# Patient Record
Sex: FEMALE | Race: BLACK OR AFRICAN AMERICAN | NOT HISPANIC OR LATINO | Employment: FULL TIME | URBAN - METROPOLITAN AREA
[De-identification: names, ages, dates, MRNs, and addresses within clinical notes are randomized per-mention and may not be internally consistent; named-entity substitution may affect disease eponyms.]

---

## 2017-07-11 ENCOUNTER — HOSPITAL ENCOUNTER (EMERGENCY)
Facility: HOSPITAL | Age: 32
Discharge: HOME | End: 2017-07-11
Attending: EMERGENCY MEDICINE | Admitting: EMERGENCY MEDICINE

## 2017-07-11 ENCOUNTER — APPOINTMENT (OUTPATIENT)
Dept: CT IMAGING | Facility: HOSPITAL | Age: 32
End: 2017-07-11

## 2017-07-11 VITALS
BODY MASS INDEX: 55.95 KG/M2 | HEART RATE: 66 BPM | RESPIRATION RATE: 20 BRPM | OXYGEN SATURATION: 100 % | DIASTOLIC BLOOD PRESSURE: 106 MMHG | SYSTOLIC BLOOD PRESSURE: 174 MMHG | WEIGHT: 285 LBS | HEIGHT: 60 IN | TEMPERATURE: 98 F

## 2017-07-11 DIAGNOSIS — R20.2 PARESTHESIA OF LEFT ARM AND LEG: ICD-10-CM

## 2017-07-11 DIAGNOSIS — I10 UNCONTROLLED HYPERTENSION: Primary | ICD-10-CM

## 2017-07-11 LAB
BUN BLDA-MCNC: 5 MG/DL (ref 8–26)
CA-I BLDA-SCNC: 1.27 MMOL/L (ref 1.2–1.32)
CHLORIDE BLDA-SCNC: 99 MMOL/L (ref 98–109)
CO2 BLDA-SCNC: 28 MMOL/L (ref 24–29)
CREAT BLDA-MCNC: 0.9 MG/DL (ref 0.6–1.3)
GLUCOSE BLDC GLUCOMTR-MCNC: 96 MG/DL (ref 70–130)
HCT VFR BLDA CALC: 43 % (ref 38–51)
HGB BLDA-MCNC: 14.6 G/DL (ref 12–17)
INR PPP: 1.1 (ref 0.8–1.2)
POTASSIUM BLDA-SCNC: 3.7 MMOL/L (ref 3.5–4.9)
PROTHROMBIN TIME: 12.9 SECONDS (ref 12.8–15.2)
SODIUM BLDA-SCNC: 141 MMOL/L (ref 138–146)

## 2017-07-11 PROCEDURE — 85610 PROTHROMBIN TIME: CPT

## 2017-07-11 PROCEDURE — 80047 BASIC METABLC PNL IONIZED CA: CPT

## 2017-07-11 PROCEDURE — 99284 EMERGENCY DEPT VISIT MOD MDM: CPT

## 2017-07-11 PROCEDURE — 85014 HEMATOCRIT: CPT

## 2017-07-11 PROCEDURE — 70450 CT HEAD/BRAIN W/O DYE: CPT

## 2017-07-11 PROCEDURE — 96374 THER/PROPH/DIAG INJ IV PUSH: CPT

## 2017-07-11 RX ORDER — FLUCONAZOLE 200 MG/1
200 TABLET ORAL 2 TIMES DAILY
COMMUNITY
End: 2020-03-16

## 2017-07-11 RX ORDER — LABETALOL HYDROCHLORIDE 5 MG/ML
20 INJECTION, SOLUTION INTRAVENOUS ONCE
Status: COMPLETED | OUTPATIENT
Start: 2017-07-11 | End: 2017-07-11

## 2017-07-11 RX ORDER — MULTIVIT WITH MINERALS/LUTEIN
1000 TABLET ORAL DAILY
COMMUNITY
End: 2017-08-25

## 2017-07-11 RX ORDER — CLONIDINE HYDROCHLORIDE 0.2 MG/1
TABLET ORAL
Qty: 15 TABLET | Refills: 0 | Status: SHIPPED | OUTPATIENT
Start: 2017-07-11 | End: 2017-08-25

## 2017-07-11 RX ADMIN — LABETALOL HYDROCHLORIDE 20 MG: 5 INJECTION, SOLUTION INTRAVENOUS at 14:11

## 2017-07-11 NOTE — ED PROVIDER NOTES
Subjective   HPI Comments: Frances Hartman is a 31 y.o.female with history of Hypertension, Diabetes Mellitus, and PCLS who presents to the ED with c/o neurologic symptoms. She reports waking up this morning with a frontal headache that has remained constant since. At 1205 her symptoms progressed as she developed left upper extremity numbness and tingling. She feels as though her left arm may be weaker in comparison to her right but she states she is unsure of this. She has history of migraines that are treated with over the counter medications. She denies experiencing the same symptoms during previous migraine episodes. She denies any other complaints at this time.     Patient is a 31 y.o. female presenting with neurologic complaint.   History provided by:  Patient  Neurologic Problem   The patient's primary symptoms include weakness (LUE). Primary symptoms comment: Numbness/tingling. Headache.. This is a new problem. The current episode started today. The neurological problem developed suddenly. Last Known Well Instant: She woke up with the headache this morning and developed numbness/tingling/weakness at 1205.  The problem is unchanged. There was left-sided focality noted. Associated symptoms include headaches (frontal). Past treatments include nothing. There is no history of a CVA.       Review of Systems   Neurological: Positive for weakness (LUE), numbness (and tingling of the LUE) and headaches (frontal).   All other systems reviewed and are negative.      Past Medical History:   Diagnosis Date   • Asthma    • Back pain    • Diabetes mellitus    • Hypertension    • Migraines    • Seasonal allergies        Allergies   Allergen Reactions   • Codeine    • Doxycycline    • Naproxen        Past Surgical History:   Procedure Laterality Date   • CHOLECYSTECTOMY     • HERNIA REPAIR     • KELOID EXCISION     • WISDOM TOOTH EXTRACTION         History reviewed. No pertinent family history.    Social History      Social History   • Marital status: Single     Spouse name: N/A   • Number of children: N/A   • Years of education: N/A     Social History Main Topics   • Smoking status: Never Smoker   • Smokeless tobacco: None   • Alcohol use Yes      Comment: RARE   • Drug use: No   • Sexual activity: Defer     Other Topics Concern   • None     Social History Narrative         Objective   Physical Exam   Constitutional: She is oriented to person, place, and time. She appears well-developed and well-nourished. No distress.   HENT:   Head: Normocephalic and atraumatic.   Eyes: Conjunctivae are normal. No scleral icterus.   Neck: Normal range of motion. Neck supple.   Cardiovascular: Normal rate, regular rhythm, normal heart sounds and intact distal pulses.    No murmur heard.  Pulmonary/Chest: Effort normal and breath sounds normal. No respiratory distress.   Abdominal: Soft. Bowel sounds are normal. There is no tenderness.   Musculoskeletal: Normal range of motion.   Neurological: She is alert and oriented to person, place, and time. No cranial nerve deficit.   No pronator drift. Equal  strength. Sensation and motor function intact. Face appears symmetric. No speech, hearing, or vision deficits noted.    Skin: Skin is warm and dry.   Psychiatric: She has a normal mood and affect. Her behavior is normal.   Nursing note and vitals reviewed.      Procedures         ED Course  ED Course   CT scan negative.  BP very high, pt says typical is 120s/80s with her meds.  Labetalol given and BP came down about 50 points systolic.  Pt reports significant improvement in headache and numbness.  I think her problem is hypertensive related.  We discussed this in detail along with treatment plan, need to monitor BP and keep a log, talk to PCP about potential need for changes in her daily meds.  Discussed expected course, reasons to return and followup.                    MDM  Number of Diagnoses or Management Options  Paresthesia of left arm  and leg:   Uncontrolled hypertension:      Amount and/or Complexity of Data Reviewed  Clinical lab tests: reviewed and ordered  Tests in the radiology section of CPT®: ordered and reviewed  Independent visualization of images, tracings, or specimens: yes        Final diagnoses:   Uncontrolled hypertension   Paresthesia of left arm and leg       Documentation assistance provided by kari Terrell.  Information recorded by the scribe was done at my direction and has been verified and validated by me.     Sarah Terrell  07/11/17 [id number]       Mikey Escalera MD  07/11/17 [id number]

## 2017-08-23 PROBLEM — E03.8 SUBCLINICAL HYPOTHYROIDISM: Status: ACTIVE | Noted: 2017-08-23

## 2017-08-23 PROBLEM — K21.9 GERD (GASTROESOPHAGEAL REFLUX DISEASE): Status: ACTIVE | Noted: 2017-08-23

## 2017-08-23 PROBLEM — L91.0 KELOID SKIN DISORDER: Status: ACTIVE | Noted: 2017-08-23

## 2017-08-23 PROBLEM — B02.9 HERPES ZOSTER: Status: ACTIVE | Noted: 2017-08-23

## 2017-08-23 PROBLEM — E78.5 HYPERLIPIDEMIA: Status: ACTIVE | Noted: 2017-08-23

## 2017-08-23 PROBLEM — I87.2 VENOUS INSUFFICIENCY (CHRONIC) (PERIPHERAL): Status: ACTIVE | Noted: 2017-08-23

## 2017-08-23 PROBLEM — E28.2 POLYCYSTIC OVARIES: Status: ACTIVE | Noted: 2017-08-23

## 2017-08-23 PROBLEM — J30.2 SEASONAL ALLERGIES: Status: ACTIVE | Noted: 2017-08-23

## 2017-08-23 PROBLEM — E66.01 MORBID OBESITY (HCC): Status: ACTIVE | Noted: 2017-08-23

## 2017-08-23 PROBLEM — D64.9 ANEMIA: Status: ACTIVE | Noted: 2017-08-23

## 2017-08-23 PROBLEM — N93.9 ABNORMAL UTERINE BLEEDING: Status: ACTIVE | Noted: 2017-08-23

## 2017-08-23 PROBLEM — R53.82 CHRONIC FATIGUE: Status: ACTIVE | Noted: 2017-08-23

## 2017-08-23 PROBLEM — E11.9 TYPE 2 DIABETES MELLITUS: Status: ACTIVE | Noted: 2017-08-23

## 2017-08-23 PROBLEM — J45.909 ASTHMA: Status: ACTIVE | Noted: 2017-08-23

## 2017-08-23 PROBLEM — F32.A DEPRESSION: Status: ACTIVE | Noted: 2017-08-23

## 2017-08-23 PROBLEM — G43.909 MIGRAINE: Status: ACTIVE | Noted: 2017-08-23

## 2017-08-23 PROBLEM — I10 HYPERTENSION: Status: ACTIVE | Noted: 2017-08-23

## 2017-08-23 PROBLEM — J35.1 CHRONIC TONSILLAR HYPERTROPHY: Status: ACTIVE | Noted: 2017-08-23

## 2017-08-25 ENCOUNTER — CONSULT (OUTPATIENT)
Dept: CARDIOLOGY | Facility: CLINIC | Age: 32
End: 2017-08-25

## 2017-08-25 VITALS
HEART RATE: 59 BPM | BODY MASS INDEX: 54.81 KG/M2 | DIASTOLIC BLOOD PRESSURE: 94 MMHG | HEIGHT: 60 IN | SYSTOLIC BLOOD PRESSURE: 140 MMHG | WEIGHT: 279.2 LBS

## 2017-08-25 DIAGNOSIS — E66.01 MORBID OBESITY DUE TO EXCESS CALORIES (HCC): ICD-10-CM

## 2017-08-25 DIAGNOSIS — G43.109 MIGRAINE WITH AURA AND WITHOUT STATUS MIGRAINOSUS, NOT INTRACTABLE: ICD-10-CM

## 2017-08-25 DIAGNOSIS — E78.2 MIXED HYPERLIPIDEMIA: ICD-10-CM

## 2017-08-25 DIAGNOSIS — I10 ESSENTIAL HYPERTENSION: Primary | ICD-10-CM

## 2017-08-25 PROCEDURE — 93000 ELECTROCARDIOGRAM COMPLETE: CPT | Performed by: INTERNAL MEDICINE

## 2017-08-25 PROCEDURE — 99203 OFFICE O/P NEW LOW 30 MIN: CPT | Performed by: INTERNAL MEDICINE

## 2017-08-25 RX ORDER — OMEGA-3S/DHA/EPA/FISH OIL/D3 300MG-1000
400 CAPSULE ORAL DAILY
COMMUNITY
End: 2019-02-01

## 2017-08-25 RX ORDER — PRENATAL VIT NO.126/IRON/FOLIC 28MG-0.8MG
1 TABLET ORAL DAILY
COMMUNITY
End: 2017-10-05

## 2017-08-25 RX ORDER — HYDROCODONE BITARTRATE AND ACETAMINOPHEN 5; 325 MG/1; MG/1
1 TABLET ORAL AS NEEDED
COMMUNITY
End: 2019-02-01 | Stop reason: SDUPTHER

## 2017-08-25 RX ORDER — LISINOPRIL AND HYDROCHLOROTHIAZIDE 12.5; 1 MG/1; MG/1
1 TABLET ORAL DAILY
Qty: 30 TABLET | Refills: 6 | Status: SHIPPED | OUTPATIENT
Start: 2017-08-25 | End: 2017-09-15

## 2017-08-25 RX ORDER — CEFADROXIL 500 MG/1
500 CAPSULE ORAL 2 TIMES DAILY
COMMUNITY
End: 2019-02-01

## 2017-08-25 NOTE — PROGRESS NOTES
Subjective   Frances Hartman is a 31 y.o. female.  LASHANDA Michel PA  [address] [place] [address]      Chief Complaint   Patient presents with   • Irregular Heart Beat   • Shortness of Breath   • Dizziness   • Edema       Patient Active Problem List    Diagnosis   • Asthma [J45.909]   • Chronic fatigue [R53.82]   • Chronic tonsillar hypertrophy [J35.1]   • Type 2 diabetes mellitus [E11.9]   • Hyperlipidemia [E78.5]   • Hypertension [I10]     Overview Note:     1. Echo 7-11-14:   1. The estimated ejection fraction is 55-60%.    2. All valves are structurally and functionally normal with no hemodynamically significant valve  disease.      • Keloid skin disorder [L91.0]   • Morbid obesity [E66.01]   • Polycystic ovaries [E28.2]   • Seasonal allergies [J30.2]   • Subclinical hypothyroidism [E03.9]   • Venous insufficiency (chronic) (peripheral) [I87.2]   • Abnormal uterine bleeding [N93.9]     Overview Note:     h/o     • Anemia [D64.9]     Overview Note:     h/o     • Depression [F32.9]     Overview Note:     h/o     • GERD (gastroesophageal reflux disease) [K21.9]     Overview Note:     H/o     • Herpes zoster [B02.9]     Overview Note:     h/o     • Migraine with aura [G43.109]     Overview Note:     1.  Onset age 8          History of Present Illness   This is a 31 year old hypertensive obese -American female with no significant cardiac history with exception of onset of hypertension at age 21.  She reports having had a stress test at that time which was apparently reported to be unremarkable.  She had an echocardiogram 3 years ago which was also unremarkable.  She recently presented to the [facility] emergency department with a complaint of left arm numbness progressing to left leg numbness she had some mild confusion but no dysarthria.  She reports that her blood pressure on arrival in the emergency department was 255/122.  She had a CT of the  "head which was unremarkable.  She was treated in the emergency department with clonidine which brought her blood pressure down approximately 50 points prior to discharge.  She was discharged to home with a prescription for Ziac in place of her previous atenolol however she reports seeing no difference in blood pressures at home and went back to her previous medical regimen.  She has been on atenolol 50 mg for many years.  She takes Lasix when necessary leg swelling.  She checks her blood pressure periodically at home the last being 133/77.  She has a history of migraine with aura with onset at age 8.  She reports having 2-3 migraines per week which she treats with OTC medications.  She states that she has a \"high pain tolerance\" but has missed work on several occasions due to headache.  She states that she had a migraine prior to her left-sided numbness and tingling.  She had a TSH and T4 in June of this year both of which were normal.  She has a history of diabetes which is reported to be well controlled by most recent laboratory evaluation.  She is not currently on an ACE inhibitor or ARB.  She has never been on a statin.  She has no complaint of exertional chest pain or dyspnea and orthopnea no PND she does have periodic lower extremity edema she has rare awareness of palpitations which are self-limiting.    The following portions of the patient's history were reviewed and updated as appropriate: allergies, current medications, past family history, past medical history, past social history, past surgical history and problem list.    Allergies   Allergen Reactions   • Augmentin [Amoxicillin-Pot Clavulanate]    • Codeine    • Doxycycline    • Naproxen          Current Outpatient Prescriptions:   •  Acetaminophen (TYLENOL ARTHRITIS PAIN PO), Take  by mouth., Disp: , Rfl:   •  albuterol (PROVENTIL HFA;VENTOLIN HFA) 108 (90 BASE) MCG/ACT inhaler, Inhale 2 puffs Every 4 (Four) Hours As Needed for wheezing., Disp: , " Rfl:   •  atenolol (TENORMIN) 50 MG tablet, Take 25 mg by mouth 2 (Two) Times a Day., Disp: , Rfl:   •  cefadroxil (DURICEF) 500 MG capsule, Take 500 mg by mouth 2 (Two) Times a Day., Disp: , Rfl:   •  cholecalciferol (VITAMIN D3) 400 units tablet, Take 400 Units by mouth Daily., Disp: , Rfl:   •  cyclobenzaprine (FLEXERIL) 10 MG tablet, Take 10 mg by mouth 3 (Three) Times a Day As Needed for muscle spasms., Disp: , Rfl:   •  diphenhydrAMINE (BENADRYL) 25 mg capsule, Take 25 mg by mouth Every 6 (Six) Hours As Needed for itching., Disp: , Rfl:   •  fluconazole (DIFLUCAN) 200 MG tablet, Take 600 mg by mouth Daily., Disp: , Rfl:   •  furosemide (LASIX) 40 MG tablet, Take 40 mg by mouth As Needed., Disp: , Rfl:   •  gabapentin (NEURONTIN) 300 MG capsule, Take 300 mg by mouth 3 (Three) Times a Day As Needed., Disp: , Rfl:   •  glipiZIDE (GLUCOTROL) 5 MG tablet, Take 5 mg by mouth 2 (Two) Times a Day Before Meals., Disp: , Rfl:   •  HYDROcodone-acetaminophen (NORCO) 5-325 MG per tablet, Take 1 tablet by mouth As Needed., Disp: , Rfl:   •  loratadine (CLARITIN) 10 MG tablet, Take 10 mg by mouth Daily., Disp: , Rfl:   •  metFORMIN (GLUCOPHAGE) 500 MG tablet, Take 500 mg by mouth Daily., Disp: , Rfl:   •  Multiple Vitamins-Minerals (MULTIVITAMIN ADULT PO), Take 1 tablet by mouth Daily., Disp: , Rfl:   •  norethindrone (AYGESTIN) 5 MG tablet, Take 5 mg by mouth Daily. 10 days per month, Disp: , Rfl:   •  potassium chloride (MICRO-K) 10 MEQ CR capsule, Take 10 mEq by mouth Daily As Needed., Disp: , Rfl:   •  Prenatal Vit-Fe Fumarate-FA (PRENATAL, CLASSIC, VITAMIN) 28-0.8 MG tablet tablet, Take 1 tablet by mouth Daily., Disp: , Rfl:   •  traMADol (ULTRAM) 50 MG tablet, Take 50 mg by mouth Every 6 (Six) Hours As Needed for moderate pain (4-6)., Disp: , Rfl:       Review of Systems   Constitution: Positive for malaise/fatigue and weight gain.   HENT: Negative.    Eyes: Negative.    Cardiovascular: Positive for leg swelling and  "palpitations. Negative for chest pain, claudication and paroxysmal nocturnal dyspnea.   Respiratory: Positive for shortness of breath.    Endocrine: Negative.    Hematologic/Lymphatic: Negative.    Skin: Negative.    Musculoskeletal: Positive for arthritis and myalgias.   Gastrointestinal: Positive for heartburn, nausea and vomiting.   Genitourinary: Negative.    Neurological: Positive for difficulty with concentration, dizziness and vertigo.   Psychiatric/Behavioral: Negative.    Allergic/Immunologic: Negative.    All other systems reviewed and are negative.      Social History     Social History   • Marital status: Single     Spouse name: N/A   • Number of children: N/A   • Years of education: N/A     Occupational History   • Not on file.     Social History Main Topics   • Smoking status: Never Smoker   • Smokeless tobacco: Not on file   • Alcohol use Yes      Comment: RARE   • Drug use: No   • Sexual activity: Defer     Other Topics Concern   • Not on file     Social History Narrative       Family History   Problem Relation Age of Onset   • Arthritis Mother    • Diabetes Mother    • Obesity Mother    • Dementia Father    • Heart attack Father    • Stroke Other      CVA       Objective      Blood pressure 140/94, pulse 59, height 60\" (152.4 cm), weight 279 lb 3.2 oz (127 kg).    Physical Exam   Constitutional: She is oriented to person, place, and time. She appears well-developed and well-nourished. No distress.   HENT:   Head: Normocephalic and atraumatic.   Mouth/Throat: Oropharynx is clear and moist.   Eyes: Pupils are equal, round, and reactive to light. No scleral icterus.   Neck: Neck supple. No JVD present. No tracheal deviation present. No thyromegaly present.   Cardiovascular: Normal rate, regular rhythm and normal heart sounds.  Exam reveals no gallop and no friction rub.    No murmur heard.  Pulmonary/Chest: Effort normal and breath sounds normal. No respiratory distress. She has no wheezes. She has no " rales.   Abdominal: Soft. Bowel sounds are normal. She exhibits no distension and no mass. There is no tenderness. There is no rebound and no guarding.   Musculoskeletal: Normal range of motion. She exhibits no edema or deformity.   Lymphadenopathy:     She has no cervical adenopathy.   Neurological: She is alert and oriented to person, place, and time. No cranial nerve deficit.   Skin: Skin is warm and dry. No rash noted. She is not diaphoretic.   Psychiatric: She has a normal mood and affect. Her behavior is normal.   Nursing note and vitals reviewed.        ECG 12 Lead  Date/Time: 8/25/2017 12:06 PM  Performed by: SLIME TOVAR  Authorized by: SLIME TOVAR   Rhythm: sinus rhythm  Rate: normal  Conduction: conduction normal  ST Segments: ST segments normal  T Waves: T waves normal  QRS axis: normal  Other: no other findings  Clinical impression: normal ECG                POC CHEM 8   Order: [id number]   Status:  Final result   Visible to patient:  No (Not Released)      Ref Range & Units 1mo ago     Glucose 70 - 130 mg/dL 96   BUN, Arterial 8 - 26 mg/dL 5 (L)   Creatinine 0.60 - 1.30 mg/dL 0.90   Sodium 138 - 146 mmol/L 141   Potassium 3.5 - 4.9 mmol/L 3.7   Chloride 98 - 109 mmol/L 99   Total CO2 24 - 29 mmol/L 28   Hemoglobin 12.0 - 17.0 g/dL 14.6   Comments: Serial Number: [id number]:  [id number]   Hematocrit 38 - 51 % 43   Ionized Calcium 1.20 - 1.32 mmol/L 1.27   Resulting Agency  [facility]      Specimen Collected: 07/11/17  1:28 PM Last Resulted: 07/11/17  1:36 PM                 Study Result   EXAMINATION: CT HEAD WO CONTRAST STROKE PROTOCOL- 0711/2017      INDICATION: left sided weakness onset 1 hour ago          TECHNIQUE: Axial CT head without intravenous contrast administration  utilizing stroke protocol      COMPARISON: NONE      FINDINGS: No large area of low attenuation to suggest large infarction.  No intracranial hemorrhage, mass or mass effect. No midline shift. Basal  cisterns are patent.  Ventricles and sulci are symmetric and within  normal limits for patient age. Globes and orbits are normal. Visualized  portions of the paranasal sinuses and mastoid air cells are grossly  clear.      IMPRESSION:  No acute intracranial abnormality.      Findings were discussed in person at scanner 1319 hours on 07/11/2017 by  Dr. Hannon with Dr. Escalera.      D:  07/11/2017  E:  07/11/2017      This report was finalized on 7/11/2017 3:29 PM by Dr. Mann Hannon.     TSH 6-2-17  2.35  T4  8    Assessment:   Diagnosis Plan   1. Essential hypertension  Adult Transthoracic Echo Complete   2. Mixed hyperlipidemia , Diet and exercise recommended with follow up with PCP.      3. Morbid obesity due to excess calories Diet and exercise recommended.      4. Migraine with aura and without status migrainosus, not intractable  Ambulatory Referral to Neurology     Plan:  Her episodes of uncontrolled hypertension appeared to be in the setting of frequent migraine headache, we feel that once this problem is addressed blood pressure control should improve.  Diet and exercise with focus on weight loss recommended, DASH diet prescribed.  She will be started on lisinopril HCTZ 10/12.5 mg daily and will continue rest of the current medications.   FU in 3 MO, sooner as needed.  Thank you for allowing us to participate in the care of your patient.     Scribed for Julianna Astudillo MD by Gumaro Tello PA-C. 8/25/2017  11:49 AM    IJulianna MD, personally performed the services described in this documentation as scribed by the above named individual in my presence, and it is both accurate and complete.  8/25/2017  12:48 PM

## 2017-09-15 ENCOUNTER — TELEPHONE (OUTPATIENT)
Dept: CARDIOLOGY | Facility: CLINIC | Age: 32
End: 2017-09-15

## 2017-09-15 RX ORDER — LISINOPRIL 20 MG/1
20 TABLET ORAL DAILY
Qty: 30 TABLET | Refills: 5 | Status: SHIPPED | OUTPATIENT
Start: 2017-09-15 | End: 2017-10-05

## 2017-09-15 NOTE — TELEPHONE ENCOUNTER
She can discontinue HCTZ, start lisinopril 20 mg daily, keep taking all other medications, monitor blood pressure and report if pressure is higher than 140.

## 2017-09-15 NOTE — TELEPHONE ENCOUNTER
Patient reports that she forgot to inform us that she was intolerant to hydrochlorothiazide as it makes her retain fluid.  She has stopped the medication and I will consult with Dr. Astudillo for further recommendations.    She is taking Prinizide which contains lisinopril as well.  Patient reports no known problems with lisinopril.     She was seen for a consult visit on 8/25/17.      Please advise.

## 2017-09-19 ENCOUNTER — HOSPITAL ENCOUNTER (OUTPATIENT)
Dept: CARDIOLOGY | Facility: HOSPITAL | Age: 32
Discharge: HOME | End: 2017-09-19
Admitting: PHYSICIAN ASSISTANT

## 2017-09-19 PROCEDURE — 93306 TTE W/DOPPLER COMPLETE: CPT | Performed by: INTERNAL MEDICINE

## 2017-09-19 PROCEDURE — 93306 TTE W/DOPPLER COMPLETE: CPT

## 2017-09-20 LAB
BH CV ECHO MEAS - AO ROOT AREA (BSA CORRECTED): 1.3
BH CV ECHO MEAS - AO ROOT AREA: 6.1 CM^2
BH CV ECHO MEAS - AO ROOT DIAM: 2.8 CM
BH CV ECHO MEAS - BSA(HAYCOCK): 2.4 M^2
BH CV ECHO MEAS - BSA: 2.2 M^2
BH CV ECHO MEAS - BZI_BMI: 54.5 KILOGRAMS/M^2
BH CV ECHO MEAS - BZI_METRIC_HEIGHT: 152.4 CM
BH CV ECHO MEAS - BZI_METRIC_WEIGHT: 126.6 KG
BH CV ECHO MEAS - CONTRAST EF (2CH): 56.1 ML/M^2
BH CV ECHO MEAS - CONTRAST EF 4CH: 63.1 ML/M^2
BH CV ECHO MEAS - EDV(CUBED): 105.1 ML
BH CV ECHO MEAS - EDV(MOD-SP2): 66 ML
BH CV ECHO MEAS - EDV(MOD-SP4): 65 ML
BH CV ECHO MEAS - EDV(TEICH): 103.4 ML
BH CV ECHO MEAS - EF(CUBED): 65.7 %
BH CV ECHO MEAS - EF(MOD-SP2): 56.1 %
BH CV ECHO MEAS - EF(MOD-SP4): 63.1 %
BH CV ECHO MEAS - EF(TEICH): 57.1 %
BH CV ECHO MEAS - ESV(CUBED): 36.1 ML
BH CV ECHO MEAS - ESV(MOD-SP2): 29 ML
BH CV ECHO MEAS - ESV(MOD-SP4): 24 ML
BH CV ECHO MEAS - ESV(TEICH): 44.3 ML
BH CV ECHO MEAS - FS: 30 %
BH CV ECHO MEAS - IVS/LVPW: 0.82
BH CV ECHO MEAS - IVSD: 1 CM
BH CV ECHO MEAS - LA DIMENSION: 3.8 CM
BH CV ECHO MEAS - LA/AO: 1.4
BH CV ECHO MEAS - LAT PEAK E' VEL: 13.7 CM/SEC
BH CV ECHO MEAS - LV DIASTOLIC VOL/BSA (35-75): 30.2 ML/M^2
BH CV ECHO MEAS - LV MASS(C)D: 192.5 GRAMS
BH CV ECHO MEAS - LV MASS(C)DI: 89.5 GRAMS/M^2
BH CV ECHO MEAS - LV MAX PG: 3.7 MMHG
BH CV ECHO MEAS - LV MEAN PG: 2.1 MMHG
BH CV ECHO MEAS - LV SYSTOLIC VOL/BSA (12-30): 11.2 ML/M^2
BH CV ECHO MEAS - LV V1 MAX: 96.7 CM/SEC
BH CV ECHO MEAS - LV V1 MEAN: 69.4 CM/SEC
BH CV ECHO MEAS - LV V1 VTI: 19.6 CM
BH CV ECHO MEAS - LVIDD: 4.7 CM
BH CV ECHO MEAS - LVIDS: 3.3 CM
BH CV ECHO MEAS - LVLD AP2: 7.8 CM
BH CV ECHO MEAS - LVLD AP4: 8.2 CM
BH CV ECHO MEAS - LVLS AP2: 6.3 CM
BH CV ECHO MEAS - LVLS AP4: 6.7 CM
BH CV ECHO MEAS - LVOT AREA (M): 2.3 CM^2
BH CV ECHO MEAS - LVOT AREA: 2.3 CM^2
BH CV ECHO MEAS - LVOT DIAM: 1.7 CM
BH CV ECHO MEAS - LVPWD: 1.2 CM
BH CV ECHO MEAS - MED PEAK E' VEL: 10.3 CM/SEC
BH CV ECHO MEAS - MV A MAX VEL: 72.1 CM/SEC
BH CV ECHO MEAS - MV E MAX VEL: 129.8 CM/SEC
BH CV ECHO MEAS - MV E/A: 1.8
BH CV ECHO MEAS - PA ACC SLOPE: 650.7 CM/SEC^2
BH CV ECHO MEAS - PA ACC TIME: 0.14 SEC
BH CV ECHO MEAS - PA PR(ACCEL): 14.8 MMHG
BH CV ECHO MEAS - RAP SYSTOLE: 8 MMHG
BH CV ECHO MEAS - RVDD: 2.9 CM
BH CV ECHO MEAS - RVSP: 32.3 MMHG
BH CV ECHO MEAS - SI(CUBED): 32.1 ML/M^2
BH CV ECHO MEAS - SI(LVOT): 21 ML/M^2
BH CV ECHO MEAS - SI(MOD-SP2): 17.2 ML/M^2
BH CV ECHO MEAS - SI(MOD-SP4): 19.1 ML/M^2
BH CV ECHO MEAS - SI(TEICH): 27.5 ML/M^2
BH CV ECHO MEAS - SV(CUBED): 69 ML
BH CV ECHO MEAS - SV(LVOT): 45.2 ML
BH CV ECHO MEAS - SV(MOD-SP2): 37 ML
BH CV ECHO MEAS - SV(MOD-SP4): 41 ML
BH CV ECHO MEAS - SV(TEICH): 59.1 ML
BH CV ECHO MEAS - TR MAX VEL: 246.5 CM/SEC
BH CV VAS BP RIGHT ARM: NORMAL MMHG
BH CV XLRA - RV BASE: 2.9 CM
BH CV XLRA - RV LENGTH: 6.2 CM
BH CV XLRA - RV MID: 2.3 CM
BH CV XLRA - TDI S': 13.1 CM/SEC
E/E' RATIO: 10
LEFT ATRIUM VOLUME INDEX: 14.4 ML/M2
LV EF 2D ECHO EST: 65 %

## 2017-10-05 ENCOUNTER — OFFICE VISIT (OUTPATIENT)
Dept: NEUROLOGY | Facility: CLINIC | Age: 32
End: 2017-10-05

## 2017-10-05 VITALS
HEART RATE: 56 BPM | BODY MASS INDEX: 55.17 KG/M2 | WEIGHT: 281 LBS | SYSTOLIC BLOOD PRESSURE: 140 MMHG | DIASTOLIC BLOOD PRESSURE: 90 MMHG | HEIGHT: 60 IN | OXYGEN SATURATION: 97 %

## 2017-10-05 DIAGNOSIS — G43.109 MIGRAINE WITH AURA AND WITHOUT STATUS MIGRAINOSUS, NOT INTRACTABLE: ICD-10-CM

## 2017-10-05 DIAGNOSIS — G43.C0 PERIODIC HEADACHE SYNDROME, NOT INTRACTABLE: Primary | ICD-10-CM

## 2017-10-05 PROCEDURE — 99245 OFF/OP CONSLTJ NEW/EST HI 55: CPT | Performed by: PSYCHIATRY & NEUROLOGY

## 2017-10-05 RX ORDER — CITALOPRAM 10 MG/1
TABLET ORAL
COMMUNITY
Start: 2017-09-21 | End: 2019-02-01

## 2017-10-05 RX ORDER — TOPIRAMATE 25 MG/1
TABLET ORAL
Qty: 120 TABLET | Refills: 3 | Status: SHIPPED | OUTPATIENT
Start: 2017-10-05 | End: 2017-12-13

## 2017-10-05 NOTE — PROGRESS NOTES
Subjective:   Chief Complaint   Patient presents with   • Migraine       Patient ID: Frances Hartman is a 31 y.o. female.    History of Present Illness     31 y.o. woman referred by Dr Astudillo for migraines.  HA frequency is daily.  Located in front of head with squeezing and throbbing sensation moderate to severe intensity.  Awakens with HA and will decrease slightly by bedtime.  Treating with Tylenol, tramadol, Lortab on a daily.  Previous trial of triptans with minimal response.       Reviewed Dr Astudillo's notes:    Presented to [facility] ED on with /122 and left A/L numbness and confusion.  Treated with clonidine and BP decreased.  H/O migraines since 8 yoa.  2 - 3 HA a week treated with OTC.  Echo  - EF 55-60,     HCT, my review of films, 7/11/17 normal  CBC, CMP, TSH, Hep B, HIV, RPR, INR - NCS    The following portions of the patient's history were reviewed and updated as appropriate:   She  has a past medical history of Asthma; Back pain; Diabetes mellitus; History of fracture; History of gallstones; Hypertension; Left lower quadrant pain; Migraines; Seasonal allergies; and UTI (urinary tract infection).  She  does not have any pertinent problems on file.  She  has a past surgical history that includes Keloid excision; Cholecystectomy; [place] tooth extraction; Hernia repair; and Other surgical history.  Her family history includes Arthritis in her mother; Dementia in her father; Diabetes in her mother; Heart attack in her father; Obesity in her mother; Stroke in her other.  She  reports that she has never smoked. She has never used smokeless tobacco. She reports that she drinks alcohol. She reports that she does not use illicit drugs.  Current Outpatient Prescriptions   Medication Sig Dispense Refill   • Acetaminophen (TYLENOL ARTHRITIS PAIN PO) Take  by mouth.     • albuterol (PROVENTIL HFA;VENTOLIN HFA) 108 (90 BASE) MCG/ACT inhaler Inhale 2 puffs Every 4 (Four) Hours As Needed for wheezing.     •  atenolol (TENORMIN) 50 MG tablet Take 25 mg by mouth 2 (Two) Times a Day.     • cefadroxil (DURICEF) 500 MG capsule Take 500 mg by mouth 2 (Two) Times a Day.     • cholecalciferol (VITAMIN D3) 400 units tablet Take 400 Units by mouth Daily.     • citalopram (CeleXA) 10 MG tablet      • cyclobenzaprine (FLEXERIL) 10 MG tablet Take 10 mg by mouth 3 (Three) Times a Day As Needed for muscle spasms.     • diphenhydrAMINE (BENADRYL) 25 mg capsule Take 25 mg by mouth Every 6 (Six) Hours As Needed for itching.     • fluconazole (DIFLUCAN) 200 MG tablet Take 600 mg by mouth Daily.     • furosemide (LASIX) 40 MG tablet Take 40 mg by mouth As Needed.     • gabapentin (NEURONTIN) 300 MG capsule Take 300 mg by mouth 3 (Three) Times a Day As Needed.     • glipiZIDE (GLUCOTROL) 5 MG tablet Take 5 mg by mouth 2 (Two) Times a Day Before Meals.     • HYDROcodone-acetaminophen (NORCO) 5-325 MG per tablet Take 1 tablet by mouth As Needed.     • loratadine (CLARITIN) 10 MG tablet Take 10 mg by mouth Daily.     • metFORMIN (GLUCOPHAGE) 500 MG tablet Take 500 mg by mouth Daily.     • norethindrone (AYGESTIN) 5 MG tablet Take 5 mg by mouth Daily. 10 days per month     • potassium chloride (MICRO-K) 10 MEQ CR capsule Take 10 mEq by mouth Daily As Needed.     • traMADol (ULTRAM) 50 MG tablet Take 50 mg by mouth Every 6 (Six) Hours As Needed for moderate pain (4-6).     • topiramate (TOPAMAX) 25 MG tablet Take one tablet twice a day for one week, then two tablets twice a day 120 tablet 3     No current facility-administered medications for this visit.      Current Outpatient Prescriptions on File Prior to Visit   Medication Sig   • Acetaminophen (TYLENOL ARTHRITIS PAIN PO) Take  by mouth.   • albuterol (PROVENTIL HFA;VENTOLIN HFA) 108 (90 BASE) MCG/ACT inhaler Inhale 2 puffs Every 4 (Four) Hours As Needed for wheezing.   • atenolol (TENORMIN) 50 MG tablet Take 25 mg by mouth 2 (Two) Times a Day.   • cefadroxil (DURICEF) 500 MG capsule Take  500 mg by mouth 2 (Two) Times a Day.   • cholecalciferol (VITAMIN D3) 400 units tablet Take 400 Units by mouth Daily.   • cyclobenzaprine (FLEXERIL) 10 MG tablet Take 10 mg by mouth 3 (Three) Times a Day As Needed for muscle spasms.   • diphenhydrAMINE (BENADRYL) 25 mg capsule Take 25 mg by mouth Every 6 (Six) Hours As Needed for itching.   • fluconazole (DIFLUCAN) 200 MG tablet Take 600 mg by mouth Daily.   • furosemide (LASIX) 40 MG tablet Take 40 mg by mouth As Needed.   • gabapentin (NEURONTIN) 300 MG capsule Take 300 mg by mouth 3 (Three) Times a Day As Needed.   • glipiZIDE (GLUCOTROL) 5 MG tablet Take 5 mg by mouth 2 (Two) Times a Day Before Meals.   • HYDROcodone-acetaminophen (NORCO) 5-325 MG per tablet Take 1 tablet by mouth As Needed.   • loratadine (CLARITIN) 10 MG tablet Take 10 mg by mouth Daily.   • metFORMIN (GLUCOPHAGE) 500 MG tablet Take 500 mg by mouth Daily.   • norethindrone (AYGESTIN) 5 MG tablet Take 5 mg by mouth Daily. 10 days per month   • potassium chloride (MICRO-K) 10 MEQ CR capsule Take 10 mEq by mouth Daily As Needed.   • traMADol (ULTRAM) 50 MG tablet Take 50 mg by mouth Every 6 (Six) Hours As Needed for moderate pain (4-6).   • [DISCONTINUED] lisinopril (PRINIVIL,ZESTRIL) 20 MG tablet Take 1 tablet by mouth Daily.   • [DISCONTINUED] Multiple Vitamins-Minerals (MULTIVITAMIN ADULT PO) Take 1 tablet by mouth Daily.   • [DISCONTINUED] Prenatal Vit-Fe Fumarate-FA (PRENATAL, CLASSIC, VITAMIN) 28-0.8 MG tablet tablet Take 1 tablet by mouth Daily.     No current facility-administered medications on file prior to visit.      She is allergic to hydrochlorothiazide; augmentin [amoxicillin-pot clavulanate]; codeine; doxycycline; and naproxen..    Review of Systems   Constitutional: Negative for activity change, fatigue and unexpected weight change.   HENT: Negative for tinnitus and trouble swallowing.    Eyes: Negative for photophobia and visual disturbance.   Respiratory: Negative for apnea,  cough and choking.    Cardiovascular: Negative for leg swelling.   Gastrointestinal: Negative for nausea and vomiting.   Endocrine: Negative for cold intolerance and heat intolerance.   Genitourinary: Negative for difficulty urinating, frequency, menstrual problem and urgency.   Musculoskeletal: Negative for back pain, gait problem, myalgias and neck pain.   Skin: Negative for color change and rash.   Allergic/Immunologic: Negative for immunocompromised state.   Neurological: Positive for weakness, numbness and headaches. Negative for dizziness, tremors, seizures, syncope, facial asymmetry, speech difficulty and light-headedness.   Hematological: Negative for adenopathy. Does not bruise/bleed easily.   Psychiatric/Behavioral: Positive for confusion. Negative for behavioral problems, decreased concentration, hallucinations and sleep disturbance.        Objective:    Neurologic Exam     Mental Status   Oriented to person, place, and time.   Registration: recalls 3 of 3 objects. Recall at 5 minutes: recalls 3 of 3 objects. Follows 3 step commands.   Attention: normal. Concentration: normal.   Speech: speech is normal   Level of consciousness: alert  Knowledge: good and consistent with education.   Able to name object. Able to read. Able to repeat. Able to write. Normal comprehension.     Cranial Nerves     CN II   Visual fields full to confrontation.   Visual acuity: normal  Right visual field deficit: none  Left visual field deficit: none     CN III, IV, VI   Pupils are equal, round, and reactive to light.  Extraocular motions are normal.   Right pupil: Shape: regular. Reactivity: brisk. Consensual response: intact.   Left pupil: Shape: regular. Reactivity: brisk. Consensual response: intact.   Nystagmus: none   Diplopia: none  Ophthalmoparesis: none  Upgaze: normal  Downgaze: normal  Conjugate gaze: present  Vestibulo-ocular reflex: present    CN V   Facial sensation intact.   Right corneal reflex: normal  Left  corneal reflex: normal    CN VII   Right facial weakness: none  Left facial weakness: none    CN VIII   Hearing: intact    CN IX, X   Palate: symmetric  Right gag reflex: normal  Left gag reflex: normal    CN XI   Right sternocleidomastoid strength: normal  Left sternocleidomastoid strength: normal    CN XII   Tongue: not atrophic  Fasciculations: absent  Tongue deviation: none    Motor Exam   Muscle bulk: normal  Overall muscle tone: normal  Right arm tone: normal  Left arm tone: normal  Right leg tone: normal  Left leg tone: normal    Strength   Strength 5/5 throughout.     Sensory Exam   Light touch normal.   Vibration normal.   Proprioception normal.   Pinprick normal.     Gait, Coordination, and Reflexes     Gait  Gait: normal    Coordination   Romberg: negative  Finger to nose coordination: normal  Heel to shin coordination: normal  Tandem walking coordination: normal    Tremor   Resting tremor: absent  Intention tremor: absent  Action tremor: absent    Reflexes   Reflexes 2+ except as noted.       Physical Exam   Constitutional: She is oriented to person, place, and time. Vital signs are normal. She appears well-developed and well-nourished.   HENT:   Head: Normocephalic and atraumatic.   Eyes: EOM and lids are normal. Pupils are equal, round, and reactive to light.   Fundoscopic exam:       The right eye shows no exudate, no hemorrhage and no papilledema. The right eye shows venous pulsations.        The left eye shows no exudate, no hemorrhage and no papilledema. The left eye shows venous pulsations.   Neck: Normal range of motion and phonation normal. Normal carotid pulses present. Carotid bruit is not present. No thyroid mass and no thyromegaly present.   Cardiovascular: Normal rate, regular rhythm and normal heart sounds.    Pulmonary/Chest: Effort normal.   Neurological: She is oriented to person, place, and time. She has normal strength. She has a normal Finger-Nose-Finger Test, a normal Heel to Shin  Test, a normal Romberg Test and a normal Tandem Gait Test. Gait normal.   Skin: Skin is warm and dry.   Psychiatric: She has a normal mood and affect. Her speech is normal.   Nursing note and vitals reviewed.      Assessment/Plan:       Problems Addressed this Visit        Cardiovascular and Mediastinum    Migraine with aura     Headaches are newly identified.  Medication changes per orders.     Start TPM titration              Relevant Medications    citalopram (CeleXA) 10 MG tablet    topiramate (TOPAMAX) 25 MG tablet      Other Visit Diagnoses     Periodic headache syndrome, not intractable    -  Primary    Relevant Medications    citalopram (CeleXA) 10 MG tablet    topiramate (TOPAMAX) 25 MG tablet

## 2017-12-01 ENCOUNTER — TELEPHONE (OUTPATIENT)
Dept: NEUROLOGY | Facility: CLINIC | Age: 32
End: 2017-12-01

## 2017-12-01 RX ORDER — POTASSIUM CHLORIDE 750 MG/1
20 CAPSULE, EXTENDED RELEASE ORAL 2 TIMES DAILY
Qty: 60 CAPSULE | Refills: 5 | Status: SHIPPED | OUTPATIENT
Start: 2017-12-01 | End: 2017-12-13

## 2017-12-01 NOTE — TELEPHONE ENCOUNTER
----- Message from Coni Cruz sent at 12/1/2017 12:44 PM EST -----  Regarding: SIDE EFFECT  Contact: [phone]  Patient states her feet are tingling from topamax. She states this started a month ago. She was advised by PCP to call and report side effect.

## 2017-12-01 NOTE — TELEPHONE ENCOUNTER
Calling patient to let her know that  wants her to take potassium chloride 10meq cr 2 tablets by mouth 2 times a day

## 2017-12-13 ENCOUNTER — OFFICE VISIT (OUTPATIENT)
Dept: NEUROLOGY | Facility: CLINIC | Age: 32
End: 2017-12-13

## 2017-12-13 VITALS
BODY MASS INDEX: 54.97 KG/M2 | SYSTOLIC BLOOD PRESSURE: 132 MMHG | OXYGEN SATURATION: 97 % | HEART RATE: 58 BPM | RESPIRATION RATE: 18 BRPM | HEIGHT: 60 IN | WEIGHT: 280 LBS | DIASTOLIC BLOOD PRESSURE: 88 MMHG

## 2017-12-13 DIAGNOSIS — G43.109 MIGRAINE WITH AURA AND WITHOUT STATUS MIGRAINOSUS, NOT INTRACTABLE: Primary | ICD-10-CM

## 2017-12-13 PROCEDURE — 99213 OFFICE O/P EST LOW 20 MIN: CPT | Performed by: PSYCHIATRY & NEUROLOGY

## 2017-12-13 RX ORDER — DIVALPROEX SODIUM 500 MG/1
500 TABLET, EXTENDED RELEASE ORAL DAILY
Qty: 30 TABLET | Refills: 5 | Status: SHIPPED | OUTPATIENT
Start: 2017-12-13 | End: 2019-02-01

## 2017-12-13 NOTE — ASSESSMENT & PLAN NOTE
Headaches are worsening.  Medication changes per orders.     Stop TPM    Start Depakote  mg daily

## 2017-12-13 NOTE — PROGRESS NOTES
Subjective:   Chief Complaint   Patient presents with   • Headache       Patient ID: Frances Hartman is a 32 y.o. female.    History of Present Illness     32 y.o. woman returns in follow up for migraines.  Last visit on 10/5/17 started on TPM.     Patient called on 12/1/17 reporting tingling on topamax and rx [facility].    Side effects of tingling and word finding problems on TPM.  HA worsened recently.    HA frequency is daily.  Located in front of head.  Taking Tylenol daily.  Tramadol/hydrocodone rare for back pain.      Problem history:    HA frequency is daily.  Located in front of head with squeezing and throbbing sensation moderate to severe intensity.  Awakens with HA and will decrease slightly by bedtime.  Treating with Tylenol, tramadol, Lortab on a daily.  Previous trial of triptans with minimal response.       Reviewed Dr Astudillo's notes:    Presented to [facility] ED on with /122 and left A/L numbness and confusion.  Treated with clonidine and BP decreased.  H/O migraines since 8 yoa.  2 - 3 HA a week treated with OTC.  Echo  - EF 55-60,     HCT, my review of films, 7/11/17 normal  CBC, CMP, TSH, Hep B, HIV, RPR, INR - NCS    The following portions of the patient's history were reviewed and updated as appropriate:   She  has a past medical history of Asthma; Back pain; Diabetes mellitus; History of fracture; History of gallstones; Hypertension; Left lower quadrant pain; Migraines; Seasonal allergies; and UTI (urinary tract infection).  She  does not have any pertinent problems on file.  She  has a past surgical history that includes Keloid excision; Cholecystectomy; [place] tooth extraction; Hernia repair; and Other surgical history.  Her family history includes Arthritis in her mother; Dementia in her father; Diabetes in her mother; Heart attack in her father; Obesity in her mother; Stroke in her other.  She  reports that she has never smoked. She has never used smokeless tobacco. She reports that she  drinks alcohol. She reports that she does not use illicit drugs.  Current Outpatient Prescriptions   Medication Sig Dispense Refill   • Acetaminophen (TYLENOL ARTHRITIS PAIN PO) Take  by mouth.     • albuterol (PROVENTIL HFA;VENTOLIN HFA) 108 (90 BASE) MCG/ACT inhaler Inhale 2 puffs Every 4 (Four) Hours As Needed for wheezing.     • atenolol (TENORMIN) 50 MG tablet Take 25 mg by mouth 2 (Two) Times a Day.     • cholecalciferol (VITAMIN D3) 400 units tablet Take 400 Units by mouth Daily.     • cyclobenzaprine (FLEXERIL) 10 MG tablet Take 10 mg by mouth 3 (Three) Times a Day As Needed for muscle spasms.     • diphenhydrAMINE (BENADRYL) 25 mg capsule Take 25 mg by mouth Every 6 (Six) Hours As Needed for itching.     • furosemide (LASIX) 40 MG tablet Take 40 mg by mouth As Needed.     • gabapentin (NEURONTIN) 300 MG capsule Take 300 mg by mouth 3 (Three) Times a Day As Needed.     • glipiZIDE (GLUCOTROL) 5 MG tablet Take 5 mg by mouth 2 (Two) Times a Day Before Meals.     • HYDROcodone-acetaminophen (NORCO) 5-325 MG per tablet Take 1 tablet by mouth As Needed.     • loratadine (CLARITIN) 10 MG tablet Take 10 mg by mouth Daily.     • metFORMIN (GLUCOPHAGE) 500 MG tablet Take 500 mg by mouth Daily.     • norethindrone (AYGESTIN) 5 MG tablet Take 5 mg by mouth Daily. 10 days per month     • traMADol (ULTRAM) 50 MG tablet Take 50 mg by mouth Every 6 (Six) Hours As Needed for moderate pain (4-6).     • cefadroxil (DURICEF) 500 MG capsule Take 500 mg by mouth 2 (Two) Times a Day.     • citalopram (CeleXA) 10 MG tablet      • divalproex (DEPAKOTE) 500 MG 24 hr tablet Take 1 tablet by mouth Daily. 30 tablet 5   • fluconazole (DIFLUCAN) 200 MG tablet Take 600 mg by mouth Daily.       No current facility-administered medications for this visit.      Current Outpatient Prescriptions on File Prior to Visit   Medication Sig   • Acetaminophen (TYLENOL ARTHRITIS PAIN PO) Take  by mouth.   • albuterol (PROVENTIL HFA;VENTOLIN HFA) 108  (90 BASE) MCG/ACT inhaler Inhale 2 puffs Every 4 (Four) Hours As Needed for wheezing.   • atenolol (TENORMIN) 50 MG tablet Take 25 mg by mouth 2 (Two) Times a Day.   • cholecalciferol (VITAMIN D3) 400 units tablet Take 400 Units by mouth Daily.   • cyclobenzaprine (FLEXERIL) 10 MG tablet Take 10 mg by mouth 3 (Three) Times a Day As Needed for muscle spasms.   • diphenhydrAMINE (BENADRYL) 25 mg capsule Take 25 mg by mouth Every 6 (Six) Hours As Needed for itching.   • furosemide (LASIX) 40 MG tablet Take 40 mg by mouth As Needed.   • gabapentin (NEURONTIN) 300 MG capsule Take 300 mg by mouth 3 (Three) Times a Day As Needed.   • glipiZIDE (GLUCOTROL) 5 MG tablet Take 5 mg by mouth 2 (Two) Times a Day Before Meals.   • HYDROcodone-acetaminophen (NORCO) 5-325 MG per tablet Take 1 tablet by mouth As Needed.   • loratadine (CLARITIN) 10 MG tablet Take 10 mg by mouth Daily.   • metFORMIN (GLUCOPHAGE) 500 MG tablet Take 500 mg by mouth Daily.   • norethindrone (AYGESTIN) 5 MG tablet Take 5 mg by mouth Daily. 10 days per month   • traMADol (ULTRAM) 50 MG tablet Take 50 mg by mouth Every 6 (Six) Hours As Needed for moderate pain (4-6).   • [DISCONTINUED] potassium chloride (MICRO-K) 10 MEQ CR capsule Take 2 capsules by mouth 2 (Two) Times a Day.   • [DISCONTINUED] topiramate (TOPAMAX) 25 MG tablet Take one tablet twice a day for one week, then two tablets twice a day   • cefadroxil (DURICEF) 500 MG capsule Take 500 mg by mouth 2 (Two) Times a Day.   • citalopram (CeleXA) 10 MG tablet    • fluconazole (DIFLUCAN) 200 MG tablet Take 600 mg by mouth Daily.     No current facility-administered medications on file prior to visit.      She is allergic to hydrochlorothiazide; augmentin [amoxicillin-pot clavulanate]; codeine; doxycycline; and naproxen..    Review of Systems   Constitutional: Positive for fatigue. Negative for activity change and unexpected weight change.   HENT: Negative for tinnitus and trouble swallowing.    Eyes:  "Negative for photophobia and visual disturbance.   Respiratory: Negative for apnea, cough and choking.    Cardiovascular: Negative for leg swelling.   Gastrointestinal: Negative for nausea and vomiting.   Endocrine: Negative for cold intolerance and heat intolerance.   Genitourinary: Negative for difficulty urinating, frequency, menstrual problem and urgency.   Musculoskeletal: Negative for back pain, gait problem, myalgias and neck pain.   Skin: Negative for color change and rash.   Allergic/Immunologic: Negative for immunocompromised state.   Neurological: Positive for weakness, numbness and headaches. Negative for dizziness, tremors, seizures, syncope, facial asymmetry, speech difficulty and light-headedness.   Hematological: Negative for adenopathy. Does not bruise/bleed easily.   Psychiatric/Behavioral: Positive for confusion. Negative for behavioral problems, decreased concentration, hallucinations and sleep disturbance.        Objective:  Vitals:    12/13/17 1014   BP: 132/88   BP Location: Left arm   Patient Position: Sitting   Cuff Size: Adult   Pulse: 58   Resp: 18   SpO2: 97%   Weight: 127 kg (280 lb)   Height: 152.4 cm (60\")       Neurologic Exam     Mental Status   Registration: recalls 3 of 3 objects. Recall at 5 minutes: recalls 3 of 3 objects. Follows 3 step commands.   Attention: normal. Concentration: normal.   Level of consciousness: alert  Knowledge: good and consistent with education.   Able to name object. Able to read. Able to repeat. Able to write. Normal comprehension.     Cranial Nerves     CN II   Visual fields full to confrontation.   Visual acuity: normal  Right visual field deficit: none  Left visual field deficit: none     CN III, IV, VI   Right pupil: Shape: regular. Reactivity: brisk. Consensual response: intact.   Left pupil: Shape: regular. Reactivity: brisk. Consensual response: intact.   Nystagmus: none   Diplopia: none  Ophthalmoparesis: none  Upgaze: normal  Downgaze: " normal  Conjugate gaze: present  Vestibulo-ocular reflex: present    CN V   Facial sensation intact.   Right corneal reflex: normal  Left corneal reflex: normal    CN VII   Right facial weakness: none  Left facial weakness: none    CN VIII   Hearing: intact    CN IX, X   Palate: symmetric  Right gag reflex: normal  Left gag reflex: normal    CN XI   Right sternocleidomastoid strength: normal  Left sternocleidomastoid strength: normal    CN XII   Tongue: not atrophic  Fasciculations: absent  Tongue deviation: none    Motor Exam   Muscle bulk: normal  Overall muscle tone: normal  Right arm tone: normal  Left arm tone: normal  Right leg tone: normal  Left leg tone: normal    Sensory Exam   Light touch normal.   Vibration normal.   Proprioception normal.   Pinprick normal.     Gait, Coordination, and Reflexes     Tremor   Resting tremor: absent  Intention tremor: absent  Action tremor: absent    Reflexes   Reflexes 2+ except as noted.       Physical Exam   Constitutional: She appears well-developed and well-nourished.   Nursing note and vitals reviewed.      Assessment/Plan:       Problems Addressed this Visit        Cardiovascular and Mediastinum    Migraine with aura - Primary     Headaches are worsening.  Medication changes per orders.     Stop TPM    Start Depakote  mg daily              Relevant Medications    divalproex (DEPAKOTE) 500 MG 24 hr tablet

## 2017-12-31 ENCOUNTER — HOSPITAL ENCOUNTER (EMERGENCY)
Facility: HOSPITAL | Age: 32
Discharge: HOME | End: 2017-12-31
Attending: EMERGENCY MEDICINE | Admitting: EMERGENCY MEDICINE

## 2017-12-31 ENCOUNTER — APPOINTMENT (OUTPATIENT)
Dept: GENERAL RADIOLOGY | Facility: HOSPITAL | Age: 32
End: 2017-12-31

## 2017-12-31 VITALS
BODY MASS INDEX: 54.97 KG/M2 | DIASTOLIC BLOOD PRESSURE: 93 MMHG | TEMPERATURE: 98.1 F | WEIGHT: 280 LBS | OXYGEN SATURATION: 100 % | HEIGHT: 60 IN | SYSTOLIC BLOOD PRESSURE: 160 MMHG | RESPIRATION RATE: 18 BRPM | HEART RATE: 74 BPM

## 2017-12-31 DIAGNOSIS — S80.02XA CONTUSION OF LEFT KNEE, INITIAL ENCOUNTER: ICD-10-CM

## 2017-12-31 DIAGNOSIS — S62.646A CLOSED NONDISPLACED FRACTURE OF PROXIMAL PHALANX OF RIGHT LITTLE FINGER, INITIAL ENCOUNTER: Primary | ICD-10-CM

## 2017-12-31 DIAGNOSIS — M54.5 ACUTE LOW BACK PAIN, UNSPECIFIED BACK PAIN LATERALITY, WITH SCIATICA PRESENCE UNSPECIFIED: ICD-10-CM

## 2017-12-31 DIAGNOSIS — S83.92XA SPRAIN OF LEFT KNEE, UNSPECIFIED LIGAMENT, INITIAL ENCOUNTER: ICD-10-CM

## 2017-12-31 PROCEDURE — 99284 EMERGENCY DEPT VISIT MOD MDM: CPT

## 2017-12-31 PROCEDURE — 73560 X-RAY EXAM OF KNEE 1 OR 2: CPT

## 2017-12-31 PROCEDURE — 73130 X-RAY EXAM OF HAND: CPT

## 2017-12-31 PROCEDURE — 72100 X-RAY EXAM L-S SPINE 2/3 VWS: CPT

## 2017-12-31 RX ORDER — HYDROCODONE BITARTRATE AND ACETAMINOPHEN 7.5; 325 MG/1; MG/1
1 TABLET ORAL ONCE
Status: COMPLETED | OUTPATIENT
Start: 2017-12-31 | End: 2017-12-31

## 2017-12-31 RX ORDER — HYDROCODONE BITARTRATE AND ACETAMINOPHEN 5; 325 MG/1; MG/1
1-2 TABLET ORAL EVERY 6 HOURS PRN
Qty: 15 TABLET | Refills: 0 | Status: ON HOLD | OUTPATIENT
Start: 2017-12-31 | End: 2021-11-30 | Stop reason: SDUPTHER

## 2017-12-31 RX ORDER — ACETAMINOPHEN 325 MG/1
650 TABLET ORAL ONCE
Status: COMPLETED | OUTPATIENT
Start: 2017-12-31 | End: 2017-12-31

## 2017-12-31 RX ADMIN — HYDROCODONE BITARTRATE AND ACETAMINOPHEN 1 TABLET: 7.5; 325 TABLET ORAL at 20:09

## 2017-12-31 RX ADMIN — ACETAMINOPHEN 650 MG: 325 TABLET, FILM COATED ORAL at 19:08
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
## 2018-02-01 ENCOUNTER — LAB (OUTPATIENT)
Dept: LAB | Facility: HOSPITAL | Age: 33
End: 2018-02-01

## 2018-02-01 ENCOUNTER — TRANSCRIBE ORDERS (OUTPATIENT)
Dept: LAB | Facility: HOSPITAL | Age: 33
End: 2018-02-01

## 2018-02-01 DIAGNOSIS — R53.83 FATIGUE, UNSPECIFIED TYPE: ICD-10-CM

## 2018-02-01 DIAGNOSIS — E11.8 TYPE 2 DIABETES MELLITUS WITH COMPLICATION, UNSPECIFIED LONG TERM INSULIN USE STATUS: ICD-10-CM

## 2018-02-01 DIAGNOSIS — R21 RASH AND OTHER NONSPECIFIC SKIN ERUPTION: Primary | ICD-10-CM

## 2018-02-01 PROCEDURE — 87186 SC STD MICRODIL/AGAR DIL: CPT

## 2018-02-01 PROCEDURE — 87147 CULTURE TYPE IMMUNOLOGIC: CPT

## 2018-02-01 PROCEDURE — 87205 SMEAR GRAM STAIN: CPT

## 2018-02-01 PROCEDURE — 88305 TISSUE EXAM BY PATHOLOGIST: CPT

## 2018-02-01 PROCEDURE — 87176 TISSUE HOMOGENIZATION CULTR: CPT

## 2018-02-01 PROCEDURE — 87070 CULTURE OTHR SPECIMN AEROBIC: CPT

## 2018-02-01 PROCEDURE — 87077 CULTURE AEROBIC IDENTIFY: CPT

## 2018-02-06 LAB
CYTO UR: NORMAL
DX PRELIMINARY: NORMAL
LAB AP CASE REPORT: NORMAL
LAB AP CLINICAL INFORMATION: NORMAL
Lab: NORMAL
PATH REPORT.FINAL DX SPEC: NORMAL
PATH REPORT.GROSS SPEC: NORMAL

## 2018-02-08 ENCOUNTER — LAB (OUTPATIENT)
Dept: LAB | Facility: HOSPITAL | Age: 33
End: 2018-02-08

## 2018-02-08 DIAGNOSIS — E10.8 TYPE 1 DIABETES MELLITUS WITH COMPLICATION (HCC): Primary | ICD-10-CM

## 2018-02-08 LAB
ALBUMIN SERPL-MCNC: 4.5 G/DL (ref 3.2–4.8)
ALBUMIN/GLOB SERPL: 1.5 G/DL (ref 1.5–2.5)
ALP SERPL-CCNC: 115 U/L (ref 25–100)
ALT SERPL W P-5'-P-CCNC: 28 U/L (ref 7–40)
ANION GAP SERPL CALCULATED.3IONS-SCNC: 8 MMOL/L (ref 3–11)
AST SERPL-CCNC: 24 U/L (ref 0–33)
BACTERIA SPEC AEROBE CULT: ABNORMAL
BILIRUB SERPL-MCNC: 0.4 MG/DL (ref 0.3–1.2)
BUN BLD-MCNC: 9 MG/DL (ref 9–23)
BUN/CREAT SERPL: 10 (ref 7–25)
CALCIUM SPEC-SCNC: 9.8 MG/DL (ref 8.7–10.4)
CHLORIDE SERPL-SCNC: 104 MMOL/L (ref 99–109)
CO2 SERPL-SCNC: 27 MMOL/L (ref 20–31)
CREAT BLD-MCNC: 0.9 MG/DL (ref 0.6–1.3)
GFR SERPL CREATININE-BSD FRML MDRD: 87 ML/MIN/1.73
GLOBULIN UR ELPH-MCNC: 3 GM/DL
GLUCOSE BLD-MCNC: 111 MG/DL (ref 70–100)
GRAM STN SPEC: ABNORMAL
POTASSIUM BLD-SCNC: 4.2 MMOL/L (ref 3.5–5.5)
PROT SERPL-MCNC: 7.5 G/DL (ref 5.7–8.2)
SODIUM BLD-SCNC: 139 MMOL/L (ref 132–146)

## 2018-02-08 PROCEDURE — 80053 COMPREHEN METABOLIC PANEL: CPT

## 2018-02-08 PROCEDURE — 36415 COLL VENOUS BLD VENIPUNCTURE: CPT

## 2019-02-01 ENCOUNTER — OFFICE VISIT (OUTPATIENT)
Dept: CARDIOLOGY | Facility: CLINIC | Age: 34
End: 2019-02-01

## 2019-02-01 VITALS
BODY MASS INDEX: 52.54 KG/M2 | HEIGHT: 60 IN | WEIGHT: 267.6 LBS | HEART RATE: 59 BPM | SYSTOLIC BLOOD PRESSURE: 149 MMHG | DIASTOLIC BLOOD PRESSURE: 92 MMHG

## 2019-02-01 DIAGNOSIS — I10 ESSENTIAL HYPERTENSION: Primary | ICD-10-CM

## 2019-02-01 DIAGNOSIS — E66.01 MORBID OBESITY (HCC): ICD-10-CM

## 2019-02-01 DIAGNOSIS — E78.2 MIXED HYPERLIPIDEMIA: ICD-10-CM

## 2019-02-01 PROCEDURE — 99214 OFFICE O/P EST MOD 30 MIN: CPT | Performed by: INTERNAL MEDICINE

## 2019-02-01 RX ORDER — RAMIPRIL 10 MG/1
10 CAPSULE ORAL NIGHTLY
Qty: 90 CAPSULE | Refills: 3 | Status: SHIPPED | OUTPATIENT
Start: 2019-02-01 | End: 2019-08-08 | Stop reason: ALTCHOICE

## 2019-02-01 RX ORDER — CARVEDILOL 25 MG/1
25 TABLET ORAL 2 TIMES DAILY
Qty: 180 TABLET | Refills: 3 | OUTPATIENT
Start: 2019-02-01 | End: 2019-02-26

## 2019-02-01 RX ORDER — CLONIDINE HYDROCHLORIDE 0.2 MG/1
0.2 TABLET ORAL DAILY PRN
COMMUNITY
End: 2019-07-17 | Stop reason: SDUPTHER

## 2019-02-01 RX ORDER — SPIRONOLACTONE 100 MG/1
100 TABLET, FILM COATED ORAL
COMMUNITY
End: 2021-04-09 | Stop reason: SDUPTHER

## 2019-02-01 NOTE — PROGRESS NOTES
"        Encounter Date:02/01/2019      Patient ID: Frances Hartman is a 33 y.o. female.    Chief Complaint: Hypertension      PROBLEM LIST:  1. Hypertension:  a. Echo, 07/11/2014: EF 55-60%. All valves are structurally and functionally normal with no hemodynamically significant valve disease.  b. Echo, 09/19/2017: EF 65%. Mild TR with normal RVSP.  2. Hyperlipidemia.  3. DM2.  4. Morbid obesity (BMI 54.7)  5. Peripheral chronic venous insufficiency.  6. Polycystic ovaries.  7. GERD.  8. Subclinical hypothyroidism.  9. Keloid skin disorder.  10. Chronic fatigue.  11. Chronic tonsillar hypertrophy.  12. Depression.  13. Migraine with aura, onset age 8.    History of Present Illness  Patient presents today for follow-up with a history of hypertension and hyperlipidemia. Since last visit, she has had \"random\" episodes of fluctuating blood pressure, with fast heart rate associated cold sweats and light-headedness. She does not tolerate hydrochlorothiazide, and says it \"makes her retain water\".. Denies chest pain, shortness of breath, leg swelling, palpitations, and syncope. She recently saw a neurologist, who put her on medication for her headaches which caused multiple side effects, including hair loss, and memory loss, she discontinued the medication.  She is not exercising regularly and has not been too careful with her diet..     Allergies   Allergen Reactions   • Hydrochlorothiazide Swelling   • Augmentin [Amoxicillin-Pot Clavulanate]    • Codeine    • Doxycycline    • Naproxen          Current Outpatient Medications:   •  Acetaminophen (TYLENOL ARTHRITIS PAIN PO), Take  by mouth As Needed., Disp: , Rfl:   •  albuterol (PROVENTIL HFA;VENTOLIN HFA) 108 (90 BASE) MCG/ACT inhaler, Inhale 2 puffs Every 4 (Four) Hours As Needed for wheezing., Disp: , Rfl:   •  atenolol (TENORMIN) 50 MG tablet, Take 50 mg by mouth 2 (Two) Times a Day., Disp: , Rfl:   •  CloNIDine (CATAPRES) 0.2 MG tablet, Take 0.2 mg by mouth Daily " As Needed for High Blood Pressure., Disp: , Rfl:   •  cyclobenzaprine (FLEXERIL) 10 MG tablet, Take 10 mg by mouth 3 (Three) Times a Day As Needed for muscle spasms., Disp: , Rfl:   •  diphenhydrAMINE (BENADRYL) 25 mg capsule, Take 25 mg by mouth Every 6 (Six) Hours As Needed for itching., Disp: , Rfl:   •  fluconazole (DIFLUCAN) 200 MG tablet, Take 200 mg by mouth 2 (Two) Times a Day., Disp: , Rfl:   •  furosemide (LASIX) 40 MG tablet, Take 40 mg by mouth Every Other Day., Disp: , Rfl:   •  gabapentin (NEURONTIN) 300 MG capsule, Take 300 mg by mouth 3 (Three) Times a Day As Needed., Disp: , Rfl:   •  glipiZIDE (GLUCOTROL) 5 MG tablet, Take 5 mg by mouth 2 (Two) Times a Day Before Meals., Disp: , Rfl:   •  HYDROcodone-acetaminophen (NORCO) 5-325 MG per tablet, Take 1-2 tablets by mouth Every 6 (Six) Hours As Needed for Severe Pain ., Disp: 15 tablet, Rfl: 0  •  loratadine (CLARITIN) 10 MG tablet, Take 10 mg by mouth Daily., Disp: , Rfl:   •  metFORMIN (GLUCOPHAGE) 500 MG tablet, Take 500 mg by mouth Daily., Disp: , Rfl:   •  norethindrone (AYGESTIN) 5 MG tablet, Take 5 mg by mouth Daily. 10 days per month, Disp: , Rfl:   •  spironolactone (ALDACTONE) 100 MG tablet, Take 100 mg by mouth Daily., Disp: , Rfl:   •  traMADol (ULTRAM) 50 MG tablet, Take 50 mg by mouth Every 6 (Six) Hours As Needed for moderate pain (4-6)., Disp: , Rfl:     The following portions of the patient's history were reviewed and updated as appropriate: allergies, current medications, past family history, past medical history, past social history, past surgical history and problem list.    ROS  Review of Systems   Constitution: Negative for chills, fever, weight gain and weight loss. Positive for cold sweats.  Cardiovascular: Negative for chest pain, claudication, dyspnea on exertion, leg swelling, orthopnea, palpitations, paroxysmal nocturnal dyspnea and syncope.  Positive for dizziness   Gastrointestinal: Negative for abdominal pain,  "constipation, diarrhea, nausea and vomiting.   Genitourinary:        No urinary symptoms   Neurological: positive for headaches.       No symptoms of stroke.   All other systems reviewed and are negative.    Objective:     Blood pressure 149/92, pulse 59, height 152.4 cm (60\"), weight 121 kg (267 lb 9.6 oz).      Physical Exam  Constitutional: She appears well-developed and well-nourished.   HENT:   HEENT exam unremarkable.   Neck: Neck supple. No JVD present.   No carotid bruits.   Cardiovascular: Normal rate, regular rhythm and normal heart sounds.    No murmur heard.  2 plus symmetric pulses.   Pulmonary/Chest: Breath sounds normal. Does not exhibit tenderness.   Abdominal:   Abdomen benign.   Musculoskeletal: Does not exhibit edema.   Neurological:   Neurological exam unremarkable.   Vitals reviewed.    Lab Review:   Lab Results   Component Value Date    GLUCOSE 111 (H) 02/08/2018    BUN 9 02/08/2018    CREATININE 0.90 02/08/2018    EGFRIFNONA 94 03/25/2015    EGFRIFAFRI 87 02/08/2018    BCR 10.0 02/08/2018    K 4.2 02/08/2018    CO2 27.0 02/08/2018    CALCIUM 9.8 02/08/2018    PROTENTOTREF 7.5 03/25/2015    ALBUMIN 4.50 02/08/2018    LABIL2 1.5 03/25/2015    AST 24 02/08/2018    ALT 28 02/08/2018     Procedures       Assessment:      Diagnosis Plan   1. Essential hypertension  DC atenolol and start carvedilol 25 mg BID.  Start ramipril 10 mg daily. Continue all other current medications   2. Mixed hyperlipidemia     3. Morbid obesity (CMS/[facility])  Weight loss strongly recommended, with diet and exercise. If this not effective, bariatric surgery recommended.     Plan:   DC atenolol and start carvedilol 25 mg BID.  Start ramipril 10 mg daily at bedtime, maintaining good hydration.  Monitor blood pressure at home.    Diet and exercise for weight loss, consider bariatric surgery.    Stable cardiac status.  Continue all other current medications.   FU in 6 MO, sooner as needed.  Thank you for allowing us to participate " in the care of your patient.     Scribed for Julianna Astudillo MD by Lois Cintron. 2/1/2019  10:59 AM      I, Julianna Astudillo MD, personally performed the services described in this documentation as scribed by the above named individual in my presence, and it is both accurate and complete.  2/1/2019  11:03 AM        Please note that portions of this note may have been completed with a voice recognition program. Efforts were made to edit the dictations, but occasionally words are mistranscribed.

## 2019-02-26 ENCOUNTER — TELEPHONE (OUTPATIENT)
Dept: CARDIOLOGY | Facility: CLINIC | Age: 34
End: 2019-02-26

## 2019-02-26 ENCOUNTER — HOSPITAL ENCOUNTER (EMERGENCY)
Facility: HOSPITAL | Age: 34
Discharge: HOME | End: 2019-02-26
Attending: EMERGENCY MEDICINE | Admitting: EMERGENCY MEDICINE

## 2019-02-26 VITALS
RESPIRATION RATE: 16 BRPM | BODY MASS INDEX: 51.63 KG/M2 | TEMPERATURE: 98.6 F | OXYGEN SATURATION: 99 % | WEIGHT: 263 LBS | HEIGHT: 60 IN | SYSTOLIC BLOOD PRESSURE: 126 MMHG | HEART RATE: 58 BPM | DIASTOLIC BLOOD PRESSURE: 108 MMHG

## 2019-02-26 DIAGNOSIS — R00.1 SINUS BRADYCARDIA: Primary | ICD-10-CM

## 2019-02-26 LAB
ALBUMIN SERPL-MCNC: 5.07 G/DL (ref 3.2–4.8)
ALBUMIN/GLOB SERPL: 1.6 G/DL (ref 1.5–2.5)
ALP SERPL-CCNC: 120 U/L (ref 25–100)
ALT SERPL W P-5'-P-CCNC: 44 U/L (ref 7–40)
ANION GAP SERPL CALCULATED.3IONS-SCNC: 9 MMOL/L (ref 3–11)
AST SERPL-CCNC: 40 U/L (ref 0–33)
BASOPHILS # BLD AUTO: 0.03 10*3/MM3 (ref 0–0.2)
BASOPHILS NFR BLD AUTO: 0.3 % (ref 0–1)
BILIRUB SERPL-MCNC: 0.4 MG/DL (ref 0.3–1.2)
BUN BLD-MCNC: 7 MG/DL (ref 9–23)
BUN/CREAT SERPL: 7.1 (ref 7–25)
CALCIUM SPEC-SCNC: 10.3 MG/DL (ref 8.7–10.4)
CHLORIDE SERPL-SCNC: 100 MMOL/L (ref 99–109)
CO2 SERPL-SCNC: 27 MMOL/L (ref 20–31)
CREAT BLD-MCNC: 0.99 MG/DL (ref 0.6–1.3)
DEPRECATED RDW RBC AUTO: 46.4 FL (ref 37–54)
EOSINOPHIL # BLD AUTO: 0.31 10*3/MM3 (ref 0–0.3)
EOSINOPHIL NFR BLD AUTO: 3.1 % (ref 0–3)
ERYTHROCYTE [DISTWIDTH] IN BLOOD BY AUTOMATED COUNT: 13 % (ref 11.3–14.5)
GFR SERPL CREATININE-BSD FRML MDRD: 77 ML/MIN/1.73
GLOBULIN UR ELPH-MCNC: 3.1 GM/DL
GLUCOSE BLD-MCNC: 106 MG/DL (ref 70–100)
HCT VFR BLD AUTO: 42.9 % (ref 34.5–44)
HGB BLD-MCNC: 13.7 G/DL (ref 11.5–15.5)
IMM GRANULOCYTES # BLD AUTO: 0.01 10*3/MM3 (ref 0–0.05)
IMM GRANULOCYTES NFR BLD AUTO: 0.1 % (ref 0–0.6)
LYMPHOCYTES # BLD AUTO: 4.36 10*3/MM3 (ref 0.6–4.8)
LYMPHOCYTES NFR BLD AUTO: 43.3 % (ref 24–44)
MCH RBC QN AUTO: 31.4 PG (ref 27–31)
MCHC RBC AUTO-ENTMCNC: 31.9 G/DL (ref 32–36)
MCV RBC AUTO: 98.2 FL (ref 80–99)
MONOCYTES # BLD AUTO: 0.53 10*3/MM3 (ref 0–1)
MONOCYTES NFR BLD AUTO: 5.3 % (ref 0–12)
NEUTROPHILS # BLD AUTO: 4.84 10*3/MM3 (ref 1.5–8.3)
NEUTROPHILS NFR BLD AUTO: 48 % (ref 41–71)
PLATELET # BLD AUTO: 227 10*3/MM3 (ref 150–450)
PMV BLD AUTO: 12.7 FL (ref 6–12)
POTASSIUM BLD-SCNC: 4.1 MMOL/L (ref 3.5–5.5)
PROT SERPL-MCNC: 8.2 G/DL (ref 5.7–8.2)
RBC # BLD AUTO: 4.37 10*6/MM3 (ref 3.89–5.14)
SODIUM BLD-SCNC: 136 MMOL/L (ref 132–146)
WBC NRBC COR # BLD: 10.07 10*3/MM3 (ref 3.5–10.8)

## 2019-02-26 PROCEDURE — 99284 EMERGENCY DEPT VISIT MOD MDM: CPT

## 2019-02-26 PROCEDURE — 80053 COMPREHEN METABOLIC PANEL: CPT | Performed by: EMERGENCY MEDICINE

## 2019-02-26 PROCEDURE — 85025 COMPLETE CBC W/AUTO DIFF WBC: CPT | Performed by: EMERGENCY MEDICINE

## 2019-02-26 PROCEDURE — 93005 ELECTROCARDIOGRAM TRACING: CPT | Performed by: EMERGENCY MEDICINE

## 2019-02-26 NOTE — TELEPHONE ENCOUNTER
Patient was seen at ER for asymptomatic bradycardia with HR in the 40's.  She was advised to hold carvedilol until talking with our office.    Advised patient to hold dose tonight, and restart tomorrow at half dose (12.5mg BID.)  Keep a BP/HR log and call back in 5 days.  Sooner if necessary.    Patient verbalized understanding.

## 2019-03-08 ENCOUNTER — OFFICE VISIT (OUTPATIENT)
Dept: NEUROLOGY | Facility: CLINIC | Age: 34
End: 2019-03-08

## 2019-03-08 VITALS
WEIGHT: 270 LBS | HEART RATE: 60 BPM | RESPIRATION RATE: 16 BRPM | DIASTOLIC BLOOD PRESSURE: 84 MMHG | SYSTOLIC BLOOD PRESSURE: 126 MMHG | HEIGHT: 60 IN | BODY MASS INDEX: 53.01 KG/M2

## 2019-03-08 DIAGNOSIS — G43.109 MIGRAINE WITH AURA AND WITHOUT STATUS MIGRAINOSUS, NOT INTRACTABLE: Primary | ICD-10-CM

## 2019-03-08 PROCEDURE — 99213 OFFICE O/P EST LOW 20 MIN: CPT | Performed by: PSYCHIATRY & NEUROLOGY

## 2019-03-08 RX ORDER — AMITRIPTYLINE HYDROCHLORIDE 25 MG/1
25 TABLET, FILM COATED ORAL NIGHTLY
Qty: 30 TABLET | Refills: 4 | Status: SHIPPED | OUTPATIENT
Start: 2019-03-08 | End: 2019-08-08

## 2019-03-08 RX ORDER — CARVEDILOL 25 MG/1
37.5 TABLET ORAL DAILY
COMMUNITY
Start: 2019-02-28 | End: 2019-08-16 | Stop reason: SDUPTHER

## 2019-03-08 NOTE — PROGRESS NOTES
Subjective:   Chief Complaint   Patient presents with   • Migraine       Patient ID: Frances Hartman is a 33 y.o. female.    History of Present Illness     33 y.o. woman returns in follow up for migraines.  Last visit on 12/13/17 stopped TPM and started Depakote.     Constant HA for the last 2 weeks.  Previous to recent HA would have one once a week, mild.  One severe HA a month.  Located in varying locations of head.  Stopped Depakote over year due to losing hair.      Taking GBP, Flexeril, tramadol for back pain once every few months.        Problem history:    HA frequency is daily.  Located in front of head with squeezing and throbbing sensation moderate to severe intensity.  Awakens with HA and will decrease slightly by bedtime.  Treating with Tylenol, tramadol, Lortab on a daily.  Previous trial of triptans with minimal response.       Reviewed Dr Astudillo's notes:    Presented to [facility] ED on with /122 and left A/L numbness and confusion.  Treated with clonidine and BP decreased.  H/O migraines since 8 yoa.  2 - 3 HA a week treated with OTC.  Echo  - EF 55-60,     HCT, my review of films, 7/11/17 normal  CBC, CMP, TSH, Hep B, HIV, RPR, INR - NCS    The following portions of the patient's history were reviewed and updated as appropriate:   She  has a past medical history of Asthma, Back pain, Diabetes mellitus (CMS/HCC), History of fracture, History of gallstones, Hypertension, Left lower quadrant pain, Migraines, Seasonal allergies, and UTI (urinary tract infection).  She does not have any pertinent problems on file.  She  has a past surgical history that includes Keloid excision; Cholecystectomy; [place] tooth extraction; Hernia repair; and Other surgical history.  Her family history includes Arrhythmia in her mother; Arthritis in her mother; Dementia in her father; Diabetes in her mother; Heart attack in her father; Obesity in her mother; Stroke in her other.  She  reports that she has never smoked. She  has never used smokeless tobacco. She reports that she drinks alcohol. She reports that she does not use drugs.  Current Outpatient Medications   Medication Sig Dispense Refill   • Acetaminophen (TYLENOL ARTHRITIS PAIN PO) Take  by mouth As Needed.     • albuterol (PROVENTIL HFA;VENTOLIN HFA) 108 (90 BASE) MCG/ACT inhaler Inhale 2 puffs Every 4 (Four) Hours As Needed for wheezing.     • carvedilol (COREG) 12.5 MG tablet      • CloNIDine (CATAPRES) 0.2 MG tablet Take 0.2 mg by mouth Daily As Needed for High Blood Pressure.     • cyclobenzaprine (FLEXERIL) 10 MG tablet Take 10 mg by mouth 3 (Three) Times a Day As Needed for muscle spasms.     • diphenhydrAMINE (BENADRYL) 25 mg capsule Take 25 mg by mouth Every 6 (Six) Hours As Needed for itching.     • fluconazole (DIFLUCAN) 200 MG tablet Take 200 mg by mouth 2 (Two) Times a Day.     • furosemide (LASIX) 40 MG tablet Take 40 mg by mouth 1 (One) Time As Needed (as needed every other day).     • gabapentin (NEURONTIN) 300 MG capsule Take 300 mg by mouth 3 (Three) Times a Day As Needed.     • glipiZIDE (GLUCOTROL) 5 MG tablet Take 5 mg by mouth 2 (Two) Times a Day Before Meals.     • HYDROcodone-acetaminophen (NORCO) 5-325 MG per tablet Take 1-2 tablets by mouth Every 6 (Six) Hours As Needed for Severe Pain . 15 tablet 0   • loratadine (CLARITIN) 10 MG tablet Take 10 mg by mouth Daily.     • metFORMIN (GLUCOPHAGE) 500 MG tablet Take 500 mg by mouth Daily.     • ramipril (ALTACE) 10 MG capsule Take 1 capsule by mouth Every Night. 90 capsule 3   • spironolactone (ALDACTONE) 100 MG tablet Take 100 mg by mouth Daily.     • traMADol (ULTRAM) 50 MG tablet Take 50 mg by mouth Every 6 (Six) Hours As Needed for moderate pain (4-6).     • amitriptyline (ELAVIL) 25 MG tablet Take 1 tablet by mouth Every Night. 30 tablet 4     No current facility-administered medications for this visit.      Current Outpatient Medications on File Prior to Visit   Medication Sig   • Acetaminophen  (TYLENOL ARTHRITIS PAIN PO) Take  by mouth As Needed.   • albuterol (PROVENTIL HFA;VENTOLIN HFA) 108 (90 BASE) MCG/ACT inhaler Inhale 2 puffs Every 4 (Four) Hours As Needed for wheezing.   • carvedilol (COREG) 12.5 MG tablet    • CloNIDine (CATAPRES) 0.2 MG tablet Take 0.2 mg by mouth Daily As Needed for High Blood Pressure.   • cyclobenzaprine (FLEXERIL) 10 MG tablet Take 10 mg by mouth 3 (Three) Times a Day As Needed for muscle spasms.   • diphenhydrAMINE (BENADRYL) 25 mg capsule Take 25 mg by mouth Every 6 (Six) Hours As Needed for itching.   • fluconazole (DIFLUCAN) 200 MG tablet Take 200 mg by mouth 2 (Two) Times a Day.   • furosemide (LASIX) 40 MG tablet Take 40 mg by mouth 1 (One) Time As Needed (as needed every other day).   • gabapentin (NEURONTIN) 300 MG capsule Take 300 mg by mouth 3 (Three) Times a Day As Needed.   • glipiZIDE (GLUCOTROL) 5 MG tablet Take 5 mg by mouth 2 (Two) Times a Day Before Meals.   • HYDROcodone-acetaminophen (NORCO) 5-325 MG per tablet Take 1-2 tablets by mouth Every 6 (Six) Hours As Needed for Severe Pain .   • loratadine (CLARITIN) 10 MG tablet Take 10 mg by mouth Daily.   • metFORMIN (GLUCOPHAGE) 500 MG tablet Take 500 mg by mouth Daily.   • ramipril (ALTACE) 10 MG capsule Take 1 capsule by mouth Every Night.   • spironolactone (ALDACTONE) 100 MG tablet Take 100 mg by mouth Daily.   • traMADol (ULTRAM) 50 MG tablet Take 50 mg by mouth Every 6 (Six) Hours As Needed for moderate pain (4-6).     No current facility-administered medications on file prior to visit.      She is allergic to hydrochlorothiazide; augmentin [amoxicillin-pot clavulanate]; codeine; doxycycline; and naproxen..    Review of Systems   Constitutional: Positive for fatigue. Negative for activity change and unexpected weight change.   HENT: Negative for tinnitus and trouble swallowing.    Eyes: Negative for photophobia and visual disturbance.   Respiratory: Negative for apnea, cough and choking.   "  Cardiovascular: Negative for leg swelling.   Gastrointestinal: Negative for nausea and vomiting.   Endocrine: Negative for cold intolerance and heat intolerance.   Genitourinary: Negative for difficulty urinating, frequency, menstrual problem and urgency.   Musculoskeletal: Negative for back pain, gait problem, myalgias and neck pain.   Skin: Negative for color change and rash.   Allergic/Immunologic: Negative for immunocompromised state.   Neurological: Positive for weakness, numbness and headaches. Negative for dizziness, tremors, seizures, syncope, facial asymmetry, speech difficulty and light-headedness.   Hematological: Negative for adenopathy. Does not bruise/bleed easily.   Psychiatric/Behavioral: Positive for confusion. Negative for behavioral problems, decreased concentration, hallucinations and sleep disturbance.        Objective:  Vitals:    03/08/19 1413   BP: 126/84   BP Location: Right arm   Patient Position: Sitting   Cuff Size: Adult   Pulse: 60   Resp: 16   Weight: 122 kg (270 lb)   Height: 152.4 cm (60\")       Neurologic Exam     Mental Status   Registration: recalls 3 of 3 objects. Recall at 5 minutes: recalls 3 of 3 objects. Follows 3 step commands.   Attention: normal. Concentration: normal.   Level of consciousness: alert  Knowledge: good and consistent with education.   Able to name object. Able to read. Able to repeat. Able to write. Normal comprehension.     Cranial Nerves     CN II   Visual fields full to confrontation.   Visual acuity: normal  Right visual field deficit: none  Left visual field deficit: none     CN III, IV, VI   Right pupil: Shape: regular. Reactivity: brisk. Consensual response: intact.   Left pupil: Shape: regular. Reactivity: brisk. Consensual response: intact.   Nystagmus: none   Diplopia: none  Ophthalmoparesis: none  Upgaze: normal  Downgaze: normal  Conjugate gaze: present  Vestibulo-ocular reflex: present    CN V   Facial sensation intact.   Right corneal reflex: " normal  Left corneal reflex: normal    CN VII   Right facial weakness: none  Left facial weakness: none    CN VIII   Hearing: intact    CN IX, X   Palate: symmetric  Right gag reflex: normal  Left gag reflex: normal    CN XI   Right sternocleidomastoid strength: normal  Left sternocleidomastoid strength: normal    CN XII   Tongue: not atrophic  Fasciculations: absent  Tongue deviation: none    Motor Exam   Muscle bulk: normal  Overall muscle tone: normal  Right arm tone: normal  Left arm tone: normal  Right leg tone: normal  Left leg tone: normal    Sensory Exam   Light touch normal.   Vibration normal.   Proprioception normal.   Pinprick normal.     Gait, Coordination, and Reflexes     Tremor   Resting tremor: absent  Intention tremor: absent  Action tremor: absent    Reflexes   Reflexes 2+ except as noted.       Physical Exam   Constitutional: She appears well-developed and well-nourished.   Nursing note and vitals reviewed.    Admission on 02/26/2019, Discharged on 02/26/2019   Component Date Value Ref Range Status   • Glucose 02/26/2019 106* 70 - 100 mg/dL Final   • BUN 02/26/2019 7* 9 - 23 mg/dL Final   • Creatinine 02/26/2019 0.99  0.60 - 1.30 mg/dL Final   • Sodium 02/26/2019 136  132 - 146 mmol/L Final   • Potassium 02/26/2019 4.1  3.5 - 5.5 mmol/L Final   • Chloride 02/26/2019 100  99 - 109 mmol/L Final   • CO2 02/26/2019 27.0  20.0 - 31.0 mmol/L Final   • Calcium 02/26/2019 10.3  8.7 - 10.4 mg/dL Final   • Total Protein 02/26/2019 8.2  5.7 - 8.2 g/dL Final   • Albumin 02/26/2019 5.07* 3.20 - 4.80 g/dL Final   • ALT (SGPT) 02/26/2019 44* 7 - 40 U/L Final   • AST (SGOT) 02/26/2019 40* 0 - 33 U/L Final   • Alkaline Phosphatase 02/26/2019 120* 25 - 100 U/L Final   • Total Bilirubin 02/26/2019 0.4  0.3 - 1.2 mg/dL Final   • eGFR   Amer 02/26/2019 77  >60 mL/min/1.73 Final   • Globulin 02/26/2019 3.1  gm/dL Final   • A/G Ratio 02/26/2019 1.6  1.5 - 2.5 g/dL Final   • BUN/Creatinine Ratio 02/26/2019 7.1   7.0 - 25.0 Final   • Anion Gap 02/26/2019 9.0  3.0 - 11.0 mmol/L Final   • WBC 02/26/2019 10.07  3.50 - 10.80 10*3/mm3 Final   • RBC 02/26/2019 4.37  3.89 - 5.14 10*6/mm3 Final   • Hemoglobin 02/26/2019 13.7  11.5 - 15.5 g/dL Final   • Hematocrit 02/26/2019 42.9  34.5 - 44.0 % Final   • MCV 02/26/2019 98.2  80.0 - 99.0 fL Final   • MCH 02/26/2019 31.4* 27.0 - 31.0 pg Final   • MCHC 02/26/2019 31.9* 32.0 - 36.0 g/dL Final   • RDW 02/26/2019 13.0  11.3 - 14.5 % Final   • RDW-SD 02/26/2019 46.4  37.0 - 54.0 fl Final   • MPV 02/26/2019 12.7* 6.0 - 12.0 fL Final   • Platelets 02/26/2019 227  150 - 450 10*3/mm3 Final   • Neutrophil % 02/26/2019 48.0  41.0 - 71.0 % Final   • Lymphocyte % 02/26/2019 43.3  24.0 - 44.0 % Final   • Monocyte % 02/26/2019 5.3  0.0 - 12.0 % Final   • Eosinophil % 02/26/2019 3.1* 0.0 - 3.0 % Final   • Basophil % 02/26/2019 0.3  0.0 - 1.0 % Final   • Immature Grans % 02/26/2019 0.1  0.0 - 0.6 % Final   • Neutrophils, Absolute 02/26/2019 4.84  1.50 - 8.30 10*3/mm3 Final   • Lymphocytes, Absolute 02/26/2019 4.36  0.60 - 4.80 10*3/mm3 Final   • Monocytes, Absolute 02/26/2019 0.53  0.00 - 1.00 10*3/mm3 Final   • Eosinophils, Absolute 02/26/2019 0.31* 0.00 - 0.30 10*3/mm3 Final   • Basophils, Absolute 02/26/2019 0.03  0.00 - 0.20 10*3/mm3 Final   • Immature Grans, Absolute 02/26/2019 0.01  0.00 - 0.05 10*3/mm3 Final         Assessment/Plan:       Problems Addressed this Visit        Cardiovascular and Mediastinum    Migraine with aura - Primary     Headaches are worsening.  Medication changes per orders.     Start Elavil 25 mg po qhs              Relevant Medications    carvedilol (COREG) 12.5 MG tablet    amitriptyline (ELAVIL) 25 MG tablet

## 2019-04-15 ENCOUNTER — TELEPHONE (OUTPATIENT)
Dept: URGENT CARE | Facility: CLINIC | Age: 34
End: 2019-04-15

## 2019-04-15 NOTE — TELEPHONE ENCOUNTER
[facility]  [address] [facility]   [phone]  Appt for 4/16/19 @ 8:45am   Pt to arrive early and bring Ins, ID, and Meds list.   Patient notfied of appt.

## 2019-05-03 ENCOUNTER — OFFICE VISIT (OUTPATIENT)
Dept: NEUROLOGY | Facility: CLINIC | Age: 34
End: 2019-05-03

## 2019-05-03 VITALS
BODY MASS INDEX: 53.2 KG/M2 | OXYGEN SATURATION: 98 % | WEIGHT: 271 LBS | SYSTOLIC BLOOD PRESSURE: 122 MMHG | DIASTOLIC BLOOD PRESSURE: 78 MMHG | HEIGHT: 60 IN | HEART RATE: 75 BPM

## 2019-05-03 DIAGNOSIS — G43.109 MIGRAINE WITH AURA AND WITHOUT STATUS MIGRAINOSUS, NOT INTRACTABLE: Primary | ICD-10-CM

## 2019-05-03 PROCEDURE — 99212 OFFICE O/P EST SF 10 MIN: CPT | Performed by: PSYCHIATRY & NEUROLOGY

## 2019-05-03 NOTE — PROGRESS NOTES
Subjective:   Chief Complaint   Patient presents with   • Migraine       Patient ID: Frances Hartman is a 33 y.o. female.    History of Present Illness     33 y.o. woman returns in follow up for migraines.  Last visit on 3/8/19 started Elavil 25 mg qhs.     Frequency is twice a week.  Moderate intensity.  Last for a few hours.   Located in varying locations of head.   OTC meds helpful.  Denies SE of Elavil.  Mild sedation on Elavil.       Taking GBP, Flexeril, tramadol for back pain once every few months.        Problem history:    HA frequency is daily.  Located in front of head with squeezing and throbbing sensation moderate to severe intensity.  Awakens with HA and will decrease slightly by bedtime.  Treating with Tylenol, tramadol, Lortab on a daily.  Previous trial of triptans with minimal response.       Reviewed Dr Astudillo's notes:    Presented to [facility] ED on with /122 and left A/L numbness and confusion.  Treated with clonidine and BP decreased.  H/O migraines since 8 yoa.  2 - 3 HA a week treated with OTC.  Echo  - EF 55-60,     HCT, my review of films, 7/11/17 normal  CBC, CMP, TSH, Hep B, HIV, RPR, INR - NCS    The following portions of the patient's history were reviewed and updated as appropriate:   She  has a past medical history of Asthma, Back pain, Diabetes mellitus (CMS/HCC), Foot pain, History of fracture, History of gallstones, Hypertension, Left lower quadrant pain, Migraines, Seasonal allergies, and UTI (urinary tract infection).  She does not have any pertinent problems on file.  She  has a past surgical history that includes Keloid excision; Cholecystectomy; [place] tooth extraction; Hernia repair; and Other surgical history.  Her family history includes Arrhythmia in her mother; Arthritis in her mother; Dementia in her father; Diabetes in her mother; Heart attack in her father; Obesity in her mother; Stroke in her other.  She  reports that she has never smoked. She has never used  smokeless tobacco. She reports that she drinks alcohol. She reports that she does not use drugs.  Current Outpatient Medications   Medication Sig Dispense Refill   • Acetaminophen (TYLENOL ARTHRITIS PAIN PO) Take  by mouth As Needed.     • albuterol (PROVENTIL HFA;VENTOLIN HFA) 108 (90 BASE) MCG/ACT inhaler Inhale 2 puffs Every 4 (Four) Hours As Needed for wheezing.     • amitriptyline (ELAVIL) 25 MG tablet Take 1 tablet by mouth Every Night. 30 tablet 4   • carvedilol (COREG) 12.5 MG tablet      • CloNIDine (CATAPRES) 0.2 MG tablet Take 0.2 mg by mouth Daily As Needed for High Blood Pressure.     • cyclobenzaprine (FLEXERIL) 10 MG tablet Take 10 mg by mouth 3 (Three) Times a Day As Needed for muscle spasms.     • diphenhydrAMINE (BENADRYL) 25 mg capsule Take 25 mg by mouth Every 6 (Six) Hours As Needed for itching.     • fluconazole (DIFLUCAN) 200 MG tablet Take 200 mg by mouth 2 (Two) Times a Day.     • furosemide (LASIX) 40 MG tablet Take 40 mg by mouth 1 (One) Time As Needed (as needed every other day).     • gabapentin (NEURONTIN) 300 MG capsule Take 300 mg by mouth 3 (Three) Times a Day As Needed.     • glipiZIDE (GLUCOTROL) 5 MG tablet Take 5 mg by mouth 2 (Two) Times a Day Before Meals.     • HYDROcodone-acetaminophen (NORCO) 5-325 MG per tablet Take 1-2 tablets by mouth Every 6 (Six) Hours As Needed for Severe Pain . 15 tablet 0   • loratadine (CLARITIN) 10 MG tablet Take 10 mg by mouth Daily.     • metFORMIN (GLUCOPHAGE) 500 MG tablet Take 500 mg by mouth Daily.     • ramipril (ALTACE) 10 MG capsule Take 1 capsule by mouth Every Night. 90 capsule 3   • spironolactone (ALDACTONE) 100 MG tablet Take 100 mg by mouth Daily.     • traMADol (ULTRAM) 50 MG tablet Take 50 mg by mouth Every 6 (Six) Hours As Needed for moderate pain (4-6).       No current facility-administered medications for this visit.      Current Outpatient Medications on File Prior to Visit   Medication Sig   • Acetaminophen (TYLENOL ARTHRITIS  PAIN PO) Take  by mouth As Needed.   • albuterol (PROVENTIL HFA;VENTOLIN HFA) 108 (90 BASE) MCG/ACT inhaler Inhale 2 puffs Every 4 (Four) Hours As Needed for wheezing.   • amitriptyline (ELAVIL) 25 MG tablet Take 1 tablet by mouth Every Night.   • carvedilol (COREG) 12.5 MG tablet    • CloNIDine (CATAPRES) 0.2 MG tablet Take 0.2 mg by mouth Daily As Needed for High Blood Pressure.   • cyclobenzaprine (FLEXERIL) 10 MG tablet Take 10 mg by mouth 3 (Three) Times a Day As Needed for muscle spasms.   • diphenhydrAMINE (BENADRYL) 25 mg capsule Take 25 mg by mouth Every 6 (Six) Hours As Needed for itching.   • fluconazole (DIFLUCAN) 200 MG tablet Take 200 mg by mouth 2 (Two) Times a Day.   • furosemide (LASIX) 40 MG tablet Take 40 mg by mouth 1 (One) Time As Needed (as needed every other day).   • gabapentin (NEURONTIN) 300 MG capsule Take 300 mg by mouth 3 (Three) Times a Day As Needed.   • glipiZIDE (GLUCOTROL) 5 MG tablet Take 5 mg by mouth 2 (Two) Times a Day Before Meals.   • HYDROcodone-acetaminophen (NORCO) 5-325 MG per tablet Take 1-2 tablets by mouth Every 6 (Six) Hours As Needed for Severe Pain .   • loratadine (CLARITIN) 10 MG tablet Take 10 mg by mouth Daily.   • metFORMIN (GLUCOPHAGE) 500 MG tablet Take 500 mg by mouth Daily.   • ramipril (ALTACE) 10 MG capsule Take 1 capsule by mouth Every Night.   • spironolactone (ALDACTONE) 100 MG tablet Take 100 mg by mouth Daily.   • traMADol (ULTRAM) 50 MG tablet Take 50 mg by mouth Every 6 (Six) Hours As Needed for moderate pain (4-6).     No current facility-administered medications on file prior to visit.      She is allergic to hydrochlorothiazide; augmentin [amoxicillin-pot clavulanate]; codeine; doxycycline; and naproxen..    Review of Systems   Constitutional: Positive for fatigue. Negative for activity change and unexpected weight change.   HENT: Negative for tinnitus and trouble swallowing.    Eyes: Negative for photophobia and visual disturbance.  "  Respiratory: Negative for apnea, cough and choking.    Cardiovascular: Negative for leg swelling.   Gastrointestinal: Negative for nausea and vomiting.   Endocrine: Negative for cold intolerance and heat intolerance.   Genitourinary: Negative for difficulty urinating, frequency, menstrual problem and urgency.   Musculoskeletal: Negative for back pain, gait problem, myalgias and neck pain.   Skin: Negative for color change and rash.   Allergic/Immunologic: Negative for immunocompromised state.   Neurological: Positive for weakness, numbness and headaches. Negative for dizziness, tremors, seizures, syncope, facial asymmetry, speech difficulty and light-headedness.   Hematological: Negative for adenopathy. Does not bruise/bleed easily.   Psychiatric/Behavioral: Positive for confusion. Negative for behavioral problems, decreased concentration, hallucinations and sleep disturbance.        Objective:  Vitals:    05/03/19 1449   BP: 122/78   BP Location: Right arm   Patient Position: Sitting   Cuff Size: Adult   Pulse: 75   SpO2: 98%   Weight: 123 kg (271 lb)   Height: 152.4 cm (60\")       Neurologic Exam     Mental Status   Registration: recalls 3 of 3 objects. Recall at 5 minutes: recalls 3 of 3 objects. Follows 3 step commands.   Attention: normal. Concentration: normal.   Level of consciousness: alert  Knowledge: good and consistent with education.   Able to name object. Able to read. Able to repeat. Able to write. Normal comprehension.     Cranial Nerves     CN II   Visual fields full to confrontation.   Visual acuity: normal  Right visual field deficit: none  Left visual field deficit: none     CN III, IV, VI   Right pupil: Shape: regular. Reactivity: brisk. Consensual response: intact.   Left pupil: Shape: regular. Reactivity: brisk. Consensual response: intact.   Nystagmus: none   Diplopia: none  Ophthalmoparesis: none  Upgaze: normal  Downgaze: normal  Conjugate gaze: present  Vestibulo-ocular reflex: " present    CN V   Facial sensation intact.   Right corneal reflex: normal  Left corneal reflex: normal    CN VII   Right facial weakness: none  Left facial weakness: none    CN VIII   Hearing: intact    CN IX, X   Palate: symmetric  Right gag reflex: normal  Left gag reflex: normal    CN XI   Right sternocleidomastoid strength: normal  Left sternocleidomastoid strength: normal    CN XII   Tongue: not atrophic  Fasciculations: absent  Tongue deviation: none    Motor Exam   Muscle bulk: normal  Overall muscle tone: normal  Right arm tone: normal  Left arm tone: normal  Right leg tone: normal  Left leg tone: normal    Sensory Exam   Light touch normal.   Vibration normal.   Proprioception normal.   Pinprick normal.     Gait, Coordination, and Reflexes     Tremor   Resting tremor: absent  Intention tremor: absent  Action tremor: absent    Reflexes   Reflexes 2+ except as noted.       Physical Exam   Constitutional: She appears well-developed and well-nourished.   Nursing note and vitals reviewed.    Admission on 02/26/2019, Discharged on 02/26/2019   Component Date Value Ref Range Status   • Glucose 02/26/2019 106* 70 - 100 mg/dL Final   • BUN 02/26/2019 7* 9 - 23 mg/dL Final   • Creatinine 02/26/2019 0.99  0.60 - 1.30 mg/dL Final   • Sodium 02/26/2019 136  132 - 146 mmol/L Final   • Potassium 02/26/2019 4.1  3.5 - 5.5 mmol/L Final   • Chloride 02/26/2019 100  99 - 109 mmol/L Final   • CO2 02/26/2019 27.0  20.0 - 31.0 mmol/L Final   • Calcium 02/26/2019 10.3  8.7 - 10.4 mg/dL Final   • Total Protein 02/26/2019 8.2  5.7 - 8.2 g/dL Final   • Albumin 02/26/2019 5.07* 3.20 - 4.80 g/dL Final   • ALT (SGPT) 02/26/2019 44* 7 - 40 U/L Final   • AST (SGOT) 02/26/2019 40* 0 - 33 U/L Final   • Alkaline Phosphatase 02/26/2019 120* 25 - 100 U/L Final   • Total Bilirubin 02/26/2019 0.4  0.3 - 1.2 mg/dL Final   • eGFR   Amer 02/26/2019 77  >60 mL/min/1.73 Final   • Globulin 02/26/2019 3.1  gm/dL Final   • A/G Ratio 02/26/2019  1.6  1.5 - 2.5 g/dL Final   • BUN/Creatinine Ratio 02/26/2019 7.1  7.0 - 25.0 Final   • Anion Gap 02/26/2019 9.0  3.0 - 11.0 mmol/L Final   • WBC 02/26/2019 10.07  3.50 - 10.80 10*3/mm3 Final   • RBC 02/26/2019 4.37  3.89 - 5.14 10*6/mm3 Final   • Hemoglobin 02/26/2019 13.7  11.5 - 15.5 g/dL Final   • Hematocrit 02/26/2019 42.9  34.5 - 44.0 % Final   • MCV 02/26/2019 98.2  80.0 - 99.0 fL Final   • MCH 02/26/2019 31.4* 27.0 - 31.0 pg Final   • MCHC 02/26/2019 31.9* 32.0 - 36.0 g/dL Final   • RDW 02/26/2019 13.0  11.3 - 14.5 % Final   • RDW-SD 02/26/2019 46.4  37.0 - 54.0 fl Final   • MPV 02/26/2019 12.7* 6.0 - 12.0 fL Final   • Platelets 02/26/2019 227  150 - 450 10*3/mm3 Final   • Neutrophil % 02/26/2019 48.0  41.0 - 71.0 % Final   • Lymphocyte % 02/26/2019 43.3  24.0 - 44.0 % Final   • Monocyte % 02/26/2019 5.3  0.0 - 12.0 % Final   • Eosinophil % 02/26/2019 3.1* 0.0 - 3.0 % Final   • Basophil % 02/26/2019 0.3  0.0 - 1.0 % Final   • Immature Grans % 02/26/2019 0.1  0.0 - 0.6 % Final   • Neutrophils, Absolute 02/26/2019 4.84  1.50 - 8.30 10*3/mm3 Final   • Lymphocytes, Absolute 02/26/2019 4.36  0.60 - 4.80 10*3/mm3 Final   • Monocytes, Absolute 02/26/2019 0.53  0.00 - 1.00 10*3/mm3 Final   • Eosinophils, Absolute 02/26/2019 0.31* 0.00 - 0.30 10*3/mm3 Final   • Basophils, Absolute 02/26/2019 0.03  0.00 - 0.20 10*3/mm3 Final   • Immature Grans, Absolute 02/26/2019 0.01  0.00 - 0.05 10*3/mm3 Final         Assessment/Plan:       Problems Addressed this Visit        Cardiovascular and Mediastinum    Migraine with aura - Primary     Headaches are improving with treatment.  Continue current treatment regimen.

## 2019-06-13 ENCOUNTER — TRANSCRIBE ORDERS (OUTPATIENT)
Dept: PHYSICAL THERAPY | Facility: HOSPITAL | Age: 34
End: 2019-06-13

## 2019-06-13 DIAGNOSIS — M47.816 SPONDYLOSIS OF LUMBAR REGION WITHOUT MYELOPATHY OR RADICULOPATHY: Primary | ICD-10-CM

## 2019-06-24 ENCOUNTER — HOSPITAL ENCOUNTER (OUTPATIENT)
Dept: PHYSICAL THERAPY | Facility: HOSPITAL | Age: 34
Setting detail: THERAPIES SERIES
Discharge: HOME | End: 2019-06-24

## 2019-06-24 DIAGNOSIS — G89.29 CHRONIC LOW BACK PAIN WITHOUT SCIATICA, UNSPECIFIED BACK PAIN LATERALITY: Primary | ICD-10-CM

## 2019-06-24 DIAGNOSIS — M54.50 CHRONIC LOW BACK PAIN WITHOUT SCIATICA, UNSPECIFIED BACK PAIN LATERALITY: Primary | ICD-10-CM

## 2019-06-24 PROCEDURE — 97161 PT EVAL LOW COMPLEX 20 MIN: CPT | Performed by: PHYSICAL THERAPIST

## 2019-06-24 NOTE — THERAPY EVALUATION
Outpatient Physical Therapy Ortho Initial Evaluation  [facility]     Patient Name: Frances Hartman  : 1985  MRN: [id number]  Today's Date: 2019      Visit Date: 2019    Patient Active Problem List   Diagnosis   • Asthma   • Chronic fatigue   • Chronic tonsillar hypertrophy   • Type 2 diabetes mellitus (CMS/HCC)   • Hyperlipidemia   • Hypertension   • Keloid skin disorder   • Morbid obesity (CMS/HCC)   • Polycystic ovaries   • Seasonal allergies   • Subclinical hypothyroidism   • Venous insufficiency (chronic) (peripheral)   • Abnormal uterine bleeding   • Anemia   • Depression   • GERD (gastroesophageal reflux disease)   • Herpes zoster   • Migraine with aura        Past Medical History:   Diagnosis Date   • Asthma    • Back pain    • Diabetes mellitus (CMS/HCC)    • Foot pain    • History of fracture    • History of gallstones    • Hypertension    • Left lower quadrant pain     h/o   • Migraines    • Seasonal allergies    • UTI (urinary tract infection)     H/o        Past Surgical History:   Procedure Laterality Date   • CHOLECYSTECTOMY     • HERNIA REPAIR     • KELOID EXCISION     • OTHER SURGICAL HISTORY      Excision of chest wall,shoulder, and trunk   • WISDOM TOOTH EXTRACTION         Visit Dx:     ICD-10-CM ICD-9-CM   1. Chronic low back pain without sciatica, unspecified back pain laterality M54.5 724.2    G89.29 338.29         Patient History     Row Name 19 1000             History    Chief Complaint  Pain  -DAVON      Type of Pain  Back pain;Hip pain  -DAVON      Date Current Problem(s) Began  00 18+ years low back couple years hips  -DAVON      Brief Description of Current Complaint  Patient notes chronic history of lower back pain with progressive hip pain and weakness noted during transfers into the car and with climbing stairs.  Pain in the hip is anterolateral bilaterally with the left starting before the right.  She reports lower back pain is sacral in nature with  "occasional lumbar pain and lower thoracic pain intermittently.  She reports her back will \"go out\" at times where she has severely limited mobility due to pain that lasts about 10 days releived with a \"coctail of medication\" that her insurance will not cover over the last 6 months or so.    -DAVON      Current Tobacco Use  nonuser  -DAVON      Hand Dominance  right-handed  -DAVON      Occupation/sports/leisure activities  not currently employed, been out of work since last April.  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  Hobbies: sing [employer], goes to [employer] [place] [employer], limited pariticpation in [employer], [employer], walking, travelling.    -DAVON      Results of Clinical Tests  XR nonsignificant in the past.  MRI: unavailable for review.  -DAVON         Pain     Pain Location  Back;Hip  -DAVON      Pain at Present  7  -DAVON      Pain at Best  3  -DAVON      Pain at Worst  9  -DAVON      What Performance Factors Make the Current Problem(s) WORSE?  prolonged sitting/standing (more than 20 minutes), laying supine  -DAVON      What Performance Factors Make the Current Problem(s) BETTER?  prone position, padding a chair for sitting    -DAVON      Difficulties at work?  na  -DAVON      Difficulties with ADL's?  sleeping  -DAVON      Difficulties with recreational activities?  sitting at [employer] [place], higher level participation  -DAVON         Fall Risk Assessment    Any falls in the past year:  No  -DAVON         Daily Activities    Primary Language  English  -DAVON      Barriers to learning  Visual  -DAVON      Action taken for identified issues  wears glasses  -DAVON      Pt Participated in POC and Goals  Yes  -DAVON         Safety    Are you being hurt, hit, or frightened by anyone at home or in your life?  No  -DAVON        User Key  (r) = Recorded By, (t) = Taken By, (c) = Cosigned By    Initials Name Provider Type    DAVON Urbano Chambers, PT Physical Therapist          PT Ortho     Row Name 06/24/19 1000       Posture/Observations    Posture/Observations Comments  OW female " with increased lumbar lordosis and generally flat thoracic spine  -DAVON       Quarter Clearing    Quarter Clearing  Lower Quarter Clearing  -DAVON       DTR- Lower Quarter Clearing    Patellar tendon (L2-4)  Bilateral:;1- Minimal response  -DAVON    Achilles tendon (S1-2)  Bilateral:;1- Minimal response  -DAVON       Neural Tension Signs- Lower Quarter Clearing    Slump  Negative  -DAVON    SLR  Negative  -DAVON       Pathological Reflexes- Lower Quarter Clearing    Clonus  Negative  -DAVON    Benítez  Negative  -DAVON       Sensory Screen for Light Touch- Lower Quarter Clearing    L2 (anterior mid thigh)  Bilateral:;Intact  -DAVON    L3 (distal anterior thigh)  Bilateral:;Intact  -DAVON    L4 (medial lower leg/foot)  Bilateral:;Intact  -DAVON    L5 (lateral lower leg/great toe)  Bilateral:;Intact  -DAVON    S1 (bottom of foot)  Bilateral:;Intact  -DAVON       Myotomal Screen- Lower Quarter Clearing    Hip flexion (L2)  Right:;4+ (Good +);Left:;4 (Good)  -DAVON    Knee extension (L3)  Bilateral:;5 (Normal)  -DAVON    Ankle DF (L4)  Bilateral:;5 (Normal)  -DAVON    Great toe extension (L5)  Bilateral:;5 (Normal)  -DAVON    Knee flexion (S2)  Bilateral:;5 (Normal)  -DAVON       Lumbar ROM Screen- Lower Quarter Clearing    Lumbar Flexion  Normal  -DAVON    Lumbar Extension  Normal hinges low lumbar, pain increases  -DAVON       SI/Hip Screen- Lower Quarter Clearing    Megan's/Armaan's test  Bilateral:;Negative  -DAVON    Posterior thigh sheer  Bilateral:;Positive  -DAVON       Special Tests/Palpation    Special Tests/Palpation  Lumbar/SI;Hip  -DAVON       Lumbar/SI Special Tests    Sacral [place] Test (SI Dysfunction)  Bilateral:;Positive  -DAVON    Lumbar/SI Special Tests Comments  repeated motion testing deferred d/t time constraints  -DAVON       Lumbosacral Palpation    Lumbosacral Palpation?  Yes  -DAVON    Lumbosacral Palpation Comments  pain with PIVM along the low lumbar segments, hypomobility noted along the low thoracic area.  No significant muscular tenderness, significant tenderness  noted along the sacral border and along the transitional lumbosacral segments  -DAVON       Hip/Thigh Palpation    Hip/Thigh Palpation?  Yes  -DAVON       Hip Special Tests    Hip scour test (labral vs hip pathology)  Left:;Positive  -DAVON    Piriformis test (piriformis syndrome)  Left:;Positive  -DAVON       MMT (Manual Muscle Testing)    General MMT Comments  pain with sidebending to the right on the left, pain bilaterally with trunk extension  -DAVON       Flexibility    Flexibility Tested?  Lower Extremity  -DAVON       Lower Extremity Flexibility    Hamstrings  Bilateral:;Mildly limited  -DAVON    Hip External Rotators  Left:;Mildly limited;Right:;WNL  -DAVON    Hip Internal Rotators  Bilateral:;Mildly limited  -DAVON       Gait/Stairs Assessment/Training    Comment (Gait/Stairs)  hip drop noted bilaterally during swing phase.   -DAVON      User Key  (r) = Recorded By, (t) = Taken By, (c) = Cosigned By    Initials Name Provider Type    Urbano Enriquez, PT Physical Therapist                      Therapy Education  Education Details: initiated HEP per exercise flowsheet  Given: HEP  Program: New  How Provided: Verbal, Written  Provided to: Patient  Level of Understanding: Verbalized     PT OP Goals     Row Name 06/24/19 1000          PT Short Term Goals    STG Date to Achieve  07/08/19  -DAVON     STG 1  Patient to be compliant with initial HEP  -DAVON     STG 1 Progress  New  -DAVON     STG 2  Patient to demonstrate reduced pain in supine lying  -DAVON     STG 2 Progress  New  -DAVON        Long Term Goals    LTG Date to Achieve  07/22/19  -DAVON     LTG 1  Patient to be independent with final HEP  -DAVON     LTG 1 Progress  New  -DAVON     LTG 2  Patient to demonstrate reduced sacral tenderness to improve sitting tolerance  -DAVON     LTG 2 Progress  New  -DAVON     LTG 3  Patient to improve Mod Oswestry score to at least 28%  -DAVON     LTG 3 Progress  New  -DAVON        Time Calculation    PT Goal Re-Cert Due Date  09/22/19  -DAVON       User Key  (r) = Recorded By, (t) =  Taken By, (c) = Cosigned By    Initials Name Provider Type    Urbano Enriquez, PT Physical Therapist          PT Assessment/Plan     Row Name 06/24/19 1000          PT Assessment    Functional Limitations  Impaired gait;Limitation in home management;Performance in leisure activities  -DAVON     Impairments  Endurance;Gait;Pain;Poor body mechanics;Posture  -DAVON     Assessment Comments  Patient presents with chronic lower back pain with bilateral hip pain.  Back pain is primarily axial in nature without referral.  She has lordotic standing posture that likely contributes, but she reports improved pain in prone lying.  Unfortunately was unable to perform repeated motion testing on evaluation d/t time constraints.    -DAVON     Please refer to paper survey for additional self-reported information  Yes  -DAVON     Rehab Potential  Fair  -DAVON     Patient/caregiver participated in establishment of treatment plan and goals  Yes  -DAVON     Patient would benefit from skilled therapy intervention  Yes  -DAVON        PT Plan    PT Frequency  2x/week;1x/week  -DAVON     Predicted Duration of Therapy Intervention (Therapy Eval)  up to 10 visits  -DAVON     Planned CPT's?  PT EVAL LOW COMPLEXITY: 58570;PT THER PROC EA 15 MIN: 78802;PT MANUAL THERAPY EA 15 MIN: 25951;PT NEUROMUSC RE-EDUCATION EA 15 MIN: 59882;PT ELECTRICAL STIM UNATTEND: ;PT ULTRASOUND EA 15 MIN: 82232;PT HOT/COLD PACK WC NONMCARE: 31535;PT TRACTION LUMBAR: 34001  -DAVON     PT Plan Comments  possible extension based program, trunk mobility, hip stretching, core stabilization, manual and modalities based on pain.  -DAVON       User Key  (r) = Recorded By, (t) = Taken By, (c) = Cosigned By    Initials Name Provider Type    Urbano Enriquez, PT Physical Therapist            Exercises     Row Name 06/24/19 1000             Subjective Pain    Able to rate subjective pain?  yes  -DAVON      Pre-Treatment Pain Level  7  -DAVON      Post-Treatment Pain Level  7  -DAVON         Exercise 1     Exercise Name 1  HEP instruction to include piriformis stretch, bridges, and LTRs  -DAVON        User Key  (r) = Recorded By, (t) = Taken By, (c) = Cosigned By    Initials Name Provider Type    Urbano Enriquez, PT Physical Therapist                                  Time Calculation:     Start Time: 0955     Therapy Charges for Today     Code Description Service Date Service Provider Modifiers Qty    [id number] HC PT EVAL LOW COMPLEXITY 4 6/24/2019 Urbano Chambers, PT GP 1                    Urbano Chambers, PT  6/24/2019

## 2019-06-27 ENCOUNTER — HOSPITAL ENCOUNTER (OUTPATIENT)
Dept: PHYSICAL THERAPY | Facility: HOSPITAL | Age: 34
Setting detail: THERAPIES SERIES
Discharge: HOME | End: 2019-06-27

## 2019-06-27 DIAGNOSIS — M54.50 CHRONIC LOW BACK PAIN WITHOUT SCIATICA, UNSPECIFIED BACK PAIN LATERALITY: Primary | ICD-10-CM

## 2019-06-27 DIAGNOSIS — G89.29 CHRONIC LOW BACK PAIN WITHOUT SCIATICA, UNSPECIFIED BACK PAIN LATERALITY: Primary | ICD-10-CM

## 2019-06-27 PROCEDURE — 97110 THERAPEUTIC EXERCISES: CPT | Performed by: PHYSICAL THERAPIST

## 2019-06-27 NOTE — THERAPY TREATMENT NOTE
Outpatient Physical Therapy Ortho Treatment Note   [facility]     Patient Name: Frances Hartman  : 1985  MRN: [id number]  Today's Date: 2019      Visit Date: 2019    Visit Dx:    ICD-10-CM ICD-9-CM   1. Chronic low back pain without sciatica, unspecified back pain laterality M54.5 724.2    G89.29 338.29       Patient Active Problem List   Diagnosis   • Asthma   • Chronic fatigue   • Chronic tonsillar hypertrophy   • Type 2 diabetes mellitus (CMS/HCC)   • Hyperlipidemia   • Hypertension   • Keloid skin disorder   • Morbid obesity (CMS/HCC)   • Polycystic ovaries   • Seasonal allergies   • Subclinical hypothyroidism   • Venous insufficiency (chronic) (peripheral)   • Abnormal uterine bleeding   • Anemia   • Depression   • GERD (gastroesophageal reflux disease)   • Herpes zoster   • Migraine with aura        Past Medical History:   Diagnosis Date   • Asthma    • Back pain    • Diabetes mellitus (CMS/HCC)    • Foot pain    • History of fracture    • History of gallstones    • Hypertension    • Left lower quadrant pain     h/o   • Migraines    • Seasonal allergies    • UTI (urinary tract infection)     H/o        Past Surgical History:   Procedure Laterality Date   • CHOLECYSTECTOMY     • HERNIA REPAIR     • KELOID EXCISION     • OTHER SURGICAL HISTORY      Excision of chest wall,shoulder, and trunk   • WISDOM TOOTH EXTRACTION                         PT Assessment/Plan     Row Name 19 1000          PT Assessment    Assessment Comments  Patient tolerates treatment well today.  Her hip pain responds well to john stretch.  -DAVON        PT Plan    PT Plan Comments  add hip strengthening  -DAVON       User Key  (r) = Recorded By, (t) = Taken By, (c) = Cosigned By    Initials Name Provider Type    Urbano Enriquez, PT Physical Therapist            Exercises     Row Name 19 1000             Subjective Comments    Subjective Comments  Patient notes that she is sore and tender along  bilateral hip joints today.  She points to the lateral hip when asked where.  -DAVON         Subjective Pain    Able to rate subjective pain?  yes  -DAVON      Pre-Treatment Pain Level  7  -DAVON      Post-Treatment Pain Level  5  -DAVON         Total Minutes    74451 - PT Therapeutic Exercise Minutes  31  -DAVON         Exercise 1    Exercise Name 1  NuStep L6  -DAVON      Time 1  5 m  -DAVON         Exercise 2    Exercise Name 2  ITB (S)  -DAVON      Sets 2  3  -DAVON      Time 2  30 s  -DAVON         Exercise 3    Exercise Name 3  Melly (S)  -DAVON      Sets 3  3  -DAVON      Time 3  30 s  -DAVON         Exercise 4    Exercise Name 4  attempted PPU  -DAVON      Sets 4  3  -DAVON      Reps 4  10  -DAVON      Additional Comments  minimal symptom change  -DAVON         Exercise 5    Exercise Name 5  bridges  -DAVON      Sets 5  2  -DAVON      Reps 5  10  -DAVON         Exercise 6    Exercise Name 6  LTR  -DAVON      Reps 6  10  -DAVON         Exercise 7    Exercise Name 7  piriformis (S)  -DAVON      Sets 7  3  -DAVON      Time 7  30 s  -DAVON        User Key  (r) = Recorded By, (t) = Taken By, (c) = Cosigned By    Initials Name Provider Type    Urbano Enriquez, PT Physical Therapist                           Therapy Education  Education Details: added ITB and melly (S)  Given: HEP  Program: New  How Provided: Verbal, Written, Demonstration  Provided to: Patient  Level of Understanding: Verbalized, Demonstrated              Time Calculation:   Start Time: 1041  Therapy Charges for Today     Code Description Service Date Service Provider Modifiers Qty    02665453039  PT THER PROC EA 15 MIN 6/27/2019 Urbano Chambers, PT GP 2                    Urbano RAMIREZ. Reyes, PT  6/27/2019

## 2019-07-11 ENCOUNTER — HOSPITAL ENCOUNTER (OUTPATIENT)
Dept: PHYSICAL THERAPY | Facility: HOSPITAL | Age: 34
Setting detail: THERAPIES SERIES
Discharge: HOME | End: 2019-07-11

## 2019-07-11 DIAGNOSIS — M54.50 CHRONIC LOW BACK PAIN WITHOUT SCIATICA, UNSPECIFIED BACK PAIN LATERALITY: Primary | ICD-10-CM

## 2019-07-11 DIAGNOSIS — G89.29 CHRONIC LOW BACK PAIN WITHOUT SCIATICA, UNSPECIFIED BACK PAIN LATERALITY: Primary | ICD-10-CM

## 2019-07-11 PROCEDURE — 97110 THERAPEUTIC EXERCISES: CPT | Performed by: PHYSICAL THERAPIST

## 2019-07-11 NOTE — THERAPY TREATMENT NOTE
Outpatient Physical Therapy Ortho Treatment Note   [facility]     Patient Name: Frances Hartman  : 1985  MRN: [id number]  Today's Date: 2019      Visit Date: 2019    Visit Dx:    ICD-10-CM ICD-9-CM   1. Chronic low back pain without sciatica, unspecified back pain laterality M54.5 724.2    G89.29 338.29       Patient Active Problem List   Diagnosis   • Asthma   • Chronic fatigue   • Chronic tonsillar hypertrophy   • Type 2 diabetes mellitus (CMS/HCC)   • Hyperlipidemia   • Hypertension   • Keloid skin disorder   • Morbid obesity (CMS/HCC)   • Polycystic ovaries   • Seasonal allergies   • Subclinical hypothyroidism   • Venous insufficiency (chronic) (peripheral)   • Abnormal uterine bleeding   • Anemia   • Depression   • GERD (gastroesophageal reflux disease)   • Herpes zoster   • Migraine with aura        Past Medical History:   Diagnosis Date   • Asthma    • Back pain    • Diabetes mellitus (CMS/HCC)    • Foot pain    • History of fracture    • History of gallstones    • Hypertension    • Left lower quadrant pain     h/o   • Migraines    • Seasonal allergies    • UTI (urinary tract infection)     H/o        Past Surgical History:   Procedure Laterality Date   • CHOLECYSTECTOMY     • HERNIA REPAIR     • KELOID EXCISION     • OTHER SURGICAL HISTORY      Excision of chest wall,shoulder, and trunk   • WISDOM TOOTH EXTRACTION                         PT Assessment/Plan     Row Name 19 1400          PT Assessment    Assessment Comments  Patient tolerated treatment well overall and has reducing pain.  -DAVON        PT Plan    PT Plan Comments  add standing hip strengthening  -DAVON       User Key  (r) = Recorded By, (t) = Taken By, (c) = Cosigned By    Initials Name Provider Type    Urbano Enriquez, PT Physical Therapist            Exercises     Row Name 19 1400             Subjective Comments    Subjective Comments  Patient notes that she has been sick with sinus issues.  She notes  "that she is sick  -DAVON         Subjective Pain    Able to rate subjective pain?  yes  -DAVON      Pre-Treatment Pain Level  2  -DAVON      Subjective Pain Comment  \"just stiff\"  -DAVON         Total Minutes    97960 - PT Therapeutic Exercise Minutes  32  -DAVON         Exercise 1    Exercise Name 1  NuStep L6  -DAVON      Time 1  5 m  -DAVON         Exercise 2    Exercise Name 2  ITB (S)  -DAVON      Sets 2  3  -DAVON      Time 2  30 s  -DAVON         Exercise 3    Exercise Name 3  Melly (S)  -DAVON      Sets 3  3  -DAVON      Time 3  30 s  -DAVON         Exercise 4    Exercise Name 4  S/L hip abd  -DAVON      Sets 4  2  -DAVON      Reps 4  10 ea  -DAVON         Exercise 5    Exercise Name 5  bridges  -DAVON      Sets 5  2  -DAVON      Reps 5  10  -DAVON         Exercise 6    Exercise Name 6  prone heel squeezes  -DAVON      Reps 6  15  -DAVON      Time 6  3 s  -DAVON         Exercise 7    Exercise Name 7  piriformis (S)  -DAVON      Sets 7  3  -DAVON      Time 7  30 s  -DAVON        User Key  (r) = Recorded By, (t) = Taken By, (c) = Cosigned By    Initials Name Provider Type    DAVON Urbano Chambers, PT Physical Therapist                                 Time Calculation:   Start Time: 1431  Therapy Charges for Today     Code Description Service Date Service Provider Modifiers Qty    84145744672  PT THER PROC EA 15 MIN 7/11/2019 Urbano Chambers, PT GP 2                    Urbano Chambers, PT  7/11/2019     "

## 2019-07-15 ENCOUNTER — TELEPHONE (OUTPATIENT)
Dept: CARDIOLOGY | Facility: CLINIC | Age: 34
End: 2019-07-15

## 2019-07-15 NOTE — TELEPHONE ENCOUNTER
"Patient states her DBP has been elevated lately.  BP readings in the 120-140's/ range, HR in the 75-90 range.  This has been happening over the last six weeks.  Patient has migraine headaches, and states they are worse when BP elevated.  She is taking all medications as prescribed, and has used PRN clonidine approx three times last week.  Denies SOA/CP.    Optometrist advised patient to hold ramipril for \"dry eyes\" 3-4 days to see if her symptoms improved, but patient wanted to contact the office first.  She drinks approx 60 ounces of water daily, no caffeine.    Please advise.  "

## 2019-07-17 RX ORDER — CLONIDINE HYDROCHLORIDE 0.2 MG/1
0.2 TABLET ORAL 2 TIMES DAILY
Qty: 60 TABLET | Refills: 5 | Status: SHIPPED | OUTPATIENT
Start: 2019-07-17 | End: 2021-03-02 | Stop reason: SDUPTHER

## 2019-07-19 ENCOUNTER — HOSPITAL ENCOUNTER (OUTPATIENT)
Dept: PHYSICAL THERAPY | Facility: HOSPITAL | Age: 34
Setting detail: THERAPIES SERIES
Discharge: HOME | End: 2019-07-19

## 2019-07-19 DIAGNOSIS — M54.50 CHRONIC LOW BACK PAIN WITHOUT SCIATICA, UNSPECIFIED BACK PAIN LATERALITY: Primary | ICD-10-CM

## 2019-07-19 DIAGNOSIS — G89.29 CHRONIC LOW BACK PAIN WITHOUT SCIATICA, UNSPECIFIED BACK PAIN LATERALITY: Primary | ICD-10-CM

## 2019-07-19 PROCEDURE — 97110 THERAPEUTIC EXERCISES: CPT

## 2019-07-19 NOTE — THERAPY TREATMENT NOTE
Outpatient Physical Therapy Ortho Treatment Note   [facility]     Patient Name: Frances Hartman  : 1985  MRN: [id number]  Today's Date: 2019      Visit Date: 2019    Visit Dx:    ICD-10-CM ICD-9-CM   1. Chronic low back pain without sciatica, unspecified back pain laterality M54.5 724.2    G89.29 338.29       Patient Active Problem List   Diagnosis   • Asthma   • Chronic fatigue   • Chronic tonsillar hypertrophy   • Type 2 diabetes mellitus (CMS/HCC)   • Hyperlipidemia   • Hypertension   • Keloid skin disorder   • Morbid obesity (CMS/HCC)   • Polycystic ovaries   • Seasonal allergies   • Subclinical hypothyroidism   • Venous insufficiency (chronic) (peripheral)   • Abnormal uterine bleeding   • Anemia   • Depression   • GERD (gastroesophageal reflux disease)   • Herpes zoster   • Migraine with aura        Past Medical History:   Diagnosis Date   • Asthma    • Back pain    • Diabetes mellitus (CMS/HCC)    • Foot pain    • History of fracture    • History of gallstones    • Hypertension    • Left lower quadrant pain     h/o   • Migraines    • Seasonal allergies    • UTI (urinary tract infection)     H/o        Past Surgical History:   Procedure Laterality Date   • CHOLECYSTECTOMY     • HERNIA REPAIR     • KELOID EXCISION     • OTHER SURGICAL HISTORY      Excision of chest wall,shoulder, and trunk   • WISDOM TOOTH EXTRACTION         PT Ortho     Row Name 19 2958       Subjective Comments    Subjective Comments  Patient states that she is feeling pretty good today. She reports being a little stiff but has been feeling fine.   -DAVON (r) NS (t) DAVON (c)       Subjective Pain    Able to rate subjective pain?  yes  -DAVON (r) NS (t) DAVON (c)    Pre-Treatment Pain Level  0  -DAVON (r) NS (t) DAVON (c)    Post-Treatment Pain Level  0  -DAVON (r) NS (t) DAVON (c)    Subjective Pain Comment  just stiff  -DAVON (r) NS (t) DAVON (c)      User Key  (r) = Recorded By, (t) = Taken By, (c) = Cosigned By    Initials Name  Provider Type    Urbano Enriquez, PT Physical Therapist    Funmilayo Martins, PT Student PT Student                      PT Assessment/Plan     Row Name 07/19/19 1345          PT Assessment    Assessment Comments  Patient tolerated therapy well. She demonstrated good mechanics with standing dynamic hip strengthening activities. She reported muscle fatigue following the session but not an increase in pain. She will be re-assessed next visit and HEP will be reviewed to continue to improve strength, mobility, and pain.  -DAVON (r) NS (t) DAVON (c)        PT Plan    PT Plan Comments  progress side stepping/diagonals, re-assess next visit  -DAVON (r) NS (t) DAVON (c)       User Key  (r) = Recorded By, (t) = Taken By, (c) = Cosigned By    Initials Name Provider Type    Urbano Enriquez, PT Physical Therapist    Funmilayo Martins, PT Student PT Student            Exercises     Row Name 07/19/19 1348             Subjective Comments    Subjective Comments  Patient states that she is feeling pretty good today. She reports being a little stiff but has been feeling fine.   -DAVON (r) NS (t) DAVON (c)         Subjective Pain    Able to rate subjective pain?  yes  -DAVON (r) NS (t) DAVON (c)      Pre-Treatment Pain Level  0  -DAVON (r) NS (t) DAVON (c)      Post-Treatment Pain Level  0  -DAVON (r) NS (t) DAVON (c)      Subjective Pain Comment  just stiff  -DAVON (r) NS (t) DAVON (c)         Total Minutes    52026 - PT Therapeutic Exercise Minutes  40  -DAVON (r) NS (t) DAVON (c)         Exercise 1    Exercise Name 1  NuStep L6  -DAVON (r) NS (t) DAVON (c)      Time 1  5 m  -DAVON (r) NS (t) DAVON (c)         Exercise 2    Exercise Name 2  Bridges  -DAVON (r) NS (t) DAVON (c)      Reps 2  10  -DAVON (r) NS (t) DAVON (c)         Exercise 3    Exercise Name 3  Standing hip ABD  -DAVON (r) NS (t) DAVON (c)      Sets 3  2  -DAVON (r) NS (t) DAVON (c)      Reps 3  10  -DAVON (r) NS (t) DAVON (c)         Exercise 4    Exercise Name 4  Standing hip ext  -DAVON (r) NS (t) DAVON (c)      Sets 4  2  -DAVON (r) NS (t) DAVON (c)       Reps 4  10  -DAVON (r) NS (t) DAVON (c)         Exercise 5    Exercise Name 5  TG squats   -DAVON (r) NS (t) DAVON (c)      Reps 5  10  -DAVON (r) NS (t) DAVON (c)         Exercise 6    Exercise Name 6  LTR on ball  -DAVON (r) NS (t) DAVON (c)      Reps 6  10  -DAVON (r) NS (t) DAVON (c)         Exercise 7    Exercise Name 7  Side stepping  -DAVON (r) NS (t) DAVON (c)      Reps 7  1 lap  -DAVON (r) NS (t) DAVON (c)      Additional Comments  fatigue following  -DAVON (r) NS (t) DAVON (c)         Exercise 8    Exercise Name 8  DKTC with Salv[place] ball  -DAVON (r) NS (t) DAVON (c)      Sets 8  2  -DAVON (r) NS (t) DAVON (c)      Reps 8  10  -DAVON (r) NS (t) DAVON (c)        User Key  (r) = Recorded By, (t) = Taken By, (c) = Cosigned By    Initials Name Provider Type    Urbano Enriquez, PT Physical Therapist    NS Funmilayo Zayas, PT Student PT Student                           Therapy Education  Education Details: Patient was educated about continuation of HEP  Program: Reinforced  How Provided: Verbal, Demonstration  Provided to: Patient  Level of Understanding: Verbalized, Demonstrated              Time Calculation:   Start Time: 1348  Therapy Charges for Today     Code Description Service Date Service Provider Modifiers Qty    61141372615  PT THER PROC EA 15 MIN 7/19/2019 Funmilayo Zayas, PT Student GP 3                    Funmilayo Zayas, PT Student  7/19/2019

## 2019-07-25 ENCOUNTER — HOSPITAL ENCOUNTER (OUTPATIENT)
Dept: PHYSICAL THERAPY | Facility: HOSPITAL | Age: 34
Setting detail: THERAPIES SERIES
Discharge: HOME | End: 2019-07-25

## 2019-07-25 DIAGNOSIS — G89.29 CHRONIC LOW BACK PAIN WITHOUT SCIATICA, UNSPECIFIED BACK PAIN LATERALITY: Primary | ICD-10-CM

## 2019-07-25 DIAGNOSIS — M54.50 CHRONIC LOW BACK PAIN WITHOUT SCIATICA, UNSPECIFIED BACK PAIN LATERALITY: Primary | ICD-10-CM

## 2019-07-25 PROCEDURE — 97110 THERAPEUTIC EXERCISES: CPT

## 2019-07-25 NOTE — THERAPY PROGRESS REPORT/RE-CERT
Outpatient Physical Therapy Ortho Progress Note  [facility]     Patient Name: Frances Harmtan  : 1985  MRN: [id number]  Today's Date: 2019      Visit Date: 2019    Visit Dx:    ICD-10-CM ICD-9-CM   1. Chronic low back pain without sciatica, unspecified back pain laterality M54.5 724.2    G89.29 338.29       Patient Active Problem List   Diagnosis   • Asthma   • Chronic fatigue   • Chronic tonsillar hypertrophy   • Type 2 diabetes mellitus (CMS/HCC)   • Hyperlipidemia   • Hypertension   • Keloid skin disorder   • Morbid obesity (CMS/HCC)   • Polycystic ovaries   • Seasonal allergies   • Subclinical hypothyroidism   • Venous insufficiency (chronic) (peripheral)   • Abnormal uterine bleeding   • Anemia   • Depression   • GERD (gastroesophageal reflux disease)   • Herpes zoster   • Migraine with aura        Past Medical History:   Diagnosis Date   • Asthma    • Back pain    • Diabetes mellitus (CMS/HCC)    • Foot pain    • History of fracture    • History of gallstones    • Hypertension    • Left lower quadrant pain     h/o   • Migraines    • Seasonal allergies    • UTI (urinary tract infection)     H/o        Past Surgical History:   Procedure Laterality Date   • CHOLECYSTECTOMY     • HERNIA REPAIR     • KELOID EXCISION     • OTHER SURGICAL HISTORY      Excision of chest wall,shoulder, and trunk   • WISDOM TOOTH EXTRACTION         PT Ortho     Row Name 19 1600       Subjective Comments    Subjective Comments  Patient states that she is feeling a lot stronger but she is still having some pain in her back. She reports improvement in sitting in the car without pain, only reporting pain getting out of and into a car. She states that she is hoping to get a pain injection on .  -DAVON (r) NS (t) DAVON (c)       Subjective Pain    Able to rate subjective pain?  yes  -DAVON (r) NS (t) DAVON (c)    Pre-Treatment Pain Level  2  -DAVON (r) NS (t) DAVON (c)    Post-Treatment Pain Level  2  -DAVON (r) NS  (t) DAVON (c)    Subjective Pain Comment  stiff  -DAVON (r) NS (t) DAVON (c)       Myotomal Screen- Lower Quarter Clearing    Hip flexion (L2)  Bilateral:;4+ (Good +) pain  -DAVON (r) NS (t) DAVON (c)    Knee extension (L3)  Bilateral:;5 (Normal)  -DAVON (r) NS (t) DAVON (c)    Knee flexion (S2)  Bilateral:;5 (Normal) relief of pain on L LE  -DAVON (r) NS (t) DAVON (c)       Lumbosacral Palpation    Lumbosacral Palpation Comments  pain over sacrum and sacral borders, L in particular and B greater trochanters  -DAVON (r) NS (t) DAVON (c)       MMT (Manual Muscle Testing)    General MMT Comments  B hip ABD: 4/5, R hip ext: 4/5, L 4-/5  -DAVON (r) NS (t) DAVON (c)       Gait/Stairs Assessment/Training    Comment (Gait/Stairs)  Patient ambulates with increased LEVON and decreased arm swing.  -DAVON (r) NS (t) DAVON (c)      User Key  (r) = Recorded By, (t) = Taken By, (c) = Cosigned By    Initials Name Provider Type    Urbano Enriquez, PT Physical Therapist    NS Funmilayo Zayas, PT Student PT Student                      PT Assessment/Plan     Row Name 07/25/19 1600          PT Assessment    Functional Limitations  Impaired gait;Limitation in home management;Performance in leisure activities  -DAVON (r) NS (t) DAVON (c)     Impairments  Endurance;Gait;Pain;Poor body mechanics;Posture  -DAVON (r) NS (t) DAVON (c)     Assessment Comments  Patient is progressing well with STGs and LTGs. She is continuing to have some pain with transitional movement but is improving in strength and function, reporting improvement in sitting tolerance in the car which was very limiting. She continues to have some strength deficits and pain and will continue to benefit from skilled PT to address pain and function.  -DAVON (r) NS (t) DAVON (c)     Please refer to paper survey for additional self-reported information  Yes  -DAVON (r) NS (t) DAVON (c)     Rehab Potential  Fair  -DAVON (r) NS (t) DAVON (c)     Patient/caregiver participated in establishment of treatment plan and goals  Yes  -DAVON (r) NS (t) DAVON (c)      Patient would benefit from skilled therapy intervention  Yes  -DAVON (r) NS (t) DAVON (c)        PT Plan    PT Frequency  1x/week  -DAVON (r) NS (t) DAVON (c)     Predicted Duration of Therapy Intervention (Therapy Eval)  up to 4 visits  -DAVON (r) NS (t) DAVON (c)     Planned CPT's?  PT THER PROC EA 15 MIN: 93845;PT THER ACT EA 15 MIN: 26900;PT MANUAL THERAPY EA 15 MIN: 59514;PT NEUROMUSC RE-EDUCATION EA 15 MIN: 75295;PT GAIT TRAINING EA 15 MIN: 90534;PT ELECTRICAL STIM UNATTEND: ;PT TRACTION LUMBAR: 68471;PT ULTRASOUND EA 15 MIN: 21868;PT IONTOPHORESIS EA 15 MIN: 03529;PT HOT/COLD PACK WC NONMCARE: 10336  -DAVON (r) NS (t) DAVON (c)     PT Plan Comments  add qped hip extensions, hip flexor strengthening, and PPTs.   -DAVON (r) NS (t) DAVON (c)       User Key  (r) = Recorded By, (t) = Taken By, (c) = Cosigned By    Initials Name Provider Type    Urbano Enriquez, PT Physical Therapist    Funmilayo Mratins, PT Student PT Student            Exercises     Row Name 07/25/19 1600             Subjective Comments    Subjective Comments  Patient states that she is feeling a lot stronger but she is still having some pain in her back. She reports improvement in sitting in the car without pain, only reporting pain getting out of and into a car. She states that she is hoping to get a pain injection on September 6th.  -DAVON (r) NS (t) DAVON (c)         Subjective Pain    Able to rate subjective pain?  yes  -DAVON (r) NS (t) DAVON (c)      Pre-Treatment Pain Level  2  -DAVON (r) NS (t) DAVON (c)      Post-Treatment Pain Level  2  -DAVON (r) NS (t) DAVON (c)      Subjective Pain Comment  stiff  -DAVON (r) NS (t) DAVON (c)         Total Minutes    57211 - PT Therapeutic Exercise Minutes  30  -DAVON (r) NS (t) DAVON (c)         Exercise 1    Exercise Name 1  Re-assessmen performed- see for details  -DAVON (r) NS (t) DAVON (c)        User Key  (r) = Recorded By, (t) = Taken By, (c) = Cosigned By    Initials Name Provider Type    Urbano Enriquez, PT Physical Therapist    Funmilayo Martins, PT  Student PT Student                       PT OP Goals     Row Name 07/25/19 1600          PT Short Term Goals    STG Date to Achieve  07/08/19  -DAVON (r) NS (t) DAVON (c)     STG 1  Patient to be compliant with initial HEP  -DAVON (r) NS (t) DAVON (c)     STG 1 Progress  Met  -DAVON (r) NS (t) DAVON (c)     STG 2  Patient to demonstrate reduced pain in supine lying  -DAVON (r) NS (t) DAVON (c)     STG 2 Progress  Ongoing  -DAVON (r) NS (t) DAVON (c)        Long Term Goals    LTG Date to Achieve  07/22/19  -DAVON (r) NS (t) DAVON (c)     LTG 1  Patient to be independent with final HEP  -DAVON (r) NS (t) DAVON (c)     LTG 1 Progress  Ongoing  -DAVON (r) NS (t) DAVON (c)     LTG 2  Patient to demonstrate reduced sacral tenderness to improve sitting tolerance  -DAVON (r) NS (t) DAVON (c)     LTG 2 Progress  Ongoing decreased pain with sitting, increased with transitions  -DAVON (r) NS (t) DAVON (c)     LTG 3  Patient to improve Mod Oswestry score to at least 28%  -DAVON (r) NS (t) DAVON (c)     LTG 3 Progress  Ongoing  -DAVON (r) NS (t) DAVON (c)     LTG 3 Progress Comments  38%  -DAVON (r) NS (t) DAVON (c)       User Key  (r) = Recorded By, (t) = Taken By, (c) = Cosigned By    Initials Name Provider Type    Urbano Enriquez, PT Physical Therapist    Funmilayo Martins, PT Student PT Student          Therapy Education  Education Details: Patient was educated about continuation of HEP and PT POC  Program: Reinforced  How Provided: Verbal  Provided to: Patient  Level of Understanding: Verbalized, Demonstrated    Outcome Measure Options: Modifed Owestry  Modified Oswestry  Modified Oswestry Score/Comments: 38%      Time Calculation:   Start Time: 1530  Therapy Charges for Today     Code Description Service Date Service Provider Modifiers Qty    33192568958 HC PT THER PROC EA 15 MIN 7/25/2019 Funmilayo Zayas, PT Student GP 2          PT G-Codes  Outcome Measure Options: Modifed Owestry  Modified Oswestry Score/Comments: 38%         Funmilayo Zayas PT Student  7/25/2019

## 2019-08-02 ENCOUNTER — APPOINTMENT (OUTPATIENT)
Dept: PHYSICAL THERAPY | Facility: HOSPITAL | Age: 34
End: 2019-08-02

## 2019-08-06 ENCOUNTER — HOSPITAL ENCOUNTER (OUTPATIENT)
Dept: PHYSICAL THERAPY | Facility: HOSPITAL | Age: 34
Setting detail: THERAPIES SERIES
Discharge: HOME | End: 2019-08-06

## 2019-08-06 DIAGNOSIS — M54.50 CHRONIC LOW BACK PAIN WITHOUT SCIATICA, UNSPECIFIED BACK PAIN LATERALITY: Primary | ICD-10-CM

## 2019-08-06 DIAGNOSIS — G89.29 CHRONIC LOW BACK PAIN WITHOUT SCIATICA, UNSPECIFIED BACK PAIN LATERALITY: Primary | ICD-10-CM

## 2019-08-06 PROCEDURE — 97110 THERAPEUTIC EXERCISES: CPT

## 2019-08-06 NOTE — THERAPY TREATMENT NOTE
Outpatient Physical Therapy Ortho Treatment Note  [facility]     Patient Name: Frances Hartman  : 1985  MRN: [id number]  Today's Date: 2019      Visit Date: 2019    Visit Dx:    ICD-10-CM ICD-9-CM   1. Chronic low back pain without sciatica, unspecified back pain laterality M54.5 724.2    G89.29 338.29       Patient Active Problem List   Diagnosis   • Asthma   • Chronic fatigue   • Chronic tonsillar hypertrophy   • Type 2 diabetes mellitus (CMS/HCC)   • Hyperlipidemia   • Hypertension   • Keloid skin disorder   • Morbid obesity (CMS/HCC)   • Polycystic ovaries   • Seasonal allergies   • Subclinical hypothyroidism   • Venous insufficiency (chronic) (peripheral)   • Abnormal uterine bleeding   • Anemia   • Depression   • GERD (gastroesophageal reflux disease)   • Herpes zoster   • Migraine with aura        Past Medical History:   Diagnosis Date   • Asthma    • Back pain    • Diabetes mellitus (CMS/HCC)    • Foot pain    • History of fracture    • History of gallstones    • Hypertension    • Left lower quadrant pain     h/o   • Migraines    • Seasonal allergies    • UTI (urinary tract infection)     H/o        Past Surgical History:   Procedure Laterality Date   • CHOLECYSTECTOMY     • HERNIA REPAIR     • KELOID EXCISION     • OTHER SURGICAL HISTORY      Excision of chest wall,shoulder, and trunk   • WISDOM TOOTH EXTRACTION         PT Ortho     Row Name 19 1400       Subjective Comments    Subjective Comments  Patient states that she still has some stiffness but she feels like it is getting better. She states that she is having an easier time with rolling over in bed and picking objects up off the floor, and feeling like she has more motion all together. She also states increased sitting tolerance in the car.  -DAVON (r) NS (t) DAVON (c)       Subjective Pain    Able to rate subjective pain?  yes  -DAVON (r) NS (t) DAVON (c)    Pre-Treatment Pain Level  0  -DAVON (r) NS (t) DAVON (c)    Post-Treatment  Pain Level  0  -DAVON (r) NS (t) DAVON (c)    Subjective Pain Comment  stiff  -DAVON (r) NS (t) DAVON (c)      User Key  (r) = Recorded By, (t) = Taken By, (c) = Cosigned By    Initials Name Provider Type    Urbano Enriquez, PT Physical Therapist    Funmilayo Martins, PT Student PT Student                      PT Assessment/Plan     Row Name 08/06/19 1400          PT Assessment    Assessment Comments  Patient tolerated therapy well. She has mild increase in B hip discomfort in her groin area with stepping, likely due to hip weakness. She is noting improvement in function at home and in the community with daily activities. She will continue to benefit from hip/glute strengthening and stretching.  -DAVON (r) NS (t) DAVON (c)        PT Plan    PT Plan Comments  review HEP, add side stepping/diagonals with band and PPT  -DAVON (r) NS (t) DAVON (c)       User Key  (r) = Recorded By, (t) = Taken By, (c) = Cosigned By    Initials Name Provider Type    Urbano Enriquez, PT Physical Therapist    Funmilayo Martins, PT Student PT Student            Exercises     Row Name 08/06/19 1400             Subjective Comments    Subjective Comments  Patient states that she still has some stiffness but she feels like it is getting better. She states that she is having an easier time with rolling over in bed and picking objects up off the floor, and feeling like she has more motion all together. She also states increased sitting tolerance in the car.  -DAVON (r) NS (t) DAVON (c)         Subjective Pain    Able to rate subjective pain?  yes  -DAVON (r) NS (t) DAVON (c)      Pre-Treatment Pain Level  0  -DAVON (r) NS (t) DAVON (c)      Post-Treatment Pain Level  0  -DAVON (r) NS (t) DAVON (c)      Subjective Pain Comment  stiff  -DAVON (r) NS (t) DAVON (c)         Total Minutes    22702 - PT Therapeutic Exercise Minutes  31  -DAVON (r) NS (t) DAVON (c)         Exercise 1    Exercise Name 1  NuStep L6  -DAVON (r) NS (t) DAVON (c)      Time 1  5 mins  -DAVON (r) NS (t) DAVON (c)         Exercise 2     Exercise Name 2  Bridges- toes up with green band  -DAVON (r) NS (t) DAVON (c)      Sets 2  2  -DAVON (r) NS (t) DAVON (c)      Reps 2  10  -DAVON (r) NS (t) DAVON (c)      Time 2  3 secs  -DAVON (r) NS (t) DAVON (c)         Exercise 3    Exercise Name 3  Step ups- 1 riser  -DAVON (r) NS (t) DAVON (c)      Reps 3  8 ea  -DAVON (r) NS (t) DAVON (c)         Exercise 4    Exercise Name 4  TG squats  -DAVON (r) NS (t) DAVON (c)      Sets 4  2  -DAVON (r) NS (t) DAVON (c)      Reps 4  10  -DAVON (r) NS (t) DAVON (c)         Exercise 5    Exercise Name 5  Prone knee flexion stretch  -DAVON (r) NS (t) DAVON (c)      Reps 5  2  -DAVON (r) NS (t) DAVON (c)      Time 5  20 sec ea  -DAVON (r) NS (t) DAVON (c)         Exercise 6    Exercise Name 6  Qped LE extensions  -DAVON (r) NS (t) DAVON (c)      Time 6  stopped due to increase in wrist pain  -DAVON (r) NS (t) DAVON (c)         Exercise 7    Exercise Name 7  Prone heel squeeze  -DAVON (r) NS (t) DAVON (c)      Reps 7  10  -DAVON (r) NS (t) DAVON (c)      Time 7  3 sec  -DAVON (r) NS (t) DAVON (c)        User Key  (r) = Recorded By, (t) = Taken By, (c) = Cosigned By    Initials Name Provider Type    Urbano Enriquez, PT Physical Therapist    Funmilayo Martins, PT Student PT Student                           Therapy Education  Education Details: Patient was educated about continuation of HEP  Program: Reinforced  How Provided: Verbal  Provided to: Patient  Level of Understanding: Verbalized, Demonstrated              Time Calculation:   Start Time: 1411  Therapy Charges for Today     Code Description Service Date Service Provider Modifiers Qty    50289751522 HC PT THER PROC EA 15 MIN 8/6/2019 Funmilayo Zayas, PT Student GP 2                    Funmilayo Zayas, PT Student  8/6/2019

## 2019-08-08 ENCOUNTER — OFFICE VISIT (OUTPATIENT)
Dept: NEUROLOGY | Facility: CLINIC | Age: 34
End: 2019-08-08

## 2019-08-08 VITALS
OXYGEN SATURATION: 98 % | WEIGHT: 268 LBS | DIASTOLIC BLOOD PRESSURE: 76 MMHG | HEART RATE: 78 BPM | RESPIRATION RATE: 18 BRPM | HEIGHT: 60 IN | BODY MASS INDEX: 52.61 KG/M2 | SYSTOLIC BLOOD PRESSURE: 112 MMHG

## 2019-08-08 DIAGNOSIS — G43.109 MIGRAINE WITH AURA AND WITHOUT STATUS MIGRAINOSUS, NOT INTRACTABLE: Primary | ICD-10-CM

## 2019-08-08 PROCEDURE — 99213 OFFICE O/P EST LOW 20 MIN: CPT | Performed by: PSYCHIATRY & NEUROLOGY

## 2019-08-08 RX ORDER — TOPIRAMATE 25 MG/1
TABLET ORAL
Qty: 120 TABLET | Refills: 3 | Status: SHIPPED | OUTPATIENT
Start: 2019-08-08 | End: 2019-08-08

## 2019-08-08 NOTE — ASSESSMENT & PLAN NOTE
Headaches are worsening.  Medication changes per orders.     Stop Elavil     Emgality given in office

## 2019-08-08 NOTE — PROGRESS NOTES
Subjective:   Chief Complaint   Patient presents with   • Migraine       Patient ID: Frances Hartman is a 33 y.o. female.    History of Present Illness     33 y.o. woman returns in follow up for migraines.  Last visit on 5/3/19 started Elavil 25 mg qhs.     Pt stopped Elavil due to dry mouth.    Frequency is daily.  Moderate intensity.  Last for entire day. .   Located in varying locations of head.   OTC meds helpful.       Taking GBP, Flexeril, tramadol for back pain once every few months.      Preventatives:  Elavil, TPM, VPA     Problem history:    HA frequency is daily.  Located in front of head with squeezing and throbbing sensation moderate to severe intensity.  Awakens with HA and will decrease slightly by bedtime.  Treating with Tylenol, tramadol, Lortab on a daily.  Previous trial of triptans with minimal response.       Reviewed Dr Astudillo's notes:    Presented to [facility] ED on with /122 and left A/L numbness and confusion.  Treated with clonidine and BP decreased.  H/O migraines since 8 yoa.  2 - 3 HA a week treated with OTC.  Echo  - EF 55-60,     HCT, my review of films, 7/11/17 normal  CBC, CMP, TSH, Hep B, HIV, RPR, INR - NCS    The following portions of the patient's history were reviewed and updated as appropriate:   She  has a past medical history of Asthma, Back pain, Diabetes mellitus (CMS/HCC), Foot pain, History of fracture, History of gallstones, Hypertension, Left lower quadrant pain, Migraines, Seasonal allergies, and UTI (urinary tract infection).  She does not have any pertinent problems on file.  She  has a past surgical history that includes Keloid excision; Cholecystectomy; [place] tooth extraction; Hernia repair; and Other surgical history.  Her family history includes Arrhythmia in her mother; Arthritis in her mother; Dementia in her father; Diabetes in her mother; Heart attack in her father; Obesity in her mother; Stroke in her other.  She  reports that she has never smoked. She  has never used smokeless tobacco. She reports that she drinks alcohol. She reports that she does not use drugs.  Current Outpatient Medications   Medication Sig Dispense Refill   • Acetaminophen (TYLENOL ARTHRITIS PAIN PO) Take  by mouth As Needed.     • albuterol (PROVENTIL HFA;VENTOLIN HFA) 108 (90 BASE) MCG/ACT inhaler Inhale 2 puffs Every 4 (Four) Hours As Needed for wheezing.     • carvedilol (COREG) 25 MG tablet Take 37.5 mg by mouth.     • CloNIDine (CATAPRES) 0.2 MG tablet Take 1 tablet by mouth 2 (Two) Times a Day. 60 tablet 5   • cyclobenzaprine (FLEXERIL) 10 MG tablet Take 10 mg by mouth 3 (Three) Times a Day As Needed for muscle spasms.     • DICLOFENAC SODIUM PO Take 75 mg by mouth.     • diphenhydrAMINE (BENADRYL) 25 mg capsule Take 25 mg by mouth Every 6 (Six) Hours As Needed for itching.     • fluconazole (DIFLUCAN) 200 MG tablet Take 200 mg by mouth 2 (Two) Times a Day.     • furosemide (LASIX) 40 MG tablet Take 40 mg by mouth 1 (One) Time As Needed (as needed every other day).     • gabapentin (NEURONTIN) 300 MG capsule Take 300 mg by mouth 3 (Three) Times a Day As Needed.     • glipiZIDE (GLUCOTROL) 5 MG tablet Take 5 mg by mouth 2 (Two) Times a Day Before Meals.     • HYDROcodone-acetaminophen (NORCO) 5-325 MG per tablet Take 1-2 tablets by mouth Every 6 (Six) Hours As Needed for Severe Pain . 15 tablet 0   • loratadine (CLARITIN) 10 MG tablet Take 10 mg by mouth Daily.     • metFORMIN (GLUCOPHAGE) 500 MG tablet Take 500 mg by mouth Daily.     • spironolactone (ALDACTONE) 100 MG tablet Take 100 mg by mouth Daily.     • traMADol (ULTRAM) 50 MG tablet Take 50 mg by mouth Every 6 (Six) Hours As Needed for moderate pain (4-6).       No current facility-administered medications for this visit.      Current Outpatient Medications on File Prior to Visit   Medication Sig   • Acetaminophen (TYLENOL ARTHRITIS PAIN PO) Take  by mouth As Needed.   • albuterol (PROVENTIL HFA;VENTOLIN HFA) 108 (90 BASE)  MCG/ACT inhaler Inhale 2 puffs Every 4 (Four) Hours As Needed for wheezing.   • carvedilol (COREG) 25 MG tablet Take 37.5 mg by mouth.   • CloNIDine (CATAPRES) 0.2 MG tablet Take 1 tablet by mouth 2 (Two) Times a Day.   • cyclobenzaprine (FLEXERIL) 10 MG tablet Take 10 mg by mouth 3 (Three) Times a Day As Needed for muscle spasms.   • DICLOFENAC SODIUM PO Take 75 mg by mouth.   • diphenhydrAMINE (BENADRYL) 25 mg capsule Take 25 mg by mouth Every 6 (Six) Hours As Needed for itching.   • fluconazole (DIFLUCAN) 200 MG tablet Take 200 mg by mouth 2 (Two) Times a Day.   • furosemide (LASIX) 40 MG tablet Take 40 mg by mouth 1 (One) Time As Needed (as needed every other day).   • gabapentin (NEURONTIN) 300 MG capsule Take 300 mg by mouth 3 (Three) Times a Day As Needed.   • glipiZIDE (GLUCOTROL) 5 MG tablet Take 5 mg by mouth 2 (Two) Times a Day Before Meals.   • HYDROcodone-acetaminophen (NORCO) 5-325 MG per tablet Take 1-2 tablets by mouth Every 6 (Six) Hours As Needed for Severe Pain .   • loratadine (CLARITIN) 10 MG tablet Take 10 mg by mouth Daily.   • metFORMIN (GLUCOPHAGE) 500 MG tablet Take 500 mg by mouth Daily.   • spironolactone (ALDACTONE) 100 MG tablet Take 100 mg by mouth Daily.   • traMADol (ULTRAM) 50 MG tablet Take 50 mg by mouth Every 6 (Six) Hours As Needed for moderate pain (4-6).   • [DISCONTINUED] amitriptyline (ELAVIL) 25 MG tablet Take 1 tablet by mouth Every Night.   • [DISCONTINUED] ramipril (ALTACE) 10 MG capsule Take 1 capsule by mouth Every Night.     No current facility-administered medications on file prior to visit.      She is allergic to hydrochlorothiazide; augmentin [amoxicillin-pot clavulanate]; codeine; doxycycline; and naproxen..    Review of Systems   Constitutional: Positive for fatigue. Negative for activity change and unexpected weight change.   HENT: Negative for trouble swallowing.    Eyes: Negative for visual disturbance.   Respiratory: Negative for apnea and choking.   "  Cardiovascular: Negative for leg swelling.   Endocrine: Negative for cold intolerance and heat intolerance.   Genitourinary: Negative for difficulty urinating, frequency, menstrual problem and urgency.   Musculoskeletal: Negative for gait problem and myalgias.   Skin: Negative for color change and rash.   Allergic/Immunologic: Negative for immunocompromised state.   Neurological: Positive for headaches. Negative for tremors, syncope, facial asymmetry, speech difficulty and light-headedness.   Hematological: Negative for adenopathy. Does not bruise/bleed easily.   Psychiatric/Behavioral: Positive for confusion. Negative for behavioral problems, decreased concentration, hallucinations and sleep disturbance.        Objective:  Vitals:    08/08/19 1404   BP: 112/76   BP Location: Left arm   Patient Position: Sitting   Cuff Size: Adult   Pulse: 78   Resp: 18   SpO2: 98%   Weight: 122 kg (268 lb)   Height: 152.4 cm (60\")       Neurologic Exam     Mental Status   Registration: recalls 3 of 3 objects. Recall at 5 minutes: recalls 3 of 3 objects. Follows 3 step commands.   Attention: normal. Concentration: normal.   Level of consciousness: alert  Knowledge: good and consistent with education.   Able to name object. Able to read. Able to repeat. Able to write. Normal comprehension.     Cranial Nerves     CN II   Visual fields full to confrontation.   Visual acuity: normal  Right visual field deficit: none  Left visual field deficit: none     CN III, IV, VI   Right pupil: Shape: regular. Reactivity: brisk. Consensual response: intact.   Left pupil: Shape: regular. Reactivity: brisk. Consensual response: intact.   Nystagmus: none   Diplopia: none  Ophthalmoparesis: none  Upgaze: normal  Downgaze: normal  Conjugate gaze: present  Vestibulo-ocular reflex: present    CN V   Facial sensation intact.   Right corneal reflex: normal  Left corneal reflex: normal    CN VII   Right facial weakness: none  Left facial weakness: " none    CN VIII   Hearing: intact    CN IX, X   Palate: symmetric  Right gag reflex: normal  Left gag reflex: normal    CN XI   Right sternocleidomastoid strength: normal  Left sternocleidomastoid strength: normal    CN XII   Tongue: not atrophic  Fasciculations: absent  Tongue deviation: none    Motor Exam   Muscle bulk: normal  Overall muscle tone: normal  Right arm tone: normal  Left arm tone: normal  Right leg tone: normal  Left leg tone: normal    Sensory Exam   Light touch normal.   Vibration normal.   Proprioception normal.   Pinprick normal.     Gait, Coordination, and Reflexes     Tremor   Resting tremor: absent  Intention tremor: absent  Action tremor: absent    Reflexes   Reflexes 2+ except as noted.       Physical Exam   Constitutional: She appears well-developed and well-nourished.   Nursing note and vitals reviewed.    Admission on 02/26/2019, Discharged on 02/26/2019   Component Date Value Ref Range Status   • Glucose 02/26/2019 106* 70 - 100 mg/dL Final   • BUN 02/26/2019 7* 9 - 23 mg/dL Final   • Creatinine 02/26/2019 0.99  0.60 - 1.30 mg/dL Final   • Sodium 02/26/2019 136  132 - 146 mmol/L Final   • Potassium 02/26/2019 4.1  3.5 - 5.5 mmol/L Final   • Chloride 02/26/2019 100  99 - 109 mmol/L Final   • CO2 02/26/2019 27.0  20.0 - 31.0 mmol/L Final   • Calcium 02/26/2019 10.3  8.7 - 10.4 mg/dL Final   • Total Protein 02/26/2019 8.2  5.7 - 8.2 g/dL Final   • Albumin 02/26/2019 5.07* 3.20 - 4.80 g/dL Final   • ALT (SGPT) 02/26/2019 44* 7 - 40 U/L Final   • AST (SGOT) 02/26/2019 40* 0 - 33 U/L Final   • Alkaline Phosphatase 02/26/2019 120* 25 - 100 U/L Final   • Total Bilirubin 02/26/2019 0.4  0.3 - 1.2 mg/dL Final   • eGFR   Amer 02/26/2019 77  >60 mL/min/1.73 Final   • Globulin 02/26/2019 3.1  gm/dL Final   • A/G Ratio 02/26/2019 1.6  1.5 - 2.5 g/dL Final   • BUN/Creatinine Ratio 02/26/2019 7.1  7.0 - 25.0 Final   • Anion Gap 02/26/2019 9.0  3.0 - 11.0 mmol/L Final   • WBC 02/26/2019 10.07   3.50 - 10.80 10*3/mm3 Final   • RBC 02/26/2019 4.37  3.89 - 5.14 10*6/mm3 Final   • Hemoglobin 02/26/2019 13.7  11.5 - 15.5 g/dL Final   • Hematocrit 02/26/2019 42.9  34.5 - 44.0 % Final   • MCV 02/26/2019 98.2  80.0 - 99.0 fL Final   • MCH 02/26/2019 31.4* 27.0 - 31.0 pg Final   • MCHC 02/26/2019 31.9* 32.0 - 36.0 g/dL Final   • RDW 02/26/2019 13.0  11.3 - 14.5 % Final   • RDW-SD 02/26/2019 46.4  37.0 - 54.0 fl Final   • MPV 02/26/2019 12.7* 6.0 - 12.0 fL Final   • Platelets 02/26/2019 227  150 - 450 10*3/mm3 Final   • Neutrophil % 02/26/2019 48.0  41.0 - 71.0 % Final   • Lymphocyte % 02/26/2019 43.3  24.0 - 44.0 % Final   • Monocyte % 02/26/2019 5.3  0.0 - 12.0 % Final   • Eosinophil % 02/26/2019 3.1* 0.0 - 3.0 % Final   • Basophil % 02/26/2019 0.3  0.0 - 1.0 % Final   • Immature Grans % 02/26/2019 0.1  0.0 - 0.6 % Final   • Neutrophils, Absolute 02/26/2019 4.84  1.50 - 8.30 10*3/mm3 Final   • Lymphocytes, Absolute 02/26/2019 4.36  0.60 - 4.80 10*3/mm3 Final   • Monocytes, Absolute 02/26/2019 0.53  0.00 - 1.00 10*3/mm3 Final   • Eosinophils, Absolute 02/26/2019 0.31* 0.00 - 0.30 10*3/mm3 Final   • Basophils, Absolute 02/26/2019 0.03  0.00 - 0.20 10*3/mm3 Final   • Immature Grans, Absolute 02/26/2019 0.01  0.00 - 0.05 10*3/mm3 Final         Assessment/Plan:       Problems Addressed this Visit        Cardiovascular and Mediastinum    Migraine with aura - Primary     Headaches are worsening.  Medication changes per orders.     Stop Elavil     Emgality given in office              Relevant Medications    DICLOFENAC SODIUM PO

## 2019-08-09 ENCOUNTER — TRANSCRIBE ORDERS (OUTPATIENT)
Dept: ADMINISTRATIVE | Facility: HOSPITAL | Age: 34
End: 2019-08-09

## 2019-08-09 DIAGNOSIS — Z80.3 FAMILY HISTORY OF BREAST CANCER IN MOTHER: ICD-10-CM

## 2019-08-09 DIAGNOSIS — Z12.31 VISIT FOR SCREENING MAMMOGRAM: Primary | ICD-10-CM

## 2019-08-13 ENCOUNTER — HOSPITAL ENCOUNTER (OUTPATIENT)
Dept: PHYSICAL THERAPY | Facility: HOSPITAL | Age: 34
Setting detail: THERAPIES SERIES
Discharge: HOME | End: 2019-08-13

## 2019-08-13 DIAGNOSIS — G89.29 CHRONIC LOW BACK PAIN WITHOUT SCIATICA, UNSPECIFIED BACK PAIN LATERALITY: Primary | ICD-10-CM

## 2019-08-13 DIAGNOSIS — M54.50 CHRONIC LOW BACK PAIN WITHOUT SCIATICA, UNSPECIFIED BACK PAIN LATERALITY: Primary | ICD-10-CM

## 2019-08-13 PROCEDURE — 97110 THERAPEUTIC EXERCISES: CPT

## 2019-08-13 NOTE — THERAPY TREATMENT NOTE
Outpatient Physical Therapy Ortho Treatment Note  [facility]     Patient Name: Frances Hartman  : 1985  MRN: [id number]  Today's Date: 2019      Visit Date: 2019    Visit Dx:    ICD-10-CM ICD-9-CM   1. Chronic low back pain without sciatica, unspecified back pain laterality M54.5 724.2    G89.29 338.29       Patient Active Problem List   Diagnosis   • Asthma   • Chronic fatigue   • Chronic tonsillar hypertrophy   • Type 2 diabetes mellitus (CMS/HCC)   • Hyperlipidemia   • Hypertension   • Keloid skin disorder   • Morbid obesity (CMS/HCC)   • Polycystic ovaries   • Seasonal allergies   • Subclinical hypothyroidism   • Venous insufficiency (chronic) (peripheral)   • Abnormal uterine bleeding   • Anemia   • Depression   • GERD (gastroesophageal reflux disease)   • Herpes zoster   • Migraine with aura        Past Medical History:   Diagnosis Date   • Asthma    • Back pain    • Diabetes mellitus (CMS/HCC)    • Foot pain    • History of fracture    • History of gallstones    • Hypertension    • Left lower quadrant pain     h/o   • Migraines    • Seasonal allergies    • UTI (urinary tract infection)     H/o        Past Surgical History:   Procedure Laterality Date   • CHOLECYSTECTOMY     • HERNIA REPAIR     • KELOID EXCISION     • OTHER SURGICAL HISTORY      Excision of chest wall,shoulder, and trunk   • WISDOM TOOTH EXTRACTION         PT Ortho     Row Name 19 1500       Subjective Comments    Subjective Comments  Patient states that she has some burning pain in the LS region that is fairly localized. She states that it came on this morning and this is something that has come on randomly from time to time over the past few years. She states that her exercises at home are going fine and that overall she has made some improvements.   -DAVON (r) NS (t) DAVON (c)       Subjective Pain    Able to rate subjective pain?  yes  -DAVON (r) NS (t) DAVON (c)    Pre-Treatment Pain Level  3  -DAVON (r) NS (t) DAVON (c)     Post-Treatment Pain Level  3  -DAVON (r) NS (t) DAVON (c)    Subjective Pain Comment  burning  -DAVON (r) NS (t) DAVON (c)      User Key  (r) = Recorded By, (t) = Taken By, (c) = Cosigned By    Initials Name Provider Type    Urbano Enriquez, PT Physical Therapist    Funmilayo Martins, PT Student PT Student                      PT Assessment/Plan     Row Name 08/13/19 1500          PT Assessment    Assessment Comments  Patient tolerated treatment well. She had a shortened session due to arriving late. She noted decreased burning pain in the sacral area with PPUs and standing extensions, with PPUs improving pain slightly more. She will continue to benefit from streching, strengthening, and extension based exercises if burning continues.  -DAVON (r) NS (t) DAVON (c)        PT Plan    PT Plan Comments  continue with PPUs if continues burning sx, add side stepping/diagonals with band and PPT.  -DAVON (r) NS (t) DAVON (c)       User Key  (r) = Recorded By, (t) = Taken By, (c) = Cosigned By    Initials Name Provider Type    Urbano Enriquez, PT Physical Therapist    Funmilayo Martins, PT Student PT Student            Exercises     Row Name 08/13/19 1500             Subjective Comments    Subjective Comments  Patient states that she has some burning pain in the LS region that is fairly localized. She states that it came on this morning and this is something that has come on randomly from time to time over the past few years. She states that her exercises at home are going fine and that overall she has made some improvements.   -DAVON (r) NS (t) DAVON (c)         Subjective Pain    Able to rate subjective pain?  yes  -DAVON (r) NS (t) DAVON (c)      Pre-Treatment Pain Level  3  -DAVON (r) NS (t) DAVON (c)      Post-Treatment Pain Level  3  -DAVON (r) NS (t) DAVON (c)      Subjective Pain Comment  burning  -DAVON (r) NS (t) DAVON (c)         Total Minutes    12176 - PT Therapeutic Exercise Minutes  25  -DAVON (r) NS (t) DAVON (c)         Exercise 1    Exercise Name 1  Bridges-  toes up  -DAVON (r) NS (t) DAVON (c)      Sets 1  2  -DAVON (r) NS (t) DAVON (c)      Reps 1  10  -DAVON (r) NS (t) DAVON (c)      Additional Comments  3 secs  -DAVON (r) NS (t) DAVON (c)         Exercise 2    Exercise Name 2  PPUs  -DAVON (r) NS (t) DAVON (c)      Reps 2  10  -DAVON (r) NS (t) DAVON (c)      Additional Comments  from elbows  -DAVON (r) NS (t) DAVON (c)         Exercise 3    Exercise Name 3  Standing extensions  -DAVON (r) NS (t) DAVON (c)      Reps 3  10  -DAVON (r) NS (t) DAVON (c)         Exercise 4    Exercise Name 4  Prone heel squeeze  -DAVON (r) NS (t) DAVON (c)      Sets 4  2  -DAVON (r) NS (t) DAVON (c)      Reps 4  10  -DAVON (r) NS (t) DAVON (c)        User Key  (r) = Recorded By, (t) = Taken By, (c) = Cosigned By    Initials Name Provider Type    Urbano Enriquez, PT Physical Therapist    Funmilayo Martins, PT Student PT Student                           Therapy Education  Education Details: Patient was educated about continuation of HEP with addition of PPUs.  Given: HEP  Program: Reinforced, New  How Provided: Verbal, Demonstration  Provided to: Patient  Level of Understanding: Verbalized, Demonstrated              Time Calculation:   Start Time: 1531  Therapy Charges for Today     Code Description Service Date Service Provider Modifiers Qty    17184393690 HC PT THER PROC EA 15 MIN 8/13/2019 Funmilayo Zayas, PT Student GP 2                    Funmilayo Zayas, PT Student  8/13/2019

## 2019-08-16 ENCOUNTER — OFFICE VISIT (OUTPATIENT)
Dept: CARDIOLOGY | Facility: CLINIC | Age: 34
End: 2019-08-16

## 2019-08-16 VITALS
WEIGHT: 270 LBS | SYSTOLIC BLOOD PRESSURE: 110 MMHG | BODY MASS INDEX: 53.01 KG/M2 | HEART RATE: 70 BPM | HEIGHT: 60 IN | OXYGEN SATURATION: 97 % | DIASTOLIC BLOOD PRESSURE: 72 MMHG

## 2019-08-16 DIAGNOSIS — I10 ESSENTIAL HYPERTENSION: Primary | ICD-10-CM

## 2019-08-16 DIAGNOSIS — E78.2 MIXED HYPERLIPIDEMIA: ICD-10-CM

## 2019-08-16 DIAGNOSIS — E66.01 MORBID OBESITY (HCC): ICD-10-CM

## 2019-08-16 PROCEDURE — 99214 OFFICE O/P EST MOD 30 MIN: CPT | Performed by: INTERNAL MEDICINE

## 2019-08-16 RX ORDER — CARVEDILOL 25 MG/1
25 TABLET ORAL EVERY MORNING
Qty: 90 TABLET | Refills: 3 | Status: SHIPPED | OUTPATIENT
Start: 2019-08-16 | End: 2020-06-19 | Stop reason: SDUPTHER

## 2019-08-16 RX ORDER — CARVEDILOL 12.5 MG/1
12.5 TABLET ORAL EVERY EVENING
Qty: 90 TABLET | Refills: 3 | Status: SHIPPED | OUTPATIENT
Start: 2019-08-16 | End: 2020-06-19 | Stop reason: DRUGHIGH

## 2019-08-16 RX ORDER — IBUPROFEN 600 MG/1
TABLET ORAL AS NEEDED
COMMUNITY
Start: 2019-08-14 | End: 2021-01-08

## 2019-08-16 NOTE — PROGRESS NOTES
Encounter Date:08/16/2019        Patient ID: Frances Hartman is a 33 y.o. female.    PCP: Agustin Magdaleno APRN     Chief Complaint: Essential hypertension      PROBLEM LIST:  1. Hypertension:  a. Echo, 07/11/2014: EF 55-60%. All valves are structurally and functionally normal with no hemodynamically significant valve disease.  b. Echo, 09/19/2017: EF 65%. Mild TR with normal RVSP.  2. Hyperlipidemia.  3. DM2.  4. Morbid obesity (BMI 54.7)  5. Peripheral chronic venous insufficiency.  6. Polycystic ovaries.  7. GERD.  8. Subclinical hypothyroidism.  9. Keloid skin disorder.  10. Chronic fatigue.  11. Chronic tonsillar hypertrophy.  12. Depression.  13. Migraine with aura, onset age 8.      History of Present Illness  Patient presents today for 6 month follow-up with a history of hypertension and hyperlipidemia. At last visit, we switched her atenolol to carvedilol and started her on ramipril for better control of her blood pressure. However, she felt that ramipril was increasing her her heart rate too much. She saw her PCP who DC'd her ramipril and changed her carvedilol from 25 mg BID to 25 mg in the AM and 12.5 mg in the PM. Since these changes, she feels that her heart rate and blood pressure have been much better controlled. Denies chest pain, shortness of breath, leg swelling, palpitations, and syncope.       Allergies   Allergen Reactions   • Hydrochlorothiazide Swelling   • Augmentin [Amoxicillin-Pot Clavulanate]    • Codeine    • Doxycycline    • Naproxen          Current Outpatient Medications:   •  Acetaminophen (TYLENOL ARTHRITIS PAIN PO), Take  by mouth As Needed., Disp: , Rfl:   •  albuterol (PROVENTIL HFA;VENTOLIN HFA) 108 (90 BASE) MCG/ACT inhaler, Inhale 2 puffs Every 4 (Four) Hours As Needed for wheezing., Disp: , Rfl:   •  carvedilol (COREG) 25 MG tablet, Take 37.5 mg by mouth Daily., Disp: , Rfl:   •  CloNIDine (CATAPRES) 0.2 MG tablet, Take 1 tablet by mouth 2 (Two) Times a Day.  (Patient taking differently: Take 0.2 mg by mouth As Needed.), Disp: 60 tablet, Rfl: 5  •  cyclobenzaprine (FLEXERIL) 10 MG tablet, Take 10 mg by mouth 3 (Three) Times a Day As Needed for muscle spasms., Disp: , Rfl:   •  DICLOFENAC SODIUM PO, Take 75 mg by mouth 2 (Two) Times a Day., Disp: , Rfl:   •  diphenhydrAMINE (BENADRYL) 25 mg capsule, Take 25 mg by mouth Every 6 (Six) Hours As Needed for itching., Disp: , Rfl:   •  fluconazole (DIFLUCAN) 200 MG tablet, Take 200 mg by mouth 2 (Two) Times a Day., Disp: , Rfl:   •  furosemide (LASIX) 40 MG tablet, Take 40 mg by mouth 1 (One) Time As Needed (as needed every other day)., Disp: , Rfl:   •  gabapentin (NEURONTIN) 300 MG capsule, Take 300 mg by mouth 3 (Three) Times a Day As Needed., Disp: , Rfl:   •  glipiZIDE (GLUCOTROL) 5 MG tablet, Take 5 mg by mouth 2 (Two) Times a Day Before Meals., Disp: , Rfl:   •  HYDROcodone-acetaminophen (NORCO) 5-325 MG per tablet, Take 1-2 tablets by mouth Every 6 (Six) Hours As Needed for Severe Pain ., Disp: 15 tablet, Rfl: 0  •  ibuprofen (ADVIL,MOTRIN) 600 MG tablet, As Needed., Disp: , Rfl:   •  loratadine (CLARITIN) 10 MG tablet, Take 10 mg by mouth Daily., Disp: , Rfl:   •  metFORMIN (GLUCOPHAGE) 500 MG tablet, Take 500 mg by mouth Daily., Disp: , Rfl:   •  norethindrone (AYGESTIN) 5 MG tablet, Daily., Disp: , Rfl:   •  spironolactone (ALDACTONE) 100 MG tablet, Take 100 mg by mouth. Takes 30 days on,  off 30 days., Disp: , Rfl:   •  traMADol (ULTRAM) 50 MG tablet, Take 50 mg by mouth Every 6 (Six) Hours As Needed for moderate pain (4-6)., Disp: , Rfl:     The following portions of the patient's history were reviewed and updated as appropriate: allergies, current medications, past family history, past medical history, past social history, past surgical history and problem list.    ROS  Review of Systems   Constitution: Negative for chills, fatigue, fever, generalized weakness, weight gain and weight loss.   Cardiovascular:  "Negative for chest pain, claudication, dyspnea on exertion, leg swelling, orthopnea, palpitations, paroxysmal nocturnal dyspnea and syncope.   Respiratory: Negative for cough, shortness of breath, and wheezing.  HENT: Negative for ear pain, nosebleeds, and tinnitus.  Gastrointestinal: Negative for abdominal pain, constipation, diarrhea, nausea and vomiting.   Genitourinary: No urinary symptoms   Musculoskeletal: Negative for muscle cramps.  Neurological: Negative for dizziness, headaches, loss of balance, numbness, and symptoms of stroke.  Psychiatric: Normal mental status.     All other systems reviewed and are negative.    Objective:     /72 (BP Location: Right arm, Patient Position: Sitting)   Pulse 70   Ht 152.4 cm (60\")   Wt 122 kg (270 lb)   SpO2 97%   BMI 52.73 kg/m²        Physical Exam  Constitutional: Patient appears well-developed and well-nourished.   HENT: HEENT exam unremarkable.   Neck: Neck supple. No JVD present. No carotid bruits.   Cardiovascular: Normal rate, regular rhythm and normal heart sounds. No murmur heard.   2+ symmetric pulses.   Pulmonary/Chest: Breath sounds normal. Does not exhibit tenderness.   Abdominal: Abdomen benign.   Musculoskeletal: Does not exhibit edema.   Neurological: Neurological exam unremarkable.   Vitals reviewed.    Lab Review:   Lab Results   Component Value Date    GLUCOSE 106 (H) 02/26/2019    BUN 7 (L) 02/26/2019    CREATININE 0.99 02/26/2019    EGFRIFNONA 94 03/25/2015    EGFRIFAFRI 77 02/26/2019    BCR 7.1 02/26/2019    K 4.1 02/26/2019    CO2 27.0 02/26/2019    CALCIUM 10.3 02/26/2019    PROTENTOTREF 7.5 03/25/2015    ALBUMIN 5.07 (H) 02/26/2019    LABIL2 1.5 03/25/2015    AST 40 (H) 02/26/2019    ALT 44 (H) 02/26/2019     Lab Results   Component Value Date    WBC 10.07 02/26/2019    HGB 13.7 02/26/2019    HCT 42.9 02/26/2019    MCV 98.2 02/26/2019     02/26/2019      Procedures       Assessment:      Diagnosis Plan   1. Essential " hypertension  Well-controlled, continue current medications.   2. Mixed hyperlipidemia  Monitored by PCP.   3. Morbid obesity (CMS/HCC)  Caloric restriction and exercise recommended. Patient was advised to consider bariatric surgery for weight loss.     Plan:   Stable cardiac status.  Continue current medications.   FU in 6 MO, sooner as needed.  Thank you for allowing us to participate in the care of your patient.     Scribed for Julianna Astudillo MD by Lois Cintron. 8/16/2019  4:04 PM     I, Julianna Astudillo MD, personally performed the services described in this documentation as scribed by the above named individual in my presence, and it is both accurate and complete.  8/16/2019  4:05 PM        Please note that portions of this note may have been completed with a voice recognition program. Efforts were made to edit the dictations, but occasionally words are mistranscribed.

## 2019-08-22 ENCOUNTER — HOSPITAL ENCOUNTER (OUTPATIENT)
Dept: PHYSICAL THERAPY | Facility: HOSPITAL | Age: 34
Setting detail: THERAPIES SERIES
Discharge: HOME | End: 2019-08-22

## 2019-08-22 DIAGNOSIS — G89.29 CHRONIC LOW BACK PAIN WITHOUT SCIATICA, UNSPECIFIED BACK PAIN LATERALITY: Primary | ICD-10-CM

## 2019-08-22 DIAGNOSIS — M54.50 CHRONIC LOW BACK PAIN WITHOUT SCIATICA, UNSPECIFIED BACK PAIN LATERALITY: Primary | ICD-10-CM

## 2019-08-22 PROCEDURE — 97110 THERAPEUTIC EXERCISES: CPT | Performed by: PHYSICAL THERAPIST

## 2019-08-22 NOTE — THERAPY PROGRESS REPORT/RE-CERT
Outpatient Physical Therapy Ortho Progress Note   [facility]     Patient Name: Frances Hartman  : 1985  MRN: [id number]  Today's Date: 2019      Visit Date: 2019    Visit Dx:    ICD-10-CM ICD-9-CM   1. Chronic low back pain without sciatica, unspecified back pain laterality M54.5 724.2    G89.29 338.29       Patient Active Problem List   Diagnosis   • Asthma   • Chronic fatigue   • Chronic tonsillar hypertrophy   • Type 2 diabetes mellitus (CMS/HCC)   • Hyperlipidemia   • Hypertension   • Keloid skin disorder   • Morbid obesity (CMS/HCC)   • Polycystic ovaries   • Seasonal allergies   • Subclinical hypothyroidism   • Venous insufficiency (chronic) (peripheral)   • Abnormal uterine bleeding   • Anemia   • Depression   • GERD (gastroesophageal reflux disease)   • Herpes zoster   • Migraine with aura        Past Medical History:   Diagnosis Date   • Asthma    • Back pain    • Diabetes mellitus (CMS/HCC)    • Foot pain    • History of fracture    • History of gallstones    • Hypertension    • Left lower quadrant pain     h/o   • Migraines    • Seasonal allergies    • UTI (urinary tract infection)     H/o        Past Surgical History:   Procedure Laterality Date   • CHOLECYSTECTOMY     • HERNIA REPAIR     • KELOID EXCISION     • OTHER SURGICAL HISTORY      Excision of chest wall,shoulder, and trunk   • WISDOM TOOTH EXTRACTION         PT Ortho     Row Name 19 1600       Posture/Observations    Posture/Observations Comments  OW female with increased lumbar lordosis and generally flat thoracic spine  -DAVON       Neural Tension Signs- Lower Quarter Clearing    Slump  Negative  -DAVON    SLR  Negative  -DAVON       Myotomal Screen- Lower Quarter Clearing    Hip flexion (L2)  Bilateral:;4+ (Good +)  -DAVON    Knee extension (L3)  Bilateral:;5 (Normal)  -DAVON    Ankle DF (L4)  Bilateral:;5 (Normal)  -DAVON    Great toe extension (L5)  Bilateral:;5 (Normal)  -DAVON    Knee flexion (S2)  Bilateral:;5 (Normal)  -DAVON        Lumbar ROM Screen- Lower Quarter Clearing    Lumbar Flexion  Normal  -DAVON    Lumbar Extension  Normal  -DAVON       SI/Hip Screen- Lower Quarter Clearing    Megan's/Armaan's test  Bilateral:;Negative  -DAVON    Posterior thigh sheer  Right:;Positive  -DAVON       Lumbar/SI Special Tests    Sacral Spring Test (SI Dysfunction)  Right:;Positive  -DAVON    Lumbar/SI Special Tests Comments  no repeated motion testing  -DAVON       Lumbosacral Palpation    Lumbosacral Palpation Comments  painful mildly along the right SIJ.  MInimal lumbar tenderness  -DAVON       MMT (Manual Muscle Testing)    General MMT Comments  bilateral hip abd 4/5, eduin hip ext 4/5  -DAVON       Lower Extremity Flexibility    Hamstrings  Bilateral:;WNL  -DAVON    Hip External Rotators  Bilateral:;WNL  -DAVON    Hip Internal Rotators  Bilateral:;Mildly limited  -DAVON      User Key  (r) = Recorded By, (t) = Taken By, (c) = Cosigned By    Initials Name Provider Type    DAVON Urbano Chambers, PT Physical Therapist                      PT Assessment/Plan     Row Name 08/22/19 1600          PT Assessment    Functional Limitations  Impaired gait;Limitation in home management;Performance in leisure activities  -DAVON     Impairments  Endurance;Gait;Pain;Poor body mechanics;Posture  -DAVON     Assessment Comments  At this time, the patient is independent with an appropriate HEP and it is challenging and progressing toward all goals remaining.  She is likely appropriate to transition to I HEP as long as she maintains current LOF.  -DAVON     Please refer to paper survey for additional self-reported information  Yes  -DAVON     Rehab Potential  Good  -DAVON     Patient/caregiver participated in establishment of treatment plan and goals  Yes  -DAVON     Patient would benefit from skilled therapy intervention  Yes  -DAVON        PT Plan    PT Frequency  1x/week  -DAVON     Predicted Duration of Therapy Intervention (Therapy Eval)  up to 2 more visits  -DAVON     Planned CPT's?  PT THER PROC EA 15 MIN: 78538;PT  MANUAL THERAPY EA 15 MIN: 49170;PT NEUROMUSC RE-EDUCATION EA 15 MIN: 57639;PT ELECTRICAL STIM UNATTEND: ;PT ULTRASOUND EA 15 MIN: 43010;PT HOT/COLD PACK WC NONMCARE: 79395;PT TRACTION LUMBAR: 32890  -DAVON     PT Plan Comments  hold PT x 2 weeks, optional follow up at that time if pain increases or exercises become easy, otherwise will plan to transition to I HEP.  -DAVON       User Key  (r) = Recorded By, (t) = Taken By, (c) = Cosigned By    Initials Name Provider Type    Urbano Enriquez, PT Physical Therapist            Exercises     Row Name 08/22/19 1600             Subjective Comments    Subjective Comments  Patient reports a significant improvement in her ability to roll over and get in/out of bed.  She reports that she isn't taking as much tylenol and that the exercises remain challenging but she is able to do them alll effectively.  -DAVON         Subjective Pain    Able to rate subjective pain?  yes  -DAVON      Pre-Treatment Pain Level  0  -DAVON      Post-Treatment Pain Level  0  -DAVON         Total Minutes    05834 - PT Therapeutic Exercise Minutes  32  -DAVON         Exercise 1    Exercise Name 1  NuStep L5  -DAVON      Time 1  5 m  -DAVON         Exercise 2    Exercise Name 2  reassessment performed  -DAVON        User Key  (r) = Recorded By, (t) = Taken By, (c) = Cosigned By    Initials Name Provider Type    Urbano Enriquez, PT Physical Therapist                       PT OP Goals     Row Name 08/22/19 1600          PT Short Term Goals    STG Date to Achieve  07/08/19  -DAVON     STG 1  Patient to be compliant with initial HEP  -DAVON     STG 1 Progress  Met  -DAVON     STG 2  Patient to demonstrate reduced pain in supine lying  -DAVON     STG 2 Progress  Met  -DAVON        Long Term Goals    LTG Date to Achieve  07/22/19  -DAVON     LTG 1  Patient to be independent with final HEP  -DAVON     LTG 1 Progress  Met  -DAVON     LTG 2  Patient to demonstrate reduced sacral tenderness to improve sitting tolerance  -DAVON     LTG 2 Progress  Ongoing  decreased pain with sitting, increased with transitions  -DAVON     LTG 3  Patient to improve Mod Oswestry score to at least 28%  -DAVON     LTG 3 Progress  Partially Met;Ongoing  -DAVON     LTG 3 Progress Comments  30%  -DAVON       User Key  (r) = Recorded By, (t) = Taken By, (c) = Cosigned By    Initials Name Provider Type    Urbano Enriquez, PT Physical Therapist                         Time Calculation:   Start Time: 1557  Therapy Charges for Today     Code Description Service Date Service Provider Modifiers Qty    [id number]  PT THER PROC EA 15 MIN 8/22/2019 Urbano Chambers, PT  2                    Urbano Chambers, PT  8/22/2019

## 2019-08-22 NOTE — THERAPY TREATMENT NOTE
Outpatient Physical Therapy Ortho Treatment Note   [facility]     Patient Name: Frances Hartman  : 1985  MRN: [id number]  Today's Date: 2019      Visit Date: 2019    Visit Dx:    ICD-10-CM ICD-9-CM   1. Chronic low back pain without sciatica, unspecified back pain laterality M54.5 724.2    G89.29 338.29       Patient Active Problem List   Diagnosis   • Asthma   • Chronic fatigue   • Chronic tonsillar hypertrophy   • Type 2 diabetes mellitus (CMS/HCC)   • Hyperlipidemia   • Hypertension   • Keloid skin disorder   • Morbid obesity (CMS/HCC)   • Polycystic ovaries   • Seasonal allergies   • Subclinical hypothyroidism   • Venous insufficiency (chronic) (peripheral)   • Abnormal uterine bleeding   • Anemia   • Depression   • GERD (gastroesophageal reflux disease)   • Herpes zoster   • Migraine with aura        Past Medical History:   Diagnosis Date   • Asthma    • Back pain    • Diabetes mellitus (CMS/HCC)    • Foot pain    • History of fracture    • History of gallstones    • Hypertension    • Left lower quadrant pain     h/o   • Migraines    • Seasonal allergies    • UTI (urinary tract infection)     H/o        Past Surgical History:   Procedure Laterality Date   • CHOLECYSTECTOMY     • HERNIA REPAIR     • KELOID EXCISION     • OTHER SURGICAL HISTORY      Excision of chest wall,shoulder, and trunk   • WISDOM TOOTH EXTRACTION         PT Ortho     Row Name 19 1600       Posture/Observations    Posture/Observations Comments  OW female with increased lumbar lordosis and generally flat thoracic spine  -DAVON       Neural Tension Signs- Lower Quarter Clearing    Slump  Negative  -DAVON    SLR  Negative  -DAVON       Myotomal Screen- Lower Quarter Clearing    Hip flexion (L2)  Bilateral:;4+ (Good +)  -DAVON    Knee extension (L3)  Bilateral:;5 (Normal)  -DAVON    Ankle DF (L4)  Bilateral:;5 (Normal)  -DAVON    Great toe extension (L5)  Bilateral:;5 (Normal)  -DAVON    Knee flexion (S2)  Bilateral:;5 (Normal)   -DAVON       Lumbar ROM Screen- Lower Quarter Clearing    Lumbar Flexion  Normal  -DAVON    Lumbar Extension  Normal  -DAVON       SI/Hip Screen- Lower Quarter Clearing    Megan's/Armaan's test  Bilateral:;Negative  -DAVON    Posterior thigh sheer  Right:;Positive  -DAVON       Lumbar/SI Special Tests    Sacral Spring Test (SI Dysfunction)  Right:;Positive  -DAVON    Lumbar/SI Special Tests Comments  no repeated motion testing  -DAVON       Lumbosacral Palpation    Lumbosacral Palpation Comments  painful mildly along the right SIJ.  MInimal lumbar tenderness  -DAVON       MMT (Manual Muscle Testing)    General MMT Comments  bilateral hip abd 4/5, eduin hip ext 4/5  -DAVON       Lower Extremity Flexibility    Hamstrings  Bilateral:;WNL  -DAVON    Hip External Rotators  Bilateral:;WNL  -DAVON    Hip Internal Rotators  Bilateral:;Mildly limited  -DAVON      User Key  (r) = Recorded By, (t) = Taken By, (c) = Cosigned By    Initials Name Provider Type    DAVON Urbano Chambers, PT Physical Therapist                      PT Assessment/Plan     Row Name 08/22/19 1600          PT Assessment    Functional Limitations  Impaired gait;Limitation in home management;Performance in leisure activities  -DAVON     Impairments  Endurance;Gait;Pain;Poor body mechanics;Posture  -DAVON     Assessment Comments  At this time, the patient is independent with an appropriate HEP and it is challenging and progressing toward all goals remaining.  She is likely appropriate to transition to I HEP as long as she maintains current LOF.  -DAVON     Please refer to paper survey for additional self-reported information  Yes  -DAVON     Rehab Potential  Good  -DAVON     Patient/caregiver participated in establishment of treatment plan and goals  Yes  -DAVON     Patient would benefit from skilled therapy intervention  Yes  -DAVON        PT Plan    PT Frequency  1x/week  -DAVON     Predicted Duration of Therapy Intervention (Therapy Eval)  up to 2 more visits  -DAVON     Planned CPT's?  PT THER PROC EA 15 MIN: 14588;PT  MANUAL THERAPY EA 15 MIN: 89063;PT NEUROMUSC RE-EDUCATION EA 15 MIN: 27853;PT ELECTRICAL STIM UNATTEND: ;PT ULTRASOUND EA 15 MIN: 91618;PT HOT/COLD PACK WC NONMCARE: 71046;PT TRACTION LUMBAR: 48443  -ADVON     PT Plan Comments  hold PT x 2 weeks, optional follow up at that time if pain increases or exercises become easy, otherwise will plan to transition to I HEP.  -DAVON       User Key  (r) = Recorded By, (t) = Taken By, (c) = Cosigned By    Initials Name Provider Type    Urbano Enriquez, PT Physical Therapist            Exercises     Row Name 08/22/19 1600             Subjective Comments    Subjective Comments  Patient reports a significant improvement in her ability to roll over and get in/out of bed.  She reports that she isn't taking as much tylenol and that the exercises remain challenging but she is able to do them alll effectively.  -DAVON         Subjective Pain    Able to rate subjective pain?  yes  -DAVON      Pre-Treatment Pain Level  0  -DAVON      Post-Treatment Pain Level  0  -DAVON         Total Minutes    51422 - PT Therapeutic Exercise Minutes  32  -DAVON         Exercise 1    Exercise Name 1  NuStep L5  -DAVON      Time 1  5 m  -DAVON         Exercise 2    Exercise Name 2  reassessment performed  -DAVON        User Key  (r) = Recorded By, (t) = Taken By, (c) = Cosigned By    Initials Name Provider Type    Urbano Enriquez, PT Physical Therapist                       PT OP Goals     Row Name 08/22/19 1600          PT Short Term Goals    STG Date to Achieve  07/08/19  -DAVON     STG 1  Patient to be compliant with initial HEP  -DAVON     STG 1 Progress  Met  -DAVON     STG 2  Patient to demonstrate reduced pain in supine lying  -DAVON     STG 2 Progress  Met  -DAVON        Long Term Goals    LTG Date to Achieve  07/22/19  -DAVON     LTG 1  Patient to be independent with final HEP  -DAVON     LTG 1 Progress  Met  -DAVON     LTG 2  Patient to demonstrate reduced sacral tenderness to improve sitting tolerance  -DAVON     LTG 2 Progress  Ongoing  decreased pain with sitting, increased with transitions  -DAVON     LTG 3  Patient to improve Mod Oswestry score to at least 28%  -DAVON     LTG 3 Progress  Partially Met;Ongoing  -DAVON     LTG 3 Progress Comments  30%  -DAVON       User Key  (r) = Recorded By, (t) = Taken By, (c) = Cosigned By    Initials Name Provider Type    Urbano Enriquez, PT Physical Therapist                         Time Calculation:   Start Time: 1557  Therapy Charges for Today     Code Description Service Date Service Provider Modifiers Qty    [id number]  PT THER PROC EA 15 MIN 8/22/2019 Urbano Chambers, PT  2                    Urbano Chambers, PT  8/22/2019

## 2019-08-29 ENCOUNTER — SPECIALTY PHARMACY (OUTPATIENT)
Dept: ONCOLOGY | Facility: HOSPITAL | Age: 34
End: 2019-08-29

## 2019-08-29 NOTE — PROGRESS NOTES
Injectable Neurology Medication Teaching        Patient Name/:     Frances Hartman   1985  Injectable Neurology Medication Regimen:  Emgality 240mg SQ x 1, then 120mg SQ monthly  Date Started Medication: 2019               Initial Teaching Follow Up Comments      Safety      Storage instructions (away from children; away from heat/cold, sunlight, or moisture)       “How are you storing your medications?”, reminders on storage, proper handling (away from children, managing waste, etc.), disposal of medication with D/C or dosage change     Patient counseled on appropriate storage of medication. Store in refrigerator, away from pets and children. Advised to inspect each syringe prior to use and discard each syringe after use in an appropriate container. Pt verbalized understanding.       Adherence       patient and/or caregiver on how to take medication, take with/without food, assess their adherence potential, stress importance of adherence, ways to manage adherence (pill boxes, phone reminders, calendars), what to do if miss a dose    “How are you taking your medication?” “How are you remembering to take your medication?”, “How many doses have you missed?”, determine reasons for non-adherence (not remembering, side effects, etc), ways to improve, overadherence? Remind patient of ways to improve/maintain adherence Reviewed plan for Emgality 120mg SQ monthly. Discussed importance of compliance. LD given in office on 2019. First self-injection of Emgality due 2019. Script processed at [facility] and mailed to patient.     Side Effects/Adverse Reactions       patient on potential side effects, s/s, ways to manage, when to call MD/seek help       Determine if patient experiencing side effects, ways to manage  Discussed potential side effects including but not limited to: injection site reactions and hypersensitivity reactions.  Counseled pt on importance of rotating injection sites. Pt  verbalized understanding.      Miscellaneous      Food interactions, DDIs, financial issues Determine if patient started any new medications (analyze for DDI) No DDIs identified with planned medication list and Emgality.       Additional Notes: Discussed aforementioned material with patient by phone. All questions and concerns addressed. Patient provided with my contact information and instructed to call if any additional questions arise.

## 2019-09-09 ENCOUNTER — TRANSCRIBE ORDERS (OUTPATIENT)
Dept: NUTRITION | Facility: HOSPITAL | Age: 34
End: 2019-09-09

## 2019-09-09 DIAGNOSIS — E11.9 DIABETES MELLITUS WITHOUT COMPLICATION (HCC): ICD-10-CM

## 2019-09-09 DIAGNOSIS — E66.01 MORBID OBESITY (HCC): Primary | ICD-10-CM

## 2019-09-09 DIAGNOSIS — I10 HYPERTENSION, UNSPECIFIED TYPE: ICD-10-CM

## 2019-09-25 ENCOUNTER — SPECIALTY PHARMACY (OUTPATIENT)
Dept: PHARMACY | Facility: HOSPITAL | Age: 34
End: 2019-09-25

## 2019-09-25 NOTE — PROGRESS NOTES
Injectable Neurology Medication Teaching        Patient Name/:     Frances Hartman   1985  Injectable Neurology Medication Regimen:  Emgality 240mg SQ x 1, then 120mg SQ monthly  Date Started Medication: 2019               Initial Teaching Follow Up Comments      Safety      Storage instructions (away from children; away from heat/cold, sunlight, or moisture)       “How are you storing your medications?”, reminders on storage, proper handling (away from children, managing waste, etc.), disposal of medication with D/C or dosage change     Patient reports appropriate storage and handling.      Adherence       patient and/or caregiver on how to take medication, take with/without food, assess their adherence potential, stress importance of adherence, ways to manage adherence (pill boxes, phone reminders, calendars), what to do if miss a dose    “How are you taking your medication?” “How are you remembering to take your medication?”, “How many doses have you missed?”, determine reasons for non-adherence (not remembering, side effects, etc), ways to improve, overadherence? Remind patient of ways to improve/maintain adherence Confirmed dose of Emgality 120mg SQ monthly. LD given in office on 2019. Next self-injection of Emgality due 10/3/2019. Script processed at [facility] and mailed to patient.     Side Effects/Adverse Reactions       patient on potential side effects, s/s, ways to manage, when to call MD/seek help       Determine if patient experiencing side effects, ways to manage  Pt denies adverse effects or issues with medication administration.      Miscellaneous      Food interactions, DDIs, financial issues Determine if patient started any new medications (analyze for DDI)       Additional Notes: Discussed aforementioned material with patient by phone. All questions and concerns addressed. Patient provided with my contact information and instructed to call if any additional  questions arise. Notified [facility] of refill request.

## 2019-10-01 ENCOUNTER — HOSPITAL ENCOUNTER (OUTPATIENT)
Dept: MAMMOGRAPHY | Facility: HOSPITAL | Age: 34
Discharge: HOME | End: 2019-10-01
Admitting: NURSE PRACTITIONER

## 2019-10-01 DIAGNOSIS — Z12.31 VISIT FOR SCREENING MAMMOGRAM: ICD-10-CM

## 2019-10-01 DIAGNOSIS — Z80.3 FAMILY HISTORY OF BREAST CANCER IN MOTHER: ICD-10-CM

## 2019-10-01 PROCEDURE — 77063 BREAST TOMOSYNTHESIS BI: CPT

## 2019-10-01 PROCEDURE — 77063 BREAST TOMOSYNTHESIS BI: CPT | Performed by: RADIOLOGY

## 2019-10-01 PROCEDURE — 77067 SCR MAMMO BI INCL CAD: CPT | Performed by: RADIOLOGY

## 2019-10-01 PROCEDURE — 77067 SCR MAMMO BI INCL CAD: CPT

## 2019-10-04 ENCOUNTER — OFFICE VISIT (OUTPATIENT)
Dept: NEUROLOGY | Facility: CLINIC | Age: 34
End: 2019-10-04

## 2019-10-04 VITALS
HEART RATE: 72 BPM | BODY MASS INDEX: 50.26 KG/M2 | WEIGHT: 256 LBS | HEIGHT: 60 IN | DIASTOLIC BLOOD PRESSURE: 68 MMHG | SYSTOLIC BLOOD PRESSURE: 126 MMHG | RESPIRATION RATE: 18 BRPM

## 2019-10-04 DIAGNOSIS — G43.109 MIGRAINE WITH AURA AND WITHOUT STATUS MIGRAINOSUS, NOT INTRACTABLE: Primary | ICD-10-CM

## 2019-10-04 PROCEDURE — 99213 OFFICE O/P EST LOW 20 MIN: CPT | Performed by: PSYCHIATRY & NEUROLOGY

## 2019-10-04 NOTE — PROGRESS NOTES
Subjective:   Chief Complaint   Patient presents with   • Migraine       Patient ID: Frances Hartman is a 33 y.o. female.    History of Present Illness     33 y.o. woman returns in follow up for migraines.  Last visit on 8/8/19 stopped Elavil and added Emgality.     Frequency decreased to two a week after Emgality.  Increased to daily at end of injection.      Moderate intensity.  Last for entire day. .   Located in varying locations of head.   OTC meds helpful.       Taking GBP, Flexeril, tramadol for back pain once every few months.      Preventatives:  Elavil, TPM, VPA     Problem history:    HA frequency is daily.  Located in front of head with squeezing and throbbing sensation moderate to severe intensity.  Awakens with HA and will decrease slightly by bedtime.  Treating with Tylenol, tramadol, Lortab on a daily.  Previous trial of triptans with minimal response.       Reviewed Dr Astudillo's notes:    Presented to [facility] ED on with /122 and left A/L numbness and confusion.  Treated with clonidine and BP decreased.  H/O migraines since 8 yoa.  2 - 3 HA a week treated with OTC.  Echo  - EF 55-60,     HCT, my review of films, 7/11/17 normal  CBC, CMP, TSH, Hep B, HIV, RPR, INR - N[facility]    The following portions of the patient's history were reviewed and updated as appropriate:   She  has a past medical history of Asthma, Back pain, Diabetes mellitus (CMS/HCC), Foot pain, History of fracture, History of gallstones, radiation therapy, Hypertension, Left lower quadrant pain, Migraines, Seasonal allergies, and UTI (urinary tract infection).  She does not have any pertinent problems on file.  She  has a past surgical history that includes Keloid excision; Cholecystectomy; [place] tooth extraction; Hernia repair; and Other surgical history.  Her family history includes Arrhythmia in her mother; Arthritis in her mother; Breast cancer in her paternal aunt; Dementia in her father; Diabetes in her mother; Heart attack in  her father; Obesity in her mother; Ovarian cancer in her maternal aunt; Stroke in an other family member.  She  reports that she has never smoked. She has never used smokeless tobacco. She reports that she drinks alcohol. She reports that she does not use drugs.  Current Outpatient Medications   Medication Sig Dispense Refill   • Acetaminophen (TYLENOL ARTHRITIS PAIN PO) Take  by mouth As Needed.     • albuterol (PROVENTIL HFA;VENTOLIN HFA) 108 (90 BASE) MCG/ACT inhaler Inhale 2 puffs Every 4 (Four) Hours As Needed for wheezing.     • albuterol sulfate  (90 Base) MCG/ACT inhaler Inhale 2 puffs Every 4 (Four) Hours As Needed for Wheezing or Shortness of Air for up to 30 days. 1 inhaler 0   • carvedilol (COREG) 12.5 MG tablet Take 1 tablet by mouth Every Evening. 90 tablet 3   • carvedilol (COREG) 25 MG tablet Take 1 tablet by mouth Every Morning. 90 tablet 3   • CloNIDine (CATAPRES) 0.2 MG tablet Take 1 tablet by mouth 2 (Two) Times a Day. (Patient taking differently: Take 0.2 mg by mouth As Needed.) 60 tablet 5   • cyclobenzaprine (FLEXERIL) 10 MG tablet Take 10 mg by mouth 3 (Three) Times a Day As Needed for muscle spasms.     • diphenhydrAMINE (BENADRYL) 25 mg capsule Take 25 mg by mouth Every 6 (Six) Hours As Needed for itching.     • fluconazole (DIFLUCAN) 200 MG tablet Take 200 mg by mouth 2 (Two) Times a Day.     • furosemide (LASIX) 40 MG tablet Take 40 mg by mouth 1 (One) Time As Needed (as needed every other day).     • gabapentin (NEURONTIN) 300 MG capsule Take 300 mg by mouth 3 (Three) Times a Day As Needed.     • galcanezumab-gnlm (EMGALITY) 120 MG/ML prefilled syringe Inject 1 mL under the skin into the appropriate area as directed Every 30 (Thirty) Days. 1 mL 11   • glipiZIDE (GLUCOTROL) 5 MG tablet Take 5 mg by mouth 2 (Two) Times a Day Before Meals.     • HYDROcodone-acetaminophen (NORCO) 5-325 MG per tablet Take 1-2 tablets by mouth Every 6 (Six) Hours As Needed for Severe Pain . 15 tablet 0    • ibuprofen (ADVIL,MOTRIN) 600 MG tablet As Needed.     • loratadine (CLARITIN) 10 MG tablet Take 10 mg by mouth Daily.     • metFORMIN (GLUCOPHAGE) 500 MG tablet Take 500 mg by mouth Daily.     • norethindrone (AYGESTIN) 5 MG tablet Daily.     • spironolactone (ALDACTONE) 100 MG tablet Take 100 mg by mouth. Takes 30 days on,  off 30 days.     • traMADol (ULTRAM) 50 MG tablet Take 50 mg by mouth Every 6 (Six) Hours As Needed for moderate pain (4-6).       No current facility-administered medications for this visit.      Current Outpatient Medications on File Prior to Visit   Medication Sig   • Acetaminophen (TYLENOL ARTHRITIS PAIN PO) Take  by mouth As Needed.   • albuterol (PROVENTIL HFA;VENTOLIN HFA) 108 (90 BASE) MCG/ACT inhaler Inhale 2 puffs Every 4 (Four) Hours As Needed for wheezing.   • albuterol sulfate  (90 Base) MCG/ACT inhaler Inhale 2 puffs Every 4 (Four) Hours As Needed for Wheezing or Shortness of Air for up to 30 days.   • carvedilol (COREG) 12.5 MG tablet Take 1 tablet by mouth Every Evening.   • carvedilol (COREG) 25 MG tablet Take 1 tablet by mouth Every Morning.   • CloNIDine (CATAPRES) 0.2 MG tablet Take 1 tablet by mouth 2 (Two) Times a Day. (Patient taking differently: Take 0.2 mg by mouth As Needed.)   • cyclobenzaprine (FLEXERIL) 10 MG tablet Take 10 mg by mouth 3 (Three) Times a Day As Needed for muscle spasms.   • diphenhydrAMINE (BENADRYL) 25 mg capsule Take 25 mg by mouth Every 6 (Six) Hours As Needed for itching.   • fluconazole (DIFLUCAN) 200 MG tablet Take 200 mg by mouth 2 (Two) Times a Day.   • furosemide (LASIX) 40 MG tablet Take 40 mg by mouth 1 (One) Time As Needed (as needed every other day).   • gabapentin (NEURONTIN) 300 MG capsule Take 300 mg by mouth 3 (Three) Times a Day As Needed.   • galcanezumab-gnlm (EMGALITY) 120 MG/ML prefilled syringe Inject 1 mL under the skin into the appropriate area as directed Every 30 (Thirty) Days.   • glipiZIDE (GLUCOTROL) 5 MG  tablet Take 5 mg by mouth 2 (Two) Times a Day Before Meals.   • HYDROcodone-acetaminophen (NORCO) 5-325 MG per tablet Take 1-2 tablets by mouth Every 6 (Six) Hours As Needed for Severe Pain .   • ibuprofen (ADVIL,MOTRIN) 600 MG tablet As Needed.   • loratadine (CLARITIN) 10 MG tablet Take 10 mg by mouth Daily.   • metFORMIN (GLUCOPHAGE) 500 MG tablet Take 500 mg by mouth Daily.   • norethindrone (AYGESTIN) 5 MG tablet Daily.   • spironolactone (ALDACTONE) 100 MG tablet Take 100 mg by mouth. Takes 30 days on,  off 30 days.   • traMADol (ULTRAM) 50 MG tablet Take 50 mg by mouth Every 6 (Six) Hours As Needed for moderate pain (4-6).   • [DISCONTINUED] azithromycin (ZITHROMAX Z-LEONID) 250 MG tablet Take 2 tablets the first day, then 1 tablet daily for 4 days.   • [DISCONTINUED] DICLOFENAC SODIUM PO Take 75 mg by mouth 2 (Two) Times a Day.     No current facility-administered medications on file prior to visit.      She is allergic to hydrochlorothiazide; augmentin [amoxicillin-pot clavulanate]; codeine; doxycycline; and naproxen..    Review of Systems   Constitutional: Positive for fatigue. Negative for activity change and unexpected weight change.   HENT: Negative for trouble swallowing.    Eyes: Negative for visual disturbance.   Respiratory: Negative for apnea and choking.    Cardiovascular: Negative for leg swelling.   Endocrine: Negative for cold intolerance and heat intolerance.   Genitourinary: Negative for difficulty urinating, frequency, menstrual problem and urgency.   Musculoskeletal: Negative for gait problem and myalgias.   Skin: Negative for color change and rash.   Allergic/Immunologic: Negative for immunocompromised state.   Neurological: Positive for headaches. Negative for tremors, syncope, facial asymmetry, speech difficulty and light-headedness.   Hematological: Negative for adenopathy. Does not bruise/bleed easily.   Psychiatric/Behavioral: Positive for confusion. Negative for behavioral problems,  "decreased concentration, hallucinations and sleep disturbance.        Objective:  Vitals:    10/04/19 1449   BP: 126/68   BP Location: Left arm   Patient Position: Sitting   Cuff Size: Adult   Pulse: 72   Resp: 18   Weight: 116 kg (256 lb)   Height: 152.4 cm (60\")       Neurologic Exam     Mental Status   Registration: recalls 3 of 3 objects. Recall at 5 minutes: recalls 3 of 3 objects. Follows 3 step commands.   Attention: normal. Concentration: normal.   Level of consciousness: alert  Knowledge: good and consistent with education.   Able to name object. Able to read. Able to repeat. Able to write. Normal comprehension.     Cranial Nerves     CN II   Visual fields full to confrontation.   Visual acuity: normal  Right visual field deficit: none  Left visual field deficit: none     CN III, IV, VI   Right pupil: Shape: regular. Reactivity: brisk. Consensual response: intact.   Left pupil: Shape: regular. Reactivity: brisk. Consensual response: intact.   Nystagmus: none   Diplopia: none  Ophthalmoparesis: none  Upgaze: normal  Downgaze: normal  Conjugate gaze: present  Vestibulo-ocular reflex: present    CN V   Facial sensation intact.   Right corneal reflex: normal  Left corneal reflex: normal    CN VII   Right facial weakness: none  Left facial weakness: none    CN VIII   Hearing: intact    CN IX, X   Palate: symmetric  Right gag reflex: normal  Left gag reflex: normal    CN XI   Right sternocleidomastoid strength: normal  Left sternocleidomastoid strength: normal    CN XII   Tongue: not atrophic  Fasciculations: absent  Tongue deviation: none    Motor Exam   Muscle bulk: normal  Overall muscle tone: normal  Right arm tone: normal  Left arm tone: normal  Right leg tone: normal  Left leg tone: normal    Sensory Exam   Light touch normal.   Vibration normal.   Proprioception normal.   Pinprick normal.     Gait, Coordination, and Reflexes     Tremor   Resting tremor: absent  Intention tremor: absent  Action tremor: " absent    Reflexes   Reflexes 2+ except as noted.       Physical Exam   Constitutional: She appears well-developed and well-nourished.   Nursing note and vitals reviewed.    Admission on 02/26/2019, Discharged on 02/26/2019   Component Date Value Ref Range Status   • Glucose 02/26/2019 106* 70 - 100 mg/dL Final   • BUN 02/26/2019 7* 9 - 23 mg/dL Final   • Creatinine 02/26/2019 0.99  0.60 - 1.30 mg/dL Final   • Sodium 02/26/2019 136  132 - 146 mmol/L Final   • Potassium 02/26/2019 4.1  3.5 - 5.5 mmol/L Final   • Chloride 02/26/2019 100  99 - 109 mmol/L Final   • CO2 02/26/2019 27.0  20.0 - 31.0 mmol/L Final   • Calcium 02/26/2019 10.3  8.7 - 10.4 mg/dL Final   • Total Protein 02/26/2019 8.2  5.7 - 8.2 g/dL Final   • Albumin 02/26/2019 5.07* 3.20 - 4.80 g/dL Final   • ALT (SGPT) 02/26/2019 44* 7 - 40 U/L Final   • AST (SGOT) 02/26/2019 40* 0 - 33 U/L Final   • Alkaline Phosphatase 02/26/2019 120* 25 - 100 U/L Final   • Total Bilirubin 02/26/2019 0.4  0.3 - 1.2 mg/dL Final   • eGFR   Amer 02/26/2019 77  >60 mL/min/1.73 Final   • Globulin 02/26/2019 3.1  gm/dL Final   • A/G Ratio 02/26/2019 1.6  1.5 - 2.5 g/dL Final   • BUN/Creatinine Ratio 02/26/2019 7.1  7.0 - 25.0 Final   • Anion Gap 02/26/2019 9.0  3.0 - 11.0 mmol/L Final   • WBC 02/26/2019 10.07  3.50 - 10.80 10*3/mm3 Final   • RBC 02/26/2019 4.37  3.89 - 5.14 10*6/mm3 Final   • Hemoglobin 02/26/2019 13.7  11.5 - 15.5 g/dL Final   • Hematocrit 02/26/2019 42.9  34.5 - 44.0 % Final   • MCV 02/26/2019 98.2  80.0 - 99.0 fL Final   • MCH 02/26/2019 31.4* 27.0 - 31.0 pg Final   • MCHC 02/26/2019 31.9* 32.0 - 36.0 g/dL Final   • RDW 02/26/2019 13.0  11.3 - 14.5 % Final   • RDW-SD 02/26/2019 46.4  37.0 - 54.0 fl Final   • MPV 02/26/2019 12.7* 6.0 - 12.0 fL Final   • Platelets 02/26/2019 227  150 - 450 10*3/mm3 Final   • Neutrophil % 02/26/2019 48.0  41.0 - 71.0 % Final   • Lymphocyte % 02/26/2019 43.3  24.0 - 44.0 % Final   • Monocyte % 02/26/2019 5.3  0.0 - 12.0 %  Final   • Eosinophil % 02/26/2019 3.1* 0.0 - 3.0 % Final   • Basophil % 02/26/2019 0.3  0.0 - 1.0 % Final   • Immature Grans % 02/26/2019 0.1  0.0 - 0.6 % Final   • Neutrophils, Absolute 02/26/2019 4.84  1.50 - 8.30 10*3/mm3 Final   • Lymphocytes, Absolute 02/26/2019 4.36  0.60 - 4.80 10*3/mm3 Final   • Monocytes, Absolute 02/26/2019 0.53  0.00 - 1.00 10*3/mm3 Final   • Eosinophils, Absolute 02/26/2019 0.31* 0.00 - 0.30 10*3/mm3 Final   • Basophils, Absolute 02/26/2019 0.03  0.00 - 0.20 10*3/mm3 Final   • Immature Grans, Absolute 02/26/2019 0.01  0.00 - 0.05 10*3/mm3 Final         Assessment/Plan:       Problems Addressed this Visit        Cardiovascular and Mediastinum    Migraine with aura - Primary     Headaches are improving with treatment.  Continue current treatment regimen.     Continue Emgality injections

## 2019-10-04 NOTE — ASSESSMENT & PLAN NOTE
Headaches are improving with treatment.  Continue current treatment regimen.     Continue Emgality injections

## 2019-10-22 ENCOUNTER — SPECIALTY PHARMACY (OUTPATIENT)
Dept: PHARMACY | Facility: HOSPITAL | Age: 34
End: 2019-10-22

## 2019-10-22 NOTE — PROGRESS NOTES
Injectable Neurology Medication Teaching        Patient Name/:     Frances Hartman   1985  Injectable Neurology Medication Regimen:  Emgality 240mg SQ x 1, then 120mg SQ monthly  Date Started Medication: 2019               Initial Teaching Follow Up Comments      Safety      Storage instructions (away from children; away from heat/cold, sunlight, or moisture)       “How are you storing your medications?”, reminders on storage, proper handling (away from children, managing waste, etc.), disposal of medication with D/C or dosage change     Patient reports appropriate storage and handling.      Adherence       patient and/or caregiver on how to take medication, take with/without food, assess their adherence potential, stress importance of adherence, ways to manage adherence (pill boxes, phone reminders, calendars), what to do if miss a dose    “How are you taking your medication?” “How are you remembering to take your medication?”, “How many doses have you missed?”, determine reasons for non-adherence (not remembering, side effects, etc), ways to improve, overadherence? Remind patient of ways to improve/maintain adherence Confirmed dose of Emgality 120mg SQ monthly. LD given in office on 2019.  Script processed at [facility] and mailed to patient.      Side Effects/Adverse Reactions       patient on potential side effects, s/s, ways to manage, when to call MD/seek help       Determine if patient experiencing side effects, ways to manage  Pt denies adverse effects or issues with medication administration.      Miscellaneous      Food interactions, DDIs, financial issues Determine if patient started any new medications (analyze for DDI)        Additional Notes: Discussed aforementioned material with patient by phone. All questions and concerns addressed. Patient provided with my contact information and instructed to call if any additional questions arise. Notified [facility] [employer] of refill  request.

## 2019-11-10 ENCOUNTER — APPOINTMENT (OUTPATIENT)
Dept: GENERAL RADIOLOGY | Facility: HOSPITAL | Age: 34
End: 2019-11-10

## 2019-11-10 ENCOUNTER — HOSPITAL ENCOUNTER (EMERGENCY)
Facility: HOSPITAL | Age: 34
Discharge: HOME | End: 2019-11-10
Attending: EMERGENCY MEDICINE | Admitting: EMERGENCY MEDICINE

## 2019-11-10 VITALS
RESPIRATION RATE: 16 BRPM | SYSTOLIC BLOOD PRESSURE: 139 MMHG | DIASTOLIC BLOOD PRESSURE: 99 MMHG | HEIGHT: 60 IN | BODY MASS INDEX: 50.85 KG/M2 | TEMPERATURE: 98.3 F | OXYGEN SATURATION: 96 % | HEART RATE: 50 BPM | WEIGHT: 259 LBS

## 2019-11-10 DIAGNOSIS — R05.9 COUGH: ICD-10-CM

## 2019-11-10 DIAGNOSIS — R06.00 DYSPNEA, UNSPECIFIED TYPE: Primary | ICD-10-CM

## 2019-11-10 LAB
ALBUMIN SERPL-MCNC: 4.5 G/DL (ref 3.5–5.2)
ALBUMIN/GLOB SERPL: 1.1 G/DL
ALP SERPL-CCNC: 104 U/L (ref 39–117)
ALT SERPL W P-5'-P-CCNC: 31 U/L (ref 1–33)
ANION GAP SERPL CALCULATED.3IONS-SCNC: 14 MMOL/L (ref 5–15)
AST SERPL-CCNC: 29 U/L (ref 1–32)
B-HCG UR QL: NEGATIVE
BASOPHILS # BLD AUTO: 0.07 10*3/MM3 (ref 0–0.2)
BASOPHILS NFR BLD AUTO: 0.6 % (ref 0–1.5)
BILIRUB SERPL-MCNC: 0.3 MG/DL (ref 0.2–1.2)
BUN BLD-MCNC: 10 MG/DL (ref 6–20)
BUN/CREAT SERPL: 13 (ref 7–25)
CALCIUM SPEC-SCNC: 10.2 MG/DL (ref 8.6–10.5)
CHLORIDE SERPL-SCNC: 102 MMOL/L (ref 98–107)
CO2 SERPL-SCNC: 24 MMOL/L (ref 22–29)
CREAT BLD-MCNC: 0.77 MG/DL (ref 0.57–1)
D DIMER PPP FEU-MCNC: <0.27 MCGFEU/ML (ref 0–0.56)
DEPRECATED RDW RBC AUTO: 47.8 FL (ref 37–54)
EOSINOPHIL # BLD AUTO: 0.23 10*3/MM3 (ref 0–0.4)
EOSINOPHIL NFR BLD AUTO: 1.9 % (ref 0.3–6.2)
ERYTHROCYTE [DISTWIDTH] IN BLOOD BY AUTOMATED COUNT: 13.2 % (ref 12.3–15.4)
GFR SERPL CREATININE-BSD FRML MDRD: 103 ML/MIN/1.73
GLOBULIN UR ELPH-MCNC: 4.1 GM/DL
GLUCOSE BLD-MCNC: 116 MG/DL (ref 65–99)
HCT VFR BLD AUTO: 40 % (ref 34–46.6)
HGB BLD-MCNC: 12.8 G/DL (ref 12–15.9)
HOLD SPECIMEN: NORMAL
HOLD SPECIMEN: NORMAL
IMM GRANULOCYTES # BLD AUTO: 0.03 10*3/MM3 (ref 0–0.05)
IMM GRANULOCYTES NFR BLD AUTO: 0.2 % (ref 0–0.5)
INTERNAL NEGATIVE CONTROL: NEGATIVE
INTERNAL POSITIVE CONTROL: POSITIVE
LYMPHOCYTES # BLD AUTO: 3.92 10*3/MM3 (ref 0.7–3.1)
LYMPHOCYTES NFR BLD AUTO: 32.1 % (ref 19.6–45.3)
Lab: NORMAL
MCH RBC QN AUTO: 31.3 PG (ref 26.6–33)
MCHC RBC AUTO-ENTMCNC: 32 G/DL (ref 31.5–35.7)
MCV RBC AUTO: 97.8 FL (ref 79–97)
MONOCYTES # BLD AUTO: 0.61 10*3/MM3 (ref 0.1–0.9)
MONOCYTES NFR BLD AUTO: 5 % (ref 5–12)
NEUTROPHILS # BLD AUTO: 7.36 10*3/MM3 (ref 1.7–7)
NEUTROPHILS NFR BLD AUTO: 60.2 % (ref 42.7–76)
NRBC BLD AUTO-RTO: 0 /100 WBC (ref 0–0.2)
NT-PROBNP SERPL-MCNC: 48.9 PG/ML (ref 5–450)
PLATELET # BLD AUTO: 311 10*3/MM3 (ref 140–450)
PMV BLD AUTO: 11.5 FL (ref 6–12)
POTASSIUM BLD-SCNC: 3.7 MMOL/L (ref 3.5–5.2)
PROT SERPL-MCNC: 8.6 G/DL (ref 6–8.5)
RBC # BLD AUTO: 4.09 10*6/MM3 (ref 3.77–5.28)
SODIUM BLD-SCNC: 140 MMOL/L (ref 136–145)
TROPONIN T SERPL-MCNC: <0.01 NG/ML (ref 0–0.03)
TROPONIN T SERPL-MCNC: <0.01 NG/ML (ref 0–0.03)
WBC NRBC COR # BLD: 12.22 10*3/MM3 (ref 3.4–10.8)
WHOLE BLOOD HOLD SPECIMEN: NORMAL
WHOLE BLOOD HOLD SPECIMEN: NORMAL

## 2019-11-10 PROCEDURE — 85379 FIBRIN DEGRADATION QUANT: CPT | Performed by: PHYSICIAN ASSISTANT

## 2019-11-10 PROCEDURE — 99284 EMERGENCY DEPT VISIT MOD MDM: CPT

## 2019-11-10 PROCEDURE — 83880 ASSAY OF NATRIURETIC PEPTIDE: CPT

## 2019-11-10 PROCEDURE — 81025 URINE PREGNANCY TEST: CPT | Performed by: EMERGENCY MEDICINE

## 2019-11-10 PROCEDURE — 25010000002 METHYLPREDNISOLONE PER 40 MG: Performed by: PHYSICIAN ASSISTANT

## 2019-11-10 PROCEDURE — 80053 COMPREHEN METABOLIC PANEL: CPT

## 2019-11-10 PROCEDURE — 84484 ASSAY OF TROPONIN QUANT: CPT | Performed by: EMERGENCY MEDICINE

## 2019-11-10 PROCEDURE — 96374 THER/PROPH/DIAG INJ IV PUSH: CPT

## 2019-11-10 PROCEDURE — 93005 ELECTROCARDIOGRAM TRACING: CPT | Performed by: EMERGENCY MEDICINE

## 2019-11-10 PROCEDURE — 94640 AIRWAY INHALATION TREATMENT: CPT

## 2019-11-10 PROCEDURE — 93005 ELECTROCARDIOGRAM TRACING: CPT

## 2019-11-10 PROCEDURE — 71045 X-RAY EXAM CHEST 1 VIEW: CPT

## 2019-11-10 PROCEDURE — 84484 ASSAY OF TROPONIN QUANT: CPT

## 2019-11-10 PROCEDURE — 85025 COMPLETE CBC W/AUTO DIFF WBC: CPT

## 2019-11-10 RX ORDER — ALBUTEROL SULFATE 1.25 MG/3ML
2.5 SOLUTION RESPIRATORY (INHALATION) EVERY 6 HOURS PRN
Qty: 60 VIAL | Refills: 0 | Status: SHIPPED | OUTPATIENT
Start: 2019-11-10 | End: 2020-03-16

## 2019-11-10 RX ORDER — SODIUM CHLORIDE 0.9 % (FLUSH) 0.9 %
10 SYRINGE (ML) INJECTION AS NEEDED
Status: DISCONTINUED | OUTPATIENT
Start: 2019-11-10 | End: 2019-11-10 | Stop reason: HOSPADM

## 2019-11-10 RX ORDER — METHYLPREDNISOLONE SODIUM SUCCINATE 40 MG/ML
80 INJECTION, POWDER, LYOPHILIZED, FOR SOLUTION INTRAMUSCULAR; INTRAVENOUS ONCE
Status: COMPLETED | OUTPATIENT
Start: 2019-11-10 | End: 2019-11-10

## 2019-11-10 RX ORDER — PREDNISONE 20 MG/1
40 TABLET ORAL DAILY
Qty: 8 TABLET | Refills: 0 | Status: SHIPPED | OUTPATIENT
Start: 2019-11-10 | End: 2019-11-14

## 2019-11-10 RX ORDER — ALBUTEROL SULFATE 2.5 MG/3ML
2.5 SOLUTION RESPIRATORY (INHALATION) ONCE
Status: COMPLETED | OUTPATIENT
Start: 2019-11-10 | End: 2019-11-10

## 2019-11-10 RX ADMIN — METHYLPREDNISOLONE SODIUM SUCCINATE 80 MG: 40 INJECTION, POWDER, FOR SOLUTION INTRAMUSCULAR; INTRAVENOUS at 17:22

## 2019-11-10 RX ADMIN — ALBUTEROL SULFATE 2.5 MG: 2.5 SOLUTION RESPIRATORY (INHALATION) at 17:52

## 2019-11-10 NOTE — ED PROVIDER NOTES
Subjective   Ms. Frances Hartman is a 33 y.o. female who presents to the ED with c/o shortness of breath. She reports for the past 4-6 weeks she has been experiencing wheezing and productive cough. Denies hemoptysis but reports mucous in her throat recently had streaks of blood in the mucous. Patient experiences pressure making her want to cough. She took extra Lasix but this did not alleviate her shortness of breath. She was prescribed Lasix because she has had leg swelling in the past but no diagnosis of CHF. She also notes she has recently had increased frequency of bowel movements and they have been loose. She has a history of hypertension, diabetes, and PCOS. She has a family history of CAD. She denies smoking, drugs, or alcohol abuse. There are no other acute symptoms at this time.        History provided by:  Patient  Shortness of Breath   Severity:  Moderate  Onset quality:  Gradual  Duration:  6 weeks  Timing:  Constant  Progression:  Worsening  Chronicity:  New  Relieved by:  Nothing  Worsened by:  Nothing  Associated symptoms: cough and sputum production    Associated symptoms: no chest pain (chest pressure), no fever and no vomiting        Review of Systems   Constitutional: Negative for chills and fever.   Respiratory: Positive for cough, sputum production and shortness of breath.    Cardiovascular: Negative for chest pain (chest pressure).   Gastrointestinal: Positive for diarrhea. Negative for blood in stool, nausea and vomiting.   All other systems reviewed and are negative.      Past Medical History:   Diagnosis Date   • Asthma    • Back pain    • Diabetes mellitus (CMS/HCC)    • Foot pain    • History of fracture    • History of gallstones    • Hx of radiation therapy     for treatments of Keloids   • Hypertension    • Left lower quadrant pain     h/o   • Migraines    • Seasonal allergies    • UTI (urinary tract infection)     H/o       Allergies   Allergen Reactions   • Hydrochlorothiazide  Swelling   • Augmentin [Amoxicillin-Pot Clavulanate]    • Codeine    • Doxycycline    • Naproxen        Past Surgical History:   Procedure Laterality Date   • CHOLECYSTECTOMY     • HERNIA REPAIR     • KELOID EXCISION     • OTHER SURGICAL HISTORY      Excision of chest wall,shoulder, and trunk   • WISDOM TOOTH EXTRACTION         Family History   Problem Relation Age of Onset   • Arthritis Mother    • Diabetes Mother    • Obesity Mother    • Arrhythmia Mother    • Dementia Father    • Heart attack Father    • Stroke Other         CVA   • Ovarian cancer Maternal Aunt         40's   • Breast cancer Paternal Aunt         30's       Social History     Socioeconomic History   • Marital status: Single     Spouse name: Not on file   • Number of children: Not on file   • Years of education: Not on file   • Highest education level: Not on file   Tobacco Use   • Smoking status: Never Smoker   • Smokeless tobacco: Never Used   Substance and Sexual Activity   • Alcohol use: Yes     Comment: RARE   • Drug use: No   • Sexual activity: Defer         Objective   Physical Exam   Constitutional: She is oriented to person, place, and time. She appears well-developed and well-nourished. No distress.   HENT:   Head: Normocephalic and atraumatic.   Nose: Nose normal.   Eyes: Conjunctivae are normal. No scleral icterus.   Neck: Normal range of motion. Neck supple.   Cardiovascular: Normal rate, regular rhythm and normal heart sounds. Exam reveals no gallop and no friction rub.   No murmur heard.  Pulmonary/Chest: Effort normal and breath sounds normal. No respiratory distress. She has no decreased breath sounds. She has no wheezes. She has no rales.   Abdominal: Soft. She exhibits no distension. There is no tenderness. There is no guarding.   Musculoskeletal: Normal range of motion. She exhibits no edema.   Neurological: She is alert and oriented to person, place, and time.   Skin: Skin is warm and dry.   Psychiatric: She has a normal mood  and affect. Her behavior is normal.   Nursing note and vitals reviewed.      Procedures         ED Course  ED Course as of Nov 10 2111   Sun Nov 10, 2019   1705 D-Dimer, Quant: <0.27 [WT]   1708 WBC: (!) 12.22 [WT]   1708 Cxr    Impression    No acute cardiopulmonary process      [WT]   1740 Troponin T: <0.010 [WT]   1740 EKG sinus bradycardia rate 57, time 1706  [WT]   1741 EKG 1450 NSR 67  [WT]      ED Course User Index  [WT] Tiffanie Robles, SILVA     Recent Results (from the past 24 hour(s))   Comprehensive Metabolic Panel    Collection Time: 11/10/19  2:58 PM   Result Value Ref Range    Glucose 116 (H) 65 - 99 mg/dL    BUN 10 6 - 20 mg/dL    Creatinine 0.77 0.57 - 1.00 mg/dL    Sodium 140 136 - 145 mmol/L    Potassium 3.7 3.5 - 5.2 mmol/L    Chloride 102 98 - 107 mmol/L    CO2 24.0 22.0 - 29.0 mmol/L    Calcium 10.2 8.6 - 10.5 mg/dL    Total Protein 8.6 (H) 6.0 - 8.5 g/dL    Albumin 4.50 3.50 - 5.20 g/dL    ALT (SGPT) 31 1 - 33 U/L    AST (SGOT) 29 1 - 32 U/L    Alkaline Phosphatase 104 39 - 117 U/L    Total Bilirubin 0.3 0.2 - 1.2 mg/dL    eGFR  African Amer 103 >60 mL/min/1.73    Globulin 4.1 gm/dL    A/G Ratio 1.1 g/dL    BUN/Creatinine Ratio 13.0 7.0 - 25.0    Anion Gap 14.0 5.0 - 15.0 mmol/L   BNP    Collection Time: 11/10/19  2:58 PM   Result Value Ref Range    proBNP 48.9 5.0 - 450.0 pg/mL   Troponin    Collection Time: 11/10/19  2:58 PM   Result Value Ref Range    Troponin T <0.010 0.000 - 0.030 ng/mL   Light Blue Top    Collection Time: 11/10/19  2:58 PM   Result Value Ref Range    Extra Tube hold for add-on    Green Top (Gel)    Collection Time: 11/10/19  2:58 PM   Result Value Ref Range    Extra Tube Hold for add-ons.    Lavender Top    Collection Time: 11/10/19  2:58 PM   Result Value Ref Range    Extra Tube hold for add-on    Gold Top - SST    Collection Time: 11/10/19  2:58 PM   Result Value Ref Range    Extra Tube Hold for add-ons.    CBC Auto Differential    Collection Time: 11/10/19  2:58 PM    Result Value Ref Range    WBC 12.22 (H) 3.40 - 10.80 10*3/mm3    RBC 4.09 3.77 - 5.28 10*6/mm3    Hemoglobin 12.8 12.0 - 15.9 g/dL    Hematocrit 40.0 34.0 - 46.6 %    MCV 97.8 (H) 79.0 - 97.0 fL    MCH 31.3 26.6 - 33.0 pg    MCHC 32.0 31.5 - 35.7 g/dL    RDW 13.2 12.3 - 15.4 %    RDW-SD 47.8 37.0 - 54.0 fl    MPV 11.5 6.0 - 12.0 fL    Platelets 311 140 - 450 10*3/mm3    Neutrophil % 60.2 42.7 - 76.0 %    Lymphocyte % 32.1 19.6 - 45.3 %    Monocyte % 5.0 5.0 - 12.0 %    Eosinophil % 1.9 0.3 - 6.2 %    Basophil % 0.6 0.0 - 1.5 %    Immature Grans % 0.2 0.0 - 0.5 %    Neutrophils, Absolute 7.36 (H) 1.70 - 7.00 10*3/mm3    Lymphocytes, Absolute 3.92 (H) 0.70 - 3.10 10*3/mm3    Monocytes, Absolute 0.61 0.10 - 0.90 10*3/mm3    Eosinophils, Absolute 0.23 0.00 - 0.40 10*3/mm3    Basophils, Absolute 0.07 0.00 - 0.20 10*3/mm3    Immature Grans, Absolute 0.03 0.00 - 0.05 10*3/mm3    nRBC 0.0 0.0 - 0.2 /100 WBC   D-dimer, Quantitative    Collection Time: 11/10/19  2:58 PM   Result Value Ref Range    D-Dimer, Quantitative <0.27 0.00 - 0.56 MCGFEU/mL   POCT pregnancy, urine    Collection Time: 11/10/19  3:55 PM   Result Value Ref Range    HCG, Urine, QL Negative Negative    Lot Number [id number]     Internal Positive Control Positive     Internal Negative Control Negative    Troponin    Collection Time: 11/10/19  5:02 PM   Result Value Ref Range    Troponin T <0.010 0.000 - 0.030 ng/mL     Note: In addition to lab results from this visit, the labs listed above may include labs taken at another facility or during a different encounter within the last 24 hours. Please correlate lab times with ED admission and discharge times for further clarification of the services performed during this visit.    XR Chest 1 View   Preliminary Result   No acute cardiopulmonary process                Vitals:    11/10/19 1804 11/10/19 1840 11/10/19 1841 11/10/19 1900   BP: 140/97 136/78  139/99   BP Location:       Patient Position:       Pulse: 64   56 50   Resp:       Temp:       TempSrc:       SpO2: 98%  97% 96%   Weight:       Height:         Medications   methylPREDNISolone sodium succinate (SOLU-Medrol) injection 80 mg (80 mg Intravenous Given 11/10/19 1722)   albuterol (PROVENTIL) nebulizer solution 0.083% 2.5 mg/3mL (2.5 mg Nebulization Given 11/10/19 1752)     ECG/EMG Results (last 24 hours)     Procedure Component Value Units Date/Time    ECG 12 Lead [[id number]] Collected:  11/10/19 1450     Updated:  11/10/19 1450        ECG 12 Lead         ECG 12 Lead                             MDM  Number of Diagnoses or Management Options  Cough:   Dyspnea, unspecified type:   Diagnosis management comments: Patient presents complaining of several weeks of cough and shortness of breath.  She has been treated with antibiotics and steroids.  She does have history of diabetes.  Differential diagnosis includes pneumonia, bronchitis, ACS, pulmonary embolism, CHF.  Plan to obtain CBC, CMP, EKG, troponin, d-dimer, BNP, chest x-ray.  Patient's chest x-ray is negative for any pneumonia.  EKG and troponin x2 are negative.  D-dimer is negative.  She is low risk Wells, no further work-up indicated.  BNP is negative.  She likely has bronchitis given her history of cough with the symptoms.  Patient be treated with steroids and albuterol.  Though she is diabetic her blood sugar is well managed and we will do low-dose.  She was given return precautions and follow-up with the chest pain clinic as she has had some chest pressure.      Final diagnoses:   Dyspnea, unspecified type   Cough       Documentation assistance provided by kari Espinoza.  Information recorded by the kari was done at my direction and has been verified and validated by me.     Robbie Espinoza  11/10/19 1636       Tiffanie Robles PA-C  11/10/19 2118

## 2019-11-10 NOTE — DISCHARGE INSTRUCTIONS
You have been seen for shortness of breath.  Please follow-up with your PCP.  I will also have you follow-up with the chest pain clinic.  Return here for any worsening of your symptoms.

## 2019-11-12 ENCOUNTER — HOSPITAL ENCOUNTER (OUTPATIENT)
Dept: MAMMOGRAPHY | Facility: HOSPITAL | Age: 34
Discharge: HOME | End: 2019-11-12
Admitting: RADIOLOGY

## 2019-11-12 ENCOUNTER — HOSPITAL ENCOUNTER (OUTPATIENT)
Dept: ULTRASOUND IMAGING | Facility: HOSPITAL | Age: 34
Discharge: HOME | End: 2019-11-12

## 2019-11-12 ENCOUNTER — OFFICE VISIT (OUTPATIENT)
Dept: CARDIOLOGY | Facility: HOSPITAL | Age: 34
End: 2019-11-12

## 2019-11-12 VITALS
OXYGEN SATURATION: 97 % | WEIGHT: 260.13 LBS | TEMPERATURE: 97.1 F | BODY MASS INDEX: 51.07 KG/M2 | HEIGHT: 60 IN | SYSTOLIC BLOOD PRESSURE: 129 MMHG | DIASTOLIC BLOOD PRESSURE: 82 MMHG | HEART RATE: 71 BPM | RESPIRATION RATE: 15 BRPM

## 2019-11-12 DIAGNOSIS — R07.9 CHEST PAIN, UNSPECIFIED TYPE: Primary | ICD-10-CM

## 2019-11-12 DIAGNOSIS — E78.2 MIXED HYPERLIPIDEMIA: ICD-10-CM

## 2019-11-12 DIAGNOSIS — E11.9 TYPE 2 DIABETES MELLITUS WITHOUT COMPLICATION, WITHOUT LONG-TERM CURRENT USE OF INSULIN (HCC): ICD-10-CM

## 2019-11-12 DIAGNOSIS — R06.09 DOE (DYSPNEA ON EXERTION): ICD-10-CM

## 2019-11-12 DIAGNOSIS — R92.8 ABNORMAL MAMMOGRAM: ICD-10-CM

## 2019-11-12 DIAGNOSIS — I10 ESSENTIAL HYPERTENSION: ICD-10-CM

## 2019-11-12 PROCEDURE — 77066 DX MAMMO INCL CAD BI: CPT | Performed by: RADIOLOGY

## 2019-11-12 PROCEDURE — 99214 OFFICE O/P EST MOD 30 MIN: CPT | Performed by: NURSE PRACTITIONER

## 2019-11-12 PROCEDURE — 77062 BREAST TOMOSYNTHESIS BI: CPT | Performed by: RADIOLOGY

## 2019-11-12 PROCEDURE — 76642 ULTRASOUND BREAST LIMITED: CPT

## 2019-11-12 PROCEDURE — 77066 DX MAMMO INCL CAD BI: CPT

## 2019-11-12 PROCEDURE — 76642 ULTRASOUND BREAST LIMITED: CPT | Performed by: RADIOLOGY

## 2019-11-12 PROCEDURE — G0279 TOMOSYNTHESIS, MAMMO: HCPCS

## 2019-11-12 NOTE — PROGRESS NOTES
" [facility]      Encounter Date:11/12/2019     Frances Hartman  [address] [place] [id number]  [unfilled]    1985    Agustin Magdaleno APRN    Frances Hartman is a 33 y.o. female.      Subjective:     Chief Complaint:  Establish Care and Shortness of Breath       HPI   Patient is a 33-year-old female with asthma, type 2 diabetes, hyperlipidemia, hypertension, subclinical hypothyroidism, chronic venous insufficiency, depression and GERD who presents to the chest pain clinic at the request of Tiffanie Robles PA-C/Dr. Escalera.  Patient presented to the ED 11/10/2019 with a 4 to 6-week history of shortness of breath, wheezing and productive cough.  Patient took extra Lasix but this did not alleviate her shortness of breath.  She reports that she was given Lasix in the past due to chronic leg swelling.  No history of CHF.  Patient has been treated with antibiotics and steroids.  Serial troponins, d-dimer, BNP were negative.  She was treated with additional steroids and albuterol.  Due to complaints of intermittent chest pressure she was sent here today for further evaluation.She reports feeling as if an elephant was sitting on her chest. On the morning of the ED visit she woke up \"gasping\" for breath. She notes multiple symptoms recently including increasing anxiety, nausea, an \"air bubble\" feeling in her chest-usually worse with eating, labile BPs and sugars. She has been on steroids recently. She does note improved chest pressure and dyspnea but still some mild discomfort as if she needs to belch. Chest discomfort intermittent, slightly worse when laying flat and when climbing stairs    Followed by Dr. Astudillo for hypertension.  She had an echo in 2017 which showed normal left ventricular systolic function no significant valve disease. S    Cardiac risks: Pretension, hyperlipidemia, diabetes, obesity  Patient Active Problem List   Diagnosis   • Asthma   • Chronic " fatigue   • Chronic tonsillar hypertrophy   • Type 2 diabetes mellitus (CMS/HCC)   • Hyperlipidemia   • Hypertension   • Keloid skin disorder   • Morbid obesity (CMS/HCC)   • Polycystic ovaries   • Seasonal allergies   • Subclinical hypothyroidism   • Venous insufficiency (chronic) (peripheral)   • Abnormal uterine bleeding   • Anemia   • Depression   • GERD (gastroesophageal reflux disease)   • Herpes zoster   • Migraine with aura       Past Medical History:   Diagnosis Date   • Asthma    • Back pain    • Diabetes mellitus (CMS/HCC)    • Foot pain    • History of fracture    • History of gallstones    • Hx of radiation therapy     for treatments of Keloids   • Hypertension    • Left lower quadrant pain     h/o   • Migraines    • Seasonal allergies    • UTI (urinary tract infection)     H/o       Past Surgical History:   Procedure Laterality Date   • CHOLECYSTECTOMY     • HERNIA REPAIR     • KELOID EXCISION     • OTHER SURGICAL HISTORY      Excision of chest wall,shoulder, and trunk   • OTHER SURGICAL HISTORY      in grown toe nail   • RADIOFREQUENCY ABLATION     • WISDOM TOOTH EXTRACTION         Family History   Problem Relation Age of Onset   • Arthritis Mother    • Diabetes Mother    • Obesity Mother    • Arrhythmia Mother    • Dementia Father    • Heart attack Father    • Stroke Other         CVA   • Ovarian cancer Maternal Aunt         40's   • Breast cancer Paternal Aunt         30's       Social History     Socioeconomic History   • Marital status: Single     Spouse name: Not on file   • Number of children: Not on file   • Years of education: Not on file   • Highest education level: Not on file   Tobacco Use   • Smoking status: Never Smoker   • Smokeless tobacco: Never Used   Substance and Sexual Activity   • Alcohol use: Yes     Comment: RARE   • Drug use: No   • Sexual activity: Defer   Social History Narrative    Caffeine coffee,soda ocassionally       Allergies   Allergen Reactions   • Hydrochlorothiazide  Swelling   • Aspartame Other (See Comments)   • Augmentin [Amoxicillin-Pot Clavulanate]    • Codeine    • Diclofenac Other (See Comments)   • Doxycycline    • Monosodium Glutamate Other (See Comments)   • Naproxen          Current Outpatient Medications:   •  Acetaminophen (TYLENOL ARTHRITIS PAIN PO), Take  by mouth As Needed., Disp: , Rfl:   •  albuterol (ACCUNEB) 1.25 MG/3ML nebulizer solution, Take 6 mL by nebulization Every 6 (Six) Hours As Needed for Wheezing., Disp: 60 vial, Rfl: 0  •  albuterol (PROVENTIL HFA;VENTOLIN HFA) 108 (90 BASE) MCG/ACT inhaler, Inhale 2 puffs Every 4 (Four) Hours As Needed for wheezing., Disp: , Rfl:   •  carvedilol (COREG) 12.5 MG tablet, Take 1 tablet by mouth Every Evening., Disp: 90 tablet, Rfl: 3  •  carvedilol (COREG) 25 MG tablet, Take 1 tablet by mouth Every Morning., Disp: 90 tablet, Rfl: 3  •  CloNIDine (CATAPRES) 0.2 MG tablet, Take 1 tablet by mouth 2 (Two) Times a Day. (Patient taking differently: Take 0.2 mg by mouth As Needed.), Disp: 60 tablet, Rfl: 5  •  cyclobenzaprine (FLEXERIL) 10 MG tablet, Take 10 mg by mouth 3 (Three) Times a Day As Needed for muscle spasms., Disp: , Rfl:   •  diphenhydrAMINE (BENADRYL) 25 mg capsule, Take 25 mg by mouth Every 6 (Six) Hours As Needed for itching., Disp: , Rfl:   •  fluconazole (DIFLUCAN) 200 MG tablet, Take 200 mg by mouth 2 (Two) Times a Day., Disp: , Rfl:   •  furosemide (LASIX) 40 MG tablet, Take 40 mg by mouth 1 (One) Time As Needed (as needed every other day)., Disp: , Rfl:   •  gabapentin (NEURONTIN) 300 MG capsule, Take 300 mg by mouth 3 (Three) Times a Day As Needed., Disp: , Rfl:   •  galcanezumab-gnlm (EMGALITY) 120 MG/ML prefilled syringe, Inject 1 mL under the skin into the appropriate area as directed Every 30 (Thirty) Days., Disp: 1 mL, Rfl: 11  •  glipiZIDE (GLUCOTROL) 5 MG tablet, Take 5 mg by mouth 2 (Two) Times a Day Before Meals., Disp: , Rfl:   •  HYDROcodone-acetaminophen (NORCO) 5-325 MG per tablet, Take  1-2 tablets by mouth Every 6 (Six) Hours As Needed for Severe Pain ., Disp: 15 tablet, Rfl: 0  •  ibuprofen (ADVIL,MOTRIN) 600 MG tablet, As Needed., Disp: , Rfl:   •  loratadine (CLARITIN) 10 MG tablet, Take 10 mg by mouth Daily., Disp: , Rfl:   •  metFORMIN (GLUCOPHAGE) 500 MG tablet, Take 500 mg by mouth Daily., Disp: , Rfl:   •  norethindrone (AYGESTIN) 5 MG tablet, Daily., Disp: , Rfl:   •  predniSONE (DELTASONE) 20 MG tablet, Take 2 tablets by mouth Daily for 4 days., Disp: 8 tablet, Rfl: 0  •  spironolactone (ALDACTONE) 100 MG tablet, Take 100 mg by mouth. Takes 30 days on,  off 30 days., Disp: , Rfl:   •  traMADol (ULTRAM) 50 MG tablet, Take 50 mg by mouth Every 6 (Six) Hours As Needed for moderate pain (4-6)., Disp: , Rfl:     The following portions of the patient's history were reviewed today and updated as appropriate: allergies, current medications, past family history, past medical history, past social history, past surgical history and problem list     Review of Systems   Constitution: Positive for malaise/fatigue. Negative for chills and fever.   Eyes: Negative.    Cardiovascular: Positive for chest pain and orthopnea. Negative for claudication, cyanosis, dyspnea on exertion, irregular heartbeat, leg swelling, near-syncope, palpitations, paroxysmal nocturnal dyspnea and syncope.   Respiratory: Positive for sputum production. Negative for cough, shortness of breath, snoring and wheezing.    Endocrine: Negative.    Hematologic/Lymphatic: Does not bruise/bleed easily.   Skin: Negative for poor wound healing.   Musculoskeletal: Negative.    Gastrointestinal: Positive for bloating, diarrhea, heartburn and nausea. Negative for abdominal pain, hematemesis, melena and vomiting.   Genitourinary: Negative.  Negative for hematuria.   Neurological: Positive for headaches.   Psychiatric/Behavioral: Negative.    Allergic/Immunologic: Positive for environmental allergies.       Objective:     Vitals:    11/12/19  "0841 11/12/19 0843 11/12/19 0844   BP: 139/69 140/88 129/82   BP Location: Right arm Left arm Left arm   Patient Position: Sitting Sitting Standing   Cuff Size: Large Adult Large Adult Large Adult   Pulse: 59  71   Resp: 15     Temp: 97.1 °F (36.2 °C)     TempSrc: Temporal     SpO2: 97%  97%   Weight: 118 kg (260 lb 2 oz)     Height: 152.4 cm (60\")         Body mass index is 50.8 kg/m².    Physical Exam   Constitutional: She is oriented to person, place, and time. She appears well-developed and well-nourished. No distress.   HENT:   Head: Normocephalic.   Eyes: Conjunctivae are normal. Pupils are equal, round, and reactive to light.   Neck: Neck supple. No JVD present. No thyromegaly present.   Cardiovascular: Regular rhythm, normal heart sounds and intact distal pulses. Bradycardia present. Exam reveals no gallop and no friction rub.   No murmur heard.  Pulmonary/Chest: Effort normal and breath sounds normal. No respiratory distress. She has no wheezes. She has no rales. She exhibits no tenderness.   Abdominal: Soft. Bowel sounds are normal.   Musculoskeletal: Normal range of motion. She exhibits no edema.   Neurological: She is alert and oriented to person, place, and time.   Skin: Skin is warm and dry.   Psychiatric: She has a normal mood and affect. Her behavior is normal. Thought content normal.   Vitals reviewed.      Lab and Diagnostic Review:  Echo 9/20/2017  · Left ventricular systolic function is normal. Estimated EF = 65%.  · Mild tricuspid valve regurgitation with normal RVSP.  Results for orders placed or performed during the hospital encounter of 11/10/19   Comprehensive Metabolic Panel   Result Value Ref Range    Glucose 116 (H) 65 - 99 mg/dL    BUN 10 6 - 20 mg/dL    Creatinine 0.77 0.57 - 1.00 mg/dL    Sodium 140 136 - 145 mmol/L    Potassium 3.7 3.5 - 5.2 mmol/L    Chloride 102 98 - 107 mmol/L    CO2 24.0 22.0 - 29.0 mmol/L    Calcium 10.2 8.6 - 10.5 mg/dL    Total Protein 8.6 (H) 6.0 - 8.5 g/dL    " Albumin 4.50 3.50 - 5.20 g/dL    ALT (SGPT) 31 1 - 33 U/L    AST (SGOT) 29 1 - 32 U/L    Alkaline Phosphatase 104 39 - 117 U/L    Total Bilirubin 0.3 0.2 - 1.2 mg/dL    eGFR  African Amer 103 >60 mL/min/1.73    Globulin 4.1 gm/dL    A/G Ratio 1.1 g/dL    BUN/Creatinine Ratio 13.0 7.0 - 25.0    Anion Gap 14.0 5.0 - 15.0 mmol/L   BNP   Result Value Ref Range    proBNP 48.9 5.0 - 450.0 pg/mL   Troponin   Result Value Ref Range    Troponin T <0.010 0.000 - 0.030 ng/mL   CBC Auto Differential   Result Value Ref Range    WBC 12.22 (H) 3.40 - 10.80 10*3/mm3    RBC 4.09 3.77 - 5.28 10*6/mm3    Hemoglobin 12.8 12.0 - 15.9 g/dL    Hematocrit 40.0 34.0 - 46.6 %    MCV 97.8 (H) 79.0 - 97.0 fL    MCH 31.3 26.6 - 33.0 pg    MCHC 32.0 31.5 - 35.7 g/dL    RDW 13.2 12.3 - 15.4 %    RDW-SD 47.8 37.0 - 54.0 fl    MPV 11.5 6.0 - 12.0 fL    Platelets 311 140 - 450 10*3/mm3    Neutrophil % 60.2 42.7 - 76.0 %    Lymphocyte % 32.1 19.6 - 45.3 %    Monocyte % 5.0 5.0 - 12.0 %    Eosinophil % 1.9 0.3 - 6.2 %    Basophil % 0.6 0.0 - 1.5 %    Immature Grans % 0.2 0.0 - 0.5 %    Neutrophils, Absolute 7.36 (H) 1.70 - 7.00 10*3/mm3    Lymphocytes, Absolute 3.92 (H) 0.70 - 3.10 10*3/mm3    Monocytes, Absolute 0.61 0.10 - 0.90 10*3/mm3    Eosinophils, Absolute 0.23 0.00 - 0.40 10*3/mm3    Basophils, Absolute 0.07 0.00 - 0.20 10*3/mm3    Immature Grans, Absolute 0.03 0.00 - 0.05 10*3/mm3    nRBC 0.0 0.0 - 0.2 /100 WBC   Troponin   Result Value Ref Range    Troponin T <0.010 0.000 - 0.030 ng/mL   D-dimer, Quantitative   Result Value Ref Range    D-Dimer, Quantitative <0.27 0.00 - 0.56 MCGFEU/mL   POCT pregnancy, urine   Result Value Ref Range    HCG, Urine, QL Negative Negative    Lot Number [id number]     Internal Positive Control Positive     Internal Negative Control Negative    CXR   FINDINGS: Cardiac size within normal limits. Pulmonary vascularity  within normal limits. No focal opacification or consolidation. No  pneumothorax or pleural  effusion. No acute osseous findings.         EKG 11/10/19 Sinus bradycardia    Assessment and Plan:   1. Chest pain, unspecified type  BRE risk score 1  Patient unable to walk on treadmill due to severe arthritis in lower back currently being treated with ablation  - Stress Test With Pet Myocardial Perfusion (MULTI STUDY, REST AND STRESS); Future.    2. Essential hypertension  Controlled  - Stress Test With Pet Myocardial Perfusion (MULTI STUDY, REST AND STRESS); Future    3. Type 2 diabetes mellitus without complication, without long-term current use of insulin (CMS/[facility])  Reports good control without complication.  Reported A1c close to 6  - Stress Test With Pet Myocardial Perfusion (MULTI STUDY, REST AND STRESS); Future    4. Mixed hyperlipidemia  Not currently on statin therapy, patient managing with diet bid lipids followed by PCP  - Stress Test With Pet Myocardial Perfusion (MULTI STUDY, REST AND STRESS); Future    5. MACKAY (dyspnea on exertion)  - Stress Test With Pet Myocardial Perfusion (MULTI STUDY, REST AND STRESS); Future    Further plans pending stress test results      It has been a pleasure to participate in the care of this patient.  Patient was instructed to call the [facility] with any questions, concerns, or worsening symptoms.    *Please note that portions of this note were completed with a voice recognition program. Efforts were made to edit the dictations, but occasionally words are mistranscribed.

## 2019-11-19 ENCOUNTER — HOSPITAL ENCOUNTER (OUTPATIENT)
Dept: CARDIOLOGY | Facility: HOSPITAL | Age: 34
Discharge: HOME | End: 2019-11-19
Admitting: NURSE PRACTITIONER

## 2019-11-19 DIAGNOSIS — R07.9 CHEST PAIN, UNSPECIFIED TYPE: ICD-10-CM

## 2019-11-19 DIAGNOSIS — E78.2 MIXED HYPERLIPIDEMIA: ICD-10-CM

## 2019-11-19 DIAGNOSIS — R06.09 DOE (DYSPNEA ON EXERTION): ICD-10-CM

## 2019-11-19 DIAGNOSIS — E11.9 TYPE 2 DIABETES MELLITUS WITHOUT COMPLICATION, WITHOUT LONG-TERM CURRENT USE OF INSULIN (HCC): ICD-10-CM

## 2019-11-19 DIAGNOSIS — I10 ESSENTIAL HYPERTENSION: ICD-10-CM

## 2019-11-19 LAB
BH CV STRESS BP STAGE 1: NORMAL
BH CV STRESS BP STAGE 3: NORMAL
BH CV STRESS COMMENTS STAGE 1: NORMAL
BH CV STRESS DOSE REGADENOSON STAGE 1: 0.4
BH CV STRESS DURATION MIN STAGE 1: 0
BH CV STRESS DURATION MIN STAGE 2: 1
BH CV STRESS DURATION MIN STAGE 3: 1
BH CV STRESS DURATION MIN STAGE 4: 1
BH CV STRESS DURATION SEC STAGE 1: 10
BH CV STRESS DURATION SEC STAGE 2: 0
BH CV STRESS DURATION SEC STAGE 3: 0
BH CV STRESS DURATION SEC STAGE 4: 0
BH CV STRESS HR STAGE 1: 91
BH CV STRESS HR STAGE 2: 76
BH CV STRESS HR STAGE 3: 75
BH CV STRESS HR STAGE 4: 72
BH CV STRESS PROTOCOL 1: NORMAL
BH CV STRESS RECOVERY BP: NORMAL MMHG
BH CV STRESS RECOVERY HR: 68 BPM
BH CV STRESS STAGE 1: 1
BH CV STRESS STAGE 2: 2
BH CV STRESS STAGE 3: 3
BH CV STRESS STAGE 4: 4
LV EF NUC BP: 83 %
MAXIMAL PREDICTED HEART RATE: 184 BPM
PERCENT MAX PREDICTED HR: 48.91 %
STRESS BASELINE BP: NORMAL MMHG
STRESS BASELINE HR: 57 BPM
STRESS PERCENT HR: 58 %
STRESS POST PEAK BP: NORMAL MMHG
STRESS POST PEAK HR: 90 BPM
STRESS TARGET HR: 156 BPM

## 2019-11-19 PROCEDURE — 78492 MYOCRD IMG PET MLT RST&STRS: CPT | Performed by: INTERNAL MEDICINE

## 2019-11-19 PROCEDURE — 93018 CV STRESS TEST I&R ONLY: CPT | Performed by: INTERNAL MEDICINE

## 2019-11-19 PROCEDURE — A9555 RB82 RUBIDIUM: HCPCS | Performed by: NURSE PRACTITIONER

## 2019-11-19 PROCEDURE — 78492 MYOCRD IMG PET MLT RST&STRS: CPT

## 2019-11-19 PROCEDURE — 25010000002 REGADENOSON 0.4 MG/5ML SOLUTION: Performed by: NURSE PRACTITIONER

## 2019-11-19 PROCEDURE — 93017 CV STRESS TEST TRACING ONLY: CPT

## 2019-11-19 PROCEDURE — 0 RUBIDIUM CHLORIDE: Performed by: NURSE PRACTITIONER

## 2019-11-19 RX ADMIN — REGADENOSON 0.4 MG: 0.08 INJECTION, SOLUTION INTRAVENOUS at 14:06

## 2019-11-19 RX ADMIN — RUBIDIUM CHLORIDE RB-82 1 DOSE: 150 INJECTION, SOLUTION INTRAVENOUS at 14:10

## 2019-11-19 RX ADMIN — RUBIDIUM CHLORIDE RB-82 1 DOSE: 150 INJECTION, SOLUTION INTRAVENOUS at 13:55

## 2019-11-20 ENCOUNTER — SPECIALTY PHARMACY (OUTPATIENT)
Dept: ONCOLOGY | Facility: HOSPITAL | Age: 34
End: 2019-11-20

## 2019-11-20 NOTE — PROGRESS NOTES
Injectable Neurology Medication Teaching        Patient Name/:     Frances Hartman   1985  Injectable Neurology Medication Regimen:  Emgality 240mg SQ x 1, then 120mg SQ monthly  Date Started Medication: 2019               Initial Teaching Follow Up Comments      Safety      Storage instructions (away from children; away from heat/cold, sunlight, or moisture)       “How are you storing your medications?”, reminders on storage, proper handling (away from children, managing waste, etc.), disposal of medication with D/C or dosage change     Patient reports appropriate storage and handling.      Adherence       patient and/or caregiver on how to take medication, take with/without food, assess their adherence potential, stress importance of adherence, ways to manage adherence (pill boxes, phone reminders, calendars), what to do if miss a dose    “How are you taking your medication?” “How are you remembering to take your medication?”, “How many doses have you missed?”, determine reasons for non-adherence (not remembering, side effects, etc), ways to improve, overadherence? Remind patient of ways to improve/maintain adherence Confirmed dose of Emgality 120mg SQ monthly. LD given in office on 2019. Refill processed at [email or web address] [facility] and mailed to patient.     Side Effects/Adverse Reactions       patient on potential side effects, s/s, ways to manage, when to call MD/seek help       Determine if patient experiencing side effects, ways to manage  Pt reports slight itching following injections but not problematic at this time. Pt will continue to monitor sx.      Miscellaneous      Food interactions, DDIs, financial issues Determine if patient started any new medications (analyze for DDI)       Additional Notes: Discussed aforementioned material with patient by phone. All questions and concerns addressed. Patient provided with my contact information and instructed to call if any additional  questions arise. Notified [facility] retail of refill request.

## 2019-11-26 ENCOUNTER — TRANSCRIBE ORDERS (OUTPATIENT)
Dept: ADMINISTRATIVE | Facility: HOSPITAL | Age: 34
End: 2019-11-26

## 2019-11-26 DIAGNOSIS — R92.8 ABNORMAL MAMMOGRAM: Primary | ICD-10-CM

## 2019-12-19 ENCOUNTER — SPECIALTY PHARMACY (OUTPATIENT)
Dept: ONCOLOGY | Facility: HOSPITAL | Age: 34
End: 2019-12-19

## 2019-12-19 NOTE — PROGRESS NOTES
Injectable Neurology Medication Follow-Up        Patient Name/:     Frances Hartman   1985  Injectable Neurology Medication Regimen:  Emgality 240mg SQ x 1, then 120mg SQ monthly  Date Started Medication: 2019               Initial Teaching Follow Up Comments      Safety      Storage instructions (away from children; away from heat/cold, sunlight, or moisture)       “How are you storing your medications?”, reminders on storage, proper handling (away from children, managing waste, etc.), disposal of medication with D/C or dosage change     Patient reports appropriate storage and handling.      Adherence       patient and/or caregiver on how to take medication, take with/without food, assess their adherence potential, stress importance of adherence, ways to manage adherence (pill boxes, phone reminders, calendars), what to do if miss a dose    “How are you taking your medication?” “How are you remembering to take your medication?”, “How many doses have you missed?”, determine reasons for non-adherence (not remembering, side effects, etc), ways to improve, overadherence? Remind patient of ways to improve/maintain adherence Confirmed dose of Emgality 120mg SQ monthly. LD given in office on 2019. Refill processed at [facility] retail and mailed to patient.     Side Effects/Adverse Reactions       patient on potential side effects, s/s, ways to manage, when to call MD/seek help       Determine if patient experiencing side effects, ways to manage  Pt reports worsening of migraine sx within last month. Admits may be stress or weather-related and wants to continue to monitor sx until 2020 appt with MD to discuss continuation.      Miscellaneous      Food interactions, DDIs, financial issues Determine if patient started any new medications (analyze for DDI)       Additional Notes: Discussed aforementioned material with patient by phone. All questions and concerns addressed. Patient provided with my  contact information and instructed to call if any additional questions arise. Notified [facility] retail of refill request.

## 2020-01-02 ENCOUNTER — TELEPHONE (OUTPATIENT)
Dept: CARDIOLOGY | Facility: CLINIC | Age: 35
End: 2020-01-02

## 2020-01-02 NOTE — TELEPHONE ENCOUNTER
Pt called and stated that she is interested in bariatric surgery. Referred pt to Dr Geiger or Dr Andre to get process started and keep 3/13/2020 appt with Dr Astudillo.     Advised pt to call us back if she needed our office to do anything.    Pt verbalizes understanding and agreeable to plan.

## 2020-01-21 ENCOUNTER — SPECIALTY PHARMACY (OUTPATIENT)
Dept: ONCOLOGY | Facility: HOSPITAL | Age: 35
End: 2020-01-21

## 2020-01-21 NOTE — PROGRESS NOTES
Injectable Neurology Medication Follow-Up        Patient Name/:     Frances Hartman   1985  Injectable Neurology Medication Regimen:  Emgality 240mg SQ x 1, then 120mg SQ monthly  Date Started Medication: 2019               Initial Teaching Follow Up Comments      Safety      Storage instructions (away from children; away from heat/cold, sunlight, or moisture)       “How are you storing your medications?”, reminders on storage, proper handling (away from children, managing waste, etc.), disposal of medication with D/C or dosage change     Patient reports appropriate storage and handling.      Adherence       patient and/or caregiver on how to take medication, take with/without food, assess their adherence potential, stress importance of adherence, ways to manage adherence (pill boxes, phone reminders, calendars), what to do if miss a dose    “How are you taking your medication?” “How are you remembering to take your medication?”, “How many doses have you missed?”, determine reasons for non-adherence (not remembering, side effects, etc), ways to improve, overadherence? Remind patient of ways to improve/maintain adherence Confirmed dose of Emgality 120mg SQ monthly. LD given in office on 2019. Refill processed at [facility] retail and mailed to patient.     Side Effects/Adverse Reactions       patient on potential side effects, s/s, ways to manage, when to call MD/seek help       Determine if patient experiencing side effects, ways to manage  Pt reports worsening of migraine sx within last month. Will take next injection as scheduled on 2020 and continue to monitor sx until 2020 appt with MD to discuss continuation.      Miscellaneous      Food interactions, DDIs, financial issues Determine if patient started any new medications (analyze for DDI)       Additional Notes: Discussed aforementioned material with patient by phone. All questions and concerns addressed. Patient provided with  my contact information and instructed to call if any additional questions arise. Notified [facility] retail of refill request.

## 2020-02-04 ENCOUNTER — OFFICE VISIT (OUTPATIENT)
Dept: NEUROLOGY | Facility: CLINIC | Age: 35
End: 2020-02-04

## 2020-02-04 VITALS — HEIGHT: 60 IN | OXYGEN SATURATION: 98 % | BODY MASS INDEX: 51.08 KG/M2 | HEART RATE: 82 BPM | WEIGHT: 260.2 LBS

## 2020-02-04 DIAGNOSIS — G43.109 MIGRAINE WITH AURA AND WITHOUT STATUS MIGRAINOSUS, NOT INTRACTABLE: Primary | ICD-10-CM

## 2020-02-04 DIAGNOSIS — G43.709 CHRONIC MIGRAINE WITHOUT AURA WITHOUT STATUS MIGRAINOSUS, NOT INTRACTABLE: ICD-10-CM

## 2020-02-04 PROBLEM — I87.2 VENOUS INSUFFICIENCY (CHRONIC) (PERIPHERAL): Status: RESOLVED | Noted: 2017-08-23 | Resolved: 2020-02-04

## 2020-02-04 PROCEDURE — 99213 OFFICE O/P EST LOW 20 MIN: CPT | Performed by: PSYCHIATRY & NEUROLOGY

## 2020-02-04 NOTE — ASSESSMENT & PLAN NOTE
Headaches are worsening.  Medication changes per orders.     Stop Emgality as not as effective     Start  Units.

## 2020-02-04 NOTE — PROGRESS NOTES
Subjective:   Chief Complaint   Patient presents with   • Migraine     4 month follow up        Patient ID: Frances Hartman is a 34 y.o. female.    History of Present Illness     34 y.o. woman returns in follow up for migraines.  Last visit on 10/4/19 continued Emgality.     Greater than 15 HA days a month, moderate to severe intensity lasting for over 4 - 6 hours.      Frequency increased to four a week in December.  Worsens prior to no next injection.  Increased to daily at end of injection.      Moderate to severe intensity.  Last for entire day.  Located in varying locations of head.   OTC meds helpful.       Taking GBP, Flexeril, tramadol for back pain once every few months.      Preventatives:  Elavil, TPM, VPA     Problem history:    HA frequency is daily.  Located in front of head with squeezing and throbbing sensation moderate to severe intensity.  Awakens with HA and will decrease slightly by bedtime.  Treating with Tylenol, tramadol, Lortab on a daily.  Previous trial of triptans with minimal response.       Reviewed Dr Astudillo's notes:    Presented to [facility] ED on with /122 and left A/L numbness and confusion.  Treated with clonidine and BP decreased.  H/O migraines since 8 yoa.  2 - 3 HA a week treated with OTC.  Echo  - EF 55-60,     HCT, my review of films, 7/11/17 normal  CBC, CMP, TSH, Hep B, HIV, RPR, INR - N[facility]    The following portions of the patient's history were reviewed and updated as appropriate:   She  has a past medical history of Asthma, Back pain, Diabetes mellitus (CMS/HCC), Foot pain, History of fracture, History of gallstones, radiation therapy, Hypertension, Left lower quadrant pain, Migraines, Seasonal allergies, and UTI (urinary tract infection).  She does not have any pertinent problems on file.  She  has a past surgical history that includes Keloid excision; Cholecystectomy; [place] tooth extraction; Hernia repair; Other surgical history; Other surgical history; and  Radiofrequency ablation.  Her family history includes Arrhythmia in her mother; Arthritis in her mother; Breast cancer in her paternal aunt; Dementia in her father; Diabetes in her mother; Heart attack in her father; Obesity in her mother; Ovarian cancer in her maternal aunt; Stroke in an other family member.  She  reports that she has never smoked. She has never used smokeless tobacco. She reports that she drinks alcohol. She reports that she does not use drugs.  Current Outpatient Medications   Medication Sig Dispense Refill   • Acetaminophen (TYLENOL ARTHRITIS PAIN PO) Take  by mouth As Needed.     • albuterol (ACCUNEB) 1.25 MG/3ML nebulizer solution Take 6 mL by nebulization Every 6 (Six) Hours As Needed for Wheezing. 60 vial 0   • albuterol (PROVENTIL HFA;VENTOLIN HFA) 108 (90 BASE) MCG/ACT inhaler Inhale 2 puffs Every 4 (Four) Hours As Needed for wheezing.     • carvedilol (COREG) 12.5 MG tablet Take 1 tablet by mouth Every Evening. 90 tablet 3   • carvedilol (COREG) 25 MG tablet Take 1 tablet by mouth Every Morning. 90 tablet 3   • cephalexin (KEFLEX) 500 MG capsule Take 1 capsule by mouth 3 (Three) Times a Day. 30 capsule 0   • CloNIDine (CATAPRES) 0.2 MG tablet Take 1 tablet by mouth 2 (Two) Times a Day. (Patient taking differently: Take 0.2 mg by mouth As Needed.) 60 tablet 5   • cyclobenzaprine (FLEXERIL) 10 MG tablet Take 10 mg by mouth 3 (Three) Times a Day As Needed for muscle spasms.     • diphenhydrAMINE (BENADRYL) 25 mg capsule Take 25 mg by mouth Every 6 (Six) Hours As Needed for itching.     • fluconazole (DIFLUCAN) 200 MG tablet Take 200 mg by mouth 2 (Two) Times a Day.     • furosemide (LASIX) 40 MG tablet Take 40 mg by mouth 1 (One) Time As Needed (as needed every other day).     • gabapentin (NEURONTIN) 300 MG capsule Take 300 mg by mouth 3 (Three) Times a Day As Needed.     • glipiZIDE (GLUCOTROL) 5 MG tablet Take 5 mg by mouth 2 (Two) Times a Day Before Meals.     • HYDROcodone-acetaminophen  (NORCO) 5-325 MG per tablet Take 1-2 tablets by mouth Every 6 (Six) Hours As Needed for Severe Pain . 15 tablet 0   • ibuprofen (ADVIL,MOTRIN) 600 MG tablet As Needed.     • loratadine (CLARITIN) 10 MG tablet Take 10 mg by mouth Daily.     • metFORMIN (GLUCOPHAGE) 500 MG tablet Take 500 mg by mouth Daily.     • norethindrone (AYGESTIN) 5 MG tablet Daily.     • oxyCODONE (ROXICODONE) 5 MG immediate release tablet      • promethazine-dextromethorphan (PROMETHAZINE-DM) 6.25-15 MG/5ML syrup Take 5 ml QID PRN cough/congestion 180 mL 0   • spironolactone (ALDACTONE) 100 MG tablet Take 100 mg by mouth. Takes 30 days on,  off 30 days.     • traMADol (ULTRAM) 50 MG tablet Take 50 mg by mouth Every 6 (Six) Hours As Needed for moderate pain (4-6).       No current facility-administered medications for this visit.      Current Outpatient Medications on File Prior to Visit   Medication Sig   • Acetaminophen (TYLENOL ARTHRITIS PAIN PO) Take  by mouth As Needed.   • albuterol (ACCUNEB) 1.25 MG/3ML nebulizer solution Take 6 mL by nebulization Every 6 (Six) Hours As Needed for Wheezing.   • albuterol (PROVENTIL HFA;VENTOLIN HFA) 108 (90 BASE) MCG/ACT inhaler Inhale 2 puffs Every 4 (Four) Hours As Needed for wheezing.   • carvedilol (COREG) 12.5 MG tablet Take 1 tablet by mouth Every Evening.   • carvedilol (COREG) 25 MG tablet Take 1 tablet by mouth Every Morning.   • cephalexin (KEFLEX) 500 MG capsule Take 1 capsule by mouth 3 (Three) Times a Day.   • CloNIDine (CATAPRES) 0.2 MG tablet Take 1 tablet by mouth 2 (Two) Times a Day. (Patient taking differently: Take 0.2 mg by mouth As Needed.)   • cyclobenzaprine (FLEXERIL) 10 MG tablet Take 10 mg by mouth 3 (Three) Times a Day As Needed for muscle spasms.   • diphenhydrAMINE (BENADRYL) 25 mg capsule Take 25 mg by mouth Every 6 (Six) Hours As Needed for itching.   • fluconazole (DIFLUCAN) 200 MG tablet Take 200 mg by mouth 2 (Two) Times a Day.   • furosemide (LASIX) 40 MG tablet Take  40 mg by mouth 1 (One) Time As Needed (as needed every other day).   • gabapentin (NEURONTIN) 300 MG capsule Take 300 mg by mouth 3 (Three) Times a Day As Needed.   • glipiZIDE (GLUCOTROL) 5 MG tablet Take 5 mg by mouth 2 (Two) Times a Day Before Meals.   • HYDROcodone-acetaminophen (NORCO) 5-325 MG per tablet Take 1-2 tablets by mouth Every 6 (Six) Hours As Needed for Severe Pain .   • ibuprofen (ADVIL,MOTRIN) 600 MG tablet As Needed.   • loratadine (CLARITIN) 10 MG tablet Take 10 mg by mouth Daily.   • metFORMIN (GLUCOPHAGE) 500 MG tablet Take 500 mg by mouth Daily.   • norethindrone (AYGESTIN) 5 MG tablet Daily.   • oxyCODONE (ROXICODONE) 5 MG immediate release tablet    • promethazine-dextromethorphan (PROMETHAZINE-DM) 6.25-15 MG/5ML syrup Take 5 ml QID PRN cough/congestion   • spironolactone (ALDACTONE) 100 MG tablet Take 100 mg by mouth. Takes 30 days on,  off 30 days.   • traMADol (ULTRAM) 50 MG tablet Take 50 mg by mouth Every 6 (Six) Hours As Needed for moderate pain (4-6).   • [DISCONTINUED] galcanezumab-gnlm (EMGALITY) 120 MG/ML prefilled syringe Inject 1 mL under the skin into the appropriate area as directed Every 30 (Thirty) Days.     No current facility-administered medications on file prior to visit.      She is allergic to hydrochlorothiazide; aspartame; augmentin [amoxicillin-pot clavulanate]; codeine; diclofenac; doxycycline; monosodium glutamate; and naproxen..    Review of Systems   Constitutional: Positive for fatigue. Negative for activity change and unexpected weight change.   HENT: Negative for trouble swallowing.    Eyes: Negative for visual disturbance.   Respiratory: Negative for apnea and choking.    Cardiovascular: Negative for leg swelling.   Endocrine: Negative for cold intolerance and heat intolerance.   Genitourinary: Negative for difficulty urinating, frequency, menstrual problem and urgency.   Musculoskeletal: Negative for gait problem and myalgias.   Skin: Negative for color  "change and rash.   Allergic/Immunologic: Negative for immunocompromised state.   Neurological: Positive for headaches. Negative for tremors, syncope, facial asymmetry, speech difficulty and light-headedness.   Hematological: Negative for adenopathy. Does not bruise/bleed easily.   Psychiatric/Behavioral: Positive for confusion. Negative for behavioral problems, decreased concentration, hallucinations and sleep disturbance.        Objective:  Vitals:    02/04/20 1506   Pulse: 82   SpO2: 98%   Weight: 118 kg (260 lb 3.2 oz)   Height: 152.4 cm (60\")       Neurologic Exam     Mental Status   Registration: recalls 3 of 3 objects. Recall at 5 minutes: recalls 3 of 3 objects. Follows 3 step commands.   Attention: normal. Concentration: normal.   Level of consciousness: alert  Knowledge: good and consistent with education.   Able to name object. Able to read. Able to repeat. Able to write. Normal comprehension.     Cranial Nerves     CN II   Visual fields full to confrontation.   Visual acuity: normal  Right visual field deficit: none  Left visual field deficit: none     CN III, IV, VI   Right pupil: Shape: regular. Reactivity: brisk. Consensual response: intact.   Left pupil: Shape: regular. Reactivity: brisk. Consensual response: intact.   Nystagmus: none   Diplopia: none  Ophthalmoparesis: none  Upgaze: normal  Downgaze: normal  Conjugate gaze: present  Vestibulo-ocular reflex: present    CN V   Facial sensation intact.   Right corneal reflex: normal  Left corneal reflex: normal    CN VII   Right facial weakness: none  Left facial weakness: none    CN VIII   Hearing: intact    CN IX, X   Palate: symmetric  Right gag reflex: normal  Left gag reflex: normal    CN XI   Right sternocleidomastoid strength: normal  Left sternocleidomastoid strength: normal    CN XII   Tongue: not atrophic  Fasciculations: absent  Tongue deviation: none    Motor Exam   Muscle bulk: normal  Overall muscle tone: normal  Right arm tone: " normal  Left arm tone: normal  Right leg tone: normal  Left leg tone: normal    Sensory Exam   Light touch normal.   Vibration normal.   Proprioception normal.   Pinprick normal.     Gait, Coordination, and Reflexes     Tremor   Resting tremor: absent  Intention tremor: absent  Action tremor: absent    Reflexes   Reflexes 2+ except as noted.       Physical Exam   Constitutional: She appears well-developed and well-nourished.   Nursing note and vitals reviewed.    Admission on 12/19/2019, Discharged on 12/19/2019   Component Date Value Ref Range Status   • Rapid Strep A Screen 12/19/2019 Negative  Negative, VALID, INVALID, Not Performed Final   • Internal Control 12/19/2019 Passed  Passed Final   • Lot Number 12/19/2019 [id number]   Final   • Expiration Date 12/19/2019 4/29/2022   Final   • Rapid Influenza A Ag 12/19/2019 Negative  Negative Final   • Rapid Influenza B Ag 12/19/2019 Negative  Negative Final   • Internal Control 12/19/2019 Passed  Passed Final   • Lot Number 12/19/2019 [id number]   Final   • Expiration Date 12/19/2019 12/11/2021   Final         Assessment/Plan:       Problems Addressed this Visit        Cardiovascular and Mediastinum    Chronic migraine without aura, not intractable     Headaches are worsening.  Medication changes per orders.     Stop Emgality as not as effective     Start  Units.              RESOLVED: Migraine with aura - Primary

## 2020-03-03 ENCOUNTER — PROCEDURE VISIT (OUTPATIENT)
Dept: NEUROLOGY | Facility: CLINIC | Age: 35
End: 2020-03-03

## 2020-03-03 VITALS
WEIGHT: 260.14 LBS | DIASTOLIC BLOOD PRESSURE: 86 MMHG | HEIGHT: 60 IN | SYSTOLIC BLOOD PRESSURE: 142 MMHG | OXYGEN SATURATION: 98 % | BODY MASS INDEX: 51.07 KG/M2 | HEART RATE: 87 BPM

## 2020-03-03 DIAGNOSIS — G43.709 CHRONIC MIGRAINE WITHOUT AURA WITHOUT STATUS MIGRAINOSUS, NOT INTRACTABLE: ICD-10-CM

## 2020-03-03 DIAGNOSIS — G43.711 CHRONIC MIGRAINE WITHOUT AURA, WITH INTRACTABLE MIGRAINE, SO STATED, WITH STATUS MIGRAINOSUS: Primary | ICD-10-CM

## 2020-03-03 PROCEDURE — 64615 CHEMODENERV MUSC MIGRAINE: CPT | Performed by: PSYCHIATRY & NEUROLOGY

## 2020-03-03 NOTE — PROGRESS NOTES
Botulinum Toxin Injection Procedure    Chief Complaint   Patient presents with   • Botox Injection     NDC: 8308-4073-32 LOT: [id number] EXP: 08/2022 155units Patient Supplied          History:  Response to treatment has reduced HA's at least 7 fewer days a month and/or 100 hours fewer each month.     Pre-operative diagnosis: headache    Post-operative diagnosis: Same    Procedure: Chemical neurolysis    After risks and benefits were explained including bleeding, infection, worsening of pain, damage to the areas being injected, weakness of muscles, loss of muscle control, dysphagia if injecting the head or neck, facial droop if injecting the facial area, painful injection, allergic or other reaction to the medications being injected, and the failure of the procedure to help the problem, a signed consent was obtained.      The patient was placed in a comfortable area and the sites to be treated were identified. A time out was called and performed. The area to be treated was prepped three times with alcohol and the alcohol allowed to dry. Next, a 27 gauge needle was used to inject the medication in the area to be treated.    Area(s) injected: frontalis muscle, semispinalis capitis muscle and temporallis muscle    Medications used: botulinum toxin 200 mu, 2 mL of saline used to dilute the botulinum toxin.      The patient did tolerate the procedure well. There were no complications.       Physical Examination    Current Treatment (Units)   Splenius Capitis 25 units on the right and 25 units on the left.   Temporalis 25 units on the right and 25 units on the left.   Frontalis 27 units on the right and 28 units on the left.   EMG Guidance none .   Complications: none .   The total amount injected in units is 155 .   The total amount wasted in units is 45 .   The total amount submitted in units is 200 .   Botox was supplied by patient

## 2020-03-13 ENCOUNTER — OFFICE VISIT (OUTPATIENT)
Dept: CARDIOLOGY | Facility: CLINIC | Age: 35
End: 2020-03-13

## 2020-03-13 VITALS
WEIGHT: 258 LBS | OXYGEN SATURATION: 98 % | DIASTOLIC BLOOD PRESSURE: 86 MMHG | SYSTOLIC BLOOD PRESSURE: 134 MMHG | BODY MASS INDEX: 50.65 KG/M2 | HEART RATE: 70 BPM | HEIGHT: 60 IN

## 2020-03-13 DIAGNOSIS — E78.2 MIXED HYPERLIPIDEMIA: ICD-10-CM

## 2020-03-13 DIAGNOSIS — R00.2 PALPITATIONS: Primary | ICD-10-CM

## 2020-03-13 DIAGNOSIS — I10 ESSENTIAL HYPERTENSION: ICD-10-CM

## 2020-03-13 DIAGNOSIS — E66.01 OBESITY, MORBID (MORE THAN 100 LBS OVER IDEAL WEIGHT OR BMI > 40) (HCC): ICD-10-CM

## 2020-03-13 PROCEDURE — 99213 OFFICE O/P EST LOW 20 MIN: CPT | Performed by: INTERNAL MEDICINE

## 2020-03-13 NOTE — PROGRESS NOTES
"[facility] Cardiology    Encounter Date:2020    Patient ID: Frances Hartman is a 34 y.o. female.  : 1985     PCP: Agustin Magdaleno APRN       Chief Complaint: Palpitations      PROBLEM LIST:  1. Hypertension:  a. Echo, 2014: EF 55-60%. All valves are structurally and functionally normal with no hemodynamically significant valve disease.  b. Echo, 2017: EF 65%. Mild TR with normal RVSP.  2. Hyperlipidemia.  3. Chest pain:  a. Pet MPS, 2019: EF > 70%. No evidence of reversible ischemia.  4. DM2.  5. Morbid obesity (BMI 54.7)  6. Peripheral chronic venous insufficiency.  7. Polycystic ovaries.  8. GERD.  9. Subclinical hypothyroidism.  10. Keloid skin disorder.  11. Chronic fatigue.  12. Chronic tonsillar hypertrophy.  13. Depression.  14. Migraine with aura, onset age 8.       History of Present Illness  Patient presents today for 6 month follow-up with a history of hypertension and hyperlipidemia. Pt reports her heart rate has been \"jumping really fast out of nowhere\" at rest. She is not aware of any specific triggers for these tachy palpitations. Patient has been trying to eat healthy, with low sodium. She admits she does not have an exercise routine. She attended a seminar on weight loss surgery \"a while ago\", and is interested in this. Patient denies chest pain, shortness of breath, edema, dizziness, and syncope.      Allergies   Allergen Reactions   • Hydrochlorothiazide Swelling   • Aspartame Other (See Comments)   • Augmentin [Amoxicillin-Pot Clavulanate]    • Codeine    • Diclofenac Other (See Comments)   • Doxycycline    • Monosodium Glutamate Other (See Comments)   • Naproxen          Current Outpatient Medications:   •  Acetaminophen (TYLENOL ARTHRITIS PAIN PO), Take  by mouth As Needed., Disp: , Rfl:   •  albuterol (ACCUNEB) 1.25 MG/3ML nebulizer solution, Take 6 mL by nebulization Every 6 (Six) Hours As Needed for Wheezing., Disp: 60 vial, " Rfl: 0  •  albuterol (PROVENTIL HFA;VENTOLIN HFA) 108 (90 BASE) MCG/ACT inhaler, Inhale 2 puffs Every 4 (Four) Hours As Needed for wheezing., Disp: , Rfl:   •  carvedilol (COREG) 12.5 MG tablet, Take 1 tablet by mouth Every Evening., Disp: 90 tablet, Rfl: 3  •  carvedilol (COREG) 25 MG tablet, Take 1 tablet by mouth Every Morning., Disp: 90 tablet, Rfl: 3  •  cephalexin (KEFLEX) 500 MG capsule, Take 1 capsule by mouth 3 (Three) Times a Day., Disp: 30 capsule, Rfl: 0  •  CloNIDine (CATAPRES) 0.2 MG tablet, Take 1 tablet by mouth 2 (Two) Times a Day. (Patient taking differently: Take 0.2 mg by mouth As Needed.), Disp: 60 tablet, Rfl: 5  •  cyclobenzaprine (FLEXERIL) 10 MG tablet, Take 10 mg by mouth 3 (Three) Times a Day As Needed for muscle spasms., Disp: , Rfl:   •  diphenhydrAMINE (BENADRYL) 25 mg capsule, Take 25 mg by mouth Every 6 (Six) Hours As Needed for itching., Disp: , Rfl:   •  fluconazole (DIFLUCAN) 200 MG tablet, Take 200 mg by mouth 2 (Two) Times a Day., Disp: , Rfl:   •  furosemide (LASIX) 40 MG tablet, Take 40 mg by mouth 1 (One) Time As Needed (as needed every other day)., Disp: , Rfl:   •  gabapentin (NEURONTIN) 300 MG capsule, Take 300 mg by mouth 3 (Three) Times a Day As Needed., Disp: , Rfl:   •  glipiZIDE (GLUCOTROL) 5 MG tablet, Take 5 mg by mouth 2 (Two) Times a Day Before Meals., Disp: , Rfl:   •  HYDROcodone-acetaminophen (NORCO) 5-325 MG per tablet, Take 1-2 tablets by mouth Every 6 (Six) Hours As Needed for Severe Pain ., Disp: 15 tablet, Rfl: 0  •  ibuprofen (ADVIL,MOTRIN) 600 MG tablet, As Needed., Disp: , Rfl:   •  loratadine (CLARITIN) 10 MG tablet, Take 10 mg by mouth Daily., Disp: , Rfl:   •  metFORMIN (GLUCOPHAGE) 500 MG tablet, Take 500 mg by mouth Daily., Disp: , Rfl:   •  norethindrone (AYGESTIN) 5 MG tablet, Daily., Disp: , Rfl:   •  OnabotulinumtoxinA 200 units reconstituted solution, Inject 200 units IM into head neck shoulders every 12 weeks per FDA protocol. Dx G43.709,  "Disp: 1 each, Rfl: 3  •  oxyCODONE (ROXICODONE) 5 MG immediate release tablet, , Disp: , Rfl:   •  promethazine-dextromethorphan (PROMETHAZINE-DM) 6.25-15 MG/5ML syrup, Take 5 ml QID PRN cough/congestion, Disp: 180 mL, Rfl: 0  •  spironolactone (ALDACTONE) 100 MG tablet, Take 100 mg by mouth. Takes 30 days on,  off 30 days., Disp: , Rfl:   •  traMADol (ULTRAM) 50 MG tablet, Take 50 mg by mouth Every 6 (Six) Hours As Needed for moderate pain (4-6)., Disp: , Rfl:     The following portions of the patient's history were reviewed and updated as appropriate: allergies, current medications, past family history, past medical history, past social history, past surgical history and problem list.    ROS  Review of Systems   Constitution: Negative for chills, fever, fatigue, generalized weakness.   Cardiovascular: Negative for chest pain, claudication, dyspnea on exertion, leg swelling, orthopnea, and syncope. Positive for palpitations.  Respiratory: Negative for cough, shortness of breath, and wheezing.  HENT: Negative for ear pain, nosebleeds, and tinnitus.  Gastrointestinal: Negative for abdominal pain, constipation, diarrhea, nausea and vomiting.   Genitourinary: No urinary symptoms.  Musculoskeletal: Negative for muscle cramps.  Neurological: Negative for dizziness, headaches, loss of balance, numbness, and symptoms of stroke.  Psychiatric: Normal mental status.     All other systems reviewed and are negative.    Objective:     /86 (BP Location: Left arm, Patient Position: Sitting)   Pulse 70   Ht 152.4 cm (60\")   Wt 117 kg (258 lb)   SpO2 98%   BMI 50.39 kg/m²        Physical Exam  Constitutional: Patient appears well-developed and well-nourished.   HENT: HEENT exam unremarkable.   Neck: Neck supple. No JVD present. No carotid bruits.   Cardiovascular: Normal rate, regular rhythm and normal heart sounds. No murmur heard.   2+ symmetric pulses.   Pulmonary/Chest: Breath sounds normal. Does not exhibit " tenderness.   Abdominal: Abdomen benign.   Musculoskeletal: Does not exhibit edema.   Neurological: Neurological exam unremarkable.   Vitals reviewed.    Lab Review:   Lab Results   Component Value Date    GLUCOSE 116 (H) 11/10/2019    BUN 10 11/10/2019    CREATININE 0.77 11/10/2019    EGFRIFAFRI 103 11/10/2019    BCR 13.0 11/10/2019    K 3.7 11/10/2019    CO2 24.0 11/10/2019    CALCIUM 10.2 11/10/2019    ALBUMIN 4.50 11/10/2019    AST 29 11/10/2019    ALT 31 11/10/2019      Lab Results   Component Value Date    WBC 12.22 (H) 11/10/2019    RBC 4.09 11/10/2019    HGB 12.8 11/10/2019    HCT 40.0 11/10/2019    MCV 97.8 (H) 11/10/2019     11/10/2019     Procedures       Assessment:      Diagnosis Plan   1. Palpitations  2-week Zio monitor to assess for arrhythmias given patient's palpitations.    2. Essential hypertension  Well-controlled, continue carvedilol, clonidine, Lasix, and spironolactone.   3. Mixed hyperlipidemia  Monitored by PCP   4. Obesity, morbid (more than 100 lbs over ideal weight or BMI > 40) (CMS/[facility])  Patient considering weight loss surgery. Begin routine aerobic exercise, at least 30 minutes 4 days per week.      Plan:   2-week Zio monitor to assess for arrhythmias given patient's palpitations.   At her request we will refer her to bariatric surgery.  Continue current medications.   FU in 3 MO, sooner as needed.  Thank you for allowing us to participate in the care of your patient.     Scribed for Julianna Astudillo MD by Lois Cintron. 3/13/2020  13:48      I, Julianna Astudillo MD, personally performed the services described in this documentation as scribed by the above named individual in my presence, and it is both accurate and complete.  3/13/2020  16:17        Please note that portions of this note may have been completed with a voice recognition program. Efforts were made to edit the dictations, but occasionally words are mistranscribed.

## 2020-03-16 ENCOUNTER — OFFICE VISIT (OUTPATIENT)
Dept: FAMILY MEDICINE CLINIC | Facility: CLINIC | Age: 35
End: 2020-03-16

## 2020-03-16 VITALS
OXYGEN SATURATION: 98 % | HEIGHT: 60 IN | DIASTOLIC BLOOD PRESSURE: 84 MMHG | BODY MASS INDEX: 50.89 KG/M2 | HEART RATE: 74 BPM | SYSTOLIC BLOOD PRESSURE: 126 MMHG | WEIGHT: 259.2 LBS

## 2020-03-16 DIAGNOSIS — J30.2 SEASONAL ALLERGIES: ICD-10-CM

## 2020-03-16 DIAGNOSIS — E78.2 MIXED HYPERLIPIDEMIA: ICD-10-CM

## 2020-03-16 DIAGNOSIS — Z76.89 ENCOUNTER TO ESTABLISH CARE: Primary | ICD-10-CM

## 2020-03-16 DIAGNOSIS — J45.30 MILD PERSISTENT ASTHMA WITHOUT COMPLICATION: ICD-10-CM

## 2020-03-16 DIAGNOSIS — E66.01 MORBID OBESITY (HCC): ICD-10-CM

## 2020-03-16 DIAGNOSIS — I10 ESSENTIAL HYPERTENSION: ICD-10-CM

## 2020-03-16 DIAGNOSIS — K21.9 GASTROESOPHAGEAL REFLUX DISEASE WITHOUT ESOPHAGITIS: ICD-10-CM

## 2020-03-16 DIAGNOSIS — G89.4 CHRONIC PAIN DISORDER: ICD-10-CM

## 2020-03-16 DIAGNOSIS — E55.9 VITAMIN D DEFICIENCY: ICD-10-CM

## 2020-03-16 DIAGNOSIS — E11.65 TYPE 2 DIABETES MELLITUS WITH HYPERGLYCEMIA, WITHOUT LONG-TERM CURRENT USE OF INSULIN (HCC): ICD-10-CM

## 2020-03-16 DIAGNOSIS — E28.2 POLYCYSTIC OVARIES: ICD-10-CM

## 2020-03-16 DIAGNOSIS — Z20.2 STD EXPOSURE: ICD-10-CM

## 2020-03-16 DIAGNOSIS — Z00.00 PHYSICAL EXAM, ANNUAL: ICD-10-CM

## 2020-03-16 PROBLEM — M16.9 OSTEOARTHRITIS OF HIP: Status: ACTIVE | Noted: 2019-06-12

## 2020-03-16 PROBLEM — M54.50 LOW BACK PAIN: Status: ACTIVE | Noted: 2019-04-19

## 2020-03-16 PROBLEM — M47.816 LUMBAR SPONDYLOSIS: Status: ACTIVE | Noted: 2019-06-12

## 2020-03-16 PROBLEM — M51.369 DEGENERATION OF LUMBAR INTERVERTEBRAL DISC: Status: ACTIVE | Noted: 2019-06-12

## 2020-03-16 PROBLEM — M51.36 DEGENERATION OF LUMBAR INTERVERTEBRAL DISC: Status: ACTIVE | Noted: 2019-06-12

## 2020-03-16 PROBLEM — M53.3 PAIN IN THE COCCYX: Status: ACTIVE | Noted: 2020-03-16

## 2020-03-16 PROCEDURE — 99214 OFFICE O/P EST MOD 30 MIN: CPT | Performed by: PHYSICIAN ASSISTANT

## 2020-03-16 RX ORDER — FLUCONAZOLE 200 MG/1
200 TABLET ORAL 2 TIMES DAILY
COMMUNITY
End: 2020-06-12 | Stop reason: SDUPTHER

## 2020-03-16 RX ORDER — FUROSEMIDE 20 MG/1
20 TABLET ORAL DAILY
COMMUNITY
End: 2020-06-12 | Stop reason: SDUPTHER

## 2020-03-16 RX ORDER — GLIPIZIDE 10 MG/1
10 TABLET ORAL 2 TIMES DAILY
COMMUNITY
Start: 2020-03-03 | End: 2020-06-12 | Stop reason: SDUPTHER

## 2020-03-16 RX ORDER — METFORMIN HYDROCHLORIDE 500 MG/1
500 TABLET, EXTENDED RELEASE ORAL DAILY
COMMUNITY
Start: 2020-03-05 | End: 2020-06-12

## 2020-03-16 NOTE — PROGRESS NOTES
Chief Complaint   Patient presents with   • Establish Care       HPI     Frances Hartman is a 34 y.o. female who presents to establish care.  Patient previously seen at [facility], not happy with provider and is switching to [facility].  Has multiple issues and complaints today.  Followed by pain management, gynecology, and cardiology.  Reports chest pressure and shortness of breath.  Seen by cardiology 4 days ago, has holter monitor on and is pending further tests.  Compliant on carvedilol 25 mg QAM and 12.5 mg QPM.  Taking lasix 20 mg QD and spironolactone 100 mg QD.  Blood pressure is stable and well-controlled.  Spironolactone and metformin initially started for PCOS.  On multiple pain medications for chronic pain especially in low back.  Reports worsening sugar values with recent back injection.  Compliant on glipizide 10 mg BID and metformin 500 mg QD.      On fluconazole 200 mg BID which PCP was managing, originally started by nfectious disease for rash on face, well-controlled with medication currently.      Chief Complaint   Patient presents with   • Establish Care       Past Medical History:   Diagnosis Date   • Asthma    • Back pain    • Diabetes mellitus (CMS/HCC)    • Foot pain    • History of fracture    • History of gallstones    • Hx of radiation therapy     for treatments of Keloids   • Hypertension    • Left lower quadrant pain     h/o   • Migraines    • Seasonal allergies    • UTI (urinary tract infection)     H/o       Past Surgical History:   Procedure Laterality Date   • CHOLECYSTECTOMY     • HERNIA REPAIR     • KELOID EXCISION     • OTHER SURGICAL HISTORY      Excision of chest wall,shoulder, and trunk   • OTHER SURGICAL HISTORY      in grown toe nail   • OTHER SURGICAL HISTORY  2019    had a spot removed of her face    • RADIOFREQUENCY ABLATION     • RADIOFREQUENCY ABLATION  2019    total of 2 ablations   • WISDOM TOOTH EXTRACTION         Family History   Problem Relation Age of  Onset   • Arthritis Mother    • Diabetes Mother    • Obesity Mother    • Arrhythmia Mother    • Dementia Father    • Heart attack Father    • Stroke Other         CVA   • Ovarian cancer Maternal Aunt         40's   • Breast cancer Paternal Aunt         30's       Social History     Socioeconomic History   • Marital status: Single     Spouse name: Not on file   • Number of children: Not on file   • Years of education: Not on file   • Highest education level: Not on file   Tobacco Use   • Smoking status: Never Smoker   • Smokeless tobacco: Never Used   Substance and Sexual Activity   • Alcohol use: Yes     Frequency: Monthly or less     Comment: RARE   • Drug use: No   • Sexual activity: Yes     Partners: Male     Birth control/protection: Condom   Social History Narrative    Caffeine coffee,soda ocassionally       Allergies   Allergen Reactions   • Hydrochlorothiazide Swelling   • Aspartame Other (See Comments)   • Augmentin [Amoxicillin-Pot Clavulanate]    • Codeine Other (See Comments)     Syncope, can take amoxicillin   • Diclofenac Other (See Comments)   • Doxycycline Other (See Comments)     Hallucinations, rash   • Eggs Or Egg-Derived Products Other (See Comments)     unsure   • Monosodium Glutamate Other (See Comments)   • Naproxen        ROS    Review of Systems   Constitutional: Positive for fatigue. Negative for chills, diaphoresis and fever.   HENT: Negative for congestion, postnasal drip and rhinorrhea.    Respiratory: Positive for chest tightness and shortness of breath. Negative for cough and wheezing.    Cardiovascular: Positive for palpitations. Negative for chest pain and leg swelling.   Neurological: Negative for dizziness and headache.   Psychiatric/Behavioral: Positive for stress. Negative for self-injury, sleep disturbance, suicidal ideas and depressed mood. The patient is not nervous/anxious.        Vitals:    03/16/20 1035   BP: 126/84   Pulse: 74   SpO2: 98%   Weight: 118 kg (259 lb 3.2 oz)  "  Height: 152.4 cm (60\")     Body mass index is 50.62 kg/m².    Current Outpatient Medications on File Prior to Visit   Medication Sig Dispense Refill   • fluconazole (DIFLUCAN) 200 MG tablet Take 200 mg by mouth 2 (Two) Times a Day.     • furosemide (LASIX) 20 MG tablet Take 20 mg by mouth Daily.     • Acetaminophen (TYLENOL ARTHRITIS PAIN PO) Take  by mouth As Needed.     • albuterol (PROVENTIL HFA;VENTOLIN HFA) 108 (90 BASE) MCG/ACT inhaler Inhale 2 puffs Every 4 (Four) Hours As Needed for wheezing.     • carvedilol (COREG) 12.5 MG tablet Take 1 tablet by mouth Every Evening. 90 tablet 3   • carvedilol (COREG) 25 MG tablet Take 1 tablet by mouth Every Morning. 90 tablet 3   • CloNIDine (CATAPRES) 0.2 MG tablet Take 1 tablet by mouth 2 (Two) Times a Day. (Patient taking differently: Take 0.2 mg by mouth As Needed.) 60 tablet 5   • cyclobenzaprine (FLEXERIL) 10 MG tablet Take 10 mg by mouth 3 (Three) Times a Day As Needed for muscle spasms.     • diphenhydrAMINE (BENADRYL) 25 mg capsule Take 25 mg by mouth Every 6 (Six) Hours As Needed for itching.     • gabapentin (NEURONTIN) 300 MG capsule Take 300 mg by mouth 3 (Three) Times a Day As Needed.     • glipizide (GLUCOTROL) 10 MG tablet Take 10 mg by mouth 2 (Two) Times a Day.     • HYDROcodone-acetaminophen (NORCO) 5-325 MG per tablet Take 1-2 tablets by mouth Every 6 (Six) Hours As Needed for Severe Pain . 15 tablet 0   • ibuprofen (ADVIL,MOTRIN) 600 MG tablet As Needed.     • loratadine (CLARITIN) 10 MG tablet Take 10 mg by mouth Daily.     • metFORMIN ER (GLUCOPHAGE-XR) 500 MG 24 hr tablet Take 500 mg by mouth Daily.     • norethindrone (AYGESTIN) 5 MG tablet Daily.     • OnabotulinumtoxinA 200 units reconstituted solution Inject 200 units IM into head neck shoulders every 12 weeks per FDA protocol. Dx G43.709 1 each 3   • oxyCODONE (ROXICODONE) 5 MG immediate release tablet      • spironolactone (ALDACTONE) 100 MG tablet Take 100 mg by mouth. Takes 30 days on,  " off 30 days.     • traMADol (ULTRAM) 50 MG tablet Take 50 mg by mouth Every 6 (Six) Hours As Needed for moderate pain (4-6).     • [DISCONTINUED] albuterol (ACCUNEB) 1.25 MG/3ML nebulizer solution Take 6 mL by nebulization Every 6 (Six) Hours As Needed for Wheezing. 60 vial 0   • [DISCONTINUED] cephalexin (KEFLEX) 500 MG capsule Take 1 capsule by mouth 3 (Three) Times a Day. 30 capsule 0   • [DISCONTINUED] fluconazole (DIFLUCAN) 200 MG tablet Take 200 mg by mouth 2 (Two) Times a Day.     • [DISCONTINUED] furosemide (LASIX) 40 MG tablet Take 40 mg by mouth 1 (One) Time As Needed (as needed every other day).     • [DISCONTINUED] glipiZIDE (GLUCOTROL) 5 MG tablet Take 5 mg by mouth 2 (Two) Times a Day Before Meals.     • [DISCONTINUED] metFORMIN (GLUCOPHAGE) 500 MG tablet Take 500 mg by mouth Daily.     • [DISCONTINUED] promethazine-dextromethorphan (PROMETHAZINE-DM) 6.25-15 MG/5ML syrup Take 5 ml QID PRN cough/congestion 180 mL 0     No current facility-administered medications on file prior to visit.        Results for orders placed or performed during the hospital encounter of 12/19/19   POCT Rapid Strep A   Result Value Ref Range    Rapid Strep A Screen Negative Negative, VALID, INVALID, Not Performed    Internal Control Passed Passed    Lot Number [id number]     Expiration Date 4/29/2022    POCT Influenza A/B   Result Value Ref Range    Rapid Influenza A Ag Negative Negative    Rapid Influenza B Ag Negative Negative    Internal Control Passed Passed    Lot Number [id number]     Expiration Date 12/11/2021        PE  Physical Exam   Constitutional: She appears well-developed and well-nourished. She is active and cooperative. She does not appear ill. No distress. She is morbidly obese.  HENT:   Head: Normocephalic and atraumatic.   Eyes: EOM are normal.   Neck: Normal range of motion.   Cardiovascular: Normal rate, regular rhythm and normal heart sounds.   Pulmonary/Chest: Effort normal and breath sounds normal.    Musculoskeletal: Normal range of motion. She exhibits no edema.   Neurological: She is alert.   Skin: Skin is warm. She is not diaphoretic. No erythema.   Psychiatric: She has a normal mood and affect. Her speech is normal and behavior is normal. Judgment and thought content normal. She is not actively hallucinating. She is attentive.   Vitals reviewed.      LUCINDA/P    Frances was seen today for establish care.    Diagnoses and all orders for this visit:    Encounter to establish care    Morbid obesity (CMS/[facility])  Referred to bariatric surgery, pending appointment.    Mild persistent asthma without complication  Using albuterol prn.  Not on any daily inhalers, did not tolerate dry powder daily well.  Has not tried spiriva previously, declines medication today.    Type 2 diabetes mellitus with hyperglycemia, without long-term current use of insulin (CMS/[facility])  -     Vitamin B12; Future  -     Microalbumin / Creatinine Urine Ratio - Urine, Clean Catch; Future  Repeat hemoglobin AIC in 1 month.  Had AIC completed recently with mild elevation.  Compliant on glipizide 10 mg BID and metformin 500 mg QD.    Mixed hyperlipidemia  -     Comprehensive Metabolic Panel; Future  -     Lipid Panel; Future  Not on any medications currently, will order lipid panel.    Essential hypertension  Compliant on medications.  Followed by cardiology, seen 4 days ago.  Pending tests for chest pressure and shortness of breath, possible palpitations.  Wearing holter monitor.  Stable, well-controlled.    Gastroesophageal reflux disease without esophagitis  On omeprazole as needed.  No significant issues currently.    Seasonal allergies  Compliant on anti-histamine.    Chronic pain disorder  Followed by pain management.  On multiple controlled substances.    Polycystic ovaries  Followed by gynecology.  On metformin and spironolactone.    Physical exam, annual  -     CBC Auto Differential; Future  -     Comprehensive Metabolic Panel; Future  -      TSH Rfx On Abnormal To Free T4; Future  -     Lipid Panel; Future  -     Vitamin B12; Future  -     Microalbumin / Creatinine Urine Ratio - Urine, Clean Catch; Future  Return in 4 weeks for APE.  Obtain labs prior.    Vitamin D deficiency  -     Vitamin D 25 Hydroxy; Future    STD exposure  Reports recent exposure.  Requests testing.  No vaginal symptoms.  Will order vaginal swab at next visit or go to gynecology.  -     Hepatitis C Antibody; Future  -     HIV-1 / O / 2 Ag / Antibody 4th Generation; Future        -     RPR; Future     Plan of care reviewed with patient at the conclusion of today's visit. Education was provided regarding diagnosis, management and any prescribed or recommended OTC medications.  Patient verbalizes understanding of and agreement with management plan.    Return in about 4 weeks (around 4/13/2020) for Annual physical.     Lesli Ramirez PA-C

## 2020-03-30 ENCOUNTER — TELEPHONE (OUTPATIENT)
Dept: FAMILY MEDICINE CLINIC | Facility: CLINIC | Age: 35
End: 2020-03-30

## 2020-04-03 ENCOUNTER — E-VISIT (OUTPATIENT)
Dept: FAMILY MEDICINE CLINIC | Facility: CLINIC | Age: 35
End: 2020-04-03

## 2020-04-03 ENCOUNTER — NURSE TRIAGE (OUTPATIENT)
Dept: CALL CENTER | Facility: HOSPITAL | Age: 35
End: 2020-04-03

## 2020-04-03 DIAGNOSIS — J45.901 MILD ASTHMA WITH EXACERBATION, UNSPECIFIED WHETHER PERSISTENT: ICD-10-CM

## 2020-04-03 DIAGNOSIS — J01.00 ACUTE NON-RECURRENT MAXILLARY SINUSITIS: Primary | ICD-10-CM

## 2020-04-03 PROCEDURE — 99422 OL DIG E/M SVC 11-20 MIN: CPT | Performed by: INTERNAL MEDICINE

## 2020-04-03 NOTE — PROGRESS NOTES
Cough, shortness of breath, low-grade fever      HPI:  Frances Hartman is a 34 y.o. female who presents today for cough, shortness of breath and low-grade fever for the last 5 days.  She typically has allergies during this time of the year although with current coronavirus situation she is concerned.  She is currently not working at this time and quarantine at home.  Visit was completed via telephone to get more information.  No obvious coronavirus contacts.    ROS:  Constitutional: no fevers, night sweats or unexplained weight loss  Eyes: no vision changes  ENT: no runny nose, ear pain, sore throat  Cardio: no chest pain, palpitations  Pulm: no shortness of breath, wheezing, or cough  GI: no abdominal pain or changes in bowel movements  : no difficulty urinating  MSK: no difficulty ambulating, no joint pain  Neuro: no weakness, dizziness or headache  Psych: no trouble sleeping  Endo: no change in appetite      Past Medical History:   Diagnosis Date   • Asthma    • Back pain    • Diabetes mellitus (CMS/HCC)    • Foot pain    • History of fracture    • History of gallstones    • Hx of radiation therapy     for treatments of Keloids   • Hypertension    • Left lower quadrant pain     h/o   • Migraines    • Seasonal allergies    • UTI (urinary tract infection)     H/o      Family History   Problem Relation Age of Onset   • Arthritis Mother    • Diabetes Mother    • Obesity Mother    • Arrhythmia Mother    • Dementia Father    • Heart attack Father    • Stroke Other         CVA   • Ovarian cancer Maternal Aunt         40's   • Breast cancer Paternal Aunt         30's      Social History     Socioeconomic History   • Marital status: Single     Spouse name: Not on file   • Number of children: Not on file   • Years of education: Not on file   • Highest education level: Not on file   Tobacco Use   • Smoking status: Never Smoker   • Smokeless tobacco: Never Used   Substance and Sexual Activity   • Alcohol use: Yes      Frequency: Monthly or less     Comment: RARE   • Drug use: No   • Sexual activity: Yes     Partners: Male     Birth control/protection: Condom   Social History Narrative    Caffeine coffee,soda ocassionally      Allergies   Allergen Reactions   • Hydrochlorothiazide Swelling   • Aspartame Other (See Comments)   • Augmentin [Amoxicillin-Pot Clavulanate]    • Codeine Other (See Comments)     Syncope, can take amoxicillin   • Diclofenac Other (See Comments)   • Doxycycline Other (See Comments)     Hallucinations, rash   • Eggs Or Egg-Derived Products Other (See Comments)     unsure   • Monosodium Glutamate Other (See Comments)   • Naproxen       Immunization History   Administered Date(s) Administered   • Hepatitis A 01/15/2019   • Tdap 01/15/2019        PE:  There were no vitals filed for this visit.   There is no height or weight on file to calculate BMI.        Current Outpatient Medications   Medication Sig Dispense Refill   • Acetaminophen (TYLENOL ARTHRITIS PAIN PO) Take  by mouth As Needed.     • albuterol (PROVENTIL HFA;VENTOLIN HFA) 108 (90 BASE) MCG/ACT inhaler Inhale 2 puffs Every 4 (Four) Hours As Needed for wheezing.     • carvedilol (COREG) 12.5 MG tablet Take 1 tablet by mouth Every Evening. 90 tablet 3   • carvedilol (COREG) 25 MG tablet Take 1 tablet by mouth Every Morning. 90 tablet 3   • CloNIDine (CATAPRES) 0.2 MG tablet Take 1 tablet by mouth 2 (Two) Times a Day. (Patient taking differently: Take 0.2 mg by mouth As Needed.) 60 tablet 5   • cyclobenzaprine (FLEXERIL) 10 MG tablet Take 10 mg by mouth 3 (Three) Times a Day As Needed for muscle spasms.     • diphenhydrAMINE (BENADRYL) 25 mg capsule Take 25 mg by mouth Every 6 (Six) Hours As Needed for itching.     • fluconazole (DIFLUCAN) 200 MG tablet Take 200 mg by mouth 2 (Two) Times a Day.     • furosemide (LASIX) 20 MG tablet Take 20 mg by mouth Daily.     • gabapentin (NEURONTIN) 300 MG capsule Take 300 mg by mouth 3 (Three) Times a Day As  Needed.     • glipizide (GLUCOTROL) 10 MG tablet Take 10 mg by mouth 2 (Two) Times a Day.     • HYDROcodone-acetaminophen (NORCO) 5-325 MG per tablet Take 1-2 tablets by mouth Every 6 (Six) Hours As Needed for Severe Pain . 15 tablet 0   • ibuprofen (ADVIL,MOTRIN) 600 MG tablet As Needed.     • loratadine (CLARITIN) 10 MG tablet Take 10 mg by mouth Daily.     • metFORMIN ER (GLUCOPHAGE-XR) 500 MG 24 hr tablet Take 500 mg by mouth Daily.     • norethindrone (AYGESTIN) 5 MG tablet Daily.     • OnabotulinumtoxinA 200 units reconstituted solution Inject 200 units IM into head neck shoulders every 12 weeks per FDA protocol. Dx G43.709 1 each 3   • oxyCODONE (ROXICODONE) 5 MG immediate release tablet      • spironolactone (ALDACTONE) 100 MG tablet Take 100 mg by mouth. Takes 30 days on,  off 30 days.     • traMADol (ULTRAM) 50 MG tablet Take 50 mg by mouth Every 6 (Six) Hours As Needed for moderate pain (4-6).       No current facility-administered medications for this visit.       Several antibiotic allergies.  Recommend azithromycin for atypical coverage.  Continue to quarantine for 14 days total.  Call return to clinic if no improvement over the next 7 days.  Diagnoses and all orders for this visit:  11 minutes was spent discussing patient on phone  Acute non-recurrent maxillary sinusitis  -     QUESTIONNAIRE SERIES    Mild asthma with exacerbation, unspecified whether persistent  -     QUESTIONNAIRE SERIES         No follow-ups on file.     Please note that portions of this document were completed with a voice recognition program. Efforts were made to edit the dictations, but occasionally words are mis-transcribed.

## 2020-04-03 NOTE — TELEPHONE ENCOUNTER
"    Reason for Disposition  • Cough present > 3 weeks    Additional Information  • Negative: SEVERE difficulty breathing (e.g., struggling for each breath, speaks in single words)  • Negative: Difficult to awaken or acting confused (e.g., disoriented, slurred speech)  • Negative: Bluish (or gray) lips or face now  • Negative: Shock suspected (e.g., cold/pale/clammy skin, too weak to stand, low BP, rapid pulse)  • Negative: Sounds like a life-threatening emergency to the triager  • Negative: [1] COVID-19 suspected (e.g., cough, fever, shortness of breath) AND [2] public health department recommends testing  • Negative: [1] COVID-19 exposure AND [2] no symptoms  • Negative: COVID-19 and Breastfeeding, questions about  • Negative: SEVERE or constant chest pain (Exception: mild central chest pain, present only when coughing)  • Negative: MODERATE difficulty breathing (e.g., speaks in phrases, SOB even at rest, pulse 100-120)  • Negative: Patient sounds very sick or weak to the triager  • Negative: MILD difficulty breathing (e.g., minimal/no SOB at rest, SOB with walking, pulse <100)  • Negative: Chest pain  • Negative: Fever > 103 F (39.4 C)  • Negative: [1] Fever > 101 F (38.3 C) AND [2] age > 60  • Negative: [1] Fever > 100.0 F (37.8 C) AND [2] bedridden (e.g., nursing home patient, CVA, chronic illness, recovering from surgery)  • Negative: HIGH RISK patient (e.g., age > 64 years, diabetes, heart or lung disease, weak immune system)  • Negative: Fever present > 3 days (72 hours)  • Negative: [1] Fever returns after gone for over 24 hours AND [2] symptoms worse or not improved  • Negative: [1] Continuous (nonstop) coughing interferes with work or school AND [2] no improvement using cough treatment per protocol    Answer Assessment - Initial Assessment Questions  1. COVID-19 DIAGNOSIS: \"Who made your Coronavirus (COVID-19) diagnosis?\" \"Was it confirmed by a positive lab test?\" If not diagnosed by a HCP, ask \"Are there " "lots of cases (community spread) where you live?\" (See public health department website, if unsure)    * MAJOR community spread: high number of cases; numbers of cases are increasing; many people hospitalized.    * MINOR community spread: low number of cases; not increasing; few or no people hospitalized      n/a  2. ONSET: \"When did the COVID-19 symptoms start?\"       1 week ago  3. WORST SYMPTOM: \"What is your worst symptom?\" (e.g., cough, fever, shortness of breath, muscle aches)      Productive cough  4. COUGH: \"How bad is the cough?\"        Not too bad  5. FEVER: \"Do you have a fever?\" If so, ask: \"What is your temperature, how was it measured, and when did it start?\"      No fever, 98.5 today  6. RESPIRATORY STATUS: \"Describe your breathing?\" (e.g., shortness of breath, wheezing, unable to speak)       A little soa with coughing  7. BETTER-SAME-WORSE: \"Are you getting better, staying the same or getting worse compared to yesterday?\"  If getting worse, ask, \"In what way?\"      same  8. HIGH RISK DISEASE: \"Do you have any chronic medical problems?\" (e.g., asthma, heart or lung disease, weak immune system, etc.)      Asthma, allergies  9. PREGNANCY: \"Is there any chance you are pregnant?\" \"When was your last menstrual period?\"      n/a  10. OTHER SYMPTOMS: \"Do you have any other symptoms?\"  (e.g., runny nose, headache, sore throat, loss of smell)        no    Protocols used: CORONAVIRUS (COVID-19) DIAGNOSED OR SUSPECTED-ADULT-AH      "

## 2020-04-06 ENCOUNTER — TELEPHONE (OUTPATIENT)
Dept: FAMILY MEDICINE CLINIC | Facility: CLINIC | Age: 35
End: 2020-04-06

## 2020-04-06 DIAGNOSIS — J45.901 MILD ASTHMA WITH EXACERBATION, UNSPECIFIED WHETHER PERSISTENT: Primary | ICD-10-CM

## 2020-04-06 RX ORDER — AZITHROMYCIN 250 MG/1
TABLET, FILM COATED ORAL
Qty: 6 TABLET | Refills: 0 | OUTPATIENT
Start: 2020-04-06 | End: 2020-05-15

## 2020-05-15 ENCOUNTER — HOSPITAL ENCOUNTER (OUTPATIENT)
Dept: MAMMOGRAPHY | Facility: HOSPITAL | Age: 35
Discharge: HOME | End: 2020-05-15
Admitting: NURSE PRACTITIONER

## 2020-05-15 DIAGNOSIS — R92.8 ABNORMAL MAMMOGRAM: ICD-10-CM

## 2020-05-15 PROCEDURE — 77066 DX MAMMO INCL CAD BI: CPT | Performed by: RADIOLOGY

## 2020-05-15 PROCEDURE — U0003 INFECTIOUS AGENT DETECTION BY NUCLEIC ACID (DNA OR RNA); SEVERE ACUTE RESPIRATORY SYNDROME CORONAVIRUS 2 (SARS-COV-2) (CORONAVIRUS DISEASE [COVID-19]), AMPLIFIED PROBE TECHNIQUE, MAKING USE OF HIGH THROUGHPUT TECHNOLOGIES AS DESCRIBED BY CMS-2020-01-R: HCPCS | Performed by: FAMILY MEDICINE

## 2020-05-15 PROCEDURE — 77066 DX MAMMO INCL CAD BI: CPT

## 2020-05-18 ENCOUNTER — TELEPHONE (OUTPATIENT)
Dept: URGENT CARE | Facility: CLINIC | Age: 35
End: 2020-05-18

## 2020-05-19 ENCOUNTER — TELEPHONE (OUTPATIENT)
Dept: FAMILY MEDICINE CLINIC | Facility: CLINIC | Age: 35
End: 2020-05-19

## 2020-05-19 NOTE — TELEPHONE ENCOUNTER
PT IS REQUESTING A REFILL FOR:  [facility] TESTING STRIPS    PT STATED SHE IS ALMOST OUT    [address] - [phone]  - [phone] FX     PT CONTACT NUMBER: [phone]

## 2020-06-01 ENCOUNTER — TELEPHONE (OUTPATIENT)
Dept: FAMILY MEDICINE CLINIC | Facility: CLINIC | Age: 35
End: 2020-06-01

## 2020-06-01 NOTE — TELEPHONE ENCOUNTER
Patient states that she would like to have labs done tomorrow for her appt on 6/12.  She can be reached at [phone]

## 2020-06-02 NOTE — TELEPHONE ENCOUNTER
There are lab orders already in the chart.  She needs to fast for at least 8 hours but okay to drink water or black coffee and take medications.

## 2020-06-04 ENCOUNTER — LAB (OUTPATIENT)
Dept: LAB | Facility: HOSPITAL | Age: 35
End: 2020-06-04

## 2020-06-04 ENCOUNTER — PROCEDURE VISIT (OUTPATIENT)
Dept: NEUROLOGY | Facility: CLINIC | Age: 35
End: 2020-06-04

## 2020-06-04 DIAGNOSIS — Z00.00 PHYSICAL EXAM, ANNUAL: ICD-10-CM

## 2020-06-04 DIAGNOSIS — E11.65 TYPE 2 DIABETES MELLITUS WITH HYPERGLYCEMIA, WITHOUT LONG-TERM CURRENT USE OF INSULIN (HCC): ICD-10-CM

## 2020-06-04 DIAGNOSIS — Z20.2 STD EXPOSURE: ICD-10-CM

## 2020-06-04 DIAGNOSIS — E78.2 MIXED HYPERLIPIDEMIA: ICD-10-CM

## 2020-06-04 DIAGNOSIS — E55.9 VITAMIN D DEFICIENCY: ICD-10-CM

## 2020-06-04 DIAGNOSIS — G43.711 CHRONIC MIGRAINE WITHOUT AURA, WITH INTRACTABLE MIGRAINE, SO STATED, WITH STATUS MIGRAINOSUS: Primary | ICD-10-CM

## 2020-06-04 PROCEDURE — 82607 VITAMIN B-12: CPT

## 2020-06-04 PROCEDURE — 84443 ASSAY THYROID STIM HORMONE: CPT

## 2020-06-04 PROCEDURE — 86803 HEPATITIS C AB TEST: CPT

## 2020-06-04 PROCEDURE — 86592 SYPHILIS TEST NON-TREP QUAL: CPT

## 2020-06-04 PROCEDURE — 82570 ASSAY OF URINE CREATININE: CPT

## 2020-06-04 PROCEDURE — 82306 VITAMIN D 25 HYDROXY: CPT

## 2020-06-04 PROCEDURE — 82043 UR ALBUMIN QUANTITATIVE: CPT

## 2020-06-04 PROCEDURE — 64615 CHEMODENERV MUSC MIGRAINE: CPT | Performed by: NURSE PRACTITIONER

## 2020-06-04 PROCEDURE — 85025 COMPLETE CBC W/AUTO DIFF WBC: CPT

## 2020-06-04 PROCEDURE — 80061 LIPID PANEL: CPT

## 2020-06-04 PROCEDURE — G0432 EIA HIV-1/HIV-2 SCREEN: HCPCS

## 2020-06-04 PROCEDURE — 80053 COMPREHEN METABOLIC PANEL: CPT

## 2020-06-04 NOTE — PROGRESS NOTES
Botulinum Toxin Injection Procedure    History:  Response to treatment has reduced HA's at least 7 fewer days a month and/or 100 hours fewer each month.     Pre-operative diagnosis: headache    Post-operative diagnosis: Same    Procedure: Chemical neurolysis    After risks and benefits were explained including bleeding, infection, worsening of pain, damage to the areas being injected, weakness of muscles, loss of muscle control, dysphagia if injecting the head or neck, facial droop if injecting the facial area, painful injection, allergic or other reaction to the medications being injected, and the failure of the procedure to help the problem, a signed consent was obtained.      The patient was placed in a comfortable area and the sites to be treated were identified. A time out was called and performed. The area to be treated was prepped three times with alcohol and the alcohol allowed to dry. Next, a 30 gauge needle was used to inject the medication in the area to be treated.    Area(s) injected: frontalis muscle, semispinalis capitis muscle and temporallis muscle    Medications used: botulinum toxin 200 mu, 2 mL of saline used to dilute the botulinum toxin.      The patient did tolerate the procedure well. There were no complications.       Physical Examination    Current Treatment (Units)   Splenius Capitis 25 units on the right and 25 units on the left.   Temporalis 25 units on the right and 25 units on the left.   Frontalis 27 units on the right and 28 units on the left.   EMG Guidance none .   Complications: none .   The total amount injected in units is 155 .   The total amount wasted in units is 45 .   The total amount submitted in units is 200 .   Botox was supplied by the patient.

## 2020-06-05 LAB
25(OH)D3 SERPL-MCNC: 26.3 NG/ML (ref 30–100)
ALBUMIN SERPL-MCNC: 4.2 G/DL (ref 3.5–5.2)
ALBUMIN UR-MCNC: 3.9 MG/DL
ALBUMIN/GLOB SERPL: 1.4 G/DL
ALP SERPL-CCNC: 85 U/L (ref 39–117)
ALT SERPL W P-5'-P-CCNC: 21 U/L (ref 1–33)
ANION GAP SERPL CALCULATED.3IONS-SCNC: 15.5 MMOL/L (ref 5–15)
AST SERPL-CCNC: 27 U/L (ref 1–32)
BASOPHILS # BLD AUTO: 0.04 10*3/MM3 (ref 0–0.2)
BASOPHILS NFR BLD AUTO: 0.4 % (ref 0–1.5)
BILIRUB SERPL-MCNC: 0.3 MG/DL (ref 0.2–1.2)
BUN BLD-MCNC: 5 MG/DL (ref 6–20)
BUN/CREAT SERPL: 6.6 (ref 7–25)
CALCIUM SPEC-SCNC: 9.4 MG/DL (ref 8.6–10.5)
CHLORIDE SERPL-SCNC: 101 MMOL/L (ref 98–107)
CHOLEST SERPL-MCNC: 194 MG/DL (ref 0–200)
CO2 SERPL-SCNC: 19.5 MMOL/L (ref 22–29)
CREAT BLD-MCNC: 0.76 MG/DL (ref 0.57–1)
CREAT UR-MCNC: 381 MG/DL
DEPRECATED RDW RBC AUTO: 45.4 FL (ref 37–54)
EOSINOPHIL # BLD AUTO: 0.22 10*3/MM3 (ref 0–0.4)
EOSINOPHIL NFR BLD AUTO: 2.2 % (ref 0.3–6.2)
ERYTHROCYTE [DISTWIDTH] IN BLOOD BY AUTOMATED COUNT: 12.8 % (ref 12.3–15.4)
GFR SERPL CREATININE-BSD FRML MDRD: 104 ML/MIN/1.73
GLOBULIN UR ELPH-MCNC: 3 GM/DL
GLUCOSE BLD-MCNC: 160 MG/DL (ref 65–99)
HCT VFR BLD AUTO: 35.4 % (ref 34–46.6)
HCV AB SER DONR QL: NORMAL
HDLC SERPL-MCNC: 30 MG/DL (ref 40–60)
HGB BLD-MCNC: 11.7 G/DL (ref 12–15.9)
HIV1+2 AB SER QL: NORMAL
IMM GRANULOCYTES # BLD AUTO: 0.03 10*3/MM3 (ref 0–0.05)
IMM GRANULOCYTES NFR BLD AUTO: 0.3 % (ref 0–0.5)
LDLC SERPL CALC-MCNC: 121 MG/DL (ref 0–100)
LDLC/HDLC SERPL: 4.04 {RATIO}
LYMPHOCYTES # BLD AUTO: 2.63 10*3/MM3 (ref 0.7–3.1)
LYMPHOCYTES NFR BLD AUTO: 26.4 % (ref 19.6–45.3)
MCH RBC QN AUTO: 32 PG (ref 26.6–33)
MCHC RBC AUTO-ENTMCNC: 33.1 G/DL (ref 31.5–35.7)
MCV RBC AUTO: 96.7 FL (ref 79–97)
MICROALBUMIN/CREAT UR: 10.2 MG/G
MONOCYTES # BLD AUTO: 0.49 10*3/MM3 (ref 0.1–0.9)
MONOCYTES NFR BLD AUTO: 4.9 % (ref 5–12)
NEUTROPHILS # BLD AUTO: 6.55 10*3/MM3 (ref 1.7–7)
NEUTROPHILS NFR BLD AUTO: 65.8 % (ref 42.7–76)
NRBC BLD AUTO-RTO: 0 /100 WBC (ref 0–0.2)
PLATELET # BLD AUTO: 224 10*3/MM3 (ref 140–450)
PMV BLD AUTO: 12.6 FL (ref 6–12)
POTASSIUM BLD-SCNC: 3.9 MMOL/L (ref 3.5–5.2)
PROT SERPL-MCNC: 7.2 G/DL (ref 6–8.5)
RBC # BLD AUTO: 3.66 10*6/MM3 (ref 3.77–5.28)
RPR SER QL: NORMAL
SODIUM BLD-SCNC: 136 MMOL/L (ref 136–145)
TRIGL SERPL-MCNC: 214 MG/DL (ref 0–150)
TSH SERPL DL<=0.05 MIU/L-ACNC: 4.05 UIU/ML (ref 0.27–4.2)
VIT B12 BLD-MCNC: 456 PG/ML (ref 211–946)
VLDLC SERPL-MCNC: 42.8 MG/DL (ref 5–40)
WBC NRBC COR # BLD: 9.96 10*3/MM3 (ref 3.4–10.8)

## 2020-06-12 ENCOUNTER — OFFICE VISIT (OUTPATIENT)
Dept: FAMILY MEDICINE CLINIC | Facility: CLINIC | Age: 35
End: 2020-06-12

## 2020-06-12 VITALS
BODY MASS INDEX: 50.65 KG/M2 | DIASTOLIC BLOOD PRESSURE: 82 MMHG | WEIGHT: 258 LBS | HEIGHT: 60 IN | SYSTOLIC BLOOD PRESSURE: 130 MMHG

## 2020-06-12 DIAGNOSIS — Z00.00 PHYSICAL EXAM, ANNUAL: Primary | ICD-10-CM

## 2020-06-12 DIAGNOSIS — E28.2 POLYCYSTIC OVARIES: ICD-10-CM

## 2020-06-12 DIAGNOSIS — Z23 NEED FOR VACCINATION AGAINST STREPTOCOCCUS PNEUMONIAE: ICD-10-CM

## 2020-06-12 DIAGNOSIS — J30.2 SEASONAL ALLERGIES: ICD-10-CM

## 2020-06-12 DIAGNOSIS — R21 RASH AND NONSPECIFIC SKIN ERUPTION: ICD-10-CM

## 2020-06-12 DIAGNOSIS — E11.65 TYPE 2 DIABETES MELLITUS WITH HYPERGLYCEMIA, WITHOUT LONG-TERM CURRENT USE OF INSULIN (HCC): ICD-10-CM

## 2020-06-12 DIAGNOSIS — E66.01 OBESITY, MORBID (MORE THAN 100 LBS OVER IDEAL WEIGHT OR BMI > 40) (HCC): ICD-10-CM

## 2020-06-12 DIAGNOSIS — I10 ESSENTIAL HYPERTENSION: ICD-10-CM

## 2020-06-12 DIAGNOSIS — G89.29 CHRONIC MIDLINE LOW BACK PAIN WITHOUT SCIATICA: ICD-10-CM

## 2020-06-12 DIAGNOSIS — M54.50 CHRONIC MIDLINE LOW BACK PAIN WITHOUT SCIATICA: ICD-10-CM

## 2020-06-12 DIAGNOSIS — E78.2 MIXED HYPERLIPIDEMIA: ICD-10-CM

## 2020-06-12 LAB — HBA1C MFR BLD: 7.3 %

## 2020-06-12 PROCEDURE — 83036 HEMOGLOBIN GLYCOSYLATED A1C: CPT | Performed by: PHYSICIAN ASSISTANT

## 2020-06-12 PROCEDURE — 90732 PPSV23 VACC 2 YRS+ SUBQ/IM: CPT | Performed by: PHYSICIAN ASSISTANT

## 2020-06-12 PROCEDURE — 99395 PREV VISIT EST AGE 18-39: CPT | Performed by: PHYSICIAN ASSISTANT

## 2020-06-12 PROCEDURE — 90471 IMMUNIZATION ADMIN: CPT | Performed by: PHYSICIAN ASSISTANT

## 2020-06-12 RX ORDER — PRAVASTATIN SODIUM 40 MG
40 TABLET ORAL DAILY
Qty: 30 TABLET | Refills: 5 | Status: SHIPPED | OUTPATIENT
Start: 2020-06-12 | End: 2020-09-21

## 2020-06-12 RX ORDER — FUROSEMIDE 20 MG/1
TABLET ORAL
Qty: 135 TABLET | Refills: 1 | Status: SHIPPED | OUTPATIENT
Start: 2020-06-12 | End: 2021-06-20 | Stop reason: HOSPADM

## 2020-06-12 RX ORDER — GLIPIZIDE 10 MG/1
10 TABLET ORAL 2 TIMES DAILY
Qty: 180 TABLET | Refills: 3 | Status: SHIPPED | OUTPATIENT
Start: 2020-06-12 | End: 2020-09-22 | Stop reason: SDUPTHER

## 2020-06-12 RX ORDER — POTASSIUM CHLORIDE 750 MG/1
10 CAPSULE, EXTENDED RELEASE ORAL AS NEEDED
COMMUNITY
End: 2021-03-24

## 2020-06-12 RX ORDER — GUAIFENESIN 400 MG/1
200 TABLET ORAL AS NEEDED
COMMUNITY
End: 2023-03-09

## 2020-06-12 RX ORDER — FLUCONAZOLE 200 MG/1
200 TABLET ORAL 2 TIMES DAILY
Qty: 180 TABLET | Refills: 3 | Status: SHIPPED | OUTPATIENT
Start: 2020-06-12 | End: 2021-09-02

## 2020-06-12 RX ORDER — LORATADINE 10 MG/1
10 TABLET ORAL DAILY
Qty: 90 TABLET | Refills: 3 | Status: SHIPPED | OUTPATIENT
Start: 2020-06-12 | End: 2021-09-27

## 2020-06-12 NOTE — PATIENT INSTRUCTIONS
"Recommend vitamin D3 5000 units daily.  Nuun tablets    General Health Maintenance:  -Yearly dermatology exam  -Yearly eye exam if you are diabetic, otherwise every 2 years for patients without diabetes  -Dental exams/cleanings at least twice a year  -Staying current on your required colonoscopy, starts at age 50 unless there are other indications prior  -Regular pap smears (at least every 1-3 years until age 65)  -Yearly pelvic and breast exams  -Yearly mammograms starting at age 40    Vaccines:  -Talk to pharmacist about shingrix shot  -Obtain flu shot when available  -Health department is recommending Hepatitis A vaccine    The largest impact you can have on your own health is getting the proper nutrition, exercise and maintaining an overall healthy body weight.  Proper nutrition involves eating lots of vegetables, fruit, minimal lean meat and avoiding processed foods and limiting refined sugars, carbohydrates and starches (\"the white stuff\").  Drinking at least 8 cups of water daily.  Walking at least 30 minutes a day and if possible, increasing this to a more cardiovascular aerobic exercise.  Maintaining a healthy body weight.      If you smoke or use tobacco products, quitting is extremely important and I am happy to discuss options with you regarding how to do this and what's available to assist you.    If you consume alcohol, limit your alcohol intake to 2 drinks a day for men and 1 drink a day for woman.    It is also important to make sure to keep regular appointments and have all general health maintenance items completed in a timely manner.      Thank you for entrusting me with your care.  It is a pleasure and honor to participate in your health.    "

## 2020-06-12 NOTE — PROGRESS NOTES
Patient Care Team:  Lesli Ramirez PA-C as PCP - General (Physician Assistant)  Albert Malone MD as Consulting Physician (Obstetrics and Gynecology)  Julianna Astudillo MD as Consulting Physician (Cardiology)  Paras Rawls MD as Consulting Physician (Anesthesiology)     Chief complaint: Patient is in today for a physical     Barnetta LUCINDA Hartman is a 34 y.o. female who presents for her yearly physical exam.     Patient presents for management of multiple concerns: hypertension, hyperlipidemia, diabetes type 2, obesity, PCOS, chronic low back pain, and rash.  Patient's blood pressure is stable and well-controlled.  She is compliant on all medications.  Patient's recent labs show hyperlipidemia.  She is not taking any medication and is hesitant to take anymore medication.  She is aware she needs to work on weight loss.  She has appointment with bariatric surgery.  She is on metformin and glipizide.  She is followed by gynecology for PCOS management.  She is followed by pain management for her chronic low back pain.  She is taking multiple pain medications which they prescribe.  She is currently on diflucan 200 mg BID for skin rash.  She reports abnormal skin rash that was investigated by ID.  Unknown cause but only diflucan was found to improve it.  If she stops taking the diflucan medication then the rash returns.  She is aware of risk of taking this medication on liver function.  Patient goes to ophthalmologist regularly.       Review of Systems   Constitutional: Positive for fatigue. Negative for chills, diaphoresis and fever.   HENT: Negative for congestion, ear pain, hearing loss, postnasal drip, rhinorrhea and sore throat.    Eyes: Negative for blurred vision and pain.   Respiratory: Negative for cough, shortness of breath and wheezing.    Cardiovascular: Negative for chest pain and leg swelling.   Gastrointestinal: Negative for abdominal pain, blood in stool, constipation, diarrhea, nausea,  vomiting and indigestion.   Endocrine: Negative for polyuria.   Genitourinary: Positive for menstrual problem. Negative for dysuria, flank pain and hematuria.   Musculoskeletal: Negative for arthralgias, gait problem and myalgias.   Skin: Positive for rash. Negative for skin lesions.   Neurological: Negative for dizziness and headache.   Psychiatric/Behavioral: Positive for stress. Negative for self-injury, sleep disturbance, suicidal ideas and depressed mood. The patient is not nervous/anxious.         History  Past Medical History:   Diagnosis Date   • Asthma    • Back pain    • Diabetes mellitus (CMS/HCC)    • Foot pain    • History of fracture    • History of gallstones    • Hx of radiation therapy     for treatments of Keloids   • Hypertension    • Left lower quadrant pain     h/o   • Migraines    • Seasonal allergies    • UTI (urinary tract infection)     H/o      Past Surgical History:   Procedure Laterality Date   • CHOLECYSTECTOMY     • HERNIA REPAIR     • KELOID EXCISION     • OTHER SURGICAL HISTORY      Excision of chest wall,shoulder, and trunk   • OTHER SURGICAL HISTORY      in grown toe nail   • OTHER SURGICAL HISTORY  2019    had a spot removed of her face    • RADIOFREQUENCY ABLATION     • RADIOFREQUENCY ABLATION  2019    total of 2 ablations   • WISDOM TOOTH EXTRACTION        Allergies   Allergen Reactions   • Hydrochlorothiazide Swelling   • Amitriptyline Mental Status Change   • Aspartame Other (See Comments)   • Codeine Other (See Comments)     Syncope, can take amoxicillin   • Diclofenac Other (See Comments)   • Doxycycline Other (See Comments)     Hallucinations, rash   • Eggs Or Egg-Derived Products Other (See Comments)     unsure   • Monosodium Glutamate Other (See Comments)   • Naproxen       Family History   Problem Relation Age of Onset   • Arthritis Mother    • Diabetes Mother    • Obesity Mother    • Arrhythmia Mother    • Dementia Father    • Heart attack Father    • Stroke Other          CVA   • Ovarian cancer Maternal Aunt         40's   • Breast cancer Paternal Aunt         30's      Social History     Socioeconomic History   • Marital status: Single     Spouse name: Not on file   • Number of children: Not on file   • Years of education: Not on file   • Highest education level: Not on file   Tobacco Use   • Smoking status: Never Smoker   • Smokeless tobacco: Never Used   Substance and Sexual Activity   • Alcohol use: Yes     Frequency: Monthly or less     Comment: RARE   • Drug use: No   • Sexual activity: Yes     Partners: Male     Birth control/protection: Condom   Social History Narrative    Caffeine coffee,soda ocassionally      Current Outpatient Medications on File Prior to Visit   Medication Sig Dispense Refill   • Acetaminophen (TYLENOL ARTHRITIS PAIN PO) Take  by mouth As Needed.     • albuterol (PROVENTIL HFA;VENTOLIN HFA) 108 (90 BASE) MCG/ACT inhaler Inhale 2 puffs Every 4 (Four) Hours As Needed for wheezing.     • carvedilol (COREG) 12.5 MG tablet Take 1 tablet by mouth Every Evening. 90 tablet 3   • carvedilol (COREG) 25 MG tablet Take 1 tablet by mouth Every Morning. 90 tablet 3   • Cholecalciferol (VITAMIN D3 GUMMIES ADULT PO) Daily.     • CloNIDine (CATAPRES) 0.2 MG tablet Take 1 tablet by mouth 2 (Two) Times a Day. (Patient taking differently: Take 0.2 mg by mouth As Needed.) 60 tablet 5   • cyclobenzaprine (FLEXERIL) 10 MG tablet Take 10 mg by mouth 3 (Three) Times a Day As Needed for muscle spasms.     • diphenhydrAMINE (BENADRYL) 25 mg capsule Take 25 mg by mouth Every 6 (Six) Hours As Needed for itching.     • gabapentin (NEURONTIN) 300 MG capsule Take 300 mg by mouth 3 (Three) Times a Day As Needed.     • glucose blood test strip Use as instructed 100 each 11   • guaiFENesin 200 MG tablet 200 mg As Needed.     • HYDROcodone-acetaminophen (NORCO) 5-325 MG per tablet Take 1-2 tablets by mouth Every 6 (Six) Hours As Needed for Severe Pain . 15 tablet 0   • ibuprofen  (ADVIL,MOTRIN) 600 MG tablet As Needed.     • levonorgestrel (Mirena, 52 MG,) 20 MCG/24HR IUD      • OnabotulinumtoxinA 200 units reconstituted solution Inject 200 units IM into head neck shoulders every 12 weeks per FDA protocol. Dx G43.709 1 each 3   • oxyCODONE (ROXICODONE) 5 MG immediate release tablet      • potassium chloride (MICRO-K) 10 MEQ CR capsule 10 mEq As Needed.     • spironolactone (ALDACTONE) 100 MG tablet Take 100 mg by mouth. Takes 30 days on,  off 30 days.     • traMADol (ULTRAM) 50 MG tablet Take 50 mg by mouth Every 6 (Six) Hours As Needed for moderate pain (4-6).       No current facility-administered medications on file prior to visit.        Results for orders placed or performed in visit on 06/12/20   POC Glycosylated Hemoglobin (Hb A1C)   Result Value Ref Range    Hemoglobin A1C 7.3 %       Health Maintenance   Topic Date Due   • DIABETIC FOOT EXAM  07/11/2017   • DIABETIC EYE EXAM  07/11/2017   • INFLUENZA VACCINE  08/01/2020   • HEMOGLOBIN A1C  12/12/2020   • LIPID PANEL  06/04/2021   • URINE MICROALBUMIN  06/04/2021   • ANNUAL PHYSICAL  06/13/2021   • PAP SMEAR  08/14/2022   • TDAP/TD VACCINES (2 - Td) 01/15/2029   • PNEUMOCOCCAL VACCINE (19-64 MEDIUM RISK)  Completed   • HEPATITIS C SCREENING  Completed       Immunizations  Td/Tdap(Booster Q 10 yrs):  Completed  Flu (Yearly):  Completed  Pneumovax (1 yr after Prevnar):  Completed  Kiiurab25 (1 yr after Pneumo):  N/A  Hep B:  Completed  Shringrix:  N/A}   Immunization History   Administered Date(s) Administered   • Hepatitis A 01/15/2019   • Pneumococcal Polysaccharide (PPSV23) 06/12/2020   • Tdap 01/15/2019         Diabetes:  Yes   Eye Exam: N/A   Foot Exam:  N/A  Obesity Counseling:  Complete  Hemoglobin A1C   Date Value Ref Range Status   06/12/2020 7.3 % Final     Microalbumin, Urine   Date Value Ref Range Status   06/04/2020 3.9 mg/dL Final       Patient's Body mass index is 50.39 kg/m². BMI is above normal parameters.  "Recommendations include: exercise counseling, nutrition counseling and pending appointment with weight loss clinic for bariatric surgery.      Colorectal Screening:  N/A  Pap:  Complete  Mammogram:  N/A  PSA(Over age 50):  N/A  US Aorta (For male smokers, age 65):  N/A  CT for Smoker (Age 55-75, 30pk yr):  N/A  Bone Density/DEXA:  N/A  Hep C ( 0680-9298):  Complete      Results for orders placed or performed in visit on 20   POC Glycosylated Hemoglobin (Hb A1C)   Result Value Ref Range    Hemoglobin A1C 7.3 %            Vitals:    20 1058   BP: 130/82   BP Location: Left arm   Patient Position: Sitting   Cuff Size: Large Adult   Weight: 117 kg (258 lb)   Height: 152.4 cm (60\")       Body mass index is 50.39 kg/m².    Physical Exam   Constitutional: She is oriented to person, place, and time. Vital signs are normal. She appears well-developed and well-nourished. She is active and cooperative. She does not appear ill. No distress. She is morbidly obese.  HENT:   Head: Normocephalic and atraumatic.   Right Ear: Hearing, tympanic membrane, external ear and ear canal normal.   Left Ear: Hearing, tympanic membrane, external ear and ear canal normal.   Nose: Nose normal. Right sinus exhibits no maxillary sinus tenderness and no frontal sinus tenderness. Left sinus exhibits no maxillary sinus tenderness and no frontal sinus tenderness.   Mouth/Throat: Uvula is midline, oropharynx is clear and moist and mucous membranes are normal.   Eyes: Conjunctivae, EOM and lids are normal.   Neck: Trachea normal, normal range of motion and phonation normal. No thyroid mass and no thyromegaly present.   Cardiovascular: Normal rate, regular rhythm and normal heart sounds.   Pulmonary/Chest: Effort normal and breath sounds normal.   Abdominal: Soft. Normal appearance and bowel sounds are normal. She exhibits no distension. There is no tenderness. There is no rigidity, no guarding and no CVA tenderness.   Musculoskeletal: " Normal range of motion. She exhibits no edema.   Lymphadenopathy:     She has no cervical adenopathy.        Right cervical: No superficial cervical adenopathy present.       Left cervical: No superficial cervical adenopathy present.   Neurological: She is alert and oriented to person, place, and time. She has normal strength and normal reflexes. Coordination and gait normal.   CN grossly intact   Skin: Skin is warm and intact. No rash noted. She is not diaphoretic. No cyanosis or erythema. Nails show no clubbing.   Psychiatric: She has a normal mood and affect. Her speech is normal and behavior is normal. Judgment and thought content normal. She is not actively hallucinating. Cognition and memory are normal. She is attentive.   Vitals reviewed.          Counseling provided on diet and nutrition, exercise, weight management, supplements, hypertension, diabetes, hyperlipidemia and flu prevention.    Frances was seen today for annual exam.    Diagnoses and all orders for this visit:    Physical exam, annual    Obesity, morbid (more than 100 lbs over ideal weight or BMI > 40) (CMS/[facility])  Patient has appointment with bariatric surgery.  Encouragement given as weight is a significant factor in her health.    Type 2 diabetes mellitus with hyperglycemia, without long-term current use of insulin (CMS/HCC)  -     POC Glycosylated Hemoglobin (Hb A1C)  -     glipizide (GLUCOTROL) 10 MG tablet; Take 1 tablet by mouth 2 (Two) Times a Day.  -     metFORMIN (Glucophage) 500 MG tablet; Take 1 tablet by mouth Every Night.  Hemoglobin AIC is 7.3%.  Continue glipizide and metformin.  Repeat hemoglobin AIC in 3 months.  Discussed diet and weight loss.    Essential hypertension  -     furosemide (LASIX) 20 MG tablet; Take 1-2 tablets nightly.  BP is stable and well-controlled.    Mixed hyperlipidemia  -     pravastatin (PRAVACHOL) 40 MG tablet; Take 1 tablet by mouth Daily.  Lipid panel indicates need for statin treatment.  Discussed  with patient and started pravastatin 40 mg daily.  Repeat cholesterol panel in 3-6 months with ALT.    Polycystic ovaries  -     metFORMIN (Glucophage) 500 MG tablet; Take 1 tablet by mouth Every Night.  Followed by gynecology.    Seasonal allergies  -     loratadine (CLARITIN) 10 MG tablet; Take 1 tablet by mouth Daily.    Chronic midline low back pain without sciatica  Followed by pain management.    Rash and nonspecific skin eruption  -     fluconazole (DIFLUCAN) 200 MG tablet; Take 1 tablet by mouth 2 (Two) Times a Day.  States that ID started her on diflucan 200 mg BID for rash.  If she stops taking medicine has severe rash on trunk.  Discussed risk of medication on liver and patient is aware of this.      Need for vaccination against Streptococcus pneumoniae  -     Pneumococcal Polysaccharide Vaccine 23-Valent Greater Than or Equal To 3yo Subcutaneous / IM      Lesli Ramirez PA-C   6/16/2020   16:19

## 2020-06-19 ENCOUNTER — OFFICE VISIT (OUTPATIENT)
Dept: CARDIOLOGY | Facility: CLINIC | Age: 35
End: 2020-06-19

## 2020-06-19 VITALS
DIASTOLIC BLOOD PRESSURE: 74 MMHG | WEIGHT: 259.2 LBS | TEMPERATURE: 97.1 F | OXYGEN SATURATION: 97 % | BODY MASS INDEX: 50.89 KG/M2 | HEART RATE: 72 BPM | SYSTOLIC BLOOD PRESSURE: 122 MMHG | HEIGHT: 60 IN

## 2020-06-19 DIAGNOSIS — I10 ESSENTIAL HYPERTENSION: ICD-10-CM

## 2020-06-19 DIAGNOSIS — E78.2 MIXED HYPERLIPIDEMIA: ICD-10-CM

## 2020-06-19 DIAGNOSIS — R00.2 PALPITATIONS: Primary | ICD-10-CM

## 2020-06-19 PROCEDURE — 99214 OFFICE O/P EST MOD 30 MIN: CPT | Performed by: INTERNAL MEDICINE

## 2020-06-19 RX ORDER — CARVEDILOL 25 MG/1
25 TABLET ORAL 2 TIMES DAILY WITH MEALS
Qty: 180 TABLET | Refills: 3 | Status: SHIPPED | OUTPATIENT
Start: 2020-06-19 | End: 2020-06-23 | Stop reason: SDUPTHER

## 2020-06-19 NOTE — PROGRESS NOTES
[facility] Cardiology    Encounter Date: 2020    Patient ID: Frances Hartman is a 34 y.o. female.  : 1985     PCP: Lesli Ramirez PA-C       Chief Complaint: Palpitations and Essential hypertension      PROBLEM LIST:  1. Hypertension:  a. Echo, 2014: EF 55-60%. All valves are structurally and functionally normal with no hemodynamically significant valve disease.  b. Echo, 2017: EF 65%. Mild TR with normal RVSP.  2. Hyperlipidemia.  3. Chest pain:  a. Pet MPS, 2019: EF > 70%. No evidence of reversible ischemia.  4. Palpitations:  a. 2-week Zio, 2020: SR. <1% PACs/PVCs. One short SVT (4 beats). Average HR 74 bpm.  5. DM2.  6. Morbid obesity (BMI 54.7)  7. Peripheral chronic venous insufficiency.  8. Polycystic ovaries.  9. GERD.  10. Subclinical hypothyroidism.  11. Keloid skin disorder.  12. Chronic fatigue.  13. Chronic tonsillar hypertrophy.  14. Depression.  15. Migraine with aura, onset age 8.    History of Present Illness  Patient presents today for a 3month follow-up with a history of palpitations and cardiac risk factors. Since last visit, she has been feeling well overall from a cardiovascular standpoint. Her main complaint is of palpitations. Patient admits she does not have an exercise routine, in part due to the COVID-19 lock down. She spends most of her time at home as she does not have a job. She has sent in a packet to request a bariatric surgery. Patient denies chest pain, shortness of breath, edema, dizziness, and syncope.      Allergies   Allergen Reactions   • Hydrochlorothiazide Swelling   • Amitriptyline Mental Status Change   • Aspartame Other (See Comments)   • Codeine Other (See Comments)     Syncope, can take amoxicillin   • Diclofenac Other (See Comments)   • Doxycycline Other (See Comments)     Hallucinations, rash   • Eggs Or Egg-Derived Products Other (See Comments)     unsure   • Monosodium Glutamate Other  (See Comments)   • Naproxen          Current Outpatient Medications:   •  Acetaminophen (TYLENOL ARTHRITIS PAIN PO), Take  by mouth As Needed., Disp: , Rfl:   •  albuterol (PROVENTIL HFA;VENTOLIN HFA) 108 (90 BASE) MCG/ACT inhaler, Inhale 2 puffs Every 4 (Four) Hours As Needed for wheezing., Disp: , Rfl:   •  carvedilol (COREG) 25 MG tablet, Take 1 tablet by mouth Every Morning., Disp: 90 tablet, Rfl: 3  •  Cholecalciferol (VITAMIN D3 GUMMIES ADULT PO), Daily., Disp: , Rfl:   •  CloNIDine (CATAPRES) 0.2 MG tablet, Take 1 tablet by mouth 2 (Two) Times a Day. (Patient taking differently: Take 0.2 mg by mouth As Needed.), Disp: 60 tablet, Rfl: 5  •  cyclobenzaprine (FLEXERIL) 10 MG tablet, Take 10 mg by mouth 3 (Three) Times a Day As Needed for muscle spasms., Disp: , Rfl:   •  diphenhydrAMINE (BENADRYL) 25 mg capsule, Take 25 mg by mouth Every 6 (Six) Hours As Needed for itching., Disp: , Rfl:   •  fluconazole (DIFLUCAN) 200 MG tablet, Take 1 tablet by mouth 2 (Two) Times a Day., Disp: 180 tablet, Rfl: 3  •  furosemide (LASIX) 20 MG tablet, Take 1-2 tablets nightly., Disp: 135 tablet, Rfl: 1  •  gabapentin (NEURONTIN) 300 MG capsule, Take 300 mg by mouth 3 (Three) Times a Day As Needed., Disp: , Rfl:   •  glipizide (GLUCOTROL) 10 MG tablet, Take 1 tablet by mouth 2 (Two) Times a Day., Disp: 180 tablet, Rfl: 3  •  glucose blood test strip, Use as instructed, Disp: 100 each, Rfl: 11  •  guaiFENesin 200 MG tablet, 200 mg As Needed., Disp: , Rfl:   •  HYDROcodone-acetaminophen (NORCO) 5-325 MG per tablet, Take 1-2 tablets by mouth Every 6 (Six) Hours As Needed for Severe Pain ., Disp: 15 tablet, Rfl: 0  •  ibuprofen (ADVIL,MOTRIN) 600 MG tablet, As Needed., Disp: , Rfl:   •  levonorgestrel (Mirena, 52 MG,) 20 MCG/24HR IUD, , Disp: , Rfl:   •  loratadine (CLARITIN) 10 MG tablet, Take 1 tablet by mouth Daily., Disp: 90 tablet, Rfl: 3  •  metFORMIN (Glucophage) 500 MG tablet, Take 1 tablet by mouth Every Night., Disp: 90  "tablet, Rfl: 1  •  OnabotulinumtoxinA 200 units reconstituted solution, Inject 200 units IM into head neck shoulders every 12 weeks per FDA protocol. Dx G43.709, Disp: 1 each, Rfl: 3  •  oxyCODONE (ROXICODONE) 5 MG immediate release tablet, 5 mg As Needed., Disp: , Rfl:   •  potassium chloride (MICRO-K) 10 MEQ CR capsule, 10 mEq As Needed., Disp: , Rfl:   •  pravastatin (PRAVACHOL) 40 MG tablet, Take 1 tablet by mouth Daily., Disp: 30 tablet, Rfl: 5  •  spironolactone (ALDACTONE) 100 MG tablet, Take 100 mg by mouth. Takes 30 days on,  off 30 days., Disp: , Rfl:   •  traMADol (ULTRAM) 50 MG tablet, Take 50 mg by mouth Every 6 (Six) Hours As Needed for moderate pain (4-6)., Disp: , Rfl:     The following portions of the patient's history were reviewed and updated as appropriate: allergies, current medications, past family history, past medical history, past social history, past surgical history and problem list.    ROS  Review of Systems   Constitution: Negative for chills, fever, fatigue, generalized weakness.   Cardiovascular: Negative for chest pain, claudication, dyspnea on exertion, leg swelling, palpitations, orthopnea, and syncope.   Respiratory: Negative for cough, shortness of breath, and wheezing.  HENT: Negative for ear pain, nosebleeds, and tinnitus.  Gastrointestinal: Negative for abdominal pain, constipation, diarrhea, nausea and vomiting.   Genitourinary: No urinary symptoms.  Musculoskeletal: Negative for muscle cramps.  Neurological: Negative for dizziness, headaches, loss of balance, numbness, and symptoms of stroke.  Psychiatric: Normal mental status.     All other systems reviewed and are negative.        Objective:     /74 (BP Location: Left arm, Patient Position: Sitting)   Pulse 72   Temp 97.1 °F (36.2 °C)   Ht 152.4 cm (60\")   Wt 118 kg (259 lb 3.2 oz)   SpO2 97%   BMI 50.62 kg/m²      Physical Exam  Constitutional: Patient appears well-developed and well-nourished.   HENT: HEENT " exam unremarkable.   Neck: Neck supple. No JVD present. No carotid bruits.   Cardiovascular: Normal rate, regular rhythm and normal heart sounds. No murmur heard.   2+ symmetric pulses.   Pulmonary/Chest: Breath sounds normal. Does not exhibit tenderness.   Abdominal: Abdomen benign.   Musculoskeletal: Does not exhibit edema.   Neurological: Neurological exam unremarkable.   Vitals reviewed.    Lab Review:   Lab Results   Component Value Date    GLUCOSE 160 (H) 06/04/2020    BUN 5 (L) 06/04/2020    CREATININE 0.76 06/04/2020    EGFRIFNONA 94 03/25/2015    EGFRIFAFRI 104 06/04/2020    BCR 6.6 (L) 06/04/2020    K 3.9 06/04/2020    CO2 19.5 (L) 06/04/2020    CALCIUM 9.4 06/04/2020    ALBUMIN 4.20 06/04/2020    AST 27 06/04/2020    ALT 21 06/04/2020     Lab Results   Component Value Date    CHOL 194 06/04/2020    TRIG 214 (H) 06/04/2020    HDL 30 (L) 06/04/2020     (H) 06/04/2020      Lab Results   Component Value Date    WBC 9.96 06/04/2020    RBC 3.66 (L) 06/04/2020    HGB 11.7 (L) 06/04/2020    HCT 35.4 06/04/2020    MCV 96.7 06/04/2020     06/04/2020     Lab Results   Component Value Date    TSH 4.050 06/04/2020     Lab Results   Component Value Date    HGBA1C 7.3 06/12/2020        Procedures       Assessment:      Diagnosis Plan   1. Palpitations  Reviewed recent heart monitor. Increase carvedilol to 25 mg BID for better control symptomatic rare/benign PACs presenting as palpitations.   2. Essential hypertension  Increase carvedilol to 25 mg BID. Continue all other current medications   3. Mixed hyperlipidemia  Acceptable control, continue pravastatin 40 mg.      Plan:   Begin routine aerobic exercise, at least 30 minutes 5 days per week.   She is undergoing evaluation for bariatric surgery, if planned she would be cleared from cardiology standpoint with low risk.  Increase carvedilol to 25 mg BID for better control of palpitations.  Continue all other current medications.   FU in 6 MO, sooner as  needed.  Thank you for allowing us to participate in the care of your patient.     Scribed for Julianna Astudillo MD by Lois Cintron. 6/19/2020  14:16      I, Julianna Astudillo MD, personally performed the services described in this documentation as scribed by the above named individual in my presence, and it is both accurate and complete.  6/19/2020  14:51        Please note that portions of this note may have been completed with a voice recognition program. Efforts were made to edit the dictations, but occasionally words are mistranscribed.

## 2020-06-23 RX ORDER — CARVEDILOL 25 MG/1
25 TABLET ORAL 2 TIMES DAILY WITH MEALS
Qty: 180 TABLET | Refills: 3 | Status: SHIPPED | OUTPATIENT
Start: 2020-06-23 | End: 2020-08-31

## 2020-08-28 ENCOUNTER — PROCEDURE VISIT (OUTPATIENT)
Dept: NEUROLOGY | Facility: CLINIC | Age: 35
End: 2020-08-28

## 2020-08-28 DIAGNOSIS — G43.709 CHRONIC MIGRAINE WITHOUT AURA WITHOUT STATUS MIGRAINOSUS, NOT INTRACTABLE: ICD-10-CM

## 2020-08-28 DIAGNOSIS — G43.711 CHRONIC MIGRAINE WITHOUT AURA, WITH INTRACTABLE MIGRAINE, SO STATED, WITH STATUS MIGRAINOSUS: Primary | ICD-10-CM

## 2020-08-28 PROCEDURE — 64615 CHEMODENERV MUSC MIGRAINE: CPT | Performed by: NURSE PRACTITIONER

## 2020-08-31 RX ORDER — CARVEDILOL 25 MG/1
25 TABLET ORAL 2 TIMES DAILY WITH MEALS
Qty: 180 TABLET | Refills: 1 | Status: SHIPPED | OUTPATIENT
Start: 2020-08-31 | End: 2020-11-10

## 2020-09-21 ENCOUNTER — OFFICE VISIT (OUTPATIENT)
Dept: OBSTETRICS AND GYNECOLOGY | Facility: CLINIC | Age: 35
End: 2020-09-21

## 2020-09-21 VITALS
HEIGHT: 60 IN | SYSTOLIC BLOOD PRESSURE: 124 MMHG | DIASTOLIC BLOOD PRESSURE: 72 MMHG | WEIGHT: 251.9 LBS | BODY MASS INDEX: 49.45 KG/M2

## 2020-09-21 DIAGNOSIS — E28.2 POLYCYSTIC OVARIES: ICD-10-CM

## 2020-09-21 DIAGNOSIS — N93.9 ABNORMAL UTERINE BLEEDING: ICD-10-CM

## 2020-09-21 DIAGNOSIS — E11.9 TYPE 2 DIABETES MELLITUS WITHOUT COMPLICATION, WITHOUT LONG-TERM CURRENT USE OF INSULIN (HCC): ICD-10-CM

## 2020-09-21 DIAGNOSIS — Z30.431 IUD CHECK UP: ICD-10-CM

## 2020-09-21 DIAGNOSIS — E66.01 OBESITY, MORBID (MORE THAN 100 LBS OVER IDEAL WEIGHT OR BMI > 40) (HCC): Primary | ICD-10-CM

## 2020-09-21 DIAGNOSIS — D25.1 FIBROIDS, INTRAMURAL: ICD-10-CM

## 2020-09-21 PROBLEM — N93.8 DUB (DYSFUNCTIONAL UTERINE BLEEDING): Status: ACTIVE | Noted: 2020-09-21

## 2020-09-21 PROCEDURE — 99213 OFFICE O/P EST LOW 20 MIN: CPT | Performed by: OBSTETRICS & GYNECOLOGY

## 2020-09-21 NOTE — PROGRESS NOTES
"Chief Complaint   Patient presents with   • Contraception     IUD check         Subjective   HPI  Frances Hartman is a 34 y.o. female, No obstetric history on file., who presents for IUD check follow up.  She had an IUD placed 4 months ago.  Her LMP was 09/20/2020.  Since the IUD placement, the patient reports pelvic discomfort. Additional complaints include only light spotting for 2 days each month with onset of a light menses yesterday.  Approximately 2 months ago she had some left lower quadrant abdominal pain That resolved spontaneously.    Also has noticed some dry nipples since IUD insertion which is improved with Vaseline      The additional following portions of the patient's history were reviewed and updated as appropriate: allergies, current medications, past family history, past medical history, past social history, past surgical history and problem list.    Did the patient have U/S today? No    Review of Systems  All other systems reviewed and are negative.     I have reviewed and agree with the HPI, ROS, and historical information as entered above. Albert Malone MD    Objective   /72   Ht 152.4 cm (60\")   Wt 114 kg (251 lb 14.4 oz)   BMI 49.20 kg/m²     Physical Exam  Exam conducted with a chaperone present.   Constitutional:       Appearance: She is obese.   Chest:      Breasts:         Right: Normal.         Left: Normal.      Comments: Macromastia present.  No masses.  No skin changes.  Neurological:      Mental Status: She is alert.   Psychiatric:         Mood and Affect: Mood normal.         Behavior: Behavior normal.         Cognition and Memory: Cognition normal.         Assessment/Plan     Assessment     Problem List Items Addressed This Visit        Digestive    Obesity, morbid (more than 100 lbs over ideal weight or BMI > 40) (CMS/[facility]) - Primary       Endocrine    Type 2 diabetes mellitus (CMS/[facility])    Relevant Medications    glipizide (GLUCOTROL) 10 MG tablet    metFORMIN " (Glucophage) 500 MG tablet    Polycystic ovaries    Relevant Medications    metFORMIN (Glucophage) 500 MG tablet       Genitourinary    Abnormal uterine bleeding    Overview     H/o; Improved with Mirena IUD.          Fibroids, intramural    IUD check up    Overview      Mirena IUD insertion May 20, 2020.               1. Dysfunctional uterine bleeding.  Improved after insertion of Mirena IUD.  Had 2 days of spotting each month first last 2 months.  Onset of late menses on 9/20/2020.  We will see if she has any abnormal bleeding.  Will need ultrasound to confirm IUD placement if has heavy or persistent bleeding.    Plan     1. Call or return if symptoms worsen or persist  No follow-ups on file.  2. Return in 3 months.      Albert Malone MD  09/21/2020

## 2020-09-22 ENCOUNTER — OFFICE VISIT (OUTPATIENT)
Dept: FAMILY MEDICINE CLINIC | Facility: CLINIC | Age: 35
End: 2020-09-22

## 2020-09-22 ENCOUNTER — TELEPHONE (OUTPATIENT)
Dept: FAMILY MEDICINE CLINIC | Facility: CLINIC | Age: 35
End: 2020-09-22

## 2020-09-22 VITALS
BODY MASS INDEX: 49.48 KG/M2 | OXYGEN SATURATION: 98 % | DIASTOLIC BLOOD PRESSURE: 82 MMHG | WEIGHT: 252 LBS | HEART RATE: 71 BPM | HEIGHT: 60 IN | SYSTOLIC BLOOD PRESSURE: 122 MMHG

## 2020-09-22 DIAGNOSIS — E11.65 TYPE 2 DIABETES MELLITUS WITH HYPERGLYCEMIA, WITHOUT LONG-TERM CURRENT USE OF INSULIN (HCC): Primary | ICD-10-CM

## 2020-09-22 DIAGNOSIS — I10 ESSENTIAL HYPERTENSION: ICD-10-CM

## 2020-09-22 DIAGNOSIS — E11.65 TYPE 2 DIABETES MELLITUS WITH HYPERGLYCEMIA, WITHOUT LONG-TERM CURRENT USE OF INSULIN (HCC): ICD-10-CM

## 2020-09-22 DIAGNOSIS — E66.01 OBESITY, MORBID (MORE THAN 100 LBS OVER IDEAL WEIGHT OR BMI > 40) (HCC): ICD-10-CM

## 2020-09-22 DIAGNOSIS — F33.0 MILD EPISODE OF RECURRENT MAJOR DEPRESSIVE DISORDER (HCC): ICD-10-CM

## 2020-09-22 LAB — HBA1C MFR BLD: 7.6 %

## 2020-09-22 PROCEDURE — 99214 OFFICE O/P EST MOD 30 MIN: CPT | Performed by: PHYSICIAN ASSISTANT

## 2020-09-22 PROCEDURE — 83036 HEMOGLOBIN GLYCOSYLATED A1C: CPT | Performed by: PHYSICIAN ASSISTANT

## 2020-09-22 RX ORDER — GLIPIZIDE 5 MG/1
TABLET ORAL
Qty: 270 TABLET | Refills: 0 | Status: SHIPPED | OUTPATIENT
Start: 2020-09-22 | End: 2021-05-18

## 2020-09-22 RX ORDER — CALCIUM CARBONATE 750 MG/1
TABLET, CHEWABLE ORAL AS NEEDED
COMMUNITY
End: 2021-09-22

## 2020-09-22 RX ORDER — GLIPIZIDE 5 MG/1
5 TABLET ORAL
Qty: 270 TABLET | Refills: 0 | Status: SHIPPED | OUTPATIENT
Start: 2020-09-22 | End: 2020-09-22 | Stop reason: SDUPTHER

## 2020-09-22 NOTE — PATIENT INSTRUCTIONS
Stop soda.  Try decaf coffee for caffeine.    Call if you have another episode of blacking out.  Check glucose and blood pressure with heart rate during these episodes.      Reduce glipizide to 5 mg in the morning and 10 mg at night.  Monitor fasting blood glucose and call if less than 90 or higher than 140.

## 2020-09-22 NOTE — PROGRESS NOTES
Chief Complaint   Patient presents with   • Diabetes       HPI     Frances Hartman is a pleasant 34 y.o. female who is here for routine follow-up of hypertension, diabetes type 2, depression and obesity.  Patient is currently motivated and working on becoming healthier.  She is working on weight loss and has appointment with bariatric surgery.  She is hopeful this will help with her diabetes and chronic joint pain.  She is followed by pain management.  Her blood pressure is stable and well-controlled today.  She is followed by cardiologist.  She is currently taking metformin 500 mg at night with dinner, unable to tolerate higher dose due to diarrhea/GI intolerance.  On glipizide 10 mg BID and has been on this for years.  Reports several episodes of syncope in the last few weeks, last episode was 2 weeks ago.  Reports feeling dizzy, diaphoretic and fainting.  She has not checked blood pressure or blood sugar around time of incident and seem to occur when she has not eaten.  She will speak with cardiology regarding this and monitor glucose levels closely, return to office or ER at next episode.  She is on diflucan daily for previous ID issue, has issues with yeast overgrowth and urinary infections.  Reports stable mood currently, denies any suicidal ideation.    Past Medical History:   Diagnosis Date   • Arthritis     unspecified   • Asthma    • Back pain    • Bradycardia    • Diabetes mellitus (CMS/HCC)     Type II   • Foot pain    • Hirsutism    • History of fracture    • History of gallstones    • Hx of radiation therapy     for treatments of Keloids   • Hypertension    • Irregular menses     infrequent spotting   • Left lower quadrant pain     h/o   • Leiomyoma, subserous     fundal peduculated 3-4 cm fibroid on u/s   • Leiomyoma, subserous     possible peduculated f3-4 cm fibroid on u/s at [facility]   • Migraines     headaches; sees Neuology   • Morbid obesity (CMS/HCC)    • PCOS (polycystic ovarian syndrome)    •  "Polycystic ovaries     severe insulin resistance/hirsuitism   • Screening breast examination     self admits   • Seasonal allergies    • UTI (urinary tract infection)     H/o       Past Surgical History:   Procedure Laterality Date   • CHOLECYSTECTOMY      laparoscopic   • HERNIA REPAIR     • KELOID EXCISION     • OTHER SURGICAL HISTORY      Excision of chest wall,shoulder, and trunk   • OTHER SURGICAL HISTORY      in grown toe nail   • OTHER SURGICAL HISTORY  2019    had a spot removed of her face    • OTHER SURGICAL HISTORY      Per [facility], \"skin spots removed\"   • RADIOFREQUENCY ABLATION     • RADIOFREQUENCY ABLATION  2019    total of 2 ablations   • WISDOM TOOTH EXTRACTION         Family History   Problem Relation Age of Onset   • Arthritis Mother    • Diabetes Mother    • Obesity Mother    • Arrhythmia Mother    • Dementia Father    • Heart attack Father    • Stroke Other         CVA   • Ovarian cancer Maternal Aunt         40's   • Breast cancer Paternal Aunt         30's   • Heart disease Other    • Hypertension Other        Social History     Socioeconomic History   • Marital status: Single     Spouse name: Not on file   • Number of children: Not on file   • Years of education: Not on file   • Highest education level: Not on file   Occupational History   • Occupation: Customer Service   Tobacco Use   • Smoking status: Never Smoker   • Smokeless tobacco: Never Used   Substance and Sexual Activity   • Alcohol use: Yes     Frequency: Monthly or less     Comment: RARE...Per Rupali, current some day   • Drug use: No   • Sexual activity: Yes     Partners: Male     Birth control/protection: Condom   Social History Narrative    Caffeine coffee,soda ocassionally       Allergies   Allergen Reactions   • Hydrochlorothiazide Swelling   • Amitriptyline Mental Status Change   • Aspartame Other (See Comments)   • Codeine Other (See Comments)     Syncope, can take amoxicillin   • Diclofenac Other (See Comments)   • " "Doxycycline Other (See Comments)     Hallucinations, rash   • Eggs Or Egg-Derived Products Other (See Comments)     unsure   • Monosodium Glutamate Other (See Comments)   • Naproxen        ROS  Review of Systems   Constitutional: Positive for fatigue. Negative for chills, fever and unexpected weight loss.   Eyes: Positive for blurred vision.   Respiratory: Negative for cough, shortness of breath and wheezing.    Cardiovascular: Negative for chest pain, palpitations and leg swelling.   Gastrointestinal: Positive for diarrhea. Negative for abdominal pain.   Endocrine: Positive for polydipsia. Negative for polyphagia and polyuria.   Skin: Negative for pallor and rash.   Neurological: Positive for dizziness, syncope, weakness and light-headedness. Negative for tremors, speech difficulty and confusion.   Psychiatric/Behavioral: Positive for stress. Negative for sleep disturbance, suicidal ideas and depressed mood. The patient is not nervous/anxious.        Vitals:    09/22/20 1239   BP: 122/82   Pulse: 71   SpO2: 98%   Weight: 114 kg (252 lb)   Height: 152.4 cm (60\")     Body mass index is 49.22 kg/m².    Current Outpatient Medications on File Prior to Visit   Medication Sig Dispense Refill   • Acetaminophen (TYLENOL ARTHRITIS PAIN PO) Take  by mouth As Needed.     • albuterol (PROVENTIL HFA;VENTOLIN HFA) 108 (90 BASE) MCG/ACT inhaler Inhale 2 puffs Every 4 (Four) Hours As Needed for wheezing.     • carvedilol (COREG) 25 MG tablet Take 1 tablet by mouth 2 (Two) Times a Day With Meals. 180 tablet 1   • Cholecalciferol (VITAMIN D3 GUMMIES ADULT PO) Daily.     • CloNIDine (CATAPRES) 0.2 MG tablet Take 1 tablet by mouth 2 (Two) Times a Day. (Patient taking differently: Take 0.2 mg by mouth As Needed.) 60 tablet 5   • cyclobenzaprine (FLEXERIL) 10 MG tablet Take 10 mg by mouth 3 (Three) Times a Day As Needed for muscle spasms.     • diphenhydrAMINE (BENADRYL) 25 mg capsule Take 25 mg by mouth Every 6 (Six) Hours As Needed for " itching.     • fluconazole (DIFLUCAN) 200 MG tablet Take 1 tablet by mouth 2 (Two) Times a Day. 180 tablet 3   • furosemide (LASIX) 20 MG tablet Take 1-2 tablets nightly. 135 tablet 1   • gabapentin (NEURONTIN) 300 MG capsule Take 300 mg by mouth 3 (Three) Times a Day As Needed.     • glucose blood test strip Use as instructed 100 each 11   • guaiFENesin 200 MG tablet 200 mg As Needed.     • HYDROcodone-acetaminophen (NORCO) 5-325 MG per tablet Take 1-2 tablets by mouth Every 6 (Six) Hours As Needed for Severe Pain . 15 tablet 0   • ibuprofen (ADVIL,MOTRIN) 600 MG tablet As Needed.     • levonorgestrel (Mirena, 52 MG,) 20 MCG/24HR IUD      • loratadine (CLARITIN) 10 MG tablet Take 1 tablet by mouth Daily. 90 tablet 3   • metFORMIN (Glucophage) 500 MG tablet Take 1 tablet by mouth Every Night. 90 tablet 1   • OnabotulinumtoxinA 200 units reconstituted solution Inject 200 units IM into head neck shoulders every 12 weeks per FDA protocol. Dx G43.709 1 each 3   • potassium chloride (MICRO-K) 10 MEQ CR capsule 10 mEq As Needed.     • spironolactone (ALDACTONE) 100 MG tablet Take 100 mg by mouth. Takes 30 days on,  off 30 days.     • traMADol (ULTRAM) 50 MG tablet Take 50 mg by mouth Every 6 (Six) Hours As Needed for moderate pain (4-6).     • calcium carbonate EX (Antacid Flavor Chews) 750 MG chewable tablet        No current facility-administered medications on file prior to visit.        Results for orders placed or performed in visit on 09/22/20   POC Glycosylated Hemoglobin (Hb A1C)    Specimen: Blood   Result Value Ref Range    Hemoglobin A1C 7.6 %       PE    Physical Exam  Constitutional:       General: She is not in acute distress.     Appearance: Normal appearance. She is well-developed. She is morbidly obese. She is not ill-appearing or diaphoretic.   HENT:      Head: Normocephalic and atraumatic.      Right Ear: Tympanic membrane normal.      Left Ear: Tympanic membrane normal.      Nose: Nose normal.   Eyes:       Extraocular Movements: Extraocular movements intact.      Conjunctiva/sclera: Conjunctivae normal.   Neck:      Musculoskeletal: Normal range of motion.   Cardiovascular:      Rate and Rhythm: Normal rate and regular rhythm.      Heart sounds: Normal heart sounds.   Pulmonary:      Effort: Pulmonary effort is normal.      Breath sounds: Normal breath sounds.   Musculoskeletal: Normal range of motion.      Right lower leg: No edema.      Left lower leg: No edema.   Skin:     General: Skin is warm.      Findings: No erythema.   Neurological:      Mental Status: She is alert.   Psychiatric:         Attention and Perception: She is attentive.         Mood and Affect: Mood normal.         Speech: Speech normal.         Behavior: Behavior normal. Behavior is cooperative.         Thought Content: Thought content normal.         Judgment: Judgment normal.         A/P    Frances was seen today for diabetes.    Diagnoses and all orders for this visit:    Type 2 diabetes mellitus with hyperglycemia, without long-term current use of insulin (CMS/[facility])  -     POC Glycosylated Hemoglobin (Hb A1C)  -     Discontinue: glipizide (GLUCOTROL) 5 MG tablet; Take 1 tablet by mouth 2 (Two) Times a Day Before Meals. Take 1 tablet in the morning, 2 tablets at night.  Reports of episodes that appear to be consistent with hypoglycemic events.  Will reduce glipizide to 5 mg QAM and 10 mg QPM.  Continue metformin 500 mg at night.  Monitor fasting glucose levels and call if she has any further issues with hypoglycemia.  Consider GLP-1.  SGLT-2 contraindicated due to yeast infection issues.  Hemoglobin AIC was 7.3%, today it is 7.6%.    Essential hypertension  Followed by cardiology.  Stable, well-controlled.    Mild episode of recurrent major depressive disorder (CMS/[facility])  Reports stable mood.  Denies any issues with depression or suicidal ideation today.    Obesity, morbid (more than 100 lbs over ideal weight or BMI > 40)  (CMS/[facility])  Working on weight loss with lifestyle changes.  Has appointment with bariatric surgery.       Plan of care reviewed with patient at the conclusion of today's visit. Education was provided regarding diagnosis, management and any prescribed or recommended OTC medications.  Patient verbalizes understanding of and agreement with management plan.    Return in about 3 months (around 12/22/2020) for Recheck, diabetes.     Lesli Ramirez PA-C  Answers for HPI/ROS submitted by the patient on 9/21/2020   Diabetes problem  What is the primary reason for your visit?: Diabetes

## 2020-09-22 NOTE — TELEPHONE ENCOUNTER
Take 1 tablet by mouth 2 (Two) Times a Day Before Meals. Take 1 tablet in the morning, 2 tablets at night    Which do you want 1 BID or 1 in am and 2 at night?

## 2020-09-22 NOTE — TELEPHONE ENCOUNTER
PHARMACY CALLED REQUESTING CLARIFICATION ON    glipizide (GLUCOTROL) 5 MG tablet    [place]    [facility] [place] [address] [place] [facility] [address] [place] [address] [phone] [facility] [phone] FX

## 2020-09-28 ENCOUNTER — TRANSCRIBE ORDERS (OUTPATIENT)
Dept: ADMINISTRATIVE | Facility: HOSPITAL | Age: 35
End: 2020-09-28

## 2020-09-28 DIAGNOSIS — R92.8 ABNORMAL MAMMOGRAM: Primary | ICD-10-CM

## 2020-10-01 ENCOUNTER — OFFICE VISIT (OUTPATIENT)
Dept: BARIATRICS/WEIGHT MGMT | Facility: CLINIC | Age: 35
End: 2020-10-01

## 2020-10-01 ENCOUNTER — DOCUMENTATION (OUTPATIENT)
Dept: BARIATRICS/WEIGHT MGMT | Facility: CLINIC | Age: 35
End: 2020-10-01

## 2020-10-01 VITALS
WEIGHT: 245.5 LBS | DIASTOLIC BLOOD PRESSURE: 78 MMHG | SYSTOLIC BLOOD PRESSURE: 134 MMHG | BODY MASS INDEX: 46.35 KG/M2 | TEMPERATURE: 97.1 F | HEART RATE: 71 BPM | OXYGEN SATURATION: 98 % | HEIGHT: 61 IN | RESPIRATION RATE: 18 BRPM

## 2020-10-01 DIAGNOSIS — J45.30 MILD PERSISTENT ASTHMA WITHOUT COMPLICATION: ICD-10-CM

## 2020-10-01 DIAGNOSIS — R12 HEARTBURN: ICD-10-CM

## 2020-10-01 DIAGNOSIS — E66.9 DIABETES MELLITUS TYPE 2 IN OBESE (HCC): ICD-10-CM

## 2020-10-01 DIAGNOSIS — I10 ESSENTIAL HYPERTENSION: ICD-10-CM

## 2020-10-01 DIAGNOSIS — E28.2 PCOS (POLYCYSTIC OVARIAN SYNDROME): ICD-10-CM

## 2020-10-01 DIAGNOSIS — E66.01 MORBID OBESITY (HCC): Primary | ICD-10-CM

## 2020-10-01 DIAGNOSIS — E11.69 DIABETES MELLITUS TYPE 2 IN OBESE (HCC): ICD-10-CM

## 2020-10-01 PROCEDURE — 99214 OFFICE O/P EST MOD 30 MIN: CPT | Performed by: PHYSICIAN ASSISTANT

## 2020-10-01 RX ORDER — SIMETHICONE 125 MG
125 TABLET,CHEWABLE ORAL EVERY 6 HOURS PRN
Status: ON HOLD | COMMUNITY
End: 2021-11-30 | Stop reason: SDUPTHER

## 2020-10-01 NOTE — PROGRESS NOTES
[facility] BARIATRIC SURGERY  [address]    [facility] [id number] [phone]      Patient  Name:  Frances Hartman  :  1985      Date of Visit: 10/01/2020      Chief Complaint:  weight gain; unable to maintain weight loss      History of Present Illness:  Frances Hartman is a 34 y.o. female who presents today for evaluation, education and consultation regarding metabolic and bariatric surgery with Dr. Andre.     Frances has been overweight for at least 20 years, has been 35 pounds or more overweight for at least 20 years, has been 100 pounds or more overweight for 10 or more years and started dieting at age 14.  Previous diet attempts include: Yoseph Jerome, High Protein, Low Carbohydrate, Low Fat, Calorie Counting, Karina's Diet, Fasting and Slim Fast.  The most weight Frances lost was 25 pounds diet and exercise but was unable to maintain that weight loss.  Her maximum lifetime weight is 290 pounds.       Complete history has been obtained and discussed today, as pertinent to metabolic/ bariatric surgery.     Note: hx significant for keloid formation - says does well w/ surgical glue + compression bandages.    Past Medical History:   Diagnosis Date   • Ankle swelling    • Anxiety    • Asthma     prn inhalers, does not follow w/ pulmonary   • Chronic back pain     previously followed w/ pain management, now just prn Tylenol + Gabapentin   • Diabetes mellitus (CMS/HCC)     Type II, dx , never on insulin, A1C 7.6   • Dyspepsia    • Dyspnea on exertion    • Fatigue    • Heartburn     chronic, prn TUMS, denies prior eval   • Hirsutism    • Hx of radiation therapy     for treatments of Keloids   • Hypertension    • Irregular menses     infrequent spotting   • Leiomyoma, subserous     possible peduculated f3-4 cm fibroid on u/s at [facility]   • Migraines     botox q3 months, following Neurology @ [facility]   • Morbid obesity (CMS/HCC)    • Polycystic ovaries     severe  "insulin resistance/hirsuitism   • Seasonal allergies      Past Surgical History:   Procedure Laterality Date   • KELOID EXCISION      1990,1993,1999,2015,2016,2019   • LAPAROSCOPIC CHOLECYSTECTOMY  2000    for stones   • RADIOFREQUENCY ABLATION  2019    x 2   • UMBILICAL HERNIA REPAIR  1990   • WISDOM TOOTH EXTRACTION  1994       Allergies   Allergen Reactions   • Hydrochlorothiazide Swelling   • Monosodium Glutamate Swelling and Headache   • Amitriptyline Mental Status Change     memory gaps + neuropathy + hair loss   • Aspartame Nausea Only   • Codeine Headache         • Diclofenac Headache   • Doxycycline Rash and Hallucinations   • Eggs Or Egg-Derived Products Other (See Comments)     dx on allergy testing, but does not completely avoid   • Naproxen Other (See Comments)     \"blackout\"       Current Outpatient Medications:   •  Acetaminophen (TYLENOL ARTHRITIS PAIN PO), Take  by mouth As Needed., Disp: , Rfl:   •  albuterol (PROVENTIL HFA;VENTOLIN HFA) 108 (90 BASE) MCG/ACT inhaler, Inhale 2 puffs Every 4 (Four) Hours As Needed for wheezing., Disp: , Rfl:   •  calcium carbonate EX (Antacid Flavor Chews) 750 MG chewable tablet, , Disp: , Rfl:   •  carvedilol (COREG) 25 MG tablet, Take 1 tablet by mouth 2 (Two) Times a Day With Meals., Disp: 180 tablet, Rfl: 1  •  Cholecalciferol (VITAMIN D3 GUMMIES ADULT PO), Daily., Disp: , Rfl:   •  CloNIDine (CATAPRES) 0.2 MG tablet, Take 1 tablet by mouth 2 (Two) Times a Day. (Patient taking differently: Take 0.2 mg by mouth As Needed.), Disp: 60 tablet, Rfl: 5  •  cyclobenzaprine (FLEXERIL) 10 MG tablet, Take 10 mg by mouth 3 (Three) Times a Day As Needed for muscle spasms., Disp: , Rfl:   •  diphenhydrAMINE (BENADRYL) 25 mg capsule, Take 25 mg by mouth Every 6 (Six) Hours As Needed for itching., Disp: , Rfl:   •  fluconazole (DIFLUCAN) 200 MG tablet, Take 1 tablet by mouth 2 (Two) Times a Day., Disp: 180 tablet, Rfl: 3  •  furosemide (LASIX) 20 MG tablet, Take 1-2 tablets " nightly., Disp: 135 tablet, Rfl: 1  •  gabapentin (NEURONTIN) 300 MG capsule, Take 300 mg by mouth 3 (Three) Times a Day As Needed., Disp: , Rfl:   •  glipizide (GLUCOTROL) 5 MG tablet, Take 1 tablet in the morning, 2 tablets at night., Disp: 270 tablet, Rfl: 0  •  glucose blood test strip, Use as instructed, Disp: 100 each, Rfl: 11  •  guaiFENesin 200 MG tablet, 200 mg As Needed., Disp: , Rfl:   •  HYDROcodone-acetaminophen (NORCO) 5-325 MG per tablet, Take 1-2 tablets by mouth Every 6 (Six) Hours As Needed for Severe Pain ., Disp: 15 tablet, Rfl: 0  •  ibuprofen (ADVIL,MOTRIN) 600 MG tablet, As Needed., Disp: , Rfl:   •  levonorgestrel (Mirena, 52 MG,) 20 MCG/24HR IUD, , Disp: , Rfl:   •  loratadine (CLARITIN) 10 MG tablet, Take 1 tablet by mouth Daily., Disp: 90 tablet, Rfl: 3  •  metFORMIN (Glucophage) 500 MG tablet, Take 1 tablet by mouth Every Night., Disp: 90 tablet, Rfl: 1  •  OnabotulinumtoxinA 200 units reconstituted solution, Inject 200 units IM into head neck shoulders every 12 weeks per FDA protocol. Dx G43.709, Disp: 1 each, Rfl: 3  •  potassium chloride (MICRO-K) 10 MEQ CR capsule, 10 mEq As Needed., Disp: , Rfl:   •  simethicone (MYLICON) 125 MG chewable tablet, Chew 125 mg Every 6 (Six) Hours As Needed for Flatulence., Disp: , Rfl:   •  spironolactone (ALDACTONE) 100 MG tablet, Take 100 mg by mouth. Takes 30 days on,  off 30 days., Disp: , Rfl:   •  traMADol (ULTRAM) 50 MG tablet, Take 50 mg by mouth Every 6 (Six) Hours As Needed for moderate pain (4-6)., Disp: , Rfl:     Social History     Socioeconomic History   • Marital status: Single     Spouse name: Not on file   • Number of children: Not on file   • Years of education: Not on file   • Highest education level: Not on file   Occupational History   • Occupation: Customer Service   Tobacco Use   • Smoking status: Never Smoker   • Smokeless tobacco: Never Used   Substance and Sexual Activity   • Alcohol use: Yes     Frequency: Monthly or less   •  Drug use: No   • Sexual activity: Yes     Partners: Male     Birth control/protection: Condom   Social History Narrative    Patient lives in [place], [place], single. Unemployed.     Family History   Problem Relation Age of Onset   • Arthritis Mother    • Diabetes Mother    • Obesity Mother    • Arrhythmia Mother    • Hypertension Mother    • Dementia Father    • Heart attack Father    • Stroke Other         CVA   • Obesity Other    • Ovarian cancer Maternal Aunt         40's   • Breast cancer Paternal Aunt         30's   • Heart disease Other    • Hypertension Other        Review of Systems:  Constitutional:  reports fatigue, weight gain and denies fevers, chills.  HEENT:  denies headache, ear pain or loss of hearing, blurred or double vision, nasal discharge or sore throat.  Cardiovascular:  reports HTN and denies hx MI, chest pain, palpitations, hx DVT.  Respiratory:   denies cough , wheezing, sleep apnea, hx PE.  Gastrointestinal:  reports heartburn and denies dysphagia, nausea, vomiting, abdominal pain, IBS, liver disease.  Genitourinary:   denies history of  frequent UTI, incontinence, hematuria, dysuria, polyuria, polydipsia, renal insufficiency.    Musculoskeletal:  reports joint pain, back pain, arthritis and denies autoimmune disease.  Neurological:  reports migraines and denies numbness /tingling, dizziness, confusion, seizure.  Psychiatric:  reports hx depression, hx anxiety and denies depressed mood, feeling anxious, bipolar disorder.  Endocrine:  reports diabetes and denies thyroid disease.  Hematologic:   denies bruising, bleeding disorder, hx anemia, hx blood transfusion.  Skin:  denies rashes, hx MRSA.    Physical Exam:  Vital Signs:  Weight: 111 kg (245 lb 8 oz)   Body mass index is 47.16 kg/m².  Temp: 97.1 °F (36.2 °C)   Heart Rate: 71   BP: 134/78     Physical Exam  Vitals signs reviewed.   Constitutional:       Appearance: She is well-developed.      Comments: wearing a mask   HENT:      Head:  Normocephalic and atraumatic.   Eyes:      General: No scleral icterus.     Conjunctiva/sclera: Conjunctivae normal.   Neck:      Musculoskeletal: Neck supple.      Thyroid: No thyromegaly.   Cardiovascular:      Rate and Rhythm: Normal rate and regular rhythm.      Heart sounds: No murmur.   Pulmonary:      Effort: Pulmonary effort is normal. No respiratory distress.      Breath sounds: Normal breath sounds. No wheezing or rales.   Abdominal:      General: Bowel sounds are normal. There is no distension.      Palpations: Abdomen is soft. There is no mass.      Tenderness: There is no abdominal tenderness.      Hernia: No hernia is present.      Comments: scars: lap zeynep, umbilical - keloid noted (L) of umbilicus   Musculoskeletal: Normal range of motion.   Skin:     General: Skin is warm and dry.      Findings: No rash.   Neurological:      Mental Status: She is alert and oriented to person, place, and time.      Gait: Gait normal.   Psychiatric:         Judgment: Judgment normal.         Patient Active Problem List   Diagnosis   • Asthma   • Chronic fatigue   • Chronic tonsillar hypertrophy   • Type 2 diabetes mellitus (CMS/HCC)   • Hyperlipidemia   • Hypertension   • Keloid skin disorder   • Seasonal allergies   • Subclinical hypothyroidism   • Abnormal uterine bleeding   • Anemia   • Depression   • Herpes zoster   • Chronic migraine without aura, not intractable   • Chronic pain disorder   • Degeneration of lumbar intervertebral disc   • Low back pain   • Lumbar spondylosis   • Osteoarthritis of hip   • Pain in the coccyx   • Fibroids, intramural   • IUD check up   • DUB (dysfunctional uterine bleeding)   • PCOS (polycystic ovarian syndrome)   • Morbid obesity (CMS/HCC)   • Dyspepsia   • Dyspnea on exertion   • Anxiety   • Heartburn   • Chronic back pain       Assessment:  34 y.o. female with medically complicated obesity pursuing sleeve gastrectomy.    Patient's Body mass index is 47.16 kg/m². BMI is above  normal parameters. Recommendations include: MBS.  Metabolic & Bariatric Surgery is deemed medically necessary given the following obesity related comorbidities: diabetes, hypertension, chronic back pain, heartburn, asthma and polycystic ovarian disease .    Plan:  Patient understands that bariatric surgery is not cosmetic surgery but rather a tool to help make a lifelong commitment to lifestyle changes including diet, exercise, behavior modifications, and healthy habits.  The patient has been educated today on those expected postoperative lifestyle changes.  Psychological and Nutritional consultations will be arranged prior to surgery.  Instructions on how to access [employer] (an internet based site w/ educational surgical videos) were given to the patient.  Recommended vitamin supplementation was reviewed.  The importance of avoiding ASA/ NSAIDS/ steroids/ tobacco/ hormones/ immunomodulators perioperatively was discussed in detail.  All questions/concerns have been addressed.      Further evaluation will include: CBC, CMP, Lipids, TSH, HgA1C, H.Pylori UBT, EKG, CXR, Pulmonary Function Testing, Gastric Emptying Study and EGD.    Additional clearances needed prior to surgery will include: Cardiology.       Further input to follow pending the above.           LASHANDA Morley

## 2020-10-01 NOTE — PROGRESS NOTES
"Metabolic and Bariatric Surgery  Presurgical Nutrition Assessment     Frances Hartman  10/01/2020  [id number]  [id number]  1985  female    Surgery desired: Sleeve Gastrectomy   Ht 153.7 cm (60.5\")   Wt 111 kg (245 lb 8 oz)  Past Medical History:   Diagnosis Date   • Ankle swelling    • Arthritis     unspecified   • Asthma    • Back pain    • Bradycardia    • Diabetes mellitus (CMS/HCC)     Type II   • Foot pain    • GERD (gastroesophageal reflux disease)    • Heartburn    • Hirsutism    • History of fracture    • History of gallstones    • Hx of radiation therapy     for treatments of Keloids   • Hypertension    • Irregular menses     infrequent spotting   • Left lower quadrant pain     h/o   • Leiomyoma, subserous     fundal peduculated 3-4 cm fibroid on u/s   • Leiomyoma, subserous     possible peduculated f3-4 cm fibroid on u/s at [facility]   • Migraines     headaches; sees Neuology   • Morbid obesity (CMS/HCC)    • PCOS (polycystic ovarian syndrome)    • Polycystic ovaries     severe insulin resistance/hirsuitism   • Screening breast examination     self admits   • Seasonal allergies    • UTI (urinary tract infection)     H/o     Past Surgical History:   Procedure Laterality Date   • CHOLECYSTECTOMY      laparoscopic   • HERNIA REPAIR     • KELOID EXCISION     • OTHER SURGICAL HISTORY      Excision of chest wall,shoulder, and trunk   • OTHER SURGICAL HISTORY      in grown toe nail   • OTHER SURGICAL HISTORY  2019    had a spot removed of her face    • OTHER SURGICAL HISTORY      Per Rupali, \"skin spots removed\"   • RADIOFREQUENCY ABLATION     • RADIOFREQUENCY ABLATION  2019    total of 2 ablations   • WISDOM TOOTH EXTRACTION       Allergies   Allergen Reactions   • Hydrochlorothiazide Swelling   • Amitriptyline Mental Status Change   • Aspartame Other (See Comments)   • Codeine Other (See Comments)     Syncope, can take amoxicillin   • Diclofenac Other (See Comments)   • Doxycycline Other (See Comments) "     Hallucinations, rash   • Eggs Or Egg-Derived Products Other (See Comments)     unsure   • Monosodium Glutamate Other (See Comments)   • Naproxen        Current Outpatient Medications:   •  Acetaminophen (TYLENOL ARTHRITIS PAIN PO), Take  by mouth As Needed., Disp: , Rfl:   •  albuterol (PROVENTIL HFA;VENTOLIN HFA) 108 (90 BASE) MCG/ACT inhaler, Inhale 2 puffs Every 4 (Four) Hours As Needed for wheezing., Disp: , Rfl:   •  calcium carbonate EX (Antacid Flavor Chews) 750 MG chewable tablet, , Disp: , Rfl:   •  carvedilol (COREG) 25 MG tablet, Take 1 tablet by mouth 2 (Two) Times a Day With Meals., Disp: 180 tablet, Rfl: 1  •  Cholecalciferol (VITAMIN D3 GUMMIES ADULT PO), Daily., Disp: , Rfl:   •  CloNIDine (CATAPRES) 0.2 MG tablet, Take 1 tablet by mouth 2 (Two) Times a Day. (Patient taking differently: Take 0.2 mg by mouth As Needed.), Disp: 60 tablet, Rfl: 5  •  cyclobenzaprine (FLEXERIL) 10 MG tablet, Take 10 mg by mouth 3 (Three) Times a Day As Needed for muscle spasms., Disp: , Rfl:   •  diphenhydrAMINE (BENADRYL) 25 mg capsule, Take 25 mg by mouth Every 6 (Six) Hours As Needed for itching., Disp: , Rfl:   •  fluconazole (DIFLUCAN) 200 MG tablet, Take 1 tablet by mouth 2 (Two) Times a Day., Disp: 180 tablet, Rfl: 3  •  furosemide (LASIX) 20 MG tablet, Take 1-2 tablets nightly., Disp: 135 tablet, Rfl: 1  •  gabapentin (NEURONTIN) 300 MG capsule, Take 300 mg by mouth 3 (Three) Times a Day As Needed., Disp: , Rfl:   •  glipizide (GLUCOTROL) 5 MG tablet, Take 1 tablet in the morning, 2 tablets at night., Disp: 270 tablet, Rfl: 0  •  glucose blood test strip, Use as instructed, Disp: 100 each, Rfl: 11  •  guaiFENesin 200 MG tablet, 200 mg As Needed., Disp: , Rfl:   •  HYDROcodone-acetaminophen (NORCO) 5-325 MG per tablet, Take 1-2 tablets by mouth Every 6 (Six) Hours As Needed for Severe Pain ., Disp: 15 tablet, Rfl: 0  •  ibuprofen (ADVIL,MOTRIN) 600 MG tablet, As Needed., Disp: , Rfl:   •  levonorgestrel  (Mirena, 52 MG,) 20 MCG/24HR IUD, , Disp: , Rfl:   •  loratadine (CLARITIN) 10 MG tablet, Take 1 tablet by mouth Daily., Disp: 90 tablet, Rfl: 3  •  metFORMIN (Glucophage) 500 MG tablet, Take 1 tablet by mouth Every Night., Disp: 90 tablet, Rfl: 1  •  OnabotulinumtoxinA 200 units reconstituted solution, Inject 200 units IM into head neck shoulders every 12 weeks per FDA protocol. Dx G43.709, Disp: 1 each, Rfl: 3  •  potassium chloride (MICRO-K) 10 MEQ CR capsule, 10 mEq As Needed., Disp: , Rfl:   •  simethicone (MYLICON) 125 MG chewable tablet, Chew 125 mg Every 6 (Six) Hours As Needed for Flatulence., Disp: , Rfl:   •  spironolactone (ALDACTONE) 100 MG tablet, Take 100 mg by mouth. Takes 30 days on,  off 30 days., Disp: , Rfl:   •  traMADol (ULTRAM) 50 MG tablet, Take 50 mg by mouth Every 6 (Six) Hours As Needed for moderate pain (4-6)., Disp: , Rfl:       Nutrition Assessment    Estimated energy needs: 1900    Estimated calories for weight loss: 1300    IBW (Pounds):  145      Excess body weight (Pounds): 100       Nutrition Recall  24 Hour recall: (B) (L) (D) -  Reviewed and discussed with patient  11am:  Bowl of cereal, sugar, milk, grape juice  5pm:  Steak, greens, mushrooms  10pm:  2 steaks, greens, mushrooms  2am:  Chips, cheddar cheese  Water       Exercise  never      Education    Provided manual for Sleeve Gastrectomy and reviewed necessary vitamin and protein supplementation for surgery.     Provided follow-up options for support, including contact information for dietitians here, if desired.  Web-based support information and apps for smart phones and computers given.  Noted that support group is offered at this clinic, and that support is associated with weight loss.    Recommend that team follow per protocol.      Nutrition Goals   Dietary Guidelines per manual  Protein goal:  grams per day     Exercise Goals  Add 15-30 minutes of activity per day as tolerated        Michela Corona,  DAHLIA  10/01/2020  13:56 EDT

## 2020-10-02 LAB — UREA BREATH TEST QL: NEGATIVE

## 2020-10-19 ENCOUNTER — APPOINTMENT (OUTPATIENT)
Dept: PREADMISSION TESTING | Facility: HOSPITAL | Age: 35
End: 2020-10-19

## 2020-10-19 PROCEDURE — U0004 COV-19 TEST NON-CDC HGH THRU: HCPCS

## 2020-10-19 PROCEDURE — C9803 HOPD COVID-19 SPEC COLLECT: HCPCS

## 2020-10-20 LAB — SARS-COV-2 RNA RESP QL NAA+PROBE: NOT DETECTED

## 2020-10-22 ENCOUNTER — LAB REQUISITION (OUTPATIENT)
Dept: LAB | Facility: HOSPITAL | Age: 35
End: 2020-10-22

## 2020-10-22 ENCOUNTER — OUTSIDE FACILITY SERVICE (OUTPATIENT)
Dept: BARIATRICS/WEIGHT MGMT | Facility: CLINIC | Age: 35
End: 2020-10-22

## 2020-10-22 DIAGNOSIS — R12 HEARTBURN: ICD-10-CM

## 2020-10-22 PROCEDURE — 43239 EGD BIOPSY SINGLE/MULTIPLE: CPT | Performed by: SURGERY

## 2020-10-22 PROCEDURE — 88312 SPECIAL STAINS GROUP 1: CPT | Performed by: SURGERY

## 2020-10-22 PROCEDURE — 88305 TISSUE EXAM BY PATHOLOGIST: CPT | Performed by: SURGERY

## 2020-10-24 LAB
CYTO UR: NORMAL
LAB AP CASE REPORT: NORMAL
LAB AP CLINICAL INFORMATION: NORMAL
PATH REPORT.FINAL DX SPEC: NORMAL
PATH REPORT.GROSS SPEC: NORMAL

## 2020-11-05 ENCOUNTER — HOSPITAL ENCOUNTER (OUTPATIENT)
Dept: MAMMOGRAPHY | Facility: HOSPITAL | Age: 35
Discharge: HOME | End: 2020-11-05
Admitting: PHYSICIAN ASSISTANT

## 2020-11-05 DIAGNOSIS — R92.8 ABNORMAL MAMMOGRAM: ICD-10-CM

## 2020-11-05 PROCEDURE — G0279 TOMOSYNTHESIS, MAMMO: HCPCS

## 2020-11-05 PROCEDURE — 77062 BREAST TOMOSYNTHESIS BI: CPT | Performed by: RADIOLOGY

## 2020-11-05 PROCEDURE — 77066 DX MAMMO INCL CAD BI: CPT | Performed by: RADIOLOGY

## 2020-11-05 PROCEDURE — 77066 DX MAMMO INCL CAD BI: CPT

## 2020-11-06 ENCOUNTER — APPOINTMENT (OUTPATIENT)
Dept: PULMONOLOGY | Facility: HOSPITAL | Age: 35
End: 2020-11-06

## 2020-11-06 ENCOUNTER — HOSPITAL ENCOUNTER (OUTPATIENT)
Dept: NUCLEAR MEDICINE | Facility: HOSPITAL | Age: 35
Discharge: HOME | End: 2020-11-06

## 2020-11-06 DIAGNOSIS — E11.69 DIABETES MELLITUS TYPE 2 IN OBESE (HCC): ICD-10-CM

## 2020-11-06 DIAGNOSIS — E66.9 DIABETES MELLITUS TYPE 2 IN OBESE (HCC): ICD-10-CM

## 2020-11-06 PROCEDURE — 78264 GASTRIC EMPTYING IMG STUDY: CPT

## 2020-11-06 PROCEDURE — 0 TECHNETIUM SULFUR COLLOID: Performed by: PHYSICIAN ASSISTANT

## 2020-11-06 PROCEDURE — A9541 TC99M SULFUR COLLOID: HCPCS | Performed by: PHYSICIAN ASSISTANT

## 2020-11-06 RX ADMIN — TECHNETIUM TC 99M SULFUR COLLOID 1 DOSE: KIT at 11:36

## 2020-11-08 ENCOUNTER — APPOINTMENT (OUTPATIENT)
Dept: PREADMISSION TESTING | Facility: HOSPITAL | Age: 35
End: 2020-11-08

## 2020-11-08 LAB — SARS-COV-2 RNA RESP QL NAA+PROBE: NOT DETECTED

## 2020-11-08 PROCEDURE — U0004 COV-19 TEST NON-CDC HGH THRU: HCPCS

## 2020-11-08 PROCEDURE — C9803 HOPD COVID-19 SPEC COLLECT: HCPCS

## 2020-11-10 ENCOUNTER — OFFICE VISIT (OUTPATIENT)
Dept: FAMILY MEDICINE CLINIC | Facility: CLINIC | Age: 35
End: 2020-11-10

## 2020-11-10 ENCOUNTER — TELEPHONE (OUTPATIENT)
Dept: FAMILY MEDICINE CLINIC | Facility: CLINIC | Age: 35
End: 2020-11-10

## 2020-11-10 VITALS
BODY MASS INDEX: 47.64 KG/M2 | DIASTOLIC BLOOD PRESSURE: 88 MMHG | WEIGHT: 248 LBS | SYSTOLIC BLOOD PRESSURE: 124 MMHG | OXYGEN SATURATION: 99 % | HEART RATE: 74 BPM

## 2020-11-10 DIAGNOSIS — G89.29 CHRONIC MIDLINE LOW BACK PAIN WITHOUT SCIATICA: ICD-10-CM

## 2020-11-10 DIAGNOSIS — E66.01 MORBID OBESITY (HCC): ICD-10-CM

## 2020-11-10 DIAGNOSIS — I10 ESSENTIAL HYPERTENSION: ICD-10-CM

## 2020-11-10 DIAGNOSIS — E66.01 MORBID OBESITY (HCC): Primary | ICD-10-CM

## 2020-11-10 DIAGNOSIS — E11.65 TYPE 2 DIABETES MELLITUS WITH HYPERGLYCEMIA, WITHOUT LONG-TERM CURRENT USE OF INSULIN (HCC): ICD-10-CM

## 2020-11-10 DIAGNOSIS — M54.50 CHRONIC MIDLINE LOW BACK PAIN WITHOUT SCIATICA: ICD-10-CM

## 2020-11-10 DIAGNOSIS — G89.4 CHRONIC PAIN DISORDER: Primary | ICD-10-CM

## 2020-11-10 PROCEDURE — 99214 OFFICE O/P EST MOD 30 MIN: CPT | Performed by: PHYSICIAN ASSISTANT

## 2020-11-10 RX ORDER — CARVEDILOL 25 MG/1
25 TABLET ORAL EVERY EVENING
COMMUNITY
End: 2021-06-24

## 2020-11-10 RX ORDER — CARVEDILOL 25 MG/1
25 TABLET ORAL 2 TIMES DAILY WITH MEALS
COMMUNITY
End: 2020-11-10

## 2020-11-10 RX ORDER — CARVEDILOL 12.5 MG/1
12.5 TABLET ORAL
COMMUNITY
End: 2020-11-23

## 2020-11-10 NOTE — PROGRESS NOTES
Chief Complaint   Patient presents with   • Nutrition Counseling       HPI     Frances Hartman is a pleasant 34 y.o. female who is here for routine follow-up of obesity, hypertension, diabetes type 2.  Patient has established with bariatric surgery and would like to proceed with surgical intervention.  She has been working on weight loss for a long time without success.  We discussed diet and exercise today.  She admits to drinking a lot of fruit juice, has cut out sodas.  Doesn't eat a lot of sweets.  Not really able to exercise due to chronic back pain which is managed by pain management.  Encouraged walking a few times a day for short periods of time to increase activity level.    Blood pressure is stable and well-controlled today.  Followed by cardiology.    She reports that her fasting glucose levels have been elevated in the 170s since switching from metformin XL to regular metformin.  She is also taking glipizide 5 mg in AM and 10 mg in PM.    Past Medical History:   Diagnosis Date   • Ankle swelling    • Anxiety    • Asthma     prn inhalers, does not follow w/ pulmonary   • Chronic back pain     previously followed w/ pain management, now just prn Tylenol + Gabapentin   • Diabetes mellitus (CMS/HCC)     Type II, dx 2014, never on insulin, A1C 7.6   • Dyspepsia    • Dyspnea on exertion    • Fatigue    • Heartburn     chronic, prn TUMS, denies prior eval   • Hirsutism    • Hx of radiation therapy     for treatments of Keloids   • Hypertension    • Irregular menses     infrequent spotting   • Leiomyoma, subserous     possible peduculated f3-4 cm fibroid on u/s at [facility]   • Migraines     botox q3 months, following Neurology @ [facility]   • Morbid obesity (CMS/HCC)    • Polycystic ovaries     severe insulin resistance/hirsuitism   • Seasonal allergies        Past Surgical History:   Procedure Laterality Date   • KELOID EXCISION      1990,1993,1999,2015,2016,2019   • LAPAROSCOPIC CHOLECYSTECTOMY  2000    for stones  "  • RADIOFREQUENCY ABLATION  2019    x 2   • UMBILICAL HERNIA REPAIR  1990   • WISDOM TOOTH EXTRACTION  1994       Family History   Problem Relation Age of Onset   • Arthritis Mother    • Diabetes Mother    • Obesity Mother    • Arrhythmia Mother    • Hypertension Mother    • Dementia Father    • Heart attack Father    • Stroke Other         CVA   • Obesity Other    • Ovarian cancer Maternal Aunt         40's   • Breast cancer Paternal Aunt         30's   • Heart disease Other    • Hypertension Other    • Endometrial cancer Neg Hx        Social History     Socioeconomic History   • Marital status: Single     Spouse name: Not on file   • Number of children: Not on file   • Years of education: Not on file   • Highest education level: Not on file   Occupational History   • Occupation: Customer Service   Tobacco Use   • Smoking status: Never Smoker   • Smokeless tobacco: Never Used   Substance and Sexual Activity   • Alcohol use: Yes     Frequency: Monthly or less   • Drug use: No   • Sexual activity: Yes     Partners: Male     Birth control/protection: Condom   Social History Narrative    Patient lives in [place], [place], single. Unemployed.       Allergies   Allergen Reactions   • Hydrochlorothiazide Swelling   • Monosodium Glutamate Swelling and Headache   • Oatmeal Other (See Comments)     Dx on allergy testing   • Amitriptyline Mental Status Change     memory gaps + neuropathy + hair loss   • Aspartame Nausea Only   • Codeine Headache         • Diclofenac Headache   • Doxycycline Rash and Hallucinations   • Eggs Or Egg-Derived Products Other (See Comments)     dx on allergy testing, but does not completely avoid   • Naproxen Other (See Comments)     \"blackout\"       ROS  Review of Systems   Constitutional: Positive for fatigue. Negative for chills and fever.   Musculoskeletal: Positive for arthralgias and back pain. Negative for gait problem.   Neurological: Negative for dizziness and headache.       Vitals:    " 11/10/20 1146   BP: 124/88   Pulse: 74   SpO2: 99%   Weight: 112 kg (248 lb)     Body mass index is 47.64 kg/m².    Current Outpatient Medications on File Prior to Visit   Medication Sig Dispense Refill   • Acetaminophen (TYLENOL ARTHRITIS PAIN PO) Take  by mouth As Needed.     • albuterol (PROVENTIL HFA;VENTOLIN HFA) 108 (90 BASE) MCG/ACT inhaler Inhale 2 puffs Every 4 (Four) Hours As Needed for wheezing.     • calcium carbonate EX (Antacid Flavor Chews) 750 MG chewable tablet      • carvedilol (COREG) 12.5 MG tablet Take 12.5 mg by mouth Daily With Breakfast.     • carvedilol (COREG) 25 MG tablet Take 25 mg by mouth Daily With Dinner.     • CloNIDine (CATAPRES) 0.2 MG tablet Take 1 tablet by mouth 2 (Two) Times a Day. (Patient taking differently: Take 0.2 mg by mouth As Needed.) 60 tablet 5   • cyclobenzaprine (FLEXERIL) 10 MG tablet Take 10 mg by mouth 3 (Three) Times a Day As Needed for muscle spasms.     • diphenhydrAMINE (BENADRYL) 25 mg capsule Take 25 mg by mouth Every 6 (Six) Hours As Needed for itching.     • fluconazole (DIFLUCAN) 200 MG tablet Take 1 tablet by mouth 2 (Two) Times a Day. 180 tablet 3   • furosemide (LASIX) 20 MG tablet Take 1-2 tablets nightly. 135 tablet 1   • gabapentin (NEURONTIN) 300 MG capsule Take 300 mg by mouth 3 (Three) Times a Day As Needed.     • glipizide (GLUCOTROL) 5 MG tablet Take 1 tablet in the morning, 2 tablets at night. 270 tablet 0   • glucose blood test strip Use as instructed 100 each 11   • guaiFENesin 200 MG tablet 200 mg As Needed.     • HYDROcodone-acetaminophen (NORCO) 5-325 MG per tablet Take 1-2 tablets by mouth Every 6 (Six) Hours As Needed for Severe Pain . 15 tablet 0   • levonorgestrel (Mirena, 52 MG,) 20 MCG/24HR IUD      • loratadine (CLARITIN) 10 MG tablet Take 1 tablet by mouth Daily. 90 tablet 3   • metFORMIN (Glucophage) 500 MG tablet Take 1 tablet by mouth Every Night. 90 tablet 1   • OnabotulinumtoxinA 200 units reconstituted solution Inject 200  units IM into head neck shoulders every 12 weeks per FDA protocol. Dx G43.709 1 each 3   • potassium chloride (MICRO-K) 10 MEQ CR capsule 10 mEq As Needed.     • simethicone (MYLICON) 125 MG chewable tablet Chew 125 mg Every 6 (Six) Hours As Needed for Flatulence.     • spironolactone (ALDACTONE) 100 MG tablet Take 100 mg by mouth. Takes 30 days on,  off 30 days.     • traMADol (ULTRAM) 50 MG tablet Take 50 mg by mouth Every 6 (Six) Hours As Needed for moderate pain (4-6).     • [DISCONTINUED] carvedilol (COREG) 25 MG tablet Take 1 tablet by mouth 2 (Two) Times a Day With Meals. 180 tablet 1   • [DISCONTINUED] carvedilol (COREG) 25 MG tablet Take 25 mg by mouth 2 (Two) Times a Day With Meals. Take 0.5 tablet in morning, full tablet in evening     • ibuprofen (ADVIL,MOTRIN) 600 MG tablet As Needed.     • [DISCONTINUED] Cholecalciferol (VITAMIN D3 GUMMIES ADULT PO) Daily.       No current facility-administered medications on file prior to visit.        Results for orders placed or performed in visit on 11/08/20   COVID-19,[facility], NP SWAB IN [place] VIRAL TRANSPORT MEDIA 24-30 HR TAT - Swab, Nasopharynx    Specimen: Nasopharynx; Swab   Result Value Ref Range    SARS-CoV-2 [place] Not Detected Not Detected       PE    Physical Exam  Vitals signs reviewed.   Constitutional:       General: She is not in acute distress.     Appearance: Normal appearance. She is well-developed. She is morbidly obese. She is not ill-appearing or diaphoretic.   HENT:      Head: Normocephalic and atraumatic.   Eyes:      Extraocular Movements: Extraocular movements intact.      Conjunctiva/sclera: Conjunctivae normal.   Neck:      Musculoskeletal: Normal range of motion.   Pulmonary:      Effort: No respiratory distress.   Musculoskeletal: Normal range of motion.      Right lower leg: No edema.      Left lower leg: No edema.   Skin:     General: Skin is warm.      Findings: No erythema or rash.   Neurological:      General: No focal deficit  present.      Mental Status: She is alert.   Psychiatric:         Attention and Perception: She is attentive.         Mood and Affect: Mood normal.         Speech: Speech normal.         Behavior: Behavior normal. Behavior is cooperative.         Thought Content: Thought content normal.         Judgment: Judgment normal.         A/P    Diagnoses and all orders for this visit:    1. Morbid obesity (CMS/HCC) (Primary)  Established with bariatric surgery.  Discussed nutritional and exercise recommendations.  Stop drinking high calorie drinks like fruit juice and soda.  More water.  Try to walk three times a day for 10 minute increments.  Portion control, monitor calories at home.  Return in 1 month for evaluation.    2. Essential hypertension  Stable, well-controlled.  Followed by cardiology.    3. Type 2 diabetes mellitus with hyperglycemia, without long-term current use of insulin (CMS/[facility])  Recent hemoglobin AIC was 7.6%, repeat in 1 month.  Increase glipizide to 10 mg BID.  Discuss switching back to metformin XL with pharmacist.  Will prescribe if they have it available and covered by insurance.       Plan of care reviewed with patient at the conclusion of today's visit. Education was provided regarding diagnosis, management and any prescribed or recommended OTC medications.  Patient verbalizes understanding of and agreement with management plan.    Return in about 4 weeks (around 12/8/2020) for Recheck, weight loss appointment.     Lesli Ramirez PA-C  Answers for HPI/ROS submitted by the patient on 11/4/2020   What is the primary reason for your visit?: Physical

## 2020-11-10 NOTE — TELEPHONE ENCOUNTER
PATIENT CALLED AND STATED SHE HAS TO CHANGE PAIN MGMT PROVIDERS.  SHE WOULD LIKE A REFERRAL FOR A NEW PAIN MGMT DOCTOR PREFERABLY IN THE [facility].    PLEASE CONTACT PATIENT TO ADVISE.    CALLBACK:  [phone]

## 2020-11-11 ENCOUNTER — HOSPITAL ENCOUNTER (OUTPATIENT)
Dept: PULMONOLOGY | Facility: HOSPITAL | Age: 35
Discharge: HOME | End: 2020-11-11
Admitting: PHYSICIAN ASSISTANT

## 2020-11-11 DIAGNOSIS — J45.30 MILD PERSISTENT ASTHMA WITHOUT COMPLICATION: ICD-10-CM

## 2020-11-11 PROCEDURE — 94727 GAS DIL/WSHOT DETER LNG VOL: CPT

## 2020-11-11 PROCEDURE — 94010 BREATHING CAPACITY TEST: CPT | Performed by: INTERNAL MEDICINE

## 2020-11-11 PROCEDURE — 94727 GAS DIL/WSHOT DETER LNG VOL: CPT | Performed by: INTERNAL MEDICINE

## 2020-11-11 PROCEDURE — 94729 DIFFUSING CAPACITY: CPT

## 2020-11-11 PROCEDURE — 94729 DIFFUSING CAPACITY: CPT | Performed by: INTERNAL MEDICINE

## 2020-11-11 PROCEDURE — 94010 BREATHING CAPACITY TEST: CPT

## 2020-11-11 NOTE — TELEPHONE ENCOUNTER
Please let patient know that pain management at [facility] does not generally manage narcotics.  Most pain management providers at this time do not prescribe controlled substances and prefer injections/procedures for managing pain.  I will place a referral for Dr. Zayas, a pain doctor, and defer to him for appropriate management.

## 2020-11-11 NOTE — TELEPHONE ENCOUNTER
Spoke with patient she has been with [facility] pain management clinic for over 1 yr but says when she was referred the doctor had already looked over her chart notes and determined she would only get injections no controlled substances, 2 weeks ago her back went out and she used the rest of the pain medication she had at her appointment yesterday they told her they would not refill her Norco, Tramadol, Gabapentin or Flexeril because they do not prescribe her controlled substance. Patient is upset that a doctor could just look over notes and ask you questions and determine no controlled medications will be given. She has 3-4 Gabapentin and a few Flexeril but no Tramadol or Norco. She says [facility] is going to write her a letter so she can see a different pain clinic she is asking to be referred within the [facility] system.

## 2020-11-11 NOTE — TELEPHONE ENCOUNTER
Contacted patient and relayed message. She is requesting narcotic because her pain is very severe and states she is unable to move.     I advised her that [facility] is unable to prescribed the narcotics she has been using from previous PCP's. She is very unhappy with [facility] and refuses to return to their office. I advised her that a new referral has been initiated and she will receive detail info from our referral coordinator. She verbalized understanding and agreed

## 2020-11-23 RX ORDER — CARVEDILOL 12.5 MG/1
TABLET ORAL
Qty: 90 TABLET | Refills: 3 | Status: SHIPPED | OUTPATIENT
Start: 2020-11-23 | End: 2021-06-24

## 2020-11-25 ENCOUNTER — TELEPHONE (OUTPATIENT)
Dept: NEUROLOGY | Facility: CLINIC | Age: 35
End: 2020-11-25

## 2020-12-01 NOTE — TELEPHONE ENCOUNTER
I spoke with patient. Informed her that botox delivery is scheduled for today. She is scheduled for 12/3/20. If for some reason our office does not receive botox, we will contact her to reschedule. Patient verbalized understanding.   -TMT

## 2020-12-08 ENCOUNTER — OFFICE VISIT (OUTPATIENT)
Dept: FAMILY MEDICINE CLINIC | Facility: CLINIC | Age: 35
End: 2020-12-08

## 2020-12-08 VITALS
HEART RATE: 75 BPM | WEIGHT: 248.6 LBS | SYSTOLIC BLOOD PRESSURE: 142 MMHG | HEIGHT: 61 IN | RESPIRATION RATE: 18 BRPM | BODY MASS INDEX: 46.93 KG/M2 | OXYGEN SATURATION: 98 % | DIASTOLIC BLOOD PRESSURE: 92 MMHG

## 2020-12-08 DIAGNOSIS — G89.29 CHRONIC MIDLINE THORACIC BACK PAIN: ICD-10-CM

## 2020-12-08 DIAGNOSIS — M54.6 CHRONIC MIDLINE THORACIC BACK PAIN: ICD-10-CM

## 2020-12-08 DIAGNOSIS — E66.01 MORBID OBESITY (HCC): ICD-10-CM

## 2020-12-08 DIAGNOSIS — E11.65 TYPE 2 DIABETES MELLITUS WITH HYPERGLYCEMIA, WITHOUT LONG-TERM CURRENT USE OF INSULIN (HCC): Primary | ICD-10-CM

## 2020-12-08 DIAGNOSIS — G89.4 CHRONIC PAIN SYNDROME: ICD-10-CM

## 2020-12-08 LAB
EXPIRATION DATE: ABNORMAL
HBA1C MFR BLD: 7.7 %
Lab: ABNORMAL

## 2020-12-08 PROCEDURE — 99214 OFFICE O/P EST MOD 30 MIN: CPT | Performed by: PHYSICIAN ASSISTANT

## 2020-12-08 PROCEDURE — 83036 HEMOGLOBIN GLYCOSYLATED A1C: CPT | Performed by: PHYSICIAN ASSISTANT

## 2020-12-08 RX ORDER — CYCLOBENZAPRINE HCL 10 MG
10 TABLET ORAL 3 TIMES DAILY PRN
Qty: 30 TABLET | Refills: 0 | Status: SHIPPED | OUTPATIENT
Start: 2020-12-08 | End: 2021-11-30 | Stop reason: HOSPADM

## 2020-12-08 RX ORDER — METFORMIN HYDROCHLORIDE 500 MG/1
500 TABLET, EXTENDED RELEASE ORAL
Qty: 90 TABLET | Refills: 1 | Status: SHIPPED | OUTPATIENT
Start: 2020-12-08 | End: 2021-04-09 | Stop reason: SDUPTHER

## 2020-12-08 NOTE — PATIENT INSTRUCTIONS
Try to increase metformin 500 mg twice a day with meals.    Contact Dr. Astudillo about blood pressure.  Okay to try carvedilol 12.5 mg twice a day.    Obtain records of back imaging from current pain doctor.    Try calling [facility].  [phone]    Consider cymbalta (dulextine) 30 mg to 60 mg

## 2020-12-08 NOTE — PROGRESS NOTES
"Chief Complaint   Patient presents with   • Obesity     Pt here for 4 wk f/u; says she's not doing well and her 'life is falling apart\" since last time she was here; she says she's had health issues and a little bit of everything       HPI     Frances Hartman is a pleasant 35 y.o. female who is here for obesity, diabetes type 2 and chronic pain syndrome.  Patient admits she has not been eating as well as she was previously.  She is taking glipizide 10 mg BID.  Taking metformin 500 mg at night.  Thinks that the extended-release metformin was better.  No diarrhea or issues with metformin.  Declines trying ozempic or injectable medicine.     Patient's main concern today is the fact that her pain management doctor has refused to prescribe norco, gabapentin or flexeril going forward.  She states she has been compliant and only uses medication prn and is only getting on refill a year.  She reports low back pain with radiculopathy that episodically flares-up and she takes norco and gabpentin for this.  She occasionally has upper thoracic/shoulder pain that the flexeril helps with.    She is morbidly obese and plans on having bariatric surgery.  She forgot her paperwork today.  Not exercising due to chronic pain.    Reports worsening depression recently due to pain issues.  She declines trying any new medications including cymbalta.    Past Medical History:   Diagnosis Date   • Ankle swelling    • Anxiety    • Asthma     prn inhalers, does not follow w/ pulmonary   • Chronic back pain     previously followed w/ pain management, now just prn Tylenol + Gabapentin   • Diabetes mellitus (CMS/HCC)     Type II, dx 2014, never on insulin, A1C 7.6   • Dyspepsia    • Dyspnea on exertion    • Fatigue    • Heartburn     chronic, prn TUMS, denies prior eval   • Hirsutism    • Hx of radiation therapy     for treatments of Keloids   • Hypertension    • Irregular menses     infrequent spotting   • Leiomyoma, subserous     possible " peduculated f3-4 cm fibroid on u/s at [facility]   • Migraines     botox q3 months, following Neurology @ [facility]   • Morbid obesity (CMS/HCC)    • Polycystic ovaries     severe insulin resistance/hirsuitism   • Seasonal allergies        Past Surgical History:   Procedure Laterality Date   • KELOID EXCISION      1990,1993,1999,2015,2016,2019   • LAPAROSCOPIC CHOLECYSTECTOMY  2000    for stones   • RADIOFREQUENCY ABLATION  2019    x 2   • UMBILICAL HERNIA REPAIR  1990   • WISDOM TOOTH EXTRACTION  1994       Family History   Problem Relation Age of Onset   • Arthritis Mother    • Diabetes Mother    • Obesity Mother    • Arrhythmia Mother    • Hypertension Mother    • Dementia Father    • Heart attack Father    • Stroke Other         CVA   • Obesity Other    • Ovarian cancer Maternal Aunt         40's   • Breast cancer Paternal Aunt         30's   • Heart disease Other    • Hypertension Other    • Endometrial cancer Neg Hx        Social History     Socioeconomic History   • Marital status: Single     Spouse name: Not on file   • Number of children: Not on file   • Years of education: Not on file   • Highest education level: Not on file   Occupational History   • Occupation: Customer Service   Tobacco Use   • Smoking status: Never Smoker   • Smokeless tobacco: Never Used   Substance and Sexual Activity   • Alcohol use: Yes     Frequency: Monthly or less   • Drug use: No   • Sexual activity: Yes     Partners: Male     Birth control/protection: Condom   Social History Narrative    Patient lives in [place], [place], single. Unemployed.       Allergies   Allergen Reactions   • Hydrochlorothiazide Swelling   • Monosodium Glutamate Swelling and Headache   • Oatmeal Other (See Comments)     Dx on allergy testing   • Amitriptyline Mental Status Change     memory gaps + neuropathy + hair loss   • Aspartame Nausea Only   • Codeine Headache         • Diclofenac Headache   • Doxycycline Rash and Hallucinations   • Eggs Or Egg-Derived Products  "Other (See Comments)     dx on allergy testing, but does not completely avoid   • Naproxen Other (See Comments)     \"blackout\"       ROS  Review of Systems   Constitutional: Positive for fatigue. Negative for chills, diaphoresis and fever.   HENT: Negative for congestion, postnasal drip and rhinorrhea.    Respiratory: Negative for cough, shortness of breath and wheezing.    Cardiovascular: Negative for chest pain and leg swelling.   Musculoskeletal: Positive for arthralgias, back pain and myalgias. Negative for gait problem.   Neurological: Negative for dizziness and headache.   Psychiatric/Behavioral: Positive for depressed mood and stress. Negative for self-injury, sleep disturbance and suicidal ideas. The patient is not nervous/anxious.        Vitals:    12/08/20 1434   BP: 142/92   Pulse: 75   Resp: 18   SpO2: 98%   Weight: 113 kg (248 lb 9.6 oz)   Height: 153.7 cm (60.5\")     Body mass index is 47.75 kg/m².    Current Outpatient Medications on File Prior to Visit   Medication Sig Dispense Refill   • Acetaminophen (TYLENOL ARTHRITIS PAIN PO) Take  by mouth As Needed.     • albuterol (PROVENTIL HFA;VENTOLIN HFA) 108 (90 BASE) MCG/ACT inhaler Inhale 2 puffs Every 4 (Four) Hours As Needed for wheezing.     • calcium carbonate EX (Antacid Flavor Chews) 750 MG chewable tablet      • carvedilol (COREG) 12.5 MG tablet TAKE ONE TABLET BY MOUTH EVERY EVENING 90 tablet 3   • carvedilol (COREG) 25 MG tablet Take 25 mg by mouth Daily With Dinner.     • CloNIDine (CATAPRES) 0.2 MG tablet Take 1 tablet by mouth 2 (Two) Times a Day. (Patient taking differently: Take 0.2 mg by mouth As Needed.) 60 tablet 5   • diphenhydrAMINE (BENADRYL) 25 mg capsule Take 25 mg by mouth Every 6 (Six) Hours As Needed for itching.     • fluconazole (DIFLUCAN) 200 MG tablet Take 1 tablet by mouth 2 (Two) Times a Day. 180 tablet 3   • furosemide (LASIX) 20 MG tablet Take 1-2 tablets nightly. 135 tablet 1   • gabapentin (NEURONTIN) 300 MG capsule " Take 300 mg by mouth 3 (Three) Times a Day As Needed.     • glipizide (GLUCOTROL) 5 MG tablet Take 1 tablet in the morning, 2 tablets at night. 270 tablet 0   • glucose blood test strip Use as instructed 100 each 11   • guaiFENesin 200 MG tablet 200 mg As Needed.     • HYDROcodone-acetaminophen (NORCO) 5-325 MG per tablet Take 1-2 tablets by mouth Every 6 (Six) Hours As Needed for Severe Pain . 15 tablet 0   • ibuprofen (ADVIL,MOTRIN) 600 MG tablet As Needed.     • levonorgestrel (Mirena, 52 MG,) 20 MCG/24HR IUD      • loratadine (CLARITIN) 10 MG tablet Take 1 tablet by mouth Daily. 90 tablet 3   • OnabotulinumtoxinA 200 units reconstituted solution Inject 200 units IM into head neck shoulders every 12 weeks per FDA protocol. Dx G43.709 1 each 3   • potassium chloride (MICRO-K) 10 MEQ CR capsule 10 mEq As Needed.     • simethicone (MYLICON) 125 MG chewable tablet Chew 125 mg Every 6 (Six) Hours As Needed for Flatulence.     • spironolactone (ALDACTONE) 100 MG tablet Take 100 mg by mouth. Takes 30 days on,  off 30 days.     • traMADol (ULTRAM) 50 MG tablet Take 50 mg by mouth Every 6 (Six) Hours As Needed for moderate pain (4-6).     • [DISCONTINUED] cyclobenzaprine (FLEXERIL) 10 MG tablet Take 10 mg by mouth 3 (Three) Times a Day As Needed for muscle spasms.     • [DISCONTINUED] metFORMIN (Glucophage) 500 MG tablet Take 1 tablet by mouth Every Night. 90 tablet 1     No current facility-administered medications on file prior to visit.        Results for orders placed or performed in visit on 12/08/20   POC Glycosylated Hemoglobin (Hb A1C)    Specimen: Blood   Result Value Ref Range    Hemoglobin A1C 7.7 %    Lot Number [id number]     Expiration Date 11,161,983        PE    Physical Exam  Vitals signs reviewed.   Constitutional:       General: She is not in acute distress.     Appearance: Normal appearance. She is well-developed. She is morbidly obese. She is not ill-appearing or diaphoretic.   HENT:      Head:  Normocephalic and atraumatic.   Eyes:      Extraocular Movements: Extraocular movements intact.      Conjunctiva/sclera: Conjunctivae normal.   Neck:      Musculoskeletal: Normal range of motion.   Cardiovascular:      Rate and Rhythm: Normal rate and regular rhythm.      Heart sounds: Normal heart sounds.   Pulmonary:      Effort: Pulmonary effort is normal.      Breath sounds: Normal breath sounds.   Musculoskeletal: Normal range of motion.      Right lower leg: No edema.      Left lower leg: No edema.   Skin:     General: Skin is warm.      Findings: No erythema or rash.   Neurological:      General: No focal deficit present.      Mental Status: She is alert.   Psychiatric:         Attention and Perception: Attention and perception normal. She is attentive.         Mood and Affect: Mood is depressed. Affect is tearful.         Speech: Speech normal.         Behavior: Behavior normal. Behavior is cooperative.         Thought Content: Thought content normal.         Cognition and Memory: Cognition and memory normal.         Judgment: Judgment normal.         A/P    Diagnoses and all orders for this visit:    1. Type 2 diabetes mellitus with hyperglycemia, without long-term current use of insulin (CMS/[facility]) (Primary)  -     POC Glycosylated Hemoglobin (Hb A1C)  -     metFORMIN ER (GLUCOPHAGE-XR) 500 MG 24 hr tablet; Take 1 tablet by mouth Daily With Dinner.  Dispense: 90 tablet; Refill: 1  Continue glipizide 10 mg BID.  Switch to metformin ER and take BID.  Previous hemoglobin AIC was 7.6%, today it is 7.7%.    2. Morbid obesity (CMS/HCC)  Admits she has been eating worse lately.  Not exercising due to chronic pain.  Pending bariatric surgery.  Forgot paperwork today.    3. Chronic pain syndrome  4. Chronic midline thoracic back pain  -     cyclobenzaprine (FLEXERIL) 10 MG tablet; Take 1 tablet by mouth 3 (Three) Times a Day As Needed for Muscle Spasms.  Dispense: 30 tablet; Refill: 0  Asked patient to get imaging  from pain management.  May consider taking over gabapentin prescription.  Will need UDS, CSC and rhett.  Advised patient multiple times that we can not manage tramadol or norco for her.       Plan of care reviewed with patient at the conclusion of today's visit. Education was provided regarding diagnosis, management and any prescribed or recommended OTC medications.  Patient verbalizes understanding of and agreement with management plan.    No follow-ups on file.     Lesli Ramirez PA-C  Answers for HPI/ROS submitted by the patient on 12/1/2020   What is the primary reason for your visit?: Physical

## 2020-12-09 ENCOUNTER — PROCEDURE VISIT (OUTPATIENT)
Dept: NEUROLOGY | Facility: CLINIC | Age: 35
End: 2020-12-09

## 2020-12-09 DIAGNOSIS — G43.711 CHRONIC MIGRAINE WITHOUT AURA, WITH INTRACTABLE MIGRAINE, SO STATED, WITH STATUS MIGRAINOSUS: Primary | ICD-10-CM

## 2020-12-09 PROCEDURE — 64615 CHEMODENERV MUSC MIGRAINE: CPT | Performed by: NURSE PRACTITIONER

## 2020-12-09 NOTE — PROGRESS NOTES
Botulinum Toxin Injection Procedure    History:  Response to treatment has reduced HA's at least 7 fewer days a month and/or 100 hours fewer each month.     Pre-operative diagnosis: headache    Post-operative diagnosis: Same    Procedure: Chemical neurolysis    After risks and benefits were explained including bleeding, infection, worsening of pain, damage to the areas being injected, weakness of muscles, loss of muscle control, dysphagia if injecting the head or neck, facial droop if injecting the facial area, painful injection, allergic or other reaction to the medications being injected, and the failure of the procedure to help the problem, a signed consent was obtained.      The patient was placed in a comfortable area and the sites to be treated were identified. A time out was called and performed. The area to be treated was prepped three times with alcohol and the alcohol allowed to dry. Next, a 30 gauge needle was used to inject the medication in the area to be treated.    Area(s) injected: frontalis muscle, semispinalis capitis muscle and temporallis muscle    Medications used: botulinum toxin 200 mu, 2 mL of saline used to dilute the botulinum toxin.      The patient did tolerate the procedure well. There were no complications.       Physical Examination    Current Treatment (Units)   Splenius Capitis 25 units on the right and 25 units on the left.   Temporalis 25 units on the right and 25 units on the left.   Frontalis 27 units on the right and 28 units on the left.   EMG Guidance none .   Complications: none .   The total amount injected in units is 155 .   The total amount wasted in units is 45 .   The total amount submitted in units is 200 .   Botox was supplied by the patient.     Given samples of Nurtec for abortive treatment.

## 2020-12-10 ENCOUNTER — OFFICE VISIT (OUTPATIENT)
Dept: PSYCHIATRY | Facility: CLINIC | Age: 35
End: 2020-12-10

## 2020-12-10 DIAGNOSIS — G89.29 OTHER CHRONIC PAIN: ICD-10-CM

## 2020-12-10 DIAGNOSIS — F43.9 SITUATIONAL STRESS: ICD-10-CM

## 2020-12-10 DIAGNOSIS — F41.9 ANXIETY: Primary | ICD-10-CM

## 2020-12-10 DIAGNOSIS — E66.01 MORBID OBESITY (HCC): ICD-10-CM

## 2020-12-10 DIAGNOSIS — Z71.89 ENCOUNTER FOR PSYCHOLOGICAL ASSESSMENT PRIOR TO BARIATRIC SURGERY: ICD-10-CM

## 2020-12-10 PROCEDURE — 90791 PSYCH DIAGNOSTIC EVALUATION: CPT | Performed by: PSYCHOLOGIST
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
## 2021-01-04 ENCOUNTER — TELEPHONE (OUTPATIENT)
Dept: FAMILY MEDICINE CLINIC | Facility: CLINIC | Age: 36
End: 2021-01-04

## 2021-01-04 DIAGNOSIS — G89.29 CHRONIC MIDLINE LOW BACK PAIN WITHOUT SCIATICA: ICD-10-CM

## 2021-01-04 DIAGNOSIS — M54.6 CHRONIC MIDLINE THORACIC BACK PAIN: Primary | ICD-10-CM

## 2021-01-04 DIAGNOSIS — G89.29 CHRONIC MIDLINE THORACIC BACK PAIN: Primary | ICD-10-CM

## 2021-01-04 DIAGNOSIS — M54.50 CHRONIC MIDLINE LOW BACK PAIN WITHOUT SCIATICA: ICD-10-CM

## 2021-01-04 NOTE — TELEPHONE ENCOUNTER
Spoke with patient.  Last MRI lumbar spine was in 2019.  Will order repeat given ongoing chronic back pain and desire to be referred to pain management.

## 2021-01-08 ENCOUNTER — OFFICE VISIT (OUTPATIENT)
Dept: CARDIOLOGY | Facility: CLINIC | Age: 36
End: 2021-01-08

## 2021-01-08 ENCOUNTER — OFFICE VISIT (OUTPATIENT)
Dept: FAMILY MEDICINE CLINIC | Facility: CLINIC | Age: 36
End: 2021-01-08

## 2021-01-08 VITALS
DIASTOLIC BLOOD PRESSURE: 72 MMHG | HEART RATE: 62 BPM | HEIGHT: 60 IN | TEMPERATURE: 97.8 F | OXYGEN SATURATION: 97 % | WEIGHT: 246.6 LBS | BODY MASS INDEX: 48.42 KG/M2 | SYSTOLIC BLOOD PRESSURE: 118 MMHG

## 2021-01-08 VITALS
HEART RATE: 70 BPM | DIASTOLIC BLOOD PRESSURE: 88 MMHG | WEIGHT: 246.8 LBS | BODY MASS INDEX: 46.6 KG/M2 | OXYGEN SATURATION: 98 % | HEIGHT: 61 IN | SYSTOLIC BLOOD PRESSURE: 136 MMHG

## 2021-01-08 DIAGNOSIS — I10 ESSENTIAL HYPERTENSION: ICD-10-CM

## 2021-01-08 DIAGNOSIS — E78.2 MIXED HYPERLIPIDEMIA: ICD-10-CM

## 2021-01-08 DIAGNOSIS — R00.2 PALPITATIONS: Primary | ICD-10-CM

## 2021-01-08 DIAGNOSIS — E66.01 MORBID OBESITY (HCC): Primary | ICD-10-CM

## 2021-01-08 PROCEDURE — 99214 OFFICE O/P EST MOD 30 MIN: CPT | Performed by: INTERNAL MEDICINE

## 2021-01-08 PROCEDURE — 99213 OFFICE O/P EST LOW 20 MIN: CPT | Performed by: PHYSICIAN ASSISTANT

## 2021-01-08 NOTE — PROGRESS NOTES
[facility] Cardiology    Encounter Date: 2021    Patient ID: Frances Hartman is a 35 y.o. female.  : 1985     PCP: Lesli Ramirez PA-C       Chief Complaint: bp issues      PROBLEM LIST:  1. Hypertension:  a. Echo, 2014: EF 55-60%. All valves are structurally and functionally normal with no hemodynamically significant valve disease.  b. Echo, 2017: EF 65%. Mild TR with normal RVSP.  2. Hyperlipidemia.  3. Chest pain:  a. Pet MPS, 2019: EF > 70%. No evidence of reversible ischemia.  4. Palpitations:  a. 2-week Zio, 2020: SR. <1% PACs/PVCs. One short SVT (4 beats). Average HR 74 bpm.  5. DM2.  6. Morbid obesity (BMI 54.7)  7. Peripheral chronic venous insufficiency.  8. Polycystic ovaries.  9. GERD.  10. Subclinical hypothyroidism.  11. Keloid skin disorder.  12. Chronic fatigue.  13. Chronic tonsillar hypertrophy.  14. Depression.  15. Migraine with aura, onset age 8.    History of Present Illness  Patient presents today for a 6 month follow-up with a history of palpitations and cardiac risk factors. Since last visit, she has been feeling well overall from a cardiovascular standpoint. Her palpitations have improved. She notes that her blood pressure has been running low at times. It reportedly always runs below 120/80 and tends to stay in the 90's/60's. She has only been taking her clonidine as needed. She is working with the weight loss center and is preparing for bariatric surgery. Patient otherwise denies chest pain, shortness of breath, PND, edema, syncope, or presyncope at this time.      Allergies   Allergen Reactions   • Hydrochlorothiazide Swelling   • Monosodium Glutamate Swelling and Headache   • Oatmeal Other (See Comments)     Dx on allergy testing   • Amitriptyline Mental Status Change     memory gaps + neuropathy + hair loss   • Aspartame Nausea Only   • Codeine Headache         • Diclofenac Headache   • Doxycycline Rash  "and Hallucinations   • Eggs Or Egg-Derived Products Other (See Comments)     dx on allergy testing, but does not completely avoid   • Naproxen Other (See Comments)     \"blackout\"         Current Outpatient Medications:   •  Acetaminophen (TYLENOL ARTHRITIS PAIN PO), Take  by mouth As Needed., Disp: , Rfl:   •  albuterol (PROVENTIL HFA;VENTOLIN HFA) 108 (90 BASE) MCG/ACT inhaler, Inhale 2 puffs Every 4 (Four) Hours As Needed for wheezing., Disp: , Rfl:   •  calcium carbonate EX (Antacid Flavor Chews) 750 MG chewable tablet, As Needed., Disp: , Rfl:   •  carvedilol (COREG) 12.5 MG tablet, TAKE ONE TABLET BY MOUTH EVERY EVENING (Patient taking differently: 12.5 mg Every Morning.), Disp: 90 tablet, Rfl: 3  •  carvedilol (COREG) 25 MG tablet, Take 25 mg by mouth Every Evening., Disp: , Rfl:   •  CloNIDine (CATAPRES) 0.2 MG tablet, Take 1 tablet by mouth 2 (Two) Times a Day. (Patient taking differently: Take 0.2 mg by mouth As Needed.), Disp: 60 tablet, Rfl: 5  •  cyclobenzaprine (FLEXERIL) 10 MG tablet, Take 1 tablet by mouth 3 (Three) Times a Day As Needed for Muscle Spasms., Disp: 30 tablet, Rfl: 0  •  diphenhydrAMINE (BENADRYL) 25 mg capsule, Take 25 mg by mouth Every 6 (Six) Hours As Needed for itching., Disp: , Rfl:   •  fluconazole (DIFLUCAN) 200 MG tablet, Take 1 tablet by mouth 2 (Two) Times a Day., Disp: 180 tablet, Rfl: 3  •  furosemide (LASIX) 20 MG tablet, Take 1-2 tablets nightly., Disp: 135 tablet, Rfl: 1  •  glipizide (GLUCOTROL) 5 MG tablet, Take 1 tablet in the morning, 2 tablets at night. (Patient taking differently: Take 2  tablet in the morning, 2 tablets at night.), Disp: 270 tablet, Rfl: 0  •  glucose blood test strip, Use as instructed, Disp: 100 each, Rfl: 11  •  guaiFENesin 200 MG tablet, 200 mg As Needed., Disp: , Rfl:   •  HYDROcodone-acetaminophen (NORCO) 5-325 MG per tablet, Take 1-2 tablets by mouth Every 6 (Six) Hours As Needed for Severe Pain ., Disp: 15 tablet, Rfl: 0  •  levonorgestrel " (Mirena, 52 MG,) 20 MCG/24HR IUD, , Disp: , Rfl:   •  loratadine (CLARITIN) 10 MG tablet, Take 1 tablet by mouth Daily., Disp: 90 tablet, Rfl: 3  •  metFORMIN ER (GLUCOPHAGE-XR) 500 MG 24 hr tablet, Take 1 tablet by mouth Daily With Dinner., Disp: 90 tablet, Rfl: 1  •  OnabotulinumtoxinA 200 units reconstituted solution, Inject 200 units IM into head neck shoulders every 12 weeks per FDA protocol. Dx G43.709, Disp: 1 each, Rfl: 3  •  potassium chloride (MICRO-K) 10 MEQ CR capsule, 10 mEq As Needed., Disp: , Rfl:   •  simethicone (MYLICON) 125 MG chewable tablet, Chew 125 mg Every 6 (Six) Hours As Needed for Flatulence., Disp: , Rfl:   •  spironolactone (ALDACTONE) 100 MG tablet, Take 100 mg by mouth. Takes 30 days on,  off 30 days., Disp: , Rfl:   •  traMADol (ULTRAM) 50 MG tablet, Take 50 mg by mouth Every 6 (Six) Hours As Needed for moderate pain (4-6)., Disp: , Rfl:   •  gabapentin (NEURONTIN) 300 MG capsule, Take 300 mg by mouth 3 (Three) Times a Day As Needed., Disp: , Rfl:     The following portions of the patient's history were reviewed and updated as appropriate: allergies, current medications, past family history, past medical history, past social history, past surgical history and problem list.    ROS  Review of Systems   Constitution: Negative for chills, fever, fatigue, generalized weakness.   Cardiovascular: Negative for chest pain, dyspnea on exertion, leg swelling, orthopnea, and syncope. Positive for palpitations.  Respiratory: Negative for cough, shortness of breath, and wheezing.  HENT: Negative for ear pain, nosebleeds, and tinnitus.  Gastrointestinal: Negative for abdominal pain, constipation, diarrhea, nausea and vomiting.   Genitourinary: No urinary symptoms.  Musculoskeletal: Negative for muscle cramps.  Neurological: Negative for dizziness, headaches, loss of balance, numbness, and symptoms of stroke.  Psychiatric: Normal mental status.     All other systems reviewed and are negative.       "  Objective:     /72 (BP Location: Left arm, Patient Position: Sitting)   Pulse 62   Temp 97.8 °F (36.6 °C)   Ht 152.4 cm (60\")   Wt 112 kg (246 lb 9.6 oz)   SpO2 97%   BMI 48.16 kg/m²      Physical Exam  Constitutional: Patient appears well-developed and well-nourished.   HENT: HEENT exam unremarkable.   Neck: Neck supple. No JVD present. No carotid bruits.   Cardiovascular: Normal rate, regular rhythm and normal heart sounds. No murmur heard.   2+ symmetric pulses.   Pulmonary/Chest: Breath sounds normal. Does not exhibit tenderness.   Abdominal: Abdomen benign.   Musculoskeletal: Does not exhibit edema.   Neurological: Neurological exam unremarkable.   Vitals reviewed.    Data Review:   Lab Results   Component Value Date    GLUCOSE 160 (H) 06/04/2020    BUN 5 (L) 06/04/2020    CREATININE 0.76 06/04/2020    EGFRIFAFRI 104 06/04/2020    BCR 6.6 (L) 06/04/2020    K 3.9 06/04/2020    CO2 19.5 (L) 06/04/2020    CALCIUM 9.4 06/04/2020    ALBUMIN 4.20 06/04/2020    AST 27 06/04/2020    ALT 21 06/04/2020     Lab Results   Component Value Date    CHOL 194 06/04/2020    TRIG 214 (H) 06/04/2020    HDL 30 (L) 06/04/2020     (H) 06/04/2020      Lab Results   Component Value Date    WBC 9.96 06/04/2020    RBC 3.66 (L) 06/04/2020    HGB 11.7 (L) 06/04/2020    HCT 35.4 06/04/2020    MCV 96.7 06/04/2020     06/04/2020     Lab Results   Component Value Date    TSH 4.050 06/04/2020     Lab Results   Component Value Date    HGBA1C 7.7 12/08/2020        Procedures       Assessment:      Diagnosis Plan   1. Palpitations  Stable; continue carvedilol for rate control.    2. Essential hypertension  Well-controlled; take lasix only as needed and continue all other medications.    3. Mixed hyperlipidemia  Acceptable control; continue current medications.     Plan:   Stable cardiac status.  Her palpitations are well controlled.  Patient has been in a weight loss program and awaiting bariatric surgery.  States that " her blood pressure has been fluctuating and runs low at times.  She uses clonidine rarely for severe hypertension only.  At this time I have recommended that she should take Lasix and potassium only as needed for volume retention or weight gain of more than 2 pounds over 24 hours.  Continue all other current medications.   FU in 6 MO, sooner as needed.  Thank you for allowing us to participate in the care of your patient.     Scribed for Julianna Astudillo MD by Sarah Evans. 1/8/2021  15:46 EST      I, Julianna Astudillo MD, personally performed the services described in this documentation as scribed by the above named individual in my presence, and it is both accurate and complete.  1/8/2021  16:50 EST        Please note that portions of this note may have been completed with a voice recognition program. Efforts were made to edit the dictations, but occasionally words are mistranscribed.

## 2021-01-08 NOTE — PROGRESS NOTES
Chief Complaint   Patient presents with   • Weight Loss       HPI     Frances Hartman is a pleasant 35 y.o. female who is here for routine follow-up of morbid obesity.  Patient is currently being followed by bariatric surgery and is in the process of scheduling surgery for weight loss.  She has been working on diet and exercise for the last several months.  She reports that she has given up all of her soda and sugary beverages.  Watching her intake of baked goods and processed sugar during the holidays.  Has been monitoring her fasting glucose and the values have been improved recently.  Patient has chronic pain especially in her back.  Unable to do a lot of physical activity, tries to park far away from entrance doors and walk more.  Pending MRI next Thursday and new pain management referral.    Past Medical History:   Diagnosis Date   • Ankle swelling    • Anxiety    • Asthma     prn inhalers, does not follow w/ pulmonary   • Chronic back pain     previously followed w/ pain management, now just prn Tylenol + Gabapentin   • Diabetes mellitus (CMS/HCC)     Type II, dx 2014, never on insulin, A1C 7.6   • Dyspepsia    • Dyspnea on exertion    • Fatigue    • Heartburn     chronic, prn TUMS, denies prior eval   • Hirsutism    • Hx of radiation therapy     for treatments of Keloids   • Hypertension    • Irregular menses     infrequent spotting   • Leiomyoma, subserous     possible peduculated f3-4 cm fibroid on u/s at [facility]   • Migraines     botox q3 months, following Neurology @ [facility]   • Morbid obesity (CMS/HCC)    • Polycystic ovaries     severe insulin resistance/hirsuitism   • Seasonal allergies        Past Surgical History:   Procedure Laterality Date   • KELOID EXCISION      1990,1993,1999,2015,2016,2019   • LAPAROSCOPIC CHOLECYSTECTOMY  2000    for stones   • RADIOFREQUENCY ABLATION  2019    x 2   • UMBILICAL HERNIA REPAIR  1990   • WISDOM TOOTH EXTRACTION  1994       Family History   Problem Relation Age of  "Onset   • Arthritis Mother    • Diabetes Mother    • Obesity Mother    • Arrhythmia Mother    • Hypertension Mother    • Dementia Father    • Heart attack Father    • Stroke Other         CVA   • Obesity Other    • Ovarian cancer Maternal Aunt         40's   • Breast cancer Paternal Aunt         30's   • Heart disease Other    • Hypertension Other    • Endometrial cancer Neg Hx        Social History     Socioeconomic History   • Marital status: Single     Spouse name: Not on file   • Number of children: Not on file   • Years of education: Not on file   • Highest education level: Not on file   Occupational History   • Occupation: Customer Service   Tobacco Use   • Smoking status: Never Smoker   • Smokeless tobacco: Never Used   Substance and Sexual Activity   • Alcohol use: Yes     Frequency: Monthly or less   • Drug use: No   • Sexual activity: Yes     Partners: Male     Birth control/protection: Condom   Social History Narrative    Patient lives in [place], [place], single. Unemployed.       Allergies   Allergen Reactions   • Hydrochlorothiazide Swelling   • Monosodium Glutamate Swelling and Headache   • Oatmeal Other (See Comments)     Dx on allergy testing   • Amitriptyline Mental Status Change     memory gaps + neuropathy + hair loss   • Aspartame Nausea Only   • Codeine Headache         • Diclofenac Headache   • Doxycycline Rash and Hallucinations   • Eggs Or Egg-Derived Products Other (See Comments)     dx on allergy testing, but does not completely avoid   • Naproxen Other (See Comments)     \"blackout\"       ROS  Review of Systems   Constitutional: Positive for fatigue. Negative for chills, diaphoresis and fever.   Respiratory: Negative for cough, shortness of breath and wheezing.    Cardiovascular: Negative for chest pain and leg swelling.   Neurological: Negative for dizziness and headache.   Psychiatric/Behavioral: Positive for stress. Negative for sleep disturbance, suicidal ideas and depressed mood. The " "patient is not nervous/anxious.        Vitals:    01/08/21 1314   BP: 136/88   BP Location: Left arm   Patient Position: Sitting   Cuff Size: Adult   Pulse: 70   SpO2: 98%   Weight: 112 kg (246 lb 12.8 oz)   Height: 153.7 cm (60.51\")     Body mass index is 47.39 kg/m².    Current Outpatient Medications on File Prior to Visit   Medication Sig Dispense Refill   • Acetaminophen (TYLENOL ARTHRITIS PAIN PO) Take  by mouth As Needed.     • albuterol (PROVENTIL HFA;VENTOLIN HFA) 108 (90 BASE) MCG/ACT inhaler Inhale 2 puffs Every 4 (Four) Hours As Needed for wheezing.     • calcium carbonate EX (Antacid Flavor Chews) 750 MG chewable tablet      • carvedilol (COREG) 12.5 MG tablet TAKE ONE TABLET BY MOUTH EVERY EVENING 90 tablet 3   • carvedilol (COREG) 25 MG tablet Take 25 mg by mouth Daily With Dinner.     • CloNIDine (CATAPRES) 0.2 MG tablet Take 1 tablet by mouth 2 (Two) Times a Day. (Patient taking differently: Take 0.2 mg by mouth As Needed.) 60 tablet 5   • cyclobenzaprine (FLEXERIL) 10 MG tablet Take 1 tablet by mouth 3 (Three) Times a Day As Needed for Muscle Spasms. 30 tablet 0   • diphenhydrAMINE (BENADRYL) 25 mg capsule Take 25 mg by mouth Every 6 (Six) Hours As Needed for itching.     • fluconazole (DIFLUCAN) 200 MG tablet Take 1 tablet by mouth 2 (Two) Times a Day. 180 tablet 3   • furosemide (LASIX) 20 MG tablet Take 1-2 tablets nightly. 135 tablet 1   • gabapentin (NEURONTIN) 300 MG capsule Take 300 mg by mouth 3 (Three) Times a Day As Needed.     • glipizide (GLUCOTROL) 5 MG tablet Take 1 tablet in the morning, 2 tablets at night. 270 tablet 0   • glucose blood test strip Use as instructed 100 each 11   • guaiFENesin 200 MG tablet 200 mg As Needed.     • HYDROcodone-acetaminophen (NORCO) 5-325 MG per tablet Take 1-2 tablets by mouth Every 6 (Six) Hours As Needed for Severe Pain . 15 tablet 0   • ibuprofen (ADVIL,MOTRIN) 600 MG tablet As Needed.     • levonorgestrel (Mirena, 52 MG,) 20 MCG/24HR IUD      • " loratadine (CLARITIN) 10 MG tablet Take 1 tablet by mouth Daily. 90 tablet 3   • metFORMIN ER (GLUCOPHAGE-XR) 500 MG 24 hr tablet Take 1 tablet by mouth Daily With Dinner. 90 tablet 1   • OnabotulinumtoxinA 200 units reconstituted solution Inject 200 units IM into head neck shoulders every 12 weeks per FDA protocol. Dx G43.709 1 each 3   • potassium chloride (MICRO-K) 10 MEQ CR capsule 10 mEq As Needed.     • simethicone (MYLICON) 125 MG chewable tablet Chew 125 mg Every 6 (Six) Hours As Needed for Flatulence.     • spironolactone (ALDACTONE) 100 MG tablet Take 100 mg by mouth. Takes 30 days on,  off 30 days.     • traMADol (ULTRAM) 50 MG tablet Take 50 mg by mouth Every 6 (Six) Hours As Needed for moderate pain (4-6).       No current facility-administered medications on file prior to visit.        Results for orders placed or performed in visit on 12/08/20   POC Glycosylated Hemoglobin (Hb A1C)    Specimen: Blood   Result Value Ref Range    Hemoglobin A1C 7.7 %    Lot Number [id number]     Expiration Date 11,161,983        PE    Physical Exam  Vitals signs reviewed.   Constitutional:       General: She is not in acute distress.     Appearance: Normal appearance. She is well-developed. She is morbidly obese. She is not ill-appearing or diaphoretic.   HENT:      Head: Normocephalic and atraumatic.   Eyes:      Extraocular Movements: Extraocular movements intact.      Conjunctiva/sclera: Conjunctivae normal.   Neck:      Musculoskeletal: Normal range of motion.   Cardiovascular:      Rate and Rhythm: Normal rate and regular rhythm.      Heart sounds: Normal heart sounds.   Pulmonary:      Effort: Pulmonary effort is normal.      Breath sounds: Normal breath sounds.   Musculoskeletal: Normal range of motion.      Right lower leg: No edema.      Left lower leg: No edema.   Skin:     General: Skin is warm.      Findings: No erythema or rash.   Neurological:      General: No focal deficit present.      Mental Status: She  is alert.   Psychiatric:         Attention and Perception: Attention and perception normal. She is attentive.         Mood and Affect: Mood and affect normal.         Speech: Speech normal.         Behavior: Behavior normal. Behavior is cooperative.         Thought Content: Thought content normal.         Cognition and Memory: Cognition and memory normal.         Judgment: Judgment normal.         A/P    Diagnoses and all orders for this visit:    1. Morbid obesity (CMS/HCC) (Primary)  Established with bariatric surgery.  Working on dietary changes and increasing daily activity.  Has lost 2 lbs since her previous visit a month ago.  Will return in 1 month.       Plan of care reviewed with patient at the conclusion of today's visit. Education was provided regarding diagnosis, management and any prescribed or recommended OTC medications.  Patient verbalizes understanding of and agreement with management plan.    No follow-ups on file.     Lesli Ramirez PA-C  Answers for HPI/ROS submitted by the patient on 1/6/2021   What is the primary reason for your visit?: Physical

## 2021-01-14 ENCOUNTER — HOSPITAL ENCOUNTER (OUTPATIENT)
Dept: MRI IMAGING | Facility: HOSPITAL | Age: 36
Discharge: HOME | End: 2021-01-14
Admitting: PHYSICIAN ASSISTANT

## 2021-01-14 DIAGNOSIS — G89.29 CHRONIC MIDLINE LOW BACK PAIN WITHOUT SCIATICA: ICD-10-CM

## 2021-01-14 DIAGNOSIS — M54.50 CHRONIC MIDLINE LOW BACK PAIN WITHOUT SCIATICA: ICD-10-CM

## 2021-01-14 PROCEDURE — 72148 MRI LUMBAR SPINE W/O DYE: CPT

## 2021-02-12 ENCOUNTER — OFFICE VISIT (OUTPATIENT)
Dept: FAMILY MEDICINE CLINIC | Facility: CLINIC | Age: 36
End: 2021-02-12

## 2021-02-12 VITALS
SYSTOLIC BLOOD PRESSURE: 125 MMHG | BODY MASS INDEX: 46.92 KG/M2 | TEMPERATURE: 98.7 F | HEART RATE: 82 BPM | DIASTOLIC BLOOD PRESSURE: 75 MMHG | HEIGHT: 60 IN | OXYGEN SATURATION: 99 % | WEIGHT: 239 LBS

## 2021-02-12 DIAGNOSIS — E66.01 MORBIDLY OBESE (HCC): Primary | ICD-10-CM

## 2021-02-12 DIAGNOSIS — R21 RASH AND NONSPECIFIC SKIN ERUPTION: ICD-10-CM

## 2021-02-12 DIAGNOSIS — J01.00 ACUTE NON-RECURRENT MAXILLARY SINUSITIS: ICD-10-CM

## 2021-02-12 DIAGNOSIS — G89.4 CHRONIC PAIN SYNDROME: ICD-10-CM

## 2021-02-12 DIAGNOSIS — M51.36 DEGENERATION OF LUMBAR INTERVERTEBRAL DISC: ICD-10-CM

## 2021-02-12 PROCEDURE — 99214 OFFICE O/P EST MOD 30 MIN: CPT | Performed by: PHYSICIAN ASSISTANT

## 2021-02-12 RX ORDER — AMOXICILLIN 875 MG/1
875 TABLET, COATED ORAL EVERY 12 HOURS SCHEDULED
Qty: 20 TABLET | Refills: 0 | Status: SHIPPED | OUTPATIENT
Start: 2021-02-12 | End: 2021-02-22

## 2021-02-12 NOTE — PROGRESS NOTES
Chief Complaint   Patient presents with   • Back Pain     Pt states she is having lower and upper back pain for about 2 weeks.    • Weight Loss     Pt states she has been trying to work on her portion control and she tries to exercise but she is in so much pain in her back .       HPI     Frances Hartman is a pleasant 35 y.o. female who is here for obesity.  Patient has been working on weight loss with diet and exercise.  She has lost 7 lbs on our scale.  She reports eating healthier.  Smaller portions, cooking at home, drinking more water.  Has had a lot of back pain in the last 2 weeks, hasn't been able to exercise as much.  Pending referral to pain management.    Patient also reports new onset rash on her hands.  She reports itching and redness.  She has not used any new products.  Never had this issue before.  Not currently using any OTC medication.    Patient reports facial pain and headache.  She reports visual changes and drainage from right eye.  Was already seen by ophthalmologist.  Not allergic to PCN.      Past Medical History:   Diagnosis Date   • Ankle swelling    • Anxiety    • Asthma     prn inhalers, does not follow w/ pulmonary   • Chronic back pain     previously followed w/ pain management, now just prn Tylenol + Gabapentin   • Diabetes mellitus (CMS/HCC)     Type II, dx 2014, never on insulin, A1C 7.6   • Dyspepsia    • Dyspnea on exertion    • Fatigue    • Heartburn     chronic, prn TUMS, denies prior eval   • Hirsutism    • Hx of radiation therapy     for treatments of Keloids   • Hypertension    • Irregular menses     infrequent spotting   • Leiomyoma, subserous     possible peduculated f3-4 cm fibroid on u/s at [facility]   • Migraines     botox q3 months, following Neurology @ [facility]   • Morbid obesity (CMS/HCC)    • Polycystic ovaries     severe insulin resistance/hirsuitism   • Seasonal allergies        Past Surgical History:   Procedure Laterality Date   • KELOID EXCISION       "1990,1993,1999,2015,2016,2019   • LAPAROSCOPIC CHOLECYSTECTOMY  2000    for stones   • RADIOFREQUENCY ABLATION  2019    x 2   • UMBILICAL HERNIA REPAIR  1990   • WISDOM TOOTH EXTRACTION  1994       Family History   Problem Relation Age of Onset   • Arthritis Mother    • Diabetes Mother    • Obesity Mother    • Arrhythmia Mother    • Hypertension Mother    • Dementia Father    • Heart attack Father    • Stroke Other         CVA   • Obesity Other    • Ovarian cancer Maternal Aunt         40's   • Breast cancer Paternal Aunt         30's   • Heart disease Other    • Hypertension Other    • Endometrial cancer Neg Hx        Social History     Socioeconomic History   • Marital status: Single     Spouse name: Not on file   • Number of children: Not on file   • Years of education: Not on file   • Highest education level: Not on file   Occupational History   • Occupation: Customer Service   Tobacco Use   • Smoking status: Never Smoker   • Smokeless tobacco: Never Used   Substance and Sexual Activity   • Alcohol use: Yes     Frequency: Monthly or less   • Drug use: No   • Sexual activity: Yes     Partners: Male     Birth control/protection: Condom   Social History Narrative    Patient lives in [place], [place], single. Unemployed.       Allergies   Allergen Reactions   • Hydrochlorothiazide Swelling   • Monosodium Glutamate Swelling and Headache   • Oatmeal Other (See Comments)     Dx on allergy testing   • Amitriptyline Mental Status Change     memory gaps + neuropathy + hair loss   • Aspartame Nausea Only   • Codeine Headache         • Diclofenac Headache   • Doxycycline Rash and Hallucinations   • Eggs Or Egg-Derived Products Other (See Comments)     dx on allergy testing, but does not completely avoid   • Naproxen Other (See Comments)     \"blackout\"       ROS  Review of Systems   Constitutional: Negative for chills and fever.   HENT: Positive for sinus pressure. Negative for congestion.    Musculoskeletal: Positive for back " "pain.   Skin: Positive for rash.       Vitals:    02/12/21 1442   BP: 125/75   Pulse: 82   Temp: 98.7 °F (37.1 °C)   SpO2: 99%   Weight: 108 kg (239 lb)   Height: 152.4 cm (60\")   PainSc:   8     Body mass index is 46.68 kg/m².    Current Outpatient Medications on File Prior to Visit   Medication Sig Dispense Refill   • glipizide (GLUCOTROL) 5 MG tablet Take 1 tablet in the morning, 2 tablets at night. (Patient taking differently: 10 mg. Take 1 tablet in the morning, 1 tablet at night.) 270 tablet 0   • melatonin 1 MG tablet Take 5 mg by mouth As Needed.     • Acetaminophen (TYLENOL ARTHRITIS PAIN PO) Take  by mouth As Needed.     • albuterol (PROVENTIL HFA;VENTOLIN HFA) 108 (90 BASE) MCG/ACT inhaler Inhale 2 puffs Every 4 (Four) Hours As Needed for wheezing.     • calcium carbonate EX (Antacid Flavor Chews) 750 MG chewable tablet As Needed.     • carvedilol (COREG) 12.5 MG tablet TAKE ONE TABLET BY MOUTH EVERY EVENING (Patient taking differently: 12.5 mg Every Morning.) 90 tablet 3   • carvedilol (COREG) 25 MG tablet Take 25 mg by mouth Every Evening.     • CloNIDine (CATAPRES) 0.2 MG tablet Take 1 tablet by mouth 2 (Two) Times a Day. (Patient taking differently: Take 0.2 mg by mouth As Needed.) 60 tablet 5   • cyclobenzaprine (FLEXERIL) 10 MG tablet Take 1 tablet by mouth 3 (Three) Times a Day As Needed for Muscle Spasms. 30 tablet 0   • diphenhydrAMINE (BENADRYL) 25 mg capsule Take 25 mg by mouth Every 6 (Six) Hours As Needed for itching.     • fluconazole (DIFLUCAN) 200 MG tablet Take 1 tablet by mouth 2 (Two) Times a Day. 180 tablet 3   • furosemide (LASIX) 20 MG tablet Take 1-2 tablets nightly. 135 tablet 1   • gabapentin (NEURONTIN) 300 MG capsule Take 300 mg by mouth 3 (Three) Times a Day As Needed.     • glucose blood test strip Use as instructed 100 each 11   • guaiFENesin 200 MG tablet 200 mg As Needed.     • HYDROcodone-acetaminophen (NORCO) 5-325 MG per tablet Take 1-2 tablets by mouth Every 6 (Six) " Hours As Needed for Severe Pain . 15 tablet 0   • levonorgestrel (Mirena, 52 MG,) 20 MCG/24HR IUD      • loratadine (CLARITIN) 10 MG tablet Take 1 tablet by mouth Daily. 90 tablet 3   • metFORMIN ER (GLUCOPHAGE-XR) 500 MG 24 hr tablet Take 1 tablet by mouth Daily With Dinner. 90 tablet 1   • OnabotulinumtoxinA 200 units reconstituted solution Inject 200 units IM into head neck shoulders every 12 weeks per FDA protocol. Dx G43.709 1 each 3   • potassium chloride (MICRO-K) 10 MEQ CR capsule 10 mEq As Needed.     • simethicone (MYLICON) 125 MG chewable tablet Chew 125 mg Every 6 (Six) Hours As Needed for Flatulence.     • spironolactone (ALDACTONE) 100 MG tablet Take 100 mg by mouth. Takes 30 days on,  off 30 days.     • traMADol (ULTRAM) 50 MG tablet Take 50 mg by mouth Every 6 (Six) Hours As Needed for moderate pain (4-6).       No current facility-administered medications on file prior to visit.        Results for orders placed or performed in visit on 12/08/20   POC Glycosylated Hemoglobin (Hb A1C)    Specimen: Blood   Result Value Ref Range    Hemoglobin A1C 7.7 %    Lot Number [id number]     Expiration Date 11,161,983        PE    Physical Exam  Vitals signs reviewed.   Constitutional:       General: She is not in acute distress.     Appearance: Normal appearance. She is well-developed. She is morbidly obese. She is not ill-appearing or diaphoretic.   HENT:      Head: Normocephalic and atraumatic.   Eyes:      Extraocular Movements: Extraocular movements intact.      Conjunctiva/sclera: Conjunctivae normal.   Neck:      Musculoskeletal: Normal range of motion.   Pulmonary:      Effort: No respiratory distress.   Musculoskeletal: Normal range of motion.   Skin:     General: Skin is warm.      Findings: Erythema and rash present.          Neurological:      General: No focal deficit present.      Mental Status: She is alert.   Psychiatric:         Attention and Perception: She is attentive.         Mood and Affect:  Mood normal.         Speech: Speech normal.         Behavior: Behavior normal. Behavior is cooperative.         Thought Content: Thought content normal.         Judgment: Judgment normal.         A/P    Diagnoses and all orders for this visit:    1. Morbidly obese (CMS/HCC) (Primary)  Has lost 7 lbs.  Working on weight loss with portion control, making food at home and watching ingredients.  Not exercising due to back pain.  Still planning on bariatric surgery.    2. Chronic pain syndrome  3. Degeneration of lumbar intervertebral disc  Chronic back pain.  Pending pain management referral.    4. Rash and nonspecific skin eruption  -     triamcinolone (KENALOG) 0.1 % ointment; Apply  topically to the appropriate area as directed 2 (Two) Times a Day.  Dispense: 80 g; Refill: 0  Stop using hand .  Trial of steroid cream.    5. Acute non-recurrent maxillary sinusitis  -     amoxicillin (AMOXIL) 875 MG tablet; Take 1 tablet by mouth Every 12 (Twelve) Hours for 10 days.  Dispense: 20 tablet; Refill: 0  Reports TTP along maxillary sinuses.  Will treat with amoxicillin.       Plan of care reviewed with patient at the conclusion of today's visit. Education was provided regarding diagnosis, management and any prescribed or recommended OTC medications.  Patient verbalizes understanding of and agreement with management plan.    No follow-ups on file.     Lesli Ramirez PA-C  Answers for HPI/ROS submitted by the patient on 2/10/2021   What is the primary reason for your visit?: Physical

## 2021-02-25 RX ORDER — ONABOTULINUMTOXINA 200 [USP'U]/1
INJECTION, POWDER, LYOPHILIZED, FOR SOLUTION INTRADERMAL; INTRAMUSCULAR
Qty: 1 EACH | Refills: 1 | Status: SHIPPED | OUTPATIENT
Start: 2021-02-25 | End: 2021-09-08 | Stop reason: SDUPTHER

## 2021-03-01 DIAGNOSIS — J01.00 ACUTE NON-RECURRENT MAXILLARY SINUSITIS: ICD-10-CM

## 2021-03-01 RX ORDER — AMOXICILLIN 875 MG/1
875 TABLET, COATED ORAL EVERY 12 HOURS SCHEDULED
Qty: 20 TABLET | Refills: 0 | OUTPATIENT
Start: 2021-03-01 | End: 2021-03-11

## 2021-03-01 NOTE — TELEPHONE ENCOUNTER
Amoxicillin was filled 2 weeks ago.  If patient is still having symptoms, would want to have appointment to discuss.

## 2021-03-02 ENCOUNTER — OFFICE VISIT (OUTPATIENT)
Dept: FAMILY MEDICINE CLINIC | Facility: CLINIC | Age: 36
End: 2021-03-02

## 2021-03-02 VITALS
BODY MASS INDEX: 47.39 KG/M2 | SYSTOLIC BLOOD PRESSURE: 112 MMHG | WEIGHT: 241.4 LBS | DIASTOLIC BLOOD PRESSURE: 70 MMHG | HEIGHT: 60 IN

## 2021-03-02 DIAGNOSIS — J01.01 ACUTE RECURRENT MAXILLARY SINUSITIS: Primary | ICD-10-CM

## 2021-03-02 PROCEDURE — 99213 OFFICE O/P EST LOW 20 MIN: CPT | Performed by: PHYSICIAN ASSISTANT

## 2021-03-02 RX ORDER — SULFAMETHOXAZOLE AND TRIMETHOPRIM 800; 160 MG/1; MG/1
1 TABLET ORAL 2 TIMES DAILY
Qty: 20 TABLET | Refills: 0 | OUTPATIENT
Start: 2021-03-02 | End: 2021-03-14

## 2021-03-02 RX ORDER — GLIPIZIDE 10 MG/1
10 TABLET ORAL
COMMUNITY
End: 2021-06-20 | Stop reason: HOSPADM

## 2021-03-02 NOTE — PATIENT INSTRUCTIONS
"DASH Eating Plan  DASH stands for \"Dietary Approaches to Stop Hypertension.\" The DASH eating plan is a healthy eating plan that has been shown to reduce high blood pressure (hypertension). Additional health benefits may include reducing the risk of type 2 diabetes mellitus, heart disease, and stroke. The DASH eating plan may also help with weight loss.  WHAT DO I NEED TO KNOW ABOUT THE DASH EATING PLAN?  For the DASH eating plan, you will follow these general guidelines:  · Choose foods with less than 150 milligrams of sodium per serving (as listed on the food label).  · Use salt-free seasonings or herbs instead of table salt or sea salt.  · Check with your health care provider or pharmacist before using salt substitutes.  · Eat lower-sodium products. These are often labeled as \"low-sodium\" or \"no salt added.\"  · Eat fresh foods. Avoid eating a lot of canned foods.  · Eat more vegetables, fruits, and low-fat dairy products.  · Choose whole grains. Look for the word \"whole\" as the first word in the ingredient list.  · Choose fish and skinless chicken or turkey more often than red meat. Limit fish, poultry, and meat to 6 oz (170 g) each day.  · Limit sweets, desserts, sugars, and sugary drinks.  · Choose heart-healthy fats.  · Eat more home-cooked food and less restaurant, buffet, and fast food.  · Limit fried foods.  · Do not oliveira foods. Cook foods using methods such as baking, boiling, grilling, and broiling instead.  · When eating at a restaurant, ask that your food be prepared with less salt, or no salt if possible.  WHAT FOODS CAN I EAT?  Seek help from a dietitian for individual calorie needs.  Grains  Whole grain or whole wheat bread. Brown rice. Whole grain or whole wheat pasta. Quinoa, bulgur, and whole grain cereals. Low-sodium cereals. Corn or whole wheat flour tortillas. Whole grain cornbread. Whole grain crackers. Low-sodium crackers.  Vegetables  Fresh or frozen vegetables (raw, steamed, roasted, or " grilled). Low-sodium or reduced-sodium tomato and vegetable juices. Low-sodium or reduced-sodium tomato sauce and paste. Low-sodium or reduced-sodium canned vegetables.   Fruits  All fresh, canned (in natural juice), or frozen fruits.  Meat and Other Protein Products  Ground beef (85% or leaner), grass-fed beef, or beef trimmed of fat. Skinless chicken or turkey. Ground chicken or turkey. Pork trimmed of fat. All fish and seafood. Eggs. Dried beans, peas, or lentils. Unsalted nuts and seeds. Unsalted canned beans.  Dairy  Low-fat dairy products, such as skim or 1% milk, 2% or reduced-fat cheeses, low-fat ricotta or cottage cheese, or plain low-fat yogurt. Low-sodium or reduced-sodium cheeses.  Fats and Oils  Tub margarines without trans fats. Light or reduced-fat mayonnaise and salad dressings (reduced sodium). Avocado. Safflower, olive, or canola oils. Natural peanut or almond butter.  Other  Unsalted popcorn and pretzels.  The items listed above may not be a complete list of recommended foods or beverages. Contact your dietitian for more options.  WHAT FOODS ARE NOT RECOMMENDED?  Grains  White bread. White pasta. White rice. Refined cornbread. Bagels and croissants. Crackers that contain trans fat.  Vegetables  Creamed or fried vegetables. Vegetables in a cheese sauce. Regular canned vegetables. Regular canned tomato sauce and paste. Regular tomato and vegetable juices.  Fruits  Canned fruit in light or heavy syrup. Fruit juice.  Meat and Other Protein Products  Fatty cuts of meat. Ribs, chicken wings, torres, sausage, bologna, salami, chitterlings, fatback, hot dogs, bratwurst, and packaged luncheon meats. Salted nuts and seeds. Canned beans with salt.  Dairy  Whole or 2% milk, cream, half-and-half, and cream cheese. Whole-fat or sweetened yogurt. Full-fat cheeses or blue cheese. Nondairy creamers and whipped toppings. Processed cheese, cheese spreads, or cheese curds.  Condiments  Onion and garlic salt, seasoned  salt, table salt, and sea salt. Canned and packaged gravies. Worcestershire sauce. Tartar sauce. Barbecue sauce. Teriyaki sauce. Soy sauce, including reduced sodium. Steak sauce. Fish sauce. Oyster sauce. Cocktail sauce. Horseradish. Ketchup and mustard. Meat flavorings and tenderizers. Bouillon cubes. Hot sauce. Tabasco sauce. Marinades. Taco seasonings. Relishes.  Fats and Oils  Butter, stick margarine, lard, shortening, ghee, and torres fat. Coconut, palm kernel, or palm oils. Regular salad dressings.  Other  Pickles and olives. Salted popcorn and pretzels.  The items listed above may not be a complete list of foods and beverages to avoid. Contact your dietitian for more information.  WHERE CAN I FIND MORE INFORMATION?  National Heart, Lung, and Blood Post: www.nhlbi.nih.gov/health/health-topics/topics/dash/     This information is not intended to replace advice given to you by your health care provider. Make sure you discuss any questions you have with your health care provider.     Document Released: 12/06/2012 Document Revised: 04/10/2017 Document Reviewed: 10/22/2014  Elsevier Interactive Patient Education ©2017 [employer]     yes

## 2021-03-02 NOTE — TELEPHONE ENCOUNTER
Contacted patient and patient was very frustrated that her prescription was not enough pills to clear up her infection. Pt states she cannot wait until 3/5. Pt has been rescheduled for 3/2. Pt did not have any additional questions at this time.

## 2021-03-02 NOTE — PROGRESS NOTES
Chief Complaint   Patient presents with   • Eye Drainage     pt states that after stoping the antibiotic she was on her eye is draining again and itching       HPI      Frances Hartman is a 35 y.o. female who presents for Eye Drainage (pt states that after stoping the antibiotic she was on her eye is draining again and itching)    Patient was recently seen for sinusitis.  She was taking amoxicillin twice a day for 10 days.  She reports that her symptoms are improving but they returned after discontinuing the antibiotic.  She reports maxillary facial tenderness.  She also has a lot of eye complaints including drainage, gritty sensation and a film.  She is not currently on any eyedrops and has not been seen by the ophthalmologist.    Past Medical History:   Diagnosis Date   • Ankle swelling    • Anxiety    • Asthma     prn inhalers, does not follow w/ pulmonary   • Chronic back pain     previously followed w/ pain management, now just prn Tylenol + Gabapentin   • Diabetes mellitus (CMS/HCC)     Type II, dx 2014, never on insulin, A1C 7.6   • Dyspepsia    • Dyspnea on exertion    • Fatigue    • Heartburn     chronic, prn TUMS, denies prior eval   • Hirsutism    • Hx of radiation therapy     for treatments of Keloids   • Hypertension    • Irregular menses     infrequent spotting   • Leiomyoma, subserous     possible peduculated f3-4 cm fibroid on [facility] at [facility]   • Migraines     botox q3 months, following Neurology @ [facility]   • Morbid obesity (CMS/HCC)    • Polycystic ovaries     severe insulin resistance/hirsuitism   • Seasonal allergies        Past Surgical History:   Procedure Laterality Date   • KELOID EXCISION      1990,1993,1999,2015,2016,2019   • LAPAROSCOPIC CHOLECYSTECTOMY  2000    for stones   • RADIOFREQUENCY ABLATION  2019    x 2   • UMBILICAL HERNIA REPAIR  1990   • WISDOM TOOTH EXTRACTION  1994       Family History   Problem Relation Age of Onset   • Arthritis Mother    • Diabetes Mother    • Obesity Mother   "  • Arrhythmia Mother    • Hypertension Mother    • Dementia Father    • Heart attack Father    • Stroke Other         CVA   • Obesity Other    • Ovarian cancer Maternal Aunt         40's   • Breast cancer Paternal Aunt         30's   • Heart disease Other    • Hypertension Other    • Endometrial cancer Neg Hx        Social History     Socioeconomic History   • Marital status: Single     Spouse name: Not on file   • Number of children: Not on file   • Years of education: Not on file   • Highest education level: Not on file   Occupational History   • Occupation: Customer Service   Tobacco Use   • Smoking status: Never Smoker   • Smokeless tobacco: Never Used   Substance and Sexual Activity   • Alcohol use: Yes     Frequency: Monthly or less   • Drug use: No   • Sexual activity: Yes     Partners: Male     Birth control/protection: Condom   Social History Narrative    Patient lives in [place], [place], single. Unemployed.       Allergies   Allergen Reactions   • Hydrochlorothiazide Swelling   • Monosodium Glutamate Swelling and Headache   • Oatmeal Other (See Comments)     Dx on allergy testing   • Amitriptyline Mental Status Change     memory gaps + neuropathy + hair loss   • Aspartame Nausea Only   • Codeine Headache         • Diclofenac Headache   • Doxycycline Rash and Hallucinations   • Eggs Or Egg-Derived Products Other (See Comments)     dx on allergy testing, but does not completely avoid   • Naproxen Other (See Comments)     \"blackout\"       ROS    Review of Systems   Constitutional: Negative for chills and fatigue.   HENT: Positive for rhinorrhea, sinus pressure and sore throat. Negative for congestion, ear pain, postnasal drip, swollen glands and trouble swallowing.    Eyes: Positive for pain, discharge, redness, itching and visual disturbance.   Respiratory: Negative for cough, shortness of breath and wheezing.    Cardiovascular: Negative for chest pain.       Vitals:    03/02/21 1347   BP: 112/70   Weight: " "109 kg (241 lb 6.4 oz)   Height: 152.4 cm (60\")     Body mass index is 47.15 kg/m².    Current Outpatient Medications on File Prior to Visit   Medication Sig Dispense Refill   • Acetaminophen (TYLENOL ARTHRITIS PAIN PO) Take  by mouth As Needed.     • albuterol (PROVENTIL HFA;VENTOLIN HFA) 108 (90 BASE) MCG/ACT inhaler Inhale 2 puffs Every 4 (Four) Hours As Needed for wheezing.     • calcium carbonate EX (Antacid Flavor Chews) 750 MG chewable tablet As Needed.     • carvedilol (COREG) 12.5 MG tablet TAKE ONE TABLET BY MOUTH EVERY EVENING (Patient taking differently: 12.5 mg Every Morning.) 90 tablet 3   • carvedilol (COREG) 25 MG tablet Take 25 mg by mouth Every Evening.     • cyclobenzaprine (FLEXERIL) 10 MG tablet Take 1 tablet by mouth 3 (Three) Times a Day As Needed for Muscle Spasms. 30 tablet 0   • diphenhydrAMINE (BENADRYL) 25 mg capsule Take 25 mg by mouth Every 6 (Six) Hours As Needed for itching.     • fluconazole (DIFLUCAN) 200 MG tablet Take 1 tablet by mouth 2 (Two) Times a Day. 180 tablet 3   • furosemide (LASIX) 20 MG tablet Take 1-2 tablets nightly. 135 tablet 1   • gabapentin (NEURONTIN) 300 MG capsule Take 300 mg by mouth 3 (Three) Times a Day As Needed.     • glipizide (GLUCOTROL) 10 MG tablet Take 10 mg by mouth 2 (Two) Times a Day Before Meals. Take one tablet in the morning and one tablet at night     • glucose blood test strip Use as instructed 100 each 11   • guaiFENesin 200 MG tablet 200 mg As Needed.     • HYDROcodone-acetaminophen (NORCO) 5-325 MG per tablet Take 1-2 tablets by mouth Every 6 (Six) Hours As Needed for Severe Pain . 15 tablet 0   • levonorgestrel (Mirena, 52 MG,) 20 MCG/24HR IUD      • loratadine (CLARITIN) 10 MG tablet Take 1 tablet by mouth Daily. 90 tablet 3   • melatonin 1 MG tablet Take 5 mg by mouth As Needed.     • metFORMIN ER (GLUCOPHAGE-XR) 500 MG 24 hr tablet Take 1 tablet by mouth Daily With Dinner. 90 tablet 1   • OnabotulinumtoxinA (Botox) 200 units " reconstituted solution FOR . PHYSICIAN TO INJECT UP  UNITS INTRAMUSCULARLY INTO HEAD, NECK AND SHOULDERS EVERY 90 DAYS. 1 each 1   • potassium chloride (MICRO-K) 10 MEQ CR capsule 10 mEq As Needed.     • simethicone (MYLICON) 125 MG chewable tablet Chew 125 mg Every 6 (Six) Hours As Needed for Flatulence.     • spironolactone (ALDACTONE) 100 MG tablet Take 100 mg by mouth. Takes 30 days on,  off 30 days.     • traMADol (ULTRAM) 50 MG tablet Take 50 mg by mouth Every 6 (Six) Hours As Needed for moderate pain (4-6).     • triamcinolone (KENALOG) 0.1 % ointment Apply  topically to the appropriate area as directed 2 (Two) Times a Day. 80 g 0   • glipizide (GLUCOTROL) 5 MG tablet Take 1 tablet in the morning, 2 tablets at night. (Patient taking differently: 10 mg. Take 1 tablet in the morning, 1 tablet at night.) 270 tablet 0   • [DISCONTINUED] CloNIDine (CATAPRES) 0.2 MG tablet Take 1 tablet by mouth 2 (Two) Times a Day. (Patient taking differently: Take 0.2 mg by mouth As Needed.) 60 tablet 5     No current facility-administered medications on file prior to visit.        Results for orders placed or performed in visit on 12/08/20   POC Glycosylated Hemoglobin (Hb A1C)    Specimen: Blood   Result Value Ref Range    Hemoglobin A1C 7.7 %    Lot Number [id number]     Expiration Date 11,161,983        PE    Physical Exam  Constitutional:       Appearance: Normal appearance. She is obese.   HENT:      Head: Normocephalic and atraumatic.      Right Ear: Tympanic membrane, ear canal and external ear normal.      Left Ear: Tympanic membrane, ear canal and external ear normal.      Nose:      Right Sinus: Maxillary sinus tenderness present. No frontal sinus tenderness.      Left Sinus: Maxillary sinus tenderness present. No frontal sinus tenderness.      Mouth/Throat:      Pharynx: Uvula midline. No oropharyngeal exudate.      Comments: Anatomically large tonsils.  Tongue has white film.  Eyes:       Extraocular Movements:      Right eye: Normal extraocular motion.      Left eye: Normal extraocular motion.      Conjunctiva/sclera: Conjunctivae normal.      Right eye: Right conjunctiva is not injected. No exudate.     Left eye: Left conjunctiva is not injected. No exudate.  Pulmonary:      Effort: No respiratory distress.   Neurological:      Mental Status: She is alert.          A/P    Diagnoses and all orders for this visit:    1. Acute recurrent maxillary sinusitis (Primary)  -     sulfamethoxazole-trimethoprim (Bactrim DS) 800-160 MG per tablet; Take 1 tablet by mouth 2 (Two) Times a Day.  Dispense: 20 tablet; Refill: 0  Patient has tenderness to palpation bilaterally along maxillary sinuses.  Prescribed Bactrim.  If symptoms persist will recommend that she be seen by ENT for ongoing recurrent sinusitis.  Discussed with patient that the eye symptoms she is describing would not be related to sinusitis.  She should follow-up with an ophthalmologist regarding this.       Plan of care reviewed with patient at the conclusion of today's visit. Education was provided regarding diagnosis, management and any prescribed or recommended OTC medications.  Patient verbalizes understanding of and agreement with management plan.    No follow-ups on file.     Lesli Ramirez PA-C    Answers for HPI/ROS submitted by the patient on 3/2/2021   What is the primary reason for your visit?: Other  Please describe your symptoms.: Phlegm in my eye.  Have you had these symptoms before?: Yes  How long have you been having these symptoms?: 5-7 days  Please list any medications you are currently taking for this condition.: Zaditor and/or Clear Eyes.  Please describe any probable cause for these symptoms. : Sinus infection. Last round of antibiotics was not long enough to completely eradicate the infection.

## 2021-03-03 ENCOUNTER — TELEPHONE (OUTPATIENT)
Dept: NEUROLOGY | Facility: CLINIC | Age: 36
End: 2021-03-03

## 2021-03-03 NOTE — TELEPHONE ENCOUNTER
KAMALA GUTIERREZ  [phone]    THE PT IS CALLING IN FRUSTRATED BECAUSE SHE WAS INFORMED BY HER SPECIALTY PHARMACY [facility] THAT HER PA THAT WAS NOT SUPPOSE TO  IN 2021  WAS CANCELED BECAUSE THEY CHANGED THE PA FROM A PHARMACY BENEFIT TO A MEDICAL BENEFIT. THEY ARE WAITING FOR THE OFFICE TO NOW SUBMIT A NEW PA FOR THE PT'S BOTOX. THE PT IS WANTING TO KNOW WHY HER PREVIOUS PA WAS CANCELED AND WHY WAS THE BENEFIT CHANGED. PLEASE GIVE HER SPECIALTY PHARMACY AND INSURANCE COMPANY A CALL TO DISCUSS THIS WITH THEM BECAUSE THE PT IS WANTING TO GET TO THE BOTTOM OF THIS BECAUSE SHE STATED THIS HAPPENED TO HER BACK IN November AS WELL. ALSO PLEASE GIVE THE PT A CALL TO KEEP HER UP TO DATE ON WHAT IS GOING ON WITH IT. THE PT IS CURRENTLY SCHEDULE FOR A BOTOX APPT ON 3/11.    [facility] - [phone]    [facility]ANCE   MEMBER SERVICES - [phone]  PROVIDER SERVICES- [phone]

## 2021-03-04 NOTE — TELEPHONE ENCOUNTER
Called [place] and the Ava conferenced me and the patient together and we got the issue took care of. Patients botox was switched to [facility] and the patient is aware.

## 2021-03-04 NOTE — TELEPHONE ENCOUNTER
I spoke with the patient yesterday and explained to her that I wasn't for sure what happened to the insurance that would make the auth go from pharmacy to medical benefits. I told her that I had started the case through her medical benefit, I was just waiting on the response. She was a little upset because the last time she had to cancel due to [facility] switching to [facility].   I called [facility] today and she stated that the botox will be delivered on 03/09/2021. I gave her the information on for the authorization. The lady stated that [facility] is switching to medical benefit as of the first of the year

## 2021-03-05 ENCOUNTER — OFFICE VISIT (OUTPATIENT)
Dept: PSYCHIATRY | Facility: CLINIC | Age: 36
End: 2021-03-05

## 2021-03-05 DIAGNOSIS — G89.29 OTHER CHRONIC PAIN: Primary | ICD-10-CM

## 2021-03-05 DIAGNOSIS — F43.23 ADJUSTMENT REACTION WITH ANXIETY AND DEPRESSION: ICD-10-CM

## 2021-03-05 PROCEDURE — 90791 PSYCH DIAGNOSTIC EVALUATION: CPT | Performed by: PSYCHOLOGIST

## 2021-03-06 NOTE — PROGRESS NOTES
PROGRESS NOTE    Data:  Frances Hartman is a 35 y.o. female who met with the undersigned for a scheduled individual outpatient therapy session from 1:46 - 2:30pm.       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 She needed quite a bit of time to tell her story today and this was conducted. She discussed feeling mistreated, punished for no reason, not feeling appreciated even though she thought she did well in the profession, and then feeling blind-sighted when she was fired. Stressors were processed individually and in detail. Venting of frustrations was conducted in order to help the pt feel less tense emotionally and gain insight into issues. Feelings were processed and validated several times in session. The numerous levels of stress at this job were discussed in detail. The numerous events over the time she was there including both hurtful and positive times were processed. Grieving the loss of the job was initiated. Healing from the pain of being fired was initiated. Perspective taking was conducted multiple times in order to help her feel less stuck, less overwhelmed, and see challenges as much more manageable. The pt's strengths were identified in order to help identify abilities to use to better face/overcome challenges. She was assisted with seeing how she did well at times, remaining responsible/respectful, even if she felt very angry or hurt. Laying the groundwork for counseling was conducted. Education about what good was done today and what to expect in subsequent sessions were provided. Moving forward in counseling, including things to work on, were discussed some towards the end of session. Self-care after an emotional session like this was highly recommended for her to do today and the pt agreed to do it. The pt expressed gratitude for today's session.        Mental Status Exam  Hygiene:   good  Dress: normal  Attitude:  cooperative and proactive  Motor Activity: normal  Speech: normal  Mood:  tense  Affect:  congruent  Thought Processes: normal  Thought Content:  normal  Suicidal Thoughts:  not endorsed  Homicidal Thoughts:  not endorsed  Crisis Safety Plan: not needed   Hallucinations:  none      Patient's Support Network Includes:  family, friends      Progress toward goal: there is evidence to suggest that she is taking measures to improve the quality of her life including seeking weight loss surgery.      Functional Status: moderate to high      Prognosis: good    Assessment      The pt presented to be struggling with chronic pain (back) and anxiety. Situational stress is evident due to financial difficulties. Anxiety tied most closely to enduring different traumas over the years, but also related to engaging in maladaptive thought patterns.       Plan      In order to diminish symptoms of anxiety, she will continue with counseling focused on healing from trauma (ongoing). Self-care after an emotional session like this was highly recommended for her to do today and the pt agreed to do it.    Misty Nava, PhD, LP

## 2021-03-09 ENCOUNTER — OFFICE VISIT (OUTPATIENT)
Dept: FAMILY MEDICINE CLINIC | Facility: CLINIC | Age: 36
End: 2021-03-09

## 2021-03-09 VITALS
TEMPERATURE: 96.8 F | HEIGHT: 60 IN | WEIGHT: 238 LBS | BODY MASS INDEX: 46.72 KG/M2 | DIASTOLIC BLOOD PRESSURE: 80 MMHG | OXYGEN SATURATION: 96 % | SYSTOLIC BLOOD PRESSURE: 126 MMHG | HEART RATE: 78 BPM

## 2021-03-09 DIAGNOSIS — E11.65 TYPE 2 DIABETES MELLITUS WITH HYPERGLYCEMIA, WITHOUT LONG-TERM CURRENT USE OF INSULIN (HCC): ICD-10-CM

## 2021-03-09 DIAGNOSIS — J02.9 SORE THROAT: ICD-10-CM

## 2021-03-09 DIAGNOSIS — J01.01 ACUTE RECURRENT MAXILLARY SINUSITIS: ICD-10-CM

## 2021-03-09 DIAGNOSIS — J35.8 CRYPTIC TONSIL: ICD-10-CM

## 2021-03-09 DIAGNOSIS — E66.01 MORBIDLY OBESE (HCC): Primary | ICD-10-CM

## 2021-03-09 LAB — HBA1C MFR BLD: 6.9 %

## 2021-03-09 PROCEDURE — 83036 HEMOGLOBIN GLYCOSYLATED A1C: CPT | Performed by: PHYSICIAN ASSISTANT

## 2021-03-09 PROCEDURE — 99214 OFFICE O/P EST MOD 30 MIN: CPT | Performed by: PHYSICIAN ASSISTANT

## 2021-03-09 NOTE — PROGRESS NOTES
Chief Complaint   Patient presents with   • Weight Loss     Pt is here for a follow up.    • Sore Throat     Pt states her left tonsil has been really sore for about 4 days. Pt states she is not sure if it the drainage from her sinusitis but she had trouble swallowing.        HPI     Frances Hartman is a pleasant 35 y.o. female who is here for routine follow-up of obesity, diabetes type 2 and sinusitis/throat pain.  Patient presents for 1 month follow-up of weight loss.  She has lost a few additional pounds.  She reports that she has been doing better about discontinuing her soda intake.  She is drinking more sparkling water.  She has been unable to exercise secondary to chronic back pain.  She is still planning on proceeding with bariatric surgery for overall health.    Patient is compliant on Metformin and glipizide.  She increased her glipizide to 5 mg twice daily.  She has been watching her diet closely and trying to eliminate sugars and starches.  She has been monitoring her glucose at home and it seems to be well controlled.    Has ongoing issues with sinusitis.  She reports sore throat this weekend and tonsil pain.  She has cryptic tonsils in general and has never had issues with these.  She was recently treated with amoxicillin but has ongoing symptoms so she was started on Bactrim.  She has not established with ENT.  She is doing salt water gargles and tea with honey at home.      Past Medical History:   Diagnosis Date   • Ankle swelling    • Anxiety    • Asthma     prn inhalers, does not follow w/ pulmonary   • Chronic back pain     previously followed w/ pain management, now just prn Tylenol + Gabapentin   • Diabetes mellitus (CMS/[facility])     Type II, dx 2014, never on insulin, A1C 7.6   • Dyspepsia    • Dyspnea on exertion    • Fatigue    • Heartburn     chronic, prn TUMS, denies prior eval   • Hirsutism    • Hx of radiation therapy     for treatments of Keloids   • Hypertension    • Irregular menses      infrequent spotting   • Leiomyoma, subserous     possible peduculated f3-4 cm fibroid on u/s at [facility]   • Migraines     botox q3 months, following Neurology @ [facility]   • Morbid obesity (CMS/HCC)    • Polycystic ovaries     severe insulin resistance/hirsuitism   • Seasonal allergies        Past Surgical History:   Procedure Laterality Date   • KELOID EXCISION      1990,1993,1999,2015,2016,2019   • LAPAROSCOPIC CHOLECYSTECTOMY  2000    for stones   • RADIOFREQUENCY ABLATION  2019    x 2   • UMBILICAL HERNIA REPAIR  1990   • WISDOM TOOTH EXTRACTION  1994       Family History   Problem Relation Age of Onset   • Arthritis Mother    • Diabetes Mother    • Obesity Mother    • Arrhythmia Mother    • Hypertension Mother    • Dementia Father    • Heart attack Father    • Stroke Other         CVA   • Obesity Other    • Ovarian cancer Maternal Aunt         40's   • Breast cancer Paternal Aunt         30's   • Heart disease Other    • Hypertension Other    • Endometrial cancer Neg Hx        Social History     Socioeconomic History   • Marital status: Single     Spouse name: Not on file   • Number of children: Not on file   • Years of education: Not on file   • Highest education level: Not on file   Tobacco Use   • Smoking status: Never Smoker   • Smokeless tobacco: Never Used   Substance and Sexual Activity   • Alcohol use: Yes   • Drug use: No   • Sexual activity: Yes     Partners: Male     Birth control/protection: Condom       Allergies   Allergen Reactions   • Hydrochlorothiazide Swelling   • Monosodium Glutamate Swelling and Headache   • Oatmeal Other (See Comments)     Dx on allergy testing   • Amitriptyline Mental Status Change     memory gaps + neuropathy + hair loss   • Aspartame Nausea Only   • Codeine Headache         • Diclofenac Headache   • Doxycycline Rash and Hallucinations   • Eggs Or Egg-Derived Products Other (See Comments)     dx on allergy testing, but does not completely avoid   • Naproxen Other (See  "Comments)     \"blackout\"       ROS  Review of Systems   Constitutional: Positive for fatigue. Negative for chills and fever.   HENT: Positive for rhinorrhea, sinus pressure and sore throat. Negative for congestion, ear pain, postnasal drip and trouble swallowing.    Eyes: Negative for visual disturbance.   Endocrine: Negative for polydipsia, polyphagia and polyuria.   Neurological: Negative for dizziness and headache.       Vitals:    03/09/21 1443   BP: 126/80   Pulse: 78   Temp: 96.8 °F (36 °C)   SpO2: 96%   Weight: 108 kg (238 lb)   Height: 152.4 cm (60\")   PainSc:   6     Body mass index is 46.48 kg/m².    Current Outpatient Medications on File Prior to Visit   Medication Sig Dispense Refill   • Acetaminophen (TYLENOL ARTHRITIS PAIN PO) Take  by mouth As Needed.     • albuterol (PROVENTIL HFA;VENTOLIN HFA) 108 (90 BASE) MCG/ACT inhaler Inhale 2 puffs Every 4 (Four) Hours As Needed for wheezing.     • calcium carbonate EX (Antacid Flavor Chews) 750 MG chewable tablet As Needed.     • carvedilol (COREG) 12.5 MG tablet TAKE ONE TABLET BY MOUTH EVERY EVENING (Patient taking differently: 12.5 mg Every Morning.) 90 tablet 3   • carvedilol (COREG) 25 MG tablet Take 25 mg by mouth Every Evening.     • cyclobenzaprine (FLEXERIL) 10 MG tablet Take 1 tablet by mouth 3 (Three) Times a Day As Needed for Muscle Spasms. 30 tablet 0   • diphenhydrAMINE (BENADRYL) 25 mg capsule Take 25 mg by mouth Every 6 (Six) Hours As Needed for itching.     • fluconazole (DIFLUCAN) 200 MG tablet Take 1 tablet by mouth 2 (Two) Times a Day. 180 tablet 3   • furosemide (LASIX) 20 MG tablet Take 1-2 tablets nightly. 135 tablet 1   • gabapentin (NEURONTIN) 300 MG capsule Take 300 mg by mouth 3 (Three) Times a Day As Needed.     • glipizide (GLUCOTROL) 10 MG tablet Take 10 mg by mouth 2 (Two) Times a Day Before Meals. Take one tablet in the morning and one tablet at night     • glipizide (GLUCOTROL) 5 MG tablet Take 1 tablet in the morning, 2 " tablets at night. (Patient taking differently: 10 mg. Take 1 tablet in the morning, 1 tablet at night.) 270 tablet 0   • glucose blood test strip Use as instructed 100 each 11   • guaiFENesin 200 MG tablet 200 mg As Needed.     • HYDROcodone-acetaminophen (NORCO) 5-325 MG per tablet Take 1-2 tablets by mouth Every 6 (Six) Hours As Needed for Severe Pain . 15 tablet 0   • levonorgestrel (Mirena, 52 MG,) 20 MCG/24HR IUD      • loratadine (CLARITIN) 10 MG tablet Take 1 tablet by mouth Daily. 90 tablet 3   • melatonin 1 MG tablet Take 5 mg by mouth As Needed.     • metFORMIN ER (GLUCOPHAGE-XR) 500 MG 24 hr tablet Take 1 tablet by mouth Daily With Dinner. 90 tablet 1   • OnabotulinumtoxinA (Botox) 200 units reconstituted solution FOR . PHYSICIAN TO INJECT UP  UNITS INTRAMUSCULARLY INTO HEAD, NECK AND SHOULDERS EVERY 90 DAYS. 1 each 1   • potassium chloride (MICRO-K) 10 MEQ CR capsule 10 mEq As Needed.     • simethicone (MYLICON) 125 MG chewable tablet Chew 125 mg Every 6 (Six) Hours As Needed for Flatulence.     • spironolactone (ALDACTONE) 100 MG tablet Take 100 mg by mouth. Takes 30 days on,  off 30 days.     • sulfamethoxazole-trimethoprim (Bactrim DS) 800-160 MG per tablet Take 1 tablet by mouth 2 (Two) Times a Day. 20 tablet 0   • traMADol (ULTRAM) 50 MG tablet Take 50 mg by mouth Every 6 (Six) Hours As Needed for moderate pain (4-6).     • triamcinolone (KENALOG) 0.1 % ointment Apply  topically to the appropriate area as directed 2 (Two) Times a Day. 80 g 0     No current facility-administered medications on file prior to visit.       Results for orders placed or performed in visit on 03/09/21   POC Glycosylated Hemoglobin (Hb A1C)    Specimen: Blood   Result Value Ref Range    Hemoglobin A1C 6.9 %       PE    Physical Exam  Vitals reviewed.   Constitutional:       General: She is not in acute distress.     Appearance: Normal appearance. She is well-developed. She is morbidly obese. She  is not ill-appearing or diaphoretic.   HENT:      Head: Normocephalic and atraumatic.      Nose: Nose normal.      Right Sinus: No maxillary sinus tenderness or frontal sinus tenderness.      Left Sinus: No maxillary sinus tenderness or frontal sinus tenderness.      Mouth/Throat:      Lips: Pink.      Mouth: Mucous membranes are moist.      Pharynx: Uvula midline. No oropharyngeal exudate or posterior oropharyngeal erythema.      Comments: Anatomically large cryptic tonsils.  Eyes:      Extraocular Movements: Extraocular movements intact.      Conjunctiva/sclera: Conjunctivae normal.   Pulmonary:      Effort: No respiratory distress.   Musculoskeletal:         General: Normal range of motion.      Cervical back: Normal range of motion.      Right lower leg: No edema.      Left lower leg: No edema.   Skin:     General: Skin is warm.      Findings: No erythema or rash.   Neurological:      General: No focal deficit present.      Mental Status: She is alert.   Psychiatric:         Attention and Perception: Attention and perception normal. She is attentive.         Mood and Affect: Mood and affect normal.         Speech: Speech normal.         Behavior: Behavior normal. Behavior is cooperative.         Thought Content: Thought content normal.         Cognition and Memory: Cognition and memory normal.         Judgment: Judgment normal.         A/P    Diagnoses and all orders for this visit:    1. Morbidly obese (CMS/HCC) (Primary)  Patient is pending bariatric surgery.  She has been working on weight loss and improving her diet.  She is been successful in discontinuing most of her soda consumption.  She is also watching her sugar and starch intake.  Unable to exercise due to chronic back pain.  She did buy an exercise ball and is trying to stretch on a regular basis.    2. Cryptic tonsil  3. Sore throat  4. Acute recurrent maxillary sinusitis  -     Ambulatory Referral to ENT (Otolaryngology)  Patient was treated with  amoxicillin 875 mg twice daily for 10 days.  She presented after this reporting that her symptoms returned.  She is unable to tolerate Augmentin.  She was started on Bactrim DS twice daily.  She reports that she still has 5 days worth.  She continues to complain of symptoms especially tonsillar pain.  She denies any issues with swallowing or breathing.  Uvula is midline.  Will refer to ENT for further evaluation.    5. Type 2 diabetes mellitus with hyperglycemia, without long-term current use of insulin (CMS/[facility])  -     POC Glycosylated Hemoglobin (Hb A1C)  If his hemoglobin A1c was 7.7%, today her A1c is 6.9%.  She has been watching her diet closely and has increased her glipizide to 5 mg twice daily.  She is also planning on Metformin.       Plan of care reviewed with patient at the conclusion of today's visit. Education was provided regarding diagnosis, management and any prescribed or recommended OTC medications.  Patient verbalizes understanding of and agreement with management plan.    No follow-ups on file.     Lesli Ramirez PA-C  Answers for HPI/ROS submitted by the patient on 3/2/2021  What is the primary reason for your visit?: Physical

## 2021-03-11 ENCOUNTER — PROCEDURE VISIT (OUTPATIENT)
Dept: NEUROLOGY | Facility: CLINIC | Age: 36
End: 2021-03-11

## 2021-03-11 ENCOUNTER — OFFICE VISIT (OUTPATIENT)
Dept: PSYCHIATRY | Facility: CLINIC | Age: 36
End: 2021-03-11

## 2021-03-11 DIAGNOSIS — G89.29 OTHER CHRONIC PAIN: Primary | ICD-10-CM

## 2021-03-11 DIAGNOSIS — F43.21 GRIEF: ICD-10-CM

## 2021-03-11 DIAGNOSIS — F41.9 ANXIETY: ICD-10-CM

## 2021-03-11 DIAGNOSIS — G43.719 INTRACTABLE CHRONIC MIGRAINE WITHOUT AURA AND WITHOUT STATUS MIGRAINOSUS: ICD-10-CM

## 2021-03-11 PROCEDURE — 64615 CHEMODENERV MUSC MIGRAINE: CPT | Performed by: NURSE PRACTITIONER

## 2021-03-11 PROCEDURE — 90834 PSYTX W PT 45 MINUTES: CPT | Performed by: PSYCHOLOGIST
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
## 2021-03-11 NOTE — PROGRESS NOTES
Botulinum Toxin Injection Procedure    History:  Response to treatment has reduced HA's at least 7 fewer days a month and/or 100 hours fewer each month.     Pre-operative diagnosis: headache    Post-operative diagnosis: Same    Procedure: Chemical neurolysis    After risks and benefits were explained including bleeding, infection, worsening of pain, damage to the areas being injected, weakness of muscles, loss of muscle control, dysphagia if injecting the head or neck, facial droop if injecting the facial area, painful injection, allergic or other reaction to the medications being injected, and the failure of the procedure to help the problem, a signed consent was obtained.      The patient was placed in a comfortable area and the sites to be treated were identified. A time out was called and performed. The area to be treated was prepped three times with alcohol and the alcohol allowed to dry. Next, a 30 gauge needle was used to inject the medication in the area to be treated.    Area(s) injected: frontalis muscle, semispinalis capitis muscle and temporallis muscle    Medications used: botulinum toxin 200 mu, 2 mL of saline used to dilute the botulinum toxin.      The patient did tolerate the procedure well. There were no complications.       Physical Examination    Current Treatment (Units)   Splenius Capitis 25 units on the right and 25 units on the left.   Temporalis 25 units on the right and 25 units on the left.   Frontalis 27 units on the right and 28 units on the left.   EMG Guidance none .   Complications: none .   The total amount injected in units is 155 .   The total amount wasted in units is 45 .   The total amount submitted in units is 200 .   Botox was supplied by the patient.     Not currently taking abortive medication, has Nurte samples to try if needed

## 2021-03-14 ENCOUNTER — APPOINTMENT (OUTPATIENT)
Dept: GENERAL RADIOLOGY | Facility: HOSPITAL | Age: 36
End: 2021-03-14

## 2021-03-14 ENCOUNTER — HOSPITAL ENCOUNTER (EMERGENCY)
Facility: HOSPITAL | Age: 36
Discharge: HOME | End: 2021-03-14
Attending: EMERGENCY MEDICINE | Admitting: EMERGENCY MEDICINE

## 2021-03-14 VITALS
OXYGEN SATURATION: 97 % | SYSTOLIC BLOOD PRESSURE: 123 MMHG | HEIGHT: 60 IN | BODY MASS INDEX: 46.33 KG/M2 | DIASTOLIC BLOOD PRESSURE: 81 MMHG | HEART RATE: 68 BPM | TEMPERATURE: 97.2 F | RESPIRATION RATE: 16 BRPM | WEIGHT: 236 LBS

## 2021-03-14 DIAGNOSIS — N28.9 RENAL INSUFFICIENCY: ICD-10-CM

## 2021-03-14 DIAGNOSIS — J20.8 VIRAL BRONCHITIS: Primary | ICD-10-CM

## 2021-03-14 LAB
ALBUMIN SERPL-MCNC: 4.4 G/DL (ref 3.5–5.2)
ALBUMIN/GLOB SERPL: 1 G/DL
ALP SERPL-CCNC: 119 U/L (ref 39–117)
ALT SERPL W P-5'-P-CCNC: 50 U/L (ref 1–33)
ANION GAP SERPL CALCULATED.3IONS-SCNC: 11 MMOL/L (ref 5–15)
AST SERPL-CCNC: 54 U/L (ref 1–32)
BASOPHILS # BLD AUTO: 0.09 10*3/MM3 (ref 0–0.2)
BASOPHILS NFR BLD AUTO: 1 % (ref 0–1.5)
BILIRUB SERPL-MCNC: 0.3 MG/DL (ref 0–1.2)
BUN SERPL-MCNC: 15 MG/DL (ref 6–20)
BUN/CREAT SERPL: 11.9 (ref 7–25)
CALCIUM SPEC-SCNC: 10.9 MG/DL (ref 8.6–10.5)
CHLORIDE SERPL-SCNC: 100 MMOL/L (ref 98–107)
CO2 SERPL-SCNC: 21 MMOL/L (ref 22–29)
CREAT SERPL-MCNC: 1.26 MG/DL (ref 0.57–1)
DEPRECATED RDW RBC AUTO: 45.3 FL (ref 37–54)
EOSINOPHIL # BLD AUTO: 0.35 10*3/MM3 (ref 0–0.4)
EOSINOPHIL NFR BLD AUTO: 3.8 % (ref 0.3–6.2)
ERYTHROCYTE [DISTWIDTH] IN BLOOD BY AUTOMATED COUNT: 12.9 % (ref 12.3–15.4)
FLUAV RNA RESP QL NAA+PROBE: NOT DETECTED
FLUBV RNA RESP QL NAA+PROBE: NOT DETECTED
GFR SERPL CREATININE-BSD FRML MDRD: 58 ML/MIN/1.73
GLOBULIN UR ELPH-MCNC: 4.2 GM/DL
GLUCOSE SERPL-MCNC: 149 MG/DL (ref 65–99)
HCT VFR BLD AUTO: 39.8 % (ref 34–46.6)
HGB BLD-MCNC: 13.1 G/DL (ref 12–15.9)
HOLD SPECIMEN: NORMAL
IMM GRANULOCYTES # BLD AUTO: 0.02 10*3/MM3 (ref 0–0.05)
IMM GRANULOCYTES NFR BLD AUTO: 0.2 % (ref 0–0.5)
LYMPHOCYTES # BLD AUTO: 3.11 10*3/MM3 (ref 0.7–3.1)
LYMPHOCYTES NFR BLD AUTO: 33.8 % (ref 19.6–45.3)
MCH RBC QN AUTO: 31.5 PG (ref 26.6–33)
MCHC RBC AUTO-ENTMCNC: 32.9 G/DL (ref 31.5–35.7)
MCV RBC AUTO: 95.7 FL (ref 79–97)
MONOCYTES # BLD AUTO: 0.54 10*3/MM3 (ref 0.1–0.9)
MONOCYTES NFR BLD AUTO: 5.9 % (ref 5–12)
NEUTROPHILS NFR BLD AUTO: 5.09 10*3/MM3 (ref 1.7–7)
NEUTROPHILS NFR BLD AUTO: 55.3 % (ref 42.7–76)
NRBC BLD AUTO-RTO: 0 /100 WBC (ref 0–0.2)
NT-PROBNP SERPL-MCNC: 35.8 PG/ML (ref 0–450)
PLATELET # BLD AUTO: 243 10*3/MM3 (ref 140–450)
PMV BLD AUTO: 12.3 FL (ref 6–12)
POTASSIUM SERPL-SCNC: 5.9 MMOL/L (ref 3.5–5.2)
PROT SERPL-MCNC: 8.6 G/DL (ref 6–8.5)
RBC # BLD AUTO: 4.16 10*6/MM3 (ref 3.77–5.28)
S PYO AG THROAT QL: NEGATIVE
SARS-COV-2 RNA RESP QL NAA+PROBE: NOT DETECTED
SODIUM SERPL-SCNC: 132 MMOL/L (ref 136–145)
TROPONIN T SERPL-MCNC: <0.01 NG/ML (ref 0–0.03)
WBC # BLD AUTO: 9.2 10*3/MM3 (ref 3.4–10.8)
WHOLE BLOOD HOLD SPECIMEN: NORMAL
WHOLE BLOOD HOLD SPECIMEN: NORMAL

## 2021-03-14 PROCEDURE — 87081 CULTURE SCREEN ONLY: CPT | Performed by: PHYSICIAN ASSISTANT

## 2021-03-14 PROCEDURE — 84484 ASSAY OF TROPONIN QUANT: CPT | Performed by: EMERGENCY MEDICINE

## 2021-03-14 PROCEDURE — 83880 ASSAY OF NATRIURETIC PEPTIDE: CPT | Performed by: EMERGENCY MEDICINE

## 2021-03-14 PROCEDURE — 93005 ELECTROCARDIOGRAM TRACING: CPT | Performed by: EMERGENCY MEDICINE

## 2021-03-14 PROCEDURE — 71045 X-RAY EXAM CHEST 1 VIEW: CPT

## 2021-03-14 PROCEDURE — 87880 STREP A ASSAY W/OPTIC: CPT | Performed by: PHYSICIAN ASSISTANT

## 2021-03-14 PROCEDURE — 85025 COMPLETE CBC W/AUTO DIFF WBC: CPT | Performed by: EMERGENCY MEDICINE

## 2021-03-14 PROCEDURE — 99285 EMERGENCY DEPT VISIT HI MDM: CPT

## 2021-03-14 PROCEDURE — 87636 SARSCOV2 & INF A&B AMP PRB: CPT | Performed by: PHYSICIAN ASSISTANT

## 2021-03-14 PROCEDURE — 80053 COMPREHEN METABOLIC PANEL: CPT | Performed by: EMERGENCY MEDICINE

## 2021-03-14 RX ORDER — BENZONATATE 100 MG/1
100 CAPSULE ORAL 3 TIMES DAILY PRN
Qty: 12 CAPSULE | Refills: 0 | Status: SHIPPED | OUTPATIENT
Start: 2021-03-14 | End: 2021-05-18

## 2021-03-14 RX ORDER — SODIUM CHLORIDE 0.9 % (FLUSH) 0.9 %
10 SYRINGE (ML) INJECTION AS NEEDED
Status: DISCONTINUED | OUTPATIENT
Start: 2021-03-14 | End: 2021-03-14 | Stop reason: HOSPADM

## 2021-03-15 ENCOUNTER — EPISODE CHANGES (OUTPATIENT)
Dept: CASE MANAGEMENT | Facility: OTHER | Age: 36
End: 2021-03-15

## 2021-03-15 LAB
QT INTERVAL: 368 MS
QTC INTERVAL: 394 MS

## 2021-03-15 NOTE — ED PROVIDER NOTES
Subjective   Ms. Hartman is a 35-year-old female comes to the emergency room today complaining of having a cough congestion. She states that that she has had some wheezing associated with this but would not necessarily call it short of breath. She states she had no chest pain. She had no fevers no chills. She has had no productive cough including no hemoptysis. She is been on 2 different rounds of antibiotics initially was on amoxicillin for 10 days and then switched over to Bactrim DS for 10 days. She is not a smoker. She has a past medical history for asthma and uses an inhaler occasionally. She is not any exposure to Covid that she is aware of. She did get the influenza vaccine has not received the Covid vaccine. She feels like she is got posterior nasal drainage and congestion in her chest. She denies any dyspnea with exertion. She has no orthopnea no PND no pedal edema. No prior history of CHF or coronary artery disease. She reports she also had a sore throat most on the left side. She is actually been to see ENT over this and they told her that they did not see anything regarding peritonsillar abscess or otherwise. Felt this is more due to posterior nasal drainage.      History provided by:  Patient   used: No    Cough  Cough characteristics:  Non-productive  Sputum characteristics:  Nondescript  Severity:  Moderate  Onset quality:  Gradual  Duration:  2 weeks  Timing:  Constant  Progression:  Unchanged  Chronicity:  New  Smoker: no    Context: upper respiratory infection    Context: not animal exposure, not exposure to allergens, not sick contacts, not smoke exposure and not with activity    Relieved by:  Nothing  Worsened by:  Activity  Ineffective treatments:  None tried  Associated symptoms: sinus congestion and sore throat    Associated symptoms: no chest pain, no diaphoresis, no ear fullness, no eye discharge, no headaches, no weight loss and no wheezing        Review of Systems  "  Constitutional: Negative for diaphoresis and weight loss.   HENT: Positive for sore throat.    Eyes: Negative for discharge.   Respiratory: Positive for cough. Negative for chest tightness and wheezing.    Cardiovascular: Negative for chest pain.   Gastrointestinal: Negative for abdominal pain.   Genitourinary: Negative for dysuria.   Neurological: Negative for headaches.   Hematological: Negative for adenopathy.   Psychiatric/Behavioral: Negative.    All other systems reviewed and are negative.      Past Medical History:   Diagnosis Date   • Ankle swelling    • Anxiety    • Asthma     prn inhalers, does not follow w/ pulmonary   • Chronic back pain     previously followed w/ pain management, now just prn Tylenol + Gabapentin   • Diabetes mellitus (CMS/[facility])     Type II, dx 2014, never on insulin, A1C 7.6   • Dyspepsia    • Dyspnea on exertion    • Fatigue    • Heartburn     chronic, prn TUMS, denies prior eval   • Hirsutism    • Hx of radiation therapy     for treatments of Keloids   • Hypertension    • Irregular menses     infrequent spotting   • Leiomyoma, subserous     possible peduculated f3-4 cm fibroid on u/s at [facility]   • Migraines     botox q3 months, following Neurology @ [facility]   • Morbid obesity (CMS/[facility])    • Polycystic ovaries     severe insulin resistance/hirsuitism   • Seasonal allergies        Allergies   Allergen Reactions   • Hydrochlorothiazide Swelling   • Monosodium Glutamate Swelling and Headache   • Oatmeal Other (See Comments)     Dx on allergy testing   • Amitriptyline Mental Status Change     memory gaps + neuropathy + hair loss   • Aspartame Nausea Only   • Codeine Headache         • Diclofenac Headache   • Doxycycline Rash and Hallucinations   • Eggs Or Egg-Derived Products Other (See Comments)     dx on allergy testing, but does not completely avoid   • Naproxen Other (See Comments)     \"blackout\"       Past Surgical History:   Procedure Laterality Date   • KELOID EXCISION      " 1990,1993,1999,2015,2016,2019   • LAPAROSCOPIC CHOLECYSTECTOMY  2000    for stones   • RADIOFREQUENCY ABLATION  2019    x 2   • UMBILICAL HERNIA REPAIR  1990   • WISDOM TOOTH EXTRACTION  1994       Family History   Problem Relation Age of Onset   • Arthritis Mother    • Diabetes Mother    • Obesity Mother    • Arrhythmia Mother    • Hypertension Mother    • Dementia Father    • Heart attack Father    • Stroke Other         CVA   • Obesity Other    • Ovarian cancer Maternal Aunt         40's   • Breast cancer Paternal Aunt         30's   • Heart disease Other    • Hypertension Other    • Endometrial cancer Neg Hx        Social History     Socioeconomic History   • Marital status: Single     Spouse name: Not on file   • Number of children: Not on file   • Years of education: Not on file   • Highest education level: Not on file   Tobacco Use   • Smoking status: Never Smoker   • Smokeless tobacco: Never Used   Substance and Sexual Activity   • Alcohol use: Yes   • Drug use: No   • Sexual activity: Yes     Partners: Male     Birth control/protection: Condom           Objective   Physical Exam  Vitals and nursing note reviewed.   Constitutional:       General: She is not in acute distress.     Appearance: She is well-developed. She is not diaphoretic.   HENT:      Head: Normocephalic and atraumatic.      Nose: Nose normal.      Mouth/Throat:      Pharynx: No pharyngeal swelling or oropharyngeal exudate.   Eyes:      General: No scleral icterus.     Conjunctiva/sclera: Conjunctivae normal.   Cardiovascular:      Rate and Rhythm: Normal rate and regular rhythm.      Heart sounds: Normal heart sounds. No murmur.   Pulmonary:      Effort: Pulmonary effort is normal. No respiratory distress.      Breath sounds: Normal breath sounds.   Abdominal:      General: Bowel sounds are normal.      Palpations: Abdomen is soft.      Tenderness: There is no abdominal tenderness.   Musculoskeletal:         General: Normal range of motion.       Cervical back: Normal range of motion and neck supple.   Lymphadenopathy:      Cervical: No cervical adenopathy.   Skin:     General: Skin is warm and dry.   Neurological:      Mental Status: She is alert and oriented to person, place, and time.   Psychiatric:         Behavior: Behavior normal.         Procedures           ED Course  ED Course as of Mar 20 1736   Sun Mar 14, 2021   2105 Dr. Brizuela also seen and examined Ms. Hartman. Wearing to discharge her home with Tessalon Perles. She can follow-up with her PMD for follow-up on her elevated creatinine. Advised her to stay away from the Bactrim she already completed this course at this time.    [DAVON]      ED Course User Index  [DAVON] Albert Nathan PA                                   No results found for this or any previous visit (from the past 24 hour(s)).  Note: In addition to lab results from this visit, the labs listed above may include labs taken at another facility or during a different encounter within the last 24 hours. Please correlate lab times with ED admission and discharge times for further clarification of the services performed during this visit.    XR Chest 1 View   Final Result   No acute cardiopulmonary process.       This report was finalized on 3/14/2021 5:14 PM by Jase Perrin.            Vitals:    03/14/21 2045 03/14/21 2100 03/14/21 2115 03/14/21 2122   BP:  123/81  123/81   BP Location:    Right arm   Patient Position:    Lying   Pulse: 71  71 68   Resp:    16   Temp:       TempSrc:       SpO2: 97%  99% 97%   Weight:       Height:         Medications - No data to display  ECG/EMG Results (last 24 hours)     Procedure Component Value Units Date/Time    ECG 12 Lead [[id number]] Collected: 03/14/21 1554     Updated: 03/14/21 1654        ECG 12 Lead   Final Result   Test Reason : SOA Protocol   Blood Pressure : **/** mmHG   Vent. Rate : 069 BPM     Atrial Rate : 069 BPM      P-R Int : 158 ms          QRS Dur : 084 ms       QT Int :  368 ms       P-R-T Axes : 137 -24 013 degrees      QTc Int : 394 ms      Unusual P axis, possible ectopic atrial rhythm   Nonspecific T wave abnormality   Abnormal ECG   When compared with ECG of 10-NOV-2019 17:06,   Ectopic atrial rhythm has replaced Sinus rhythm   Nonspecific T wave abnormality, worse in Inferior leads   Confirmed by MD FARRAR CORY ([id number]) on 3/15/2021 6:42:57 PM      Referred By:  EDMD           Confirmed By:JAI FARRAR MD                  MDM  Number of Diagnoses or Management Options  Renal insufficiency: new and requires workup  Viral bronchitis: new and requires workup     Amount and/or Complexity of Data Reviewed  Clinical lab tests: reviewed and ordered  Tests in the radiology section of CPT®: reviewed and ordered  Tests in the medicine section of CPT®: reviewed and ordered  Decide to obtain previous medical records or to obtain history from someone other than the patient: yes  Discuss the patient with other providers: yes    Patient Progress  Patient progress: stable      Final diagnoses:   Viral bronchitis   Renal insufficiency            Albert Nathan PA  03/20/21 8845

## 2021-03-16 LAB — BACTERIA SPEC AEROBE CULT: NORMAL

## 2021-03-18 ENCOUNTER — OFFICE VISIT (OUTPATIENT)
Dept: PSYCHIATRY | Facility: CLINIC | Age: 36
End: 2021-03-18

## 2021-03-18 DIAGNOSIS — F33.1 MODERATE EPISODE OF RECURRENT MAJOR DEPRESSIVE DISORDER (HCC): Primary | ICD-10-CM

## 2021-03-18 DIAGNOSIS — R45.89: ICD-10-CM

## 2021-03-18 PROCEDURE — 90834 PSYTX W PT 45 MINUTES: CPT | Performed by: PSYCHOLOGIST

## 2021-03-19 ENCOUNTER — IMMUNIZATION (OUTPATIENT)
Dept: VACCINE CLINIC | Facility: HOSPITAL | Age: 36
End: 2021-03-19

## 2021-03-19 ENCOUNTER — PATIENT OUTREACH (OUTPATIENT)
Dept: CASE MANAGEMENT | Facility: OTHER | Age: 36
End: 2021-03-19

## 2021-03-19 PROCEDURE — 0001A: CPT | Performed by: INTERNAL MEDICINE

## 2021-03-19 PROCEDURE — 91300 HC SARSCOV02 VAC 30MCG/0.3ML IM: CPT | Performed by: INTERNAL MEDICINE

## 2021-03-19 NOTE — PROGRESS NOTES
PROGRESS NOTE    Data:  Frances Hartman is a 35 y.o. female who met with the undersigned for a scheduled individual outpatient therapy session from 1:00 - 1:45pm.      Clinical Maneuvering/Intervention:      The pt talked about experiencing different major and difficult experiences recently. She talked about stressors faced just in the past week with great detail. She expressed how she had shortness of breath, felt very worried about this, going to urgent care, and not feeling satisfied with the care she received. This, she said happened, after going to her primary doctor and another doctor, both experiences described by the pt as difficult for her. Stressors were processed individually and in detail. Venting of frustrations was conducted in order to help the pt feel less tense emotionally and gain insight into issues. Feelings were processed and validated several times in session. She was assisted with processing recent stressful events with having a sore through and shortness of breath. She was assisted with 'teasing out,' how even though she did not feel understood by some, that she did feel understood and cared for by others. Also, even though much, if not everything seemed terrible last week, she was assisted with seeing the good and lessons in it. This intervention will be conducted in subsequent sessions in order to model if for her and help her practice it more often (in order to help alleviate depression). Maladaptive thought patterns were identified, challenged, and evaluated for validity in order to allow for the pt to chose different and more adaptive ways of thinking a few times. At the same time, having survived many difficulties in life and how this can still drag her down were normalized. The pt expressed gratitude for today's session.     Mental Status Exam  Hygiene:  good  Dress: normal  Attitude:  cooperative and proactive  Motor Activity: normal  Speech: normal  Mood:  tense, down  Affect:   congruent  Thought Processes: normal  Thought Content:  normal  Suicidal Thoughts:  not endorsed  Homicidal Thoughts:  not endorsed  Crisis Safety Plan: not needed   Hallucinations:  none      Patient's Support Network Includes:  family, friends      Progress toward goal: there is evidence to suggest that she is making efforts to heal from loss, trauma, and cope better with challenges in life      Functional Status: moderate to high      Prognosis: good    Assessment      The pt presented to be struggling with several things in life, today being more of depression and feeling upset with specific health problems/health treatment. She seems to benefit from CBT, and even though she faces and has experienced many difficulties, she can cope better with stressors over time.       Plan      In order to diminish symptoms of depression and calm emotional upset; she will give thought to what was discussed today in terms of noting good things that came out of the week, practice more adaptive thinking in general, and continue with counseling (ongoing).    Misty Nava, PhD, LP

## 2021-03-19 NOTE — OUTREACH NOTE
Patient Outreach Note    Called patient in follow up to ED visit 3-14-21.  Patient stated she still has some nagging discomfort with swallowing with left tonsil.  Stated her breathing is easier now; said she never thought she was short of breath, but felt like she had difficulty breathing.  Provided attentive listening as patient talked about her issues over last several months with extra mucus and drainage.  Patient stated she has kept regular and frequent appts with her PCP; she has seen the ENT as recommended. Reported she is drinking increased amount of water and cranberry juice/ fluids to stay well hydrated; is eating okay as normal; is cutting back on Tylenol as she said she was instructed.  Discussed ED recommendations/ AVS.  Patient voiced awareness of need to get labs re-checked regarding her kidney function and f/u with her PCP; stated she is calling PCP office today.  Stated she is also getting her first Covid vaccine today.  Patient verbalized being attentive and in-tune with her body and any changes from normal.  She demonstrated good communication skills regarding her body's symptoms.  Encouraged good communication with her PCP.  Patient voiced appreciation for the call.    Yakelin Andrade RN  Ambulatory     3/19/2021, 11:50 EDT

## 2021-03-23 ENCOUNTER — LAB (OUTPATIENT)
Dept: LAB | Facility: HOSPITAL | Age: 36
End: 2021-03-23

## 2021-03-23 DIAGNOSIS — N28.9 ABNORMAL KIDNEY FUNCTION: Primary | ICD-10-CM

## 2021-03-23 DIAGNOSIS — N28.9 ABNORMAL KIDNEY FUNCTION: ICD-10-CM

## 2021-03-23 LAB
ALBUMIN SERPL-MCNC: 4.6 G/DL (ref 3.5–5.2)
ALBUMIN/GLOB SERPL: 1.4 G/DL
ALP SERPL-CCNC: 106 U/L (ref 39–117)
ALT SERPL W P-5'-P-CCNC: 38 U/L (ref 1–33)
ANION GAP SERPL CALCULATED.3IONS-SCNC: 15 MMOL/L (ref 5–15)
AST SERPL-CCNC: 29 U/L (ref 1–32)
BILIRUB SERPL-MCNC: 0.2 MG/DL (ref 0–1.2)
BUN SERPL-MCNC: 15 MG/DL (ref 6–20)
BUN/CREAT SERPL: 15.5 (ref 7–25)
CALCIUM SPEC-SCNC: 10.1 MG/DL (ref 8.6–10.5)
CHLORIDE SERPL-SCNC: 101 MMOL/L (ref 98–107)
CO2 SERPL-SCNC: 22 MMOL/L (ref 22–29)
CREAT SERPL-MCNC: 0.97 MG/DL (ref 0.57–1)
GFR SERPL CREATININE-BSD FRML MDRD: 78 ML/MIN/1.73
GLOBULIN UR ELPH-MCNC: 3.4 GM/DL
GLUCOSE SERPL-MCNC: 248 MG/DL (ref 65–99)
POTASSIUM SERPL-SCNC: 4.1 MMOL/L (ref 3.5–5.2)
PROT SERPL-MCNC: 8 G/DL (ref 6–8.5)
SODIUM SERPL-SCNC: 138 MMOL/L (ref 136–145)

## 2021-03-23 PROCEDURE — 80053 COMPREHEN METABOLIC PANEL: CPT

## 2021-03-23 PROCEDURE — 36415 COLL VENOUS BLD VENIPUNCTURE: CPT

## 2021-03-24 ENCOUNTER — OFFICE VISIT (OUTPATIENT)
Dept: FAMILY MEDICINE CLINIC | Facility: CLINIC | Age: 36
End: 2021-03-24

## 2021-03-24 VITALS
OXYGEN SATURATION: 98 % | SYSTOLIC BLOOD PRESSURE: 130 MMHG | DIASTOLIC BLOOD PRESSURE: 85 MMHG | HEIGHT: 60 IN | TEMPERATURE: 97.7 F | WEIGHT: 239 LBS | HEART RATE: 83 BPM | BODY MASS INDEX: 46.92 KG/M2

## 2021-03-24 DIAGNOSIS — N17.9 AKI (ACUTE KIDNEY INJURY) (HCC): Primary | ICD-10-CM

## 2021-03-24 DIAGNOSIS — J20.8 VIRAL BRONCHITIS: ICD-10-CM

## 2021-03-24 DIAGNOSIS — R74.8 ELEVATED LIVER ENZYMES: ICD-10-CM

## 2021-03-24 DIAGNOSIS — F43.21 GRIEF REACTION: ICD-10-CM

## 2021-03-24 PROCEDURE — 99214 OFFICE O/P EST MOD 30 MIN: CPT | Performed by: PHYSICIAN ASSISTANT

## 2021-03-24 NOTE — PATIENT INSTRUCTIONS
May want to consider Dr. Wang and Dr. Zamarripa for ENT/season allergies.  Call and see if they accept her insurance.  Happy to make referral if they accept her insurance.  Otherwise,  would be a good option.

## 2021-03-24 NOTE — PROGRESS NOTES
Chief Complaint   Patient presents with   • Hospital Follow Up Visit      Pt states she was having trouble breathing & she thought it was due to something she ate or related to her drainage which she saw the ENT on the day before. Pt states she went to the ER on 3/14. Pt states she was told she had a kidney infection.    • Flank Pain     Pt states she has been having some lower back pain. Pt also states she had some cramping and spotting last week. Pt states she missed her recent gyno appt due to family emergency.        HPI      Frances Hartman is a 35 y.o. female who presents for Hospital Follow Up Visit ( Pt states she was having trouble breathing & she thought it was due to something she ate or related to her drainage which she saw the ENT on the day before. Pt states she went to the ER on 3/14. Pt states she was told she had a kidney infection. ) and Flank Pain (Pt states she has been having some lower back pain. Pt also states she had some cramping and spotting last week. Pt states she missed her recent gyno appt due to family emergency. )    Patient was recently seen in the ED on 3/14 for ongoing facial congestion, postnasal drip and cough.  She was diagnosed with viral bronchitis and prescribed Tessalon Perles.  She had recently completed a course of amoxicillin and Bactrim.  Her lab work in the hospital showed abnormal kidney function and electrolytes.  She was told told her potassium.  Hydrate well and return to PCP for lab work.  Her lab work from yesterday showed that her kidney function and electrolytes had returned to normal.  She still has a slight elevation in her ALT but it is improving.  She is feeling better overall from the head congestion, postnasal drip and cough.  She does report some mild flank pain, abdominal cramping and light spotting.  She has a Mirena and occasionally has a period that is irregular.  She is established with gynecology.  Patient was also seen by ENT, Dr. White, and  was unhappy with provider.  She was told she would need tonsillectomy and referred to .  Her allergies and sinusitis were not evaluated or discussed.    She did however find that her father passed 2 days ago and she is struggling emotionally with this.  She denies any worsening depression and plans on going to [place] soon for his .    Past Medical History:   Diagnosis Date   • Ankle swelling    • Anxiety    • Asthma     prn inhalers, does not follow w/ pulmonary   • Chronic back pain     previously followed w/ pain management, now just prn Tylenol + Gabapentin   • Diabetes mellitus (CMS/HCC)     Type II, dx , never on insulin, A1C 7.6   • Dyspepsia    • Dyspnea on exertion    • Fatigue    • Heartburn     chronic, prn TUMS, denies prior eval   • Hirsutism    • Hx of radiation therapy     for treatments of Keloids   • Hypertension    • Irregular menses     infrequent spotting   • Leiomyoma, subserous     possible peduculated f3-4 cm fibroid on u/s at [facility]   • Migraines     botox q3 months, following Neurology @ [facility]   • Morbid obesity (CMS/HCC)    • Polycystic ovaries     severe insulin resistance/hirsuitism   • Seasonal allergies        Past Surgical History:   Procedure Laterality Date   • KELOID EXCISION      ,,,2015,2016,2019   • LAPAROSCOPIC CHOLECYSTECTOMY      for stones   • RADIOFREQUENCY ABLATION  2019    x 2   • UMBILICAL HERNIA REPAIR     • WISDOM TOOTH EXTRACTION         Family History   Problem Relation Age of Onset   • Arthritis Mother    • Diabetes Mother    • Obesity Mother    • Arrhythmia Mother    • Hypertension Mother    • Dementia Father    • Heart attack Father    • Stroke Other         CVA   • Obesity Other    • Ovarian cancer Maternal Aunt         40's   • Breast cancer Paternal Aunt         30's   • Heart disease Other    • Hypertension Other    • Endometrial cancer Neg Hx        Social History     Socioeconomic History   • Marital status: Single     Spouse  "name: Not on file   • Number of children: Not on file   • Years of education: Not on file   • Highest education level: Not on file   Tobacco Use   • Smoking status: Never Smoker   • Smokeless tobacco: Never Used   Substance and Sexual Activity   • Alcohol use: Yes   • Drug use: No   • Sexual activity: Yes     Partners: Male     Birth control/protection: Condom       Allergies   Allergen Reactions   • Hydrochlorothiazide Swelling   • Monosodium Glutamate Swelling and Headache   • Oatmeal Other (See Comments)     Dx on allergy testing   • Amitriptyline Mental Status Change     memory gaps + neuropathy + hair loss   • Aspartame Nausea Only   • Codeine Headache         • Diclofenac Headache   • Doxycycline Rash and Hallucinations   • Eggs Or Egg-Derived Products Other (See Comments)     dx on allergy testing, but does not completely avoid   • Naproxen Other (See Comments)     \"blackout\"       ROS    Review of Systems   Constitutional: Positive for fatigue. Negative for chills, diaphoresis and fever.   HENT: Negative for congestion, postnasal drip and rhinorrhea.    Respiratory: Negative for cough, shortness of breath and wheezing.    Cardiovascular: Negative for chest pain and leg swelling.   Genitourinary: Positive for flank pain and vaginal bleeding. Negative for dysuria and hematuria.   Musculoskeletal: Positive for back pain.   Neurological: Negative for dizziness and headache.   Psychiatric/Behavioral: Positive for sleep disturbance, depressed mood and stress. Negative for self-injury and suicidal ideas. The patient is not nervous/anxious.        Vitals:    03/24/21 1020   BP: 130/85   Pulse: 83   Temp: 97.7 °F (36.5 °C)   SpO2: 98%   Weight: 108 kg (239 lb)   Height: 152.4 cm (60\")   PainSc:   8     Body mass index is 46.68 kg/m².    Current Outpatient Medications on File Prior to Visit   Medication Sig Dispense Refill   • Acetaminophen (TYLENOL ARTHRITIS PAIN PO) Take  by mouth As Needed.     • albuterol " (PROVENTIL HFA;VENTOLIN HFA) 108 (90 BASE) MCG/ACT inhaler Inhale 2 puffs Every 4 (Four) Hours As Needed for wheezing.     • benzonatate (TESSALON) 100 MG capsule Take 1 capsule by mouth 3 (Three) Times a Day As Needed for Cough. 12 capsule 0   • calcium carbonate EX (Antacid Flavor Chews) 750 MG chewable tablet As Needed.     • carvedilol (COREG) 12.5 MG tablet TAKE ONE TABLET BY MOUTH EVERY EVENING (Patient taking differently: 12.5 mg Every Morning.) 90 tablet 3   • carvedilol (COREG) 25 MG tablet Take 25 mg by mouth Every Evening.     • cyclobenzaprine (FLEXERIL) 10 MG tablet Take 1 tablet by mouth 3 (Three) Times a Day As Needed for Muscle Spasms. 30 tablet 0   • diphenhydrAMINE (BENADRYL) 25 mg capsule Take 25 mg by mouth Every 6 (Six) Hours As Needed for itching.     • fluconazole (DIFLUCAN) 200 MG tablet Take 1 tablet by mouth 2 (Two) Times a Day. 180 tablet 3   • furosemide (LASIX) 20 MG tablet Take 1-2 tablets nightly. 135 tablet 1   • gabapentin (NEURONTIN) 300 MG capsule Take 300 mg by mouth 3 (Three) Times a Day As Needed.     • glipizide (GLUCOTROL) 10 MG tablet Take 10 mg by mouth 2 (Two) Times a Day Before Meals. Take one tablet in the morning and one tablet at night     • glipizide (GLUCOTROL) 5 MG tablet Take 1 tablet in the morning, 2 tablets at night. (Patient taking differently: 10 mg. Take 1 tablet in the morning, 1 tablet at night.) 270 tablet 0   • glucose blood test strip Use as instructed 100 each 11   • guaiFENesin 200 MG tablet 200 mg As Needed.     • HYDROcodone-acetaminophen (NORCO) 5-325 MG per tablet Take 1-2 tablets by mouth Every 6 (Six) Hours As Needed for Severe Pain . 15 tablet 0   • levonorgestrel (Mirena, 52 MG,) 20 MCG/24HR IUD      • loratadine (CLARITIN) 10 MG tablet Take 1 tablet by mouth Daily. 90 tablet 3   • melatonin 1 MG tablet Take 5 mg by mouth As Needed.     • metFORMIN ER (GLUCOPHAGE-XR) 500 MG 24 hr tablet Take 1 tablet by mouth Daily With Dinner. 90 tablet 1   •  OnabotulinumtoxinA (Botox) 200 units reconstituted solution FOR . PHYSICIAN TO INJECT UP  UNITS INTRAMUSCULARLY INTO HEAD, NECK AND SHOULDERS EVERY 90 DAYS. 1 each 1   • simethicone (MYLICON) 125 MG chewable tablet Chew 125 mg Every 6 (Six) Hours As Needed for Flatulence.     • spironolactone (ALDACTONE) 100 MG tablet Take 100 mg by mouth. Takes 30 days on,  off 30 days.     • traMADol (ULTRAM) 50 MG tablet Take 50 mg by mouth Every 6 (Six) Hours As Needed for moderate pain (4-6).     • [DISCONTINUED] potassium chloride (MICRO-K) 10 MEQ CR capsule 10 mEq As Needed.       No current facility-administered medications on file prior to visit.       Results for orders placed or performed in visit on 03/23/21   Comprehensive metabolic panel    Specimen: Blood   Result Value Ref Range    Glucose 248 (H) 65 - 99 mg/dL    BUN 15 6 - 20 mg/dL    Creatinine 0.97 0.57 - 1.00 mg/dL    Sodium 138 136 - 145 mmol/L    Potassium 4.1 3.5 - 5.2 mmol/L    Chloride 101 98 - 107 mmol/L    CO2 22.0 22.0 - 29.0 mmol/L    Calcium 10.1 8.6 - 10.5 mg/dL    Total Protein 8.0 6.0 - 8.5 g/dL    Albumin 4.60 3.50 - 5.20 g/dL    ALT (SGPT) 38 (H) 1 - 33 U/L    AST (SGOT) 29 1 - 32 U/L    Alkaline Phosphatase 106 39 - 117 U/L    Total Bilirubin 0.2 0.0 - 1.2 mg/dL    eGFR  African Amer 78 >60 mL/min/1.73    Globulin 3.4 gm/dL    A/G Ratio 1.4 g/dL    BUN/Creatinine Ratio 15.5 7.0 - 25.0    Anion Gap 15.0 5.0 - 15.0 mmol/L       PE    Physical Exam  Vitals reviewed.   Constitutional:       General: She is not in acute distress.     Appearance: Normal appearance. She is well-developed. She is morbidly obese. She is not ill-appearing or diaphoretic.   HENT:      Head: Normocephalic and atraumatic.   Eyes:      Extraocular Movements: Extraocular movements intact.      Conjunctiva/sclera: Conjunctivae normal.   Cardiovascular:      Rate and Rhythm: Normal rate and regular rhythm.      Heart sounds: Normal heart sounds.    Pulmonary:      Effort: Pulmonary effort is normal.      Breath sounds: Normal breath sounds.   Musculoskeletal:         General: Normal range of motion.      Cervical back: Normal range of motion.      Right lower leg: No edema.      Left lower leg: No edema.   Skin:     General: Skin is warm.      Findings: No erythema or rash.   Neurological:      General: No focal deficit present.      Mental Status: She is alert.   Psychiatric:         Attention and Perception: Attention and perception normal. She is attentive.         Mood and Affect: Mood and affect normal.         Speech: Speech normal.         Behavior: Behavior normal. Behavior is cooperative.         Thought Content: Thought content normal.         Cognition and Memory: Cognition and memory normal.         Judgment: Judgment normal.          A/P    Diagnoses and all orders for this visit:    1. LURDES (acute kidney injury) (CMS/HCC) (Primary)  Resolved according to lab work yesterday.  Hydrate well.  Will continue to monitor.    2. Elevated liver enzymes  Improving according to recent lab work.  Will repeat in 1 month.    3. Viral bronchitis  Resolved.  Denies any symptoms today.    4. Grief reaction  Father passed 2 days ago.  Going to [place] for .  Has good family support.  Seems to be managing feelings well.  Established with psychiatry, has appointment tomorrow.       Plan of care reviewed with patient at the conclusion of today's visit. Education was provided regarding diagnosis, management and any prescribed or recommended OTC medications.  Patient verbalizes understanding of and agreement with management plan.    No follow-ups on file.     Lesli Ramirez PA-C

## 2021-03-25 ENCOUNTER — OFFICE VISIT (OUTPATIENT)
Dept: PSYCHIATRY | Facility: CLINIC | Age: 36
End: 2021-03-25

## 2021-03-25 DIAGNOSIS — F33.1 MODERATE EPISODE OF RECURRENT MAJOR DEPRESSIVE DISORDER (HCC): ICD-10-CM

## 2021-03-25 DIAGNOSIS — F43.21 GRIEF: Primary | ICD-10-CM

## 2021-03-25 DIAGNOSIS — R45.4 ANGER: ICD-10-CM

## 2021-03-25 PROCEDURE — 90832 PSYTX W PT 30 MINUTES: CPT | Performed by: PSYCHOLOGIST

## 2021-03-27 NOTE — PROGRESS NOTES
PROGRESS NOTE    Data:  Frances Hartman is a 35 y.o. female who met with the undersigned for a scheduled individual outpatient therapy session from 1:15 - 1:45pm.      Clinical Maneuvering/Intervention:      The pt talked about experiencing different major and difficult experiences recently. She talked about stressors faced just in the past week with great detail. She expressed how she had just lost her father and is struggling to deal with her stepmother about getting ashes. Stressors were processed individually and in detail. Venting of frustrations was conducted in order to help the pt feel less tense emotionally and gain insight into issues. Feelings were processed and validated several times in session. Grief counseling was provided. It was a shorter session today, however, as the pt came late. The pt was told upfront in session that it would be shorter than the usual 45 minutes. Grief counseling continued. Anger, venting of anger, and processing loss were the focus of the session. She was assisted with working out anger, in order to make sense of things, and think more clearly. After most of the sessison was spent on processing loss and anger, she was assisted with noting coping skills. Ideas such as letting her mother care for her were discussed in detail. The pt was able to use some humor toward the end of session and this was encouraged/supported in order to help the pt see things as less overwhelming and to feel calmer. The pt expressed gratitude for today's session.     Mental Status Exam  Hygiene:  good  Dress: normal  Attitude:  cooperative and proactive  Motor Activity: normal  Speech: normal  Mood:  tense, down  Affect:  congruent  Thought Processes: normal  Thought Content:  normal  Suicidal Thoughts:  not endorsed  Homicidal Thoughts:  not endorsed  Crisis Safety Plan: not needed   Hallucinations:  none      Patient's Support Network Includes:  family, friends      Progress toward goal: there is  evidence to suggest that she is upset and grieving due to the recent loss of her father.      Functional Status: moderate to high      Prognosis: good    Assessment      The pt presented to be struggling with several things in life, today grief over the loss of her father and stress dealing with her stepmother. She seems to benefit from CBT and supportive therapy typically, but grief therapy is more appropriate as of late. Anger is likely related to existing depression and now, loss.       Plan      In order to diminish symptoms of grief and calm emotional upset; she will spend time with her mother who is very supportive and continue processing her loss with family and other supportive individuals (ongoing). Depression will be addressed in subsequent sessions, but after first coping with grief.     Misty Nava, PhD, LP

## 2021-04-09 ENCOUNTER — OFFICE VISIT (OUTPATIENT)
Dept: FAMILY MEDICINE CLINIC | Facility: CLINIC | Age: 36
End: 2021-04-09

## 2021-04-09 ENCOUNTER — IMMUNIZATION (OUTPATIENT)
Dept: VACCINE CLINIC | Facility: HOSPITAL | Age: 36
End: 2021-04-09

## 2021-04-09 VITALS
DIASTOLIC BLOOD PRESSURE: 82 MMHG | HEIGHT: 60 IN | SYSTOLIC BLOOD PRESSURE: 118 MMHG | TEMPERATURE: 96.9 F | OXYGEN SATURATION: 96 % | BODY MASS INDEX: 47.12 KG/M2 | HEART RATE: 77 BPM | WEIGHT: 240 LBS

## 2021-04-09 DIAGNOSIS — M51.36 DEGENERATION OF LUMBAR INTERVERTEBRAL DISC: ICD-10-CM

## 2021-04-09 DIAGNOSIS — E11.65 TYPE 2 DIABETES MELLITUS WITH HYPERGLYCEMIA, WITHOUT LONG-TERM CURRENT USE OF INSULIN (HCC): ICD-10-CM

## 2021-04-09 DIAGNOSIS — E28.2 PCOS (POLYCYSTIC OVARIAN SYNDROME): ICD-10-CM

## 2021-04-09 DIAGNOSIS — F43.21 GRIEF REACTION: ICD-10-CM

## 2021-04-09 DIAGNOSIS — E66.01 MORBIDLY OBESE (HCC): Primary | ICD-10-CM

## 2021-04-09 DIAGNOSIS — G89.4 CHRONIC PAIN SYNDROME: ICD-10-CM

## 2021-04-09 PROCEDURE — 0002A: CPT | Performed by: INTERNAL MEDICINE

## 2021-04-09 PROCEDURE — 91300 HC SARSCOV02 VAC 30MCG/0.3ML IM: CPT | Performed by: INTERNAL MEDICINE

## 2021-04-09 PROCEDURE — 99214 OFFICE O/P EST MOD 30 MIN: CPT | Performed by: PHYSICIAN ASSISTANT

## 2021-04-09 RX ORDER — METFORMIN HYDROCHLORIDE 500 MG/1
500 TABLET, EXTENDED RELEASE ORAL
Qty: 90 TABLET | Refills: 1 | Status: SHIPPED | OUTPATIENT
Start: 2021-04-09 | End: 2021-10-14

## 2021-04-09 RX ORDER — SPIRONOLACTONE 100 MG/1
100 TABLET, FILM COATED ORAL EVERY OTHER DAY
Qty: 45 TABLET | Refills: 1 | Status: SHIPPED | OUTPATIENT
Start: 2021-04-09 | End: 2021-11-30 | Stop reason: HOSPADM

## 2021-04-09 NOTE — PROGRESS NOTES
Chief Complaint   Patient presents with   • Weight Check     Pt is here for her final weight check.    • grief     Pt states she has been processing things. Pt states she is doing a little better than her last visit.    • Back Pain     Pt states she did follow up with the pain clinic.        HPI     Frances Hartman is a pleasant 35 y.o. female who is here for morbid obesity, back pain and grief reaction.  Patient is doing well overall.  She went to [place] for her father's .  Seems to be managing his passing in a healthy manner.  She was seen by Dr. Zayas for back pain and they are working on a plan to manage/treat her issues.  She has been working on weight loss with diet.  Back pain limits her exercise.  She was not able to maintain her diet when she was traveling for .  She has not lost any weight since her last appointment, gained 1 lb.    Past Medical History:   Diagnosis Date   • Ankle swelling    • Anxiety    • Asthma     prn inhalers, does not follow w/ pulmonary   • Chronic back pain     previously followed w/ pain management, now just prn Tylenol + Gabapentin   • Diabetes mellitus (CMS/HCC)     Type II, dx , never on insulin, A1C 7.6   • Dyspepsia    • Dyspnea on exertion    • Fatigue    • Heartburn     chronic, prn TUMS, denies prior eval   • Hirsutism    • Hx of radiation therapy     for treatments of Keloids   • Hypertension    • Irregular menses     infrequent spotting   • Leiomyoma, subserous     possible peduculated f3-4 cm fibroid on u/s at [facility]   • Migraines     botox q3 months, following Neurology @ [facility]   • Morbid obesity (CMS/HCC)    • Polycystic ovaries     severe insulin resistance/hirsuitism   • Seasonal allergies        Past Surgical History:   Procedure Laterality Date   • KELOID EXCISION      ,,,,2016,2019   • LAPAROSCOPIC CHOLECYSTECTOMY      for stones   • RADIOFREQUENCY ABLATION  2019    x 2   • UMBILICAL HERNIA REPAIR     • WISDOM TOOTH  "EXTRACTION  1994       Family History   Problem Relation Age of Onset   • Arthritis Mother    • Diabetes Mother    • Obesity Mother    • Arrhythmia Mother    • Hypertension Mother    • Dementia Father    • Heart attack Father    • Stroke Other         CVA   • Obesity Other    • Ovarian cancer Maternal Aunt         40's   • Breast cancer Paternal Aunt         30's   • Heart disease Other    • Hypertension Other    • Endometrial cancer Neg Hx        Social History     Socioeconomic History   • Marital status: Single     Spouse name: Not on file   • Number of children: Not on file   • Years of education: Not on file   • Highest education level: Not on file   Tobacco Use   • Smoking status: Never Smoker   • Smokeless tobacco: Never Used   Substance and Sexual Activity   • Alcohol use: Yes   • Drug use: No   • Sexual activity: Yes     Partners: Male     Birth control/protection: Condom       Allergies   Allergen Reactions   • Hydrochlorothiazide Swelling   • Monosodium Glutamate Swelling and Headache   • Oatmeal Other (See Comments)     Dx on allergy testing   • Amitriptyline Mental Status Change     memory gaps + neuropathy + hair loss   • Aspartame Nausea Only   • Codeine Headache         • Diclofenac Headache   • Doxycycline Rash and Hallucinations   • Eggs Or Egg-Derived Products Other (See Comments)     dx on allergy testing, but does not completely avoid   • Naproxen Other (See Comments)     \"blackout\"       ROS  Review of Systems   Constitutional: Positive for fatigue. Negative for chills and fever.   Musculoskeletal: Positive for back pain.   Psychiatric/Behavioral: Positive for depressed mood and stress. Negative for agitation, sleep disturbance and suicidal ideas. The patient is not nervous/anxious.        Vitals:    04/09/21 1449   BP: 118/82   Pulse: 77   Temp: 96.9 °F (36.1 °C)   SpO2: 96%   Weight: 109 kg (240 lb)   Height: 152.4 cm (60\")   PainSc:   5     Body mass index is 46.87 kg/m².    Current " Outpatient Medications on File Prior to Visit   Medication Sig Dispense Refill   • Acetaminophen (TYLENOL ARTHRITIS PAIN PO) Take  by mouth As Needed.     • albuterol (PROVENTIL HFA;VENTOLIN HFA) 108 (90 BASE) MCG/ACT inhaler Inhale 2 puffs Every 4 (Four) Hours As Needed for wheezing.     • benzonatate (TESSALON) 100 MG capsule Take 1 capsule by mouth 3 (Three) Times a Day As Needed for Cough. 12 capsule 0   • calcium carbonate EX (Antacid Flavor Chews) 750 MG chewable tablet As Needed.     • carvedilol (COREG) 12.5 MG tablet TAKE ONE TABLET BY MOUTH EVERY EVENING (Patient taking differently: 12.5 mg Every Morning.) 90 tablet 3   • carvedilol (COREG) 25 MG tablet Take 25 mg by mouth Every Evening.     • cyclobenzaprine (FLEXERIL) 10 MG tablet Take 1 tablet by mouth 3 (Three) Times a Day As Needed for Muscle Spasms. 30 tablet 0   • diphenhydrAMINE (BENADRYL) 25 mg capsule Take 25 mg by mouth Every 6 (Six) Hours As Needed for itching.     • fluconazole (DIFLUCAN) 200 MG tablet Take 1 tablet by mouth 2 (Two) Times a Day. 180 tablet 3   • furosemide (LASIX) 20 MG tablet Take 1-2 tablets nightly. 135 tablet 1   • gabapentin (NEURONTIN) 300 MG capsule Take 300 mg by mouth 3 (Three) Times a Day As Needed.     • glipizide (GLUCOTROL) 10 MG tablet Take 10 mg by mouth 2 (Two) Times a Day Before Meals. Take one tablet in the morning and one tablet at night     • glipizide (GLUCOTROL) 5 MG tablet Take 1 tablet in the morning, 2 tablets at night. (Patient taking differently: 10 mg. Take 1 tablet in the morning, 1 tablet at night.) 270 tablet 0   • glucose blood test strip Use as instructed 100 each 11   • guaiFENesin 200 MG tablet 200 mg As Needed.     • HYDROcodone-acetaminophen (NORCO) 5-325 MG per tablet Take 1-2 tablets by mouth Every 6 (Six) Hours As Needed for Severe Pain . 15 tablet 0   • levonorgestrel (Mirena, 52 MG,) 20 MCG/24HR IUD      • loratadine (CLARITIN) 10 MG tablet Take 1 tablet by mouth Daily. 90 tablet 3   •  melatonin 1 MG tablet Take 5 mg by mouth As Needed.     • OnabotulinumtoxinA (Botox) 200 units reconstituted solution FOR . PHYSICIAN TO INJECT UP  UNITS INTRAMUSCULARLY INTO HEAD, NECK AND SHOULDERS EVERY 90 DAYS. 1 each 1   • simethicone (MYLICON) 125 MG chewable tablet Chew 125 mg Every 6 (Six) Hours As Needed for Flatulence.     • traMADol (ULTRAM) 50 MG tablet Take 50 mg by mouth Every 6 (Six) Hours As Needed for moderate pain (4-6).     • [DISCONTINUED] metFORMIN ER (GLUCOPHAGE-XR) 500 MG 24 hr tablet Take 1 tablet by mouth Daily With Dinner. 90 tablet 1   • [DISCONTINUED] spironolactone (ALDACTONE) 100 MG tablet Take 100 mg by mouth. Takes 30 days on,  off 30 days.       No current facility-administered medications on file prior to visit.       Results for orders placed or performed in visit on 03/23/21   Comprehensive metabolic panel    Specimen: Blood   Result Value Ref Range    Glucose 248 (H) 65 - 99 mg/dL    BUN 15 6 - 20 mg/dL    Creatinine 0.97 0.57 - 1.00 mg/dL    Sodium 138 136 - 145 mmol/L    Potassium 4.1 3.5 - 5.2 mmol/L    Chloride 101 98 - 107 mmol/L    CO2 22.0 22.0 - 29.0 mmol/L    Calcium 10.1 8.6 - 10.5 mg/dL    Total Protein 8.0 6.0 - 8.5 g/dL    Albumin 4.60 3.50 - 5.20 g/dL    ALT (SGPT) 38 (H) 1 - 33 U/L    AST (SGOT) 29 1 - 32 U/L    Alkaline Phosphatase 106 39 - 117 U/L    Total Bilirubin 0.2 0.0 - 1.2 mg/dL    eGFR  African Amer 78 >60 mL/min/1.73    Globulin 3.4 gm/dL    A/G Ratio 1.4 g/dL    BUN/Creatinine Ratio 15.5 7.0 - 25.0    Anion Gap 15.0 5.0 - 15.0 mmol/L       PE    Physical Exam  Vitals reviewed.   Constitutional:       General: She is not in acute distress.     Appearance: Normal appearance. She is well-developed. She is morbidly obese. She is not ill-appearing or diaphoretic.   HENT:      Head: Normocephalic and atraumatic.   Eyes:      Extraocular Movements: Extraocular movements intact.      Conjunctiva/sclera: Conjunctivae normal.    Pulmonary:      Effort: No respiratory distress.   Musculoskeletal:         General: Normal range of motion.      Cervical back: Normal range of motion.   Neurological:      General: No focal deficit present.      Mental Status: She is alert.   Psychiatric:         Attention and Perception: She is attentive.         Mood and Affect: Mood normal.         Speech: Speech normal.         Behavior: Behavior normal. Behavior is cooperative.         Thought Content: Thought content normal.         Judgment: Judgment normal.         A/P    Diagnoses and all orders for this visit:    1. Morbidly obese (CMS/[facility]) (Primary)  Actively working on weight loss.  Watching diet.  Trying to exercise when back is not hurting.  Has not lost any weight this last month.  Will follow-up with bariatric surgery about proceeding with surgical intervention given failure of weight loss with diet/exercise.    2. Type 2 diabetes mellitus with hyperglycemia, without long-term current use of insulin (CMS/[facility])  -     metFORMIN ER (GLUCOPHAGE-XR) 500 MG 24 hr tablet; Take 1 tablet by mouth Daily With Dinner.  Dispense: 90 tablet; Refill: 1  Hemoglobin AIC was 6.9% a month ago.  Return in 2 months.    3. Degeneration of lumbar intervertebral disc  4. Chronic pain syndrome  Recently established with Dr. Zayas.    5. PCOS (polycystic ovarian syndrome)  -     spironolactone (ALDACTONE) 100 MG tablet; Take 1 tablet by mouth Every Other Day. Takes 30 days on,  off 30 days.  Dispense: 45 tablet; Refill: 1  Established with gynecology.  Goes regularly.  Reviewed recent labs, normal potassium.    6. Grief reaction  Father recently passed.  Seems to be managing her grief in a normal, healthy manner.       Plan of care reviewed with patient at the conclusion of today's visit. Education was provided regarding diagnosis, management and any prescribed or recommended OTC medications.  Patient verbalizes understanding of and agreement with management plan.    No  follow-ups on file.     Lesli Ramirez PA-C

## 2021-05-07 DIAGNOSIS — R53.83 FATIGUE, UNSPECIFIED TYPE: Primary | ICD-10-CM

## 2021-05-07 DIAGNOSIS — R06.00 DYSPNEA, UNSPECIFIED TYPE: ICD-10-CM

## 2021-05-13 ENCOUNTER — PATIENT MESSAGE (OUTPATIENT)
Dept: FAMILY MEDICINE CLINIC | Facility: CLINIC | Age: 36
End: 2021-05-13

## 2021-05-13 RX ORDER — RIBOFLAVIN (VITAMIN B2) 100 MG
500 TABLET ORAL DAILY
Qty: 150 TABLET | Refills: 2 | Status: CANCELLED | OUTPATIENT
Start: 2021-05-13 | End: 2021-06-12

## 2021-05-13 RX ORDER — MAGNESIUM 200 MG
1 TABLET ORAL DAILY
Qty: 30 EACH | Refills: 5 | Status: SHIPPED | OUTPATIENT
Start: 2021-05-13 | End: 2021-07-09

## 2021-05-13 RX ORDER — PNV NO.95/FERROUS FUM/FOLIC AC 28MG-0.8MG
325 TABLET ORAL DAILY
Qty: 30 TABLET | Refills: 5 | Status: SHIPPED | OUTPATIENT
Start: 2021-05-13 | End: 2021-09-22

## 2021-05-13 RX ORDER — THIAMINE MONONITRATE (VIT B1) 100 MG
100 TABLET ORAL DAILY
Qty: 30 TABLET | Refills: 2 | Status: SHIPPED | OUTPATIENT
Start: 2021-05-13 | End: 2021-06-12

## 2021-05-13 RX ORDER — CALCIUM CARB/VIT D2/MINERALS
1200 TABLET ORAL DAILY
Qty: 30 EACH | Refills: 5 | Status: SHIPPED | OUTPATIENT
Start: 2021-05-13 | End: 2021-07-09

## 2021-05-13 RX ORDER — MULTIVIT WITH MINERALS/LUTEIN
500 TABLET ORAL DAILY
Qty: 60 TABLET | Refills: 5 | Status: ON HOLD | OUTPATIENT
Start: 2021-05-13 | End: 2021-06-15

## 2021-05-13 RX ORDER — UBIDECARENONE 75 MG
500 CAPSULE ORAL 2 TIMES DAILY
Qty: 60 TABLET | Refills: 2 | Status: CANCELLED | OUTPATIENT
Start: 2021-05-13 | End: 2021-06-12

## 2021-05-13 NOTE — TELEPHONE ENCOUNTER
From: Frances Hartman  To: Lesli Ramirez PA-C  Sent: 5/13/2021 11:31 AM EDT  Subject: Prescription Question    Vern Ballesteros,     I am another step closer to having the Bariatric Sleeve Surgery completed. In preparation for the surgery and postoperative treatment, I have to take several vitamins.     I was curious to know if you would be able to prescribe any or all of the vitamins, so that I can get them through the pharmacy?    I would be happy to come in, if you need to discuss this in person. I consulted with the Bariatric Office first and they said I should ask your office for those prescriptions.     Look forward to hearing from you.     Thank you,     Frances

## 2021-05-14 ENCOUNTER — LAB (OUTPATIENT)
Dept: LAB | Facility: HOSPITAL | Age: 36
End: 2021-05-14

## 2021-05-14 DIAGNOSIS — R53.83 FATIGUE, UNSPECIFIED TYPE: ICD-10-CM

## 2021-05-14 DIAGNOSIS — R06.00 DYSPNEA, UNSPECIFIED TYPE: ICD-10-CM

## 2021-05-14 LAB
ALBUMIN SERPL-MCNC: 4.4 G/DL (ref 3.5–5.2)
ALBUMIN/GLOB SERPL: 1.1 G/DL
ALP SERPL-CCNC: 106 U/L (ref 39–117)
ALT SERPL W P-5'-P-CCNC: 63 U/L (ref 1–33)
ANION GAP SERPL CALCULATED.3IONS-SCNC: 11.5 MMOL/L (ref 5–15)
AST SERPL-CCNC: 40 U/L (ref 1–32)
BILIRUB SERPL-MCNC: 0.2 MG/DL (ref 0–1.2)
BUN SERPL-MCNC: 13 MG/DL (ref 6–20)
BUN/CREAT SERPL: 13 (ref 7–25)
CALCIUM SPEC-SCNC: 10.3 MG/DL (ref 8.6–10.5)
CHLORIDE SERPL-SCNC: 102 MMOL/L (ref 98–107)
CO2 SERPL-SCNC: 23.5 MMOL/L (ref 22–29)
CREAT SERPL-MCNC: 1 MG/DL (ref 0.57–1)
DEPRECATED RDW RBC AUTO: 45.3 FL (ref 37–54)
ERYTHROCYTE [DISTWIDTH] IN BLOOD BY AUTOMATED COUNT: 12.8 % (ref 12.3–15.4)
GFR SERPL CREATININE-BSD FRML MDRD: 76 ML/MIN/1.73
GLOBULIN UR ELPH-MCNC: 4 GM/DL
GLUCOSE SERPL-MCNC: 216 MG/DL (ref 65–99)
HCT VFR BLD AUTO: 40.3 % (ref 34–46.6)
HGB BLD-MCNC: 13.1 G/DL (ref 12–15.9)
MCH RBC QN AUTO: 31.6 PG (ref 26.6–33)
MCHC RBC AUTO-ENTMCNC: 32.5 G/DL (ref 31.5–35.7)
MCV RBC AUTO: 97.3 FL (ref 79–97)
PLATELET # BLD AUTO: 259 10*3/MM3 (ref 140–450)
PMV BLD AUTO: 12.3 FL (ref 6–12)
POTASSIUM SERPL-SCNC: 4.7 MMOL/L (ref 3.5–5.2)
PROT SERPL-MCNC: 8.4 G/DL (ref 6–8.5)
RBC # BLD AUTO: 4.14 10*6/MM3 (ref 3.77–5.28)
SODIUM SERPL-SCNC: 137 MMOL/L (ref 136–145)
WBC # BLD AUTO: 13.7 10*3/MM3 (ref 3.4–10.8)

## 2021-05-14 PROCEDURE — 85027 COMPLETE CBC AUTOMATED: CPT

## 2021-05-14 PROCEDURE — 80053 COMPREHEN METABOLIC PANEL: CPT

## 2021-05-14 PROCEDURE — 36415 COLL VENOUS BLD VENIPUNCTURE: CPT

## 2021-05-18 ENCOUNTER — CONSULT (OUTPATIENT)
Dept: BARIATRICS/WEIGHT MGMT | Facility: CLINIC | Age: 36
End: 2021-05-18

## 2021-05-18 VITALS
WEIGHT: 231 LBS | OXYGEN SATURATION: 98 % | HEIGHT: 60 IN | BODY MASS INDEX: 45.35 KG/M2 | RESPIRATION RATE: 18 BRPM | SYSTOLIC BLOOD PRESSURE: 122 MMHG | TEMPERATURE: 97.8 F | DIASTOLIC BLOOD PRESSURE: 80 MMHG | HEART RATE: 67 BPM

## 2021-05-18 DIAGNOSIS — E66.9 DIABETES MELLITUS TYPE 2 IN OBESE (HCC): ICD-10-CM

## 2021-05-18 DIAGNOSIS — J45.30 MILD PERSISTENT ASTHMA WITHOUT COMPLICATION: ICD-10-CM

## 2021-05-18 DIAGNOSIS — E11.69 DIABETES MELLITUS TYPE 2 IN OBESE (HCC): ICD-10-CM

## 2021-05-18 DIAGNOSIS — R53.83 FATIGUE, UNSPECIFIED TYPE: ICD-10-CM

## 2021-05-18 DIAGNOSIS — I10 ESSENTIAL HYPERTENSION: ICD-10-CM

## 2021-05-18 DIAGNOSIS — E66.01 OBESITY, CLASS III, BMI 40-49.9 (MORBID OBESITY) (HCC): Primary | ICD-10-CM

## 2021-05-18 DIAGNOSIS — E28.2 PCOS (POLYCYSTIC OVARIAN SYNDROME): ICD-10-CM

## 2021-05-18 PROCEDURE — 99214 OFFICE O/P EST MOD 30 MIN: CPT | Performed by: SURGERY

## 2021-05-18 RX ORDER — DIPHENOXYLATE HYDROCHLORIDE AND ATROPINE SULFATE 2.5; .025 MG/1; MG/1
1 TABLET ORAL DAILY
COMMUNITY
Start: 2021-04-24 | End: 2021-09-22

## 2021-05-18 RX ORDER — CHLORHEXIDINE GLUCONATE 0.12 MG/ML
15 RINSE ORAL
Status: CANCELLED | OUTPATIENT
Start: 2021-05-18 | End: 2021-05-18

## 2021-05-18 RX ORDER — SCOLOPAMINE TRANSDERMAL SYSTEM 1 MG/1
1 PATCH, EXTENDED RELEASE TRANSDERMAL ONCE
Status: CANCELLED | OUTPATIENT
Start: 2021-05-18 | End: 2021-05-18

## 2021-05-18 RX ORDER — PANTOPRAZOLE SODIUM 40 MG/10ML
40 INJECTION, POWDER, LYOPHILIZED, FOR SOLUTION INTRAVENOUS ONCE
Status: CANCELLED | OUTPATIENT
Start: 2021-05-18 | End: 2021-05-18

## 2021-05-18 RX ORDER — GABAPENTIN 100 MG/1
600 CAPSULE ORAL ONCE
Status: CANCELLED | OUTPATIENT
Start: 2021-05-18 | End: 2021-05-18

## 2021-05-18 RX ORDER — ACETAMINOPHEN 500 MG
1000 TABLET ORAL ONCE
Status: CANCELLED | OUTPATIENT
Start: 2021-05-18 | End: 2021-05-18

## 2021-05-18 RX ORDER — ASCORBIC ACID 500 MG
500 TABLET ORAL DAILY
COMMUNITY
Start: 2021-05-13 | End: 2021-09-22

## 2021-05-18 RX ORDER — SODIUM CHLORIDE 9 MG/ML
150 INJECTION, SOLUTION INTRAVENOUS CONTINUOUS
Status: CANCELLED | OUTPATIENT
Start: 2021-05-18

## 2021-05-18 NOTE — PROGRESS NOTES
"[facility] BARIATRIC SURGERY  [address]    [facility] [id number] [phone]      Patient  Name:  Frances Hartman  :  1985      Date of Visit: 2021      Chief Complaint:  weight gain; unable to maintain weight loss    History of Present Illness:  Frances Hartman is a 35 y.o. female who presents today for evaluation, education and consultation regarding weight loss surgery.     Patient has been overweight for many years, with numerous failed dietary/weight loss attempts.  She now has obesity related comorbidities of HTN, DM2, asthma, chronic back pain, GERD, gastroparesis, PCOS, migraines and as such has decided to pursue weight loss surgery.    From intake:  \"  Note: hx significant for keloid formation - says does well w/ surgical glue + compression bandages.\"    2021 Update:    Currently not working, but has a  background.    No personal or family hx of VTE or clotting d/o.  No liver, lung, heart, or renal disease      Re: hypertrophic scarring, prefers subcuticular stitches/dermabond.  She will get some silicone for incisions.  She reports massage makes the keloids worse.  Would like to try pressure dressing with 4x4 and stretch tape to provide constant compression.  Most of her hypertrophic scars are from chicken pox scars.    Had umbilical hernia repair at age 5, presumably without mesh.    She appreciated my class and wanted copies of the pregnancy and mental preparation slide.    Please hold Tramadol 1 week before and 6 weeks after surgery.  Has chronic back pain.  Sees pain management, has had cortisone epidural and radiofrequency ablation.    Has some mild nausea.  Not really bothersome.  New dx of gastroparesis on screening GES prior to Bariatric Surgery.        Review of data:    Further evaluation will include: CBC, CMP, Lipids, TSH, HgA1C, H.Pylori UBT, EKG, CXR, Pulmonary Function Testing, Gastric Emptying Study and " EGD.    Additional clearances needed prior to surgery will include: Cardiology.     JOSE: gabapentin and tramadol x 1  CBC: WBcs 13.7  CMP: glc 216, ALt/AST 63/40  HP negative    EKG: ectopic atrial rhythm, T wave abnormalities.  CXR: nl    EGD: 10/22/20 GEJ 40 cm, moderate size hiatal hernia.  Path benign  GES: delayed gastric emptying    Cardiac clearance: low risk    PFTs: mild reduction in DLCO    Last tobacco: n/a  Last NSAIDs: tramadol 4 days ago.  Last ASA: n/a  Last steroids: epidural with steroid April 28.  Last hormones: IUD      Past Medical History:   Diagnosis Date   • Anxiety    • Asthma     prn inhalers, does not follow w/ pulmonary   • Chronic back pain     previously followed w/ pain management, now just prn Tylenol + Gabapentin   • Diabetes mellitus (CMS/HCC)     Type II, dx 2014, never on insulin, A1C 7.6   • Dyspepsia    • Dyspnea on exertion    • Fatigue    • Gastroparesis    • Heartburn     chronic, prn TUMS, denies prior eval   • Hirsutism    • Hx of radiation therapy     for treatments of Keloids   • Hypertension    • Irregular menses     infrequent spotting   • Leiomyoma, subserous     possible peduculated f3-4 cm fibroid on u/s at [facility]   • Migraines     botox q3 months, following Neurology @ [facility]   • Morbid obesity (CMS/HCC)    • Peripheral edema    • Polycystic ovaries     severe insulin resistance/hirsuitism   • Seasonal allergies      Past Surgical History:   Procedure Laterality Date   • KELOID EXCISION      1990,1993,1999,2015,2016,2019   • LAPAROSCOPIC CHOLECYSTECTOMY  2000    for stones   • RADIOFREQUENCY ABLATION  2019    x 2   • UMBILICAL HERNIA REPAIR  1990   • WISDOM TOOTH EXTRACTION  1994       Allergies   Allergen Reactions   • Hydrochlorothiazide Swelling   • Monosodium Glutamate Swelling and Headache   • Oatmeal Other (See Comments)     Dx on allergy testing   • Augmentin [Amoxicillin-Pot Clavulanate] Other (See Comments)     Syncope, not sure if allergic to cephalosporins.  "  • Amitriptyline Mental Status Change     memory gaps + neuropathy + hair loss   • Aspartame Nausea Only   • Codeine Headache      Can tolerate hydrocodone, no other adverse reactions to other narcotics.   • Diclofenac Headache   • Doxycycline Rash and Hallucinations   • Eggs Or Egg-Derived Products Other (See Comments)     dx on allergy testing, but does not completely avoid   • Naproxen Other (See Comments)     \"blackout\"       Current Outpatient Medications:   •  Acetaminophen (TYLENOL ARTHRITIS PAIN PO), Take  by mouth As Needed., Disp: , Rfl:   •  albuterol (PROVENTIL HFA;VENTOLIN HFA) 108 (90 BASE) MCG/ACT inhaler, Inhale 2 puffs Every 4 (Four) Hours As Needed for wheezing., Disp: , Rfl:   •  carvedilol (COREG) 12.5 MG tablet, TAKE ONE TABLET BY MOUTH EVERY EVENING (Patient taking differently: 12.5 mg Every Morning.), Disp: 90 tablet, Rfl: 3  •  carvedilol (COREG) 25 MG tablet, Take 25 mg by mouth Every Evening., Disp: , Rfl:   •  cyclobenzaprine (FLEXERIL) 10 MG tablet, Take 1 tablet by mouth 3 (Three) Times a Day As Needed for Muscle Spasms., Disp: 30 tablet, Rfl: 0  •  diphenhydrAMINE (BENADRYL) 25 mg capsule, Take 25 mg by mouth Every 6 (Six) Hours As Needed for itching., Disp: , Rfl:   •  fluconazole (DIFLUCAN) 200 MG tablet, Take 1 tablet by mouth 2 (Two) Times a Day., Disp: 180 tablet, Rfl: 3  •  furosemide (LASIX) 20 MG tablet, Take 1-2 tablets nightly., Disp: 135 tablet, Rfl: 1  •  gabapentin (NEURONTIN) 300 MG capsule, Take 300 mg by mouth 3 (Three) Times a Day As Needed., Disp: , Rfl:   •  glipizide (GLUCOTROL) 10 MG tablet, Take 10 mg by mouth 2 (Two) Times a Day Before Meals. Take one tablet in the morning and one tablet at night, Disp: , Rfl:   •  glucose blood test strip, Use as instructed, Disp: 100 each, Rfl: 11  •  guaiFENesin 200 MG tablet, 200 mg As Needed., Disp: , Rfl:   •  levonorgestrel (Mirena, 52 MG,) 20 MCG/24HR IUD, , Disp: , Rfl:   •  loratadine (CLARITIN) 10 MG tablet, Take 1 " tablet by mouth Daily., Disp: 90 tablet, Rfl: 3  •  melatonin 1 MG tablet, Take 5 mg by mouth As Needed., Disp: , Rfl:   •  metFORMIN ER (GLUCOPHAGE-XR) 500 MG 24 hr tablet, Take 1 tablet by mouth Daily With Dinner., Disp: 90 tablet, Rfl: 1  •  Multiple Minerals-Vitamins (Calcium Citrate +) tablet, Take 1,200 mg by mouth Daily., Disp: 30 each, Rfl: 5  •  OnabotulinumtoxinA (Botox) 200 units reconstituted solution, FOR . PHYSICIAN TO INJECT UP  UNITS INTRAMUSCULARLY INTO HEAD, NECK AND SHOULDERS EVERY 90 DAYS., Disp: 1 each, Rfl: 1  •  simethicone (MYLICON) 125 MG chewable tablet, Chew 125 mg Every 6 (Six) Hours As Needed for Flatulence., Disp: , Rfl:   •  spironolactone (ALDACTONE) 100 MG tablet, Take 1 tablet by mouth Every Other Day. Takes 30 days on,  off 30 days., Disp: 45 tablet, Rfl: 1  •  traMADol (ULTRAM) 50 MG tablet, Take 50 mg by mouth Every 6 (Six) Hours As Needed for moderate pain (4-6)., Disp: , Rfl:   •  ascorbic acid (VITAMIN C) 500 MG tablet, Take 500 mg by mouth Daily., Disp: , Rfl:   •  calcium carbonate EX (Antacid Flavor Chews) 750 MG chewable tablet, As Needed., Disp: , Rfl:   •  Cyanocobalamin (Vitamin B-12) 1000 MCG sublingual tablet, Place 1 tablet under the tongue Daily., Disp: 30 each, Rfl: 5  •  ferrous sulfate 325 (65 Fe) MG tablet, Take 1 tablet by mouth Daily., Disp: 30 tablet, Rfl: 5  •  HYDROcodone-acetaminophen (NORCO) 5-325 MG per tablet, Take 1-2 tablets by mouth Every 6 (Six) Hours As Needed for Severe Pain ., Disp: 15 tablet, Rfl: 0  •  multivitamin (Multi Vitamin Daily) tablet tablet, , Disp: , Rfl:   •  thiamine (VITAMIN B-1) 100 MG tablet, Take 1 tablet by mouth Daily for 30 days., Disp: 30 tablet, Rfl: 2  •  vitamin C (ASCORBIC ACID) 250 MG tablet, Take 2 tablets by mouth Daily., Disp: 60 tablet, Rfl: 5    Social History     Socioeconomic History   • Marital status: Single     Spouse name: Not on file   • Number of children: Not on file   • Years  of education: Not on file   • Highest education level: Not on file   Tobacco Use   • Smoking status: Never Smoker   • Smokeless tobacco: Never Used   Substance and Sexual Activity   • Alcohol use: Yes   • Drug use: No   • Sexual activity: Yes     Partners: Male     Birth control/protection: Condom     Family History   Problem Relation Age of Onset   • Arthritis Mother    • Diabetes Mother    • Obesity Mother    • Arrhythmia Mother    • Hypertension Mother    • Dementia Father    • Heart attack Father    • Stroke Other         CVA   • Obesity Other    • Ovarian cancer Maternal Aunt         40's   • Breast cancer Paternal Aunt         30's   • Heart disease Other    • Hypertension Other    • Endometrial cancer Neg Hx        Review of Systems   Constitutional: Positive for fatigue and unexpected weight gain. Negative for chills, diaphoresis, fever and unexpected weight loss.   HENT: Negative for congestion and facial swelling.    Eyes: Negative for blurred vision, double vision and discharge.   Respiratory: Negative for chest tightness, shortness of breath and stridor.    Cardiovascular: Negative for chest pain, palpitations and leg swelling.   Gastrointestinal: Negative for blood in stool.   Endocrine: Negative for polydipsia.   Genitourinary: Negative for hematuria.   Musculoskeletal: Positive for arthralgias.   Skin: Negative for color change.   Allergic/Immunologic: Negative for immunocompromised state.   Neurological: Negative for confusion.   Psychiatric/Behavioral: Negative for self-injury.       Physical Exam:  Vital Signs:  Weight: 105 kg (231 lb)   Body mass index is 45.11 kg/m².  Temp: 97.8 °F (36.6 °C)   Heart Rate: 67   BP: 122/80     Physical Exam  Vitals reviewed.   Constitutional:       Appearance: She is well-developed.   HENT:      Head: Normocephalic and atraumatic.      Nose: Nose normal.   Eyes:      Conjunctiva/sclera: Conjunctivae normal.      Pupils: Pupils are equal, round, and reactive to  light.   Neck:      Thyroid: No thyromegaly.      Vascular: No carotid bruit.      Trachea: No tracheal deviation.   Cardiovascular:      Rate and Rhythm: Normal rate and regular rhythm.      Heart sounds: Normal heart sounds.   Pulmonary:      Effort: Pulmonary effort is normal. No respiratory distress.      Breath sounds: Normal breath sounds.   Abdominal:      General: There is no distension.      Palpations: Abdomen is soft.      Tenderness: There is no abdominal tenderness.      Comments: Lap scars, one with hypertrophic scarring   Musculoskeletal:         General: No deformity. Normal range of motion.      Cervical back: Normal range of motion and neck supple.   Skin:     General: Skin is warm and dry.      Findings: No rash.      Comments: Hypertrophic scars on abdomen, left jaw, left shoulder, right upper arm. Acanthosis nigricans on her neck.   Neurological:      Mental Status: She is alert and oriented to person, place, and time.      Cranial Nerves: No cranial nerve deficit.      Coordination: Coordination normal.   Psychiatric:         Behavior: Behavior normal.         Thought Content: Thought content normal.         Judgment: Judgment normal.         Patient Active Problem List   Diagnosis   • Asthma   • Chronic fatigue   • Chronic tonsillar hypertrophy   • Type 2 diabetes mellitus (CMS/HCC)   • Hyperlipidemia   • Hypertension   • Keloid skin disorder   • Seasonal allergies   • Subclinical hypothyroidism   • Abnormal uterine bleeding   • Anemia   • Depression   • Herpes zoster   • Chronic migraine without aura, not intractable   • Chronic pain syndrome   • Degeneration of lumbar intervertebral disc   • Low back pain   • Lumbar spondylosis   • Osteoarthritis of hip   • Pain in the coccyx   • Fibroids, intramural   • IUD check up   • DUB (dysfunctional uterine bleeding)   • PCOS (polycystic ovarian syndrome)   • Morbidly obese (CMS/HCC)   • Dyspepsia   • Dyspnea on exertion   • Anxiety   • Heartburn   •  "Chronic back pain   • Morbid obesity (CMS/[facility])       Assessment:    Frances Hartman is a 35 y.o. year old female with medically complicated obesity.    Weight loss surgery is deemed medically necessary given the following obesity related comorbidities including HTN, DM2, asthma, chronic back pain, GERD, gastroparesis, PCOS, migraines with current Weight: 105 kg (231 lb) and Body mass index is 45.11 kg/m²..    Patient is aware that surgery is a tool, and that weight loss is not guaranteed but only seen in the context of appropriate use, follow up and exercise.    The patient was present for an approximately a 2.5 hour discussion of the purpose of weight loss surgery, how WLS is a tool to assist in achieving weight loss goals, the most common complications and how best to avoid them, and the strategies for short and long term weight loss.  Ample opportunity to discuss questions was available both in group and during the time of individual examination.    I reviewed all available preop labs, Xrays, tests, clearances, etc and signed off on these in the record.  All of this in addition to the patient's unique history and exam has been taken into consideration in determining their appropriate candidacy for weight loss surgery.    Complications  of laparoscopic/possible robotic gastric sleeve were discussed. The patient is well aware of the potential complications of surgery that include but not limited to bleeding, infections, deep venous thrombosis, pulmonary embolism, pulmonary complications such as pneumonia, cardiac events, hernias, small bowel obstruction, damage to the spleen or other organs, bowel injury, disfiguring scars, failure to lose weight, need for additional surgery, conversion to an open procedure, and death. Patient is also aware of complications which apply in this particular procedure that can include but are not limited to a \"leak\" at the staple line which in some instances may require conversion " to gastric bypass.    The patient is aware if a hiatal hernia is encountered, it likely will be repaired.  R/B/A Rx to hiatal hernia repair were discussed as outlined in our long consent form.  Briefly risks in addition to those for LSG include recurrent hernia, YOLANDA, dysphagia, esophageal injury, pneumothorax, injury to the vagus nerves, injury to the thoracic duct, aorta or vena cava.    Greater than 3 minutes was spent with the patient discussing avoiding all tobacco products and second hand smoke at least 2 weeks pre-operatively and 6 weeks post-operatively to minimize the risk of sleeve leak.  This included discussing the importance of avoiding even secondhand smoke as the risk of leak is increased.  Examples discussed:  I made it very clear that the patient understands they should avoid even riding in a car where someone has previously smoked in the last 2 weeks, living in a house where someone smokes (even if it's in a separate room/patio/attached garage, etc.) we discussed that they should not have a conversation with a group of people who are smoking even if it's outside.  They can be around wood burning fires and barbecue.  I told them I do not know if marijuana has a same effects but my overall recommendation is to avoid it for 2 weeks prior in 6 weeks after surgery.  They also are aware that nicotine may also increase the risk of leak and I strongly encouraged him to avoid that as well for 2 weeks prior in 6 weeks after surgery.    Discussed the risks, benefits and alternative therapies at great length as outlined in our extensive consent forms, consent videos, and educational teaching process under the direction of the center's .    A copy of the patient's signed informed consent is on file.    Plan:  Laparoscopic sleeve gastrectomy + HHR at [facility].  Possible robotic.      R/B/A Rx discussed to postop anticoagulation incl but not limited to bleeding, drug reaction, venothromboembolic  events, etc. and she declined.    MDM high:  Elective procedure with the following risk factors: morbid obesity, DM2, HTN.    We discussed at length her hypertrophic scarring.    4+ chronic medical problems reviewed.      Ayaka Andre MD                                           Answers for HPI/ROS submitted by the patient on 5/11/2021  What is the primary reason for your visit?: Physical

## 2021-05-18 NOTE — H&P
"[facility] BARIATRIC SURGERY  [address]    [facility] [id number] [phone]      Patient  Name:  Frances Hartman  :  1985      Date of Visit: 2021      Chief Complaint:  weight gain; unable to maintain weight loss    History of Present Illness:  Frances Hartman is a 35 y.o. female who presents today for evaluation, education and consultation regarding weight loss surgery.     Patient has been overweight for many years, with numerous failed dietary/weight loss attempts.  She now has obesity related comorbidities of HTN, DM2, asthma, chronic back pain, GERD, gastroparesis, PCOS, migraines and as such has decided to pursue weight loss surgery.    From intake:  \"  Note: hx significant for keloid formation - says does well w/ surgical glue + compression bandages.\"    2021 Update:    Currently not working, but has a  background.    No personal or family hx of VTE or clotting d/o.  No liver, lung, heart, or renal disease      Re: hypertrophic scarring, prefers subcuticular stitches/dermabond.  She will get some silicone for incisions.  She reports massage makes the keloids worse.  Would like to try pressure dressing with 4x4 and stretch tape to provide constant compression.  Most of her hypertrophic scars are from chicken pox scars.    Had umbilical hernia repair at age 5, presumably without mesh.    She appreciated my class and wanted copies of the pregnancy and mental preparation slide.    Please hold Tramadol 1 week before and 6 weeks after surgery.  Has chronic back pain.  Sees pain management, has had cortisone epidural and radiofrequency ablation.    Has some mild nausea.  Not really bothersome.  New dx of gastroparesis on screening GES prior to Bariatric Surgery.        Review of data:    Further evaluation will include: CBC, CMP, Lipids, TSH, HgA1C, H.Pylori UBT, EKG, CXR, Pulmonary Function Testing, Gastric Emptying Study and " EGD.    Additional clearances needed prior to surgery will include: Cardiology.     JOSE: gabapentin and tramadol x 1  CBC: WBcs 13.7  CMP: glc 216, ALt/AST 63/40  HP negative    EKG: ectopic atrial rhythm, T wave abnormalities.  CXR: nl    EGD: 10/22/20 GEJ 40 cm, moderate size hiatal hernia.  Path benign  GES: delayed gastric emptying    Cardiac clearance: low risk    PFTs: mild reduction in DLCO    Last tobacco: n/a  Last NSAIDs: tramadol 4 days ago.  Last ASA: n/a  Last steroids: epidural with steroid April 28.  Last hormones: IUD      Past Medical History:   Diagnosis Date   • Anxiety    • Asthma     prn inhalers, does not follow w/ pulmonary   • Chronic back pain     previously followed w/ pain management, now just prn Tylenol + Gabapentin   • Diabetes mellitus (CMS/HCC)     Type II, dx 2014, never on insulin, A1C 7.6   • Dyspepsia    • Dyspnea on exertion    • Fatigue    • Gastroparesis    • Heartburn     chronic, prn TUMS, denies prior eval   • Hirsutism    • Hx of radiation therapy     for treatments of Keloids   • Hypertension    • Irregular menses     infrequent spotting   • Leiomyoma, subserous     possible peduculated f3-4 cm fibroid on u/s at [facility]   • Migraines     botox q3 months, following Neurology @ [facility]   • Morbid obesity (CMS/HCC)    • Peripheral edema    • Polycystic ovaries     severe insulin resistance/hirsuitism   • Seasonal allergies      Past Surgical History:   Procedure Laterality Date   • KELOID EXCISION      1990,1993,1999,2015,2016,2019   • LAPAROSCOPIC CHOLECYSTECTOMY  2000    for stones   • RADIOFREQUENCY ABLATION  2019    x 2   • UMBILICAL HERNIA REPAIR  1990   • WISDOM TOOTH EXTRACTION  1994       Allergies   Allergen Reactions   • Hydrochlorothiazide Swelling   • Monosodium Glutamate Swelling and Headache   • Oatmeal Other (See Comments)     Dx on allergy testing   • Augmentin [Amoxicillin-Pot Clavulanate] Other (See Comments)     Syncope, not sure if allergic to cephalosporins.  "  • Amitriptyline Mental Status Change     memory gaps + neuropathy + hair loss   • Aspartame Nausea Only   • Codeine Headache      Can tolerate hydrocodone, no other adverse reactions to other narcotics.   • Diclofenac Headache   • Doxycycline Rash and Hallucinations   • Eggs Or Egg-Derived Products Other (See Comments)     dx on allergy testing, but does not completely avoid   • Naproxen Other (See Comments)     \"blackout\"       Current Outpatient Medications:   •  Acetaminophen (TYLENOL ARTHRITIS PAIN PO), Take  by mouth As Needed., Disp: , Rfl:   •  albuterol (PROVENTIL HFA;VENTOLIN HFA) 108 (90 BASE) MCG/ACT inhaler, Inhale 2 puffs Every 4 (Four) Hours As Needed for wheezing., Disp: , Rfl:   •  carvedilol (COREG) 12.5 MG tablet, TAKE ONE TABLET BY MOUTH EVERY EVENING (Patient taking differently: 12.5 mg Every Morning.), Disp: 90 tablet, Rfl: 3  •  carvedilol (COREG) 25 MG tablet, Take 25 mg by mouth Every Evening., Disp: , Rfl:   •  cyclobenzaprine (FLEXERIL) 10 MG tablet, Take 1 tablet by mouth 3 (Three) Times a Day As Needed for Muscle Spasms., Disp: 30 tablet, Rfl: 0  •  diphenhydrAMINE (BENADRYL) 25 mg capsule, Take 25 mg by mouth Every 6 (Six) Hours As Needed for itching., Disp: , Rfl:   •  fluconazole (DIFLUCAN) 200 MG tablet, Take 1 tablet by mouth 2 (Two) Times a Day., Disp: 180 tablet, Rfl: 3  •  furosemide (LASIX) 20 MG tablet, Take 1-2 tablets nightly., Disp: 135 tablet, Rfl: 1  •  gabapentin (NEURONTIN) 300 MG capsule, Take 300 mg by mouth 3 (Three) Times a Day As Needed., Disp: , Rfl:   •  glipizide (GLUCOTROL) 10 MG tablet, Take 10 mg by mouth 2 (Two) Times a Day Before Meals. Take one tablet in the morning and one tablet at night, Disp: , Rfl:   •  glucose blood test strip, Use as instructed, Disp: 100 each, Rfl: 11  •  guaiFENesin 200 MG tablet, 200 mg As Needed., Disp: , Rfl:   •  levonorgestrel (Mirena, 52 MG,) 20 MCG/24HR IUD, , Disp: , Rfl:   •  loratadine (CLARITIN) 10 MG tablet, Take 1 " tablet by mouth Daily., Disp: 90 tablet, Rfl: 3  •  melatonin 1 MG tablet, Take 5 mg by mouth As Needed., Disp: , Rfl:   •  metFORMIN ER (GLUCOPHAGE-XR) 500 MG 24 hr tablet, Take 1 tablet by mouth Daily With Dinner., Disp: 90 tablet, Rfl: 1  •  Multiple Minerals-Vitamins (Calcium Citrate +) tablet, Take 1,200 mg by mouth Daily., Disp: 30 each, Rfl: 5  •  OnabotulinumtoxinA (Botox) 200 units reconstituted solution, FOR . PHYSICIAN TO INJECT UP  UNITS INTRAMUSCULARLY INTO HEAD, NECK AND SHOULDERS EVERY 90 DAYS., Disp: 1 each, Rfl: 1  •  simethicone (MYLICON) 125 MG chewable tablet, Chew 125 mg Every 6 (Six) Hours As Needed for Flatulence., Disp: , Rfl:   •  spironolactone (ALDACTONE) 100 MG tablet, Take 1 tablet by mouth Every Other Day. Takes 30 days on,  off 30 days., Disp: 45 tablet, Rfl: 1  •  traMADol (ULTRAM) 50 MG tablet, Take 50 mg by mouth Every 6 (Six) Hours As Needed for moderate pain (4-6)., Disp: , Rfl:   •  ascorbic acid (VITAMIN C) 500 MG tablet, Take 500 mg by mouth Daily., Disp: , Rfl:   •  calcium carbonate EX (Antacid Flavor Chews) 750 MG chewable tablet, As Needed., Disp: , Rfl:   •  Cyanocobalamin (Vitamin B-12) 1000 MCG sublingual tablet, Place 1 tablet under the tongue Daily., Disp: 30 each, Rfl: 5  •  ferrous sulfate 325 (65 Fe) MG tablet, Take 1 tablet by mouth Daily., Disp: 30 tablet, Rfl: 5  •  HYDROcodone-acetaminophen (NORCO) 5-325 MG per tablet, Take 1-2 tablets by mouth Every 6 (Six) Hours As Needed for Severe Pain ., Disp: 15 tablet, Rfl: 0  •  multivitamin (Multi Vitamin Daily) tablet tablet, , Disp: , Rfl:   •  thiamine (VITAMIN B-1) 100 MG tablet, Take 1 tablet by mouth Daily for 30 days., Disp: 30 tablet, Rfl: 2  •  vitamin C (ASCORBIC ACID) 250 MG tablet, Take 2 tablets by mouth Daily., Disp: 60 tablet, Rfl: 5    Social History     Socioeconomic History   • Marital status: Single     Spouse name: Not on file   • Number of children: Not on file   • Years  of education: Not on file   • Highest education level: Not on file   Tobacco Use   • Smoking status: Never Smoker   • Smokeless tobacco: Never Used   Substance and Sexual Activity   • Alcohol use: Yes   • Drug use: No   • Sexual activity: Yes     Partners: Male     Birth control/protection: Condom     Family History   Problem Relation Age of Onset   • Arthritis Mother    • Diabetes Mother    • Obesity Mother    • Arrhythmia Mother    • Hypertension Mother    • Dementia Father    • Heart attack Father    • Stroke Other         CVA   • Obesity Other    • Ovarian cancer Maternal Aunt         40's   • Breast cancer Paternal Aunt         30's   • Heart disease Other    • Hypertension Other    • Endometrial cancer Neg Hx        Review of Systems   Constitutional: Positive for fatigue and unexpected weight gain. Negative for chills, diaphoresis, fever and unexpected weight loss.   HENT: Negative for congestion and facial swelling.    Eyes: Negative for blurred vision, double vision and discharge.   Respiratory: Negative for chest tightness, shortness of breath and stridor.    Cardiovascular: Negative for chest pain, palpitations and leg swelling.   Gastrointestinal: Negative for blood in stool.   Endocrine: Negative for polydipsia.   Genitourinary: Negative for hematuria.   Musculoskeletal: Positive for arthralgias.   Skin: Negative for color change.   Allergic/Immunologic: Negative for immunocompromised state.   Neurological: Negative for confusion.   Psychiatric/Behavioral: Negative for self-injury.       Physical Exam:  Vital Signs:  Weight: 105 kg (231 lb)   Body mass index is 45.11 kg/m².  Temp: 97.8 °F (36.6 °C)   Heart Rate: 67   BP: 122/80     Physical Exam  Vitals reviewed.   Constitutional:       Appearance: She is well-developed.   HENT:      Head: Normocephalic and atraumatic.      Nose: Nose normal.   Eyes:      Conjunctiva/sclera: Conjunctivae normal.      Pupils: Pupils are equal, round, and reactive to  light.   Neck:      Thyroid: No thyromegaly.      Vascular: No carotid bruit.      Trachea: No tracheal deviation.   Cardiovascular:      Rate and Rhythm: Normal rate and regular rhythm.      Heart sounds: Normal heart sounds.   Pulmonary:      Effort: Pulmonary effort is normal. No respiratory distress.      Breath sounds: Normal breath sounds.   Abdominal:      General: There is no distension.      Palpations: Abdomen is soft.      Tenderness: There is no abdominal tenderness.      Comments: Lap scars, one with hypertrophic scarring   Musculoskeletal:         General: No deformity. Normal range of motion.      Cervical back: Normal range of motion and neck supple.   Skin:     General: Skin is warm and dry.      Findings: No rash.      Comments: Hypertrophic scars on abdomen, left jaw, left shoulder, right upper arm. Acanthosis nigricans on her neck.   Neurological:      Mental Status: She is alert and oriented to person, place, and time.      Cranial Nerves: No cranial nerve deficit.      Coordination: Coordination normal.   Psychiatric:         Behavior: Behavior normal.         Thought Content: Thought content normal.         Judgment: Judgment normal.         Patient Active Problem List   Diagnosis   • Asthma   • Chronic fatigue   • Chronic tonsillar hypertrophy   • Type 2 diabetes mellitus (CMS/HCC)   • Hyperlipidemia   • Hypertension   • Keloid skin disorder   • Seasonal allergies   • Subclinical hypothyroidism   • Abnormal uterine bleeding   • Anemia   • Depression   • Herpes zoster   • Chronic migraine without aura, not intractable   • Chronic pain syndrome   • Degeneration of lumbar intervertebral disc   • Low back pain   • Lumbar spondylosis   • Osteoarthritis of hip   • Pain in the coccyx   • Fibroids, intramural   • IUD check up   • DUB (dysfunctional uterine bleeding)   • PCOS (polycystic ovarian syndrome)   • Morbidly obese (CMS/HCC)   • Dyspepsia   • Dyspnea on exertion   • Anxiety   • Heartburn   •  "Chronic back pain   • Morbid obesity (CMS/[facility])       Assessment:    Frances Hartman is a 35 y.o. year old female with medically complicated obesity.    Weight loss surgery is deemed medically necessary given the following obesity related comorbidities including HTN, DM2, asthma, chronic back pain, GERD, gastroparesis, PCOS, migraines with current Weight: 105 kg (231 lb) and Body mass index is 45.11 kg/m²..    Patient is aware that surgery is a tool, and that weight loss is not guaranteed but only seen in the context of appropriate use, follow up and exercise.    The patient was present for an approximately a 2.5 hour discussion of the purpose of weight loss surgery, how WLS is a tool to assist in achieving weight loss goals, the most common complications and how best to avoid them, and the strategies for short and long term weight loss.  Ample opportunity to discuss questions was available both in group and during the time of individual examination.    I reviewed all available preop labs, Xrays, tests, clearances, etc and signed off on these in the record.  All of this in addition to the patient's unique history and exam has been taken into consideration in determining their appropriate candidacy for weight loss surgery.    Complications  of laparoscopic/possible robotic gastric sleeve were discussed. The patient is well aware of the potential complications of surgery that include but not limited to bleeding, infections, deep venous thrombosis, pulmonary embolism, pulmonary complications such as pneumonia, cardiac events, hernias, small bowel obstruction, damage to the spleen or other organs, bowel injury, disfiguring scars, failure to lose weight, need for additional surgery, conversion to an open procedure, and death. Patient is also aware of complications which apply in this particular procedure that can include but are not limited to a \"leak\" at the staple line which in some instances may require conversion " to gastric bypass.    The patient is aware if a hiatal hernia is encountered, it likely will be repaired.  R/B/A Rx to hiatal hernia repair were discussed as outlined in our long consent form.  Briefly risks in addition to those for LSG include recurrent hernia, YOLANDA, dysphagia, esophageal injury, pneumothorax, injury to the vagus nerves, injury to the thoracic duct, aorta or vena cava.    Greater than 3 minutes was spent with the patient discussing avoiding all tobacco products and second hand smoke at least 2 weeks pre-operatively and 6 weeks post-operatively to minimize the risk of sleeve leak.  This included discussing the importance of avoiding even secondhand smoke as the risk of leak is increased.  Examples discussed:  I made it very clear that the patient understands they should avoid even riding in a car where someone has previously smoked in the last 2 weeks, living in a house where someone smokes (even if it's in a separate room/patio/attached garage, etc.) we discussed that they should not have a conversation with a group of people who are smoking even if it's outside.  They can be around wood burning fires and barbecue.  I told them I do not know if marijuana has a same effects but my overall recommendation is to avoid it for 2 weeks prior in 6 weeks after surgery.  They also are aware that nicotine may also increase the risk of leak and I strongly encouraged him to avoid that as well for 2 weeks prior in 6 weeks after surgery.    Discussed the risks, benefits and alternative therapies at great length as outlined in our extensive consent forms, consent videos, and educational teaching process under the direction of the center's .    A copy of the patient's signed informed consent is on file.    Plan:  Laparoscopic sleeve gastrectomy + HHR at [facility].  Possible robotic.      R/B/A Rx discussed to postop anticoagulation incl but not limited to bleeding, drug reaction, venothromboembolic  events, etc. and she declined.    MDM high:  Elective procedure with the following risk factors: morbid obesity, DM2, HTN.    We discussed at length her hypertrophic scarring.    4+ chronic medical problems reviewed.      Ayaka Andre MD                                           Answers for HPI/ROS submitted by the patient on 5/11/2021  What is the primary reason for your visit?: Physical

## 2021-05-19 ENCOUNTER — OFFICE VISIT (OUTPATIENT)
Dept: PSYCHIATRY | Facility: CLINIC | Age: 36
End: 2021-05-19

## 2021-05-19 DIAGNOSIS — F33.1 MODERATE EPISODE OF RECURRENT MAJOR DEPRESSIVE DISORDER (HCC): ICD-10-CM

## 2021-05-19 DIAGNOSIS — F43.21 GRIEF: Primary | ICD-10-CM

## 2021-05-19 PROCEDURE — 90837 PSYTX W PT 60 MINUTES: CPT | Performed by: PSYCHOLOGIST

## 2021-05-19 NOTE — PROGRESS NOTES
PROGRESS NOTE    Data:  Frances Hartman is a 35 y.o. female who met with the undersigned for a scheduled individual outpatient therapy session from 11:00 - 11:55am.      Clinical Maneuvering/Intervention:      The pt talked about experiencing different stressful and difficult experiences recently. She talked about having a falling out with a friend/roommate, grieving the loss of her father, and struggling to get her father's ashes from her stepmother (another difficult relationship). The pt talked too of having a terrible reaction to getting pain treatment and breaking out in hives. Stressors were processed individually and in detail. Venting of frustrations was conducted in order to help the pt feel less tense emotionally and gain insight into issues. Feelings were processed and validated several times in session. She talked about feeling cursed at times, or at least wondering why so many bad things happen to her. Finding her role in struggles, what she could be doing differently now or in the future, were discussed. The difficulties in life and lack of control in life/over others were normalized. Perspective taking was conducted multiple times in order to help her feel less stuck, less overwhelmed, and see challenges as much more manageable. While she has been through many terrible things, her allergic reaction did go away, the shot for pain worked, and her friend who left her place is gone and the pt has her space back to herself. Ways of moving forward in a positive way were discussed. The pt was able to note things she is learning and that she she has hope that she can connect with others as time goes on. The pt expressed gratitude for today's session.    Mental Status Exam  Hygiene:  good  Dress: normal  Attitude:  cooperative and proactive  Motor Activity: normal  Speech: normal  Mood:  frustrated  Affect:  congruent  Thought Processes: normal  Thought Content:  normal  Suicidal Thoughts:  not  endorsed  Homicidal Thoughts:  not endorsed  Crisis Safety Plan: not needed   Hallucinations:  none      Patient's Support Network Includes:  family, friends      Progress toward goal: there is evidence to suggest that she is grieving the loss of her father and feeling stuck in life      Functional Status: moderate to high      Prognosis: good    Assessment      The pt presented to be struggling with grief and depression; she often feels stuck in life and like she cannot get away from bad things happening to her. She seems to benefit from CBT and supportive therapy. She seems to learn counseling skills quickly.      Plan      In order to diminish symptoms of grief and depression, she will continue to process her difficulties both in and outside of session, with those she trusts (ongoing). She will make a point to do self-care today after doing emotional work in session today, such as watching basketball or some other fun thing. She will make a point to reclaim her space at home as discussed today, now that her friend has moved out (as soon as feasible).     Misty Nava, PhD, LP

## 2021-05-20 PROBLEM — E66.01 OBESITY, CLASS III, BMI 40-49.9 (MORBID OBESITY) (HCC): Status: ACTIVE | Noted: 2021-05-20

## 2021-05-20 PROBLEM — E66.813 OBESITY, CLASS III, BMI 40-49.9 (MORBID OBESITY): Status: ACTIVE | Noted: 2021-05-20

## 2021-05-24 ENCOUNTER — TELEPHONE (OUTPATIENT)
Dept: FAMILY MEDICINE CLINIC | Facility: CLINIC | Age: 36
End: 2021-05-24

## 2021-05-24 RX ORDER — GLUCOSAM/CHON-MSM1/C/MANG/BOSW 500-416.6
1 TABLET ORAL DAILY PRN
Qty: 100 EACH | Refills: 0 | Status: SHIPPED | OUTPATIENT
Start: 2021-05-24 | End: 2021-06-24

## 2021-05-24 NOTE — TELEPHONE ENCOUNTER
Caller: Frances Hartman    Relationship: Self    Best call back number: [phone]    Medication needed: TRUE PLUS 33G LANCETS   Requested Prescriptions      No prescriptions requested or ordered in this encounter       When do you need the refill by: TODAY    What additional details did the patient provide when requesting the medication: PATIENT STATED THE LANCETS THAT WERE SENT TO THE PHARMACY WILL NOT FIT HER METER SHE NEEDS THE SPECIFIC TYPE TRUE PLUS 33G LANCETS     Does the patient have less than a 3 day supply:  [x] Yes  [] No    What is the patient's preferred pharmacy: [facility] [address] [place] [address] [phone] [facility] [phone]

## 2021-05-24 NOTE — TELEPHONE ENCOUNTER
Contacted patient and advised her that lancets have been resent. She verbalized understanding and thanked us

## 2021-05-24 NOTE — TELEPHONE ENCOUNTER
PATIENT STATED THE LANCETS THAT WERE SENT TO THE PHARMACY WILL NOT FIT HER METER SHE NEEDS THE SPECIFIC TYPE TRUE PLUS 33G LANCETS

## 2021-05-27 ENCOUNTER — OFFICE VISIT (OUTPATIENT)
Dept: PSYCHIATRY | Facility: CLINIC | Age: 36
End: 2021-05-27

## 2021-05-27 ENCOUNTER — TELEPHONE (OUTPATIENT)
Dept: NEUROLOGY | Facility: CLINIC | Age: 36
End: 2021-05-27

## 2021-05-27 DIAGNOSIS — F43.21 GRIEF: ICD-10-CM

## 2021-05-27 DIAGNOSIS — F33.1 MODERATE EPISODE OF RECURRENT MAJOR DEPRESSIVE DISORDER (HCC): Primary | ICD-10-CM

## 2021-05-27 PROCEDURE — 90834 PSYTX W PT 45 MINUTES: CPT | Performed by: PSYCHOLOGIST
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
## 2021-06-02 ENCOUNTER — PREP FOR SURGERY (OUTPATIENT)
Dept: OTHER | Facility: HOSPITAL | Age: 36
End: 2021-06-02

## 2021-06-02 DIAGNOSIS — E66.01 OBESITY, CLASS III, BMI 40-49.9 (MORBID OBESITY) (HCC): Primary | ICD-10-CM

## 2021-06-03 ENCOUNTER — OFFICE VISIT (OUTPATIENT)
Dept: PSYCHIATRY | Facility: CLINIC | Age: 36
End: 2021-06-03

## 2021-06-03 DIAGNOSIS — Z71.3 ENCOUNTER FOR WEIGHT LOSS COUNSELING: ICD-10-CM

## 2021-06-03 DIAGNOSIS — E66.01 MORBID OBESITY (HCC): ICD-10-CM

## 2021-06-03 DIAGNOSIS — F33.41 RECURRENT MAJOR DEPRESSIVE DISORDER, IN PARTIAL REMISSION (HCC): Primary | ICD-10-CM

## 2021-06-03 PROCEDURE — 90834 PSYTX W PT 45 MINUTES: CPT | Performed by: PSYCHOLOGIST

## 2021-06-04 NOTE — PROGRESS NOTES
PROGRESS NOTE    Data:  Frances Hartman is a 35 y.o. female who met with the undersigned for a scheduled individual outpatient therapy session from 11:00 - 11:55am.      Clinical Maneuvering/Intervention:      The pt talked about recent stressors including family conflict and struggling to overcome trauma in her life, and feeling nervous about her upcoming weight loss surgery. She focused mainly on her upcoming surgery, the struggle to do the 2-week diet required beforehand, and wondering at times if she will continue and have the surgery. Stressors were processed individually and in detail. Venting of frustrations was conducted in order to help the pt feel less tense emotionally and gain insight into issues. Feelings were processed and validated several times in session. Perspective taking was conducted multiple times in order to help her feel less stuck, less overwhelmed, and see challenges as much more manageable. The pt was assisted in recognizing progress in order to show encouragement and promote motivation to keep making positive changes in life. She is losing weight, learning a lot of about diet, and learning how to adapt to the 2-week diet. She talked until it seemed like she prepared herself mentally for this surgery. The pt was assisted in recognizing progress in order to show encouragement and promote motivation to keep making positive changes in life. She is also being a little kinder to herself over time and more assertive, but loving, with her mother. Some humor was used in session as it is one of the pt's coping skills. Humor was used too, to help the pt see things as less challenging and more manageable than they first seemed. Humor was used as well, to model use of the skill as a way to decrease tension ongoing. The pt expressed gratitude for today's session.    Mental Status Exam  Hygiene:  good  Dress: normal  Attitude:  cooperative and proactive  Motor Activity: normal  Speech: normal  Mood:   frustrated  Affect:  congruent  Thought Processes: normal  Thought Content:  normal  Suicidal Thoughts:  not endorsed  Homicidal Thoughts:  not endorsed  Crisis Safety Plan: not needed   Hallucinations:  none      Patient's Support Network Includes:  family, friends      Progress toward goal: there is evidence to suggest that she is battling depression and may still engage in maladaptive thought patterns, though depression is likely lifting      Functional Status: moderate to high      Prognosis: good    Assessment      The pt presented to be struggling with depression; she often feels stuck in life, unloved, and/or misunderstood. Depression seems partially remitted as her mood seems lighter and she is more hopeful about her future as of late. Being overweight attributes to depression because she feels held back by it in life. She seems to benefit from CBT and supportive therapy. She seems to learn counseling skills quickly.      Plan      In order to diminish symptoms of depression, she will continue processing important things with a close friend (ongoing) and continue with counseling (ongoing). She will give thought to how she is more than able to adapt to the 2-week diet and decide for herself if weight loss surgery is still the route she wants to take.    Misty Nava, PhD, LP

## 2021-06-07 ENCOUNTER — TELEPHONE (OUTPATIENT)
Dept: BARIATRICS/WEIGHT MGMT | Facility: CLINIC | Age: 36
End: 2021-06-07

## 2021-06-07 NOTE — TELEPHONE ENCOUNTER
"· Pt stated that her documentation for the  location of her keloid is incorrect.  Pt stated she has a Keloid on her Right jaw.   · Pt stated she has a Botox injection scheduled for migraines on Friday (6/11). Due to her surgery date on 6/15  will she need to reschedule or stop this?    · Pt stated that on \"actions needed\" from her last after visit summary it stated pt should complete an ERAS hydration information packet, Enhanced recovery booklet, and NPO status. She also stated that theres orders in for  Chlorhexidine order that he is unaware of . She said she never received this information and wanted to make sure this was not needed prior to surgery     Please advise  "

## 2021-06-08 ENCOUNTER — OFFICE VISIT (OUTPATIENT)
Dept: FAMILY MEDICINE CLINIC | Facility: CLINIC | Age: 36
End: 2021-06-08

## 2021-06-08 VITALS
SYSTOLIC BLOOD PRESSURE: 126 MMHG | BODY MASS INDEX: 45.94 KG/M2 | HEIGHT: 60 IN | TEMPERATURE: 98 F | DIASTOLIC BLOOD PRESSURE: 82 MMHG | OXYGEN SATURATION: 97 % | HEART RATE: 71 BPM | WEIGHT: 234 LBS

## 2021-06-08 DIAGNOSIS — E11.65 TYPE 2 DIABETES MELLITUS WITH HYPERGLYCEMIA, WITHOUT LONG-TERM CURRENT USE OF INSULIN (HCC): Primary | ICD-10-CM

## 2021-06-08 LAB — HBA1C MFR BLD: 7.4 %

## 2021-06-08 PROCEDURE — 83036 HEMOGLOBIN GLYCOSYLATED A1C: CPT | Performed by: PHYSICIAN ASSISTANT

## 2021-06-08 PROCEDURE — 99213 OFFICE O/P EST LOW 20 MIN: CPT | Performed by: PHYSICIAN ASSISTANT

## 2021-06-08 RX ORDER — GLUCOSAM/CHON-MSM1/C/MANG/BOSW 500-416.6
TABLET ORAL
COMMUNITY
Start: 2019-01-01 | End: 2021-06-24

## 2021-06-08 NOTE — PROGRESS NOTES
Chief Complaint   Patient presents with   • Diabetes     Pt is here for a follow up. Pt states her Bariatrics provider put her on a new medication and her glucose dropped really low over the weekend. She states she called their office with concerns and was told to follow up with PCP.        HPI     Frances Hartman is a pleasant 35 y.o. female who is here for routine follow-up of diabetes type 2.  Patient is compliant on metformin and glipizide.  She has had 2 steroid injections in the last few weeks.  Reports low sugar of 55 over weekend.  She is unsure about proceeding with bariatric surgery.    Past Medical History:   Diagnosis Date   • Anxiety    • Asthma     prn inhalers, does not follow w/ pulmonary   • Chronic back pain     previously followed w/ pain management, now just prn Tylenol + Gabapentin   • Diabetes mellitus (CMS/HCC)     Type II, dx 2014, never on insulin, A1C 7.6   • Dyspepsia    • Dyspnea on exertion    • Fatigue    • Gastroparesis    • Heartburn     chronic, prn TUMS, denies prior eval   • Hirsutism    • Hx of radiation therapy     for treatments of Keloids   • Hypertension    • Irregular menses     infrequent spotting   • Leiomyoma, subserous     possible peduculated f3-4 cm fibroid on u/s at [facility]   • Migraines     botox q3 months, following Neurology @ [facility]   • Morbid obesity (CMS/HCC)    • Peripheral edema    • Polycystic ovaries     severe insulin resistance/hirsuitism   • Seasonal allergies        Past Surgical History:   Procedure Laterality Date   • KELOID EXCISION      1990,1993,1999,2015,2016,2019   • LAPAROSCOPIC CHOLECYSTECTOMY  2000    for stones   • RADIOFREQUENCY ABLATION  2019    x 2   • UMBILICAL HERNIA REPAIR  1990   • WISDOM TOOTH EXTRACTION  1994       Family History   Problem Relation Age of Onset   • Arthritis Mother    • Diabetes Mother    • Obesity Mother    • Arrhythmia Mother    • Hypertension Mother    • Dementia Father    • Heart attack Father    • Stroke Other         " CVA   • Obesity Other    • Ovarian cancer Maternal Aunt         40's   • Breast cancer Paternal Aunt         30's   • Heart disease Other    • Hypertension Other    • Endometrial cancer Neg Hx        Social History     Socioeconomic History   • Marital status: Single     Spouse name: Not on file   • Number of children: Not on file   • Years of education: Not on file   • Highest education level: Not on file   Tobacco Use   • Smoking status: Never Smoker   • Smokeless tobacco: Never Used   Substance and Sexual Activity   • Alcohol use: Yes   • Drug use: No   • Sexual activity: Yes     Partners: Male     Birth control/protection: Condom       Allergies   Allergen Reactions   • Hydrochlorothiazide Swelling   • Monosodium Glutamate Swelling and Headache   • Oatmeal Other (See Comments)     Dx on allergy testing   • Augmentin [Amoxicillin-Pot Clavulanate] Other (See Comments)     Syncope, not sure if allergic to cephalosporins.   • Amitriptyline Mental Status Change     memory gaps + neuropathy + hair loss   • Aspartame Nausea Only   • Codeine Headache      Can tolerate hydrocodone, no other adverse reactions to other narcotics.   • Diclofenac Headache   • Doxycycline Rash and Hallucinations   • Eggs Or Egg-Derived Products Other (See Comments)     dx on allergy testing, but does not completely avoid   • Naproxen Other (See Comments)     \"blackout\"       ROS  Review of Systems   Constitutional: Negative for chills and fever.   Eyes: Negative for visual disturbance.   Endocrine: Negative for polydipsia, polyphagia and polyuria.   Neurological: Negative for dizziness and headache.       Vitals:    06/08/21 1400   BP: 126/82   Pulse: 71   Temp: 98 °F (36.7 °C)   SpO2: 97%   Weight: 106 kg (234 lb)   Height: 152.4 cm (60\")     Body mass index is 45.7 kg/m².    Current Outpatient Medications on File Prior to Visit   Medication Sig Dispense Refill   • Acetaminophen (TYLENOL ARTHRITIS PAIN PO) Take  by mouth As Needed.     • " albuterol (PROVENTIL HFA;VENTOLIN HFA) 108 (90 BASE) MCG/ACT inhaler Inhale 2 puffs Every 4 (Four) Hours As Needed for wheezing.     • ascorbic acid (VITAMIN C) 500 MG tablet Take 500 mg by mouth Daily.     • calcium carbonate EX (Antacid Flavor Chews) 750 MG chewable tablet As Needed.     • carvedilol (COREG) 12.5 MG tablet TAKE ONE TABLET BY MOUTH EVERY EVENING (Patient taking differently: 12.5 mg Every Morning.) 90 tablet 3   • carvedilol (COREG) 25 MG tablet Take 25 mg by mouth Every Evening.     • Cyanocobalamin (Vitamin B-12) 1000 MCG sublingual tablet Place 1 tablet under the tongue Daily. 30 each 5   • cyclobenzaprine (FLEXERIL) 10 MG tablet Take 1 tablet by mouth 3 (Three) Times a Day As Needed for Muscle Spasms. 30 tablet 0   • diphenhydrAMINE (BENADRYL) 25 mg capsule Take 25 mg by mouth Every 6 (Six) Hours As Needed for itching.     • ferrous sulfate 325 (65 Fe) MG tablet Take 1 tablet by mouth Daily. 30 tablet 5   • fluconazole (DIFLUCAN) 200 MG tablet Take 1 tablet by mouth 2 (Two) Times a Day. 180 tablet 3   • furosemide (LASIX) 20 MG tablet Take 1-2 tablets nightly. 135 tablet 1   • gabapentin (NEURONTIN) 300 MG capsule Take 300 mg by mouth 3 (Three) Times a Day As Needed.     • glipizide (GLUCOTROL) 10 MG tablet Take 10 mg by mouth 2 (Two) Times a Day Before Meals. Take one tablet in the morning and one tablet at night     • glucose blood test strip Use as instructed 100 each 11   • guaiFENesin 200 MG tablet 200 mg As Needed.     • HYDROcodone-acetaminophen (NORCO) 5-325 MG per tablet Take 1-2 tablets by mouth Every 6 (Six) Hours As Needed for Severe Pain . 15 tablet 0   • levonorgestrel (Mirena, 52 MG,) 20 MCG/24HR IUD      • loratadine (CLARITIN) 10 MG tablet Take 1 tablet by mouth Daily. 90 tablet 3   • melatonin 1 MG tablet Take 5 mg by mouth As Needed.     • metFORMIN ER (GLUCOPHAGE-XR) 500 MG 24 hr tablet Take 1 tablet by mouth Daily With Dinner. 90 tablet 1   • Multiple Minerals-Vitamins  (Calcium Citrate +) tablet Take 1,200 mg by mouth Daily. 30 each 5   • multivitamin (Multi Vitamin Daily) tablet tablet      • OnabotulinumtoxinA (Botox) 200 units reconstituted solution FOR . PHYSICIAN TO INJECT UP  UNITS INTRAMUSCULARLY INTO HEAD, NECK AND SHOULDERS EVERY 90 DAYS. 1 each 1   • simethicone (MYLICON) 125 MG chewable tablet Chew 125 mg Every 6 (Six) Hours As Needed for Flatulence.     • spironolactone (ALDACTONE) 100 MG tablet Take 1 tablet by mouth Every Other Day. Takes 30 days on,  off 30 days. 45 tablet 1   • thiamine (VITAMIN B-1) 100 MG tablet Take 1 tablet by mouth Daily for 30 days. 30 tablet 2   • traMADol (ULTRAM) 50 MG tablet Take 50 mg by mouth Every 6 (Six) Hours As Needed for moderate pain (4-6).     • TRUEplus Lancets 33G misc 1 each Daily As Needed (testing). 100 each 0   • TRUEplus Lancets 33G misc      • vitamin C (ASCORBIC ACID) 250 MG tablet Take 2 tablets by mouth Daily. 60 tablet 5     No current facility-administered medications on file prior to visit.       Results for orders placed or performed in visit on 06/08/21   POC Glycosylated Hemoglobin (Hb A1C)    Specimen: Blood   Result Value Ref Range    Hemoglobin A1C 7.4 %       PE    Physical Exam  Vitals reviewed.   Constitutional:       General: She is not in acute distress.     Appearance: Normal appearance. She is well-developed. She is not ill-appearing or diaphoretic.   HENT:      Head: Normocephalic and atraumatic.   Eyes:      Extraocular Movements: Extraocular movements intact.      Conjunctiva/sclera: Conjunctivae normal.   Pulmonary:      Effort: No respiratory distress.   Musculoskeletal:         General: Normal range of motion.      Cervical back: Normal range of motion.   Neurological:      General: No focal deficit present.      Mental Status: She is alert.   Psychiatric:         Attention and Perception: She is attentive.         Mood and Affect: Mood normal.         Speech: Speech  normal.         Behavior: Behavior normal. Behavior is cooperative.         Thought Content: Thought content normal.         Judgment: Judgment normal.         A/P    Diagnoses and all orders for this visit:    1. Type 2 diabetes mellitus with hyperglycemia, without long-term current use of insulin (CMS/[facility]) (Primary)  -     POC Glycosylated Hemoglobin (Hb A1C)  Previous hemoglobin AIC is 6.9%, today her hemoglobin AIC is 7.4%.  Patient is compliant on metformin and glipizide.  She had one low reading of 55 over the weekend.  Recommend juice or piece of candy if this happens again and notify our office.           Plan of care reviewed with patient at the conclusion of today's visit. Education was provided regarding diagnosis, management and any prescribed or recommended OTC medications.  Patient verbalizes understanding of and agreement with management plan.    Return in about 3 months (around 9/8/2021) for Recheck, DM.     Lesli Ramirez PA-C

## 2021-06-10 ENCOUNTER — PRE-ADMISSION TESTING (OUTPATIENT)
Dept: PREADMISSION TESTING | Facility: HOSPITAL | Age: 36
End: 2021-06-10

## 2021-06-10 ENCOUNTER — TELEPHONE (OUTPATIENT)
Dept: BARIATRICS/WEIGHT MGMT | Facility: CLINIC | Age: 36
End: 2021-06-10

## 2021-06-10 ENCOUNTER — OFFICE VISIT (OUTPATIENT)
Dept: PSYCHIATRY | Facility: CLINIC | Age: 36
End: 2021-06-10

## 2021-06-10 VITALS — HEIGHT: 60 IN | BODY MASS INDEX: 45.7 KG/M2

## 2021-06-10 DIAGNOSIS — F33.41 RECURRENT MAJOR DEPRESSIVE DISORDER, IN PARTIAL REMISSION (HCC): Primary | ICD-10-CM

## 2021-06-10 DIAGNOSIS — Z71.3 ENCOUNTER FOR WEIGHT LOSS COUNSELING: ICD-10-CM

## 2021-06-10 DIAGNOSIS — R45.89: ICD-10-CM

## 2021-06-10 LAB
DEPRECATED RDW RBC AUTO: 46.4 FL (ref 37–54)
ERYTHROCYTE [DISTWIDTH] IN BLOOD BY AUTOMATED COUNT: 12.9 % (ref 12.3–15.4)
HCT VFR BLD AUTO: 39.7 % (ref 34–46.6)
HGB BLD-MCNC: 12.7 G/DL (ref 12–15.9)
MCH RBC QN AUTO: 31.6 PG (ref 26.6–33)
MCHC RBC AUTO-ENTMCNC: 32 G/DL (ref 31.5–35.7)
MCV RBC AUTO: 98.8 FL (ref 79–97)
PLATELET # BLD AUTO: 249 10*3/MM3 (ref 140–450)
PMV BLD AUTO: 11.7 FL (ref 6–12)
POTASSIUM SERPL-SCNC: 4.4 MMOL/L (ref 3.5–5.2)
RBC # BLD AUTO: 4.02 10*6/MM3 (ref 3.77–5.28)
WBC # BLD AUTO: 10.83 10*3/MM3 (ref 3.4–10.8)

## 2021-06-10 PROCEDURE — 90834 PSYTX W PT 45 MINUTES: CPT | Performed by: PSYCHOLOGIST

## 2021-06-10 PROCEDURE — 85027 COMPLETE CBC AUTOMATED: CPT

## 2021-06-10 PROCEDURE — 84132 ASSAY OF SERUM POTASSIUM: CPT

## 2021-06-10 PROCEDURE — 36415 COLL VENOUS BLD VENIPUNCTURE: CPT

## 2021-06-10 RX ORDER — UBIDECARENONE 75 MG
50 CAPSULE ORAL DAILY
COMMUNITY
End: 2021-09-22

## 2021-06-10 RX ORDER — DOCUSATE SODIUM 100 MG/1
100-200 CAPSULE, LIQUID FILLED ORAL AS NEEDED
COMMUNITY
End: 2022-02-01

## 2021-06-10 RX ORDER — ACETAMINOPHEN 500 MG
500-1000 TABLET ORAL EVERY 6 HOURS PRN
COMMUNITY

## 2021-06-10 NOTE — PROGRESS NOTES
PROGRESS NOTE    Data:  Frances Hartman is a 35 y.o. female who met with the undersigned for a scheduled individual outpatient therapy session from 1:00 - 1:45pm.      Clinical Maneuvering/Intervention:      The pt talked about recent stressors, mainly related to her health: weight loss, weight loss surgery, her glucose level dropping/feeling ill. Stressors were processed individually and in detail. Venting of frustrations was conducted in order to help the pt feel less tense emotionally and gain insight into issues. Feelings were processed and validated several times in session. She spoke in detail about her struggles to eat the diet prescribed to her by bariatrics as it is low in carbs and she expressed feeling like she needs more carbs to sustain a good glucose level. She went into detail about the struggles with the bariatric office, saying that she felt frustrated/cofused/let down at times. An action plan was designed to empower the pt to know what to do. Simple steps of one, two, three were laid out in order to help the pt feel more in control of things and feel less stressed. Perspective taking was conducted multiple times in order to help her feel less stuck, less overwhelmed, and see challenges as much more manageable. The pt was assisted in recognizing progress in order to show encouragement and promote motivation to keep making positive changes in life. She was assisted with seeing that she is losing weight and is improving her health. Homework was assigned tailored to pt's needs. The pt expressed gratitude for today's session.    Mental Status Exam  Hygiene:  good  Dress: normal  Attitude:  cooperative and proactive  Motor Activity: normal  Speech: normal  Mood:  frustrated and tense  Affect:  congruent  Thought Processes: normal  Thought Content:  normal  Suicidal Thoughts:  not endorsed  Homicidal Thoughts:  not endorsed  Crisis Safety Plan: not needed   Hallucinations:  none      Patient's Support  Network Includes:  family, friends      Progress toward goal: there is evidence to suggest that she is battling depression and may still engage in maladaptive thought patterns, though depression is likely lifting      Functional Status: moderate to high      Prognosis: good    Assessment      The pt presented to be struggling with depression and feeling upset about her experiences with weight loss and details of whether or not to still have weight loss surgery. She tends to benefit from assistance with applying her many skills/strengths to solving her own issues and/or coming up with an action plan to solve her own issues.       Plan      In order to diminish symptoms of depression, she will continue processing important things with a close friend (ongoing) and continue with counseling (ongoing). She will make the 'right' decision for herself in terms of how she plans to lose weight and whether or not she will proceed with weight loss surgery (as soon as feasible).     Misty Nava, PhD, LP

## 2021-06-10 NOTE — TELEPHONE ENCOUNTER
Patient had called a few days ago asking if she needed to stop her lasix and spironolactone a few days before the surgery?  She said when she had consult with Dr. Andre she was told to stop but couldn't remember when.  That message was sent to Rob PYLE and was answered that patient could take those medications up until surgery but do not take them the morning of the surgery.    Patient is upset that Dr. Andre was not the provider that answered the question and would like us to ask Dr. Andre specifically about the spironolactone and lasix.     Dr. Andre can you please answer the patients questions, she would like an answer ASAP.    When should spironolactone and lasix be stopped?    Thank you.

## 2021-06-10 NOTE — PAT
Patient to apply Chlorhexadine wipes  to surgical area (as instructed) the night before procedure and the AM of procedure. Wipes provided.    Patient instructed to drink 20 ounces (or until full) of Gatorade and it needs to be completed 1 hour before given arrival time for procedure (NO RED Gatorade)    Patient verbalized understanding.    Covid test 6-

## 2021-06-10 NOTE — TELEPHONE ENCOUNTER
Patient notified the information that was given to her previously by DINORAH Au was correct and from Dr. Andre.  Continue medication but do not take the morning of the surgery.  Patient verbalized understanding.

## 2021-06-11 ENCOUNTER — PROCEDURE VISIT (OUTPATIENT)
Dept: NEUROLOGY | Facility: CLINIC | Age: 36
End: 2021-06-11

## 2021-06-11 VITALS — BODY MASS INDEX: 45.7 KG/M2 | HEIGHT: 60 IN

## 2021-06-11 DIAGNOSIS — G43.719 INTRACTABLE CHRONIC MIGRAINE WITHOUT AURA AND WITHOUT STATUS MIGRAINOSUS: Primary | ICD-10-CM

## 2021-06-11 PROCEDURE — 64615 CHEMODENERV MUSC MIGRAINE: CPT | Performed by: NURSE PRACTITIONER

## 2021-06-11 NOTE — PROGRESS NOTES
Botulinum Toxin Injection Procedure    History:  Response to treatment has reduced HA's at least 7 fewer days a month and/or 100 hours fewer each month.     Pre-operative diagnosis: headache    Post-operative diagnosis: Same    Procedure: Chemical neurolysis    After risks and benefits were explained including bleeding, infection, worsening of pain, damage to the areas being injected, weakness of muscles, loss of muscle control, dysphagia if injecting the head or neck, facial droop if injecting the facial area, painful injection, allergic or other reaction to the medications being injected, and the failure of the procedure to help the problem, a signed consent was obtained.      The patient was placed in a comfortable area and the sites to be treated were identified. A time out was called and performed. The area to be treated was prepped three times with alcohol and the alcohol allowed to dry. Next, a 30 gauge needle was used to inject the medication in the area to be treated.    Area(s) injected: frontalis muscle, semispinalis capitis muscle and temporallis muscle    Medications used: botulinum toxin 200 mu, 4 mL of saline used to dilute the botulinum toxin.      The patient did tolerate the procedure well. There were no complications.       Physical Examination    Current Treatment (Units)    5 units on the right and 5 units on the left  Procerus 5 units  Frontalis 10 units on the right and 10 units on the left  Temporalis 25 units on the right and 25 units on the left  Occipitalis 25 units on the right and 25 units on the left  Cervical Paraspinal 10 units on the right and 10 units on the left  Trapezius 15 units on the right and 15 units on the left  EMG Guidance none .   Complications: none .   The total amount injected in units is 155 .   The total amount wasted in units is 45 .   The total amount submitted in units is 200 .   Botox was supplied by the patient.

## 2021-06-13 ENCOUNTER — APPOINTMENT (OUTPATIENT)
Dept: PREADMISSION TESTING | Facility: HOSPITAL | Age: 36
End: 2021-06-13

## 2021-06-13 LAB — SARS-COV-2 RNA NOSE QL NAA+PROBE: NOT DETECTED

## 2021-06-13 PROCEDURE — U0004 COV-19 TEST NON-CDC HGH THRU: HCPCS

## 2021-06-13 PROCEDURE — C9803 HOPD COVID-19 SPEC COLLECT: HCPCS

## 2021-06-14 ENCOUNTER — TELEPHONE (OUTPATIENT)
Dept: BARIATRICS/WEIGHT MGMT | Facility: CLINIC | Age: 36
End: 2021-06-14

## 2021-06-14 ENCOUNTER — ANESTHESIA EVENT (OUTPATIENT)
Dept: PERIOP | Facility: HOSPITAL | Age: 36
End: 2021-06-14

## 2021-06-14 RX ORDER — SODIUM CHLORIDE, SODIUM LACTATE, POTASSIUM CHLORIDE, CALCIUM CHLORIDE 600; 310; 30; 20 MG/100ML; MG/100ML; MG/100ML; MG/100ML
9 INJECTION, SOLUTION INTRAVENOUS CONTINUOUS
Status: CANCELLED | OUTPATIENT
Start: 2021-06-14

## 2021-06-14 RX ORDER — FAMOTIDINE 20 MG/1
20 TABLET, FILM COATED ORAL ONCE
Status: CANCELLED | OUTPATIENT
Start: 2021-06-14 | End: 2021-06-14

## 2021-06-14 RX ORDER — FAMOTIDINE 10 MG/ML
20 INJECTION, SOLUTION INTRAVENOUS ONCE
Status: CANCELLED | OUTPATIENT
Start: 2021-06-14 | End: 2021-06-14

## 2021-06-14 RX ORDER — SODIUM CHLORIDE 0.9 % (FLUSH) 0.9 %
10 SYRINGE (ML) INJECTION AS NEEDED
Status: CANCELLED | OUTPATIENT
Start: 2021-06-14

## 2021-06-14 RX ORDER — SODIUM CHLORIDE 0.9 % (FLUSH) 0.9 %
10 SYRINGE (ML) INJECTION EVERY 12 HOURS SCHEDULED
Status: CANCELLED | OUTPATIENT
Start: 2021-06-14

## 2021-06-14 NOTE — TELEPHONE ENCOUNTER
Patient called she states she has severe constipation. No BM in several days.  Very hard stool.  She is worried and asking if she should cancel her surgery?  She is asking if she can use an enema?      Patient states her glucose level has been low today.  Patient is asking if she can have some apple juice to bring those numbers back up?

## 2021-06-14 NOTE — TELEPHONE ENCOUNTER
For hypoglycemia, I would recommend hold glipizide.  Apple juice will make the  liver shrinking diet not work as well, so I would avoid if possible.     Re: constipation, this is not uncommon on the liver shrinking diet.   Take Miralax up to 4x per day.   Can also take milk of magnesia up to 4x per day.   Drink plenty of water.   Scheduled Colace 100 mg morning and evening.   Enema is ok.       I wouldn't cancel surgery: she would have to redo the liver shrinking diet, and she would be constipated all over again.     Patient notified and verbalized understanding.

## 2021-06-15 ENCOUNTER — ANESTHESIA EVENT CONVERTED (OUTPATIENT)
Dept: ANESTHESIOLOGY | Facility: HOSPITAL | Age: 36
End: 2021-06-15

## 2021-06-15 ENCOUNTER — ANESTHESIA (OUTPATIENT)
Dept: PERIOP | Facility: HOSPITAL | Age: 36
End: 2021-06-15

## 2021-06-15 ENCOUNTER — HOSPITAL ENCOUNTER (INPATIENT)
Facility: HOSPITAL | Age: 36
LOS: 5 days | Discharge: HOME | End: 2021-06-20
Attending: SURGERY | Admitting: SURGERY

## 2021-06-15 DIAGNOSIS — E66.01 MORBID OBESITY (HCC): Primary | ICD-10-CM

## 2021-06-15 DIAGNOSIS — E66.01 OBESITY, CLASS III, BMI 40-49.9 (MORBID OBESITY) (HCC): ICD-10-CM

## 2021-06-15 LAB
B-HCG UR QL: NEGATIVE
GLUCOSE BLDC GLUCOMTR-MCNC: 143 MG/DL (ref 70–130)
GLUCOSE BLDC GLUCOMTR-MCNC: 276 MG/DL (ref 70–130)
INTERNAL NEGATIVE CONTROL: NORMAL
INTERNAL POSITIVE CONTROL: NORMAL
Lab: NORMAL

## 2021-06-15 PROCEDURE — C1889 IMPLANT/INSERT DEVICE, NOC: HCPCS | Performed by: SURGERY

## 2021-06-15 PROCEDURE — 25010000003 LIDOCAINE 1 % SOLUTION: Performed by: NURSE ANESTHETIST, CERTIFIED REGISTERED

## 2021-06-15 PROCEDURE — 8E0W4CZ ROBOTIC ASSISTED PROCEDURE OF TRUNK REGION, PERCUTANEOUS ENDOSCOPIC APPROACH: ICD-10-PCS | Performed by: SURGERY

## 2021-06-15 PROCEDURE — 25010000002 PROPOFOL 10 MG/ML EMULSION: Performed by: NURSE ANESTHETIST, CERTIFIED REGISTERED

## 2021-06-15 PROCEDURE — 25010000002 DEXAMETHASONE SODIUM PHOSPHATE 10 MG/ML SOLUTION: Performed by: NURSE ANESTHETIST, CERTIFIED REGISTERED

## 2021-06-15 PROCEDURE — 25010000002 SUCCINYLCHOLINE PER 20 MG: Performed by: NURSE ANESTHETIST, CERTIFIED REGISTERED

## 2021-06-15 PROCEDURE — 82962 GLUCOSE BLOOD TEST: CPT

## 2021-06-15 PROCEDURE — 25010000003 CEFAZOLIN IN DEXTROSE 2-4 GM/100ML-% SOLUTION: Performed by: SURGERY

## 2021-06-15 PROCEDURE — 0BQT4ZZ REPAIR DIAPHRAGM, PERCUTANEOUS ENDOSCOPIC APPROACH: ICD-10-PCS | Performed by: SURGERY

## 2021-06-15 PROCEDURE — 43775 LAP SLEEVE GASTRECTOMY: CPT | Performed by: SURGERY

## 2021-06-15 PROCEDURE — 25010000002 FENTANYL CITRATE (PF) 50 MCG/ML SOLUTION: Performed by: NURSE ANESTHETIST, CERTIFIED REGISTERED

## 2021-06-15 PROCEDURE — 25010000002 ENOXAPARIN PER 10 MG: Performed by: SURGERY

## 2021-06-15 PROCEDURE — 25010000002 BUPRENORPHINE PER 0.1 MG: Performed by: NURSE ANESTHETIST, CERTIFIED REGISTERED

## 2021-06-15 PROCEDURE — 0DB64Z3 EXCISION OF STOMACH, PERCUTANEOUS ENDOSCOPIC APPROACH, VERTICAL: ICD-10-PCS | Performed by: SURGERY

## 2021-06-15 PROCEDURE — 25010000002 NEOSTIGMINE 10 MG/10ML SOLUTION: Performed by: NURSE ANESTHETIST, CERTIFIED REGISTERED

## 2021-06-15 PROCEDURE — 25010000002 CEFAZOLIN PER 500 MG: Performed by: SURGERY

## 2021-06-15 PROCEDURE — 43775 LAP SLEEVE GASTRECTOMY: CPT | Performed by: PHYSICIAN ASSISTANT

## 2021-06-15 PROCEDURE — 25010000002 ONDANSETRON PER 1 MG: Performed by: NURSE ANESTHETIST, CERTIFIED REGISTERED

## 2021-06-15 PROCEDURE — 0DJ08ZZ INSPECTION OF UPPER INTESTINAL TRACT, VIA NATURAL OR ARTIFICIAL OPENING ENDOSCOPIC: ICD-10-PCS | Performed by: SURGERY

## 2021-06-15 PROCEDURE — 81025 URINE PREGNANCY TEST: CPT | Performed by: ANESTHESIOLOGY

## 2021-06-15 PROCEDURE — 88307 TISSUE EXAM BY PATHOLOGIST: CPT | Performed by: SURGERY

## 2021-06-15 DEVICE — SEALANT WND FIBRIN TISSEEL PREFIL/SYR/PRIMAFZ 4ML: Type: IMPLANTABLE DEVICE | Site: ABDOMEN | Status: FUNCTIONAL

## 2021-06-15 DEVICE — STPL/LN REINF PERISTRIP DRY VERITAS GEL: Type: IMPLANTABLE DEVICE | Site: ABDOMEN | Status: FUNCTIONAL

## 2021-06-15 DEVICE — PBT NON ABSORBABLE WOUND CLOSURE DEVICE
Type: IMPLANTABLE DEVICE | Site: ABDOMEN | Status: FUNCTIONAL
Brand: V-LOC

## 2021-06-15 DEVICE — STPL/LN REINF PERISTRIP DRY VERITAS THN: Type: IMPLANTABLE DEVICE | Site: ABDOMEN | Status: FUNCTIONAL

## 2021-06-15 DEVICE — STAPLER 60 RELOAD GREEN
Type: IMPLANTABLE DEVICE | Site: ABDOMEN | Status: FUNCTIONAL
Brand: SUREFORM

## 2021-06-15 DEVICE — FLOSEAL HEMOSTATIC MATRIX, 10ML
Type: IMPLANTABLE DEVICE | Site: ABDOMEN | Status: FUNCTIONAL
Brand: FLOSEAL HEMOSTATIC MATRIX

## 2021-06-15 DEVICE — STAPLER 60 RELOAD BLUE
Type: IMPLANTABLE DEVICE | Site: ABDOMEN | Status: FUNCTIONAL
Brand: SUREFORM

## 2021-06-15 RX ORDER — HYDROMORPHONE HYDROCHLORIDE 1 MG/ML
0.5 INJECTION, SOLUTION INTRAMUSCULAR; INTRAVENOUS; SUBCUTANEOUS
Status: DISCONTINUED | OUTPATIENT
Start: 2021-06-15 | End: 2021-06-15 | Stop reason: HOSPADM

## 2021-06-15 RX ORDER — CYCLOBENZAPRINE HCL 10 MG
10 TABLET ORAL 3 TIMES DAILY PRN
Status: DISCONTINUED | OUTPATIENT
Start: 2021-06-15 | End: 2021-06-20 | Stop reason: HOSPADM

## 2021-06-15 RX ORDER — SODIUM CHLORIDE 0.9 % (FLUSH) 0.9 %
3 SYRINGE (ML) INJECTION EVERY 12 HOURS SCHEDULED
Status: DISCONTINUED | OUTPATIENT
Start: 2021-06-15 | End: 2021-06-20 | Stop reason: HOSPADM

## 2021-06-15 RX ORDER — HYDROMORPHONE HYDROCHLORIDE 1 MG/ML
0.5 INJECTION, SOLUTION INTRAMUSCULAR; INTRAVENOUS; SUBCUTANEOUS
Status: DISCONTINUED | OUTPATIENT
Start: 2021-06-15 | End: 2021-06-20 | Stop reason: HOSPADM

## 2021-06-15 RX ORDER — HYDROMORPHONE HYDROCHLORIDE 2 MG/1
2 TABLET ORAL
Status: DISCONTINUED | OUTPATIENT
Start: 2021-06-15 | End: 2021-06-20 | Stop reason: HOSPADM

## 2021-06-15 RX ORDER — DROPERIDOL 2.5 MG/ML
0.62 INJECTION, SOLUTION INTRAMUSCULAR; INTRAVENOUS AS NEEDED
Status: DISCONTINUED | OUTPATIENT
Start: 2021-06-15 | End: 2021-06-15 | Stop reason: HOSPADM

## 2021-06-15 RX ORDER — SODIUM CHLORIDE 0.9 % (FLUSH) 0.9 %
3 SYRINGE (ML) INJECTION EVERY 12 HOURS SCHEDULED
Status: DISCONTINUED | OUTPATIENT
Start: 2021-06-15 | End: 2021-06-15 | Stop reason: HOSPADM

## 2021-06-15 RX ORDER — BUPRENORPHINE HYDROCHLORIDE 0.32 MG/ML
INJECTION INTRAMUSCULAR; INTRAVENOUS
Status: COMPLETED | OUTPATIENT
Start: 2021-06-15 | End: 2021-06-15

## 2021-06-15 RX ORDER — LABETALOL HYDROCHLORIDE 5 MG/ML
5 INJECTION, SOLUTION INTRAVENOUS
Status: DISCONTINUED | OUTPATIENT
Start: 2021-06-15 | End: 2021-06-15 | Stop reason: HOSPADM

## 2021-06-15 RX ORDER — FENTANYL CITRATE 50 UG/ML
INJECTION, SOLUTION INTRAMUSCULAR; INTRAVENOUS AS NEEDED
Status: DISCONTINUED | OUTPATIENT
Start: 2021-06-15 | End: 2021-06-15 | Stop reason: SURG

## 2021-06-15 RX ORDER — CEFAZOLIN SODIUM 2 G/100ML
2 INJECTION, SOLUTION INTRAVENOUS EVERY 8 HOURS
Status: COMPLETED | OUTPATIENT
Start: 2021-06-15 | End: 2021-06-16

## 2021-06-15 RX ORDER — PROMETHAZINE HYDROCHLORIDE 25 MG/1
25 SUPPOSITORY RECTAL ONCE AS NEEDED
Status: DISCONTINUED | OUTPATIENT
Start: 2021-06-15 | End: 2021-06-15 | Stop reason: HOSPADM

## 2021-06-15 RX ORDER — SODIUM CHLORIDE 9 MG/ML
150 INJECTION, SOLUTION INTRAVENOUS CONTINUOUS
Status: DISCONTINUED | OUTPATIENT
Start: 2021-06-15 | End: 2021-06-16

## 2021-06-15 RX ORDER — ONDANSETRON 2 MG/ML
INJECTION INTRAMUSCULAR; INTRAVENOUS AS NEEDED
Status: DISCONTINUED | OUTPATIENT
Start: 2021-06-15 | End: 2021-06-15 | Stop reason: SURG

## 2021-06-15 RX ORDER — OXYCODONE HYDROCHLORIDE 5 MG/1
5 TABLET ORAL EVERY 6 HOURS PRN
Status: DISCONTINUED | OUTPATIENT
Start: 2021-06-15 | End: 2021-06-20 | Stop reason: HOSPADM

## 2021-06-15 RX ORDER — SCOLOPAMINE TRANSDERMAL SYSTEM 1 MG/1
1 PATCH, EXTENDED RELEASE TRANSDERMAL ONCE
Status: DISCONTINUED | OUTPATIENT
Start: 2021-06-15 | End: 2021-06-15 | Stop reason: SDUPTHER

## 2021-06-15 RX ORDER — SIMETHICONE 80 MG
80 TABLET,CHEWABLE ORAL 4 TIMES DAILY PRN
Status: DISCONTINUED | OUTPATIENT
Start: 2021-06-15 | End: 2021-06-20 | Stop reason: HOSPADM

## 2021-06-15 RX ORDER — LABETALOL HYDROCHLORIDE 5 MG/ML
10 INJECTION, SOLUTION INTRAVENOUS
Status: DISCONTINUED | OUTPATIENT
Start: 2021-06-15 | End: 2021-06-20 | Stop reason: HOSPADM

## 2021-06-15 RX ORDER — ACETAMINOPHEN 160 MG/5ML
1000 SOLUTION ORAL EVERY 8 HOURS SCHEDULED
Status: ACTIVE | OUTPATIENT
Start: 2021-06-15 | End: 2021-06-17

## 2021-06-15 RX ORDER — CHLORHEXIDINE GLUCONATE 0.12 MG/ML
15 RINSE ORAL
Status: COMPLETED | OUTPATIENT
Start: 2021-06-15 | End: 2021-06-15

## 2021-06-15 RX ORDER — ALPRAZOLAM 0.25 MG/1
0.25 TABLET ORAL 2 TIMES DAILY PRN
Status: DISCONTINUED | OUTPATIENT
Start: 2021-06-15 | End: 2021-06-20 | Stop reason: HOSPADM

## 2021-06-15 RX ORDER — MIDAZOLAM HYDROCHLORIDE 1 MG/ML
1 INJECTION INTRAMUSCULAR; INTRAVENOUS
Status: DISCONTINUED | OUTPATIENT
Start: 2021-06-15 | End: 2021-06-15 | Stop reason: HOSPADM

## 2021-06-15 RX ORDER — GABAPENTIN 300 MG/1
600 CAPSULE ORAL ONCE
Status: COMPLETED | OUTPATIENT
Start: 2021-06-15 | End: 2021-06-15

## 2021-06-15 RX ORDER — FENTANYL CITRATE 50 UG/ML
50 INJECTION, SOLUTION INTRAMUSCULAR; INTRAVENOUS
Status: DISCONTINUED | OUTPATIENT
Start: 2021-06-15 | End: 2021-06-15 | Stop reason: HOSPADM

## 2021-06-15 RX ORDER — HYDRALAZINE HYDROCHLORIDE 20 MG/ML
5 INJECTION INTRAMUSCULAR; INTRAVENOUS
Status: DISCONTINUED | OUTPATIENT
Start: 2021-06-15 | End: 2021-06-15 | Stop reason: HOSPADM

## 2021-06-15 RX ORDER — GABAPENTIN 100 MG/1
100 CAPSULE ORAL 3 TIMES DAILY
Status: DISPENSED | OUTPATIENT
Start: 2021-06-15 | End: 2021-06-17

## 2021-06-15 RX ORDER — SODIUM CHLORIDE 0.9 % (FLUSH) 0.9 %
3-10 SYRINGE (ML) INJECTION AS NEEDED
Status: DISCONTINUED | OUTPATIENT
Start: 2021-06-15 | End: 2021-06-15 | Stop reason: HOSPADM

## 2021-06-15 RX ORDER — SUCCINYLCHOLINE CHLORIDE 20 MG/ML
INJECTION INTRAMUSCULAR; INTRAVENOUS AS NEEDED
Status: DISCONTINUED | OUTPATIENT
Start: 2021-06-15 | End: 2021-06-15 | Stop reason: SURG

## 2021-06-15 RX ORDER — SODIUM CHLORIDE, SODIUM LACTATE, POTASSIUM CHLORIDE, CALCIUM CHLORIDE 600; 310; 30; 20 MG/100ML; MG/100ML; MG/100ML; MG/100ML
150 INJECTION, SOLUTION INTRAVENOUS CONTINUOUS
Status: ACTIVE | OUTPATIENT
Start: 2021-06-15 | End: 2021-06-16

## 2021-06-15 RX ORDER — BUPIVACAINE HYDROCHLORIDE 2.5 MG/ML
INJECTION, SOLUTION EPIDURAL; INFILTRATION; INTRACAUDAL
Status: COMPLETED | OUTPATIENT
Start: 2021-06-15 | End: 2021-06-15

## 2021-06-15 RX ORDER — LIDOCAINE HYDROCHLORIDE 10 MG/ML
INJECTION, SOLUTION INFILTRATION; PERINEURAL AS NEEDED
Status: DISCONTINUED | OUTPATIENT
Start: 2021-06-15 | End: 2021-06-15 | Stop reason: SURG

## 2021-06-15 RX ORDER — SODIUM CHLORIDE 0.9 % (FLUSH) 0.9 %
1-10 SYRINGE (ML) INJECTION AS NEEDED
Status: DISCONTINUED | OUTPATIENT
Start: 2021-06-15 | End: 2021-06-20 | Stop reason: HOSPADM

## 2021-06-15 RX ORDER — ALBUTEROL SULFATE 90 UG/1
2 AEROSOL, METERED RESPIRATORY (INHALATION) EVERY 4 HOURS PRN
Status: DISCONTINUED | OUTPATIENT
Start: 2021-06-15 | End: 2021-06-20 | Stop reason: HOSPADM

## 2021-06-15 RX ORDER — GABAPENTIN 250 MG/5ML
100 SOLUTION ORAL 3 TIMES DAILY
Status: ACTIVE | OUTPATIENT
Start: 2021-06-15 | End: 2021-06-17

## 2021-06-15 RX ORDER — CEFAZOLIN SODIUM 2 G/100ML
2 INJECTION, SOLUTION INTRAVENOUS ONCE
Status: COMPLETED | OUTPATIENT
Start: 2021-06-15 | End: 2021-06-15

## 2021-06-15 RX ORDER — DIPHENHYDRAMINE HYDROCHLORIDE 50 MG/ML
25 INJECTION INTRAMUSCULAR; INTRAVENOUS EVERY 4 HOURS PRN
Status: DISCONTINUED | OUTPATIENT
Start: 2021-06-15 | End: 2021-06-20 | Stop reason: HOSPADM

## 2021-06-15 RX ORDER — PROMETHAZINE HYDROCHLORIDE 12.5 MG/1
12.5 TABLET ORAL EVERY 6 HOURS PRN
Status: DISCONTINUED | OUTPATIENT
Start: 2021-06-15 | End: 2021-06-20 | Stop reason: HOSPADM

## 2021-06-15 RX ORDER — PROCHLORPERAZINE MALEATE 10 MG
10 TABLET ORAL EVERY 6 HOURS PRN
Status: DISCONTINUED | OUTPATIENT
Start: 2021-06-15 | End: 2021-06-20 | Stop reason: HOSPADM

## 2021-06-15 RX ORDER — PANTOPRAZOLE SODIUM 40 MG/10ML
40 INJECTION, POWDER, LYOPHILIZED, FOR SOLUTION INTRAVENOUS ONCE
Status: COMPLETED | OUTPATIENT
Start: 2021-06-15 | End: 2021-06-15

## 2021-06-15 RX ORDER — SODIUM CHLORIDE AND POTASSIUM CHLORIDE 150; 450 MG/100ML; MG/100ML
100 INJECTION, SOLUTION INTRAVENOUS CONTINUOUS
Status: DISCONTINUED | OUTPATIENT
Start: 2021-06-16 | End: 2021-06-16

## 2021-06-15 RX ORDER — ONDANSETRON 2 MG/ML
4 INJECTION INTRAMUSCULAR; INTRAVENOUS EVERY 6 HOURS PRN
Status: DISPENSED | OUTPATIENT
Start: 2021-06-15 | End: 2021-06-19

## 2021-06-15 RX ORDER — ONDANSETRON 2 MG/ML
4 INJECTION INTRAMUSCULAR; INTRAVENOUS ONCE AS NEEDED
Status: DISCONTINUED | OUTPATIENT
Start: 2021-06-15 | End: 2021-06-15 | Stop reason: HOSPADM

## 2021-06-15 RX ORDER — ONDANSETRON 4 MG/1
4 TABLET, FILM COATED ORAL EVERY 6 HOURS PRN
Status: DISCONTINUED | OUTPATIENT
Start: 2021-06-19 | End: 2021-06-20 | Stop reason: HOSPADM

## 2021-06-15 RX ORDER — MAGNESIUM HYDROXIDE 1200 MG/15ML
LIQUID ORAL AS NEEDED
Status: DISCONTINUED | OUTPATIENT
Start: 2021-06-15 | End: 2021-06-15 | Stop reason: HOSPADM

## 2021-06-15 RX ORDER — CARVEDILOL 12.5 MG/1
12.5 TABLET ORAL EVERY MORNING
Status: DISCONTINUED | OUTPATIENT
Start: 2021-06-16 | End: 2021-06-20 | Stop reason: HOSPADM

## 2021-06-15 RX ORDER — NALOXONE HCL 0.4 MG/ML
0.1 VIAL (ML) INJECTION
Status: DISCONTINUED | OUTPATIENT
Start: 2021-06-15 | End: 2021-06-20 | Stop reason: HOSPADM

## 2021-06-15 RX ORDER — PROPOFOL 10 MG/ML
VIAL (ML) INTRAVENOUS AS NEEDED
Status: DISCONTINUED | OUTPATIENT
Start: 2021-06-15 | End: 2021-06-15 | Stop reason: SURG

## 2021-06-15 RX ORDER — DEXAMETHASONE SODIUM PHOSPHATE 10 MG/ML
INJECTION, SOLUTION INTRAMUSCULAR; INTRAVENOUS AS NEEDED
Status: DISCONTINUED | OUTPATIENT
Start: 2021-06-15 | End: 2021-06-15 | Stop reason: SURG

## 2021-06-15 RX ORDER — PANTOPRAZOLE SODIUM 40 MG/1
40 TABLET, DELAYED RELEASE ORAL EVERY MORNING
Status: DISCONTINUED | OUTPATIENT
Start: 2021-06-16 | End: 2021-06-20 | Stop reason: HOSPADM

## 2021-06-15 RX ORDER — SCOLOPAMINE TRANSDERMAL SYSTEM 1 MG/1
1 PATCH, EXTENDED RELEASE TRANSDERMAL ONCE
Status: COMPLETED | OUTPATIENT
Start: 2021-06-15 | End: 2021-06-18

## 2021-06-15 RX ORDER — NEOSTIGMINE METHYLSULFATE 1 MG/ML
INJECTION, SOLUTION INTRAVENOUS AS NEEDED
Status: DISCONTINUED | OUTPATIENT
Start: 2021-06-15 | End: 2021-06-15 | Stop reason: SURG

## 2021-06-15 RX ORDER — ACETAMINOPHEN 500 MG
1000 TABLET ORAL ONCE
Status: COMPLETED | OUTPATIENT
Start: 2021-06-15 | End: 2021-06-15

## 2021-06-15 RX ORDER — CYANOCOBALAMIN 1000 UG/ML
1000 INJECTION, SOLUTION INTRAMUSCULAR; SUBCUTANEOUS ONCE
Status: COMPLETED | OUTPATIENT
Start: 2021-06-16 | End: 2021-06-16

## 2021-06-15 RX ORDER — PROMETHAZINE HYDROCHLORIDE 25 MG/1
25 TABLET ORAL ONCE AS NEEDED
Status: DISCONTINUED | OUTPATIENT
Start: 2021-06-15 | End: 2021-06-15 | Stop reason: HOSPADM

## 2021-06-15 RX ORDER — NICOTINE POLACRILEX 4 MG
15 LOZENGE BUCCAL
Status: DISCONTINUED | OUTPATIENT
Start: 2021-06-15 | End: 2021-06-20 | Stop reason: HOSPADM

## 2021-06-15 RX ORDER — GLYCOPYRROLATE 0.2 MG/ML
INJECTION INTRAMUSCULAR; INTRAVENOUS AS NEEDED
Status: DISCONTINUED | OUTPATIENT
Start: 2021-06-15 | End: 2021-06-15 | Stop reason: SURG

## 2021-06-15 RX ORDER — METOCLOPRAMIDE HYDROCHLORIDE 5 MG/ML
10 INJECTION INTRAMUSCULAR; INTRAVENOUS EVERY 6 HOURS PRN
Status: DISCONTINUED | OUTPATIENT
Start: 2021-06-15 | End: 2021-06-20 | Stop reason: HOSPADM

## 2021-06-15 RX ORDER — LIDOCAINE HYDROCHLORIDE 10 MG/ML
0.5 INJECTION, SOLUTION EPIDURAL; INFILTRATION; INTRACAUDAL; PERINEURAL ONCE AS NEEDED
Status: COMPLETED | OUTPATIENT
Start: 2021-06-15 | End: 2021-06-15

## 2021-06-15 RX ORDER — IPRATROPIUM BROMIDE AND ALBUTEROL SULFATE 2.5; .5 MG/3ML; MG/3ML
3 SOLUTION RESPIRATORY (INHALATION) ONCE AS NEEDED
Status: DISCONTINUED | OUTPATIENT
Start: 2021-06-15 | End: 2021-06-15 | Stop reason: HOSPADM

## 2021-06-15 RX ORDER — DROPERIDOL 2.5 MG/ML
0.62 INJECTION, SOLUTION INTRAMUSCULAR; INTRAVENOUS ONCE AS NEEDED
Status: DISCONTINUED | OUTPATIENT
Start: 2021-06-15 | End: 2021-06-15 | Stop reason: HOSPADM

## 2021-06-15 RX ORDER — ESMOLOL HYDROCHLORIDE 10 MG/ML
INJECTION INTRAVENOUS AS NEEDED
Status: DISCONTINUED | OUTPATIENT
Start: 2021-06-15 | End: 2021-06-15 | Stop reason: SURG

## 2021-06-15 RX ORDER — ROCURONIUM BROMIDE 10 MG/ML
INJECTION, SOLUTION INTRAVENOUS AS NEEDED
Status: DISCONTINUED | OUTPATIENT
Start: 2021-06-15 | End: 2021-06-15 | Stop reason: SURG

## 2021-06-15 RX ORDER — CETIRIZINE HYDROCHLORIDE 10 MG/1
10 TABLET ORAL DAILY
Status: DISCONTINUED | OUTPATIENT
Start: 2021-06-16 | End: 2021-06-20 | Stop reason: HOSPADM

## 2021-06-15 RX ORDER — NALOXONE HCL 0.4 MG/ML
0.4 VIAL (ML) INJECTION AS NEEDED
Status: DISCONTINUED | OUTPATIENT
Start: 2021-06-15 | End: 2021-06-15 | Stop reason: HOSPADM

## 2021-06-15 RX ORDER — GUAIFENESIN 200 MG/1
200 TABLET ORAL AS NEEDED
Status: DISCONTINUED | OUTPATIENT
Start: 2021-06-15 | End: 2021-06-20 | Stop reason: HOSPADM

## 2021-06-15 RX ORDER — CARVEDILOL 12.5 MG/1
25 TABLET ORAL NIGHTLY
Status: DISCONTINUED | OUTPATIENT
Start: 2021-06-15 | End: 2021-06-20 | Stop reason: HOSPADM

## 2021-06-15 RX ORDER — ACETAMINOPHEN 500 MG
1000 TABLET ORAL EVERY 8 HOURS SCHEDULED
Status: DISPENSED | OUTPATIENT
Start: 2021-06-15 | End: 2021-06-17

## 2021-06-15 RX ORDER — DEXAMETHASONE SODIUM PHOSPHATE 10 MG/ML
INJECTION, SOLUTION INTRAMUSCULAR; INTRAVENOUS
Status: COMPLETED | OUTPATIENT
Start: 2021-06-15 | End: 2021-06-15

## 2021-06-15 RX ORDER — MEPERIDINE HYDROCHLORIDE 25 MG/ML
12.5 INJECTION INTRAMUSCULAR; INTRAVENOUS; SUBCUTANEOUS
Status: DISCONTINUED | OUTPATIENT
Start: 2021-06-15 | End: 2021-06-15 | Stop reason: HOSPADM

## 2021-06-15 RX ORDER — DEXTROSE MONOHYDRATE 25 G/50ML
25 INJECTION, SOLUTION INTRAVENOUS
Status: DISCONTINUED | OUTPATIENT
Start: 2021-06-15 | End: 2021-06-20 | Stop reason: HOSPADM

## 2021-06-15 RX ORDER — LIDOCAINE HYDROCHLORIDE 20 MG/ML
JELLY TOPICAL AS NEEDED
Status: DISCONTINUED | OUTPATIENT
Start: 2021-06-15 | End: 2021-06-15 | Stop reason: SURG

## 2021-06-15 RX ADMIN — CHLORHEXIDINE GLUCONATE 0.12% ORAL RINSE 15 ML: 1.2 LIQUID ORAL at 11:17

## 2021-06-15 RX ADMIN — PROPOFOL 100 MG: 10 INJECTION, EMULSION INTRAVENOUS at 14:13

## 2021-06-15 RX ADMIN — GLYCOPYRROLATE 0.4 MG: 0.4 INJECTION INTRAMUSCULAR; INTRAVENOUS at 17:15

## 2021-06-15 RX ADMIN — PANTOPRAZOLE SODIUM 40 MG: 40 INJECTION, POWDER, FOR SOLUTION INTRAVENOUS at 20:22

## 2021-06-15 RX ADMIN — LIDOCAINE HYDROCHLORIDE 0.4 ML: 10 INJECTION, SOLUTION EPIDURAL; INFILTRATION; INTRACAUDAL; PERINEURAL at 11:32

## 2021-06-15 RX ADMIN — SODIUM CHLORIDE 150 ML/HR: 9 INJECTION, SOLUTION INTRAVENOUS at 11:50

## 2021-06-15 RX ADMIN — SODIUM CHLORIDE: 9 INJECTION, SOLUTION INTRAVENOUS at 14:00

## 2021-06-15 RX ADMIN — CHLORHEXIDINE GLUCONATE 0.12% ORAL RINSE 15 ML: 1.2 LIQUID ORAL at 11:21

## 2021-06-15 RX ADMIN — OXYCODONE 5 MG: 5 TABLET ORAL at 20:18

## 2021-06-15 RX ADMIN — SODIUM CHLORIDE 1000 ML: 9 INJECTION, SOLUTION INTRAVENOUS at 11:34

## 2021-06-15 RX ADMIN — SUCCINYLCHOLINE CHLORIDE 120 MG: 20 INJECTION, SOLUTION INTRAMUSCULAR; INTRAVENOUS at 14:01

## 2021-06-15 RX ADMIN — ROCURONIUM BROMIDE 10 MG: 10 INJECTION, SOLUTION INTRAVENOUS at 14:41

## 2021-06-15 RX ADMIN — SIMETHICONE 80 MG: 80 TABLET, CHEWABLE ORAL at 20:34

## 2021-06-15 RX ADMIN — BUPRENORPHINE HYDROCHLORIDE 0.3 MG: 0.32 INJECTION INTRAMUSCULAR; INTRAVENOUS at 14:17

## 2021-06-15 RX ADMIN — PANTOPRAZOLE SODIUM 40 MG: 40 INJECTION, POWDER, FOR SOLUTION INTRAVENOUS at 11:34

## 2021-06-15 RX ADMIN — NEOSTIGMINE 4 MG: 1 INJECTION INTRAVENOUS at 17:15

## 2021-06-15 RX ADMIN — PROPOFOL 25 MCG/KG/MIN: 10 INJECTION, EMULSION INTRAVENOUS at 14:14

## 2021-06-15 RX ADMIN — SODIUM CHLORIDE, PRESERVATIVE FREE 3 ML: 5 INJECTION INTRAVENOUS at 20:34

## 2021-06-15 RX ADMIN — DEXAMETHASONE SODIUM PHOSPHATE 6 MG: 10 INJECTION, SOLUTION INTRAMUSCULAR; INTRAVENOUS at 14:01

## 2021-06-15 RX ADMIN — SCOPALAMINE 1 PATCH: 1 PATCH, EXTENDED RELEASE TRANSDERMAL at 11:06

## 2021-06-15 RX ADMIN — ACETAMINOPHEN 1000 MG: 500 TABLET ORAL at 11:06

## 2021-06-15 RX ADMIN — LIDOCAINE HYDROCHLORIDE 50 MG: 10 INJECTION, SOLUTION INFILTRATION; PERINEURAL at 14:01

## 2021-06-15 RX ADMIN — FENTANYL CITRATE 50 MCG: 50 INJECTION, SOLUTION INTRAMUSCULAR; INTRAVENOUS at 17:11

## 2021-06-15 RX ADMIN — ROCURONIUM BROMIDE 5 MG: 10 INJECTION, SOLUTION INTRAVENOUS at 14:01

## 2021-06-15 RX ADMIN — ESMOLOL HYDROCHLORIDE 20 MG: 10 INJECTION, SOLUTION INTRAVENOUS at 14:01

## 2021-06-15 RX ADMIN — CARVEDILOL 25 MG: 12.5 TABLET, FILM COATED ORAL at 20:18

## 2021-06-15 RX ADMIN — PROPOFOL 200 MG: 10 INJECTION, EMULSION INTRAVENOUS at 14:01

## 2021-06-15 RX ADMIN — CEFAZOLIN SODIUM 2 G: 10 INJECTION, POWDER, FOR SOLUTION INTRAVENOUS at 20:19

## 2021-06-15 RX ADMIN — BUPIVACAINE HYDROCHLORIDE 60 ML: 2.5 INJECTION, SOLUTION EPIDURAL; INFILTRATION; INTRACAUDAL; PERINEURAL at 14:17

## 2021-06-15 RX ADMIN — SODIUM CHLORIDE, POTASSIUM CHLORIDE, SODIUM LACTATE AND CALCIUM CHLORIDE 150 ML/HR: 600; 310; 30; 20 INJECTION, SOLUTION INTRAVENOUS at 20:21

## 2021-06-15 RX ADMIN — FENTANYL CITRATE 50 MCG: 50 INJECTION, SOLUTION INTRAMUSCULAR; INTRAVENOUS at 14:13

## 2021-06-15 RX ADMIN — ROCURONIUM BROMIDE 45 MG: 10 INJECTION, SOLUTION INTRAVENOUS at 14:13

## 2021-06-15 RX ADMIN — LIDOCAINE HYDROCHLORIDE 2 ML: 20 JELLY TOPICAL at 14:02

## 2021-06-15 RX ADMIN — CEFAZOLIN SODIUM 2 G: 10 INJECTION, POWDER, FOR SOLUTION INTRAVENOUS at 13:54

## 2021-06-15 RX ADMIN — DEXAMETHASONE SODIUM PHOSPHATE 4 MG: 10 INJECTION, SOLUTION INTRAMUSCULAR; INTRAVENOUS at 14:17

## 2021-06-15 RX ADMIN — GABAPENTIN 600 MG: 300 CAPSULE ORAL at 11:06

## 2021-06-15 RX ADMIN — ACETAMINOPHEN 1000 MG: 500 TABLET, FILM COATED ORAL at 20:18

## 2021-06-15 RX ADMIN — ONDANSETRON 4 MG: 2 INJECTION INTRAMUSCULAR; INTRAVENOUS at 16:58

## 2021-06-15 NOTE — ANESTHESIA PROCEDURE NOTES
Bilateral 4-quadrant TAPs      Patient reassessed immediately prior to procedure    Patient location during procedure: OR  Start time: 6/15/2021 2:10 PM  Stop time: 6/15/2021 2:17 PM  Reason for block: at surgeon's request and post-op pain management  Performed by  Anesthesiologist: Luis Alcantar MD  Preanesthetic Checklist  Completed: patient identified, IV checked, site marked, risks and benefits discussed, surgical consent, monitors and equipment checked, pre-op evaluation and timeout performed  Prep:  Pt Position: supine  Sterile barriers:cap, gloves, sterile barriers and mask  Prep: ChloraPrep  Patient monitoring: blood pressure monitoring, continuous pulse oximetry and EKG  Procedure  Sedation:yes  Performed under: general  Guidance:ultrasound guided  Ultrasound documentation: digital image uploaded directly to EPIC.    Laterality:Bilateral  Block Type:TAP (4-quadrant)  Injection Technique:single-shot  Needle Type:short-bevel and echogenic  Needle Gauge:20 G  Resistance on Injection: none    Medications Used: buprenorphine (BUPRENEX) injection, 0.3 mg  dexamethasone sodium phosphate injection, 4 mg  bupivacaine PF (MARCAINE) 0.25 % injection, 60 mL  Med admintered at 6/15/2021 2:17 PM      Medications  Preservative Free Saline:40ml  Comment:Block Injection:  LA dose divided between Right and Left 4-quadrant TAP blocks        Post Assessment  Injection Assessment: negative aspiration for heme, incremental injection and no paresthesia on injection  Patient Tolerance:comfortable throughout block  Complications:no  Additional Notes      Under Ultrasound guidance, a BBraun 4inch 360 degree needle was advanced with Normal Saline hydro dissection of tissue.  The Internal Oblique and Transversus Abdominus muscles where visualized.  At or before the aponeurosis of Internal Oblique, local anesthetic spread was visualized in the Transversus Abdominus Plane. Injection was made incrementally with aspiration every 5 mls.   There was no  intravascular injection,  injection pressure was normal, there was no neural injection, and the procedure was completed without difficulty.  Thank You.

## 2021-06-15 NOTE — ANESTHESIA POSTPROCEDURE EVALUATION
Patient: Frances Hartman    Procedure Summary     Date: 06/15/21 Room / [facility]    Anesthesia Start: 1354 Anesthesia Stop:     Procedure: GASTRIC SLEEVE LAPAROSCOPIC WITH DAVINCI ROBOT, LAPROSCOPIC HIATAL HERNIA REPAIR WITH DAVINCI ROBOT, ESOPHAGOGASTRODUODENOSCOPY (N/A Abdomen) Diagnosis:       Obesity, Class III, BMI 40-49.9 (morbid obesity) (CMS/[facility])      (Obesity, Class III, BMI 40-49.9 (morbid obesity) (CMS/[facility]) [E66.01])    Surgeons: Ayaka Andre MD Provider: Nicholas Person MD    Anesthesia Type: general with block ASA Status: 3          Anesthesia Type: general with block    Vitals  No vitals data found for the desired time range.          Post Anesthesia Care and Evaluation    Patient location during evaluation: PACU  Patient participation: complete - patient participated  Level of consciousness: awake and alert  Pain management: adequate  Airway patency: patent  Anesthetic complications: No anesthetic complications  PONV Status: none  Cardiovascular status: hemodynamically stable and acceptable  Respiratory status: nonlabored ventilation, acceptable and nasal cannula  Hydration status: acceptable

## 2021-06-15 NOTE — OP NOTE
OPERATIVE REPORT    DATE: 06/15/21    PATIENT: Frances Hartman    PREOPERATIVE DIAGNOSIS:    1. Morbid obesity with comorbidities  2.  Hiatal hernia    POSTOPERATIVE DIAGNOSIS:    1. Morbid obesity with multiple comorbidities.  2.  Hiatal hernia    PROCEDURES PERFORMED:  1. Robotic assisted laparoscopic sleeve gastrectomy (85% subtotal vertical gastrectomy) with hiatal hernia repair  2.  Esophagogastroduodenoscopy      SURGEON:  Ayaka Andre M.D.    ASSISTANT:  Assistant: Yordy Dietz PA-C was responsible for performing the following activities: Retraction, Suction, Irrigation, Suturing, Closing, Placing Dressing and Held/Positioned Camera and their skilled assistance was necessary for the success of this case.    ANESTHESIA:  General endotracheal with BABATUNDE block    ESTIMATED BLOOD LOSS:   25 mL    FLUIDS:  Crystalloids.    SPECIMENS:  Subtotal gastrectomy.    DRAINS:  None.    COUNTS:  Correct.    COMPLICATIONS:  None.    FINDINGS: Small hiatal hernia. Hepatomegaly.    INDICATIONS:   Frances Hartman is a 35 y.o. year old female with morbid obesity and associated comorbidities who presents for elective laparoscopic sleeve gastrectomy. The patient has has undergone our extensive preoperative education, teaching, and consent process. Everything is in order and they wish to proceed.         DESCRIPTION OF PROCEDURE:   The patient was brought to the operating room and placed supine upon the operating room table. SCDs were placed. The patient underwent uneventful general endotracheal anesthesia and bilateral BABATUNDE blocks per the anesthesiology staff.  She received subcutaneous Lovenox and was given Ancef. The abdomen was prepped with ChloraPrep and draped in the usual sterile fashion. An Ioban was used as well.  Timeout was performed.     A small transverse incision was made a few centimeters above and to the left of the umbilicus and the peritoneal cavity entered under direct camera visualization using  a 5 mm 0° laparoscope and an Optiview trocar.  The abdomen was then insufflated to a pressure of 16 mmHg with CO2 gas. Exploratory laparoscopy revealed no evidence of injury from the entrance technique.  The liver was notable for microvesicular steatosis and hepatomegaly.  Two 8 mm ports were placed to the patient's left, lateral of the  first port, and a 12 mm port was placed in the right upper quadrant.  The 5 mm port was switched to an 8 mm robotic port.  A Armond liver retractor was placed through a small subxiphoid stab incision and the the left lobe of the liver was elevated.  The robot was docked, all safety checks were performed, and I moved to the robot console.     The stomach was decompressed with an 18 Kuwaiti orogastric tube, which was then positioned in the antrum. The greater curvature vessels were taken down with the vessel sealer extend energy device beginning at the midpoint of the stomach and continued up to the angle of His, including the short gastrics. The left trevor was completely exposed and there was a small hiatal hernia. The GE junction fat pad was elevated to make a clear landing zone for the stapler later. The greater curvature vessels were taken down with the energy device extending distally within 3 cm of the pylorus. Filmy adhesions to the stomach were taken down posteriorly.     I dissected the left trevor with the vessel sealer, then divided the pars flaccida.  There was a replaced right hepatic vessel in the gastrohepatic ligament and it was preserved.  I dissected the right trevor, then passed a penrose drain around the esophagus for retraction.  The phrenoesophageal ligament was divided.  Once the hiatus was completely dissected, I performed a posterior cruroplasty with running non-absorbable 0 V-loc suture.  The penrose was removed.  Of note, the hiatal hernia repair was exceedingly difficult due to hepatomegaly and difficult obtaining good liver retraction.  There were two liver  capsular tears, and bleeding was later controlled with FloSeal and Tisseel.     A 36 Wolof bougie was inserted and positioned along the lesser curvature of the stomach.  Using the bougie as a template, a vertical sleeve gastrectomy was fashioned with successive staple loads bolstered with a single absorbable Saar-strip: the first two loads were green, and the following loads were blue.   The staple line was examined and was hemostatic. The gastrectomy specimen was removed through the medial 12 mm port site. The specimen was later examined, and the staples were well-formed. Palpation of the specimen revealed no masses. The staple line of the sleeve gastrectomy revealed staples that were well-formed. The upper abdomen was flooded with saline, and endoscopy was performed.     The flexible endoscope was passed transorally down to the pylorus easily. The sleeve extended to within 6 cm proximal to the pylorus and was hemostatic. The sleeve was not narrow or twisted. There was no hiatal hernia or distal esophagitis. The rest of the esophagus was normal. The scope was removed. The leak test was normal.        I rescrubbed and suctioned the irrigation fluid from the upper abdomen, making sure it was dry. The staple line on the stomach was hemostatic.  The staple line was treated with 4 ml of aerosolized Tisseel fibrin glue. The liver retractor was removed easily. The medial 12 mm port was removed under direct visualization and there was no bleeding. This incision was closed with an 0-vicryl transfascial suture using a suture passer under direct visualization in a horizontal mattress fashion. All other ports were removed and then replaced under direct visualization and all of these were hemostatic. The instruments were removed and the abdomen was desufflated. The ports were removed.  3-0 monocryl plus was used to close skin incisions with interrupted sutures. Skin glue was placed.  Bolster dressings with gauze and tegaderm  were placed to serve as pressure dressings to help prevent hypertrophic scarring.  The patient was awakened and taken to recovery in good condition, having tolerated the procedure well. All sponge, needle, and instrument counts were correct.

## 2021-06-15 NOTE — BRIEF OP NOTE
GASTRIC SLEEVE WITH HIATAL HERNIA LAPAROSCOPIC WITH DAVINCI ROBOT  Progress Note    Frances Hartman  6/15/2021    Pre-op Diagnosis:   Obesity, Class III, BMI 40-49.9 (morbid obesity) (CMS/HCC) [E66.01]       Post-Op Diagnosis Codes:     * Obesity, Class III, BMI 40-49.9 (morbid obesity) (CMS/HCC) [E66.01]    Procedure/CPT® Codes:  SD LAP, MAGDIEL RESTRICT PROC, LONGITUDINAL GASTRECTOMY [26971]  SD LAP,ESOPHAGOGAST FUNDOPLASTY [59389]  SD ESOPHAGOGASTRODUODENOSCOPY TRANSORAL DIAGNOSTIC [40576]      Procedure(s):  GASTRIC SLEEVE LAPAROSCOPIC WITH DAVINCI ROBOT, LAPROSCOPIC HIATAL HERNIA REPAIR WITH DAVINCI ROBOT, ESOPHAGOGASTRODUODENOSCOPY    Surgeon(s):  Ayaka Andre MD    Anesthesia: General with Block    Staff:   Circulator: Meg Whaley RN; Christi Cameron RN  Physician Assistant: Fouzia Mac PA-C  Scrub Person: Edie Herrera; Parminder Mckeon  Nursing Assistant: Fani Ray McKayla, PCT  Assistant: Yordy Dietz PA-C  Assistant: Yordy Dietz PA-C      Estimated Blood Loss: 25 mL    Urine Voided: * No values recorded between 6/15/2021  1:55 PM and 6/15/2021  5:43 PM *    Specimens:                Specimens     ID Source Type Tests Collected By Collected At Frozen?    A Stomach Tissue · TISSUE PATHOLOGY EXAM   Ayaka Andre MD 6/15/21 1703 No    Description: SUB-TOTAL GASTRECTOMY                Drains: * No LDAs found *    Findings: Small hiatal hernia.  Hepatomegaly.    Complications: None immediate    Assistant: Yordy Dietz PA-C  was responsible for performing the following activities: Retraction, Suction, Irrigation, Suturing, Closing, Placing Dressing and Held/Positioned Camera and their skilled assistance was necessary for the success of this case.    Ayaka Andre MD     Date: 6/15/2021  Time: 17:47 EDT

## 2021-06-15 NOTE — ANESTHESIA PREPROCEDURE EVALUATION
Anesthesia Evaluation     Patient summary reviewed and Nursing notes reviewed                Airway   Mallampati: III  TM distance: >3 FB  Neck ROM: full  Possible difficult intubation, Large neck circumference and Small opening  Dental - normal exam     Pulmonary - normal exam   (+) asthma,  Cardiovascular - normal exam    (+) hypertension, hyperlipidemia,     ROS comment:   Echo 9/17: normal EF, mild TR    Stress test 11/19: no ischemia/low risk study    Neuro/Psych- negative ROS  GI/Hepatic/Renal/Endo    (+) morbid obesity,  diabetes mellitus,     Musculoskeletal     Abdominal  - normal exam    Bowel sounds: normal.   Substance History - negative use     OB/GYN negative ob/gyn ROS         Other   arthritis,                      Anesthesia Plan    ASA 3     general with block   (TAP blocks; glidescope/leos available)  intravenous induction     Anesthetic plan, all risks, benefits, and alternatives have been provided, discussed and informed consent has been obtained with: patient.    Plan discussed with CRNA.

## 2021-06-15 NOTE — ANESTHESIA PROCEDURE NOTES
Airway  Urgency: elective    Date/Time: 6/15/2021 2:02 PM  Airway not difficult    General Information and Staff    Patient location during procedure: OR  CRNA: Leonora Ann CRNA    Indications and Patient Condition  Indications for airway management: airway protection    Preoxygenated: yes  MILS not maintained throughout  Mask difficulty assessment: 1 - vent by mask    Final Airway Details  Final airway type: endotracheal airway      Successful airway: ETT  Cuffed: yes   Successful intubation technique: direct laryngoscopy  Facilitating devices/methods: intubating stylet  Endotracheal tube insertion site: oral  Blade: Vicky  Blade size: 3  ETT size (mm): 7.0  Cormack-Lehane Classification: grade I - full view of glottis  Placement verified by: chest auscultation and capnometry   Measured from: lips  ETT/EBT  to lips (cm): 22  Number of attempts at approach: 1  Assessment: lips, teeth, and gum same as pre-op and atraumatic intubation    Additional Comments  Negative epigastric sounds, Breath sound equal bilaterally with symmetric chest rise and fall

## 2021-06-15 NOTE — ANESTHESIA POSTPROCEDURE EVALUATION
Patient: Frances Hartman    Procedure Summary     Date: 06/15/21 Room / Location:  [facility]  /  [facility]    Anesthesia Start: 1354 Anesthesia Stop:     Procedure: GASTRIC SLEEVE LAPAROSCOPIC WITH DAVINCI ROBOT, LAPROSCOPIC HIATAL HERNIA REPAIR WITH DAVINCI ROBOT, ESOPHAGOGASTRODUODENOSCOPY (N/A Abdomen) Diagnosis:       Obesity, Class III, BMI 40-49.9 (morbid obesity) (CMS/[facility])      (Obesity, Class III, BMI 40-49.9 (morbid obesity) (CMS/[facility]) [E66.01])    Surgeons: Ayaka Andre MD Provider: Nicholas Person MD    Anesthesia Type: general with block ASA Status: 3          Anesthesia Type: general with block    Vitals  Vitals Value Taken Time   BP     Temp     Pulse     Resp     SpO2 87 % 06/15/21 1746   Vitals shown include unvalidated device data.        Post Anesthesia Care and Evaluation    Patient location during evaluation: PACU  Patient participation: complete - patient participated  Level of consciousness: awake and alert  Pain management: adequate  Airway patency: patent  Anesthetic complications: No anesthetic complications  PONV Status: none  Cardiovascular status: hemodynamically stable and acceptable  Respiratory status: nonlabored ventilation, acceptable and nasal cannula  Hydration status: acceptable

## 2021-06-16 ENCOUNTER — APPOINTMENT (OUTPATIENT)
Dept: GENERAL RADIOLOGY | Facility: HOSPITAL | Age: 36
End: 2021-06-16

## 2021-06-16 LAB
ALBUMIN SERPL-MCNC: 3.6 G/DL (ref 3.5–5.2)
ALBUMIN/GLOB SERPL: 1.1 G/DL
ALP SERPL-CCNC: 81 U/L (ref 39–117)
ALT SERPL W P-5'-P-CCNC: 154 U/L (ref 1–33)
ANION GAP SERPL CALCULATED.3IONS-SCNC: 12 MMOL/L (ref 5–15)
AST SERPL-CCNC: 141 U/L (ref 1–32)
BASOPHILS # BLD AUTO: 0 10*3/MM3 (ref 0–0.2)
BASOPHILS NFR BLD AUTO: 0 % (ref 0–1.5)
BILIRUB SERPL-MCNC: 0.2 MG/DL (ref 0–1.2)
BUN SERPL-MCNC: 15 MG/DL (ref 6–20)
BUN/CREAT SERPL: 14.2 (ref 7–25)
CALCIUM SPEC-SCNC: 9.9 MG/DL (ref 8.6–10.5)
CHLORIDE SERPL-SCNC: 101 MMOL/L (ref 98–107)
CO2 SERPL-SCNC: 18 MMOL/L (ref 22–29)
CREAT SERPL-MCNC: 1.06 MG/DL (ref 0.57–1)
DEPRECATED RDW RBC AUTO: 52 FL (ref 37–54)
EOSINOPHIL # BLD AUTO: 0.2 10*3/MM3 (ref 0–0.4)
EOSINOPHIL NFR BLD AUTO: 2 % (ref 0.3–6.2)
ERYTHROCYTE [DISTWIDTH] IN BLOOD BY AUTOMATED COUNT: 13.1 % (ref 12.3–15.4)
GFR SERPL CREATININE-BSD FRML MDRD: 71 ML/MIN/1.73
GLOBULIN UR ELPH-MCNC: 3.2 GM/DL
GLUCOSE BLDC GLUCOMTR-MCNC: 116 MG/DL (ref 70–130)
GLUCOSE BLDC GLUCOMTR-MCNC: 146 MG/DL (ref 70–130)
GLUCOSE BLDC GLUCOMTR-MCNC: 182 MG/DL (ref 70–130)
GLUCOSE BLDC GLUCOMTR-MCNC: 191 MG/DL (ref 70–130)
GLUCOSE SERPL-MCNC: 213 MG/DL (ref 65–99)
HCT VFR BLD AUTO: 39.1 % (ref 34–46.6)
HGB BLD-MCNC: 11.6 G/DL (ref 12–15.9)
IMM GRANULOCYTES # BLD AUTO: 0.03 10*3/MM3 (ref 0–0.05)
IMM GRANULOCYTES NFR BLD AUTO: 0.3 % (ref 0–0.5)
IRON 24H UR-MRATE: 36 MCG/DL (ref 37–145)
LARGE PLATELETS: NORMAL
LYMPHOCYTES # BLD AUTO: 0.88 10*3/MM3 (ref 0.7–3.1)
LYMPHOCYTES NFR BLD AUTO: 8.9 % (ref 19.6–45.3)
MCH RBC QN AUTO: 31.8 PG (ref 26.6–33)
MCHC RBC AUTO-ENTMCNC: 29.7 G/DL (ref 31.5–35.7)
MCV RBC AUTO: 107.1 FL (ref 79–97)
MONOCYTES # BLD AUTO: 0.55 10*3/MM3 (ref 0.1–0.9)
MONOCYTES NFR BLD AUTO: 5.5 % (ref 5–12)
NEUTROPHILS NFR BLD AUTO: 8.26 10*3/MM3 (ref 1.7–7)
NEUTROPHILS NFR BLD AUTO: 83.3 % (ref 42.7–76)
NRBC BLD AUTO-RTO: 0 /100 WBC (ref 0–0.2)
PLATELET # BLD AUTO: 183 10*3/MM3 (ref 140–450)
PMV BLD AUTO: 11.7 FL (ref 6–12)
POTASSIUM SERPL-SCNC: 4.7 MMOL/L (ref 3.5–5.2)
PROT SERPL-MCNC: 6.8 G/DL (ref 6–8.5)
RBC # BLD AUTO: 3.65 10*6/MM3 (ref 3.77–5.28)
RBC MORPH BLD: NORMAL
SODIUM SERPL-SCNC: 131 MMOL/L (ref 136–145)
WBC # BLD AUTO: 9.92 10*3/MM3 (ref 3.4–10.8)
WBC MORPH BLD: NORMAL

## 2021-06-16 PROCEDURE — 25010000003 POTASSIUM CHLORIDE PER 2 MEQ: Performed by: SURGERY

## 2021-06-16 PROCEDURE — 74240 X-RAY XM UPR GI TRC 1CNTRST: CPT

## 2021-06-16 PROCEDURE — 25010000002 ENOXAPARIN PER 10 MG: Performed by: SURGERY

## 2021-06-16 PROCEDURE — 82962 GLUCOSE BLOOD TEST: CPT

## 2021-06-16 PROCEDURE — 85025 COMPLETE CBC W/AUTO DIFF WBC: CPT | Performed by: SURGERY

## 2021-06-16 PROCEDURE — 63710000001 INSULIN LISPRO (HUMAN) PER 5 UNITS: Performed by: SURGERY

## 2021-06-16 PROCEDURE — 80053 COMPREHEN METABOLIC PANEL: CPT | Performed by: SURGERY

## 2021-06-16 PROCEDURE — G0108 DIAB MANAGE TRN  PER INDIV: HCPCS

## 2021-06-16 PROCEDURE — 0 DIATRIZOATE MEGLUMINE & SODIUM PER 1 ML: Performed by: SURGERY

## 2021-06-16 PROCEDURE — 99024 POSTOP FOLLOW-UP VISIT: CPT | Performed by: SURGERY

## 2021-06-16 PROCEDURE — 85007 BL SMEAR W/DIFF WBC COUNT: CPT | Performed by: SURGERY

## 2021-06-16 PROCEDURE — 25010000002 NA FERRIC GLUC CPLX PER 12.5 MG: Performed by: SURGERY

## 2021-06-16 PROCEDURE — 83540 ASSAY OF IRON: CPT | Performed by: SURGERY

## 2021-06-16 PROCEDURE — 25010000002 THIAMINE PER 100 MG: Performed by: SURGERY

## 2021-06-16 PROCEDURE — 25810000003 SODIUM CHLORIDE 0.9 % WITH KCL 20 MEQ 20-0.9 MEQ/L-% SOLUTION: Performed by: SURGERY

## 2021-06-16 PROCEDURE — 25010000002 CEFAZOLIN PER 500 MG: Performed by: SURGERY

## 2021-06-16 PROCEDURE — 25010000002 CYANOCOBALAMIN PER 1000 MCG: Performed by: SURGERY

## 2021-06-16 RX ORDER — SODIUM CHLORIDE AND POTASSIUM CHLORIDE 150; 900 MG/100ML; MG/100ML
125 INJECTION, SOLUTION INTRAVENOUS CONTINUOUS
Status: DISCONTINUED | OUTPATIENT
Start: 2021-06-16 | End: 2021-06-20 | Stop reason: HOSPADM

## 2021-06-16 RX ADMIN — POTASSIUM CHLORIDE AND SODIUM CHLORIDE 100 ML/HR: 450; 150 INJECTION, SOLUTION INTRAVENOUS at 07:58

## 2021-06-16 RX ADMIN — ACETAMINOPHEN 1000 MG: 500 TABLET, FILM COATED ORAL at 21:06

## 2021-06-16 RX ADMIN — PANTOPRAZOLE SODIUM 40 MG: 40 TABLET, DELAYED RELEASE ORAL at 05:00

## 2021-06-16 RX ADMIN — INSULIN LISPRO 4 UNITS: 100 INJECTION, SOLUTION INTRAVENOUS; SUBCUTANEOUS at 07:58

## 2021-06-16 RX ADMIN — CARVEDILOL 12.5 MG: 12.5 TABLET, FILM COATED ORAL at 05:00

## 2021-06-16 RX ADMIN — CETIRIZINE HYDROCHLORIDE 10 MG: 10 TABLET, FILM COATED ORAL at 08:00

## 2021-06-16 RX ADMIN — POTASSIUM CHLORIDE AND SODIUM CHLORIDE 125 ML/HR: 900; 150 INJECTION, SOLUTION INTRAVENOUS at 18:55

## 2021-06-16 RX ADMIN — HYDROMORPHONE HYDROCHLORIDE 2 MG: 2 TABLET ORAL at 13:14

## 2021-06-16 RX ADMIN — GABAPENTIN 100 MG: 100 CAPSULE ORAL at 21:06

## 2021-06-16 RX ADMIN — CYANOCOBALAMIN 1000 MCG: 1000 INJECTION, SOLUTION INTRAMUSCULAR; SUBCUTANEOUS at 07:58

## 2021-06-16 RX ADMIN — OXYCODONE 5 MG: 5 TABLET ORAL at 18:55

## 2021-06-16 RX ADMIN — SODIUM CHLORIDE 1000 ML: 9 INJECTION, SOLUTION INTRAVENOUS at 09:01

## 2021-06-16 RX ADMIN — GABAPENTIN 100 MG: 100 CAPSULE ORAL at 08:00

## 2021-06-16 RX ADMIN — OXYCODONE 5 MG: 5 TABLET ORAL at 07:59

## 2021-06-16 RX ADMIN — SODIUM CHLORIDE 125 MG: 9 INJECTION, SOLUTION INTRAVENOUS at 13:14

## 2021-06-16 RX ADMIN — Medication 30 ML: at 09:31

## 2021-06-16 RX ADMIN — POTASSIUM CHLORIDE AND SODIUM CHLORIDE 125 ML/HR: 900; 150 INJECTION, SOLUTION INTRAVENOUS at 09:00

## 2021-06-16 RX ADMIN — CARVEDILOL 25 MG: 12.5 TABLET, FILM COATED ORAL at 21:06

## 2021-06-16 RX ADMIN — ACETAMINOPHEN 1000 MG: 500 TABLET, FILM COATED ORAL at 13:14

## 2021-06-16 RX ADMIN — CEFAZOLIN SODIUM 2 G: 10 INJECTION, POWDER, FOR SOLUTION INTRAVENOUS at 05:00

## 2021-06-16 RX ADMIN — ACETAMINOPHEN 1000 MG: 500 TABLET, FILM COATED ORAL at 05:01

## 2021-06-16 RX ADMIN — THIAMINE HYDROCHLORIDE 100 ML/HR: 100 INJECTION, SOLUTION INTRAMUSCULAR; INTRAVENOUS at 07:17

## 2021-06-16 RX ADMIN — ENOXAPARIN SODIUM 40 MG: 40 INJECTION SUBCUTANEOUS at 08:01

## 2021-06-16 NOTE — CONSULTS
" Discussed and taught patient about type 2 diabetes self-management, risk factors, and importance of blood glucose control to reduce complications. Target blood glucose readings and A1c goals per ADA were reviewed. Reviewed with patient current A1c 7.4 and discussed its significance. Signs, symptoms, and treatment of hyperglycemia and hypoglycemia were discussed. Lifestyle changes such as physical activity with MD approval and healthy eating were encouraged. Stressed the importance of strict blood sugar control after surgery to prevent complications such as infection and to promote healing of incision. Encouraged pt to monitor blood sugar at home 2+  times per day and to call PCP if blood sugar is trending kim. Encouraged to keep record of blood glucose readings to take to follow up appointment with PCP. Provided patient with copy of [employer] \"What is Diabetes\" handout, \"Blood Glucose Goals\" handout, and \"What is A1c\" handout. Thank you for this referral.  "

## 2021-06-16 NOTE — PAYOR COMM NOTE
"Patient was admitted on 6/15/21  Please correct DOS    Mira Rodas RN CM  Phone [phone]  Fax [phone]      Frances Gutierrez (35 y.o. Female)     Date of Birth Social Security Number Address Home Phone MRN    1985  [id number] [place] [id number] [phone] [id number]    Shinto Marital Status          Quaker Single       Admission Date Admission Type Admitting Provider Attending Provider Department, Room/Bed    6/15/21 Elective Ayaka Andre MD Quintero, Paige Nealy, MD [facility] 2F, S214/1    Discharge Date Discharge Disposition Discharge Destination                       Attending Provider: Ayaka Andre MD    Allergies: Hydrochlorothiazide, Monosodium Glutamate, Oatmeal, Augmentin [Amoxicillin-pot Clavulanate], Amitriptyline, Aspartame, Codeine, Diclofenac, Doxycycline, Eggs Or Egg-derived Products, Ibuprofen, Naproxen    Isolation: None   Infection: None   Code Status: Not on file    Ht: 152.4 cm (60\")   Wt: 102 kg (224 lb)    Admission Cmt: None   Principal Problem: Obesity, Class III, BMI 40-49.9 (morbid obesity) (CMS/HCC) [E66.01] More...                 Active Insurance as of 6/15/2021     Primary Coverage     Payor Plan Insurance Group Employer/Plan Group    ANTHEM MEDICAID ANTH MEDICAID [id number]     Payor Plan Address Payor Plan Phone Number Payor Plan Fax Number Effective Dates    [address] [address] [phone]  1/3/2019 - None Entered    [facility] [id number]       Subscriber Name Subscriber Birth Date Member ID       FRANCES GUTIERREZ 1985 [id number]                 Emergency Contacts      (Rel.) Home Phone Work Phone Mobile Phone    Amelia Gutierrez (Mother) -- -- [phone]               Operative/Procedure Notes (last 24 hours) (Notes from 06/15/21 0829 through 06/16/21 0829)      Ayaka Andre MD at 06/15/21 1428          GASTRIC SLEEVE WITH HIATAL HERNIA LAPAROSCOPIC WITH DAVINCI ROBOT  " Progress Note    Frances Hartman  6/15/2021    Pre-op Diagnosis:   Obesity, Class III, BMI 40-49.9 (morbid obesity) (CMS/HCC) [E66.01]       Post-Op Diagnosis Codes:     * Obesity, Class III, BMI 40-49.9 (morbid obesity) (CMS/HCC) [E66.01]    Procedure/CPT® Codes:  NC LAP, MAGDIEL RESTRICT PROC, LONGITUDINAL GASTRECTOMY [57935]  NC LAP,ESOPHAGOGAST FUNDOPLASTY [54295]  NC ESOPHAGOGASTRODUODENOSCOPY TRANSORAL DIAGNOSTIC [80805]      Procedure(s):  GASTRIC SLEEVE LAPAROSCOPIC WITH DAVINCI ROBOT, LAPROSCOPIC HIATAL HERNIA REPAIR WITH DAVINCI ROBOT, ESOPHAGOGASTRODUODENOSCOPY    Surgeon(s):  Ayaka Andre MD    Anesthesia: General with Block    Staff:   Circulator: Meg Whaley RN; Christi Cameron RN  Physician Assistant: Fouzia Mac PA-C  Scrub Person: Edie Herrera; Parminder Mckeon  Nursing Assistant: Fani Ray; Nadine Mixon PCT  Assistant: Yordy Dietz PA-C  Assistant: Yordy Dietz PA-C      Estimated Blood Loss: 25 mL    Urine Voided: * No values recorded between 6/15/2021  1:55 PM and 6/15/2021  5:43 PM *    Specimens:                Specimens     ID Source Type Tests Collected By Collected At Frozen?    A Stomach Tissue · TISSUE PATHOLOGY EXAM   Ayaka Andre MD 6/15/21 1703 No    Description: SUB-TOTAL GASTRECTOMY                Drains: * No LDAs found *    Findings: Small hiatal hernia.  Hepatomegaly.    Complications: None immediate    Assistant: Yordy Dietz PA-C  was responsible for performing the following activities: Retraction, Suction, Irrigation, Suturing, Closing, Placing Dressing and Held/Positioned Camera and their skilled assistance was necessary for the success of this case.    Ayaka Andre MD     Date: 6/15/2021  Time: 17:47 EDT        Electronically signed by Ayaka Andre MD at 06/15/21 7014     Ayaka Andre MD at 06/15/21 1428        OPERATIVE REPORT    DATE: 06/15/21    PATIENT: Frances Hartman    PREOPERATIVE  DIAGNOSIS:    1. Morbid obesity with comorbidities    POSTOPERATIVE DIAGNOSIS:    1. Morbid obesity with multiple comorbidities.    PROCEDURES PERFORMED:  1. Robotic assisted laparoscopic sleeve gastrectomy (85% subtotal vertical gastrectomy)    2.  Esophagogastroduodenoscopy    SURGEON:  Ayaka Andre M.D.    ASSISTANT:  Assistant: Yordy Dietz PA-C was responsible for performing the following activities: Retraction, Suction, Irrigation, Suturing, Closing, Placing Dressing and Held/Positioned Camera and their skilled assistance was necessary for the success of this case.    ANESTHESIA:  General endotracheal with BABATUNDE block    ESTIMATED BLOOD LOSS:   25 mL    FLUIDS:  Crystalloids.    SPECIMENS:  Subtotal gastrectomy.    DRAINS:  None.    COUNTS:  Correct.    COMPLICATIONS:  None.    FINDINGS: Small hiatal hernia. Hepatomegaly.    INDICATIONS:   Frances Hartman is a 35 y.o. year old female with morbid obesity and associated comorbidities who presents for elective laparoscopic sleeve gastrectomy. The patient has has undergone our extensive preoperative education, teaching, and consent process. Everything is in order and they wish to proceed.         DESCRIPTION OF PROCEDURE:   The patient was brought to the operating room and placed supine upon the operating room table. SCDs were placed. The patient underwent uneventful general endotracheal anesthesia and bilateral BABATUNDE blocks per the anesthesiology staff.  She received subcutaneous Lovenox and was given Ancef. The abdomen was prepped with ChloraPrep and draped in the usual sterile fashion. An Ioban was used as well.  Timeout was performed.     A small transverse incision was made a few centimeters above and to the left of the umbilicus and the peritoneal cavity entered under direct camera visualization using a 5 mm 0° laparoscope and an Optiview trocar.  The abdomen was then insufflated to a pressure of 16 mmHg with CO2 gas. Exploratory laparoscopy  revealed no evidence of injury from the entrance technique.  The liver was notable for microvesicular steatosis and hepatomegaly.  Two 8 mm ports were placed to the patient's left, lateral of the  first port, and a 12 mm port was placed in the right upper quadrant.  The 5 mm port was switched to an 8 mm robotic port.  A Armond liver retractor was placed through a small subxiphoid stab incision and the the left lobe of the liver was elevated.  The robot was docked, all safety checks were performed, and I moved to the robot console.     The stomach was decompressed with an 18 Serbian orogastric tube, which was then positioned in the antrum. The greater curvature vessels were taken down with the vessel sealer extend energy device beginning at the midpoint of the stomach and continued up to the angle of His, including the short gastrics. The left trevor was completely exposed and there was a small hiatal hernia. The GE junction fat pad was elevated to make a clear landing zone for the stapler later. The greater curvature vessels were taken down with the energy device extending distally within 3 cm of the pylorus. Filmy adhesions to the stomach were taken down posteriorly.     I dissected the left trevor with the vessel sealer, then divided the pars flaccida.  There was a replaced right hepatic vessel in the gastrohepatic ligament and it was preserved.  I dissected the right trevor, then passed a penrose drain around the esophagus for retraction.  The phrenoesophageal ligament was divided.  Once the hiatus was completely dissected, I performed a posterior cruroplasty with running non-absorbable 0 V-loc suture.  The penrose was removed.  Of note, the hiatal hernia repair was exceedingly difficult due to hepatomegaly and difficult obtaining good liver retraction.  There were two liver capsular tears, and bleeding was later controlled with FloSeal and Tisseel.     A 36 Serbian bougie was inserted and positioned along the lesser  curvature of the stomach.  Using the bougie as a template, a vertical sleeve gastrectomy was fashioned with successive staple loads bolstered with a single absorbable Sara-strip: the first two loads were green, and the following loads were blue.   The staple line was examined and was hemostatic. The gastrectomy specimen was removed through the medial 12 mm port site. The specimen was later examined, and the staples were well-formed. Palpation of the specimen revealed no masses. The staple line of the sleeve gastrectomy revealed staples that were well-formed. The upper abdomen was flooded with saline, and endoscopy was performed.     The flexible endoscope was passed transorally down to the pylorus easily. The sleeve extended to within 6 cm proximal to the pylorus and was hemostatic. The sleeve was not narrow or twisted. There was no hiatal hernia or distal esophagitis. The rest of the esophagus was normal. The scope was removed. The leak test was normal.        I rescrubbed and suctioned the irrigation fluid from the upper abdomen, making sure it was dry. The staple line on the stomach was hemostatic.  The staple line was treated with 4 ml of aerosolized Tisseel fibrin glue. The liver retractor was removed easily. The medial 12 mm port was removed under direct visualization and there was no bleeding. This incision was closed with an 0-vicryl transfascial suture using a suture passer under direct visualization in a horizontal mattress fashion. All other ports were removed and then replaced under direct visualization and all of these were hemostatic. The instruments were removed and the abdomen was desufflated. The ports were removed.  3-0 monocryl plus was used to close skin incisions with interrupted sutures. Skin glue was placed.  Bolster dressings with gauze and tegaderm were placed to serve as pressure dressings to help prevent hypertrophic scarring.  The patient was awakened and taken to recovery in good  condition, having tolerated the procedure well. All sponge, needle, and instrument counts were correct.             Electronically signed by Ayaka Andre MD at 06/15/21 4419

## 2021-06-16 NOTE — CASE MANAGEMENT/SOCIAL WORK
Discharge Planning Assessment  [facility]     Patient Name: Frances Hartman  MRN: [id number]  Today's Date: 6/16/2021    Admit Date: 6/15/2021    Discharge Needs Assessment     Row Name 06/16/21 0901       Living Environment    Lives With  alone    Current Living Arrangements  home/apartment/condo    Primary Care Provided by  self    Provides Primary Care For  no one    Family Caregiver if Needed  parent(s)    Family Caregiver Names  Amelia Hartman (mother) [phone]    Able to Return to Prior Arrangements  yes       Resource/Environmental Concerns    Resource/Environmental Concerns  none    Transportation Concerns  car, none       Transition Planning    Patient/Family Anticipates Transition to  home with family    Patient/Family Anticipated Services at Transition  none    Transportation Anticipated  family or friend will provide       Discharge Needs Assessment    Readmission Within the Last 30 Days  no previous admission in last 30 days    Equipment Currently Used at Home  none    Concerns to be Addressed  denies needs/concerns at this time    Anticipated Changes Related to Illness  none    Equipment Needed After Discharge  none        Discharge Plan     Row Name 06/16/21 0902       Plan    Plan  Home with family    Patient/Family in Agreement with Plan  yes    Plan Comments  Spoke with patient at bedside. Lives alone in [facility]. Contact is Amelia Hartman (mother) [phone]. Is independent with ADL's. No problems with [place] Medicaid or medications. Uses no DME at home. Plan is home with family. No living will or POA. CM will continue to follow.    Final Discharge Disposition Code  01 - home or self-care        Continued Care and Services - Admitted Since 6/15/2021    Coordination has not been started for this encounter.         Demographic Summary     Row Name 06/16/21 0900       General Information    Admission Type  inpatient    Arrived From  PACU/recovery room    Referral Source  admission list     Reason for Consult  discharge planning    Preferred Language  English     Used During This Interaction  no       Contact Information    Permission Granted to Share Info With      Contact Information Obtained for      Contact Information Comments  PCP is Lesli Ramirez        Functional Status     Row Name 06/16/21 0901       Functional Status    Usual Activity Tolerance  good    Current Activity Tolerance  good       Functional Status, IADL    Medications  independent    Meal Preparation  independent    Housekeeping  independent    Laundry  independent    Shopping  independent       Mental Status    General Appearance WDL  WDL       Mental Status Summary    Recent Changes in Mental Status/Cognitive Functioning  no changes       Employment/    Employment Status  unemployed        Psychosocial    No documentation.       Abuse/Neglect    No documentation.       Legal    No documentation.       Substance Abuse    No documentation.       Patient Forms    No documentation.           Yoseph Vieyra RN

## 2021-06-16 NOTE — PROGRESS NOTES
"Bariatric Surgery Progress Note:      Chief Complaint:  POD #1    Subjective     Interval History:  Doing well.  No complaints.  Pain controlled.  Denies N/V.  No fevers.  Ambulating.  Voiding.  IS 1250 observed.    Metabolic acidosis and hyponatremia on labs this morning: bolused 1L NS and fluids changed to NS + KCl.    Objective     Vital Signs  Blood pressure 125/78, pulse 62, temperature 98.4 °F (36.9 °C), temperature source Oral, resp. rate 14, height 152.4 cm (60\"), weight 102 kg (224 lb), SpO2 91 %, not currently breastfeeding.      Intake/Output Summary (Last 24 hours) at 6/16/2021 1640  Last data filed at 6/16/2021 1300  Gross per 24 hour   Intake 420 ml   Output 2700 ml   Net -2280 ml       Physical Exam:  General: Alert, NAD  Lungs: Clear  Heart: RRR  Abdomen: soft, appropriate, incisions okay  Extremities: warm, (+) SCDs       Labs:  Lab Results (last 24 hours)     Procedure Component Value Units Date/Time    POC Glucose Once [[id number]]  (Abnormal) Collected: 06/16/21 1635    Specimen: Blood Updated: 06/16/21 1638     Glucose 146 mg/dL     POC Glucose Once [[id number]]  (Abnormal) Collected: 06/16/21 1109    Specimen: Blood Updated: 06/16/21 1110     Glucose 182 mg/dL     CBC & Differential [[id number]]  (Abnormal) Collected: 06/16/21 0603    Specimen: Blood Updated: 06/16/21 0835    Narrative:      The following orders were created for panel order CBC & Differential.  Procedure                               Abnormality         Status                     ---------                               -----------         ------                     Scan Slide[[id number]]                                       Final result               CBC Auto Differential[[id number]]        Abnormal            Final result                 Please view results for these tests on the individual orders.    Scan Slide [[id number]] Collected: 06/16/21 0603    Specimen: Blood Updated: 06/16/21 0835     RBC Morphology Normal     WBC " Morphology Normal     Large Platelets Slight/1+    CBC Auto Differential [[id number]]  (Abnormal) Collected: 06/16/21 0603    Specimen: Blood Updated: 06/16/21 0835     WBC 9.92 10*3/mm3      RBC 3.65 10*6/mm3      Hemoglobin 11.6 g/dL      Hematocrit 39.1 %      .1 fL      MCH 31.8 pg      MCHC 29.7 g/dL      RDW 13.1 %      RDW-SD 52.0 fl      MPV 11.7 fL      Platelets 183 10*3/mm3      Neutrophil % 83.3 %      Lymphocyte % 8.9 %      Monocyte % 5.5 %      Eosinophil % 2.0 %      Basophil % 0.0 %      Immature Grans % 0.3 %      Neutrophils, Absolute 8.26 10*3/mm3      Lymphocytes, Absolute 0.88 10*3/mm3      Monocytes, Absolute 0.55 10*3/mm3      Eosinophils, Absolute 0.20 10*3/mm3      Basophils, Absolute 0.00 10*3/mm3      Immature Grans, Absolute 0.03 10*3/mm3      nRBC 0.0 /100 WBC     POC Glucose Once [[id number]]  (Abnormal) Collected: 06/16/21 0735    Specimen: Blood Updated: 06/16/21 0737     Glucose 191 mg/dL     Tissue Pathology Exam [[id number]] Collected: 06/15/21 1703    Specimen: Tissue from Stomach Updated: 06/16/21 0704    Comprehensive Metabolic Panel [[id number]]  (Abnormal) Collected: 06/16/21 0603    Specimen: Blood Updated: 06/16/21 0652     Glucose 213 mg/dL      BUN 15 mg/dL      Creatinine 1.06 mg/dL      Sodium 131 mmol/L      Potassium 4.7 mmol/L      Comment: Slight hemolysis detected by analyzer. Results may be affected.        Chloride 101 mmol/L      CO2 18.0 mmol/L      Calcium 9.9 mg/dL      Total Protein 6.8 g/dL      Albumin 3.60 g/dL      ALT (SGPT) 154 U/L      AST (SGOT) 141 U/L      Alkaline Phosphatase 81 U/L      Total Bilirubin 0.2 mg/dL      eGFR  African Amer 71 mL/min/1.73      Globulin 3.2 gm/dL      A/G Ratio 1.1 g/dL      BUN/Creatinine Ratio 14.2     Anion Gap 12.0 mmol/L     Narrative:      GFR Normal >60  Chronic Kidney Disease <60  Kidney Failure <15      Iron [[id number]]  (Abnormal) Collected: 06/16/21 0603    Specimen: Blood Updated: 06/16/21 0651     Iron  36 mcg/dL             Assessment/Plan     POD # 1 s/p robotic sleeve gastrectomy and hiatal hernia repair.  Notable hepatomegaly.    Doing well.  UGI normal post-sleeve, images and report reviewed.  IV iron given for low Fe without evidence of bleeding on Lovenox.     Hyponatremia and metabolic acidosis.  Fluids were changed and she was bolused.  Recheck in AM.  Anticipate discharge tomorrow.    Reeducated on importance of pulmonary toilet in preventing post-op pulmonary complications    Continue OOB/ PT/ DVT prophx.

## 2021-06-16 NOTE — PLAN OF CARE
Goal Outcome Evaluation:              Outcome Summary: patient alert and orie nted complaiing of continujed pain denies nausea, refuses all insulin and states gabapentin makes her too sleeping, family at bedside iron adm per order,walked in only once with much promtping, ambulating in room with assit, complaikns too muh pain to go home  Problem: Adult Inpatient Plan of Care  Goal: Absence of Hospital-Acquired Illness or Injury  Outcome: Ongoing, Progressing  Intervention: Identify and Manage Fall Risk  Recent Flowsheet Documentation  Taken 6/16/2021 1800 by Kimberly Haskins RN  Safety Promotion/Fall Prevention:   fall prevention program maintained   safety round/check completed  Taken 6/16/2021 1600 by Kimberly Haskins RN  Safety Promotion/Fall Prevention:   activity supervised   fall prevention program maintained   safety round/check completed  Taken 6/16/2021 1400 by Kimberly Haskins RN  Safety Promotion/Fall Prevention:   activity supervised   fall prevention program maintained   safety round/check completed  Taken 6/16/2021 1200 by Kimberly Haskins RN  Safety Promotion/Fall Prevention:   safety round/check completed   fall prevention program maintained  Taken 6/16/2021 1017 by Kimberly Haskins RN  Safety Promotion/Fall Prevention:   activity supervised   fall prevention program maintained   safety round/check completed  Taken 6/16/2021 0800 by Kimberly Haskins RN  Safety Promotion/Fall Prevention:   activity supervised   fall prevention program maintained   safety round/check completed  Intervention: Prevent Skin Injury  Recent Flowsheet Documentation  Taken 6/16/2021 1800 by Kimberly Haskins RN  Body Position: sitting up in bed  Taken 6/16/2021 1400 by Kimberly Haskins RN  Body Position: side-lying, right  Taken 6/16/2021 1200 by Kimberly Haskins RN  Body Position: sitting up in bed  Taken 6/16/2021 0800 by Kimberly Haskins RN  Body Position: supine, legs elevated  Skin Protection: adhesive  use limited  Intervention: Prevent and Manage VTE (venous thromboembolism) Risk  Recent Flowsheet Documentation  Taken 6/16/2021 0800 by Kimberly Haskins RN  VTE Prevention/Management:   bilateral   sequential compression devices on   bleeding risk factor(s) identified  Intervention: Prevent Infection  Recent Flowsheet Documentation  Taken 6/16/2021 1800 by Kimberly Haskins RN  Infection Prevention: rest/sleep promoted  Taken 6/16/2021 1600 by Kimberly Haskins RN  Infection Prevention: rest/sleep promoted  Taken 6/16/2021 1400 by Kimberly Haskins RN  Infection Prevention: rest/sleep promoted  Taken 6/16/2021 1017 by Kimberly Haskins RN  Infection Prevention: rest/sleep promoted  Taken 6/16/2021 0800 by Kimberly Haskins RN  Infection Prevention: rest/sleep promoted

## 2021-06-16 NOTE — PLAN OF CARE
Goal Outcome Evaluation:   Patient rested well this evening, with some lethargy and eventually ambulated to the bathroom with 700 ml output. Patient given Oxycodone 5 mg po for pain x1 and scheduled tylenol but refused scheduled gabapentin. Patient states that she was too lethargic for ambulation in [place]. Patient resting comfortably at this time and is on 2LNC, NSR on the monitor. Will continue to monitor patient. Safety protocols in place.

## 2021-06-17 LAB
ALBUMIN SERPL-MCNC: 3.4 G/DL (ref 3.5–5.2)
ALBUMIN/GLOB SERPL: 1.2 G/DL
ALP SERPL-CCNC: 70 U/L (ref 39–117)
ALT SERPL W P-5'-P-CCNC: 93 U/L (ref 1–33)
ANION GAP SERPL CALCULATED.3IONS-SCNC: 7 MMOL/L (ref 5–15)
AST SERPL-CCNC: 75 U/L (ref 1–32)
BASOPHILS # BLD AUTO: 0.02 10*3/MM3 (ref 0–0.2)
BASOPHILS NFR BLD AUTO: 0.2 % (ref 0–1.5)
BILIRUB SERPL-MCNC: 0.2 MG/DL (ref 0–1.2)
BUN SERPL-MCNC: 13 MG/DL (ref 6–20)
BUN/CREAT SERPL: 15.3 (ref 7–25)
CALCIUM SPEC-SCNC: 9.8 MG/DL (ref 8.6–10.5)
CHLORIDE SERPL-SCNC: 109 MMOL/L (ref 98–107)
CO2 SERPL-SCNC: 23 MMOL/L (ref 22–29)
CREAT SERPL-MCNC: 0.85 MG/DL (ref 0.57–1)
CYTO UR: NORMAL
DEPRECATED RDW RBC AUTO: 51.5 FL (ref 37–54)
EOSINOPHIL # BLD AUTO: 0.05 10*3/MM3 (ref 0–0.4)
EOSINOPHIL NFR BLD AUTO: 0.5 % (ref 0.3–6.2)
ERYTHROCYTE [DISTWIDTH] IN BLOOD BY AUTOMATED COUNT: 13.5 % (ref 12.3–15.4)
GFR SERPL CREATININE-BSD FRML MDRD: 91 ML/MIN/1.73
GLOBULIN UR ELPH-MCNC: 2.8 GM/DL
GLUCOSE BLDC GLUCOMTR-MCNC: 82 MG/DL (ref 70–130)
GLUCOSE BLDC GLUCOMTR-MCNC: 83 MG/DL (ref 70–130)
GLUCOSE BLDC GLUCOMTR-MCNC: 85 MG/DL (ref 70–130)
GLUCOSE BLDC GLUCOMTR-MCNC: 87 MG/DL (ref 70–130)
GLUCOSE SERPL-MCNC: 92 MG/DL (ref 65–99)
HCT VFR BLD AUTO: 33.7 % (ref 34–46.6)
HGB BLD-MCNC: 10.5 G/DL (ref 12–15.9)
IMM GRANULOCYTES # BLD AUTO: 0.06 10*3/MM3 (ref 0–0.05)
IMM GRANULOCYTES NFR BLD AUTO: 0.6 % (ref 0–0.5)
LAB AP CASE REPORT: NORMAL
LAB AP CLINICAL INFORMATION: NORMAL
LYMPHOCYTES # BLD AUTO: 2.82 10*3/MM3 (ref 0.7–3.1)
LYMPHOCYTES NFR BLD AUTO: 27.3 % (ref 19.6–45.3)
MCH RBC QN AUTO: 31.9 PG (ref 26.6–33)
MCHC RBC AUTO-ENTMCNC: 31.2 G/DL (ref 31.5–35.7)
MCV RBC AUTO: 102.4 FL (ref 79–97)
MONOCYTES # BLD AUTO: 0.73 10*3/MM3 (ref 0.1–0.9)
MONOCYTES NFR BLD AUTO: 7.1 % (ref 5–12)
NEUTROPHILS NFR BLD AUTO: 6.65 10*3/MM3 (ref 1.7–7)
NEUTROPHILS NFR BLD AUTO: 64.3 % (ref 42.7–76)
NRBC BLD AUTO-RTO: 0 /100 WBC (ref 0–0.2)
PATH REPORT.FINAL DX SPEC: NORMAL
PATH REPORT.GROSS SPEC: NORMAL
PLATELET # BLD AUTO: 175 10*3/MM3 (ref 140–450)
PMV BLD AUTO: 12.5 FL (ref 6–12)
POTASSIUM SERPL-SCNC: 4.8 MMOL/L (ref 3.5–5.2)
PROT SERPL-MCNC: 6.2 G/DL (ref 6–8.5)
RBC # BLD AUTO: 3.29 10*6/MM3 (ref 3.77–5.28)
SODIUM SERPL-SCNC: 139 MMOL/L (ref 136–145)
WBC # BLD AUTO: 10.33 10*3/MM3 (ref 3.4–10.8)

## 2021-06-17 PROCEDURE — 25810000003 SODIUM CHLORIDE 0.9 % WITH KCL 20 MEQ 20-0.9 MEQ/L-% SOLUTION: Performed by: SURGERY

## 2021-06-17 PROCEDURE — 82962 GLUCOSE BLOOD TEST: CPT

## 2021-06-17 PROCEDURE — 25010000002 ENOXAPARIN PER 10 MG: Performed by: SURGERY

## 2021-06-17 PROCEDURE — 85025 COMPLETE CBC W/AUTO DIFF WBC: CPT | Performed by: SURGERY

## 2021-06-17 PROCEDURE — 25010000002 ONDANSETRON PER 1 MG: Performed by: SURGERY

## 2021-06-17 PROCEDURE — 80053 COMPREHEN METABOLIC PANEL: CPT | Performed by: SURGERY

## 2021-06-17 PROCEDURE — 99024 POSTOP FOLLOW-UP VISIT: CPT | Performed by: SURGERY

## 2021-06-17 RX ADMIN — SIMETHICONE 80 MG: 80 TABLET, CHEWABLE ORAL at 01:41

## 2021-06-17 RX ADMIN — AMISULPRIDE 10 MG: 2.5 INJECTION, SOLUTION INTRAVENOUS at 09:10

## 2021-06-17 RX ADMIN — PANTOPRAZOLE SODIUM 40 MG: 40 TABLET, DELAYED RELEASE ORAL at 06:07

## 2021-06-17 RX ADMIN — CARVEDILOL 25 MG: 12.5 TABLET, FILM COATED ORAL at 20:57

## 2021-06-17 RX ADMIN — POTASSIUM CHLORIDE AND SODIUM CHLORIDE 125 ML/HR: 900; 150 INJECTION, SOLUTION INTRAVENOUS at 01:41

## 2021-06-17 RX ADMIN — SIMETHICONE 80 MG: 80 TABLET, CHEWABLE ORAL at 21:26

## 2021-06-17 RX ADMIN — SIMETHICONE 80 MG: 80 TABLET, CHEWABLE ORAL at 09:17

## 2021-06-17 RX ADMIN — ONDANSETRON 4 MG: 2 INJECTION INTRAMUSCULAR; INTRAVENOUS at 01:41

## 2021-06-17 RX ADMIN — HYDROMORPHONE HYDROCHLORIDE 2 MG: 2 TABLET ORAL at 09:10

## 2021-06-17 RX ADMIN — ENOXAPARIN SODIUM 40 MG: 40 INJECTION SUBCUTANEOUS at 09:10

## 2021-06-17 RX ADMIN — SODIUM CHLORIDE, PRESERVATIVE FREE 3 ML: 5 INJECTION INTRAVENOUS at 09:11

## 2021-06-17 RX ADMIN — CETIRIZINE HYDROCHLORIDE 10 MG: 10 TABLET, FILM COATED ORAL at 09:11

## 2021-06-17 RX ADMIN — GABAPENTIN 100 MG: 100 CAPSULE ORAL at 09:10

## 2021-06-17 RX ADMIN — GABAPENTIN 100 MG: 100 CAPSULE ORAL at 16:08

## 2021-06-17 RX ADMIN — POTASSIUM CHLORIDE AND SODIUM CHLORIDE 125 ML/HR: 900; 150 INJECTION, SOLUTION INTRAVENOUS at 09:17

## 2021-06-17 RX ADMIN — POTASSIUM CHLORIDE AND SODIUM CHLORIDE 125 ML/HR: 900; 150 INJECTION, SOLUTION INTRAVENOUS at 16:08

## 2021-06-17 RX ADMIN — CYCLOBENZAPRINE HYDROCHLORIDE 10 MG: 10 TABLET, FILM COATED ORAL at 09:11

## 2021-06-17 RX ADMIN — ACETAMINOPHEN 1000 MG: 500 TABLET, FILM COATED ORAL at 16:08

## 2021-06-17 RX ADMIN — CARVEDILOL 12.5 MG: 12.5 TABLET, FILM COATED ORAL at 06:07

## 2021-06-17 NOTE — PLAN OF CARE
Goal Outcome Evaluation:      VSS on RA, some c/o pain controlled with prns PO, pt. C/o fatigue/drowsiness, refused to walk bed\sides to bathroom, pt. Has voided, will continue to monitor.

## 2021-06-17 NOTE — PROGRESS NOTES
"Cc: POD#2 robot LSG  \"feel worse today\"    Her mother is in the room.  The patient is sitting up in bed and looks well but says she feels much worse.  She says she is having a lot of pain but the pain medication knocks her out.  She says she has not drank much of her protein shake.  Nurses complained that she is noncompliant with requests for ambulation.  The patient says she has not felt like walking.  She is able to get her spirometer to 1500 today.  No pulmonary complaints.  The patient says she has not wanted to walk in the hallways but is walking to use the bathroom.  She passed a small amount of flatus.  She says she does not want to go home today.  No fever pulse 97 respiration 16 blood pressure 123/82 urine output 2100, 600.  She is in no apparent distress.  Lungs clear to auscultation.  Heart tachycardic.  Abdomen soft, nontender, nondistended, bowel sounds active.  She will not let me remove the wound dressings.  She says they are pressure dressings to hopefully minimize keloid formation.  No surrounding ecchymosis or erythema.  CMP normal except for chloride of 109 albumin 3.40 ALT 93 AST 75.  White count normal at 10.3 with a normal differential.  H&H 10.5 and 33.7    Impression: Postop day #2 robotic sleeve gastrectomy.  Pain not well controlled with current regimen.  Suboptimal oral intake.  Patient wants to stay another day.  Suboptimal incentive spirometry.  Iron deficiency anemia without evidence of bleeding on Lovenox.  Hypoalbuminemia.  Noncompliance with ambulation in the hallways.    Plan: Continue liquids, out of bed, pulmonary toilet, VTE prophylaxis.  Strongly encouraged her on all fronts as apparently so have the nurses.  Recheck H&H in the morning, continue Lovenox for now, especially with suboptimal ambulation.  Check a prealbumin in the morning.  See orders      "

## 2021-06-18 LAB
ALBUMIN SERPL-MCNC: 3 G/DL (ref 3.5–5.2)
ALBUMIN/GLOB SERPL: 1.1 G/DL
ALP SERPL-CCNC: 67 U/L (ref 39–117)
ALT SERPL W P-5'-P-CCNC: 72 U/L (ref 1–33)
ANION GAP SERPL CALCULATED.3IONS-SCNC: 7 MMOL/L (ref 5–15)
AST SERPL-CCNC: 49 U/L (ref 1–32)
BASOPHILS # BLD AUTO: 0.04 10*3/MM3 (ref 0–0.2)
BASOPHILS NFR BLD AUTO: 0.4 % (ref 0–1.5)
BILIRUB SERPL-MCNC: 0.2 MG/DL (ref 0–1.2)
BUN SERPL-MCNC: 9 MG/DL (ref 6–20)
BUN/CREAT SERPL: 11.7 (ref 7–25)
CALCIUM SPEC-SCNC: 9 MG/DL (ref 8.6–10.5)
CHLORIDE SERPL-SCNC: 109 MMOL/L (ref 98–107)
CO2 SERPL-SCNC: 22 MMOL/L (ref 22–29)
CREAT SERPL-MCNC: 0.77 MG/DL (ref 0.57–1)
DEPRECATED RDW RBC AUTO: 51.5 FL (ref 37–54)
EOSINOPHIL # BLD AUTO: 0.19 10*3/MM3 (ref 0–0.4)
EOSINOPHIL NFR BLD AUTO: 1.9 % (ref 0.3–6.2)
ERYTHROCYTE [DISTWIDTH] IN BLOOD BY AUTOMATED COUNT: 13.6 % (ref 12.3–15.4)
GFR SERPL CREATININE-BSD FRML MDRD: 102 ML/MIN/1.73
GLOBULIN UR ELPH-MCNC: 2.8 GM/DL
GLUCOSE BLDC GLUCOMTR-MCNC: 81 MG/DL (ref 70–130)
GLUCOSE BLDC GLUCOMTR-MCNC: 82 MG/DL (ref 70–130)
GLUCOSE BLDC GLUCOMTR-MCNC: 85 MG/DL (ref 70–130)
GLUCOSE BLDC GLUCOMTR-MCNC: 87 MG/DL (ref 70–130)
GLUCOSE SERPL-MCNC: 80 MG/DL (ref 65–99)
HCT VFR BLD AUTO: 32.5 % (ref 34–46.6)
HCT VFR BLD AUTO: 35.6 % (ref 34–46.6)
HGB BLD-MCNC: 10 G/DL (ref 12–15.9)
HGB BLD-MCNC: 10.8 G/DL (ref 12–15.9)
IMM GRANULOCYTES # BLD AUTO: 0.05 10*3/MM3 (ref 0–0.05)
IMM GRANULOCYTES NFR BLD AUTO: 0.5 % (ref 0–0.5)
LYMPHOCYTES # BLD AUTO: 4.33 10*3/MM3 (ref 0.7–3.1)
LYMPHOCYTES NFR BLD AUTO: 43.3 % (ref 19.6–45.3)
MCH RBC QN AUTO: 31.8 PG (ref 26.6–33)
MCHC RBC AUTO-ENTMCNC: 30.8 G/DL (ref 31.5–35.7)
MCV RBC AUTO: 103.5 FL (ref 79–97)
MONOCYTES # BLD AUTO: 0.72 10*3/MM3 (ref 0.1–0.9)
MONOCYTES NFR BLD AUTO: 7.2 % (ref 5–12)
NEUTROPHILS NFR BLD AUTO: 4.68 10*3/MM3 (ref 1.7–7)
NEUTROPHILS NFR BLD AUTO: 46.7 % (ref 42.7–76)
NRBC BLD AUTO-RTO: 0 /100 WBC (ref 0–0.2)
PLATELET # BLD AUTO: 167 10*3/MM3 (ref 140–450)
PMV BLD AUTO: 12 FL (ref 6–12)
POTASSIUM SERPL-SCNC: 4.7 MMOL/L (ref 3.5–5.2)
PREALB SERPL-MCNC: 20.8 MG/DL (ref 20–40)
PROT SERPL-MCNC: 5.8 G/DL (ref 6–8.5)
RBC # BLD AUTO: 3.14 10*6/MM3 (ref 3.77–5.28)
SODIUM SERPL-SCNC: 138 MMOL/L (ref 136–145)
WBC # BLD AUTO: 10.01 10*3/MM3 (ref 3.4–10.8)

## 2021-06-18 PROCEDURE — 85018 HEMOGLOBIN: CPT | Performed by: SURGERY

## 2021-06-18 PROCEDURE — 82962 GLUCOSE BLOOD TEST: CPT

## 2021-06-18 PROCEDURE — 84134 ASSAY OF PREALBUMIN: CPT | Performed by: SURGERY

## 2021-06-18 PROCEDURE — 85014 HEMATOCRIT: CPT | Performed by: SURGERY

## 2021-06-18 PROCEDURE — 99024 POSTOP FOLLOW-UP VISIT: CPT | Performed by: SURGERY

## 2021-06-18 PROCEDURE — 25010000002 ENOXAPARIN PER 10 MG: Performed by: SURGERY

## 2021-06-18 PROCEDURE — 25810000003 SODIUM CHLORIDE 0.9 % WITH KCL 20 MEQ 20-0.9 MEQ/L-% SOLUTION: Performed by: SURGERY

## 2021-06-18 PROCEDURE — 80053 COMPREHEN METABOLIC PANEL: CPT | Performed by: SURGERY

## 2021-06-18 PROCEDURE — 85025 COMPLETE CBC W/AUTO DIFF WBC: CPT | Performed by: SURGERY

## 2021-06-18 RX ADMIN — ENOXAPARIN SODIUM 40 MG: 40 INJECTION SUBCUTANEOUS at 08:59

## 2021-06-18 RX ADMIN — CYCLOBENZAPRINE HYDROCHLORIDE 10 MG: 10 TABLET, FILM COATED ORAL at 21:21

## 2021-06-18 RX ADMIN — CARVEDILOL 25 MG: 12.5 TABLET, FILM COATED ORAL at 21:19

## 2021-06-18 RX ADMIN — SIMETHICONE 80 MG: 80 TABLET, CHEWABLE ORAL at 09:11

## 2021-06-18 RX ADMIN — CARVEDILOL 12.5 MG: 12.5 TABLET, FILM COATED ORAL at 06:03

## 2021-06-18 RX ADMIN — SIMETHICONE 80 MG: 80 TABLET, CHEWABLE ORAL at 21:22

## 2021-06-18 RX ADMIN — POTASSIUM CHLORIDE AND SODIUM CHLORIDE 125 ML/HR: 900; 150 INJECTION, SOLUTION INTRAVENOUS at 12:30

## 2021-06-18 RX ADMIN — HYDROMORPHONE HYDROCHLORIDE 2 MG: 2 TABLET ORAL at 12:14

## 2021-06-18 RX ADMIN — PANTOPRAZOLE SODIUM 40 MG: 40 TABLET, DELAYED RELEASE ORAL at 06:03

## 2021-06-18 NOTE — PROGRESS NOTES
"Bariatric Surgery     LOS: 3 days   Patient Care Team:  Lesli Ramirez PA-C as PCP - General (Physician Assistant)  Albert Malone MD as Consulting Physician (Obstetrics and Gynecology)  Julianna Astudillo MD as Consulting Physician (Cardiology)  Paras Rawls MD as Consulting Physician (Anesthesiology)  Misty Nava, PhD as Psychologist (Psychology)  Ayaka Andre MD as Surgeon (General Surgery)  Adalberto Zayas MD (Pain Medicine)    Chief Complaint:  POD #3    Subjective     Interval History:  Doing better.  She complains of substernal dysphagia and a sensation that she has a \"air bubble\" in her chest whenever she drinks.  She can tolerate about 1 ounce per hour of protein shake.  Denies N/V.  No fevers.  Pain controlled.  Ambulating.  Voiding.  IS 1500.    Objective     Vital Signs  Blood pressure 127/84, pulse 54, temperature 98 °F (36.7 °C), temperature source Oral, resp. rate 16, height 152.4 cm (60\"), weight 102 kg (224 lb), SpO2 96 %, not currently breastfeeding.    Physical Exam:  General: Alert, NAD  Lungs: Clear  Heart: RRR  Abdomen: soft, appropriate, incisions okay  Extremities: warm, (+) SCDs     Results Review:     I reviewed the patient's new clinical results.    Labs:  Lab Results (last 24 hours)     Procedure Component Value Units Date/Time    Hemoglobin & Hematocrit, Blood [[id number]]  (Abnormal) Collected: 06/18/21 1710    Specimen: Blood Updated: 06/18/21 1723     Hemoglobin 10.8 g/dL      Hematocrit 35.6 %     POC Glucose Once [[id number]]  (Normal) Collected: 06/18/21 1617    Specimen: Blood Updated: 06/18/21 1619     Glucose 81 mg/dL     Prealbumin [[id number]]  (Normal) Collected: 06/18/21 0720    Specimen: Blood Updated: 06/18/21 1359     Prealbumin 20.8 mg/dL     POC Glucose Once [[id number]]  (Normal) Collected: 06/18/21 1120    Specimen: Blood Updated: 06/18/21 1125     Glucose 87 mg/dL     Comprehensive Metabolic Panel [[id number]]  (Abnormal) Collected: 06/18/21 " 0720    Specimen: Blood Updated: 06/18/21 0856     Glucose 80 mg/dL      BUN 9 mg/dL      Creatinine 0.77 mg/dL      Sodium 138 mmol/L      Potassium 4.7 mmol/L      Chloride 109 mmol/L      CO2 22.0 mmol/L      Calcium 9.0 mg/dL      Total Protein 5.8 g/dL      Albumin 3.00 g/dL      ALT (SGPT) 72 U/L      AST (SGOT) 49 U/L      Alkaline Phosphatase 67 U/L      Total Bilirubin 0.2 mg/dL      eGFR  African Amer 102 mL/min/1.73      Globulin 2.8 gm/dL      A/G Ratio 1.1 g/dL      BUN/Creatinine Ratio 11.7     Anion Gap 7.0 mmol/L     Narrative:      GFR Normal >60  Chronic Kidney Disease <60  Kidney Failure <15      CBC & Differential [[id number]]  (Abnormal) Collected: 06/18/21 0720    Specimen: Blood Updated: 06/18/21 0833    Narrative:      The following orders were created for panel order CBC & Differential.  Procedure                               Abnormality         Status                     ---------                               -----------         ------                     CBC Auto Differential[[id number]]        Abnormal            Final result                 Please view results for these tests on the individual orders.    CBC Auto Differential [[id number]]  (Abnormal) Collected: 06/18/21 0720    Specimen: Blood Updated: 06/18/21 0833     WBC 10.01 10*3/mm3      RBC 3.14 10*6/mm3      Hemoglobin 10.0 g/dL      Hematocrit 32.5 %      .5 fL      MCH 31.8 pg      MCHC 30.8 g/dL      RDW 13.6 %      RDW-SD 51.5 fl      MPV 12.0 fL      Platelets 167 10*3/mm3      Neutrophil % 46.7 %      Lymphocyte % 43.3 %      Monocyte % 7.2 %      Eosinophil % 1.9 %      Basophil % 0.4 %      Immature Grans % 0.5 %      Neutrophils, Absolute 4.68 10*3/mm3      Lymphocytes, Absolute 4.33 10*3/mm3      Monocytes, Absolute 0.72 10*3/mm3      Eosinophils, Absolute 0.19 10*3/mm3      Basophils, Absolute 0.04 10*3/mm3      Immature Grans, Absolute 0.05 10*3/mm3      nRBC 0.0 /100 WBC     POC Glucose Once [[id number]]   (Normal) Collected: 06/18/21 0759    Specimen: Blood Updated: 06/18/21 0800     Glucose 85 mg/dL           Imaging:  Imaging Results (Last 24 Hours)     ** No results found for the last 24 hours. **            Assessment/Plan     POD # 3 s/p robotic assisted LSG/HHR .    She is slowly improving and feels better today.  Oral intake is very poor, and the best she has done is 1 ounce per hour and has been limited by substernal dysphagia.  I reassured her that this is within the realm of normal after surgery, and is likely due to swelling after the sleeve and hiatal hernia repair.  She is ambulating better and I encouraged her to take another walk in the [place] before bed tonight.  I discussed with her a drinking schedule by which she would increase her oral intake slowly by 1 ounce each hour.      Hopefully ready for discharge tomorrow.    Continue OOB/ PT/ DVT prophx.          Ayaka Andre MD  06/18/21  19:30 EDT

## 2021-06-18 NOTE — CASE MANAGEMENT/SOCIAL WORK
Continued Stay Note  [facility]     Patient Name: Frances Hartman  MRN: [id number]  Today's Date: 6/18/2021    Admit Date: 6/15/2021    Discharge Plan     Row Name 06/18/21 1023       Plan    Plan  Home with family    Plan Comments  Spoke with patient at bedside. Plan is home with mother. Patient denies any discharge needs at this time. CM will continue to follow.        Discharge Codes    No documentation.             Yoseph Vieyra RN

## 2021-06-18 NOTE — PLAN OF CARE
Goal Outcome Evaluation:              Outcome Summary: patient is alert, roomair, uses IS, ambulates and drinks protein with encourament, c/o of pain x 2, given PRN simethicone and 1 dose pain medication, no c.o at this time, sleeping most of shift, edcuated on importance of ambulating, drinking protein and using IS, will cont to monitor

## 2021-06-19 LAB
ALBUMIN SERPL-MCNC: 3.4 G/DL (ref 3.5–5.2)
ALBUMIN/GLOB SERPL: 1.2 G/DL
ALP SERPL-CCNC: 77 U/L (ref 39–117)
ALT SERPL W P-5'-P-CCNC: 70 U/L (ref 1–33)
ANION GAP SERPL CALCULATED.3IONS-SCNC: 11 MMOL/L (ref 5–15)
AST SERPL-CCNC: 48 U/L (ref 1–32)
BASOPHILS # BLD AUTO: 0.04 10*3/MM3 (ref 0–0.2)
BASOPHILS NFR BLD AUTO: 0.4 % (ref 0–1.5)
BILIRUB SERPL-MCNC: 0.3 MG/DL (ref 0–1.2)
BUN SERPL-MCNC: 8 MG/DL (ref 6–20)
BUN/CREAT SERPL: 10.7 (ref 7–25)
CALCIUM SPEC-SCNC: 9.5 MG/DL (ref 8.6–10.5)
CHLORIDE SERPL-SCNC: 107 MMOL/L (ref 98–107)
CO2 SERPL-SCNC: 22 MMOL/L (ref 22–29)
CREAT SERPL-MCNC: 0.75 MG/DL (ref 0.57–1)
DEPRECATED RDW RBC AUTO: 47.9 FL (ref 37–54)
EOSINOPHIL # BLD AUTO: 0.17 10*3/MM3 (ref 0–0.4)
EOSINOPHIL NFR BLD AUTO: 1.8 % (ref 0.3–6.2)
ERYTHROCYTE [DISTWIDTH] IN BLOOD BY AUTOMATED COUNT: 13 % (ref 12.3–15.4)
GFR SERPL CREATININE-BSD FRML MDRD: 105 ML/MIN/1.73
GLOBULIN UR ELPH-MCNC: 2.8 GM/DL
GLUCOSE BLDC GLUCOMTR-MCNC: 82 MG/DL (ref 70–130)
GLUCOSE BLDC GLUCOMTR-MCNC: 83 MG/DL (ref 70–130)
GLUCOSE BLDC GLUCOMTR-MCNC: 91 MG/DL (ref 70–130)
GLUCOSE BLDC GLUCOMTR-MCNC: 91 MG/DL (ref 70–130)
GLUCOSE SERPL-MCNC: 87 MG/DL (ref 65–99)
HCT VFR BLD AUTO: 32.9 % (ref 34–46.6)
HGB BLD-MCNC: 10.4 G/DL (ref 12–15.9)
IMM GRANULOCYTES # BLD AUTO: 0.06 10*3/MM3 (ref 0–0.05)
IMM GRANULOCYTES NFR BLD AUTO: 0.6 % (ref 0–0.5)
LYMPHOCYTES # BLD AUTO: 3.73 10*3/MM3 (ref 0.7–3.1)
LYMPHOCYTES NFR BLD AUTO: 39 % (ref 19.6–45.3)
MCH RBC QN AUTO: 31.7 PG (ref 26.6–33)
MCHC RBC AUTO-ENTMCNC: 31.6 G/DL (ref 31.5–35.7)
MCV RBC AUTO: 100.3 FL (ref 79–97)
MONOCYTES # BLD AUTO: 0.64 10*3/MM3 (ref 0.1–0.9)
MONOCYTES NFR BLD AUTO: 6.7 % (ref 5–12)
NEUTROPHILS NFR BLD AUTO: 4.93 10*3/MM3 (ref 1.7–7)
NEUTROPHILS NFR BLD AUTO: 51.5 % (ref 42.7–76)
NRBC BLD AUTO-RTO: 0.2 /100 WBC (ref 0–0.2)
PLATELET # BLD AUTO: 195 10*3/MM3 (ref 140–450)
PMV BLD AUTO: 12.1 FL (ref 6–12)
POTASSIUM SERPL-SCNC: 4 MMOL/L (ref 3.5–5.2)
PROT SERPL-MCNC: 6.2 G/DL (ref 6–8.5)
RBC # BLD AUTO: 3.28 10*6/MM3 (ref 3.77–5.28)
SODIUM SERPL-SCNC: 140 MMOL/L (ref 136–145)
WBC # BLD AUTO: 9.57 10*3/MM3 (ref 3.4–10.8)

## 2021-06-19 PROCEDURE — 85025 COMPLETE CBC W/AUTO DIFF WBC: CPT | Performed by: SURGERY

## 2021-06-19 PROCEDURE — 99024 POSTOP FOLLOW-UP VISIT: CPT | Performed by: SURGERY

## 2021-06-19 PROCEDURE — 82962 GLUCOSE BLOOD TEST: CPT

## 2021-06-19 PROCEDURE — 80053 COMPREHEN METABOLIC PANEL: CPT | Performed by: SURGERY

## 2021-06-19 PROCEDURE — 25810000003 SODIUM CHLORIDE 0.9 % WITH KCL 20 MEQ 20-0.9 MEQ/L-% SOLUTION: Performed by: SURGERY

## 2021-06-19 PROCEDURE — 25010000002 ENOXAPARIN PER 10 MG: Performed by: SURGERY

## 2021-06-19 RX ADMIN — PANTOPRAZOLE SODIUM 40 MG: 40 TABLET, DELAYED RELEASE ORAL at 06:09

## 2021-06-19 RX ADMIN — CARVEDILOL 25 MG: 12.5 TABLET, FILM COATED ORAL at 20:30

## 2021-06-19 RX ADMIN — ENOXAPARIN SODIUM 40 MG: 40 INJECTION SUBCUTANEOUS at 08:09

## 2021-06-19 RX ADMIN — OXYCODONE 5 MG: 5 TABLET ORAL at 22:41

## 2021-06-19 RX ADMIN — POTASSIUM CHLORIDE AND SODIUM CHLORIDE 125 ML/HR: 900; 150 INJECTION, SOLUTION INTRAVENOUS at 07:21

## 2021-06-19 RX ADMIN — SIMETHICONE 80 MG: 80 TABLET, CHEWABLE ORAL at 06:30

## 2021-06-19 RX ADMIN — SIMETHICONE 80 MG: 80 TABLET, CHEWABLE ORAL at 20:30

## 2021-06-19 RX ADMIN — OXYCODONE 5 MG: 5 TABLET ORAL at 08:09

## 2021-06-19 RX ADMIN — POTASSIUM CHLORIDE AND SODIUM CHLORIDE 125 ML/HR: 900; 150 INJECTION, SOLUTION INTRAVENOUS at 15:26

## 2021-06-19 RX ADMIN — CARVEDILOL 12.5 MG: 12.5 TABLET, FILM COATED ORAL at 06:08

## 2021-06-19 NOTE — PLAN OF CARE
Goal Outcome Evaluation:              Outcome Summary: Pt has rested well this shift between care. Is ambulating to the bathroom when needed. Getting to 1500 on IS. No complaints at this time. Continueing to encourage ambulating. Will continue to monitor. 0430 6/19/2021

## 2021-06-19 NOTE — PLAN OF CARE
"Goal Outcome Evaluation:  Plan of Care Reviewed With: patient           Outcome Summary: Pt c/o of migraine today, oxycodone given, VSS, pt states \" she won't be doing the protien shakes\" as she is lactose intolerant. Pt has been encouraged to walk and every time pt is rounded on pt states she's about to walk, however pt has not walked today so far. Will continue to monitor, continue to encouraged IS and ambulation.  "

## 2021-06-19 NOTE — PROGRESS NOTES
"Cc:  POD#4  Robot LSG  \" I am not ready to go home\"    She is alone in the room.  She says she feels better, less pain, less nausea.  She says she is lactose intolerant and gets bloated and cannot tolerate the Premier shakes.  She has a lot of questions about shakes and says she did not discuss this preoperatively.  She is wondering if boost is okay.  She is passing flatus.  She is ambulating and voiding well.  No pulmonary complaints.  Spirometer 1500.  No fever or tachycardia pulse 65 respirations 18 blood pressure 134/82 urine output 1100.  She is in no apparent distress.  Lungs clear to auscultation.  Heart regular rhythm.  Abdomen soft and benign.  Bowel sounds active.  Wound dressings in place and not removed because of keloid concerns.  No surrounding cellulitis or ecchymosis.  CMP normal except for an albumin of 3.40 ALT 70 AST 48.  White blood count normal at 9.6 with normal differential.  H&H stable at 10.4 and 32.9.    Impression: Postop day #4 robotic sleeve gastrectomy.  Clinically doing okay.  Iron deficiency anemia without evidence of bleeding on Lovenox.  Slow improvement in transaminases likely related to liver retraction during surgery.  Hypoalbuminemia with a normal prealbumin.  Multiple drug allergies, chronic pain syndrome, chronic fatigue, depression.  Prolonged postoperative hospital stay without significant perioperative complications.    Plan: Continue liquids, out of bed, pulmonary toilet, VTE prophylaxis.  No new orders.  "

## 2021-06-20 ENCOUNTER — READMISSION MANAGEMENT (OUTPATIENT)
Dept: CALL CENTER | Facility: HOSPITAL | Age: 36
End: 2021-06-20

## 2021-06-20 VITALS
RESPIRATION RATE: 18 BRPM | SYSTOLIC BLOOD PRESSURE: 109 MMHG | TEMPERATURE: 98.1 F | OXYGEN SATURATION: 96 % | HEIGHT: 60 IN | WEIGHT: 224 LBS | DIASTOLIC BLOOD PRESSURE: 69 MMHG | HEART RATE: 75 BPM | BODY MASS INDEX: 43.98 KG/M2

## 2021-06-20 LAB
ALBUMIN SERPL-MCNC: 3.1 G/DL (ref 3.5–5.2)
ALBUMIN/GLOB SERPL: 1.1 G/DL
ALP SERPL-CCNC: 77 U/L (ref 39–117)
ALT SERPL W P-5'-P-CCNC: 60 U/L (ref 1–33)
ANION GAP SERPL CALCULATED.3IONS-SCNC: 13 MMOL/L (ref 5–15)
AST SERPL-CCNC: 34 U/L (ref 1–32)
BASOPHILS # BLD AUTO: 0.03 10*3/MM3 (ref 0–0.2)
BASOPHILS NFR BLD AUTO: 0.3 % (ref 0–1.5)
BILIRUB SERPL-MCNC: 0.3 MG/DL (ref 0–1.2)
BUN SERPL-MCNC: 7 MG/DL (ref 6–20)
BUN/CREAT SERPL: 10.4 (ref 7–25)
CALCIUM SPEC-SCNC: 9.1 MG/DL (ref 8.6–10.5)
CHLORIDE SERPL-SCNC: 109 MMOL/L (ref 98–107)
CO2 SERPL-SCNC: 19 MMOL/L (ref 22–29)
CREAT SERPL-MCNC: 0.67 MG/DL (ref 0.57–1)
DEPRECATED RDW RBC AUTO: 48.5 FL (ref 37–54)
EOSINOPHIL # BLD AUTO: 0.19 10*3/MM3 (ref 0–0.4)
EOSINOPHIL NFR BLD AUTO: 2 % (ref 0.3–6.2)
ERYTHROCYTE [DISTWIDTH] IN BLOOD BY AUTOMATED COUNT: 13.2 % (ref 12.3–15.4)
GFR SERPL CREATININE-BSD FRML MDRD: 120 ML/MIN/1.73
GLOBULIN UR ELPH-MCNC: 2.9 GM/DL
GLUCOSE BLDC GLUCOMTR-MCNC: 85 MG/DL (ref 70–130)
GLUCOSE BLDC GLUCOMTR-MCNC: 91 MG/DL (ref 70–130)
GLUCOSE SERPL-MCNC: 71 MG/DL (ref 65–99)
HCT VFR BLD AUTO: 34.5 % (ref 34–46.6)
HGB BLD-MCNC: 10.8 G/DL (ref 12–15.9)
IMM GRANULOCYTES # BLD AUTO: 0.05 10*3/MM3 (ref 0–0.05)
IMM GRANULOCYTES NFR BLD AUTO: 0.5 % (ref 0–0.5)
LYMPHOCYTES # BLD AUTO: 2.91 10*3/MM3 (ref 0.7–3.1)
LYMPHOCYTES NFR BLD AUTO: 31 % (ref 19.6–45.3)
MCH RBC QN AUTO: 31.7 PG (ref 26.6–33)
MCHC RBC AUTO-ENTMCNC: 31.3 G/DL (ref 31.5–35.7)
MCV RBC AUTO: 101.2 FL (ref 79–97)
MONOCYTES # BLD AUTO: 0.53 10*3/MM3 (ref 0.1–0.9)
MONOCYTES NFR BLD AUTO: 5.7 % (ref 5–12)
NEUTROPHILS NFR BLD AUTO: 5.67 10*3/MM3 (ref 1.7–7)
NEUTROPHILS NFR BLD AUTO: 60.5 % (ref 42.7–76)
NRBC BLD AUTO-RTO: 0 /100 WBC (ref 0–0.2)
PLATELET # BLD AUTO: 214 10*3/MM3 (ref 140–450)
PMV BLD AUTO: 11.5 FL (ref 6–12)
POTASSIUM SERPL-SCNC: 4.4 MMOL/L (ref 3.5–5.2)
PROT SERPL-MCNC: 6 G/DL (ref 6–8.5)
RBC # BLD AUTO: 3.41 10*6/MM3 (ref 3.77–5.28)
SODIUM SERPL-SCNC: 141 MMOL/L (ref 136–145)
WBC # BLD AUTO: 9.38 10*3/MM3 (ref 3.4–10.8)

## 2021-06-20 PROCEDURE — 85025 COMPLETE CBC W/AUTO DIFF WBC: CPT | Performed by: SURGERY

## 2021-06-20 PROCEDURE — 25010000002 ENOXAPARIN PER 10 MG: Performed by: SURGERY

## 2021-06-20 PROCEDURE — 99024 POSTOP FOLLOW-UP VISIT: CPT | Performed by: SURGERY

## 2021-06-20 PROCEDURE — 82962 GLUCOSE BLOOD TEST: CPT

## 2021-06-20 PROCEDURE — 80053 COMPREHEN METABOLIC PANEL: CPT | Performed by: SURGERY

## 2021-06-20 PROCEDURE — 25810000003 SODIUM CHLORIDE 0.9 % WITH KCL 20 MEQ 20-0.9 MEQ/L-% SOLUTION: Performed by: SURGERY

## 2021-06-20 RX ORDER — OMEPRAZOLE 40 MG/1
40 CAPSULE, DELAYED RELEASE ORAL DAILY
Qty: 60 CAPSULE | Refills: 0 | Status: SHIPPED | OUTPATIENT
Start: 2021-06-20 | End: 2021-06-24 | Stop reason: SDUPTHER

## 2021-06-20 RX ADMIN — CARVEDILOL 12.5 MG: 12.5 TABLET, FILM COATED ORAL at 06:05

## 2021-06-20 RX ADMIN — ENOXAPARIN SODIUM 40 MG: 40 INJECTION SUBCUTANEOUS at 09:12

## 2021-06-20 RX ADMIN — POTASSIUM CHLORIDE AND SODIUM CHLORIDE 125 ML/HR: 900; 150 INJECTION, SOLUTION INTRAVENOUS at 02:33

## 2021-06-20 RX ADMIN — PANTOPRAZOLE SODIUM 40 MG: 40 TABLET, DELAYED RELEASE ORAL at 06:05

## 2021-06-20 RX ADMIN — SIMETHICONE 80 MG: 80 TABLET, CHEWABLE ORAL at 09:48

## 2021-06-20 RX ADMIN — HYDROMORPHONE HYDROCHLORIDE 2 MG: 2 TABLET ORAL at 09:48

## 2021-06-20 RX ADMIN — SODIUM CHLORIDE, PRESERVATIVE FREE 3 ML: 5 INJECTION INTRAVENOUS at 09:13

## 2021-06-20 NOTE — PROGRESS NOTES
"Cc: POD#5 Robot LSG  \"sore right sided incision\"    She is alone in the room with her mother on speaker phone.  She says she is having a lot of pain at the right lateral trocar site incision but does not want me to remove the dressing and look at it.  It hurts more when she walks.  She thinks she is ready to go home.  She is ambulating okay and voiding well.  Passing flatus, no bowel movement.  Tolerating oral intake fairly well.  Spirometer 1750.  No fever or tachycardia pulse 75 respirations 18 blood pressure 109/69 urine output 1800, 900 she is in no apparent distress.  Lungs clear to auscultation.  Heart regular rhythm.  Abdomen soft benign bowel sounds active.  Wounds covered.  No surrounding cellulitis, ecchymosis, induration, warmth, or significant tenderness.  White blood count normal at 9.4 with a normal differential H&H 10.8 and 34.5 which is stable CMP normal except for chloride of 109, CO2 of 19 albumin 3.10 ALT 60 AST 34.    Impression: Postoperative day #5 robotic assisted sleeve gastrectomy.  She continues to meet discharge criteria and would like to go home today.  She says her primary physician gave her Lortab and she does not need any additional narcotic and is seeing him back tomorrow.  She says she has follow-up in the office in 2 days and is to call if her right lower quadrant incisional pain worsens.  Once again she did not want me to look at the incision.    Plan: Discharge home.  Discharge instructions discussed.  Once again discussed postoperative home anticoagulation as she says she does not recall this discussion in the office and once again she declined.  She knows to take her Lasix as needed.  She says it is for blood pressure, not edema and her blood pressure is running low normal.  Discontinue glipizide if sugars are running well but she says she takes her Metformin for PCOS and she can continue this.  Continue to check her sugars at home and adjust if they do not remain under control. "  She says she does not need anything for nausea.  Only prescription omeprazole 40 mg daily #60.  See orders

## 2021-06-20 NOTE — PLAN OF CARE
"  Problem: Adult Inpatient Plan of Care  Goal: Plan of Care Review  6/20/2021 1639 by Julieth Cameron RN  Outcome: Met  Flowsheets (Taken 6/20/2021 1639)  Plan of Care Reviewed With: patient  Outcome Summary: Pt has discharge orders per Dr. Geiger.  Pt is watching bariatric discharge video at this time.  6/20/2021 1607 by Julieth Cameron RN  Outcome: Ongoing, Progressing  Flowsheets (Taken 6/20/2021 1607)  Plan of Care Reviewed With: patient  Outcome Summary: Pt states she is feeling \"alot better\" today. Has ambulated 2x in [address]. Using IS. Pt has not had any protein intake today as of 14:00pm. Staff has encouraged protein intake, educated pt on the importance of protein for healing, and supplied pt with sugar-free pro stat gel as she reports she cannot tolerate the protein drinks. Pt reports RLQ pain, po dilauded given. Pt states she does not want any more pain meds at this time as it make her very sleepy.  Goal: Patient-Specific Goal (Individualized)  6/20/2021 1639 by Julieth Cameron RN  Outcome: Met  6/20/2021 1607 by Julieth Cameron RN  Outcome: Ongoing, Progressing  Goal: Absence of Hospital-Acquired Illness or Injury  6/20/2021 1639 by Julieth Cameron RN  Outcome: Met  6/20/2021 1607 by Julieth Cameron RN  Outcome: Ongoing, Progressing  Intervention: Identify and Manage Fall Risk  Recent Flowsheet Documentation  Taken 6/20/2021 1600 by Julieth Cameron RN  Safety Promotion/Fall Prevention:  • activity supervised  • fall prevention program maintained  • nonskid shoes/slippers when out of bed  • safety round/check completed  Taken 6/20/2021 1400 by Julieth Cameron RN  Safety Promotion/Fall Prevention:  • activity supervised  • fall prevention program maintained  • nonskid shoes/slippers when out of bed  • safety round/check completed  Taken 6/20/2021 1200 by Julieth Cameron RN  Safety Promotion/Fall Prevention:  • activity supervised  • fall prevention program maintained  • nonskid shoes/slippers when out of bed  • " safety round/check completed  Taken 6/20/2021 1000 by Julieth Hernandez RN  Safety Promotion/Fall Prevention:  • activity supervised  • fall prevention program maintained  • nonskid shoes/slippers when out of bed  • safety round/check completed  Taken 6/20/2021 0800 by Julieth Hernandez RN  Safety Promotion/Fall Prevention:  • activity supervised  • fall prevention program maintained  • nonskid shoes/slippers when out of bed  • safety round/check completed  Intervention: Prevent Skin Injury  Recent Flowsheet Documentation  Taken 6/20/2021 1600 by Julieth Hernandez RN  Body Position: sitting up in bed  Taken 6/20/2021 1400 by Julieth Hernandez RN  Body Position: sitting up in bed  Taken 6/20/2021 1200 by Julieth Hernandez RN  Body Position: supine  Taken 6/20/2021 1000 by Julieth Hernandez RN  Body Position: sitting up in bed  Taken 6/20/2021 0800 by Julieth Hernandez RN  Body Position: sitting up in bed  Intervention: Prevent and Manage VTE (venous thromboembolism) Risk  Recent Flowsheet Documentation  Taken 6/20/2021 0800 by Julieth Hernandez RN  VTE Prevention/Management:  • bilateral  • dorsiflexion/plantar flexion performed  • sequential compression devices on  Goal: Optimal Comfort and Wellbeing  6/20/2021 1639 by Julieth Hernandez RN  Outcome: Met  6/20/2021 1607 by Julieth Hernandez RN  Outcome: Ongoing, Progressing  Intervention: Provide Person-Centered Care  Recent Flowsheet Documentation  Taken 6/20/2021 0800 by Julieth Hernandez RN  Trust Relationship/Rapport:  • care explained  • thoughts/feelings acknowledged  Goal: Readiness for Transition of Care  6/20/2021 1639 by Julieth Hernandez RN  Outcome: Met  6/20/2021 1607 by Julieth Hernandez RN  Outcome: Ongoing, Progressing   Goal Outcome Evaluation:  Plan of Care Reviewed With: patient           Outcome Summary: Pt has discharge orders per Dr. Geiger

## 2021-06-20 NOTE — OUTREACH NOTE
Prep Survey      Responses   [facility] facility patient discharged from?  [place]   Is LACE score < 7 ?  No   Emergency Room discharge w/ pulse ox?  No   Eligibility  [facility]   Date of Admission  06/15/21   Date of Discharge  06/20/21   Discharge Disposition  Home or Self Care   Discharge diagnosis  Lap gastric sleeve, Lap hiatal hernia repair   Does the patient have one of the following disease processes/diagnoses(primary or secondary)?  General Surgery   Does the patient have Home health ordered?  No   Is there a DME ordered?  No   Prep survey completed?  Yes          Jordyn Zayas RN

## 2021-06-20 NOTE — PLAN OF CARE
Goal Outcome Evaluation:           Progress: no change  Outcome Summary: Pt has walked in the halls once this shift. Using IS and chewing gum. VSS on room air. No complaints at this time. Will continue to monitor. 0515 6/20/2021

## 2021-06-21 ENCOUNTER — TRANSITIONAL CARE MANAGEMENT TELEPHONE ENCOUNTER (OUTPATIENT)
Dept: CALL CENTER | Facility: HOSPITAL | Age: 36
End: 2021-06-21

## 2021-06-21 NOTE — OUTREACH NOTE
Call Center TCM Note      Responses   [facility] patient discharged from?  [place]   Does the patient have one of the following disease processes/diagnoses(primary or secondary)?  General Surgery   TCM attempt successful?  Yes   Call start time  0914   Call end time  0933   Discharge diagnosis  Lap gastric sleeve, Lap hiatal hernia repair   Meds reviewed with patient/caregiver?  Yes   Is the patient having any side effects they believe may be caused by any medication additions or changes?  No   Does the patient have all medications related to this admission filled (includes all antibiotics, pain medications, etc.)  Yes   Prescription comments  Picking up new RX today    Is the patient taking all medications as directed (includes completed medication regime)?  Yes   Does the patient have a follow up appointment scheduled with their surgeon?  Yes [6-22-21 with Cindy Lopez]   Has the patient kept scheduled appointments due by today?  N/A   Comments  Patient would like to make her own apt dc fu with PCP    Psychosocial issues?  No   Did the patient receive a copy of their discharge instructions?  Yes   Nursing interventions  Reviewed instructions with patient   What is the patient's perception of their health status since discharge?  Improving   Nursing interventions  Nurse provided patient education   Is the patient /caregiver able to teach back basic post-op care?  Continue use of incentive spirometry at least 1 week post discharge, Keep incision areas clean,dry and protected   Is the patient/caregiver able to teach back signs and symptoms of incisional infection?  Increased drainage or bleeding, Incisional warmth   Is the patient/caregiver able to teach back steps to recovery at home?  Set small, achievable goals for return to baseline health, Rest and rebuild strength, gradually increase activity   If the patient is a current smoker, are they able to teach back resources for cessation?  Not a smoker   Is  "the patient/caregiver able to teach back the hierarchy of who to call/visit for symptoms/problems? PCP, Specialist, Home health nurse, Urgent Care, ED, 911  Yes   TCM call completed?  Yes   Wrap up additional comments  \"Everybody was amazing except the nurse that discharged me could have helped me gather up belongings before she left\"           Maribel Unger RN    6/21/2021, 09:33 EDT      "

## 2021-06-22 ENCOUNTER — TELEPHONE (OUTPATIENT)
Dept: BARIATRICS/WEIGHT MGMT | Facility: CLINIC | Age: 36
End: 2021-06-22

## 2021-06-22 ENCOUNTER — OFFICE VISIT (OUTPATIENT)
Dept: BARIATRICS/WEIGHT MGMT | Facility: CLINIC | Age: 36
End: 2021-06-22

## 2021-06-22 VITALS
TEMPERATURE: 97.3 F | HEIGHT: 60 IN | DIASTOLIC BLOOD PRESSURE: 86 MMHG | WEIGHT: 232.5 LBS | RESPIRATION RATE: 18 BRPM | HEART RATE: 76 BPM | OXYGEN SATURATION: 99 % | BODY MASS INDEX: 45.65 KG/M2 | SYSTOLIC BLOOD PRESSURE: 132 MMHG

## 2021-06-22 DIAGNOSIS — E66.01 OBESITY, CLASS III, BMI 40-49.9 (MORBID OBESITY) (HCC): ICD-10-CM

## 2021-06-22 DIAGNOSIS — Z98.84 STATUS POST BARIATRIC SURGERY: Primary | ICD-10-CM

## 2021-06-22 PROCEDURE — 99024 POSTOP FOLLOW-UP VISIT: CPT | Performed by: PHYSICIAN ASSISTANT

## 2021-06-22 NOTE — TELEPHONE ENCOUNTER
Please call patient and let her know I talked with Dr. Andre and she recommends heating pad, Tylenol, compression clothing, home Norco and Tylenol as discussed at her office visit.No further narcotic prescriptions at this time.

## 2021-06-22 NOTE — PROGRESS NOTES
[facility] Bariatric Surgery  [address]    [facility] [id number] [phone]      Patient Name:  Frances Hartman.  :  1985      Reason for Visit:  POD #7    HPI:  Frances Hartman is a 35 y.o. female s/p robotic LSG by  on 6/15/21    POD#1 with metabolic acidosis and hyponatremia, d/c POD#5 with poor PO intake    Doing fine.  Not getting adequate protein bc she doesn't tolerate premier and was told not to use boost but that is all she has. Drinking 2 boost a day without issue. Has pressure dressings on incisions sites to hopefully minimize keloids.  Has abd pain at R trochar site mostly, denies known redness/ drainage although hasn't taking the dressing off. Has home norco and doesn't think this is strong enough.BS are controlled off DM medication. Says she takes lasix for LE edema and has noted some swelling, doesn't think she can stay off this.  Denies dysphagia, reflux, nausea, vomiting, pulmonary issues and fevers.  Tolerating diet progression - on stage 1.  IS to 2000. Getting 40g prot/day. Doing 8oz bottle in a couple hours.   Drinking  Close to 64 fluid oz/day.  Has not started vitamins.  Hasn't started omeprazole.   Holding ASA , NSAIDs , Tramadol, Hormones, Diuretics , Steroids and Immunologics and tobacco use/ exposure.  Ambulating.     Presurgery weight: 231 pounds.  Today's weight is 105 kg (232 lb 8 oz) pounds, today's  Body mass index is 45.41 kg/m²., and@ gained 1lb since surgery.     Past Medical History:   Diagnosis Date   • Anxiety    • Asthma     prn inhalers, does not follow w/ pulmonary   • Chronic back pain     previously followed w/ pain management, now just prn Tylenol + Gabapentin   • Diabetes mellitus (CMS/HCC)     Type II, dx , never on insulin, A1C 7.6   • Dyspepsia    • Dyspnea on exertion    • Fatigue    • Gastroparesis    • Heartburn     chronic, prn TUMS, denies prior eval   • Hirsutism    • Hx of radiation therapy      for treatments of Keloids   • Hypertension    • Irregular menses     infrequent spotting   • Leiomyoma, subserous     possible peduculated f3-4 cm fibroid on u/s at [facility]   • Migraines     botox q3 months, following Neurology @ [facility]   • Morbid obesity (CMS/HCC)    • Peripheral edema    • Polycystic ovaries     severe insulin resistance/hirsuitism   • Seasonal allergies    • Slow to wake up after anesthesia      Past Surgical History:   Procedure Laterality Date   • ENDOSCOPY N/A 6/15/2021    Procedure: ESOPHAGOGASTRODUODENOSCOPY;  Surgeon: Ayaka Andre MD;  Location:  [facility];  Service: Robotics - DaVinci;  Laterality: N/A;   • GASTRIC SLEEVE LAPAROSCOPIC N/A 6/15/2021    Procedure: GASTRIC SLEEVE LAPAROSCOPIC WITH DAVINCI ROBOT, LAPROSCOPIC HIATAL HERNIA REPAIR WITH DAVINCI ROBOT;  Surgeon: Ayaka Andre MD;  Location:  [facility];  Service: Robotics - DaVinci;  Laterality: N/A;   • KELOID EXCISION      1990,1993,1999,2015,2016,2019   • LAPAROSCOPIC CHOLECYSTECTOMY  2000    for stones   • RADIOFREQUENCY ABLATION  2019    x 2  for pt back   • UMBILICAL HERNIA REPAIR  1990   • WISDOM TOOTH EXTRACTION  1994     Outpatient Medications Marked as Taking for the 6/22/21 encounter (Office Visit) with Cindy Lopez PA-C   Medication Sig Dispense Refill   • Acetaminophen (TYLENOL ARTHRITIS PAIN PO) Take  by mouth As Needed.     • acetaminophen (TYLENOL) 500 MG tablet Take 500-1,000 mg by mouth Every 6 (Six) Hours As Needed for Mild Pain .     • albuterol (PROVENTIL HFA;VENTOLIN HFA) 108 (90 BASE) MCG/ACT inhaler Inhale 2 puffs Every 4 (Four) Hours As Needed for wheezing.     • carvedilol (COREG) 12.5 MG tablet TAKE ONE TABLET BY MOUTH EVERY EVENING (Patient taking differently: 12.5 mg Every Morning.) 90 tablet 3   • carvedilol (COREG) 25 MG tablet Take 25 mg by mouth Every Evening.     • cyclobenzaprine (FLEXERIL) 10 MG tablet Take 1 tablet by mouth 3 (Three) Times a Day As Needed for Muscle Spasms. 30  tablet 0   • diphenhydrAMINE (BENADRYL) 25 mg capsule Take 25 mg by mouth Every 6 (Six) Hours As Needed for itching.     • docusate sodium (COLACE) 100 MG capsule Take 100-200 mg by mouth Daily.     • glucose blood test strip Use as instructed 100 each 11   • HYDROcodone-acetaminophen (NORCO) 5-325 MG per tablet Take 1-2 tablets by mouth Every 6 (Six) Hours As Needed for Severe Pain . (Patient taking differently: Take 1-2 tablets by mouth 2 (Two) Times a Day As Needed for Severe Pain .) 15 tablet 0   • KETOTIFEN FUMARATE OP Apply  to eye(s) as directed by provider Daily As Needed.     • levonorgestrel (Mirena, 52 MG,) 20 MCG/24HR IUD      • loratadine (CLARITIN) 10 MG tablet Take 1 tablet by mouth Daily. 90 tablet 3   • omeprazole (priLOSEC) 40 MG capsule Take 1 capsule by mouth Daily for 60 days. 60 capsule 0   • OnabotulinumtoxinA (Botox) 200 units reconstituted solution FOR . PHYSICIAN TO INJECT UP  UNITS INTRAMUSCULARLY INTO HEAD, NECK AND SHOULDERS EVERY 90 DAYS. 1 each 1   • TRUEplus Lancets 33G misc 1 each Daily As Needed (testing). 100 each 0   • TRUEplus Lancets 33G misc        Current Facility-Administered Medications for the 6/22/21 encounter (Office Visit) with Cindy Lopez PA-C   Medication Dose Route Frequency Provider Last Rate Last Admin   • OnabotulinumtoxinA 200 Units  200 Units Intramuscular Q3 Months Shiela Coelho, TANESHA   200 Units at 06/11/21 1616     Allergies   Allergen Reactions   • Hydrochlorothiazide Swelling   • Monosodium Glutamate Swelling and Headache   • Oatmeal Other (See Comments)     Dx on allergy testing   • Augmentin [Amoxicillin-Pot Clavulanate] Other (See Comments)     Syncope, not sure if allergic to cephalosporins.   • Amitriptyline Mental Status Change     memory gaps + neuropathy + hair loss   • Aspartame Nausea Only   • Codeine Headache      Can tolerate hydrocodone, no other adverse reactions to other narcotics.   • Diclofenac  "Headache   • Doxycycline Rash and Hallucinations   • Eggs Or Egg-Derived Products Other (See Comments)     dx on allergy testing, but does not completely avoid   • Ibuprofen Other (See Comments)     \"makes me retain fluid and eventually go into CHF\"   • Naproxen Other (See Comments)     \"blackout\"       Social History     Socioeconomic History   • Marital status: Single     Spouse name: Not on file   • Number of children: Not on file   • Years of education: Not on file   • Highest education level: Not on file   Tobacco Use   • Smoking status: Never Smoker   • Smokeless tobacco: Never Used   Substance and Sexual Activity   • Alcohol use: Not Currently     Comment: special occasion only   • Drug use: No   • Sexual activity: Defer     Partners: Male     Birth control/protection: Condom       /86   Pulse 76   Temp 97.3 °F (36.3 °C)   Resp 18   Ht 152.4 cm (60\")   Wt 105 kg (232 lb 8 oz)   SpO2 99%   BMI 45.41 kg/m²   Physical Exam  Constitutional:       Appearance: She is well-developed. She is obese.   HENT:      Head: Normocephalic and atraumatic.   Cardiovascular:      Rate and Rhythm: Normal rate and regular rhythm.   Pulmonary:      Effort: Pulmonary effort is normal.      Breath sounds: Normal breath sounds.   Abdominal:      General: Bowel sounds are normal.      Palpations: Abdomen is soft.      Comments: Incisions healing well   Skin:     General: Skin is warm and dry.   Neurological:      Mental Status: She is alert.   Psychiatric:         Mood and Affect: Mood normal.         Behavior: Behavior normal.         Thought Content: Thought content normal.         Judgment: Judgment normal.           Assessment:   POD #7  s/p robotic LSG by  on 6/15/21    ICD-10-CM ICD-9-CM   1. Status post bariatric surgery  Z98.84 V45.86   2. Obesity, Class III, BMI 40-49.9 (morbid obesity) (CMS/[facility])  E66.01 278.01           Plan:  Doing well. Advised home norco/ tylenol, heating pad, compression, offered " trigger point injection. Surgical sites dressed with pressure dressing.  F/u with PCP re: JOLLY BS.  Use lasix only prn LE edema.  START OMEPRAZOLE. Continue to advance diet per manual.  Increase protein intake to 100g/day.  Increase exercise/activity as tolerated.  Reviewed lifting restrictions, nothing >25 lbs x 2 more weeks.  Start vitamins.    Continue to avoid ASA/NSAIDs/tramadol/tobacco x 6 weeks postop, steroids x 8 weeks postop.  Call w/ problems/concerns.    Patient's Body mass index is 45.41 kg/m². indicating that she is morbidly obese (BMI > 40 or > 35 with obesity - related health condition). Obesity-related health conditions include the following: as above. Obesity is improving on treatment. BMI is is above average; BMI management plan is completed. We discussed low calorie, low carb based diet program, portion control and consulting a Bariatric surgeon..        The patient was instructed to follow up in 3 weeks, sooner if needed.

## 2021-06-22 NOTE — TELEPHONE ENCOUNTER
Notified pt that you have talked with Dr. Andre and she recommends heating pad, Tylenol, compression clothing, home Norco and Tylenol as discussed at her office visit. I let pt know that no further narcotic prescriptions at this time.  Pt verbalized understanding.

## 2021-06-24 ENCOUNTER — OFFICE VISIT (OUTPATIENT)
Dept: PSYCHIATRY | Facility: CLINIC | Age: 36
End: 2021-06-24

## 2021-06-24 ENCOUNTER — OFFICE VISIT (OUTPATIENT)
Dept: FAMILY MEDICINE CLINIC | Facility: CLINIC | Age: 36
End: 2021-06-24

## 2021-06-24 VITALS
HEART RATE: 77 BPM | OXYGEN SATURATION: 98 % | WEIGHT: 229 LBS | DIASTOLIC BLOOD PRESSURE: 77 MMHG | HEIGHT: 60 IN | SYSTOLIC BLOOD PRESSURE: 124 MMHG | BODY MASS INDEX: 44.96 KG/M2

## 2021-06-24 DIAGNOSIS — I10 ESSENTIAL HYPERTENSION: ICD-10-CM

## 2021-06-24 DIAGNOSIS — Z98.890 S/P GASTRIC SURGERY: ICD-10-CM

## 2021-06-24 DIAGNOSIS — F33.1 MODERATE EPISODE OF RECURRENT MAJOR DEPRESSIVE DISORDER (HCC): ICD-10-CM

## 2021-06-24 DIAGNOSIS — E66.01 OBESITY, CLASS III, BMI 40-49.9 (MORBID OBESITY) (HCC): ICD-10-CM

## 2021-06-24 DIAGNOSIS — E66.01 MORBID OBESITY (HCC): Primary | ICD-10-CM

## 2021-06-24 DIAGNOSIS — R45.4 ANGER: ICD-10-CM

## 2021-06-24 DIAGNOSIS — E11.65 TYPE 2 DIABETES MELLITUS WITH HYPERGLYCEMIA, WITHOUT LONG-TERM CURRENT USE OF INSULIN (HCC): ICD-10-CM

## 2021-06-24 DIAGNOSIS — Z09 HOSPITAL DISCHARGE FOLLOW-UP: Primary | ICD-10-CM

## 2021-06-24 PROCEDURE — 99214 OFFICE O/P EST MOD 30 MIN: CPT | Performed by: PHYSICIAN ASSISTANT

## 2021-06-24 PROCEDURE — 90834 PSYTX W PT 45 MINUTES: CPT | Performed by: PSYCHOLOGIST

## 2021-06-24 RX ORDER — OMEPRAZOLE 40 MG/1
40 CAPSULE, DELAYED RELEASE ORAL DAILY
Qty: 60 CAPSULE | Refills: 0 | Status: SHIPPED | OUTPATIENT
Start: 2021-06-24 | End: 2021-08-23

## 2021-06-24 RX ORDER — CARVEDILOL 12.5 MG/1
12.5 TABLET ORAL 2 TIMES DAILY WITH MEALS
Qty: 180 TABLET | Refills: 1 | Status: SHIPPED | OUTPATIENT
Start: 2021-06-24 | End: 2021-09-13 | Stop reason: SDUPTHER

## 2021-06-24 RX ORDER — GLUCOSAM/CHON-MSM1/C/MANG/BOSW 500-416.6
1 TABLET ORAL 2 TIMES DAILY PRN
Qty: 100 EACH | Refills: 1 | Status: SHIPPED | OUTPATIENT
Start: 2021-06-24 | End: 2021-08-12

## 2021-06-24 RX ORDER — OMEPRAZOLE 40 MG/1
40 CAPSULE, DELAYED RELEASE ORAL DAILY
Qty: 60 CAPSULE | Refills: 0 | Status: SHIPPED | OUTPATIENT
Start: 2021-06-24 | End: 2021-06-24 | Stop reason: SDUPTHER

## 2021-06-24 NOTE — PROGRESS NOTES
Transitional Care Follow Up Visit  Subjective     Frances Hartman is a 35 y.o. female who presents for a transitional care management visit.    Within 48 business hours after discharge our office contacted her via telephone to coordinate her care and needs.      I reviewed and discussed the details of that call along with the discharge summary, hospital problems, inpatient lab results, inpatient diagnostic studies, and consultation reports with Frances.     Current outpatient and discharge medications have been reconciled for the patient.  Reviewed by: Lesli Ramirez PA-C      Date of TCM Phone Call 6/20/2021   [facility]   Date of Admission 6/15/2021   Date of Discharge 6/20/2021   Discharge Disposition Home or Self Care     Risk for Readmission (LACE) Score: 11 (6/20/2021  6:01 AM)    Current outpatient and discharge medications have been reconciled for the patient.  Reviewed by: Lesli Ramirez PA-C    Patient presents for routine hospital follow-up after elective robotic-assisted laparoscopic sleeve gastrectomy and hiatal hernia. .  Patient was hospitalized at [facility], operated by [facility] from 6/15 to 6/20.  Patient had difficulty with protein shake after surgery.  She reports generalized abdominal pain worse in the right upper quadrant.  She is tolerating food now, not much.  Hydrating with water.  Mild nausea.  No vomiting.  Was seen 1 week after surgery for routine surgical follow-up, reports her incisional wounds are healing well.  She has been holding her diabetes medications.  Reports fasting glucose in the 120s.  Blood pressure is stable and well-controlled today.       Course During Hospital Stay:  6/15 to 6/20     The following portions of the patient's history were reviewed and updated as appropriate: allergies, current medications, past family history, past medical history, past social history, past surgical history and problem list.    Review of Systems   Constitutional: Negative for chills,  fatigue and fever.   Respiratory: Negative for cough, shortness of breath and wheezing.    Cardiovascular: Negative for chest pain and leg swelling.   Gastrointestinal: Positive for abdominal pain and nausea. Negative for vomiting.   Genitourinary: Negative for dysuria.   Musculoskeletal: Positive for arthralgias and back pain.   Neurological: Positive for light-headedness. Negative for dizziness and headaches.   Psychiatric/Behavioral: Positive for dysphoric mood and sleep disturbance.       Objective   Physical Exam  Vitals reviewed.   Constitutional:       General: She is not in acute distress.     Appearance: Normal appearance. She is well-developed. She is obese. She is not ill-appearing or diaphoretic.   HENT:      Head: Normocephalic and atraumatic.   Eyes:      Extraocular Movements: Extraocular movements intact.      Conjunctiva/sclera: Conjunctivae normal.   Cardiovascular:      Rate and Rhythm: Normal rate and regular rhythm.      Heart sounds: Normal heart sounds.   Pulmonary:      Effort: Pulmonary effort is normal.      Breath sounds: Normal breath sounds.   Musculoskeletal:         General: Normal range of motion.      Cervical back: Normal range of motion.      Right lower leg: No edema.      Left lower leg: No edema.   Skin:     General: Skin is warm.      Findings: No erythema or rash.   Neurological:      General: No focal deficit present.      Mental Status: She is alert.   Psychiatric:         Attention and Perception: Attention and perception normal. She is attentive.         Mood and Affect: Mood and affect normal.         Speech: Speech normal.         Behavior: Behavior normal. Behavior is cooperative.         Thought Content: Thought content normal.         Cognition and Memory: Cognition and memory normal.         Judgment: Judgment normal.         Assessment/Plan   Problems Addressed this Visit        Cardiac and Vasculature    Hypertension    Relevant Medications    carvedilol (COREG)  12.5 MG tablet       Endocrine and Metabolic    Type 2 diabetes mellitus (CMS/[facility])    Relevant Medications    TRUEplus Lancets 33G misc    Obesity, Class III, BMI 40-49.9 (morbid obesity) (CMS/[facility])      Other Visit Diagnoses     Hospital discharge follow-up    -  Primary    S/P gastric surgery          Diagnoses       Codes Comments    Hospital discharge follow-up    -  Primary ICD-10-CM: Z09  ICD-9-CM: V67.59     Obesity, Class III, BMI 40-49.9 (morbid obesity) (CMS/[facility])     ICD-10-CM: E66.01  ICD-9-CM: 278.01     S/P gastric surgery     ICD-10-CM: Z98.890  ICD-9-CM: V45.89     Essential hypertension      Stable, well-controlled  Stay on carvedilol 12.5 mg BID.   ICD-10-CM: I10  ICD-9-CM: 401.9     Type 2 diabetes mellitus with hyperglycemia, without long-term current use of insulin (CMS/[facility])      Restart metformin and monitor fasting glucose levels ICD-10-CM: E11.65  ICD-9-CM: 250.00, 790.29

## 2021-06-25 NOTE — PROGRESS NOTES
PROGRESS NOTE    Data:  Frances Hartman is a 35 y.o. female who met with the undersigned for a scheduled individual outpatient therapy session from 1:00 - 1:45pm.      Clinical Maneuvering/Intervention:      The pt talked about recent stressors, mainly related to her health: weight loss, weight loss surgery, feeling ill. She talked in great detail about recent events with surgery, how stressful it was, and that she is starting to regret having it. Stressors were processed individually and in detail. Venting of frustrations was conducted in order to help the pt feel less tense emotionally and gain insight into issues. Feelings were processed and validated several times in session. Venting was conducted for a good part of the session before she was calmer and interventions could be conducted. Perspective taking was conducted multiple times in order to help her feel less stuck, less overwhelmed, and see challenges as much more manageable. She was assisted with seeing how she is a very independent person and has always felt this way, but even though this is strength at times, it can be a challenged in other smith. Even though she was regretting getting the surgery, after maladaptive thought patterns were addressed, she did talk about how she plans to be successful with having had the weight loss surgery. She was assisted with seeing that all in all, she is still 'on track' to do what she wants and is important to her in life: improve her diet, lose weight, and improve her health over all. Most of the session was conducted emotional crisis intervention, but the pt calmed towards the end of session. A tentative plan/direction was discussed towards the end of session: healthy small meals as advised to her, while pacing herself/being patient with herself to adjust to her post-surgery status and lose weight. Some humor was used in session as it is one of the pt's coping skills. Humor was used too, to help the pt see  things as less challenging and more manageable than they first seemed. Humor was used as well, to model use of the skill as a way to decrease tension ongoing. The pt expressed gratitude for today's session.    Mental Status Exam  Hygiene:  good  Dress: normal  Attitude:  cooperative and proactive  Motor Activity: normal  Speech: normal  Mood:  tense and angry  Affect:  congruent  Thought Processes: normal  Thought Content:  normal  Suicidal Thoughts:  not endorsed  Homicidal Thoughts:  not endorsed  Crisis Safety Plan: not needed   Hallucinations:  none      Patient's Support Network Includes:  family, friends      Progress toward goal: there is evidence to suggest that she is battling depression and undergoing medical treatment/health problems have been very upsetting for her lately.      Functional Status: moderate to high      Prognosis: good    Assessment      The pt presented to be struggling with depression and feeling upset/angry about her experiences with weight loss and undergoing weight loss surgery. She tended to benefit from emotional crisis intervention today as she was calmer towards the end of session and clearer about what she will do to meet personal goals and promote her mental health.      Plan      In order to diminish symptoms of depression and anger, she will continue processing important things with a close friend (ongoing) and continue with counseling (ongoing). She will 'stay the course,' in terms of healthy diet and exercise in order to ensure success with her weight loss surgery (ongoing).    Misty Nava, PhD, LP

## 2021-06-26 NOTE — DISCHARGE SUMMARY
Discharge Summary    Patient name: Frances Hartman    Medical record number: [id number]    Admission date: 6/15/2021  Discharge date:  6/20/2021    Attending physician: Ayaka Andre MD    Primary care physician: Lesli Ramirez PA-C    Condition on discharge: Stable    Primary Diagnoses:  Morbid obesity with co-morbidities    Operative Procedure:  6/15/21 robotic assisted laparoscopic sleeve gastrectomy and hiatal hernia repair    Hospital Course: The patient is a very pleasant 35 y.o. female that was admitted to the hospital after elective robotic-assisted laparoscopic sleeve gastrectomy and hiatal hernia.  On POD#1, UGI was without obstruction or leak.  She was slow to mobilize with ambulation and increase protein intake due to dislike of the Premier protein, but by POD#5, was ambulating well, tolerating stage 1 diet, and felt ready to be discharged.  Please see daily progress notes for full details of hospital stay.  Frances Hartman was discharged home in good condition with instructions to follow up in the office in 1 week.      Discharge medications:      Discharge Medications      Changes to Medications      Instructions Start Date   HYDROcodone-acetaminophen 5-325 MG per tablet  Commonly known as: NORCO  What changed: when to take this   1-2 tablets, Oral, Every 6 Hours PRN      spironolactone 100 MG tablet  Commonly known as: ALDACTONE  What changed: additional instructions   100 mg, Oral, Every Other Day, Takes 30 days on,  off 30 days.         Continue These Medications      Instructions Start Date   albuterol sulfate  (90 Base) MCG/ACT inhaler  Commonly known as: PROVENTIL HFA;VENTOLIN HFA;PROAIR HFA   2 puffs, Inhalation, Every 4 Hours PRN      Antacid Flavor Chews 750 MG chewable tablet  Generic drug: calcium carbonate EX   As Needed      ascorbic acid 500 MG tablet  Commonly known as: VITAMIN C   500 mg, Oral, Daily      Botox 200 units reconstituted solution  Generic  drug: OnabotulinumtoxinA   FOR . PHYSICIAN TO INJECT UP  UNITS INTRAMUSCULARLY INTO HEAD, NECK AND SHOULDERS EVERY 90 DAYS.      CALCIUM CITRATE + D PO   2 tablets, Oral, Daily      Calcium Citrate + tablet   1,200 mg, Oral, Daily      cyclobenzaprine 10 MG tablet  Commonly known as: FLEXERIL   10 mg, Oral, 3 Times Daily PRN      diphenhydrAMINE 25 mg capsule  Commonly known as: BENADRYL   25 mg, Oral, Every 6 Hours PRN      docusate sodium 100 MG capsule  Commonly known as: COLACE   100-200 mg, Oral, Daily      ferrous sulfate 325 (65 Fe) MG tablet   325 mg, Oral, Daily      fluconazole 200 MG tablet  Commonly known as: DIFLUCAN   200 mg, Oral, 2 Times Daily      gabapentin 300 MG capsule  Commonly known as: NEURONTIN   300 mg, Oral, Daily PRN      glucose blood test strip   Use as instructed      guaiFENesin 400 MG tablet  Commonly known as: HUMIBID 3   200 mg, Oral, As Needed      KETOTIFEN FUMARATE OP   Ophthalmic, Daily PRN      loratadine 10 MG tablet  Commonly known as: CLARITIN   10 mg, Oral, Daily      melatonin 1 MG tablet   5 mg, Oral, As Needed      metFORMIN  MG 24 hr tablet  Commonly known as: GLUCOPHAGE-XR   500 mg, Oral, Daily With Dinner      Mirena (52 MG) 20 MCG/24HR IUD  Generic drug: levonorgestrel   No dose, route, or frequency recorded.      Multi Vitamin Daily tablet tablet   1 tablet, Daily      simethicone 125 MG chewable tablet  Commonly known as: MYLICON   125 mg, Oral, Every 6 Hours PRN      TYLENOL ARTHRITIS PAIN PO   Oral, As Needed      acetaminophen 500 MG tablet  Commonly known as: TYLENOL   500-1,000 mg, Oral, Every 6 Hours PRN      vitamin B-12 100 MCG tablet  Commonly known as: CYANOCOBALAMIN   50 mcg, Oral, Daily      Vitamin B-12 1000 MCG sublingual tablet   1 tablet, Sublingual, Daily         Stop These Medications    furosemide 20 MG tablet  Commonly known as: LASIX     glipizide 10 MG tablet  Commonly known as: GLUCOTROL     traMADol 50 MG  tablet  Commonly known as: ULTRAM     TRUEplus Lancets 33G misc            Discharge instructions:  Per Bariatric manual      Follow-up appointment: Follow up with Bariatric PA in the office in 1 week.

## 2021-06-29 ENCOUNTER — READMISSION MANAGEMENT (OUTPATIENT)
Dept: CALL CENTER | Facility: HOSPITAL | Age: 36
End: 2021-06-29

## 2021-06-29 NOTE — OUTREACH NOTE
General Surgery Week 2 Survey      Responses   [facility] patient discharged from?  [facility]   Does the patient have one of the following disease processes/diagnoses(primary or secondary)?  General Surgery   Week 2 attempt successful?  Yes   Call start time  1030   Call end time  1052   Discharge diagnosis  Lap gastric sleeve, Lap hiatal hernia repair   Meds reviewed with patient/caregiver?  Yes   Is the patient having any side effects they believe may be caused by any medication additions or changes?  No   Does the patient have all medications related to this admission filled (includes all antibiotics, pain medications, etc.)  Yes   Is the patient taking all medications as directed (includes completed medication regime)?  Yes   Does the patient have a follow up appointment scheduled with their surgeon?  Yes   Has the patient kept scheduled appointments due by today?  Yes   What is the Home health agency?   HH agency   Has home health visited the patient within 72 hours of discharge?  Yes   Home health comments  HH visits have started   Psychosocial issues?  No   Did the patient receive a copy of their discharge instructions?  Yes   Nursing interventions  Reviewed instructions with patient   What is the patient's perception of their health status since discharge?  Improving   Nursing interventions  Nurse provided patient education   Is the patient /caregiver able to teach back basic post-op care?  Continue use of incentive spirometry at least 1 week post discharge, Practice 'cough and deep breath', Drive as instructed by MD in discharge instructions, Take showers only when approved by MD-sponge bathe until then, No tub bath, swimming, or hot tub until instructed by MD, Keep incision areas clean,dry and protected, Do not remove steri-strips, Lifting as instructed by MD in discharge instructions   Is the patient/caregiver able to teach back signs and symptoms of incisional infection?  Increased redness,  swelling or pain at the incisonal site, Incisional warmth, Fever, Increased drainage or bleeding, Pus or odor from incision   Is the patient/caregiver able to teach back steps to recovery at home?  Set small, achievable goals for return to baseline health, Rest and rebuild strength, gradually increase activity, Eat a well-balance diet   Is the patient/caregiver able to teach back the hierarchy of who to call/visit for symptoms/problems? PCP, Specialist, Home health nurse, Urgent Care, ED, 911  Yes   Additional teach back comments  states intake has been difficult, gets full quickly, also states most of foods listed, pt is allergic to, pt seeking out dietician to find alternate food choices   Week 2 call completed?  Yes          Jacqueline Jacobson RN

## 2021-07-02 ENCOUNTER — TELEPHONE (OUTPATIENT)
Dept: BARIATRICS/WEIGHT MGMT | Facility: CLINIC | Age: 36
End: 2021-07-02

## 2021-07-02 NOTE — TELEPHONE ENCOUNTER
Patient called, she states she is having a very hard time eating.  She states she feels like there is an air bubble in her throat and makes it hard to eat.  She states she is allergic to almost everything on stage 2, she has only been drinking juice.  Would you have any suggestions for her to be able to move forward with the stages and without so much pain and nausea?

## 2021-07-02 NOTE — TELEPHONE ENCOUNTER
If she cannot tolerate the food, then just do liquids.       Protein shakes   Collagen protein in broth or soup   Greek yogurt     I'm not sure which foods don't agree with her, but she needs to focus on foods that are liquid or semi liquid at this stage.           Patient notified and verbalized understanding.

## 2021-07-06 ENCOUNTER — READMISSION MANAGEMENT (OUTPATIENT)
Dept: CALL CENTER | Facility: HOSPITAL | Age: 36
End: 2021-07-06

## 2021-07-06 NOTE — OUTREACH NOTE
General Surgery Week 3 Survey      Responses   [facility] patient discharged from?  [place]   Does the patient have one of the following disease processes/diagnoses(primary or secondary)?  General Surgery   Week 3 attempt successful?  Yes   Call start time  1135   Call end time  1137   Discharge diagnosis  Lap gastric sleeve, Lap hiatal hernia repair   Is the patient taking all medications as directed (includes completed medication regime)?  Yes   Has the patient kept scheduled appointments due by today?  Yes   What is the Home health agency?   HH agency   Has home health visited the patient within 72 hours of discharge?  Yes   Psychosocial issues?  No   What is the patient's perception of their health status since discharge?  Improving   Nursing interventions  Nurse provided patient education   Is the patient /caregiver able to teach back basic post-op care?  Keep incision areas clean,dry and protected, Do not remove steri-strips, Lifting as instructed by MD in discharge instructions   Is the patient/caregiver able to teach back signs and symptoms of incisional infection?  Fever   Is the patient/caregiver able to teach back steps to recovery at home?  Set small, achievable goals for return to baseline health, Rest and rebuild strength, gradually increase activity   Is the patient/caregiver able to teach back the hierarchy of who to call/visit for symptoms/problems? PCP, Specialist, Home health nurse, Urgent Care, ED, 911  Yes   Week 3 call completed?  Yes   Wrap up additional comments  Says she is doing a little better but still having some issues, she has been in contact with her surgeons office.          Cynthia [facility] RN

## 2021-07-09 ENCOUNTER — OFFICE VISIT (OUTPATIENT)
Dept: CARDIOLOGY | Facility: CLINIC | Age: 36
End: 2021-07-09

## 2021-07-09 VITALS
OXYGEN SATURATION: 98 % | HEIGHT: 60 IN | HEART RATE: 75 BPM | BODY MASS INDEX: 43 KG/M2 | WEIGHT: 219 LBS | DIASTOLIC BLOOD PRESSURE: 70 MMHG | SYSTOLIC BLOOD PRESSURE: 122 MMHG

## 2021-07-09 DIAGNOSIS — I10 ESSENTIAL HYPERTENSION: ICD-10-CM

## 2021-07-09 DIAGNOSIS — R00.2 PALPITATIONS: Primary | ICD-10-CM

## 2021-07-09 DIAGNOSIS — E78.2 MIXED HYPERLIPIDEMIA: ICD-10-CM

## 2021-07-09 PROCEDURE — 99213 OFFICE O/P EST LOW 20 MIN: CPT | Performed by: PHYSICIAN ASSISTANT

## 2021-07-09 NOTE — PROGRESS NOTES
[facility] Cardiology    Encounter Date: 2021    Patient ID: Frances Hartman is a 35 y.o. female.  : 1985     PCP: Lesli Ramirez PA-C       Chief Complaint: Palpitations      PROBLEM LIST:  1. Hypertension:  a. Echo, 2014: EF 55-60%. All valves are structurally and functionally normal with no hemodynamically significant valve disease.  b. Echo, 2017: EF 65%. Mild TR with normal RVSP.  2. Hyperlipidemia.  3. Chest pain:  a. Pet MPS, 2019: EF > 70%. No evidence of reversible ischemia.  4. Palpitations:  a. 2-week Zio, 2020: SR. <1% PACs/PVCs. One short SVT (4 beats). Average HR 74 bpm.  5. DM2.  6. Morbid obesity (BMI 54.7)  7. Peripheral chronic venous insufficiency.  8. Polycystic ovaries.  9. GERD.  10. Subclinical hypothyroidism.  11. Keloid skin disorder.  12. Chronic fatigue.  13. Chronic tonsillar hypertrophy.  14. Depression.  15. Migraine with aura, onset age 8.    History of Present Illness  Patient presents today for 6-month follow-up with a history of palpitations, chest pain, and cardiac risk factors. Since last visit, she had gastric sleeve. She is doing well with weight loss. She has had some mild pain in recover but she is slowly getting better. No Chest pain or SOB.     Allergies   Allergen Reactions   • Hydrochlorothiazide Swelling   • Monosodium Glutamate Swelling and Headache   • Oatmeal Other (See Comments)     Dx on allergy testing   • Amitriptyline Mental Status Change     memory gaps + neuropathy + hair loss   • Aspartame Nausea Only   • Augmentin [Amoxicillin-Pot Clavulanate] Other (See Comments)     Syncope, not sure if allergic to cephalosporins.   • Codeine Headache      Can tolerate hydrocodone, no other adverse reactions to other narcotics.   • Diclofenac Headache   • Doxycycline Rash and Hallucinations   • Eggs Or Egg-Derived Products Other (See Comments)     dx on allergy testing, but does not completely  "avoid   • Ibuprofen Other (See Comments)     \"makes me retain fluid and eventually go into CHF\"   • Naproxen Other (See Comments)     \"blackout\"         Current Outpatient Medications:   •  Acetaminophen (TYLENOL ARTHRITIS PAIN PO), Take  by mouth As Needed., Disp: , Rfl:   •  acetaminophen (TYLENOL) 500 MG tablet, Take 500-1,000 mg by mouth Every 6 (Six) Hours As Needed for Mild Pain ., Disp: , Rfl:   •  albuterol (PROVENTIL HFA;VENTOLIN HFA) 108 (90 BASE) MCG/ACT inhaler, Inhale 2 puffs Every 4 (Four) Hours As Needed for wheezing., Disp: , Rfl:   •  ascorbic acid (VITAMIN C) 500 MG tablet, Take 500 mg by mouth Daily., Disp: , Rfl:   •  calcium carbonate EX (Antacid Flavor Chews) 750 MG chewable tablet, As Needed., Disp: , Rfl:   •  Calcium Citrate-Vitamin D (CALCIUM CITRATE + D PO), Take 2 tablets by mouth Daily., Disp: , Rfl:   •  carvedilol (COREG) 12.5 MG tablet, Take 1 tablet by mouth 2 (Two) Times a Day With Meals., Disp: 180 tablet, Rfl: 1  •  cyclobenzaprine (FLEXERIL) 10 MG tablet, Take 1 tablet by mouth 3 (Three) Times a Day As Needed for Muscle Spasms., Disp: 30 tablet, Rfl: 0  •  diphenhydrAMINE (BENADRYL) 25 mg capsule, Take 25 mg by mouth Every 6 (Six) Hours As Needed for itching., Disp: , Rfl:   •  docusate sodium (COLACE) 100 MG capsule, Take 100-200 mg by mouth Daily., Disp: , Rfl:   •  ferrous sulfate 325 (65 Fe) MG tablet, Take 1 tablet by mouth Daily., Disp: 30 tablet, Rfl: 5  •  fluconazole (DIFLUCAN) 200 MG tablet, Take 1 tablet by mouth 2 (Two) Times a Day., Disp: 180 tablet, Rfl: 3  •  gabapentin (NEURONTIN) 300 MG capsule, Take 300 mg by mouth Daily As Needed., Disp: , Rfl:   •  glucose blood test strip, Use as instructed, Disp: 100 each, Rfl: 11  •  guaiFENesin (HUMIBID 3) 400 MG tablet, Take 200 mg by mouth As Needed., Disp: , Rfl:   •  HYDROcodone-acetaminophen (NORCO) 5-325 MG per tablet, Take 1-2 tablets by mouth Every 6 (Six) Hours As Needed for Severe Pain . (Patient taking " differently: Take 1-2 tablets by mouth 2 (Two) Times a Day As Needed for Severe Pain .), Disp: 15 tablet, Rfl: 0  •  KETOTIFEN FUMARATE OP, Apply  to eye(s) as directed by provider Daily As Needed., Disp: , Rfl:   •  levonorgestrel (Mirena, 52 MG,) 20 MCG/24HR IUD, , Disp: , Rfl:   •  loratadine (CLARITIN) 10 MG tablet, Take 1 tablet by mouth Daily., Disp: 90 tablet, Rfl: 3  •  melatonin 1 MG tablet, Take 5 mg by mouth As Needed., Disp: , Rfl:   •  metFORMIN ER (GLUCOPHAGE-XR) 500 MG 24 hr tablet, Take 1 tablet by mouth Daily With Dinner., Disp: 90 tablet, Rfl: 1  •  multivitamin (Multi Vitamin Daily) tablet tablet, 1 tablet Daily., Disp: , Rfl:   •  omeprazole (priLOSEC) 40 MG capsule, Take 1 capsule by mouth Daily for 60 days., Disp: 60 capsule, Rfl: 0  •  OnabotulinumtoxinA (Botox) 200 units reconstituted solution, FOR . PHYSICIAN TO INJECT UP  UNITS INTRAMUSCULARLY INTO HEAD, NECK AND SHOULDERS EVERY 90 DAYS., Disp: 1 each, Rfl: 1  •  simethicone (MYLICON) 125 MG chewable tablet, Chew 125 mg Every 6 (Six) Hours As Needed for Flatulence., Disp: , Rfl:   •  spironolactone (ALDACTONE) 100 MG tablet, Take 1 tablet by mouth Every Other Day. Takes 30 days on,  off 30 days. (Patient taking differently: Take 100 mg by mouth Every Other Day. Or Takes 30 days on,  off 30 days.), Disp: 45 tablet, Rfl: 1  •  TRUEplus Lancets 33G misc, 1 each 2 (Two) Times a Day As Needed (testing)., Disp: 100 each, Rfl: 1  •  vitamin B-12 (CYANOCOBALAMIN) 100 MCG tablet, Take 50 mcg by mouth Daily., Disp: , Rfl:     Current Facility-Administered Medications:   •  OnabotulinumtoxinA 200 Units, 200 Units, Intramuscular, Q3 Months, Shiela Coelho, TANESHA, 200 Units at 06/11/21 1616    The following portions of the patient's history were reviewed and updated as appropriate: allergies, current medications, past family history, past medical history, past social history, past surgical history and problem  "list.    ROS  Review of Systems   Constitution: Negative for chills, fever, fatigue, generalized weakness.   Cardiovascular: Negative for chest pain, dyspnea on exertion, leg swelling, palpitations, orthopnea, and syncope.   Respiratory: Negative for cough, shortness of breath, and wheezing.  HENT: Negative for ear pain, nosebleeds, and tinnitus.  Gastrointestinal: Negative for abdominal pain, constipation, diarrhea, nausea and vomiting.   Genitourinary: No urinary symptoms.  Musculoskeletal: Negative for muscle cramps.  Neurological: Negative for dizziness, headaches, loss of balance, numbness, and symptoms of stroke.  Psychiatric: Normal mental status.     All other systems reviewed and are negative.        Objective:     /70 (BP Location: Right arm, Patient Position: Sitting)   Pulse 75   Ht 152.4 cm (60\")   Wt 99.3 kg (219 lb)   SpO2 98%   BMI 42.77 kg/m²      Physical Exam  Constitutional: Patient appears well-developed and well-nourished.   HENT: HEENT exam unremarkable.   Neck: Neck supple. No JVD present. No carotid bruits.   Cardiovascular: Normal rate, regular rhythm and normal heart sounds. No murmur heard.   2+ symmetric pulses.   Pulmonary/Chest: Breath sounds normal. Does not exhibit tenderness.   Abdominal: Abdomen benign.   Musculoskeletal: Does not exhibit edema.   Neurological: Neurological exam unremarkable.   Vitals reviewed.    Data Review:   Lab Results   Component Value Date    GLUCOSE 71 06/20/2021    BUN 7 06/20/2021    CREATININE 0.67 06/20/2021    EGFRIFAFRI 120 06/20/2021    BCR 10.4 06/20/2021    K 4.4 06/20/2021    CO2 19.0 (L) 06/20/2021    CALCIUM 9.1 06/20/2021    ALBUMIN 3.10 (L) 06/20/2021    AST 34 (H) 06/20/2021    ALT 60 (H) 06/20/2021     Lab Results   Component Value Date    CHOL 194 06/04/2020    TRIG 214 (H) 06/04/2020    HDL 30 (L) 06/04/2020     (H) 06/04/2020      Lab Results   Component Value Date    WBC 9.38 06/20/2021    RBC 3.41 (L) 06/20/2021    HGB " 10.8 (L) 06/20/2021    HCT 34.5 06/20/2021    .2 (H) 06/20/2021     06/20/2021     Lab Results   Component Value Date    TSH 4.050 06/04/2020     Lab Results   Component Value Date    HGBA1C 7.4 06/08/2021        Procedures       Assessment:      Diagnosis Plan   1. Palpitations     2. Essential hypertension     3. Mixed hyperlipidemia       Plan:   Over all stable CV course since gastric sleeve  Continue current medications.   FU in 6 Months   Thank you for allowing us to participate in the care of your patient.   Electronically signed by LASHANDA Card, 07/09/21, 3:52 PM EDT.          Please note that portions of this note may have been completed with a voice recognition program. Efforts were made to edit the dictations, but occasionally words are mistranscribed.

## 2021-07-15 ENCOUNTER — OFFICE VISIT (OUTPATIENT)
Dept: BARIATRICS/WEIGHT MGMT | Facility: CLINIC | Age: 36
End: 2021-07-15

## 2021-07-15 ENCOUNTER — READMISSION MANAGEMENT (OUTPATIENT)
Dept: CALL CENTER | Facility: HOSPITAL | Age: 36
End: 2021-07-15

## 2021-07-15 VITALS
TEMPERATURE: 98 F | HEART RATE: 68 BPM | HEIGHT: 60 IN | DIASTOLIC BLOOD PRESSURE: 68 MMHG | RESPIRATION RATE: 18 BRPM | OXYGEN SATURATION: 99 % | WEIGHT: 214 LBS | BODY MASS INDEX: 42.01 KG/M2 | SYSTOLIC BLOOD PRESSURE: 126 MMHG

## 2021-07-15 DIAGNOSIS — R53.83 FATIGUE, UNSPECIFIED TYPE: Primary | ICD-10-CM

## 2021-07-15 DIAGNOSIS — E55.9 HYPOVITAMINOSIS D: ICD-10-CM

## 2021-07-15 DIAGNOSIS — K91.2 POSTGASTRECTOMY MALABSORPTION: ICD-10-CM

## 2021-07-15 DIAGNOSIS — Z13.0 SCREENING, IRON DEFICIENCY ANEMIA: ICD-10-CM

## 2021-07-15 DIAGNOSIS — Z13.21 MALNUTRITION SCREEN: ICD-10-CM

## 2021-07-15 DIAGNOSIS — R11.10 VOMITING, INTRACTABILITY OF VOMITING NOT SPECIFIED, PRESENCE OF NAUSEA NOT SPECIFIED, UNSPECIFIED VOMITING TYPE: ICD-10-CM

## 2021-07-15 DIAGNOSIS — Z90.3 POSTGASTRECTOMY MALABSORPTION: ICD-10-CM

## 2021-07-15 PROCEDURE — 99024 POSTOP FOLLOW-UP VISIT: CPT | Performed by: SURGERY

## 2021-07-15 RX ORDER — SODIUM CHLORIDE 9 MG/ML
150 INJECTION, SOLUTION INTRAVENOUS CONTINUOUS
Status: CANCELLED | OUTPATIENT
Start: 2021-07-15

## 2021-07-15 NOTE — PROGRESS NOTES
"[facility] Bariatric Surgery  [address]    [facility] [id number] [phone]      Patient Name:  Frances Hartman.  :  1985      Date of Visit: 7/15/2021      Reason for Visit:  1 month postop    HPI:  Frances Hartman is a 35 y.o. female s/p  LSG by  on 6/15/21    Not doing very well  Not tolerating diet progression.  Getting 0g prot/day.  Vomitus is stomach acid or white and foamey.    \"My body is rejecting the protein shakes.\"  After 2 oz, she has diarrhea.  She is using Boost High Protein, which appear to be higher lactose shake.  She tried eggs, and felt like they sat in her epigastrium for a while.  They eventually went through hours later.  She last tried eggs 1 week ago.      Yesterday's Diet Recall:    Did not eat yesterday.  Drank 8 oz water only.      Feels full after 1-2 oz water.     Has attempted to take vitamins, has trouble with the large ones b/c she cannot drink enough water to push them through.        C/o numbness in left hand/fingers.  Occasionally whole arm feels numb.  Still has some RUQ soresness that is unchanged.  Also having some pain near to another right-sided incision.  Sometimes pains are sharp/stabbing.  She is taking Tylenol for this pain.    On Omeprazole .  Still holding ASA , NSAIDs  and Steroids.      Presurgery weight:  231 pounds. Today's weight is 97.1 kg (214 lb) pounds, today's Body mass index is 41.79 kg/m²., and her weight loss since surgery is 17 pounds.       Past Medical History:   Diagnosis Date   • Anxiety    • Asthma     prn inhalers, does not follow w/ pulmonary   • Chronic back pain     previously followed w/ pain management, now just prn Tylenol + Gabapentin   • Diabetes mellitus (CMS/HCC)     Type II, dx , never on insulin, A1C 7.6   • Dyspepsia    • Dyspnea on exertion    • Fatigue    • Gastroparesis    • Heartburn     chronic, prn TUMS, denies prior eval   • Hirsutism    • Hx of radiation " therapy     for treatments of Keloids   • Hypertension    • Irregular menses     infrequent spotting   • Leiomyoma, subserous     possible peduculated f3-4 cm fibroid on u/s at [facility]   • Migraines     botox q3 months, following Neurology @ [facility]   • Morbid obesity (CMS/HCC)    • Peripheral edema    • Polycystic ovaries     severe insulin resistance/hirsuitism   • Seasonal allergies    • Slow to wake up after anesthesia      Past Surgical History:   Procedure Laterality Date   • ENDOSCOPY N/A 6/15/2021    Procedure: ESOPHAGOGASTRODUODENOSCOPY;  Surgeon: Ayaka Andre MD;  Location:  [facility];  Service: [employer] [facility];  Laterality: N/A;   • GASTRIC SLEEVE LAPAROSCOPIC N/A 6/15/2021    Procedure: GASTRIC SLEEVE LAPAROSCOPIC WITH DAVINCI ROBOT, LAPROSCOPIC HIATAL HERNIA REPAIR WITH DAVINCI ROBOT;  Surgeon: Ayaka Andre MD;  Location:  [facility];  Service: [employer] [facility];  Laterality: N/A;   • KELOID EXCISION      1990,1993,1999,2015,2016,2019   • LAPAROSCOPIC CHOLECYSTECTOMY  2000    for stones   • RADIOFREQUENCY ABLATION  2019    x 2  for pt back   • UMBILICAL HERNIA REPAIR  1990   • WISDOM TOOTH EXTRACTION  1994     Outpatient Medications Marked as Taking for the 7/15/21 encounter (Office Visit) with Ayaka Andre MD   Medication Sig Dispense Refill   • Acetaminophen (TYLENOL ARTHRITIS PAIN PO) Take  by mouth As Needed.     • acetaminophen (TYLENOL) 500 MG tablet Take 500-1,000 mg by mouth Every 6 (Six) Hours As Needed for Mild Pain .     • albuterol (PROVENTIL HFA;VENTOLIN HFA) 108 (90 BASE) MCG/ACT inhaler Inhale 2 puffs Every 4 (Four) Hours As Needed for wheezing.     • ascorbic acid (VITAMIN C) 500 MG tablet Take 500 mg by mouth Daily.     • calcium carbonate EX (Antacid Flavor Chews) 750 MG chewable tablet As Needed.     • Calcium Citrate-Vitamin D (CALCIUM CITRATE + D PO) Take 2 tablets by mouth Daily.     • carvedilol (COREG) 12.5 MG tablet Take 1 tablet by mouth 2 (Two) Times a  Day With Meals. 180 tablet 1   • cyclobenzaprine (FLEXERIL) 10 MG tablet Take 1 tablet by mouth 3 (Three) Times a Day As Needed for Muscle Spasms. 30 tablet 0   • diphenhydrAMINE (BENADRYL) 25 mg capsule Take 25 mg by mouth Every 6 (Six) Hours As Needed for itching.     • docusate sodium (COLACE) 100 MG capsule Take 100-200 mg by mouth Daily.     • ferrous sulfate 325 (65 Fe) MG tablet Take 1 tablet by mouth Daily. 30 tablet 5   • fluconazole (DIFLUCAN) 200 MG tablet Take 1 tablet by mouth 2 (Two) Times a Day. 180 tablet 3   • gabapentin (NEURONTIN) 300 MG capsule Take 300 mg by mouth Daily As Needed.     • guaiFENesin (HUMIBID 3) 400 MG tablet Take 200 mg by mouth As Needed.     • HYDROcodone-acetaminophen (NORCO) 5-325 MG per tablet Take 1-2 tablets by mouth Every 6 (Six) Hours As Needed for Severe Pain . (Patient taking differently: Take 1-2 tablets by mouth 2 (Two) Times a Day As Needed for Severe Pain .) 15 tablet 0   • KETOTIFEN FUMARATE OP Apply  to eye(s) as directed by provider Daily As Needed.     • levonorgestrel (Mirena, 52 MG,) 20 MCG/24HR IUD      • loratadine (CLARITIN) 10 MG tablet Take 1 tablet by mouth Daily. 90 tablet 3   • melatonin 1 MG tablet Take 5 mg by mouth As Needed.     • metFORMIN ER (GLUCOPHAGE-XR) 500 MG 24 hr tablet Take 1 tablet by mouth Daily With Dinner. 90 tablet 1   • multivitamin (Multi Vitamin Daily) tablet tablet 1 tablet Daily.     • omeprazole (priLOSEC) 40 MG capsule Take 1 capsule by mouth Daily for 60 days. 60 capsule 0   • OnabotulinumtoxinA (Botox) 200 units reconstituted solution FOR . PHYSICIAN TO INJECT UP  UNITS INTRAMUSCULARLY INTO HEAD, NECK AND SHOULDERS EVERY 90 DAYS. 1 each 1   • simethicone (MYLICON) 125 MG chewable tablet Chew 125 mg Every 6 (Six) Hours As Needed for Flatulence.     • spironolactone (ALDACTONE) 100 MG tablet Take 1 tablet by mouth Every Other Day. Takes 30 days on,  off 30 days. (Patient taking differently: Take 100  "mg by mouth Every Other Day. Or Takes 30 days on,  off 30 days.) 45 tablet 1   • vitamin B-12 (CYANOCOBALAMIN) 100 MCG tablet Take 50 mcg by mouth Daily.       Current Facility-Administered Medications for the 7/15/21 encounter (Office Visit) with Ayaka Andre MD   Medication Dose Route Frequency Provider Last Rate Last Admin   • OnabotulinumtoxinA 200 Units  200 Units Intramuscular Q3 Months Traecharu Shiela ALEXYS, APRN   200 Units at 06/11/21 1616     Allergies   Allergen Reactions   • Hydrochlorothiazide Swelling   • Monosodium Glutamate Swelling and Headache   • Oatmeal Other (See Comments)     Dx on allergy testing   • Amitriptyline Mental Status Change     memory gaps + neuropathy + hair loss   • Aspartame Nausea Only   • Augmentin [Amoxicillin-Pot Clavulanate] Other (See Comments)     Syncope, not sure if allergic to cephalosporins.   • Codeine Headache      Can tolerate hydrocodone, no other adverse reactions to other narcotics.   • Diclofenac Headache   • Doxycycline Rash and Hallucinations   • Eggs Or Egg-Derived Products Other (See Comments)     dx on allergy testing, but does not completely avoid   • Ibuprofen Other (See Comments)     \"makes me retain fluid and eventually go into CHF\"   • Naproxen Other (See Comments)     \"blackout\"       Social History     Socioeconomic History   • Marital status: Single     Spouse name: Not on file   • Number of children: Not on file   • Years of education: Not on file   • Highest education level: Not on file   Tobacco Use   • Smoking status: Never Smoker   • Smokeless tobacco: Never Used   Substance and Sexual Activity   • Alcohol use: Not Currently     Alcohol/week: 0.0 standard drinks     Comment: Very rarely drink socially. At most 2 drinks per month.   • Drug use: No   • Sexual activity: Not Currently     Partners: Male     Birth control/protection: Condom, I.U.D.       /68 (BP Location: Left arm, Patient Position: Sitting, Cuff Size: Large Adult)  " " Pulse 68   Temp 98 °F (36.7 °C) (Temporal)   Resp 18   Ht 152.4 cm (60\")   Wt 97.1 kg (214 lb)   SpO2 99%   BMI 41.79 kg/m²     Physical Exam  Constitutional:       General: She is not in acute distress.     Appearance: She is well-developed. She is not diaphoretic.   HENT:      Head: Normocephalic and atraumatic.      Mouth/Throat:      Pharynx: No oropharyngeal exudate.   Eyes:      Conjunctiva/sclera: Conjunctivae normal.      Pupils: Pupils are equal, round, and reactive to light.   Pulmonary:      Effort: Pulmonary effort is normal. No respiratory distress.   Abdominal:      General: There is no distension.      Palpations: Abdomen is soft.      Comments: Incisions are well-healing.  Slight hyperpigmentation.  No keloiding so far.   Skin:     General: Skin is warm and dry.      Coloration: Skin is not pale.   Neurological:      Mental Status: She is alert and oriented to person, place, and time.      Cranial Nerves: No cranial nerve deficit.   Psychiatric:         Behavior: Behavior normal.         Thought Content: Thought content normal.           Assessment:  1 month s/p  LSG by  on 6/15/21    Plan:  Doing well.  Continue to advance diet per manual.  Continue protein >70g/day.  Encouraged good food choices - high protein, low carb.  Continue routine exercise.  Routine bariatric labs ordered.  Continue vitamins w/ adjustments pending lab results.  Call w/ problems/concerns.    At high risk for delayed leak due to malnutrition.  Pt counseled on nutrition expectations, ideas to try.  I think lactose intolerant.    UGI/EGD ordered to workup technical causes of vomiting.    The patient was instructed to follow up in 2 months, sooner if needed.     Ayaka Andre MD    Answers for HPI/ROS submitted by the patient on 7/8/2021  What is the primary reason for your visit?: Physical      "

## 2021-07-15 NOTE — OUTREACH NOTE
"General Surgery Week 4 Survey      Responses   [facility] patient discharged from?  [place]   Does the patient have one of the following disease processes/diagnoses(primary or secondary)?  General Surgery   Week 4 attempt successful?  Yes   Call start time  1356   Call end time  1400   Discharge diagnosis  Lap gastric sleeve, Lap hiatal hernia repair   Is the patient taking all medications as directed (includes completed medication regime)?  Yes [Pt states, \"I'm trying to\"]   Has the patient kept scheduled appointments due by today?  Yes [She did reschedule one appt. Will f/u surgeon's is today.]   Is the patient still receiving Home Health Services?  Yes   What is the patient's perception of their health status since discharge?  Improving   Week 4 call completed?  Yes   Would the patient like one additional call?  No   Graduated  Yes   Is the patient interested in additional calls from an ambulatory ?  NOTE:  applies to high risk patients requiring additional follow-up.  No   Did the patient feel the follow up calls were helpful during their recovery period?  Yes   Was the number of calls appropriate?  Yes          Stacey Aponte RN  "

## 2021-07-20 ENCOUNTER — HOSPITAL ENCOUNTER (EMERGENCY)
Facility: HOSPITAL | Age: 36
Discharge: HOME | End: 2021-07-20
Attending: EMERGENCY MEDICINE | Admitting: EMERGENCY MEDICINE

## 2021-07-20 ENCOUNTER — TELEPHONE (OUTPATIENT)
Dept: BARIATRICS/WEIGHT MGMT | Facility: CLINIC | Age: 36
End: 2021-07-20

## 2021-07-20 ENCOUNTER — APPOINTMENT (OUTPATIENT)
Dept: PREADMISSION TESTING | Facility: HOSPITAL | Age: 36
End: 2021-07-20

## 2021-07-20 VITALS
HEART RATE: 85 BPM | RESPIRATION RATE: 18 BRPM | SYSTOLIC BLOOD PRESSURE: 126 MMHG | WEIGHT: 214 LBS | BODY MASS INDEX: 42.01 KG/M2 | HEIGHT: 60 IN | DIASTOLIC BLOOD PRESSURE: 86 MMHG | OXYGEN SATURATION: 98 % | TEMPERATURE: 98.9 F

## 2021-07-20 DIAGNOSIS — E16.2 HYPOGLYCEMIA: Primary | ICD-10-CM

## 2021-07-20 LAB
ANION GAP SERPL CALCULATED.3IONS-SCNC: 16 MMOL/L (ref 5–15)
BUN SERPL-MCNC: 4 MG/DL (ref 6–20)
BUN/CREAT SERPL: 4.7 (ref 7–25)
CALCIUM SPEC-SCNC: 10.6 MG/DL (ref 8.6–10.5)
CHLORIDE SERPL-SCNC: 105 MMOL/L (ref 98–107)
CO2 SERPL-SCNC: 22 MMOL/L (ref 22–29)
CREAT SERPL-MCNC: 0.85 MG/DL (ref 0.57–1)
GFR SERPL CREATININE-BSD FRML MDRD: 91 ML/MIN/1.73
GLUCOSE BLDC GLUCOMTR-MCNC: 112 MG/DL (ref 70–130)
GLUCOSE BLDC GLUCOMTR-MCNC: 63 MG/DL (ref 70–130)
GLUCOSE SERPL-MCNC: 80 MG/DL (ref 65–99)
POTASSIUM SERPL-SCNC: 4 MMOL/L (ref 3.5–5.2)
SARS-COV-2 RNA PNL SPEC NAA+PROBE: NOT DETECTED
SODIUM SERPL-SCNC: 143 MMOL/L (ref 136–145)

## 2021-07-20 PROCEDURE — 82962 GLUCOSE BLOOD TEST: CPT

## 2021-07-20 PROCEDURE — 96374 THER/PROPH/DIAG INJ IV PUSH: CPT

## 2021-07-20 PROCEDURE — 80048 BASIC METABOLIC PNL TOTAL CA: CPT | Performed by: EMERGENCY MEDICINE

## 2021-07-20 PROCEDURE — 99283 EMERGENCY DEPT VISIT LOW MDM: CPT

## 2021-07-20 PROCEDURE — C9803 HOPD COVID-19 SPEC COLLECT: HCPCS

## 2021-07-20 PROCEDURE — U0004 COV-19 TEST NON-CDC HGH THRU: HCPCS

## 2021-07-20 RX ORDER — DEXTROSE MONOHYDRATE 25 G/50ML
12.5 INJECTION, SOLUTION INTRAVENOUS ONCE
Status: COMPLETED | OUTPATIENT
Start: 2021-07-20 | End: 2021-07-20

## 2021-07-20 RX ADMIN — SODIUM CHLORIDE 500 ML: 9 INJECTION, SOLUTION INTRAVENOUS at 05:33

## 2021-07-20 RX ADMIN — DEXTROSE MONOHYDRATE 12.5 G: 500 INJECTION PARENTERAL at 05:33

## 2021-07-20 NOTE — TELEPHONE ENCOUNTER
Micheline is on the phone with [employer] now. She got her UGI moved to tomorrow and EGD is Thursday.

## 2021-07-20 NOTE — TELEPHONE ENCOUNTER
Patient is scheduled for UGI on 07/21/2021 and Ekaterina was able to schedule the patient for EGD on 07/22/2021.  Patient is aware.

## 2021-07-20 NOTE — TELEPHONE ENCOUNTER
Patient went to the ED this am for low glucose.  She is asking if she needs to do anything further at this point?

## 2021-07-20 NOTE — ED PROVIDER NOTES
Subjective   35-year-old female presents for evaluation of refractory hypoglycemia.  Of note, the patient has a history of diabetes.  She was previously on Metformin for PCOS and diabetes and was also previously on glipizide.  However, the patient underwent a recent gastric bypass surgery by Dr. Andre in mid June.  She states that it has been quite difficult postoperatively and that it has been quite a lifestyle change from a diet standpoint.  She states that she has had a hard time eating and drinking adequate nutrition following the surgery.  She has not had her glipizide in over a week but has been taking her Metformin this week to help out with her PCOS.  Yesterday morning at 8 AM, the patient became lightheaded and diaphoretic.  She checked her blood sugar and noted to be in the 40s.  Throughout the day, she continued to have consistent blood sugar readings ranging from 40s to 60s.  She tried drinking juice throughout the day to bring up her glucose but could never get it consistently back within normal range.  As result, she came to the emergency department to be evaluated today.  She denies any fevers.          Review of Systems   Constitutional: Positive for diaphoresis.   Neurological: Positive for dizziness.   All other systems reviewed and are negative.      Past Medical History:   Diagnosis Date   • Anxiety    • Asthma     prn inhalers, does not follow w/ pulmonary   • Chronic back pain     previously followed w/ pain management, now just prn Tylenol + Gabapentin   • Diabetes mellitus (CMS/HCC)     Type II, dx 2014, never on insulin, A1C 7.6   • Dyspepsia    • Dyspnea on exertion    • Fatigue    • Gastroparesis    • Heartburn     chronic, prn TUMS, denies prior eval   • Hirsutism    • Hx of radiation therapy     for treatments of Keloids   • Hypertension    • Irregular menses     infrequent spotting   • Leiomyoma, subserous     possible peduculated f3-4 cm fibroid on u/s at [facility]   • Migraines     botox  "q3 months, following Neurology @ [facility]   • Morbid obesity (CMS/HCC)    • Peripheral edema    • Polycystic ovaries     severe insulin resistance/hirsuitism   • Seasonal allergies    • Slow to wake up after anesthesia        Allergies   Allergen Reactions   • Hydrochlorothiazide Swelling   • Monosodium Glutamate Swelling and Headache   • Oatmeal Other (See Comments)     Dx on allergy testing   • Amitriptyline Mental Status Change     memory gaps + neuropathy + hair loss   • Aspartame Nausea Only   • Augmentin [Amoxicillin-Pot Clavulanate] Other (See Comments)     Syncope, not sure if allergic to cephalosporins.   • Codeine Headache      Can tolerate hydrocodone, no other adverse reactions to other narcotics.   • Diclofenac Headache   • Doxycycline Rash and Hallucinations   • Eggs Or Egg-Derived Products Other (See Comments)     dx on allergy testing, but does not completely avoid   • Ibuprofen Other (See Comments)     \"makes me retain fluid and eventually go into CHF\"   • Naproxen Other (See Comments)     \"blackout\"       Past Surgical History:   Procedure Laterality Date   • ENDOSCOPY N/A 6/15/2021    Procedure: ESOPHAGOGASTRODUODENOSCOPY;  Surgeon: Ayaka Andre MD;  [facility];  [facility];  Laterality: N/A;   • GASTRIC SLEEVE LAPAROSCOPIC N/A 6/15/2021    Procedure: GASTRIC SLEEVE LAPAROSCOPIC WITH DAVINCI ROBOT, LAPROSCOPIC HIATAL HERNIA REPAIR WITH DAVINCI ROBOT;  Surgeon: Ayaka Andre MD;  Location:  [facility];  Service: [facility];  Laterality: N/A;   • KELOID EXCISION      1990,1993,1999,2015,2016,2019   • LAPAROSCOPIC CHOLECYSTECTOMY  2000    for stones   • RADIOFREQUENCY ABLATION  2019    x 2  for pt back   • UMBILICAL HERNIA REPAIR  1990   • WISDOM TOOTH EXTRACTION  1994       Family History   Problem Relation Age of Onset   • Arthritis Mother    • Diabetes Mother    • Obesity Mother    • Arrhythmia Mother    • Hypertension Mother    • Asthma Mother    • " Dementia Father    • Heart attack Father    • Arrhythmia Father    • Heart disease Father    • Stroke Other         CVA   • Obesity Other    • Ovarian cancer Maternal Aunt         40's   • Breast cancer Paternal Aunt         30's   • Heart disease Other    • Hypertension Other    • Endometrial cancer Neg Hx        Social History     Socioeconomic History   • Marital status: Single     Spouse name: Not on file   • Number of children: Not on file   • Years of education: Not on file   • Highest education level: Not on file   Tobacco Use   • Smoking status: Never Smoker   • Smokeless tobacco: Never Used   Substance and Sexual Activity   • Alcohol use: Not Currently     Alcohol/week: 0.0 standard drinks     Comment: Very rarely drink socially. At most 2 drinks per month.   • Drug use: No   • Sexual activity: Not Currently     Partners: Male     Birth control/protection: Condom, I.U.D.           Objective   Physical Exam  Vitals and nursing note reviewed.   Constitutional:       General: She is not in acute distress.     Appearance: Normal appearance. She is well-developed. She is not diaphoretic.      Comments: Well-appearing female in no acute distress   HENT:      Head: Normocephalic and atraumatic.   Eyes:      Pupils: Pupils are equal, round, and reactive to light.   Cardiovascular:      Rate and Rhythm: Normal rate and regular rhythm.      Heart sounds: Normal heart sounds. No murmur heard.   No friction rub. No gallop.    Pulmonary:      Effort: Pulmonary effort is normal. No respiratory distress.      Breath sounds: Normal breath sounds. No wheezing or rales.   Abdominal:      General: Bowel sounds are normal. There is no distension.      Palpations: Abdomen is soft. There is no mass.      Tenderness: There is no abdominal tenderness. There is no guarding or rebound.   Musculoskeletal:         General: Normal range of motion.      Cervical back: Neck supple.   Skin:     General: Skin is warm and dry.       Findings: No erythema or rash.   Neurological:      General: No focal deficit present.      Mental Status: She is alert and oriented to person, place, and time.   Psychiatric:         Mood and Affect: Mood normal.         Thought Content: Thought content normal.         Judgment: Judgment normal.         Procedures           ED Course  ED Course as of Jul 20 0812   Tue Jul 20, 2021   0507 35-year-old female presents for evaluation of refractory hypoglycemia.  Of note, the patient had gastric surgery on Deepthi 15.  Her caloric intake has been dramatically reduced since that time.  She takes Metformin for PCOS but has not been on her glipizide for more than a week as a result of her surgery.  At approximately 8 AM yesterday morning she became diaphoretic and checked her blood sugar.  She noted to be in the 40s.  Despite drinking juices throughout the day, her blood sugars remained in the 40-60-samantha range, prompting her visit to the emergency department this morning.  On arrival, the patient is well-appearing.  Initial fingerstick is 63.  She is well-appearing and mentating appropriately.  Exam is otherwise benign.  Patient given orange juice and a box lunch.  We had a long discussion regarding her current nutrition.  She is taking in only simple sugars, and I feel that this is the most likely etiology of her refractory hypoglycemia.    [DD]   0618 Repeat fingerstick blood glucose is uptrending.  The patient is tolerating p.o.  I feel that she can be managed as an outpatient at this point.  We had a long discussion regarding the importance of nutrition and dietary modifications that could help her going forward that would likely prevent her from hypoglycemic episodes.  She will follow-up with Dr. Andre within the next week.  Agreeable with plan and given appropriate strict return precautions.    [DD]   0639 In addition to discussing dietary modifications, we also discussed the importance of the patient not taking her  "Metformin or glipizide for the time being until her diet and caloric intake are more consistent.    [DD]      ED Course User Index  [DD] Herminio Shore MD                                 Recent Results (from the past 24 hour(s))   POC Glucose Once    Collection Time: 07/20/21  4:51 AM    Specimen: Blood   Result Value Ref Range    Glucose 63 (L) 70 - 130 mg/dL   Basic Metabolic Panel    Collection Time: 07/20/21  5:29 AM    Specimen: Blood   Result Value Ref Range    Glucose 80 65 - 99 mg/dL    BUN 4 (L) 6 - 20 mg/dL    Creatinine 0.85 0.57 - 1.00 mg/dL    Sodium 143 136 - 145 mmol/L    Potassium 4.0 3.5 - 5.2 mmol/L    Chloride 105 98 - 107 mmol/L    CO2 22.0 22.0 - 29.0 mmol/L    Calcium 10.6 (H) 8.6 - 10.5 mg/dL    eGFR  African Amer 91 >60 mL/min/1.73    BUN/Creatinine Ratio 4.7 (L) 7.0 - 25.0    Anion Gap 16.0 (H) 5.0 - 15.0 mmol/L   POC Glucose Once    Collection Time: 07/20/21  6:13 AM    Specimen: Blood   Result Value Ref Range    Glucose 112 70 - 130 mg/dL     Note: In addition to lab results from this visit, the labs listed above may include labs taken at another facility or during a different encounter within the last 24 hours. Please correlate lab times with ED admission and discharge times for further clarification of the services performed during this visit.    No orders to display     Vitals:    07/20/21 0447 07/20/21 0600 07/20/21 0630 07/20/21 0700   BP: (!) 146/103 144/87 134/90 126/86   BP Location: Left arm      Patient Position: Sitting      Pulse: 85      Resp: 18      Temp: 98.9 °F (37.2 °C)      TempSrc: Oral      SpO2: 98% 99% 98% 98%   Weight: 97.1 kg (214 lb)      Height: 152.4 cm (60\")        Medications   dextrose (D50W) 25 g/ 50mL Intravenous Solution 12.5 g (12.5 g Intravenous Given 7/20/21 0533)   sodium chloride 0.9 % bolus 500 mL (0 mL Intravenous Stopped 7/20/21 0637)     ECG/EMG Results (last 24 hours)     ** No results found for the last 24 hours. **        No orders to " display                 MDM    Final diagnoses:   Hypoglycemia       ED Disposition  ED Disposition     ED Disposition Condition Comment    Discharge Stable           Ayaka Andre MD  [address]    [facility] [address] [phone]    In 1 week           Medication List      Changed    HYDROcodone-acetaminophen 5-325 MG per tablet  Commonly known as: NORCO  Take 1-2 tablets by mouth Every 6 (Six) Hours As Needed for Severe Pain .  What changed: when to take this     spironolactone 100 MG tablet  Commonly known as: ALDACTONE  Take 1 tablet by mouth Every Other Day. Takes 30 days on,  off 30 days.  What changed: additional instructions             Herminio Shore MD  07/20/21 1382

## 2021-07-21 ENCOUNTER — APPOINTMENT (OUTPATIENT)
Dept: PREADMISSION TESTING | Facility: HOSPITAL | Age: 36
End: 2021-07-21

## 2021-07-21 ENCOUNTER — HOSPITAL ENCOUNTER (OUTPATIENT)
Dept: GENERAL RADIOLOGY | Facility: HOSPITAL | Age: 36
Discharge: HOME | End: 2021-07-21
Admitting: SURGERY

## 2021-07-21 DIAGNOSIS — Z13.21 MALNUTRITION SCREEN: ICD-10-CM

## 2021-07-21 DIAGNOSIS — E55.9 HYPOVITAMINOSIS D: ICD-10-CM

## 2021-07-21 DIAGNOSIS — R53.83 FATIGUE, UNSPECIFIED TYPE: ICD-10-CM

## 2021-07-21 DIAGNOSIS — K91.2 POSTGASTRECTOMY MALABSORPTION: ICD-10-CM

## 2021-07-21 DIAGNOSIS — Z90.3 POSTGASTRECTOMY MALABSORPTION: ICD-10-CM

## 2021-07-21 DIAGNOSIS — R11.10 VOMITING, INTRACTABILITY OF VOMITING NOT SPECIFIED, PRESENCE OF NAUSEA NOT SPECIFIED, UNSPECIFIED VOMITING TYPE: ICD-10-CM

## 2021-07-21 DIAGNOSIS — Z13.0 SCREENING, IRON DEFICIENCY ANEMIA: ICD-10-CM

## 2021-07-21 PROCEDURE — 74240 X-RAY XM UPR GI TRC 1CNTRST: CPT

## 2021-07-21 PROCEDURE — 0 DIATRIZOATE MEGLUMINE & SODIUM PER 1 ML: Performed by: SURGERY

## 2021-07-21 RX ADMIN — DIATRIZOATE MEGLUMINE AND DIATRIZOATE SODIUM 50 ML: 660; 100 LIQUID ORAL; RECTAL at 08:54

## 2021-08-04 ENCOUNTER — OFFICE VISIT (OUTPATIENT)
Dept: OBSTETRICS AND GYNECOLOGY | Facility: CLINIC | Age: 36
End: 2021-08-04

## 2021-08-04 VITALS — BODY MASS INDEX: 40.8 KG/M2 | HEIGHT: 60 IN | WEIGHT: 207.8 LBS

## 2021-08-04 DIAGNOSIS — Z30.431 IUD CHECK UP: ICD-10-CM

## 2021-08-04 DIAGNOSIS — Z11.3 SCREENING EXAMINATION FOR STD (SEXUALLY TRANSMITTED DISEASE): ICD-10-CM

## 2021-08-04 DIAGNOSIS — E28.2 PCOS (POLYCYSTIC OVARIAN SYNDROME): ICD-10-CM

## 2021-08-04 DIAGNOSIS — Z01.419 ENCOUNTER FOR ANNUAL ROUTINE GYNECOLOGICAL EXAMINATION: Primary | ICD-10-CM

## 2021-08-04 DIAGNOSIS — E28.2 POLYCYSTIC OVARIES: ICD-10-CM

## 2021-08-04 DIAGNOSIS — D25.2 LEIOMYOMA, SUBSEROUS: ICD-10-CM

## 2021-08-04 DIAGNOSIS — E66.01 OBESITY, CLASS III, BMI 40-49.9 (MORBID OBESITY) (HCC): ICD-10-CM

## 2021-08-04 DIAGNOSIS — Z98.84 OBESITY SURGERY STATUS: ICD-10-CM

## 2021-08-04 DIAGNOSIS — E10.9 TYPE 1 DIABETES MELLITUS WITHOUT COMPLICATION (HCC): ICD-10-CM

## 2021-08-04 PROCEDURE — 99395 PREV VISIT EST AGE 18-39: CPT | Performed by: OBSTETRICS & GYNECOLOGY

## 2021-08-04 NOTE — ASSESSMENT & PLAN NOTE
Diabetes is improving with lifestyle modifications.   Discussed ways to avoid symptomatic hypoglycemia.  Diabetes will be reassessed in 1 year.

## 2021-08-04 NOTE — PROGRESS NOTES
GYN Annual Exam     CC - Here for annual exam.        [facility]  Frances Hartman is a 35 y.o. female, No obstetric history on file., who presents for annual well woman exam. No LMP recorded (lmp unknown). Patient has had an implant..  Patient has no menses with Mirena IUD.  Periods are absent secondary to birth control.  Dysmenorrhea:none.  Patient reports problems with: none.  There were no changes to her medical or surgical history since her last visit.. Partner Status: Marital Status: single.  She is sexually active. She has not had new partners since her last STD testing. She does desire STD testing. .    Additional OB/GYN History   Current contraception: contraceptive methods: IUD.  Insertion date:   Desires to: do not start contraception  Last Pap :   Last Completed Pap Smear          Ordered - PAP SMEAR (Every 3 Years) Ordered on 2019  Done - Dr. Malone              History of abnormal Pap smear: no  Family history of uterine, colon, breast, or ovarian cancer: yes - breast cancer- aunt, ovarian- aunt  Performs monthly Self-Breast Exam: yes  Exercises Regularly:no  Feelings of Anxiety or Depression: no  Tobacco Usage?: No   OB History             Para        Term   0            AB        Living           SAB        TAB        Ectopic        Molar        Multiple        Live Births                    Health Maintenance   Topic Date Due   • Hepatitis B (1 of 3 - Risk 3-dose series) Never done   • DIABETIC FOOT EXAM  Never done   • DIABETIC EYE EXAM  Never done   • LIPID PANEL  2021   • URINE MICROALBUMIN  2021   • ANNUAL PHYSICAL  2021   • INFLUENZA VACCINE  10/01/2021   • HEMOGLOBIN A1C  2021   • Annual Gynecologic Pelvic and Breast Exam  2022   • PAP SMEAR  2022   • MAMMOGRAM  2022   • TDAP/TD VACCINES (2 - Td or Tdap) 01/15/2029   • Pneumococcal Vaccine 0-64 (2 of 2 - PPSV23) 2048   • HEPATITIS C SCREENING  Completed   •  "COVID-19 Vaccine  Completed       The additional following portions of the patient's history were reviewed and updated as appropriate: allergies, current medications, past family history, past medical history, past social history, past surgical history and problem list.    Review of Systems   Constitutional: Negative.    HENT: Negative.    Eyes: Negative.    Respiratory: Negative.    Cardiovascular: Negative.    Gastrointestinal: Negative.    Endocrine: Negative.    Genitourinary: Negative.    Musculoskeletal: Negative.    Skin: Negative.    Allergic/Immunologic: Negative.    Neurological: Negative.    Hematological: Negative.    Psychiatric/Behavioral: Negative.          I have reviewed and agree with the HPI, ROS, and historical information as entered above. Albert Malone MD    Objective   Ht 152.4 cm (60\")   Wt 94.3 kg (207 lb 12.8 oz)   LMP  (LMP Unknown)   BMI 40.58 kg/m²     Physical Exam  Vitals and nursing note reviewed. Exam conducted with a chaperone present.   Constitutional:       Appearance: She is well-developed.   HENT:      Head: Normocephalic and atraumatic.   Neck:      Thyroid: No thyroid mass or thyromegaly.   Cardiovascular:      Rate and Rhythm: Normal rate and regular rhythm.      Heart sounds: No murmur heard.     Pulmonary:      Effort: Pulmonary effort is normal. No retractions.      Breath sounds: Normal breath sounds. No wheezing, rhonchi or rales.   Chest:      Chest wall: No mass or tenderness.      Breasts:         Right: Normal. No mass, nipple discharge, skin change or tenderness.         Left: Normal. No mass, nipple discharge, skin change or tenderness.   Abdominal:      General: Bowel sounds are normal.      Palpations: Abdomen is soft. Abdomen is not rigid. There is no mass.      Tenderness: There is no abdominal tenderness. There is no guarding.      Hernia: No hernia is present. There is no hernia in the left inguinal area.   Genitourinary:     Labia:         Right: No " rash, tenderness or lesion.         Left: No rash, tenderness or lesion.       Vagina: Normal. No vaginal discharge or lesions.      Cervix: No cervical motion tenderness, discharge, lesion or cervical bleeding.      Uterus: Normal. Not enlarged, not fixed and not tender.       Adnexa:         Right: No mass or tenderness.          Left: No mass or tenderness.        Rectum: No external hemorrhoid.      Comments: IUD string present.  Cervix without discharge or lesions.  Uterus normal size nontender.  No adnexal masses or tenderness.  Musculoskeletal:      Cervical back: Normal range of motion. No muscular tenderness.   Neurological:      Mental Status: She is alert and oriented to person, place, and time.   Psychiatric:         Behavior: Behavior normal.            Assessment and Plan    Problem List Items Addressed This Visit     PCOS (polycystic ovarian syndrome)    Relevant Medications    spironolactone (ALDACTONE) 100 MG tablet    Obesity, Class III, BMI 40-49.9 (morbid obesity) (CMS/[facility])    Overview     Added automatically from request for surgery [id number]         Polycystic ovaries    Overview     severe insulin resistance/hirsuitism         Leiomyoma, subserous    Overview     possible peduculated f3-4 cm fibroid on u/s at [facility]         IUD check up    Overview     Mirena IUD insertion 5/20/2020.  Due for removal 5/20/2025         Current Assessment & Plan     Lost 20 lb since surgery.          Obesity surgery status    Overview     Gastric Sleeve Deepthi 15/2021; Dr Trujillo         Diabetes mellitus (CMS/[facility])    Overview     Type II, dx 2014, never on insulin, A1C 7.6           Current Assessment & Plan     Diabetes is improving with lifestyle modifications.   Discussed ways to avoid symptomatic hypoglycemia.  Diabetes will be reassessed in 1 year.         Relevant Medications    metFORMIN ER (GLUCOPHAGE-XR) 500 MG 24 hr tablet      Other Visit Diagnoses     Encounter for annual routine gynecological  examination    -  Primary    Relevant Orders    Pap IG, Ct-Ng TV Rfx HPV All          1. GYN annual well woman exam.  35 years of age.  No menses with Mirena IUD.  IUD can now stay in for 6 years.  Pap smear obtained today.  2. Obesity; status post gastric sleeve Deepthi 15, 2021.  Has lost 20 pounds.  3. Type 2 diabetes.  Post gastric sleeve blood sugars have improved.  Has currently stopped all medication.  4. Encouraged use of condoms for STD prevention.  5. Reviewed monthly self breast exams.  Instructed to call with lumps, pain, or breast discharge.    6. Reviewed BMI and weight loss as preventative health measures.   7. Reccommended Flu Vaccine in Fall of each year.  8. RTC in 1 year or PRN with problems      Albert Malone MD  08/04/2021

## 2021-08-05 ENCOUNTER — TELEPHONE (OUTPATIENT)
Dept: FAMILY MEDICINE CLINIC | Facility: CLINIC | Age: 36
End: 2021-08-05

## 2021-08-05 DIAGNOSIS — E11.65 TYPE 2 DIABETES MELLITUS WITH HYPERGLYCEMIA, WITHOUT LONG-TERM CURRENT USE OF INSULIN (HCC): Primary | ICD-10-CM

## 2021-08-05 RX ORDER — CALCIUM CITRATE/VITAMIN D3 200MG-6.25
1 TABLET ORAL 2 TIMES DAILY
Qty: 100 EACH | Refills: 5 | Status: SHIPPED | OUTPATIENT
Start: 2021-08-05 | End: 2021-08-12

## 2021-08-05 RX ORDER — CALCIUM CITRATE/VITAMIN D3 200MG-6.25
TABLET ORAL
Qty: 100 EACH | Refills: 1 | Status: SHIPPED | OUTPATIENT
Start: 2021-08-05 | End: 2021-08-05 | Stop reason: SDUPTHER

## 2021-08-05 NOTE — TELEPHONE ENCOUNTER
Pharmacy Name: [facility] [address] [facility] [facility] - [phone]  - [phone]      Pharmacy representative phone number: [phone]    What medication are you calling in regards to: True Metrix Blood Glucose Test test strip    What question does the pharmacy have: PHARMACY IS NEEDING THE TESTING FREQUENCY     Who is the provider that prescribed the medication:  ERICKA

## 2021-08-05 NOTE — TELEPHONE ENCOUNTER
Rx Refill Note  Requested Prescriptions     Pending Prescriptions Disp Refills   • True Metrix Blood Glucose Test test strip [Pharmacy Med Name: [facility] TRUE METRIX GLUCOSE TEST STRIP]       Sig: USE AS INSTRUCTED TO TEST BLOOD SUGAR      Last office visit with prescribing clinician: 6/24/2021      Next office visit with prescribing clinician: 9/9/2021            Yakov Mendez MA  08/05/21, 15:44 EDT

## 2021-08-06 ENCOUNTER — OFFICE VISIT (OUTPATIENT)
Dept: BARIATRICS/WEIGHT MGMT | Facility: CLINIC | Age: 36
End: 2021-08-06

## 2021-08-06 VITALS
BODY MASS INDEX: 40.93 KG/M2 | SYSTOLIC BLOOD PRESSURE: 124 MMHG | RESPIRATION RATE: 18 BRPM | TEMPERATURE: 98 F | WEIGHT: 208.5 LBS | HEIGHT: 60 IN | DIASTOLIC BLOOD PRESSURE: 80 MMHG | OXYGEN SATURATION: 98 % | HEART RATE: 70 BPM

## 2021-08-06 DIAGNOSIS — Z13.0 SCREENING, IRON DEFICIENCY ANEMIA: ICD-10-CM

## 2021-08-06 DIAGNOSIS — Z13.21 MALNUTRITION SCREEN: ICD-10-CM

## 2021-08-06 DIAGNOSIS — K91.2 POSTGASTRECTOMY MALABSORPTION: ICD-10-CM

## 2021-08-06 DIAGNOSIS — E55.9 HYPOVITAMINOSIS D: ICD-10-CM

## 2021-08-06 DIAGNOSIS — Z90.3 POSTGASTRECTOMY MALABSORPTION: ICD-10-CM

## 2021-08-06 DIAGNOSIS — R53.83 FATIGUE, UNSPECIFIED TYPE: Primary | ICD-10-CM

## 2021-08-06 DIAGNOSIS — M79.661 RIGHT CALF PAIN: ICD-10-CM

## 2021-08-06 PROCEDURE — 99024 POSTOP FOLLOW-UP VISIT: CPT | Performed by: SURGERY

## 2021-08-06 NOTE — PROGRESS NOTES
[facility] Bariatric Surgery  [address]    [facility] [address] [phone]        Patient Name: Frances Hartman.  YOB: 1985      Date of Visit: 08/06/2021      Reason for Visit:  Follow up dysphagia    HPI:  Frances Hartman is a 35 y.o. female s/p robotic assisted LSG by  on 6/15/21  She had been struggling with vomiting/dysphagia.    S/p 7/22/1 EGD with dilation of mild incisura stricture.  She had actually been improving in the days leading up to EGD.    C.o right sided calf pain x 2 weeks that is worsening.  Better if she elevates in the bed.  Also better with pain pill or muscle relaxer.  Denies any decrease or increase in activity.     No hx of VTE.    Oral intake is MUCH better since dilation.  Very happy.    Past Medical History:   Diagnosis Date   • Anxiety    • Asthma     prn inhalers, does not follow w/ pulmonary   • Chronic back pain     previously followed w/ pain management, now just prn Tylenol + Gabapentin   • Diabetes mellitus (CMS/HCC)     Type II, dx 2014, never on insulin, A1C 7.6   • Dyspepsia    • Dyspnea on exertion    • Fatigue    • Gastroparesis    • Heartburn     chronic, prn TUMS, denies prior eval   • Hirsutism    • Hx of radiation therapy     for treatments of Keloids   • Hypertension    • Irregular menses     infrequent spotting   • Leiomyoma, subserous     possible peduculated f3-4 cm fibroid on u/s at [facility]   • Migraines     botox q3 months, following Neurology @ [facility]   • Morbid obesity (CMS/HCC)    • Peripheral edema    • Polycystic ovaries     severe insulin resistance/hirsuitism   • Seasonal allergies    • Slow to wake up after anesthesia      Past Surgical History:   Procedure Laterality Date   • ENDOSCOPY N/A 6/15/2021    Procedure: ESOPHAGOGASTRODUODENOSCOPY;  Surgeon: Ayaka Andre MD;  Location: [facility];  Service: Robotics - [facility];  Laterality: N/A;   • GASTRIC SLEEVE LAPAROSCOPIC N/A  "6/15/2021    Procedure: GASTRIC SLEEVE LAPAROSCOPIC WITH DAVINCI ROBOT, LAPROSCOPIC HIATAL HERNIA REPAIR WITH DAVINCI ROBOT;  Surgeon: Ayaka Andre MD;  [facility];  Service: Robotics - [facility];  Laterality: N/A;   • KELOID EXCISION      1990,1993,1999,2015,2016,2019   • LAPAROSCOPIC CHOLECYSTECTOMY  2000    for stones   • RADIOFREQUENCY ABLATION  2019    x 2  for pt back   • UMBILICAL HERNIA REPAIR  1990   • WISDOM TOOTH EXTRACTION  1994     No outpatient medications have been marked as taking for the 8/6/21 encounter (Office Visit) with Ayaka Andre MD.     Current Facility-Administered Medications for the 8/6/21 encounter (Office Visit) with Ayaka Andre MD   Medication Dose Route Frequency Provider Last Rate Last Admin   • OnabotulinumtoxinA 200 Units  200 Units Intramuscular Q3 Months Shiela Coelho, APRN   200 Units at 06/11/21 1616     Allergies   Allergen Reactions   • Hydrochlorothiazide Swelling   • Monosodium Glutamate Swelling and Headache   • Oatmeal Other (See Comments)     Dx on allergy testing   • Amitriptyline Mental Status Change     memory gaps + neuropathy + hair loss   • Aspartame Nausea Only   • Augmentin [Amoxicillin-Pot Clavulanate] Other (See Comments)     Syncope, not sure if allergic to cephalosporins.   • Codeine Headache      Can tolerate hydrocodone, no other adverse reactions to other narcotics.   • Diclofenac Headache   • Doxycycline Rash and Hallucinations   • Eggs Or Egg-Derived Products Other (See Comments)     dx on allergy testing, but does not completely avoid   • Ibuprofen Other (See Comments)     \"makes me retain fluid and eventually go into CHF\"   • Naproxen Other (See Comments)     \"blackout\"       Social History     Socioeconomic History   • Marital status: Single     Spouse name: Not on file   • Number of children: Not on file   • Years of education: Not on file   • Highest education level: Not on file   Tobacco Use   • Smoking " status: Never Smoker   • Smokeless tobacco: Never Used   Substance and Sexual Activity   • Alcohol use: Not Currently     Alcohol/week: 0.0 standard drinks     Comment: Very rarely drink socially. At most 2 drinks per month.   • Drug use: No   • Sexual activity: Not Currently     Partners: Male     Birth control/protection: Condom, I.U.D.       Vitals:    08/06/21 1459   BP: 124/80   Pulse: 70   Resp: 18   Temp: 98 °F (36.7 °C)   SpO2: 98%     Weight 94.6 kg (208 lb 8 oz)  Body mass index is 40.72 kg/m².    Physical Exam  Constitutional:       General: She is not in acute distress.     Appearance: She is well-developed. She is not diaphoretic.   HENT:      Head: Normocephalic and atraumatic.      Mouth/Throat:      Pharynx: No oropharyngeal exudate.   Eyes:      Conjunctiva/sclera: Conjunctivae normal.      Pupils: Pupils are equal, round, and reactive to light.   Pulmonary:      Effort: Pulmonary effort is normal. No respiratory distress.   Abdominal:      General: There is no distension.      Palpations: Abdomen is soft.   Musculoskeletal:      Comments: No edema.  Negative Magdalene's sign b/l (no calf pain on passive flexion of foot)   Skin:     General: Skin is warm and dry.      Coloration: Skin is not pale.   Neurological:      Mental Status: She is alert and oriented to person, place, and time.      Cranial Nerves: No cranial nerve deficit.   Psychiatric:         Behavior: Behavior normal.         Thought Content: Thought content normal.           Assessment:      ICD-10-CM ICD-9-CM   1. Fatigue, unspecified type  R53.83 780.79   2. Hypovitaminosis D  E55.9 268.9   3. Malnutrition screen  Z13.21 V77.2   4. Screening, iron deficiency anemia  Z13.0 V78.0   5. Postgastrectomy malabsorption  K91.2 579.3    Z90.3    6. Right calf pain  M79.661 729.5       Plan:     Right calf pain does not have PE c/w DVT, but will order RLE Duplex to be sure.  Check 1 month labs today.  Will f/u on Duplex and lab results.  F/u in 6  weeks.

## 2021-08-10 LAB
25(OH)D3+25(OH)D2 SERPL-MCNC: 56.2 NG/ML (ref 30–100)
ALBUMIN SERPL-MCNC: 4.3 G/DL (ref 3.8–4.8)
ALBUMIN/GLOB SERPL: 1.4 {RATIO} (ref 1.2–2.2)
ALP SERPL-CCNC: 126 IU/L (ref 48–121)
ALT SERPL-CCNC: 56 IU/L (ref 0–32)
AST SERPL-CCNC: 38 IU/L (ref 0–40)
BASOPHILS # BLD AUTO: 0 X10E3/UL (ref 0–0.2)
BASOPHILS NFR BLD AUTO: 1 %
BILIRUB SERPL-MCNC: 0.3 MG/DL (ref 0–1.2)
BUN SERPL-MCNC: 7 MG/DL (ref 6–20)
BUN/CREAT SERPL: 8 (ref 9–23)
CALCIUM SERPL-MCNC: 9.9 MG/DL (ref 8.7–10.2)
CHLORIDE SERPL-SCNC: 107 MMOL/L (ref 96–106)
CO2 SERPL-SCNC: 21 MMOL/L (ref 20–29)
CREAT SERPL-MCNC: 0.91 MG/DL (ref 0.57–1)
EOSINOPHIL # BLD AUTO: 0.2 X10E3/UL (ref 0–0.4)
EOSINOPHIL NFR BLD AUTO: 2 %
ERYTHROCYTE [DISTWIDTH] IN BLOOD BY AUTOMATED COUNT: 13.5 % (ref 11.7–15.4)
FERRITIN SERPL-MCNC: 169 NG/ML (ref 15–150)
FOLATE SERPL-MCNC: 18.8 NG/ML
GLOBULIN SER CALC-MCNC: 3 G/DL (ref 1.5–4.5)
GLUCOSE SERPL-MCNC: 159 MG/DL (ref 65–99)
HCT VFR BLD AUTO: 40.3 % (ref 34–46.6)
HGB BLD-MCNC: 13.1 G/DL (ref 11.1–15.9)
IMM GRANULOCYTES # BLD AUTO: 0 X10E3/UL (ref 0–0.1)
IMM GRANULOCYTES NFR BLD AUTO: 0 %
IRON SERPL-MCNC: 78 UG/DL (ref 27–159)
LYMPHOCYTES # BLD AUTO: 2.8 X10E3/UL (ref 0.7–3.1)
LYMPHOCYTES NFR BLD AUTO: 38 %
Lab: NORMAL
MCH RBC QN AUTO: 30.3 PG (ref 26.6–33)
MCHC RBC AUTO-ENTMCNC: 32.5 G/DL (ref 31.5–35.7)
MCV RBC AUTO: 93 FL (ref 79–97)
METHYLMALONATE SERPL-SCNC: 116 NMOL/L (ref 0–378)
MONOCYTES # BLD AUTO: 0.5 X10E3/UL (ref 0.1–0.9)
MONOCYTES NFR BLD AUTO: 7 %
NEUTROPHILS # BLD AUTO: 4 X10E3/UL (ref 1.4–7)
NEUTROPHILS NFR BLD AUTO: 52 %
PLATELET # BLD AUTO: 169 X10E3/UL (ref 150–450)
POTASSIUM SERPL-SCNC: 4.7 MMOL/L (ref 3.5–5.2)
PREALB SERPL-MCNC: 28 MG/DL (ref 14–35)
PROT SERPL-MCNC: 7.3 G/DL (ref 6–8.5)
RBC # BLD AUTO: 4.32 X10E6/UL (ref 3.77–5.28)
SODIUM SERPL-SCNC: 145 MMOL/L (ref 134–144)
VIT B1 BLD-SCNC: 126.5 NMOL/L (ref 66.5–200)
WBC # BLD AUTO: 7.5 X10E3/UL (ref 3.4–10.8)

## 2021-08-12 ENCOUNTER — TELEPHONE (OUTPATIENT)
Dept: FAMILY MEDICINE CLINIC | Facility: CLINIC | Age: 36
End: 2021-08-12

## 2021-08-12 DIAGNOSIS — E11.65 TYPE 2 DIABETES MELLITUS WITH HYPERGLYCEMIA, WITHOUT LONG-TERM CURRENT USE OF INSULIN (HCC): ICD-10-CM

## 2021-08-12 DIAGNOSIS — R60.0 LOWER EXTREMITY EDEMA: Primary | ICD-10-CM

## 2021-08-12 RX ORDER — LANCETS 33 GAUGE
1 EACH MISCELLANEOUS 2 TIMES DAILY
Qty: 100 EACH | Refills: 2 | Status: SHIPPED | OUTPATIENT
Start: 2021-08-12 | End: 2021-10-14 | Stop reason: SDUPTHER

## 2021-08-12 NOTE — TELEPHONE ENCOUNTER
Caller: Frances Hartman    Relationship: Self    Best call back number:     [phone]     What medications are you currently taking:     N/A     When did you start taking these medications:     N/A    Which medication are you concerned about:      glucose blood (True Metrix Blood Glucose Test) test strip     TRUEplus Lancets 33G misc    PATIENT STATED THAT THE MEDICATIONS LISTED ABOVE ARE NOT COVERED BY HER INSURANCE    PATIENT STATED THAT THE PHARMACIST SENT A FAX YESTERDAY, 8/11/21    IN THE FAX PHARMACIST INCLUDED INFORMATION FOR A WHOLE NEW GLUCOSE SYSTEM THAT WOULD BE COVERED BY INSURANCE TO BE NEWLY PRESCRIBED BY MIGEL BARNETT    PATIENT STATED SHE IS OUT OF TEST STRIPS CURRENTLY    PATIENT STATED THIS IS URGENT     Who prescribed you this medication:     MIGEL BARNETT    What are your concerns:     PLEASE SEE ABOVE    How long have you been taking these medications:     N/A    How long have you had these concerns:     N/A    MIGEL BARNETT

## 2021-08-20 PROBLEM — Z12.4 SCREENING FOR CERVICAL CANCER: Status: ACTIVE | Noted: 2021-08-20

## 2021-08-25 ENCOUNTER — OFFICE VISIT (OUTPATIENT)
Dept: PSYCHIATRY | Facility: CLINIC | Age: 36
End: 2021-08-25

## 2021-08-25 DIAGNOSIS — R45.4 IRRITABLE: ICD-10-CM

## 2021-08-25 DIAGNOSIS — G89.29 OTHER CHRONIC PAIN: ICD-10-CM

## 2021-08-25 DIAGNOSIS — R45.4 ANGER: ICD-10-CM

## 2021-08-25 DIAGNOSIS — F33.0 MILD EPISODE OF RECURRENT MAJOR DEPRESSIVE DISORDER (HCC): Primary | ICD-10-CM

## 2021-08-25 PROCEDURE — 90834 PSYTX W PT 45 MINUTES: CPT | Performed by: PSYCHOLOGIST

## 2021-08-26 NOTE — PROGRESS NOTES
PROGRESS NOTE    Data:  Frances Hartman is a 35 y.o. female who met with the undersigned for a scheduled individual outpatient therapy session from 2:31 - 3:20pm.      Clinical Maneuvering/Intervention:      The pt talked about recent stressors, mainly related to her health: weight loss, weight loss surgery, feeling ill. She talked in great detail about recent events such as a second weight loss related surgery, trouble eating/swallowing, and back pain increasing. She talked in detail about feeling irritated/frustrated with not being able to get in with her pain doctor sooner and other issues related to medical care. Stressors were processed individually and in detail. Venting of frustrations was conducted in order to help the pt feel less tense emotionally and gain insight into issues. Feelings were processed and validated several times in session. Anger and frustrations were worked out first, the other interventions were conducted in order to build insight and move towards solutions. Perspective taking was conducted multiple times in order to help her feel less stuck, less overwhelmed, and see challenges as much more manageable. She was assisted wherever possible to apply her own skills/strengths to solve her own issues. Core issues of frustrations were addressed some as well such as impaired self-esteem. The pt was assisted in recognizing progress in order to show encouragement and promote motivation to keep making positive changes in life. She is losing weight, starting to like herself more (e.g., self-esteem is 'growing'), and she has had a couple of job interviews in order to move towards working again. The pt's strengths were identified in order to help identify abilities to use to better face/overcome challenges. Homework was assigned tailored to pt's needs. The pt expressed gratitude for today's session.    Mental Status Exam  Hygiene:  good  Dress: normal  Attitude:  cooperative and proactive  Motor  Activity: normal  Speech: normal  Mood:  tense and angry  Affect:  congruent  Thought Processes: normal  Thought Content:  normal  Suicidal Thoughts:  not endorsed  Homicidal Thoughts:  not endorsed  Crisis Safety Plan: not needed   Hallucinations:  none      Patient's Support Network Includes:  family, friends      Progress toward goal: there is evidence to suggest that she is battling depression, anger and irritability      Functional Status: moderate to high      Prognosis: good    Assessment      The pt presented to be struggling with depression, anger, and irritability. Chronic pain likely exacerbates emotional distress. She tends to benefit from highly supportive and strength-based counseling to start as self-esteem seems low. Assertiveness training will likely benefit her over time.       Plan      In order to diminish symptoms of depression, anger, and irritability, she will continue processing important things with a close friend (ongoing) and continue with counseling (ongoing), and 'stay the course' in terms of losing weight, getting pain treatment, and gaining employment.    Misty Nava, PhD, LP

## 2021-09-01 DIAGNOSIS — R21 RASH AND NONSPECIFIC SKIN ERUPTION: ICD-10-CM

## 2021-09-02 RX ORDER — FLUCONAZOLE 200 MG/1
TABLET ORAL
Qty: 180 TABLET | Refills: 0 | Status: SHIPPED | OUTPATIENT
Start: 2021-09-02 | End: 2021-10-14 | Stop reason: SDUPTHER

## 2021-09-03 ENCOUNTER — OFFICE VISIT (OUTPATIENT)
Dept: BEHAVIORAL HEALTH | Facility: CLINIC | Age: 36
End: 2021-09-03

## 2021-09-03 DIAGNOSIS — R63.8 DIFFICULTY EATING: ICD-10-CM

## 2021-09-03 DIAGNOSIS — R45.4 FEELING IRRITABLE: ICD-10-CM

## 2021-09-03 DIAGNOSIS — F33.0 MILD EPISODE OF RECURRENT MAJOR DEPRESSIVE DISORDER (HCC): Primary | ICD-10-CM

## 2021-09-03 DIAGNOSIS — G89.29 OTHER CHRONIC PAIN: ICD-10-CM

## 2021-09-03 PROCEDURE — 90834 PSYTX W PT 45 MINUTES: CPT | Performed by: PSYCHOLOGIST

## 2021-09-04 NOTE — PROGRESS NOTES
PROGRESS NOTE    Data:  Frances Hartman is a 35 y.o. female who met with the undersigned for a scheduled individual outpatient therapy session from 2:31 - 3:20pm.      Clinical Maneuvering/Intervention:      The pt talked about recent stressors, mainly related to her health: difficulty eating, asthma acting up, and feeling swollen. She also talked about stress related to an ongoing leak in her apartment that is likely ruining her things and could be causing a mold/air problem for her. Stressors were processed individually and in detail. Venting of frustrations was conducted in order to help the pt feel less tense emotionally and gain insight into issues. Feelings were processed and validated several times in session. Perspective taking was conducted multiple times in order to help her feel less stuck, less overwhelmed, and see challenges as much more manageable. Active listening was conducted in order to help the pt make sense of stressors and start moving towards potential solutions. The pt was assisted with finding solutions based on existing skill-set and abilities. The pt's strengths were identified in order to help identify abilities to use to better face/overcome challenges. An action plan was designed to empower the pt to know what to do. Simple steps of one, two, three were laid out in order to help the pt feel more in control of things and feel less stressed. Some humor was used in session as it is one of the pt's coping skills. Humor was used too, to help the pt see things as less challenging and more manageable than they first seemed. Humor was used as well, to model use of the skill as a way to decrease tension ongoing. Homework was assigned tailored to pt's needs. The pt expressed gratitude for today's session.    Mental Status Exam  Hygiene:  good  Dress: normal  Attitude:  cooperative and proactive  Motor Activity: normal  Speech: normal  Mood:  tense   Affect:  congruent  Thought Processes:  normal  Thought Content:  normal  Suicidal Thoughts:  not endorsed  Homicidal Thoughts:  not endorsed  Crisis Safety Plan: not needed   Hallucinations:  none      Patient's Support Network Includes:  family, friends      Progress toward goal: there is evidence to suggest that she is battling depression, anger and irritability      Functional Status: moderate to high      Prognosis: good    Assessment      The pt presented to be struggling with depression and irritability. Chronic pain likely exacerbates emotional distress. She tends to benefit from highly supportive and strength-based counseling to start as self-esteem seems low. Assertiveness training will likely benefit her over time.       Plan      In order to diminish symptoms of depression and irritability, she will work her plan of action developed today, including the 'first things first' notion of dealing with stress (this week). She will focus on meeting primary needs first (live with mother for a few days, get apartment fixed, and work on continuing to find foods she can 'keep down' (this week and next as the case may be).    Misty Nava, PhD, LP

## 2021-09-08 ENCOUNTER — HOSPITAL ENCOUNTER (OUTPATIENT)
Dept: CARDIOLOGY | Facility: HOSPITAL | Age: 36
Discharge: HOME | End: 2021-09-08
Admitting: SURGERY

## 2021-09-08 DIAGNOSIS — R60.0 LOWER EXTREMITY EDEMA: ICD-10-CM

## 2021-09-08 PROCEDURE — 93971 EXTREMITY STUDY: CPT

## 2021-09-08 PROCEDURE — 93971 EXTREMITY STUDY: CPT | Performed by: INTERNAL MEDICINE

## 2021-09-09 ENCOUNTER — TELEPHONE (OUTPATIENT)
Dept: NEUROLOGY | Facility: CLINIC | Age: 36
End: 2021-09-09

## 2021-09-09 LAB
BH CV ECHO MEAS - BSA(HAYCOCK): 2.1 M^2
BH CV ECHO MEAS - BSA: 1.9 M^2
BH CV ECHO MEAS - BZI_BMI: 40.6 KILOGRAMS/M^2
BH CV ECHO MEAS - BZI_METRIC_HEIGHT: 152.4 CM
BH CV ECHO MEAS - BZI_METRIC_WEIGHT: 94.3 KG
BH CV LOW VAS RIGHT PROFUNDA FEMORAL SPONT: 1
BH CV LOWER VASCULAR LEFT COMMON FEMORAL AUGMENT: NORMAL
BH CV LOWER VASCULAR LEFT COMMON FEMORAL COMPRESS: NORMAL
BH CV LOWER VASCULAR LEFT COMMON FEMORAL PHASIC: NORMAL
BH CV LOWER VASCULAR LEFT COMMON FEMORAL SPONT: NORMAL
BH CV LOWER VASCULAR RIGHT COMMON FEMORAL AUGMENT: NORMAL
BH CV LOWER VASCULAR RIGHT COMMON FEMORAL COMPRESS: NORMAL
BH CV LOWER VASCULAR RIGHT COMMON FEMORAL PHASIC: NORMAL
BH CV LOWER VASCULAR RIGHT COMMON FEMORAL SPONT: NORMAL
BH CV LOWER VASCULAR RIGHT DISTAL FEMORAL AUGMENT: NORMAL
BH CV LOWER VASCULAR RIGHT DISTAL FEMORAL COMPRESS: NORMAL
BH CV LOWER VASCULAR RIGHT DISTAL FEMORAL PHASIC: NORMAL
BH CV LOWER VASCULAR RIGHT DISTAL FEMORAL SPONT: NORMAL
BH CV LOWER VASCULAR RIGHT GASTRONEMIUS COMPRESS: NORMAL
BH CV LOWER VASCULAR RIGHT GREATER SAPH AK COMPRESS: NORMAL
BH CV LOWER VASCULAR RIGHT GREATER SAPH BK COMPRESS: NORMAL
BH CV LOWER VASCULAR RIGHT LESSER SAPH COMPRESS: NORMAL
BH CV LOWER VASCULAR RIGHT MID FEMORAL AUGMENT: NORMAL
BH CV LOWER VASCULAR RIGHT MID FEMORAL COMPRESS: NORMAL
BH CV LOWER VASCULAR RIGHT MID FEMORAL PHASIC: NORMAL
BH CV LOWER VASCULAR RIGHT MID FEMORAL SPONT: NORMAL
BH CV LOWER VASCULAR RIGHT PERONEAL COMPRESS: NORMAL
BH CV LOWER VASCULAR RIGHT POPLITEAL AUGMENT: NORMAL
BH CV LOWER VASCULAR RIGHT POPLITEAL COMPRESS: NORMAL
BH CV LOWER VASCULAR RIGHT POPLITEAL PHASIC: NORMAL
BH CV LOWER VASCULAR RIGHT POPLITEAL SPONT: NORMAL
BH CV LOWER VASCULAR RIGHT POSTERIOR TIBIAL COMPRESS: NORMAL
BH CV LOWER VASCULAR RIGHT PROFUNDA FEMORAL AUGMENT: NORMAL
BH CV LOWER VASCULAR RIGHT PROFUNDA FEMORAL COMPRESS: NORMAL
BH CV LOWER VASCULAR RIGHT PROFUNDA FEMORAL PHASIC: NORMAL
BH CV LOWER VASCULAR RIGHT PROFUNDA FEMORAL SPONT: NORMAL
BH CV LOWER VASCULAR RIGHT PROFUNDA FEMORAL THROMBUS: NORMAL
BH CV LOWER VASCULAR RIGHT PROXIMAL FEMORAL AUGMENT: NORMAL
BH CV LOWER VASCULAR RIGHT PROXIMAL FEMORAL COMPRESS: NORMAL
BH CV LOWER VASCULAR RIGHT PROXIMAL FEMORAL PHASIC: NORMAL
BH CV LOWER VASCULAR RIGHT PROXIMAL FEMORAL SPONT: NORMAL
BH CV LOWER VASCULAR RIGHT SAPHENOFEMORAL JUNCTION AUGMENT: NORMAL
BH CV LOWER VASCULAR RIGHT SAPHENOFEMORAL JUNCTION COMPRESS: NORMAL
BH CV LOWER VASCULAR RIGHT SAPHENOFEMORAL JUNCTION PHASIC: NORMAL
BH CV LOWER VASCULAR RIGHT SAPHENOFEMORAL JUNCTION SPONT: NORMAL
MAXIMAL PREDICTED HEART RATE: 183 BPM
STRESS TARGET HR: 156 BPM

## 2021-09-09 NOTE — TELEPHONE ENCOUNTER
Eliquis and Botox will not interfere. She may bleed a bit more than usual but she does not have to stop her medication prior to her injections.

## 2021-09-09 NOTE — TELEPHONE ENCOUNTER
Provider: LOUISE / DR. POZO    Caller: KAMALA    Relationship to Patient:[facility] [employer]    Phone Number: [phone]    Reason for Call:     SAYS ANOTHER DOCTOR HAS PRESCRIBED HER BLOOD THINNERS THAT SHE IS TO START TODAY. WANTS TO KNOW IF/HOW THIS MEDICATION WILL AFFECT HER BOTOX AND IF SHE NEEDS TO STOP THEM BEFORE RECEIVING HER BOTOX ON 9-14

## 2021-09-10 ENCOUNTER — OFFICE VISIT (OUTPATIENT)
Dept: FAMILY MEDICINE CLINIC | Facility: CLINIC | Age: 36
End: 2021-09-10

## 2021-09-10 VITALS
OXYGEN SATURATION: 96 % | HEART RATE: 66 BPM | WEIGHT: 201.2 LBS | BODY MASS INDEX: 39.5 KG/M2 | SYSTOLIC BLOOD PRESSURE: 120 MMHG | DIASTOLIC BLOOD PRESSURE: 80 MMHG | HEIGHT: 60 IN | TEMPERATURE: 97.5 F

## 2021-09-10 DIAGNOSIS — I10 ESSENTIAL HYPERTENSION: Primary | ICD-10-CM

## 2021-09-10 DIAGNOSIS — E11.65 TYPE 2 DIABETES MELLITUS WITH HYPERGLYCEMIA, WITHOUT LONG-TERM CURRENT USE OF INSULIN (HCC): ICD-10-CM

## 2021-09-10 DIAGNOSIS — I82.511 CHRONIC DEEP VEIN THROMBOSIS (DVT) OF FEMORAL VEIN OF RIGHT LOWER EXTREMITY (HCC): ICD-10-CM

## 2021-09-10 DIAGNOSIS — Z98.84 S/P BARIATRIC SURGERY: ICD-10-CM

## 2021-09-10 LAB — HBA1C MFR BLD: 6.3 %

## 2021-09-10 PROCEDURE — 99214 OFFICE O/P EST MOD 30 MIN: CPT | Performed by: PHYSICIAN ASSISTANT

## 2021-09-10 RX ORDER — BLOOD-GLUCOSE METER
EACH MISCELLANEOUS
COMMUNITY
Start: 2021-08-12

## 2021-09-10 RX ORDER — APIXABAN 5 MG/1
5 TABLET, FILM COATED ORAL EVERY 12 HOURS SCHEDULED
Qty: 90 TABLET | Refills: 0 | Status: SHIPPED | OUTPATIENT
Start: 2021-09-10 | End: 2021-10-15

## 2021-09-10 RX ORDER — APIXABAN 5 MG/1
TABLET, FILM COATED ORAL
COMMUNITY
Start: 2021-09-08 | End: 2021-09-10 | Stop reason: SDUPTHER

## 2021-09-10 NOTE — PROGRESS NOTES
"Chief Complaint   Patient presents with   • Diabetes   • Hypertension   • Medication Problem     Questions about Glipizide, metformin, and eliquis        HPI     Frances Hartman is a pleasant 35 y.o. female who is here for routine follow-up of diabetes type 2, hypertension and new DVT of right femoral vein.  Patient recently underwent bariatric surgery and is still recovering from this.  She continues to lose weight.  She recently had duplex ultrasound of her right leg which showed a partial clot in her femoral artery.  This was deemed \"chronic\".  She denies any previous blood clot history.  She believes this probably occurred after surgery.  She was placed on Eliquis 5 mg twice daily by her surgeon and told to have PCP manage this.    Patient is taking Metformin and glipizide infrequently.  She states she takes glipizide if her glucose values are greater than 150.  She denies any symptoms of hypoglycemia.  She admits she is not eating very healthy because the food she can tolerate have a lot of sugar or carbohydrates.    Patient's blood pressure is stable well-controlled.  She is compliant on her medications.  She does monitor it frequently at home.    Past Medical History:   Diagnosis Date   • Anxiety    • Asthma     prn inhalers, does not follow w/ pulmonary   • Chronic back pain     previously followed w/ pain management, now just prn Tylenol + Gabapentin   • Chronic deep vein thrombosis (DVT) of femoral vein of right lower extremity (CMS/[facility]) 9/10/2021   • Diabetes mellitus (CMS/HCC)     Type II, dx 2014, never on insulin, A1C 7.6   • Dyspepsia    • Dyspnea on exertion    • Fatigue    • Gastroparesis    • Heartburn     chronic, prn TUMS, denies prior eval   • Hirsutism    • Hx of radiation therapy     for treatments of Keloids   • Hypertension    • Irregular menses     infrequent spotting   • Leiomyoma, subserous     possible peduculated f3-4 cm fibroid on u/s at [facility]   • Migraines     botox q3 months, " following Neurology @ [facility]   • Morbid obesity (CMS/HCC)    • Peripheral edema    • Polycystic ovaries     severe insulin resistance/hirsuitism   • Seasonal allergies    • Slow to wake up after anesthesia        Past Surgical History:   Procedure Laterality Date   • ENDOSCOPY N/A 6/15/2021    Procedure: ESOPHAGOGASTRODUODENOSCOPY;  Surgeon: Ayaka Andre MD;  Location:  [facility];  Service: Robotics - DaVinci;  Laterality: N/A;   • GASTRIC SLEEVE LAPAROSCOPIC N/A 6/15/2021    Procedure: GASTRIC SLEEVE LAPAROSCOPIC WITH DAVINCI ROBOT, LAPROSCOPIC HIATAL HERNIA REPAIR WITH DAVINCI ROBOT;  Surgeon: Ayaka Andre MD;  Location:  [facility];  Service: Robotics - DaVinci;  Laterality: N/A;   • KELOID EXCISION      1990,1993,1999,2015,2016,2019   • LAPAROSCOPIC CHOLECYSTECTOMY  2000    for stones   • RADIOFREQUENCY ABLATION  2019    x 2  for pt back   • UMBILICAL HERNIA REPAIR  1990   • WISDOM TOOTH EXTRACTION  1994       Family History   Problem Relation Age of Onset   • Arthritis Mother    • Diabetes Mother    • Obesity Mother    • Arrhythmia Mother    • Hypertension Mother    • Asthma Mother    • Dementia Father    • Heart attack Father    • Arrhythmia Father    • Heart disease Father    • Stroke Other         CVA   • Obesity Other    • Ovarian cancer Maternal Aunt         40's   • Breast cancer Paternal Aunt         30's   • Heart disease Other    • Hypertension Other    • Endometrial cancer Neg Hx        Social History     Socioeconomic History   • Marital status: Single     Spouse name: Not on file   • Number of children: Not on file   • Years of education: Not on file   • Highest education level: Not on file   Tobacco Use   • Smoking status: Never Smoker   • Smokeless tobacco: Never Used   Substance and Sexual Activity   • Alcohol use: Not Currently     Alcohol/week: 0.0 standard drinks     Comment: Very rarely drink socially. At most 2 drinks per month.   • Drug use: No   • Sexual activity: Not  "Currently     Partners: Male     Birth control/protection: Condom, I.U.D.       Allergies   Allergen Reactions   • Hydrochlorothiazide Swelling   • Monosodium Glutamate Swelling and Headache   • Oatmeal Other (See Comments)     Dx on allergy testing   • Amitriptyline Mental Status Change     memory gaps + neuropathy + hair loss   • Aspartame Nausea Only   • Augmentin [Amoxicillin-Pot Clavulanate] Other (See Comments)     Syncope, not sure if allergic to cephalosporins.   • Codeine Headache      Can tolerate hydrocodone, no other adverse reactions to other narcotics.   • Diclofenac Headache   • Doxycycline Rash and Hallucinations   • Eggs Or Egg-Derived Products Other (See Comments)     dx on allergy testing, but does not completely avoid   • Ibuprofen Other (See Comments)     \"makes me retain fluid and eventually go into CHF\"   • Naproxen Other (See Comments)     \"blackout\"       ROS  Review of Systems   Constitutional: Negative for chills and fever.   Eyes: Negative for visual disturbance.   Respiratory: Negative for cough, shortness of breath and wheezing.    Cardiovascular: Negative for chest pain, palpitations and leg swelling.   Gastrointestinal: Positive for nausea and indigestion.   Endocrine: Negative for polydipsia, polyphagia and polyuria.       Vitals:    09/10/21 1400   BP: 120/80   Pulse: 66   Temp: 97.5 °F (36.4 °C)   SpO2: 96%   Weight: 91.3 kg (201 lb 3.2 oz)   Height: 152.4 cm (60\")     Body mass index is 39.29 kg/m².    Current Outpatient Medications on File Prior to Visit   Medication Sig Dispense Refill   • Acetaminophen (TYLENOL ARTHRITIS PAIN PO) Take  by mouth As Needed.     • acetaminophen (TYLENOL) 500 MG tablet Take 500-1,000 mg by mouth Every 6 (Six) Hours As Needed for Mild Pain .     • albuterol (PROVENTIL HFA;VENTOLIN HFA) 108 (90 BASE) MCG/ACT inhaler Inhale 2 puffs Every 4 (Four) Hours As Needed for wheezing.     • ascorbic acid (VITAMIN C) 500 MG tablet Take 500 mg by mouth Daily.   "   • Blood Glucose Monitoring Suppl (ONE TOUCH ULTRA 2) w/Device kit      • Blood Glucose Monitoring Suppl device Use BID to test blood sugars dx e11.65 1 each 0   • calcium carbonate EX (Antacid Flavor Chews) 750 MG chewable tablet As Needed.     • Calcium Citrate-Vitamin D (CALCIUM CITRATE + D PO) Take 2 tablets by mouth Daily.     • carvedilol (COREG) 12.5 MG tablet Take 1 tablet by mouth 2 (Two) Times a Day With Meals. 180 tablet 1   • cyclobenzaprine (FLEXERIL) 10 MG tablet Take 1 tablet by mouth 3 (Three) Times a Day As Needed for Muscle Spasms. 30 tablet 0   • diphenhydrAMINE (BENADRYL) 25 mg capsule Take 25 mg by mouth Every 6 (Six) Hours As Needed for itching.     • docusate sodium (COLACE) 100 MG capsule Take 100-200 mg by mouth Daily.     • ferrous sulfate 325 (65 Fe) MG tablet Take 1 tablet by mouth Daily. 30 tablet 5   • fluconazole (DIFLUCAN) 200 MG tablet TAKE ONE TABLET BY MOUTH TWICE A  tablet 0   • gabapentin (NEURONTIN) 300 MG capsule Take 300 mg by mouth Daily As Needed.     • glucose blood test strip USe BID to test blood sugar DX.e11.65 100 each 3   • guaiFENesin (HUMIBID 3) 400 MG tablet Take 200 mg by mouth As Needed.     • HYDROcodone-acetaminophen (NORCO) 5-325 MG per tablet Take 1-2 tablets by mouth Every 6 (Six) Hours As Needed for Severe Pain . (Patient taking differently: Take 1-2 tablets by mouth 2 (Two) Times a Day As Needed for Severe Pain .) 15 tablet 0   • KETOTIFEN FUMARATE OP Apply  to eye(s) as directed by provider Daily As Needed.     • Lancets 33G misc 1 Each/kg 2 (two) times a day. To test blood sugars DxE11.65 100 each 2   • levonorgestrel (Mirena, 52 MG,) 20 MCG/24HR IUD      • loratadine (CLARITIN) 10 MG tablet Take 1 tablet by mouth Daily. 90 tablet 3   • melatonin 1 MG tablet Take 5 mg by mouth As Needed.     • metFORMIN ER (GLUCOPHAGE-XR) 500 MG 24 hr tablet Take 1 tablet by mouth Daily With Dinner. 90 tablet 1   • multivitamin (Multi Vitamin Daily) tablet tablet  1 tablet Daily.     • OnabotulinumtoxinA (Botox) 200 units reconstituted solution FOR . PHYSICIAN TO INJECT UP  UNITS INTRAMUSCULARLY INTO HEAD, NECK AND SHOULDERS EVERY 90 DAYS. 1 each 3   • simethicone (MYLICON) 125 MG chewable tablet Chew 125 mg Every 6 (Six) Hours As Needed for Flatulence.     • spironolactone (ALDACTONE) 100 MG tablet Take 1 tablet by mouth Every Other Day. Takes 30 days on,  off 30 days. (Patient taking differently: Take 100 mg by mouth Every Other Day. Or Takes 30 days on,  off 30 days.) 45 tablet 1   • vitamin B-12 (CYANOCOBALAMIN) 100 MCG tablet Take 50 mcg by mouth Daily.     • [DISCONTINUED] Eliquis 5 MG tablet tablet        Current Facility-Administered Medications on File Prior to Visit   Medication Dose Route Frequency Provider Last Rate Last Admin   • OnabotulinumtoxinA 200 Units  200 Units Intramuscular Q3 Months Shiela Coelho APRN   200 Units at 06/11/21 1616       Results for orders placed or performed in visit on 09/10/21   POC Glycosylated Hemoglobin (Hb A1C)    Specimen: Blood   Result Value Ref Range    Hemoglobin A1C 6.3 %       PE    Physical Exam  Vitals reviewed.   Constitutional:       General: She is not in acute distress.     Appearance: Normal appearance. She is well-developed. She is obese. She is not ill-appearing or diaphoretic.   HENT:      Head: Normocephalic and atraumatic.   Eyes:      Extraocular Movements: Extraocular movements intact.      Conjunctiva/sclera: Conjunctivae normal.   Pulmonary:      Effort: No respiratory distress.   Musculoskeletal:         General: Normal range of motion.      Cervical back: Normal range of motion.      Right lower leg: No edema.      Left lower leg: No edema.   Skin:     General: Skin is warm.      Findings: No erythema or rash.   Neurological:      General: No focal deficit present.      Mental Status: She is alert.   Psychiatric:         Attention and Perception: She is attentive.          "Mood and Affect: Mood normal.         Speech: Speech normal.         Behavior: Behavior normal. Behavior is cooperative.         Thought Content: Thought content normal.         Judgment: Judgment normal.         A/P    Diagnoses and all orders for this visit:    1. Essential hypertension (Primary)  Stable well-controlled.  Compliant on medication.    2. Type 2 diabetes mellitus with hyperglycemia, without long-term current use of insulin (CMS/[facility])  -     POC Glycosylated Hemoglobin (Hb A1C)  Hemoglobin A1c is 6.3% today.  Recommend patient discontinue glipizide completely as this is most likely to cause hypoglycemia events.  Continue Metformin 500 mg with dinner.  Return in 3 months.    3. Chronic deep vein thrombosis (DVT) of femoral vein of right lower extremity (CMS/[facility])  -     Eliquis 5 MG tablet tablet; Take 1 tablet by mouth Every 12 (Twelve) Hours.  Dispense: 90 tablet; Refill: 0  Recently diagnosed partial femoral vein occlusion of right lower extremity.  Ultrasound findings state \"chronic\".  No known history of previous blood clots.  Most likely due to recent surgery.  On eliquis 5 mg BID.  Continue for 3 months and return to office for follow-up.    4. S/P bariatric surgery  Has ongoing weight loss.  Still following with bariatric surgery.       Plan of care reviewed with patient at the conclusion of today's visit. Education was provided regarding diagnosis, management and any prescribed or recommended OTC medications.  Patient verbalizes understanding of and agreement with management plan.    Return in about 3 months (around 12/10/2021) for Recheck, DM/weight loss.     Lesli Ramirez PA-C  "

## 2021-09-13 DIAGNOSIS — I10 ESSENTIAL HYPERTENSION: ICD-10-CM

## 2021-09-13 RX ORDER — CARVEDILOL 6.25 MG/1
6.25 TABLET ORAL 2 TIMES DAILY WITH MEALS
Qty: 180 TABLET | Refills: 0 | Status: SHIPPED | OUTPATIENT
Start: 2021-09-13 | End: 2021-10-14 | Stop reason: SDUPTHER

## 2021-09-14 ENCOUNTER — PROCEDURE VISIT (OUTPATIENT)
Dept: NEUROLOGY | Facility: CLINIC | Age: 36
End: 2021-09-14

## 2021-09-14 DIAGNOSIS — G43.709 CHRONIC MIGRAINE WITHOUT AURA WITHOUT STATUS MIGRAINOSUS, NOT INTRACTABLE: Primary | ICD-10-CM

## 2021-09-14 PROCEDURE — 64615 CHEMODENERV MUSC MIGRAINE: CPT | Performed by: NURSE PRACTITIONER

## 2021-09-14 NOTE — PROGRESS NOTES
Botulinum Toxin Injection Procedure    History:  Response to treatment has reduced HA's at least 7 fewer days a month and/or 100 hours fewer each month.     Pre-operative diagnosis: headache    Post-operative diagnosis: Same    Procedure: Chemical neurolysis    After risks and benefits were explained including bleeding, infection, worsening of pain, damage to the areas being injected, weakness of muscles, loss of muscle control, dysphagia if injecting the head or neck, facial droop if injecting the facial area, painful injection, allergic or other reaction to the medications being injected, and the failure of the procedure to help the problem, a signed consent was obtained.      The patient was placed in a comfortable area and the sites to be treated were identified. A time out was called and performed. The area to be treated was prepped three times with alcohol and the alcohol allowed to dry. Next, a 30 gauge needle was used to inject the medication in the area to be treated.    Area(s) injected: frontalis muscle, semispinalis capitis muscle and temporallis muscle    Medications used: botulinum toxin 200 mu, 4 mL of saline used to dilute the botulinum toxin.      The patient did tolerate the procedure well. There were no complications.       Physical Examination    Current Treatment (Units)    5 units on the right and 5 units on the left  Procerus 5 units  Frontalis 10 units on the right and 10 units on the left  Temporalis 25 units on the right and 25 units on the left  Occipitalis 25 units on the right and 25 units on the left  Cervical Paraspinal 10 units on the right and 10 units on the left  EMG Guidance none .   Complications: none .   The total amount injected in units is 155 .   The total amount wasted in units is 45 .   The total amount submitted in units is 200 .   Botox was supplied by the patient.

## 2021-09-22 ENCOUNTER — OFFICE VISIT (OUTPATIENT)
Dept: BARIATRICS/WEIGHT MGMT | Facility: CLINIC | Age: 36
End: 2021-09-22

## 2021-09-22 ENCOUNTER — OFFICE VISIT (OUTPATIENT)
Dept: BEHAVIORAL HEALTH | Facility: CLINIC | Age: 36
End: 2021-09-22

## 2021-09-22 VITALS
RESPIRATION RATE: 18 BRPM | WEIGHT: 194 LBS | HEART RATE: 62 BPM | BODY MASS INDEX: 38.09 KG/M2 | SYSTOLIC BLOOD PRESSURE: 116 MMHG | DIASTOLIC BLOOD PRESSURE: 64 MMHG | HEIGHT: 60 IN | TEMPERATURE: 97.6 F | OXYGEN SATURATION: 99 %

## 2021-09-22 DIAGNOSIS — R45.4 FEELING IRRITABLE: ICD-10-CM

## 2021-09-22 DIAGNOSIS — R53.83 FATIGUE, UNSPECIFIED TYPE: Primary | ICD-10-CM

## 2021-09-22 DIAGNOSIS — F33.0 MILD EPISODE OF RECURRENT MAJOR DEPRESSIVE DISORDER (HCC): Primary | ICD-10-CM

## 2021-09-22 DIAGNOSIS — Z90.3 POSTGASTRECTOMY MALABSORPTION: ICD-10-CM

## 2021-09-22 DIAGNOSIS — E55.9 HYPOVITAMINOSIS D: ICD-10-CM

## 2021-09-22 DIAGNOSIS — K91.2 POSTGASTRECTOMY MALABSORPTION: ICD-10-CM

## 2021-09-22 DIAGNOSIS — Z13.0 SCREENING, IRON DEFICIENCY ANEMIA: ICD-10-CM

## 2021-09-22 DIAGNOSIS — Z13.21 MALNUTRITION SCREEN: ICD-10-CM

## 2021-09-22 PROCEDURE — 99214 OFFICE O/P EST MOD 30 MIN: CPT | Performed by: SURGERY

## 2021-09-22 PROCEDURE — 90834 PSYTX W PT 45 MINUTES: CPT | Performed by: PSYCHOLOGIST

## 2021-09-22 NOTE — PROGRESS NOTES
"[facility] Bariatric Surgery  [address]    [facility] [id number] [phone]        Patient Name:  Frances Hartman.  :  1985      Date of Visit: 2021      Reason for Visit:   3 months postop     HPI: Frances Hartmna is a 35 y.o. female s/p robotic assisted LSG by  on 6/15/21      From LOV note:  \"She had been struggling with vomiting/dysphagia.    S/p  EGD with dilation of mild incisura stricture.  She had actually been improving in the days leading up to EGD.    C.o right sided calf pain x 2 weeks that is worsening.  Better if she elevates in the bed.  Also better with pain pill or muscle relaxer.  Denies any decrease or increase in activity.     No hx of VTE.    Oral intake is MUCH better since dilation.  Very happy.\"    21 Update:  I had ordered Duplex U/S, and there was actually a 4 week delay from time I ordered U/S to the time it was done.  She was found to have a chronic DVT in right deep femoral vein.  She was instructed to return to PCP for recs on anticoagulation. She was started on Eliquis.  She notes some spotting despite being on Mirena.  The plan is to be on for 3 months, then repeat U/S.    We discussed her complaints about her hospital stay: nurses were not attentive and took a long time to get to her when she called.    Since I saw her last, she was able to eat without dysphagia, but had vomiting with every meal until about 1 week ago. Then she could do a little better.  Has some days with nausea, but has advanced diet very slowly and is doing well now.  Drinking is much easier.     Denies GERD.  Ran out of PPI, no longer taking.    Getting ?? g prot/day.  She has protein with every meal. Drinking 64 fluid oz/day.  1 month labs revealed  no vitamin deficiencies.  Stopped taking vitamins.  Did not realize she was supposed to stay on them.  Also, had some malaise with taking all of the pills, not really nausea.  " Exercise: walking--not sure how many minutes at a time.  Takes trash to dumpster, takes the long way back.  Walks up and down aisles in the store.  Fearful to do anything else lest she dislodge the clot.    Still takes metformin.  Off glipizide.  Has noted glucose variability.  Frustrated b/c she cannot tolerate starches very well, which she uses to bring up low blood sugar.     Presurgery weight: 231 pounds.  Today's weight is 88 kg (194 lb) pounds, today's  Body mass index is 37.89 kg/m²., and weight loss since surgery is 37 pounds.      Past Medical History:   Diagnosis Date   • Anxiety    • Asthma     prn inhalers, does not follow w/ pulmonary   • Chronic back pain     previously followed w/ pain management, now just prn Tylenol + Gabapentin   • Chronic deep vein thrombosis (DVT) of femoral vein of right lower extremity (CMS/HCC) 9/10/2021   • Diabetes mellitus (CMS/HCC)     Type II, dx 2014, never on insulin, A1C 7.6   • Dyspepsia    • Dyspnea on exertion    • Fatigue    • Gastroparesis    • Heartburn     chronic, prn TUMS, denies prior eval   • Hirsutism    • Hx of radiation therapy     for treatments of Keloids   • Hypertension    • Irregular menses     infrequent spotting   • Leiomyoma, subserous     possible peduculated f3-4 cm fibroid on u/s at [facility]   • Migraines     botox q3 months, following Neurology @ [facility]   • Morbid obesity (CMS/HCC)    • Peripheral edema    • Polycystic ovaries     severe insulin resistance/hirsuitism   • Seasonal allergies    • Slow to wake up after anesthesia      Past Surgical History:   Procedure Laterality Date   • ENDOSCOPY N/A 6/15/2021    Procedure: ESOPHAGOGASTRODUODENOSCOPY;  Surgeon: Ayaka Andre MD;  Location: [facility];  Service: Robotics - DaVinci;  Laterality: N/A;   • GASTRIC SLEEVE LAPAROSCOPIC N/A 6/15/2021    Procedure: GASTRIC SLEEVE LAPAROSCOPIC WITH DAVINCI ROBOT, LAPROSCOPIC HIATAL HERNIA REPAIR WITH DAVINCI ROBOT;  Surgeon: Ayaka Andre MD;   [facility]: [facility];  Service: Robotics - DaVinci;  Laterality: N/A;   • KELOID EXCISION      1990,1993,1999,2015,2016,2019   • LAPAROSCOPIC CHOLECYSTECTOMY  2000    for stones   • RADIOFREQUENCY ABLATION  2019    x 2  for pt back   • UMBILICAL HERNIA REPAIR  1990   • WISDOM TOOTH EXTRACTION  1994     Outpatient Medications Marked as Taking for the 9/22/21 encounter (Office Visit) with Ayaka Andre MD   Medication Sig Dispense Refill   • Acetaminophen (TYLENOL ARTHRITIS PAIN PO) Take  by mouth As Needed.     • acetaminophen (TYLENOL) 500 MG tablet Take 500-1,000 mg by mouth Every 6 (Six) Hours As Needed for Mild Pain .     • albuterol (PROVENTIL HFA;VENTOLIN HFA) 108 (90 BASE) MCG/ACT inhaler Inhale 2 puffs Every 4 (Four) Hours As Needed for wheezing.     • carvedilol (COREG) 6.25 MG tablet Take 1 tablet by mouth 2 (Two) Times a Day With Meals. 180 tablet 0   • cyclobenzaprine (FLEXERIL) 10 MG tablet Take 1 tablet by mouth 3 (Three) Times a Day As Needed for Muscle Spasms. 30 tablet 0   • diphenhydrAMINE (BENADRYL) 25 mg capsule Take 25 mg by mouth Every 6 (Six) Hours As Needed for itching.     • docusate sodium (COLACE) 100 MG capsule Take 100-200 mg by mouth Daily.     • Eliquis 5 MG tablet tablet Take 1 tablet by mouth Every 12 (Twelve) Hours. 90 tablet 0   • fluconazole (DIFLUCAN) 200 MG tablet TAKE ONE TABLET BY MOUTH TWICE A  tablet 0   • gabapentin (NEURONTIN) 300 MG capsule Take 300 mg by mouth Daily As Needed.     • guaiFENesin (HUMIBID 3) 400 MG tablet Take 200 mg by mouth As Needed.     • HYDROcodone-acetaminophen (NORCO) 5-325 MG per tablet Take 1-2 tablets by mouth Every 6 (Six) Hours As Needed for Severe Pain . (Patient taking differently: Take 1-2 tablets by mouth 2 (Two) Times a Day As Needed for Severe Pain .) 15 tablet 0   • levonorgestrel (Mirena, 52 MG,) 20 MCG/24HR IUD      • loratadine (CLARITIN) 10 MG tablet Take 1 tablet by mouth Daily. 90 tablet 3   • melatonin 1 MG tablet  "Take 5 mg by mouth As Needed.     • metFORMIN ER (GLUCOPHAGE-XR) 500 MG 24 hr tablet Take 1 tablet by mouth Daily With Dinner. 90 tablet 1   • OnabotulinumtoxinA (Botox) 200 units reconstituted solution FOR . PHYSICIAN TO INJECT UP  UNITS INTRAMUSCULARLY INTO HEAD, NECK AND SHOULDERS EVERY 90 DAYS. 1 each 3   • spironolactone (ALDACTONE) 100 MG tablet Take 1 tablet by mouth Every Other Day. Takes 30 days on,  off 30 days. (Patient taking differently: Take 100 mg by mouth Every Other Day. Or Takes 30 days on,  off 30 days.) 45 tablet 1     Current Facility-Administered Medications for the 9/22/21 encounter (Office Visit) with Ayaka Andre MD   Medication Dose Route Frequency Provider Last Rate Last Admin   • OnabotulinumtoxinA 200 Units  200 Units Intramuscular Q3 Months Shiela Coelho APRN   200 Units at 06/11/21 1616       Allergies   Allergen Reactions   • Hydrochlorothiazide Swelling   • Monosodium Glutamate Swelling and Headache   • Oatmeal Other (See Comments)     Dx on allergy testing   • Amitriptyline Mental Status Change     memory gaps + neuropathy + hair loss   • Aspartame Nausea Only   • Augmentin [Amoxicillin-Pot Clavulanate] Other (See Comments)     Syncope, not sure if allergic to cephalosporins.   • Codeine Headache      Can tolerate hydrocodone, no other adverse reactions to other narcotics.   • Diclofenac Headache   • Doxycycline Rash and Hallucinations   • Eggs Or Egg-Derived Products Other (See Comments)     dx on allergy testing, but does not completely avoid   • Ibuprofen Other (See Comments)     \"makes me retain fluid and eventually go into CHF\"   • Naproxen Other (See Comments)     \"blackout\"       Social History     Socioeconomic History   • Marital status: Single     Spouse name: Not on file   • Number of children: Not on file   • Years of education: Not on file   • Highest education level: Not on file   Tobacco Use   • Smoking status: Never " "Smoker   • Smokeless tobacco: Never Used   Substance and Sexual Activity   • Alcohol use: Not Currently     Alcohol/week: 0.0 standard drinks     Comment: Very rarely drink socially. At most 2 drinks per month.   • Drug use: No   • Sexual activity: Not Currently     Partners: Male     Birth control/protection: Condom, I.U.D.       /64 (BP Location: Left arm, Patient Position: Sitting, Cuff Size: Large Adult)   Pulse 62   Temp 97.6 °F (36.4 °C) (Temporal)   Resp 18   Ht 152.4 cm (60\")   Wt 88 kg (194 lb)   SpO2 99%   BMI 37.89 kg/m²     Physical Exam  Constitutional:       General: She is not in acute distress.     Appearance: She is well-developed. She is not diaphoretic.   HENT:      Head: Normocephalic and atraumatic.      Mouth/Throat:      Pharynx: No oropharyngeal exudate.   Eyes:      Conjunctiva/sclera: Conjunctivae normal.      Pupils: Pupils are equal, round, and reactive to light.   Pulmonary:      Effort: Pulmonary effort is normal. No respiratory distress.   Abdominal:      General: There is no distension.      Palpations: Abdomen is soft.   Musculoskeletal:      Comments: To my gross examination, no difference in mild edema RLE vs. LLE   Skin:     General: Skin is warm and dry.      Coloration: Skin is not pale.   Neurological:      Mental Status: She is alert and oriented to person, place, and time.      Cranial Nerves: No cranial nerve deficit.   Psychiatric:         Behavior: Behavior normal.         Thought Content: Thought content normal.           Assessment:  3 months s/p robotic assisted LSG by  on 6/15/21         ICD-10-CM ICD-9-CM   1. Fatigue, unspecified type  R53.83 780.79   2. Hypovitaminosis D  E55.9 268.9   3. Malnutrition screen  Z13.21 V77.2   4. Screening, iron deficiency anemia  Z13.0 V78.0   5. Postgastrectomy malabsorption  K91.2 579.3    Z90.3          Plan:  Doing well. Continue w/ good food choices and healthy habits.  Continue protein >70g/day.  Continue " routine exercise.  Routine bariatric labs ordered.  Continue vitamins w/ adjustments pending lab results.  Call w/ problems/concerns.     We discussed repeat dilation of stricture: pt politely declined, as she thinks she is finally doing better.    Recommend d/w PCP re: lowering or discontinuing metformin.  May be contributing to hypoglycemia.  Recommend instead of OJ, try so treat hypoglycemia with a food that contains some fiber and protein also to prevent reactive hypoglycemia.    Recommend log intake on [employer].    The patient was instructed to follow up in 3 months, sooner if needed.    note: approx 15 of the 25 minute visit was spent counseling on nutrition and necessary dietary/lifestyle modifications face to face.    Ayaka Andre MD
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
## 2021-09-24 ENCOUNTER — TRANSCRIBE ORDERS (OUTPATIENT)
Dept: ADMINISTRATIVE | Facility: HOSPITAL | Age: 36
End: 2021-09-24

## 2021-09-24 DIAGNOSIS — Z12.31 VISIT FOR SCREENING MAMMOGRAM: Primary | ICD-10-CM

## 2021-09-27 DIAGNOSIS — J30.2 SEASONAL ALLERGIES: ICD-10-CM

## 2021-09-27 RX ORDER — LORATADINE 10 MG/1
TABLET ORAL
Qty: 90 TABLET | Refills: 0 | Status: SHIPPED | OUTPATIENT
Start: 2021-09-27 | End: 2022-03-07

## 2021-09-27 NOTE — TELEPHONE ENCOUNTER
Rx Refill Note  Requested Prescriptions     Pending Prescriptions Disp Refills   • loratadine (CLARITIN) 10 MG tablet [Pharmacy Med Name: LORATADINE 10 MG TABLET] 90 tablet 3     Sig: TAKE ONE TABLET BY MOUTH DAILY      Last office visit with prescribing clinician: 9/10/2021      Next office visit with prescribing clinician: 12/14/2021       {TIP  Please add Last Relevant Lab Date if appropriate:9/22/21    Nelida Ramirez LPN  09/27/21, 11:07 EDT

## 2021-10-01 ENCOUNTER — OFFICE VISIT (OUTPATIENT)
Dept: BEHAVIORAL HEALTH | Facility: CLINIC | Age: 36
End: 2021-10-01

## 2021-10-01 DIAGNOSIS — F33.0 MILD EPISODE OF RECURRENT MAJOR DEPRESSIVE DISORDER (HCC): Primary | ICD-10-CM

## 2021-10-01 DIAGNOSIS — R45.4 FEELING IRRITABLE: ICD-10-CM

## 2021-10-01 DIAGNOSIS — R45.4 ANGER: ICD-10-CM

## 2021-10-01 DIAGNOSIS — G89.29 OTHER CHRONIC PAIN: ICD-10-CM

## 2021-10-01 PROCEDURE — 90837 PSYTX W PT 60 MINUTES: CPT | Performed by: PSYCHOLOGIST
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
## 2021-10-06 ENCOUNTER — OFFICE VISIT (OUTPATIENT)
Dept: BEHAVIORAL HEALTH | Facility: CLINIC | Age: 36
End: 2021-10-06

## 2021-10-06 DIAGNOSIS — R45.4 ANGER: ICD-10-CM

## 2021-10-06 DIAGNOSIS — R45.4 IRRITABILITY: ICD-10-CM

## 2021-10-06 DIAGNOSIS — F33.1 MODERATE EPISODE OF RECURRENT MAJOR DEPRESSIVE DISORDER (HCC): Primary | ICD-10-CM

## 2021-10-06 PROCEDURE — 90834 PSYTX W PT 45 MINUTES: CPT | Performed by: PSYCHOLOGIST
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
## 2021-10-13 ENCOUNTER — OFFICE VISIT (OUTPATIENT)
Dept: BEHAVIORAL HEALTH | Facility: CLINIC | Age: 36
End: 2021-10-13

## 2021-10-13 DIAGNOSIS — F33.1 MODERATE EPISODE OF RECURRENT MAJOR DEPRESSIVE DISORDER (HCC): Primary | ICD-10-CM

## 2021-10-13 DIAGNOSIS — R45.4 ANGER: ICD-10-CM

## 2021-10-13 DIAGNOSIS — R45.4 IRRITABILITY: ICD-10-CM

## 2021-10-13 PROCEDURE — 90834 PSYTX W PT 45 MINUTES: CPT | Performed by: PSYCHOLOGIST
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
## 2021-10-14 ENCOUNTER — OFFICE VISIT (OUTPATIENT)
Dept: FAMILY MEDICINE CLINIC | Facility: CLINIC | Age: 36
End: 2021-10-14

## 2021-10-14 ENCOUNTER — LAB (OUTPATIENT)
Dept: LAB | Facility: HOSPITAL | Age: 36
End: 2021-10-14

## 2021-10-14 VITALS
SYSTOLIC BLOOD PRESSURE: 135 MMHG | HEIGHT: 60 IN | WEIGHT: 188 LBS | OXYGEN SATURATION: 97 % | HEART RATE: 78 BPM | DIASTOLIC BLOOD PRESSURE: 90 MMHG | TEMPERATURE: 97.6 F | BODY MASS INDEX: 36.91 KG/M2

## 2021-10-14 DIAGNOSIS — I82.511 CHRONIC DEEP VEIN THROMBOSIS (DVT) OF FEMORAL VEIN OF RIGHT LOWER EXTREMITY (HCC): ICD-10-CM

## 2021-10-14 DIAGNOSIS — Z13.21 MALNUTRITION SCREEN: ICD-10-CM

## 2021-10-14 DIAGNOSIS — K91.2 POSTGASTRECTOMY MALABSORPTION: ICD-10-CM

## 2021-10-14 DIAGNOSIS — I10 ESSENTIAL HYPERTENSION: ICD-10-CM

## 2021-10-14 DIAGNOSIS — E11.65 TYPE 2 DIABETES MELLITUS WITH HYPERGLYCEMIA, WITHOUT LONG-TERM CURRENT USE OF INSULIN (HCC): Primary | ICD-10-CM

## 2021-10-14 DIAGNOSIS — R21 RASH AND NONSPECIFIC SKIN ERUPTION: ICD-10-CM

## 2021-10-14 DIAGNOSIS — E66.01 MORBIDLY OBESE (HCC): ICD-10-CM

## 2021-10-14 DIAGNOSIS — Z98.84 S/P BARIATRIC SURGERY: ICD-10-CM

## 2021-10-14 DIAGNOSIS — R53.83 FATIGUE, UNSPECIFIED TYPE: ICD-10-CM

## 2021-10-14 DIAGNOSIS — K21.9 GASTROESOPHAGEAL REFLUX DISEASE, UNSPECIFIED WHETHER ESOPHAGITIS PRESENT: ICD-10-CM

## 2021-10-14 DIAGNOSIS — R11.10 VOMITING, INTRACTABILITY OF VOMITING NOT SPECIFIED, PRESENCE OF NAUSEA NOT SPECIFIED, UNSPECIFIED VOMITING TYPE: ICD-10-CM

## 2021-10-14 DIAGNOSIS — E55.9 HYPOVITAMINOSIS D: ICD-10-CM

## 2021-10-14 DIAGNOSIS — Z90.3 POSTGASTRECTOMY MALABSORPTION: ICD-10-CM

## 2021-10-14 DIAGNOSIS — Z13.0 SCREENING, IRON DEFICIENCY ANEMIA: ICD-10-CM

## 2021-10-14 LAB
25(OH)D3 SERPL-MCNC: 45.9 NG/ML (ref 30–100)
ALBUMIN SERPL-MCNC: 4.4 G/DL (ref 3.5–5.2)
ALBUMIN/GLOB SERPL: 1.6 G/DL
ALP SERPL-CCNC: 151 U/L (ref 39–117)
ALT SERPL W P-5'-P-CCNC: 87 U/L (ref 1–33)
ANION GAP SERPL CALCULATED.3IONS-SCNC: 16.8 MMOL/L (ref 5–15)
AST SERPL-CCNC: 67 U/L (ref 1–32)
BASOPHILS # BLD AUTO: 0.05 10*3/MM3 (ref 0–0.2)
BASOPHILS NFR BLD AUTO: 0.6 % (ref 0–1.5)
BILIRUB SERPL-MCNC: 0.2 MG/DL (ref 0–1.2)
BUN SERPL-MCNC: 9 MG/DL (ref 6–20)
BUN/CREAT SERPL: 8.6 (ref 7–25)
CALCIUM SPEC-SCNC: 9.9 MG/DL (ref 8.6–10.5)
CHLORIDE SERPL-SCNC: 105 MMOL/L (ref 98–107)
CO2 SERPL-SCNC: 21.2 MMOL/L (ref 22–29)
CREAT SERPL-MCNC: 1.05 MG/DL (ref 0.57–1)
DEPRECATED RDW RBC AUTO: 46.5 FL (ref 37–54)
EOSINOPHIL # BLD AUTO: 0.11 10*3/MM3 (ref 0–0.4)
EOSINOPHIL NFR BLD AUTO: 1.4 % (ref 0.3–6.2)
ERYTHROCYTE [DISTWIDTH] IN BLOOD BY AUTOMATED COUNT: 13.7 % (ref 12.3–15.4)
FERRITIN SERPL-MCNC: 365 NG/ML (ref 13–150)
FOLATE SERPL-MCNC: 5.96 NG/ML (ref 4.78–24.2)
GFR SERPL CREATININE-BSD FRML MDRD: 71 ML/MIN/1.73
GLOBULIN UR ELPH-MCNC: 2.7 GM/DL
GLUCOSE SERPL-MCNC: 111 MG/DL (ref 65–99)
HCT VFR BLD AUTO: 40.5 % (ref 34–46.6)
HGB BLD-MCNC: 13.4 G/DL (ref 12–15.9)
IMM GRANULOCYTES # BLD AUTO: 0.02 10*3/MM3 (ref 0–0.05)
IMM GRANULOCYTES NFR BLD AUTO: 0.3 % (ref 0–0.5)
IRON 24H UR-MRATE: 124 MCG/DL (ref 37–145)
LYMPHOCYTES # BLD AUTO: 2.84 10*3/MM3 (ref 0.7–3.1)
LYMPHOCYTES NFR BLD AUTO: 36.7 % (ref 19.6–45.3)
MCH RBC QN AUTO: 30.7 PG (ref 26.6–33)
MCHC RBC AUTO-ENTMCNC: 33.1 G/DL (ref 31.5–35.7)
MCV RBC AUTO: 92.7 FL (ref 79–97)
MONOCYTES # BLD AUTO: 0.62 10*3/MM3 (ref 0.1–0.9)
MONOCYTES NFR BLD AUTO: 8 % (ref 5–12)
NEUTROPHILS NFR BLD AUTO: 4.09 10*3/MM3 (ref 1.7–7)
NEUTROPHILS NFR BLD AUTO: 53 % (ref 42.7–76)
NRBC BLD AUTO-RTO: 0 /100 WBC (ref 0–0.2)
PLATELET # BLD AUTO: 213 10*3/MM3 (ref 140–450)
PMV BLD AUTO: 12.9 FL (ref 6–12)
POTASSIUM SERPL-SCNC: 3.7 MMOL/L (ref 3.5–5.2)
PREALB SERPL-MCNC: 34.3 MG/DL (ref 20–40)
PROT SERPL-MCNC: 7.1 G/DL (ref 6–8.5)
RBC # BLD AUTO: 4.37 10*6/MM3 (ref 3.77–5.28)
SODIUM SERPL-SCNC: 143 MMOL/L (ref 136–145)
WBC # BLD AUTO: 7.73 10*3/MM3 (ref 3.4–10.8)

## 2021-10-14 PROCEDURE — 80053 COMPREHEN METABOLIC PANEL: CPT

## 2021-10-14 PROCEDURE — 36415 COLL VENOUS BLD VENIPUNCTURE: CPT

## 2021-10-14 PROCEDURE — 83540 ASSAY OF IRON: CPT

## 2021-10-14 PROCEDURE — 82306 VITAMIN D 25 HYDROXY: CPT

## 2021-10-14 PROCEDURE — 85025 COMPLETE CBC W/AUTO DIFF WBC: CPT

## 2021-10-14 PROCEDURE — 84134 ASSAY OF PREALBUMIN: CPT

## 2021-10-14 PROCEDURE — 83921 ORGANIC ACID SINGLE QUANT: CPT

## 2021-10-14 PROCEDURE — 84425 ASSAY OF VITAMIN B-1: CPT

## 2021-10-14 PROCEDURE — 82746 ASSAY OF FOLIC ACID SERUM: CPT

## 2021-10-14 PROCEDURE — 82728 ASSAY OF FERRITIN: CPT

## 2021-10-14 PROCEDURE — 99214 OFFICE O/P EST MOD 30 MIN: CPT | Performed by: PHYSICIAN ASSISTANT

## 2021-10-14 RX ORDER — CARVEDILOL 6.25 MG/1
TABLET ORAL
Qty: 270 TABLET | Refills: 0 | Status: SHIPPED | OUTPATIENT
Start: 2021-10-14 | End: 2023-03-07 | Stop reason: SDUPTHER

## 2021-10-14 RX ORDER — OMEPRAZOLE 40 MG/1
40 CAPSULE, DELAYED RELEASE ORAL DAILY
Qty: 90 CAPSULE | Refills: 0 | Status: SHIPPED | OUTPATIENT
Start: 2021-10-14 | End: 2021-11-15 | Stop reason: HOSPADM

## 2021-10-14 RX ORDER — FLUCONAZOLE 200 MG/1
200 TABLET ORAL 2 TIMES DAILY
Qty: 20 TABLET | Refills: 0 | Status: SHIPPED | OUTPATIENT
Start: 2021-10-14 | End: 2021-11-15 | Stop reason: HOSPADM

## 2021-10-14 RX ORDER — LANCETS 33 GAUGE
1 EACH MISCELLANEOUS 2 TIMES DAILY
Qty: 100 EACH | Refills: 2 | Status: SHIPPED | OUTPATIENT
Start: 2021-10-14

## 2021-10-15 RX ORDER — APIXABAN 5 MG/1
TABLET, FILM COATED ORAL
Qty: 60 TABLET | Refills: 0 | Status: SHIPPED | OUTPATIENT
Start: 2021-10-15 | End: 2021-12-07

## 2021-10-15 NOTE — TELEPHONE ENCOUNTER
Rx Refill Note  Requested Prescriptions     Pending Prescriptions Disp Refills   • Eliquis 5 MG tablet tablet [Pharmacy Med Name: ELIQUIS 5 MG TABLET] 60 tablet      Sig: TAKE ONE TABLET BY MOUTH EVERY 12 HOURS      Last office visit with prescribing clinician: 10/14/2021      Next office visit with prescribing clinician: 12/14/2021            Danny Hernandez MA  10/15/21, 07:56 EDT

## 2021-10-19 NOTE — PROGRESS NOTES
Chief Complaint   Patient presents with   • Chronic deep vein thrombosis   • Hypertension   • Diabetes   • Med Refill       HPI     Frances Hartman is a pleasant 35 y.o. female who is here for routine follow-up of hypertension, obesity, s/p bariatric surgery, diabetes type 2, chronic DVT, rash and GERD.  Patient is s/p bariatric surgery and still having complications.  Difficulty eating certain foods.  Her blood pressure is borderline today.  She reports it is elevated at home.  Her carvedilol was decreased at last appointment.  She has had several episodes of low glucose levels.  She is taking metformin and glipizide occasionally.  She reports GI upset and worries that the metformin is bothering her stomach.  She has never tried januvia.    She is on fluconazole 200 mg BID for rash.  She was diagnosed years ago by infectious disease and started on this to manage rash.  She was initially seen by dermatology and they referred her to ID.    Patient is on eliquis 5 mg BID for chronic DVT.    Past Medical History:   Diagnosis Date   • Anxiety    • Asthma     prn inhalers, does not follow w/ pulmonary   • Chronic back pain     previously followed w/ pain management, now just prn Tylenol + Gabapentin   • Chronic deep vein thrombosis (DVT) of femoral vein of right lower extremity (HCC) 9/10/2021   • Diabetes mellitus (HCC)     Type II, dx 2014, never on insulin, A1C 7.6   • Dyspepsia    • Dyspnea on exertion    • Fatigue    • Gastroparesis    • Heartburn     chronic, prn TUMS, denies prior eval   • Hirsutism    • Hx of radiation therapy     for treatments of Keloids   • Hypertension    • Irregular menses     infrequent spotting   • Leiomyoma, subserous     possible peduculated f3-4 cm fibroid on u/s at [facility]   • Migraines     botox q3 months, following Neurology @ [facility]   • Morbid obesity (HCC)    • Peripheral edema    • Polycystic ovaries     severe insulin resistance/hirsuitism   • Seasonal allergies    • Slow to wake up  after anesthesia        Past Surgical History:   Procedure Laterality Date   • ENDOSCOPY N/A 6/15/2021    Procedure: ESOPHAGOGASTRODUODENOSCOPY;  Surgeon: Ayaka Andre MD;  Location:  [facility];  Service: [facility] - [facility];  Laterality: N/A;   • GASTRIC SLEEVE LAPAROSCOPIC N/A 6/15/2021    Procedure: GASTRIC SLEEVE LAPAROSCOPIC WITH DAVINCI ROBOT, LAPROSCOPIC HIATAL HERNIA REPAIR WITH DAVINCI ROBOT;  Surgeon: Ayaka Andre MD;  Location:  [facility];  Service: [facility] - [facility];  Laterality: N/A;   • KELOID EXCISION      1990,1993,1999,2015,2016,2019   • LAPAROSCOPIC CHOLECYSTECTOMY  2000    for stones   • RADIOFREQUENCY ABLATION  2019    x 2  for pt back   • UMBILICAL HERNIA REPAIR  1990   • WISDOM TOOTH EXTRACTION  1994       Family History   Problem Relation Age of Onset   • Arthritis Mother    • Diabetes Mother    • Obesity Mother    • Arrhythmia Mother    • Hypertension Mother    • Asthma Mother    • Dementia Father    • Heart attack Father    • Arrhythmia Father    • Heart disease Father    • Stroke Other         CVA   • Obesity Other    • Ovarian cancer Maternal Aunt         40's   • Breast cancer Paternal Aunt         30's   • Heart disease Other    • Hypertension Other    • Endometrial cancer Neg Hx        Social History     Socioeconomic History   • Marital status: Single   Tobacco Use   • Smoking status: Never Smoker   • Smokeless tobacco: Never Used   Substance and Sexual Activity   • Alcohol use: Not Currently     Alcohol/week: 0.0 standard drinks     Comment: Very rarely drink socially. At most 2 drinks per month.   • Drug use: No   • Sexual activity: Not Currently     Partners: Male     Birth control/protection: Condom, I.U.D.       Allergies   Allergen Reactions   • Hydrochlorothiazide Swelling   • Monosodium Glutamate Swelling and Headache   • Oatmeal Other (See Comments)     Dx on allergy testing   • Amitriptyline Mental Status Change     memory gaps + neuropathy + hair loss   •  "Aspartame Nausea Only   • Augmentin [Amoxicillin-Pot Clavulanate] Other (See Comments)     Syncope, not sure if allergic to cephalosporins.   • Codeine Headache      Can tolerate hydrocodone, no other adverse reactions to other narcotics.   • Diclofenac Headache   • Doxycycline Rash and Hallucinations   • Eggs Or Egg-Derived Products Other (See Comments)     dx on allergy testing, but does not completely avoid   • Ibuprofen Other (See Comments)     \"makes me retain fluid and eventually go into CHF\"   • Naproxen Other (See Comments)     \"blackout\"       ROS  Review of Systems   Constitutional: Positive for fatigue. Negative for chills, diaphoresis and fever.   HENT: Negative for congestion, postnasal drip and rhinorrhea.    Eyes: Positive for visual disturbance.   Respiratory: Negative for cough, shortness of breath and wheezing.    Cardiovascular: Negative for chest pain and leg swelling.   Neurological: Positive for light-headedness. Negative for dizziness and headache.   Psychiatric/Behavioral: Positive for sleep disturbance and stress. Negative for self-injury, suicidal ideas and depressed mood. The patient is not nervous/anxious.        Vitals:    10/14/21 1437   BP: 135/90   Pulse: 78   Temp: 97.6 °F (36.4 °C)   SpO2: 97%   Weight: 85.3 kg (188 lb)   Height: 152.4 cm (60\")     Body mass index is 36.72 kg/m².    Current Outpatient Medications on File Prior to Visit   Medication Sig Dispense Refill   • Acetaminophen (TYLENOL ARTHRITIS PAIN PO) Take  by mouth As Needed.     • acetaminophen (TYLENOL) 500 MG tablet Take 500-1,000 mg by mouth Every 6 (Six) Hours As Needed for Mild Pain .     • albuterol (PROVENTIL HFA;VENTOLIN HFA) 108 (90 BASE) MCG/ACT inhaler Inhale 2 puffs Every 4 (Four) Hours As Needed for wheezing.     • Blood Glucose Monitoring Suppl (ONE TOUCH ULTRA 2) w/Device kit      • Blood Glucose Monitoring Suppl device Use BID to test blood sugars dx e11.65 1 each 0   • cyclobenzaprine (FLEXERIL) 10 MG " tablet Take 1 tablet by mouth 3 (Three) Times a Day As Needed for Muscle Spasms. 30 tablet 0   • diphenhydrAMINE (BENADRYL) 25 mg capsule Take 25 mg by mouth Every 6 (Six) Hours As Needed for itching.     • docusate sodium (COLACE) 100 MG capsule Take 100-200 mg by mouth Daily.     • gabapentin (NEURONTIN) 300 MG capsule Take 300 mg by mouth Daily As Needed.     • guaiFENesin (HUMIBID 3) 400 MG tablet Take 200 mg by mouth As Needed.     • HYDROcodone-acetaminophen (NORCO) 5-325 MG per tablet Take 1-2 tablets by mouth Every 6 (Six) Hours As Needed for Severe Pain . (Patient taking differently: Take 1-2 tablets by mouth 2 (Two) Times a Day As Needed for Severe Pain .) 15 tablet 0   • KETOTIFEN FUMARATE OP Apply  to eye(s) as directed by provider Daily As Needed.     • levonorgestrel (Mirena, 52 MG,) 20 MCG/24HR IUD      • loratadine (CLARITIN) 10 MG tablet TAKE ONE TABLET BY MOUTH DAILY 90 tablet 0   • melatonin 1 MG tablet Take 5 mg by mouth As Needed.     • OnabotulinumtoxinA (Botox) 200 units reconstituted solution FOR . PHYSICIAN TO INJECT UP  UNITS INTRAMUSCULARLY INTO HEAD, NECK AND SHOULDERS EVERY 90 DAYS. 1 each 3   • simethicone (MYLICON) 125 MG chewable tablet Chew 125 mg Every 6 (Six) Hours As Needed for Flatulence.     • spironolactone (ALDACTONE) 100 MG tablet Take 1 tablet by mouth Every Other Day. Takes 30 days on,  off 30 days. (Patient taking differently: Take 100 mg by mouth Every Other Day. Or Takes 30 days on,  off 30 days.) 45 tablet 1     Current Facility-Administered Medications on File Prior to Visit   Medication Dose Route Frequency Provider Last Rate Last Admin   • OnabotulinumtoxinA 200 Units  200 Units Intramuscular Q3 Months Shiela Coelho APRN   200 Units at 06/11/21 1616       Results for orders placed or performed in visit on 09/10/21   POC Glycosylated Hemoglobin (Hb A1C)    Specimen: Blood   Result Value Ref Range    Hemoglobin A1C 6.3 %        PE    Physical Exam  Vitals reviewed.   Constitutional:       General: She is not in acute distress.     Appearance: Normal appearance. She is well-developed. She is obese. She is not ill-appearing or diaphoretic.   HENT:      Head: Normocephalic and atraumatic.   Eyes:      Extraocular Movements: Extraocular movements intact.      Conjunctiva/sclera: Conjunctivae normal.   Pulmonary:      Effort: No respiratory distress.   Musculoskeletal:         General: Normal range of motion.      Cervical back: Normal range of motion.      Right lower leg: No edema.      Left lower leg: No edema.   Skin:     General: Skin is warm.      Findings: No erythema or rash.   Neurological:      General: No focal deficit present.      Mental Status: She is alert.   Psychiatric:         Attention and Perception: She is attentive.         Mood and Affect: Mood normal.         Speech: Speech normal.         Behavior: Behavior normal. Behavior is cooperative.         Thought Content: Thought content normal.         Judgment: Judgment normal.         A/P    Diagnoses and all orders for this visit:    1. Type 2 diabetes mellitus with hyperglycemia, without long-term current use of insulin (HCC) (Primary)  -     SITagliptin (Januvia) 50 MG tablet; Take 1 tablet by mouth Daily.  Dispense: 30 tablet; Refill: 2  -     glucose blood test strip; USe BID to test blood sugar DX.e11.65  Dispense: 100 each; Refill: 3  -     Lancets 33G misc; 1 Each/kg 2 (two) times a day. To test blood sugars DxE11.65  Dispense: 100 each; Refill: 2  Hemoglobin AIC was 7.4%, and recent hemoglobin AIC was 6.3%.  Discontinue metformin due to GI upset.  Avoid glipizide due to hypoglycemia.  Start januvia 100 mg daily.    2. Essential hypertension  -     carvedilol (COREG) 6.25 MG tablet; Take 1 tab in AM and 2 tab in PM  Dispense: 270 tablet; Refill: 0  Borderline.  Elevated at home.  Will trial increase in coreg to 6.25 mg QAM and 12.5 mg QPM.  Followed by  cardiology.    3. Rash and nonspecific skin eruption  -     fluconazole (DIFLUCAN) 200 MG tablet; Take 1 tablet by mouth 2 (Two) Times a Day.  Dispense: 20 tablet; Refill: 0  Started on fluconazole 200 mg BID years ago by ID after being referred by dermatology.  Monitor liver enzymes.    4. Gastroesophageal reflux disease, unspecified whether esophagitis present  -     omeprazole (priLOSEC) 40 MG capsule; Take 1 capsule by mouth Daily.  Dispense: 90 capsule; Refill: 0    5. Morbidly obese (HCC)  6. S/P bariatric surgery  Has been losing weight.  Followed by bariatric surgery.    7. Chronic deep vein thrombosis (DVT) of femoral vein of right lower extremity (HCC)  On eliquis 5 mg BID.       Plan of care reviewed with patient at the conclusion of today's visit. Education was provided regarding diagnosis, management and any prescribed or recommended OTC medications.  Patient verbalizes understanding of and agreement with management plan.    No follow-ups on file.     Lesli Ramirez PA-C

## 2021-10-20 ENCOUNTER — OFFICE VISIT (OUTPATIENT)
Dept: BEHAVIORAL HEALTH | Facility: CLINIC | Age: 36
End: 2021-10-20

## 2021-10-20 DIAGNOSIS — R45.4 IRRITABILITY: ICD-10-CM

## 2021-10-20 DIAGNOSIS — F33.1 MODERATE EPISODE OF RECURRENT MAJOR DEPRESSIVE DISORDER (HCC): Primary | ICD-10-CM

## 2021-10-20 DIAGNOSIS — R45.4 ANGER: ICD-10-CM

## 2021-10-20 LAB
Lab: NORMAL
METHYLMALONATE SERPL-SCNC: 191 NMOL/L (ref 0–378)

## 2021-10-20 PROCEDURE — 90834 PSYTX W PT 45 MINUTES: CPT | Performed by: PSYCHOLOGIST

## 2021-10-21 ENCOUNTER — OFFICE VISIT (OUTPATIENT)
Dept: BARIATRICS/WEIGHT MGMT | Facility: CLINIC | Age: 36
End: 2021-10-21

## 2021-10-21 VITALS
HEART RATE: 80 BPM | DIASTOLIC BLOOD PRESSURE: 76 MMHG | OXYGEN SATURATION: 98 % | RESPIRATION RATE: 18 BRPM | TEMPERATURE: 96.5 F | SYSTOLIC BLOOD PRESSURE: 124 MMHG | HEIGHT: 60 IN | BODY MASS INDEX: 35.44 KG/M2 | WEIGHT: 180.5 LBS

## 2021-10-21 DIAGNOSIS — R11.11 VOMITING WITHOUT NAUSEA, INTRACTABILITY OF VOMITING NOT SPECIFIED, UNSPECIFIED VOMITING TYPE: Primary | ICD-10-CM

## 2021-10-21 PROCEDURE — 99213 OFFICE O/P EST LOW 20 MIN: CPT | Performed by: SURGERY

## 2021-10-21 NOTE — PROGRESS NOTES
[facility]  [address]    [facility] [id number] [phone]        Patient Name: Frances Hartman.  YOB: 1985      Date of Visit: 10/21/2021      Reason for Visit:  Nausea/vomiting    HPI:  Frances Hartman is a 35 y.o. female s/p robotic assisted LSG/HHR by  on 6/15/21    Trouble with dysphagia.  Only had sip of water thus far today.   Does not take nausea meds.  Eating almost nothing for past week.  Feels weak b/c not much oral intake.  Doesn't tolerate dairy well, nor meat.  Has some nighttime regurgitation.    She is still on Prilosec 40 mg daily.    Had dilation previously for sleeve stricture 7/2021.  At that time, hiatus was not stenosed and there was no recurrent hiatal hernia.    On Eliquis for chronic DVT discovered after surgery.    Labs reviewed: good except for mild elevation LFTs (known gross fatty liver at time of surgery), mild dehydration.          Past Medical History:   Diagnosis Date   • Anxiety    • Asthma     prn inhalers, does not follow w/ pulmonary   • Chronic back pain     previously followed w/ pain management, now just prn Tylenol + Gabapentin   • Chronic deep vein thrombosis (DVT) of femoral vein of right lower extremity (HCC) 9/10/2021   • Diabetes mellitus (HCC)     Type II, dx 2014, never on insulin, A1C 7.6   • Dyspepsia    • Dyspnea on exertion    • Fatigue    • Gastroparesis    • Heartburn     chronic, prn TUMS, denies prior eval   • Hirsutism    • Hx of radiation therapy     for treatments of Keloids   • Hypertension    • Irregular menses     infrequent spotting   • Leiomyoma, subserous     possible peduculated f3-4 cm fibroid on u/s at [facility]   • Migraines     botox q3 months, following Neurology @ [facility]   • Morbid obesity (HCC)    • Peripheral edema    • Polycystic ovaries     severe insulin resistance/hirsuitism   • Seasonal allergies    • Slow to wake up after anesthesia      Past Surgical  History:   Procedure Laterality Date   • ENDOSCOPY N/A 6/15/2021    Procedure: ESOPHAGOGASTRODUODENOSCOPY;  Surgeon: Ayaka Andre MD;  Location:  [facility];  Service: [facility];  Laterality: N/A;   • GASTRIC SLEEVE LAPAROSCOPIC N/A 6/15/2021    Procedure: GASTRIC SLEEVE LAPAROSCOPIC WITH DAVINCI ROBOT, LAPROSCOPIC HIATAL HERNIA REPAIR WITH DAVINCI ROBOT;  Surgeon: Ayaka Andre MD;  Location:  [facility];  Service: [facility] - [facility];  Laterality: N/A;   • KELOID EXCISION      1990,1993,1999,2015,2016,2019   • LAPAROSCOPIC CHOLECYSTECTOMY  2000    for stones   • RADIOFREQUENCY ABLATION  2019    x 2  for pt back   • UMBILICAL HERNIA REPAIR  1990   • WISDOM TOOTH EXTRACTION  1994     Outpatient Medications Marked as Taking for the 10/21/21 encounter (Office Visit) with Ayaka Andre MD   Medication Sig Dispense Refill   • Acetaminophen (TYLENOL ARTHRITIS PAIN PO) Take  by mouth As Needed.     • acetaminophen (TYLENOL) 500 MG tablet Take 500-1,000 mg by mouth Every 6 (Six) Hours As Needed for Mild Pain .     • albuterol (PROVENTIL HFA;VENTOLIN HFA) 108 (90 BASE) MCG/ACT inhaler Inhale 2 puffs Every 4 (Four) Hours As Needed for wheezing.     • carvedilol (COREG) 6.25 MG tablet Take 1 tab in AM and 2 tab in  tablet 0   • cyclobenzaprine (FLEXERIL) 10 MG tablet Take 1 tablet by mouth 3 (Three) Times a Day As Needed for Muscle Spasms. 30 tablet 0   • diphenhydrAMINE (BENADRYL) 25 mg capsule Take 25 mg by mouth Every 6 (Six) Hours As Needed for itching.     • docusate sodium (COLACE) 100 MG capsule Take 100-200 mg by mouth Daily.     • Eliquis 5 MG tablet tablet TAKE ONE TABLET BY MOUTH EVERY 12 HOURS 60 tablet 0   • fluconazole (DIFLUCAN) 200 MG tablet Take 1 tablet by mouth 2 (Two) Times a Day. 20 tablet 0   • gabapentin (NEURONTIN) 300 MG capsule Take 300 mg by mouth Daily As Needed.     • guaiFENesin (HUMIBID 3) 400 MG tablet Take 200 mg by mouth As Needed.     •  HYDROcodone-acetaminophen (NORCO) 5-325 MG per tablet Take 1-2 tablets by mouth Every 6 (Six) Hours As Needed for Severe Pain . (Patient taking differently: Take 1-2 tablets by mouth 2 (Two) Times a Day As Needed for Severe Pain .) 15 tablet 0   • KETOTIFEN FUMARATE OP Apply  to eye(s) as directed by provider Daily As Needed.     • levonorgestrel (Mirena, 52 MG,) 20 MCG/24HR IUD      • loratadine (CLARITIN) 10 MG tablet TAKE ONE TABLET BY MOUTH DAILY 90 tablet 0   • melatonin 1 MG tablet Take 5 mg by mouth As Needed.     • omeprazole (priLOSEC) 40 MG capsule Take 1 capsule by mouth Daily. 90 capsule 0   • OnabotulinumtoxinA (Botox) 200 units reconstituted solution FOR . PHYSICIAN TO INJECT UP  UNITS INTRAMUSCULARLY INTO HEAD, NECK AND SHOULDERS EVERY 90 DAYS. 1 each 3   • simethicone (MYLICON) 125 MG chewable tablet Chew 125 mg Every 6 (Six) Hours As Needed for Flatulence.     • SITagliptin (Januvia) 50 MG tablet Take 1 tablet by mouth Daily. 30 tablet 2   • spironolactone (ALDACTONE) 100 MG tablet Take 1 tablet by mouth Every Other Day. Takes 30 days on,  off 30 days. (Patient taking differently: Take 100 mg by mouth Every Other Day. Or Takes 30 days on,  off 30 days.) 45 tablet 1     Current Facility-Administered Medications for the 10/21/21 encounter (Office Visit) with Ayaka Andre MD   Medication Dose Route Frequency Provider Last Rate Last Admin   • OnabotulinumtoxinA 200 Units  200 Units Intramuscular Q3 Months Shiela Coelho APRN   200 Units at 06/11/21 1616     Allergies   Allergen Reactions   • Hydrochlorothiazide Swelling   • Monosodium Glutamate Swelling and Headache   • Oatmeal Other (See Comments)     Dx on allergy testing   • Amitriptyline Mental Status Change     memory gaps + neuropathy + hair loss   • Aspartame Nausea Only   • Augmentin [Amoxicillin-Pot Clavulanate] Other (See Comments)     Syncope, not sure if allergic to cephalosporins.   • Codeine  "Headache      Can tolerate hydrocodone, no other adverse reactions to other narcotics.   • Diclofenac Headache   • Doxycycline Rash and Hallucinations   • Eggs Or Egg-Derived Products Other (See Comments)     dx on allergy testing, but does not completely avoid   • Ibuprofen Other (See Comments)     \"makes me retain fluid and eventually go into CHF\"   • Naproxen Other (See Comments)     \"blackout\"       Social History     Socioeconomic History   • Marital status: Single   Tobacco Use   • Smoking status: Never Smoker   • Smokeless tobacco: Never Used   Substance and Sexual Activity   • Alcohol use: Not Currently     Alcohol/week: 0.0 standard drinks     Comment: Very rarely drink socially. At most 2 drinks per month.   • Drug use: No   • Sexual activity: Not Currently     Partners: Male     Birth control/protection: Condom, I.U.D.       Vitals:    10/21/21 1143   BP: 124/76   Pulse: 80   Resp: 18   Temp: 96.5 °F (35.8 °C)   SpO2: 98%     Weight 81.9 kg (180 lb 8 oz)  Body mass index is 35.25 kg/m².    Physical Exam  Constitutional:       General: She is not in acute distress.     Appearance: She is well-developed. She is not diaphoretic.   HENT:      Head: Normocephalic and atraumatic.      Mouth/Throat:      Pharynx: No oropharyngeal exudate.   Eyes:      Conjunctiva/sclera: Conjunctivae normal.      Pupils: Pupils are equal, round, and reactive to light.   Pulmonary:      Effort: Pulmonary effort is normal. No respiratory distress.   Abdominal:      General: There is no distension.      Palpations: Abdomen is soft.   Skin:     General: Skin is warm and dry.      Coloration: Skin is not pale.   Neurological:      Mental Status: She is alert and oriented to person, place, and time.      Cranial Nerves: No cranial nerve deficit.   Psychiatric:         Behavior: Behavior normal.         Thought Content: Thought content normal.           Assessment:      ICD-10-CM ICD-9-CM   1. Vomiting without nausea, intractability of " vomiting not specified, unspecified vomiting type  R11.11 787.03       Plan:      Suspect recurrent stricture.  Pt advised that usually these take 2-3 dilations before complete resolution.   Needs EGD with dilation--will d/w my GI colleagues to see if they could do this with an achalasia balloon to get more durable response.  Continue PPI.  Advised drinking small amount of liquid hourly, try collagen for protein as other supplements and foods are poorly tolerated.  Prealbumin is actually excellent.    We discussed the option of getting IVF at infusion center or just getting some at EGD.  If she can be scheduled in the next week, she thinks she can wait, otherwise she is interested in outpatient infusion.    I wonder how much her propensity to keloid/form hypertrophic scars may contribute to sleeve stricture, as the appearance of sleeve at surgery was excellent.

## 2021-10-21 NOTE — PROGRESS NOTES
PROGRESS NOTE    Data:  Frances Hartman is a 35 y.o. female who met with the undersigned for a scheduled individual outpatient therapy session from 2:30 - 3:15pm.      Clinical Maneuvering/Intervention:      The pt talked about recent stressors such not being able to eat, water damage in her home, and feeling generally low/depressed. In the beginning of session, there was a disagreement about when to stop the session after the pt was 15 minutes late today. The disagreement was used for material in the session in order to articulate and resolve a difference of opinion. Stressors were processed individually and in detail. Venting of frustrations was conducted in order to help the pt feel less tense emotionally and gain insight into issues. Feelings were processed and validated several times in session. The resolution of difference of opinion involved expressing feelings, thoughts and fears, some even on behalf of the therapist. The therapist and pt worked together to learn from the disagreement, and repair the therapist-pt relationship. Conflict resolution was used in session, taking time in session in order to not only learn from it, but also talk about how to prevent confusion in the future. The pt was then assisted with processing other aspects of her life, including mending relationships with others in her life, including her aunt and cousin. Active listening was conducted in order to help the pt make sense of stressors and start moving towards potential solutions. The pt was assisted with finding solutions based on existing skill-set and abilities.The pt's strengths were identified in order to help identify abilities to use to better face/overcome challenges. Some humor was used in session as it is one of the pt's coping skills. Humor was used too, to help the pt see things as less challenging and more manageable than they first seemed. Humor was used as well, to model use of the skill as a way to decrease  tension ongoing. The pt expressed gratitude for today's session.    Mental Status Exam  Hygiene:  good  Dress: normal  Attitude:  cooperative and proactive  Motor Activity: normal  Speech: normal  Mood:  depressed  Affect:  congruent  Thought Processes: normal  Thought Content:  normal  Suicidal Thoughts:  not endorsed  Homicidal Thoughts:  not endorsed  Crisis Safety Plan: not needed   Hallucinations:  none      Patient's Support Network Includes:  family, friends      Progress toward goal: there is evidence to suggest that she is battling depression and irritability      Functional Status: moderate to high      Prognosis: good    Assessment      The pt presented to be struggling with depression, irritability, and anger. She tends to benefit from highly supportive and strength-based counseling, but may also be open to challenge herself to grow as time goes on. Assertiveness training is appropriate for her and she is improving in this area in respect to the relationship with her mother.        Plan      In order to diminish symptoms of depression, anger, and irritability: she will continue to work her plan of action in terms of healthy communication with important people in her life and use assertiveness as appropriate (ongoing).    Misty Nava, PhD, LP

## 2021-10-23 LAB — VIT B1 BLD-SCNC: 74.8 NMOL/L (ref 66.5–200)

## 2021-10-25 ENCOUNTER — TELEPHONE (OUTPATIENT)
Dept: GASTROENTEROLOGY | Facility: CLINIC | Age: 36
End: 2021-10-25

## 2021-10-25 ENCOUNTER — PREP FOR SURGERY (OUTPATIENT)
Dept: OTHER | Facility: HOSPITAL | Age: 36
End: 2021-10-25

## 2021-10-25 DIAGNOSIS — K22.2 ESOPHAGEAL STRICTURE: Primary | ICD-10-CM

## 2021-10-25 DIAGNOSIS — Z01.818 PRE-OP TESTING: ICD-10-CM

## 2021-10-25 NOTE — TELEPHONE ENCOUNTER
Patient is scheduled for EGD with Dilatation this week on 10.28.21. Per Dr. Hernandez, patient will need to hold 24 hours prior. Patient is on Eliquis for DVT per patient. Patient states her PCP LASHANDA Rooney is the one that prescribes it. Can you please get cardiac clearance.     Thank you

## 2021-10-25 NOTE — TELEPHONE ENCOUNTER
October 25, 2021 [address] [place] [address]  Phone: [phone]  Fax: [phone]       Frances Hartman  [address] [place] [id number]  1985        Patient will be having a EGD by Dr. Jase Hernandez on October 28, 2021. The patient is needing cardiac clearance due to being on blood thinners. Please complete the following and fax back to the office.     Patient's was last seen in the office on:_____________________    Procedure/Test Performed:    _____ Stress Test       _____ Echocardiogram    _____ EKG     _____ Heart Cath    Based on the above test results and/or clinical evaluation it is my recommendation:    ____ Patient may proceed with the scheduled surgical procedure with an acceptable cardiac risk.    ____ Patient CAN NOT stop Plavix, Effient, Ticlid, Aspirin, Coumadin, Pradaxa, Xarelto, Eliquis, Savaysa, or Brilinta prior to procedure.     ____ Patient CAN stop Plavix, Effient, Ticlid, Aspirin, Coumadin, Pradaxa, Xarelto, Eliquis, Savaysa, or Brilinta  ______ days prior to procedure.    Please sign and date below.    Signature________________________________________   Date:_________________     Thank You    Jase Hernandez M.D.

## 2021-10-26 ENCOUNTER — PRE-ADMISSION TESTING (OUTPATIENT)
Dept: PREADMISSION TESTING | Facility: HOSPITAL | Age: 36
End: 2021-10-26

## 2021-10-26 VITALS — WEIGHT: 183 LBS | BODY MASS INDEX: 35.93 KG/M2 | HEIGHT: 60 IN

## 2021-10-26 DIAGNOSIS — Z01.818 PRE-OP TESTING: ICD-10-CM

## 2021-10-26 LAB
POTASSIUM SERPL-SCNC: 3.3 MMOL/L (ref 3.5–5.2)
QT INTERVAL: 426 MS
QTC INTERVAL: 443 MS
SARS-COV-2 RNA PNL SPEC NAA+PROBE: NOT DETECTED

## 2021-10-26 PROCEDURE — 93010 ELECTROCARDIOGRAM REPORT: CPT | Performed by: INTERNAL MEDICINE

## 2021-10-26 PROCEDURE — U0004 COV-19 TEST NON-CDC HGH THRU: HCPCS

## 2021-10-26 PROCEDURE — 93005 ELECTROCARDIOGRAM TRACING: CPT

## 2021-10-26 PROCEDURE — 84132 ASSAY OF SERUM POTASSIUM: CPT

## 2021-10-26 PROCEDURE — C9803 HOPD COVID-19 SPEC COLLECT: HCPCS

## 2021-10-26 PROCEDURE — 36415 COLL VENOUS BLD VENIPUNCTURE: CPT

## 2021-10-26 RX ORDER — FUROSEMIDE 20 MG/1
1 TABLET ORAL
Status: ON HOLD | COMMUNITY
End: 2021-11-30 | Stop reason: SDUPTHER

## 2021-10-26 NOTE — DISCHARGE INSTRUCTIONS
The following information and instructions were given:    Do not eat, drink, smoke or chew gum after midnight the night before surgery. This includes no mints.  Take all routine, prescribed medications including heart and blood pressure medicines with a sip of water unless otherwise instructed by your physician.   Do NOT take diabetic medication unless instructed by your physician.    DO NOT shave for two days before your procedure.  Do not wear makeup.      DO NOT wear fingernail polish (gel/regular) and/or acrylic/artificial nails on the day of surgery.   If you had a recent manicure and would rather not remove polish or artificial nails, the minimum requirement is that the polish/artificial nails must be removed from the middle finger on each hand.      If you are having surgery/procedure on an upper extremity, fingernail polish/artificial fingernails must be removed for surgery.  NO EXCEPTIONS.      If you are having surgery/procedure on a lower extremity, toenail polish on both extremities must be removed for surgery.  NO EXCEPTIONS.    Remove all jewelry (advise to go to jeweler if unable to remove).  Jewelry, especially rings, can no longer be taped for surgery.    Leave anything you consider valuable at home.    Leave your suitcase in the car until after your surgery.    Bring the following with you the day of your procedure (when applicable):       -Picture ID and insurance cards       -Co-pay/deductible required by insurance       -Medications in the original bottles (not a list) including all over-the-counter medications if not brought to PAT       -PAT Pass    Education booklet, brochure, handout or binder related to procedure given to patient.  Education booklet also includes general information related to their recovery that mentions signs and symptoms of infection and when to call the doctor.    Pain Control After Surgery handout given to patient.    Respirex use (handout given to patient) and  pneumonia prevention education provided.    Signs and Symptoms of infection discussed with patient in Pre Admission Testing.  Patient instructed to call their doctor if any of the following symptoms are noted during recovery:  Fever of 100.4 F or higher, incision that is warm or has increasing bleeding, redness or drainage.    DVT Prevention instructions given verbally during Pre Admission Testing appointment that stress the importance of ambulation to improve blood circulation.  Also encouraged patient to perform foot exercises when in bed and application of a sequential device may be applied to lower extremities to improve circulation.

## 2021-10-26 NOTE — PAT
Dr Mehta office has requested clearance for eliquis to hold 1 day, as of time pt in PAT this has not been answered. Instructed pt to call Dr Hernandez office tomorrow befor taking eliquis to clarify eliquis instructions

## 2021-10-27 ENCOUNTER — TELEPHONE (OUTPATIENT)
Dept: FAMILY MEDICINE CLINIC | Facility: CLINIC | Age: 36
End: 2021-10-27

## 2021-10-27 ENCOUNTER — ANESTHESIA EVENT (OUTPATIENT)
Dept: GASTROENTEROLOGY | Facility: HOSPITAL | Age: 36
End: 2021-10-27

## 2021-10-27 ENCOUNTER — OFFICE VISIT (OUTPATIENT)
Dept: BEHAVIORAL HEALTH | Facility: CLINIC | Age: 36
End: 2021-10-27

## 2021-10-27 DIAGNOSIS — F33.1 MODERATE EPISODE OF RECURRENT MAJOR DEPRESSIVE DISORDER (HCC): Primary | ICD-10-CM

## 2021-10-27 DIAGNOSIS — R63.8 DIFFICULTY EATING: ICD-10-CM

## 2021-10-27 DIAGNOSIS — R45.89 SAD MOOD: ICD-10-CM

## 2021-10-27 PROCEDURE — 90834 PSYTX W PT 45 MINUTES: CPT | Performed by: PSYCHOLOGIST

## 2021-10-27 NOTE — TELEPHONE ENCOUNTER
PATIENT CALLED TO GET INFORMATION ON WEATHER SHE NEEDS TO STOP HER Eliquis 5 MG tablet tablet. SHE IS HAVING A MEDICAL PROCEDURE TOMORROW AND NEEDS TO KNOW.      SHE WILL BE UNAVAILABLE FROM 2-3 TODAY BUT YOU MAY LEAVE MESSAGE ON HER VOICE MAIL.      Frances Hartman     [phone]

## 2021-10-27 NOTE — PROGRESS NOTES
PROGRESS NOTE    Data:  Frances Hartman is a 35 y.o. female who met with the undersigned for a scheduled individual outpatient therapy session from 2:15 - 3:00pm.      Clinical Maneuvering/Intervention:      The pt talked about recent stressors such not being able to eat, feeling down, and feeling sad/alone. She talked about losses in her life, focusing more on her father today. Grief counseling was provided. Stressors were processed individually and in detail. Venting of frustrations was conducted in order to help the pt feel less tense emotionally and gain insight into issues. Feelings were processed and validated several times in session. Active listening was conducted in order to help the pt make sense of stressors and start moving towards potential solutions. The pt was assisted with finding solutions based on existing skill-set and abilities. Remembering and finding gratitude for her father was conducted. Another issue that came up today, was how she was feeling nervous about her upcoming procedure to stretch out her stomach. Fears, worries, hopes, etc were all processed in detail today. Perspective taking was conducted multiple times in order to help her feel less stuck, less overwhelmed, and see challenges as much more manageable. She was assisted via encouragement and purposeful questions to find the best mindset to have about her procedures. The pt's strengths were identified in order to help identify abilities to use to better face/overcome challenges. Issues and direction for counseling was revisited. Hope for the pt's mental health and general quality of lfie were discussed. Some humor was used in session as it is one of the pt's coping skills. Humor was used too, to help the pt see things as less challenging and more manageable than they first seemed. Humor was used as well, to model use of the skill as a way to decrease tension ongoing. Homework was assigned tailored to pt's needs. The pt  expressed gratitude for today's session.    Mental Status Exam  Hygiene:  good  Dress: normal  Attitude:  cooperative and proactive  Motor Activity: normal  Speech: normal  Mood:  depressed  Affect:  congruent  Thought Processes: normal  Thought Content:  normal  Suicidal Thoughts:  not endorsed  Homicidal Thoughts:  not endorsed  Crisis Safety Plan: not needed   Hallucinations:  none      Patient's Support Network Includes:  family, friends      Progress toward goal: there is evidence to suggest that she is battling depression and sadness.      Functional Status: moderate to high      Prognosis: good    Assessment      The pt presented to be struggling with depression and sadness. She tends to benefit from highly supportive and strength-based counseling, but may also be open to challenge herself to grow as time goes on. Assertiveness training is appropriate for her and she is improving in this area in respect to the relationship with her mother.        Plan      In order to diminish symptoms of depression and sadness, she will continue to work her plan of action in terms of improving her physical health and noticing improvements along the way (ongoing). She will continue with counseling and finding ways to becoming a strong person, but also someone who appreciates herself more over time.     Misty Nava, PhD, LP

## 2021-10-27 NOTE — TELEPHONE ENCOUNTER
Patient is having esophogastric dilation surgery tommorow and needs to know if she needs to hold Eliquis and Januvia before her procedure.     She has not taken Eliquis today and will wait to hear back from us

## 2021-10-28 ENCOUNTER — ANESTHESIA (OUTPATIENT)
Dept: GASTROENTEROLOGY | Facility: HOSPITAL | Age: 36
End: 2021-10-28

## 2021-10-28 ENCOUNTER — APPOINTMENT (OUTPATIENT)
Dept: GENERAL RADIOLOGY | Facility: HOSPITAL | Age: 36
End: 2021-10-28

## 2021-10-28 ENCOUNTER — HOSPITAL ENCOUNTER (OUTPATIENT)
Facility: HOSPITAL | Age: 36
Setting detail: HOSPITAL OUTPATIENT SURGERY
Discharge: HOME | End: 2021-10-28
Attending: INTERNAL MEDICINE | Admitting: INTERNAL MEDICINE

## 2021-10-28 VITALS
SYSTOLIC BLOOD PRESSURE: 129 MMHG | RESPIRATION RATE: 16 BRPM | TEMPERATURE: 97.4 F | OXYGEN SATURATION: 94 % | DIASTOLIC BLOOD PRESSURE: 88 MMHG | HEART RATE: 70 BPM

## 2021-10-28 LAB
B-HCG UR QL: NEGATIVE
EXPIRATION DATE: NORMAL
GLUCOSE BLDC GLUCOMTR-MCNC: 113 MG/DL (ref 70–130)
GLUCOSE BLDC GLUCOMTR-MCNC: 138 MG/DL (ref 70–130)
INTERNAL NEGATIVE CONTROL: NORMAL
INTERNAL POSITIVE CONTROL: NORMAL
Lab: NORMAL

## 2021-10-28 PROCEDURE — C1769 GUIDE WIRE: HCPCS | Performed by: INTERNAL MEDICINE

## 2021-10-28 PROCEDURE — 25010000002 PROPOFOL 10 MG/ML EMULSION: Performed by: NURSE ANESTHETIST, CERTIFIED REGISTERED

## 2021-10-28 PROCEDURE — 43248 EGD GUIDE WIRE INSERTION: CPT | Performed by: INTERNAL MEDICINE

## 2021-10-28 PROCEDURE — C1726 CATH, BAL DIL, NON-VASCULAR: HCPCS | Performed by: INTERNAL MEDICINE

## 2021-10-28 PROCEDURE — 82962 GLUCOSE BLOOD TEST: CPT

## 2021-10-28 PROCEDURE — 81025 URINE PREGNANCY TEST: CPT | Performed by: ANESTHESIOLOGY

## 2021-10-28 PROCEDURE — 76000 FLUOROSCOPY <1 HR PHYS/QHP: CPT

## 2021-10-28 RX ORDER — LIDOCAINE HYDROCHLORIDE 10 MG/ML
0.5 INJECTION, SOLUTION EPIDURAL; INFILTRATION; INTRACAUDAL; PERINEURAL ONCE AS NEEDED
Status: DISCONTINUED | OUTPATIENT
Start: 2021-10-28 | End: 2021-10-28 | Stop reason: HOSPADM

## 2021-10-28 RX ORDER — SODIUM CHLORIDE 0.9 % (FLUSH) 0.9 %
10 SYRINGE (ML) INJECTION EVERY 12 HOURS SCHEDULED
Status: DISCONTINUED | OUTPATIENT
Start: 2021-10-28 | End: 2021-10-28 | Stop reason: HOSPADM

## 2021-10-28 RX ORDER — SODIUM CHLORIDE 0.9 % (FLUSH) 0.9 %
10 SYRINGE (ML) INJECTION AS NEEDED
Status: DISCONTINUED | OUTPATIENT
Start: 2021-10-28 | End: 2021-10-28 | Stop reason: HOSPADM

## 2021-10-28 RX ORDER — MIDAZOLAM HYDROCHLORIDE 1 MG/ML
1 INJECTION INTRAMUSCULAR; INTRAVENOUS
Status: DISCONTINUED | OUTPATIENT
Start: 2021-10-28 | End: 2021-10-28 | Stop reason: HOSPADM

## 2021-10-28 RX ORDER — ONDANSETRON 2 MG/ML
4 INJECTION INTRAMUSCULAR; INTRAVENOUS ONCE AS NEEDED
Status: DISCONTINUED | OUTPATIENT
Start: 2021-10-28 | End: 2021-10-28 | Stop reason: HOSPADM

## 2021-10-28 RX ORDER — FAMOTIDINE 20 MG/1
20 TABLET, FILM COATED ORAL ONCE
Status: DISCONTINUED | OUTPATIENT
Start: 2021-10-28 | End: 2021-10-28 | Stop reason: HOSPADM

## 2021-10-28 RX ORDER — SODIUM CHLORIDE, SODIUM LACTATE, POTASSIUM CHLORIDE, CALCIUM CHLORIDE 600; 310; 30; 20 MG/100ML; MG/100ML; MG/100ML; MG/100ML
9 INJECTION, SOLUTION INTRAVENOUS CONTINUOUS
Status: DISCONTINUED | OUTPATIENT
Start: 2021-10-28 | End: 2021-10-28 | Stop reason: HOSPADM

## 2021-10-28 RX ORDER — SODIUM CHLORIDE 9 MG/ML
50 INJECTION, SOLUTION INTRAVENOUS CONTINUOUS
Status: DISCONTINUED | OUTPATIENT
Start: 2021-10-28 | End: 2021-10-28 | Stop reason: HOSPADM

## 2021-10-28 RX ORDER — IPRATROPIUM BROMIDE AND ALBUTEROL SULFATE 2.5; .5 MG/3ML; MG/3ML
3 SOLUTION RESPIRATORY (INHALATION) ONCE AS NEEDED
Status: DISCONTINUED | OUTPATIENT
Start: 2021-10-28 | End: 2021-10-28 | Stop reason: HOSPADM

## 2021-10-28 RX ORDER — LIDOCAINE HYDROCHLORIDE 10 MG/ML
INJECTION, SOLUTION EPIDURAL; INFILTRATION; INTRACAUDAL; PERINEURAL AS NEEDED
Status: DISCONTINUED | OUTPATIENT
Start: 2021-10-28 | End: 2021-10-28 | Stop reason: SURG

## 2021-10-28 RX ORDER — PROPOFOL 10 MG/ML
VIAL (ML) INTRAVENOUS AS NEEDED
Status: DISCONTINUED | OUTPATIENT
Start: 2021-10-28 | End: 2021-10-28 | Stop reason: SURG

## 2021-10-28 RX ORDER — FAMOTIDINE 10 MG/ML
20 INJECTION, SOLUTION INTRAVENOUS ONCE
Status: COMPLETED | OUTPATIENT
Start: 2021-10-28 | End: 2021-10-28

## 2021-10-28 RX ORDER — LABETALOL HYDROCHLORIDE 5 MG/ML
5 INJECTION, SOLUTION INTRAVENOUS
Status: DISCONTINUED | OUTPATIENT
Start: 2021-10-28 | End: 2021-10-28 | Stop reason: HOSPADM

## 2021-10-28 RX ADMIN — FAMOTIDINE 20 MG: 10 INJECTION INTRAVENOUS at 10:40

## 2021-10-28 RX ADMIN — PROPOFOL 100 MG: 10 INJECTION, EMULSION INTRAVENOUS at 12:27

## 2021-10-28 RX ADMIN — PROPOFOL 100 MCG/KG/MIN: 10 INJECTION, EMULSION INTRAVENOUS at 12:30

## 2021-10-28 RX ADMIN — SODIUM CHLORIDE 50 ML/HR: 9 INJECTION, SOLUTION INTRAVENOUS at 10:40

## 2021-10-28 RX ADMIN — LIDOCAINE HYDROCHLORIDE 50 MG: 10 INJECTION, SOLUTION EPIDURAL; INFILTRATION; INTRACAUDAL; PERINEURAL at 12:27

## 2021-10-28 RX ADMIN — SODIUM CHLORIDE 500 ML: 9 INJECTION, SOLUTION INTRAVENOUS at 14:00

## 2021-10-28 NOTE — ANESTHESIA PREPROCEDURE EVALUATION
Anesthesia Evaluation                  Airway   Mallampati: II  Dental      Pulmonary    (+) asthma,  Cardiovascular     (+) hypertension,       Neuro/Psych  GI/Hepatic/Renal/Endo    (+) obesity,   diabetes mellitus,     Musculoskeletal     Abdominal    Substance History      OB/GYN          Other                        Anesthesia Plan    ASA 3     general     intravenous induction     Anesthetic plan, all risks, benefits, and alternatives have been provided, discussed and informed consent has been obtained with: patient.

## 2021-10-28 NOTE — ANESTHESIA POSTPROCEDURE EVALUATION
Patient: Frances Hartman    Procedure Summary     Date: 10/28/21 Room / Location:  [facility]    Anesthesia Start: 1222 Anesthesia Stop:     Procedure: ESOPHAGOGASTRODUODENOSCOPY WITH ACHALASIA BALLOON DILATATION (N/A ) Diagnosis:       Esophageal stricture      (Esophageal stricture [K22.2])    Surgeons: Jase Hernandez MD Provider: Baltazar Butts MD    Anesthesia Type: general ASA Status: 3          Anesthesia Type: general    Vitals  Vitals Value Taken Time   BP     Temp     Pulse 84 10/28/21 1256   Resp     SpO2 97 % 10/28/21 1256   Vitals shown include unvalidated device data.        Post Anesthesia Care and Evaluation    Patient location during evaluation: PACU  Patient participation: complete - patient participated  Level of consciousness: awake and alert  Pain management: adequate  Airway patency: patent  Anesthetic complications: No anesthetic complications  PONV Status: none  Cardiovascular status: hemodynamically stable and acceptable  Respiratory status: nonlabored ventilation, acceptable and nasal cannula  Hydration status: acceptable    Comments: Patient hypertensive at this time. RN to monitor and treat PRN. /102 after recheck, SpO2 92% 2L NC, RR 12, HR 84, T 97.1

## 2021-10-28 NOTE — TELEPHONE ENCOUNTER
Sent patient message on [employer].  Surgeon usually is the one who specifies which medications to hold prior to procedure.  Holding januvia and eliquis until after procedure tomorrow is fine.

## 2021-11-03 ENCOUNTER — OFFICE VISIT (OUTPATIENT)
Dept: BEHAVIORAL HEALTH | Facility: CLINIC | Age: 36
End: 2021-11-03

## 2021-11-03 DIAGNOSIS — R63.8 DIFFICULTY EATING: ICD-10-CM

## 2021-11-03 DIAGNOSIS — F33.1 MODERATE EPISODE OF RECURRENT MAJOR DEPRESSIVE DISORDER (HCC): Primary | ICD-10-CM

## 2021-11-03 DIAGNOSIS — R45.81 POOR SELF ESTEEM: ICD-10-CM

## 2021-11-03 PROCEDURE — 90834 PSYTX W PT 45 MINUTES: CPT | Performed by: PSYCHOLOGIST

## 2021-11-03 NOTE — PROGRESS NOTES
PROGRESS NOTE    Data:  Frances Hartman is a 35 y.o. female who met with the undersigned for a scheduled individual outpatient therapy session from 2:15 - 3:00pm.      Clinical Maneuvering/Intervention:      The pt talked about recent stressors such as not being able to eat, feeling depressed, and feeling unloved in her life. Stressors were processed individually and in detail. Venting of frustrations was conducted in order to help the pt feel less tense emotionally and gain insight into issues. Feelings were processed and validated several times in session. Core issues such as feeling disappointed by others, such as abandoned (her friend R passing away, then losing her father). She can often feel like the world is against her or that troubles may never end. Active listening was conducted in order to help the pt make sense of stressors and start moving towards potential solutions. The pt was assisted with finding solutions based on existing skill-set and abilities. Perspective taking was conducted multiple times in order to help her feel less stuck, less overwhelmed, and see challenges as much more manageable. Helping her focus on 'first thing's first,' was demonstrated to her as a means to help her feel more centered and less distressed. The pt's strengths were identified in order to help identify abilities to use to better face/overcome challenges. Using her ability to problem solve, as well as using intelligence and strategy, to de-stress were implemented. She was able to talk about her plan of action to receive help and improve her health. An action plan was designed to empower the pt to know what to do. Simple steps of one, two, three were laid out in order to help the pt feel more in control of things and feel less stressed.  She was assisted in focusing on health first, before addressing/battling other stressors. Taking on one thing at a time and working her plan were reviewed towards the end of session.  Some humor was used in session as it is one of the pt's coping skills. Humor was used too, to help the pt see things as less challenging and more manageable than they first seemed. Humor was used as well, to model use of the skill as a way to decrease tension ongoing. The pt expressed gratitude for today's session.    Mental Status Exam  Hygiene:  good  Dress: normal  Attitude:  cooperative and proactive  Motor Activity: normal  Speech: normal  Mood:  depressed  Affect:  congruent  Thought Processes: normal  Thought Content:  normal  Suicidal Thoughts:  not endorsed  Homicidal Thoughts:  not endorsed  Crisis Safety Plan: not needed   Hallucinations:  none      Patient's Support Network Includes:  family, friends      Progress toward goal: there is evidence to suggest that she is battling depression and often feels down about herself/her life      Functional Status: moderate to high      Prognosis: good    Assessment      The pt presented to be struggling with depression of a moderate level. Self-esteem seems impaired and cognitive distortions are often evident. She tends to benefit from highly supportive and strength-based counseling, but may also be open to challenge herself to grow as time goes on.        Plan      In order to diminish symptoms of depression and improve self-esteem, she will work her plan of action in terms of improving her physical health and noticing improvements along the way (ongoing). She will continue with counseling (ongoing); improving self-talk will likely be addressed in subsequent sessions.     Misty Nava, PhD, LP

## 2021-11-04 DIAGNOSIS — Z01.812 ENCOUNTER FOR PREOPERATIVE SCREENING LABORATORY TESTING FOR COVID-19 VIRUS: Primary | ICD-10-CM

## 2021-11-04 DIAGNOSIS — Z20.822 ENCOUNTER FOR PREOPERATIVE SCREENING LABORATORY TESTING FOR COVID-19 VIRUS: Primary | ICD-10-CM

## 2021-11-05 ENCOUNTER — LAB (OUTPATIENT)
Dept: PREADMISSION TESTING | Facility: HOSPITAL | Age: 36
End: 2021-11-05

## 2021-11-05 DIAGNOSIS — Z01.812 ENCOUNTER FOR PREOPERATIVE SCREENING LABORATORY TESTING FOR COVID-19 VIRUS: ICD-10-CM

## 2021-11-05 DIAGNOSIS — Z20.822 ENCOUNTER FOR PREOPERATIVE SCREENING LABORATORY TESTING FOR COVID-19 VIRUS: ICD-10-CM

## 2021-11-05 PROCEDURE — U0004 COV-19 TEST NON-CDC HGH THRU: HCPCS | Performed by: SURGERY

## 2021-11-06 LAB — SARS-COV-2 RNA PNL SPEC NAA+PROBE: NOT DETECTED

## 2021-11-09 ENCOUNTER — HOSPITAL ENCOUNTER (OUTPATIENT)
Dept: GENERAL RADIOLOGY | Facility: HOSPITAL | Age: 36
Discharge: HOME | End: 2021-11-09
Admitting: SURGERY

## 2021-11-09 DIAGNOSIS — Z01.812 ENCOUNTER FOR PREOPERATIVE SCREENING LABORATORY TESTING FOR COVID-19 VIRUS: ICD-10-CM

## 2021-11-09 DIAGNOSIS — Z20.822 ENCOUNTER FOR PREOPERATIVE SCREENING LABORATORY TESTING FOR COVID-19 VIRUS: ICD-10-CM

## 2021-11-09 DIAGNOSIS — E03.8 SUBCLINICAL HYPOTHYROIDISM: ICD-10-CM

## 2021-11-09 DIAGNOSIS — E11.65 TYPE 2 DIABETES MELLITUS WITH HYPERGLYCEMIA, WITHOUT LONG-TERM CURRENT USE OF INSULIN (HCC): Primary | ICD-10-CM

## 2021-11-09 PROCEDURE — 74240 X-RAY XM UPR GI TRC 1CNTRST: CPT

## 2021-11-09 RX ADMIN — BARIUM SULFATE 183 ML: 960 POWDER, FOR SUSPENSION ORAL at 14:22

## 2021-11-10 ENCOUNTER — OFFICE VISIT (OUTPATIENT)
Dept: BEHAVIORAL HEALTH | Facility: CLINIC | Age: 36
End: 2021-11-10

## 2021-11-10 DIAGNOSIS — F33.1 MODERATE EPISODE OF RECURRENT MAJOR DEPRESSIVE DISORDER (HCC): Primary | ICD-10-CM

## 2021-11-10 DIAGNOSIS — R45.81 POOR SELF ESTEEM: ICD-10-CM

## 2021-11-10 DIAGNOSIS — R63.8 DIFFICULTY EATING: ICD-10-CM

## 2021-11-10 PROCEDURE — 90834 PSYTX W PT 45 MINUTES: CPT | Performed by: PSYCHOLOGIST
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
## 2021-11-11 ENCOUNTER — TELEPHONE (OUTPATIENT)
Dept: BARIATRICS/WEIGHT MGMT | Facility: CLINIC | Age: 36
End: 2021-11-11

## 2021-11-11 ENCOUNTER — OFFICE VISIT (OUTPATIENT)
Dept: FAMILY MEDICINE CLINIC | Facility: CLINIC | Age: 36
End: 2021-11-11

## 2021-11-11 ENCOUNTER — HOSPITAL ENCOUNTER (EMERGENCY)
Facility: HOSPITAL | Age: 36
Discharge: HOME | End: 2021-11-12
Attending: EMERGENCY MEDICINE | Admitting: EMERGENCY MEDICINE

## 2021-11-11 VITALS
HEIGHT: 60 IN | BODY MASS INDEX: 35.14 KG/M2 | SYSTOLIC BLOOD PRESSURE: 120 MMHG | WEIGHT: 179 LBS | HEART RATE: 67 BPM | OXYGEN SATURATION: 99 % | DIASTOLIC BLOOD PRESSURE: 78 MMHG

## 2021-11-11 DIAGNOSIS — E28.2 PCOS (POLYCYSTIC OVARIAN SYNDROME): ICD-10-CM

## 2021-11-11 DIAGNOSIS — Z23 IMMUNIZATION DUE: ICD-10-CM

## 2021-11-11 DIAGNOSIS — Z98.84 S/P BARIATRIC SURGERY: ICD-10-CM

## 2021-11-11 DIAGNOSIS — R82.4 KETONURIA: ICD-10-CM

## 2021-11-11 DIAGNOSIS — E86.0 DEHYDRATION: ICD-10-CM

## 2021-11-11 DIAGNOSIS — K22.2 ESOPHAGEAL STRICTURE: ICD-10-CM

## 2021-11-11 DIAGNOSIS — R11.14 BILIOUS VOMITING WITH NAUSEA: ICD-10-CM

## 2021-11-11 DIAGNOSIS — R53.1 WEAKNESS: ICD-10-CM

## 2021-11-11 DIAGNOSIS — R11.2 NON-INTRACTABLE VOMITING WITH NAUSEA, UNSPECIFIED VOMITING TYPE: Primary | ICD-10-CM

## 2021-11-11 DIAGNOSIS — Z77.120 MOLD EXPOSURE: ICD-10-CM

## 2021-11-11 DIAGNOSIS — R42 LIGHTHEADED: ICD-10-CM

## 2021-11-11 DIAGNOSIS — R63.8 DIFFICULTY EATING: Primary | ICD-10-CM

## 2021-11-11 LAB
ALBUMIN SERPL-MCNC: 4 G/DL (ref 3.5–5.2)
ALBUMIN/GLOB SERPL: 1.1 G/DL
ALP SERPL-CCNC: 163 U/L (ref 39–117)
ALT SERPL W P-5'-P-CCNC: 34 U/L (ref 1–33)
ANION GAP SERPL CALCULATED.3IONS-SCNC: 20 MMOL/L (ref 5–15)
AST SERPL-CCNC: 38 U/L (ref 1–32)
ATMOSPHERIC PRESS: ABNORMAL MM[HG]
B-HCG UR QL: NEGATIVE
B-OH-BUTYR SERPL-SCNC: 4.49 MMOL/L (ref 0.02–0.27)
BACTERIA UR QL AUTO: ABNORMAL /HPF
BASE EXCESS BLDV CALC-SCNC: -1.6 MMOL/L (ref -2–2)
BASOPHILS # BLD AUTO: 0.06 10*3/MM3 (ref 0–0.2)
BASOPHILS NFR BLD AUTO: 0.7 % (ref 0–1.5)
BDY SITE: ABNORMAL
BILIRUB SERPL-MCNC: 0.4 MG/DL (ref 0–1.2)
BILIRUB UR QL STRIP: ABNORMAL
BILIRUB UR QL STRIP: NEGATIVE
BODY TEMPERATURE: 37 C
BUN SERPL-MCNC: 5 MG/DL (ref 6–20)
BUN/CREAT SERPL: 5.5 (ref 7–25)
CALCIUM SPEC-SCNC: 9.8 MG/DL (ref 8.6–10.5)
CHLORIDE SERPL-SCNC: 97 MMOL/L (ref 98–107)
CLARITY UR: ABNORMAL
CLARITY UR: CLEAR
CO2 BLDA-SCNC: 25.7 MMOL/L (ref 22–33)
CO2 SERPL-SCNC: 22 MMOL/L (ref 22–29)
COHGB MFR BLD: 0.9 %
COLOR UR: ABNORMAL
COLOR UR: YELLOW
CREAT SERPL-MCNC: 0.91 MG/DL (ref 0.57–1)
D-LACTATE SERPL-SCNC: 1.7 MMOL/L (ref 0.5–2)
DEPRECATED RDW RBC AUTO: 50.7 FL (ref 37–54)
EOSINOPHIL # BLD AUTO: 0.1 10*3/MM3 (ref 0–0.4)
EOSINOPHIL NFR BLD AUTO: 1.1 % (ref 0.3–6.2)
EPAP: 0
ERYTHROCYTE [DISTWIDTH] IN BLOOD BY AUTOMATED COUNT: 14.9 % (ref 12.3–15.4)
EXPIRATION DATE: 2023
GFR SERPL CREATININE-BSD FRML MDRD: 84 ML/MIN/1.73
GLOBULIN UR ELPH-MCNC: 3.7 GM/DL
GLUCOSE SERPL-MCNC: 95 MG/DL (ref 65–99)
GLUCOSE UR STRIP-MCNC: NEGATIVE MG/DL
GLUCOSE UR STRIP-MCNC: NEGATIVE MG/DL
HCO3 BLDV-SCNC: 24.4 MMOL/L (ref 22–28)
HCT VFR BLD AUTO: 41.9 % (ref 34–46.6)
HGB BLD-MCNC: 13.8 G/DL (ref 12–15.9)
HGB BLDA-MCNC: 12.9 G/DL (ref 14–18)
HGB UR QL STRIP.AUTO: NEGATIVE
HGB UR QL STRIP.AUTO: NEGATIVE
HOLD SPECIMEN: NORMAL
HYALINE CASTS UR QL AUTO: ABNORMAL /LPF
IMM GRANULOCYTES # BLD AUTO: 0.03 10*3/MM3 (ref 0–0.05)
IMM GRANULOCYTES NFR BLD AUTO: 0.3 % (ref 0–0.5)
INHALED O2 CONCENTRATION: 21 %
INTERNAL NEGATIVE CONTROL: NORMAL
INTERNAL POSITIVE CONTROL: NORMAL
IPAP: 0
KETONES UR QL STRIP: ABNORMAL
KETONES UR QL STRIP: ABNORMAL
LEUKOCYTE ESTERASE UR QL STRIP.AUTO: ABNORMAL
LEUKOCYTE ESTERASE UR QL STRIP.AUTO: NEGATIVE
LIPASE SERPL-CCNC: 31 U/L (ref 13–60)
LYMPHOCYTES # BLD AUTO: 3.18 10*3/MM3 (ref 0.7–3.1)
LYMPHOCYTES NFR BLD AUTO: 35.1 % (ref 19.6–45.3)
Lab: NORMAL
MCH RBC QN AUTO: 30.7 PG (ref 26.6–33)
MCHC RBC AUTO-ENTMCNC: 32.9 G/DL (ref 31.5–35.7)
MCV RBC AUTO: 93.3 FL (ref 79–97)
METHGB BLD QL: 0.4 %
MODALITY: ABNORMAL
MONOCYTES # BLD AUTO: 0.62 10*3/MM3 (ref 0.1–0.9)
MONOCYTES NFR BLD AUTO: 6.9 % (ref 5–12)
NEUTROPHILS NFR BLD AUTO: 5.06 10*3/MM3 (ref 1.7–7)
NEUTROPHILS NFR BLD AUTO: 55.9 % (ref 42.7–76)
NITRITE UR QL STRIP: NEGATIVE
NITRITE UR QL STRIP: NEGATIVE
NOTE: ABNORMAL
NRBC BLD AUTO-RTO: 0.2 /100 WBC (ref 0–0.2)
OXYHGB MFR BLDV: 59.8 %
PAW @ PEAK INSP FLOW SETTING VENT: 0 CMH2O
PCO2 BLDV: 44.9 MM HG (ref 41–51)
PH BLDV: 7.34 PH UNITS (ref 7.31–7.41)
PH UR STRIP.AUTO: 5.5 [PH] (ref 5–8)
PH UR STRIP.AUTO: 5.5 [PH] (ref 5–8)
PLATELET # BLD AUTO: 193 10*3/MM3 (ref 140–450)
PMV BLD AUTO: 12.7 FL (ref 6–12)
PO2 BLDV: 33.8 MM HG (ref 27–53)
POTASSIUM SERPL-SCNC: 3.8 MMOL/L (ref 3.5–5.2)
PROT SERPL-MCNC: 7.7 G/DL (ref 6–8.5)
PROT UR QL STRIP: ABNORMAL
PROT UR QL STRIP: ABNORMAL
RBC # BLD AUTO: 4.49 10*6/MM3 (ref 3.77–5.28)
RBC # UR: ABNORMAL /HPF
REF LAB TEST METHOD: ABNORMAL
SODIUM SERPL-SCNC: 139 MMOL/L (ref 136–145)
SP GR UR STRIP: 1.02 (ref 1–1.03)
SP GR UR STRIP: 1.02 (ref 1–1.03)
SQUAMOUS #/AREA URNS HPF: ABNORMAL /HPF
TOTAL RATE: 0 BREATHS/MINUTE
TSH SERPL DL<=0.05 MIU/L-ACNC: 2.21 UIU/ML (ref 0.27–4.2)
UROBILINOGEN UR QL STRIP: ABNORMAL
UROBILINOGEN UR QL STRIP: ABNORMAL
WBC # BLD AUTO: 9.05 10*3/MM3 (ref 3.4–10.8)
WBC UR QL AUTO: ABNORMAL /HPF
WHOLE BLOOD HOLD SPECIMEN: NORMAL
WHOLE BLOOD HOLD SPECIMEN: NORMAL

## 2021-11-11 PROCEDURE — 25010000002 ONDANSETRON PER 1 MG: Performed by: EMERGENCY MEDICINE

## 2021-11-11 PROCEDURE — 99214 OFFICE O/P EST MOD 30 MIN: CPT | Performed by: PHYSICIAN ASSISTANT

## 2021-11-11 PROCEDURE — 99283 EMERGENCY DEPT VISIT LOW MDM: CPT

## 2021-11-11 PROCEDURE — 81025 URINE PREGNANCY TEST: CPT

## 2021-11-11 PROCEDURE — 83605 ASSAY OF LACTIC ACID: CPT | Performed by: INTERNAL MEDICINE

## 2021-11-11 PROCEDURE — 82010 KETONE BODYS QUAN: CPT | Performed by: INTERNAL MEDICINE

## 2021-11-11 PROCEDURE — 81025 URINE PREGNANCY TEST: CPT | Performed by: EMERGENCY MEDICINE

## 2021-11-11 PROCEDURE — 81003 URINALYSIS AUTO W/O SCOPE: CPT | Performed by: EMERGENCY MEDICINE

## 2021-11-11 PROCEDURE — 82805 BLOOD GASES W/O2 SATURATION: CPT

## 2021-11-11 PROCEDURE — 83690 ASSAY OF LIPASE: CPT | Performed by: EMERGENCY MEDICINE

## 2021-11-11 PROCEDURE — P9612 CATHETERIZE FOR URINE SPEC: HCPCS

## 2021-11-11 PROCEDURE — 80050 GENERAL HEALTH PANEL: CPT | Performed by: EMERGENCY MEDICINE

## 2021-11-11 PROCEDURE — 91300 COVID-19 (PFIZER): CPT | Performed by: PHYSICIAN ASSISTANT

## 2021-11-11 PROCEDURE — 96374 THER/PROPH/DIAG INJ IV PUSH: CPT

## 2021-11-11 PROCEDURE — 0003A COVID-19 (PFIZER): CPT | Performed by: PHYSICIAN ASSISTANT

## 2021-11-11 PROCEDURE — 81001 URINALYSIS AUTO W/SCOPE: CPT | Performed by: EMERGENCY MEDICINE

## 2021-11-11 RX ORDER — SODIUM CHLORIDE 9 MG/ML
10 INJECTION INTRAVENOUS AS NEEDED
Status: DISCONTINUED | OUTPATIENT
Start: 2021-11-11 | End: 2021-11-12 | Stop reason: HOSPADM

## 2021-11-11 RX ORDER — ONDANSETRON 2 MG/ML
4 INJECTION INTRAMUSCULAR; INTRAVENOUS ONCE
Status: COMPLETED | OUTPATIENT
Start: 2021-11-11 | End: 2021-11-11

## 2021-11-11 RX ORDER — ONDANSETRON 2 MG/ML
4 INJECTION INTRAMUSCULAR; INTRAVENOUS ONCE
Status: DISCONTINUED | OUTPATIENT
Start: 2021-11-11 | End: 2021-11-11

## 2021-11-11 RX ADMIN — ONDANSETRON 4 MG: 2 INJECTION INTRAMUSCULAR; INTRAVENOUS at 19:05

## 2021-11-11 RX ADMIN — SODIUM CHLORIDE 1000 ML: 9 INJECTION, SOLUTION INTRAVENOUS at 18:48

## 2021-11-11 NOTE — PROGRESS NOTES
Chief Complaint   Patient presents with   • Vomiting   • Nausea   • Not tolerating food   • Mold Allergy   • Polycystic Ovary Syndrome       HPI      Frances Hartman is a 35 y.o. female who presents for Vomiting, Nausea, Not tolerating food, Mold Allergy, and Polycystic Ovary Syndrome.  Patient underwent bariatric surgery in June of 2021 with Dr. Andre.  She recently had esophageal dilation by Dr. Hernandez.  Patient presents today stating she is unable to tolerate water or food.  She is having trouble keeping water down.  She is vomiting daily, several times.  She has not eaten much of anything in the last 2 weeks.  Her vomit is bile.  She feels dizzy, weak and thinks she may pass out.  She is having trouble walking, dressing and bathing.  She is requesting to be directly admitted to the hospital.      Patient has history of PCOS.  She reports worsening symptoms and vaginal discharge since stopping metformin.  She is unable to take metformin due to hypoglycemia since she is not eating.  She has pending endocrinology appointment.  She is established with gynecology.  Has IUD.  History of blood clots, on Eliquis.    Patient is unable to stay at her home currently due to mold.  She reports that the mold is visible and she feels it is contributing to her feeling poorly.  She is wondering if there is a test to check whether she has mold in her system.  She is staying with her mother.        Past Medical History:   Diagnosis Date   • Anxiety    • Asthma     prn inhalers, does not follow w/ pulmonary   • Chronic back pain     previously followed w/ pain management, now just prn Tylenol + Gabapentin   • Chronic deep vein thrombosis (DVT) of femoral vein of right lower extremity (HCC) 9/10/2021   • Diabetes mellitus (HCC)     Type II, dx 2014, never on insulin, A1C 7.6   • Dyspepsia    • Dyspnea on exertion    • Fatigue    • Gastroparesis    • GERD (gastroesophageal reflux disease)    • Heartburn     chronic, prn TUMS,  denies prior eval   • Hirsutism    • Hx of radiation therapy     for treatments of Keloids   • Hypertension    • Irregular menses     infrequent spotting   • Leiomyoma, subserous     possible peduculated f3-4 cm fibroid on u/s at [facility]   • Migraines     botox q3 months, following Neurology @ [facility]   • Morbid obesity (HCC)    • Peripheral edema    • Polycystic ovaries     severe insulin resistance/hirsuitism   • Seasonal allergies    • Slow to wake up after anesthesia        Past Surgical History:   Procedure Laterality Date   • ENDOSCOPY N/A 6/15/2021    Procedure: ESOPHAGOGASTRODUODENOSCOPY;  Surgeon: Ayaka Andre MD;  Location:  [facility];  Service: Robotics - DaVinci;  Laterality: N/A;   • ENDOSCOPY N/A 10/28/2021    Procedure: ESOPHAGOGASTRODUODENOSCOPY WITH ACHALASIA BALLOON DILATATION;  Surgeon: Jase Hernandez MD;  Location:  [facility];  Service: Gastroenterology;  Laterality: N/A;  58 F DILATOR   • GASTRIC SLEEVE LAPAROSCOPIC N/A 6/15/2021    Procedure: GASTRIC SLEEVE LAPAROSCOPIC WITH DAVINCI ROBOT, LAPROSCOPIC HIATAL HERNIA REPAIR WITH DAVINCI ROBOT;  Surgeon: Ayaka Andre MD;  Location:  [facility];  Service: Robotics - DaVinci;  Laterality: N/A;   • KELOID EXCISION      1990,1993,1999,2015,2016,2019   • LAPAROSCOPIC CHOLECYSTECTOMY  2000    for stones   • RADIOFREQUENCY ABLATION  2019    x 2  for pt back   • UMBILICAL HERNIA REPAIR  1990   • WISDOM TOOTH EXTRACTION  1994       Family History   Problem Relation Age of Onset   • Arthritis Mother    • Diabetes Mother    • Obesity Mother    • Arrhythmia Mother    • Hypertension Mother    • Asthma Mother    • Dementia Father    • Heart attack Father    • Arrhythmia Father    • Heart disease Father    • Stroke Other         CVA   • Obesity Other    • Ovarian cancer Maternal Aunt         40's   • Breast cancer Paternal Aunt         30's   • Heart disease Other    • Hypertension Other    • Endometrial cancer Neg Hx        Social History  "    Socioeconomic History   • Marital status: Single   Tobacco Use   • Smoking status: Never Smoker   • Smokeless tobacco: Never Used   Vaping Use   • Vaping Use: Never used   Substance and Sexual Activity   • Alcohol use: Not Currently     Alcohol/week: 0.0 standard drinks     Comment: Very rarely drink socially. At most 2 drinks per month.   • Drug use: No   • Sexual activity: Defer     Partners: Male     Birth control/protection: Condom, I.U.D.       Allergies   Allergen Reactions   • Capsicum Annuum Extract & Derivative (Bell Pepper) [Capsicum] Anaphylaxis   • Ibuprofen Other (See Comments)     \"makes me retain fluid and eventually go into CHF\"   • Peanut-Containing Drug Products Anaphylaxis   • Hydrochlorothiazide Swelling     eventually causes CHF    • Monosodium Glutamate Swelling and Headache   • Oatmeal Other (See Comments)     Dx on allergy testing   • Amitriptyline Mental Status Change     memory gaps + neuropathy + hair loss   • Aspartame Nausea Only   • Augmentin [Amoxicillin-Pot Clavulanate] Other (See Comments)     Syncope, not sure if allergic to cephalosporins.   • Codeine Headache      Can tolerate hydrocodone, no other adverse reactions to other narcotics.   • Diclofenac Headache   • Doxycycline Rash and Hallucinations   • Eggs Or Egg-Derived Products Other (See Comments)     dx on allergy testing, but does not completely avoid   • Naproxen Other (See Comments)     \"blackout\"       ROS    Review of Systems   Constitutional: Positive for fatigue. Negative for chills and fever.   Gastrointestinal: Positive for nausea, vomiting and indigestion.   Genitourinary: Positive for menstrual problem and vaginal discharge.   Neurological: Positive for dizziness, weakness and light-headedness.       Vitals:    11/11/21 1436   BP: 120/78   Pulse: 67   SpO2: 99%   Weight: 81.2 kg (179 lb)   Height: 152.4 cm (60\")   PainSc:   9     Body mass index is 34.96 kg/m².    Current Outpatient Medications on File Prior to " Visit   Medication Sig Dispense Refill   • Acetaminophen (TYLENOL ARTHRITIS PAIN PO) Take 1,300 mg by mouth As Needed (moderate pain).     • acetaminophen (TYLENOL) 500 MG tablet Take 500-1,000 mg by mouth Every 6 (Six) Hours As Needed for Mild Pain .     • albuterol (PROVENTIL HFA;VENTOLIN HFA) 108 (90 BASE) MCG/ACT inhaler Inhale 2 puffs Every 4 (Four) Hours As Needed for wheezing.     • Blood Glucose Monitoring Suppl (ONE TOUCH ULTRA 2) w/Device kit      • Blood Glucose Monitoring Suppl device Use BID to test blood sugars dx e11.65 1 each 0   • carvedilol (COREG) 6.25 MG tablet Take 1 tab in AM and 2 tab in  tablet 0   • cyclobenzaprine (FLEXERIL) 10 MG tablet Take 1 tablet by mouth 3 (Three) Times a Day As Needed for Muscle Spasms. 30 tablet 0   • diphenhydrAMINE (BENADRYL) 25 mg capsule Take 25 mg by mouth Every 6 (Six) Hours As Needed for Itching or Sleep.     • docusate sodium (COLACE) 100 MG capsule Take 100-200 mg by mouth As Needed.     • Eliquis 5 MG tablet tablet TAKE ONE TABLET BY MOUTH EVERY 12 HOURS (Patient taking differently: Take 5 mg by mouth Every 12 (Twelve) Hours.) 60 tablet 0   • fluconazole (DIFLUCAN) 200 MG tablet Take 1 tablet by mouth 2 (Two) Times a Day. 20 tablet 0   • furosemide (Lasix) 20 MG tablet Take 1 tablet by mouth.     • gabapentin (NEURONTIN) 300 MG capsule Take 300 mg by mouth Daily As Needed.     • glucose blood test strip USe BID to test blood sugar DX.e11.65 100 each 3   • guaiFENesin (HUMIBID 3) 400 MG tablet Take 200 mg by mouth As Needed.     • HYDROcodone-acetaminophen (NORCO) 5-325 MG per tablet Take 1-2 tablets by mouth Every 6 (Six) Hours As Needed for Severe Pain . (Patient taking differently: Take 1-2 tablets by mouth 2 (Two) Times a Day As Needed for Severe Pain .) 15 tablet 0   • KETOTIFEN FUMARATE OP Apply  to eye(s) as directed by provider Daily As Needed.     • Lancets 33G misc 1 Each/kg 2 (two) times a day. To test blood sugars DxE11.65 100 each 2   •  levonorgestrel (Mirena, 52 MG,) 20 MCG/24HR IUD 1 each by Intrauterine route.     • loratadine (CLARITIN) 10 MG tablet TAKE ONE TABLET BY MOUTH DAILY (Patient taking differently: Take 10 mg by mouth Daily.) 90 tablet 0   • Melatonin 5 MG capsule Take 5 mg by mouth As Needed.     • omeprazole (priLOSEC) 40 MG capsule Take 1 capsule by mouth Daily. 90 capsule 0   • OnabotulinumtoxinA (Botox) 200 units reconstituted solution FOR . PHYSICIAN TO INJECT UP  UNITS INTRAMUSCULARLY INTO HEAD, NECK AND SHOULDERS EVERY 90 DAYS. 1 each 3   • simethicone (MYLICON) 125 MG chewable tablet Chew 125 mg Every 6 (Six) Hours As Needed for Flatulence.     • SITagliptin (Januvia) 50 MG tablet Take 1 tablet by mouth Daily. 30 tablet 2   • spironolactone (ALDACTONE) 100 MG tablet Take 1 tablet by mouth Every Other Day. Takes 30 days on,  off 30 days. (Patient taking differently: Take 100 mg by mouth Every Other Day. Or Takes 30 days on,  off 30 days.) 45 tablet 1     Current Facility-Administered Medications on File Prior to Visit   Medication Dose Route Frequency Provider Last Rate Last Admin   • OnabotulinumtoxinA 200 Units  200 Units Intramuscular Q3 Months Shiela Coelho, TANESHA   200 Units at 06/11/21 1616       Results for orders placed or performed in visit on 11/05/21   COVID-19, [facility] IN-HOUSE NP/OP SWAB IN UTM/VTM/[facility] TRANSPORT MEDIA 24HR TAT - Swab, Oropharynx    Specimen: Oropharynx; Swab   Result Value Ref Range    COVID19 Not Detected Not Detected - Ref. Range       PE    Physical Exam  Vitals reviewed.   Constitutional:       General: She is not in acute distress.     Appearance: Normal appearance. She is well-developed. She is obese. She is not ill-appearing or diaphoretic.   HENT:      Head: Normocephalic and atraumatic.   Eyes:      Extraocular Movements: Extraocular movements intact.      Conjunctiva/sclera: Conjunctivae normal.   Pulmonary:      Effort: No respiratory  distress.   Musculoskeletal:         General: Normal range of motion.      Cervical back: Normal range of motion.   Neurological:      General: No focal deficit present.      Mental Status: She is alert.   Psychiatric:         Attention and Perception: Attention and perception normal. She is attentive.         Mood and Affect: Affect is blunt and flat.         Speech: Speech normal.         Behavior: Behavior normal. Behavior is cooperative.         Thought Content: Thought content normal.         Cognition and Memory: Cognition and memory normal.         Judgment: Judgment normal.          A/P    Diagnoses and all orders for this visit:    1. Difficulty eating (Primary)  2. Bilious vomiting with nausea  Patient states she hasn't been able to eat much for the last 2 weeks.  Having difficulty keeping water down.  Reports nausea and vomiting during the days.   She states she has weakness, fatigue and feels as if she may pass out.  She is wanting to be admitted to hospital.  Advised patient she will need to be evaluated at ER and she agrees to go.    3. S/P bariatric surgery  Surgery in June 2021.  Patient reports her symptoms started after surgery and are not improving but worsening.  Encouraged patient to follow-up with bariatric surgeon.    4. Esophageal stricture  Recent dilation by Dr. Hernandez.    5. PCOS (polycystic ovarian syndrome)  Followed by Dr. Malone.  Encouraged patient to discuss this with her gynecologist.  Unable to take metformin due to hypoglycemia since she is not eating food.  History of blood clots on eliquis.  Has IUD.  Unsure of what other treatment options are available for patients worsening PCOS issues.  She also has appointment with endocrinology in February as a new patient.    6. Mold exposure  Reports mold in apartment.  Requests testing and I encouraged her to check with allergist.  Should contact code enforcement/housing department given issues.    7. Immunization due  -     COVID-19  Vaccine (Pf[employer])         Plan of care reviewed with patient at the conclusion of today's visit. Education was provided regarding diagnosis, management and any prescribed or recommended OTC medications.  Patient verbalizes understanding of and agreement with management plan.    No follow-ups on file.     Lesli Ramirez PA-C

## 2021-11-11 NOTE — TELEPHONE ENCOUNTER
Tried calling patient twice, lvm both times.  Patient left a vm stating that she is currently at [facility] ED.  She says she has symptoms of weakness and fatigue.  Patient states she is not able to talk while she is in ED.

## 2021-11-11 NOTE — ED PROVIDER NOTES
Subjective   Patient is a very pleasant 35-year-old female who presents with nausea vomiting, suspected dehydration, near syncopal episodes. She states that in June of this year she had a gastric sleeve placed by Dr. Andre, bariatric surgery. States that since that time she had persistent nausea vomiting. She has had EGDs performed which also noted esophageal strictures and dilation was performed. She states that the sensation of food being stuck in her throat is improved but anytime she eats or drinks she has vomiting followed shortly thereafter. That she says is been a chronic problem, persistently worse over the past 6 weeks. She is actually living with her mother for the past 1 week as she does not feel safe at home alone. She states that anytime she stands up and tries to walk she becomes a near syncopal. She has not actually fully lost consciousness as when she developed the symptoms she sits or lies down immediately. She denies any significant pain associated with these events. She denies fever, chills, chest pain, shortness of breath, or other acute complaints.      Vomiting  The primary symptoms include weight loss, fatigue, nausea and vomiting. Primary symptoms do not include fever, abdominal pain or melena. The illness began more than 7 days ago. The onset was gradual. The problem has been gradually worsening.   The weight loss began more than 2 weeks ago.       Review of Systems   Constitutional: Positive for fatigue and weight loss. Negative for fever.   Gastrointestinal: Positive for nausea and vomiting. Negative for abdominal pain and melena.   All other systems reviewed and are negative.      Past Medical History:   Diagnosis Date   • Anxiety    • Asthma     prn inhalers, does not follow w/ pulmonary   • Chronic back pain     previously followed w/ pain management, now just prn Tylenol + Gabapentin   • Chronic deep vein thrombosis (DVT) of femoral vein of right lower extremity (HCC) 9/10/2021   •  "Diabetes mellitus (HCC)     Type II, dx 2014, never on insulin, A1C 7.6   • Dyspepsia    • Dyspnea on exertion    • Fatigue    • Gastroparesis    • GERD (gastroesophageal reflux disease)    • Heartburn     chronic, prn TUMS, denies prior eval   • Hirsutism    • Hx of radiation therapy     for treatments of Keloids   • Hypertension    • Irregular menses     infrequent spotting   • Leiomyoma, subserous     possible peduculated f3-4 cm fibroid on u/s at [facility]   • Migraines     botox q3 months, following Neurology @ [facility]   • Morbid obesity (HCC)    • Peripheral edema    • Polycystic ovaries     severe insulin resistance/hirsuitism   • Seasonal allergies    • Slow to wake up after anesthesia        Allergies   Allergen Reactions   • Capsicum Annuum Extract & Derivative (Bell Pepper) [Capsicum] Anaphylaxis   • Ibuprofen Other (See Comments)     \"makes me retain fluid and eventually go into CHF\"   • Peanut-Containing Drug Products Anaphylaxis   • Hydrochlorothiazide Swelling     eventually causes CHF    • Monosodium Glutamate Swelling and Headache   • Oatmeal Other (See Comments)     Dx on allergy testing   • Amitriptyline Mental Status Change     memory gaps + neuropathy + hair loss   • Aspartame Nausea Only   • Augmentin [Amoxicillin-Pot Clavulanate] Other (See Comments)     Syncope, not sure if allergic to cephalosporins.   • Codeine Headache      Can tolerate hydrocodone, no other adverse reactions to other narcotics.   • Diclofenac Headache   • Doxycycline Rash and Hallucinations   • Eggs Or Egg-Derived Products Other (See Comments)     dx on allergy testing, but does not completely avoid   • Naproxen Other (See Comments)     \"blackout\"       Past Surgical History:   Procedure Laterality Date   • ENDOSCOPY N/A 6/15/2021    Procedure: ESOPHAGOGASTRODUODENOSCOPY;  Surgeon: Ayaka Andre MD;  Location: [facility];  Service: Robotics - DaVinci;  Laterality: N/A;   • ENDOSCOPY N/A 10/28/2021    Procedure: " ESOPHAGOGASTRODUODENOSCOPY WITH ACHALASIA BALLOON DILATATION;  Surgeon: Jase Hernandez MD;  Location:  [facility];  Service: Gastroenterology;  Laterality: N/A;  58 F DILATOR   • GASTRIC SLEEVE LAPAROSCOPIC N/A 6/15/2021    Procedure: GASTRIC SLEEVE LAPAROSCOPIC WITH DAVINCI ROBOT, LAPROSCOPIC HIATAL HERNIA REPAIR WITH DAVINCI ROBOT;  Surgeon: Ayaka Andre MD;  Location:  [facility];  Service: Robotics - DaVinci;  Laterality: N/A;   • KELOID EXCISION      1990,1993,1999,2015,2016,2019   • LAPAROSCOPIC CHOLECYSTECTOMY  2000    for stones   • RADIOFREQUENCY ABLATION  2019    x 2  for pt back   • UMBILICAL HERNIA REPAIR  1990   • WISDOM TOOTH EXTRACTION  1994       Family History   Problem Relation Age of Onset   • Arthritis Mother    • Diabetes Mother    • Obesity Mother    • Arrhythmia Mother    • Hypertension Mother    • Asthma Mother    • Dementia Father    • Heart attack Father    • Arrhythmia Father    • Heart disease Father    • Stroke Other         CVA   • Obesity Other    • Ovarian cancer Maternal Aunt         40's   • Breast cancer Paternal Aunt         30's   • Heart disease Other    • Hypertension Other    • Endometrial cancer Neg Hx        Social History     Socioeconomic History   • Marital status: Single   Tobacco Use   • Smoking status: Never Smoker   • Smokeless tobacco: Never Used   Vaping Use   • Vaping Use: Never used   Substance and Sexual Activity   • Alcohol use: Not Currently     Alcohol/week: 0.0 standard drinks     Comment: Very rarely drink socially. At most 2 drinks per month.   • Drug use: No   • Sexual activity: Defer     Partners: Male     Birth control/protection: Condom, I.U.D.           Objective   Physical Exam  Vitals and nursing note reviewed.   Constitutional:       General: She is not in acute distress.     Appearance: She is well-developed.   HENT:      Head: Normocephalic and atraumatic.   Eyes:      Conjunctiva/sclera: Conjunctivae normal.      Pupils: Pupils are  equal, round, and reactive to light.   Cardiovascular:      Rate and Rhythm: Normal rate and regular rhythm.      Heart sounds: Normal heart sounds.   Pulmonary:      Effort: Pulmonary effort is normal. No respiratory distress.      Breath sounds: Normal breath sounds.   Abdominal:      General: Bowel sounds are normal. There is no distension.      Palpations: Abdomen is soft. There is no mass.      Tenderness: There is no abdominal tenderness. There is no rebound.   Musculoskeletal:         General: Normal range of motion.      Cervical back: Normal range of motion and neck supple.   Skin:     General: Skin is warm and dry.   Neurological:      Mental Status: She is alert and oriented to person, place, and time.         Procedures           ED Course      Recent Results (from the past 24 hour(s))   Urinalysis With Microscopic If Indicated (No Culture) - Urine, Clean Catch    Collection Time: 11/11/21  5:56 PM    Specimen: Urine, Clean Catch   Result Value Ref Range    Color, UA Dark Yellow (A) Yellow, Straw    Appearance, UA Cloudy (A) Clear    pH, UA 5.5 5.0 - 8.0    Specific Gravity, UA 1.025 1.001 - 1.030    Glucose, UA Negative Negative    Ketones, UA 40 mg/dL (2+) (A) Negative    Bilirubin, UA Large (3+) (A) Negative    Blood, UA Negative Negative    Protein,  mg/dL (2+) (A) Negative    Leuk Esterase, UA Trace (A) Negative    Nitrite, UA Negative Negative    Urobilinogen, UA 1.0 E.U./dL 0.2 - 1.0 E.U./dL   Urinalysis, Microscopic Only - Urine, Clean Catch    Collection Time: 11/11/21  5:56 PM    Specimen: Urine, Clean Catch   Result Value Ref Range    RBC, UA 3-6 (A) None Seen, 0-2 /HPF    WBC, UA 6-12 (A) None Seen, 0-2 /HPF    Bacteria, UA 2+ (A) None Seen, Trace /HPF    Squamous Epithelial Cells, UA 7-12 (A) None Seen, 0-2 /HPF    Hyaline Casts, UA None Seen 0 - 6 /LPF    Methodology Manual Light Microscopy    POC Pregnancy, Urine    Collection Time: 11/11/21  6:02 PM    Specimen: Urine   Result Value  Ref Range    HCG, Urine, QL Negative Negative    Lot Number [id number]     Internal Positive Control Passed Positive, Passed    Internal Negative Control Passed Negative, Passed    Expiration Date 2,023    Comprehensive Metabolic Panel    Collection Time: 11/11/21  6:51 PM    Specimen: Blood   Result Value Ref Range    Glucose 95 65 - 99 mg/dL    BUN 5 (L) 6 - 20 mg/dL    Creatinine 0.91 0.57 - 1.00 mg/dL    Sodium 139 136 - 145 mmol/L    Potassium 3.8 3.5 - 5.2 mmol/L    Chloride 97 (L) 98 - 107 mmol/L    CO2 22.0 22.0 - 29.0 mmol/L    Calcium 9.8 8.6 - 10.5 mg/dL    Total Protein 7.7 6.0 - 8.5 g/dL    Albumin 4.00 3.50 - 5.20 g/dL    ALT (SGPT) 34 (H) 1 - 33 U/L    AST (SGOT) 38 (H) 1 - 32 U/L    Alkaline Phosphatase 163 (H) 39 - 117 U/L    Total Bilirubin 0.4 0.0 - 1.2 mg/dL    eGFR  African Amer 84 >60 mL/min/1.73    Globulin 3.7 gm/dL    A/G Ratio 1.1 g/dL    BUN/Creatinine Ratio 5.5 (L) 7.0 - 25.0    Anion Gap 20.0 (H) 5.0 - 15.0 mmol/L   Lipase    Collection Time: 11/11/21  6:51 PM    Specimen: Blood   Result Value Ref Range    Lipase 31 13 - 60 U/L   Green Top (Gel)    Collection Time: 11/11/21  6:51 PM   Result Value Ref Range    Extra Tube Hold for add-ons.    Lavender Top    Collection Time: 11/11/21  6:51 PM   Result Value Ref Range    Extra Tube hold for add-on    Gold Top - SST    Collection Time: 11/11/21  6:51 PM   Result Value Ref Range    Extra Tube Hold for add-ons.    Gray Top    Collection Time: 11/11/21  6:51 PM   Result Value Ref Range    Extra Tube Hold for add-ons.    Light Blue Top    Collection Time: 11/11/21  6:51 PM   Result Value Ref Range    Extra Tube hold for add-on    CBC Auto Differential    Collection Time: 11/11/21  6:51 PM    Specimen: Blood   Result Value Ref Range    WBC 9.05 3.40 - 10.80 10*3/mm3    RBC 4.49 3.77 - 5.28 10*6/mm3    Hemoglobin 13.8 12.0 - 15.9 g/dL    Hematocrit 41.9 34.0 - 46.6 %    MCV 93.3 79.0 - 97.0 fL    MCH 30.7 26.6 - 33.0 pg    MCHC 32.9 31.5 - 35.7  g/dL    RDW 14.9 12.3 - 15.4 %    RDW-SD 50.7 37.0 - 54.0 fl    MPV 12.7 (H) 6.0 - 12.0 fL    Platelets 193 140 - 450 10*3/mm3    Neutrophil % 55.9 42.7 - 76.0 %    Lymphocyte % 35.1 19.6 - 45.3 %    Monocyte % 6.9 5.0 - 12.0 %    Eosinophil % 1.1 0.3 - 6.2 %    Basophil % 0.7 0.0 - 1.5 %    Immature Grans % 0.3 0.0 - 0.5 %    Neutrophils, Absolute 5.06 1.70 - 7.00 10*3/mm3    Lymphocytes, Absolute 3.18 (H) 0.70 - 3.10 10*3/mm3    Monocytes, Absolute 0.62 0.10 - 0.90 10*3/mm3    Eosinophils, Absolute 0.10 0.00 - 0.40 10*3/mm3    Basophils, Absolute 0.06 0.00 - 0.20 10*3/mm3    Immature Grans, Absolute 0.03 0.00 - 0.05 10*3/mm3    nRBC 0.2 0.0 - 0.2 /100 WBC   Beta Hydroxybutyrate Quantitative    Collection Time: 11/11/21  6:51 PM    Specimen: Blood   Result Value Ref Range    Beta-Hydroxybutyrate Quant 4.487 (H) 0.020 - 0.270 mmol/L   TSH    Collection Time: 11/11/21  6:51 PM    Specimen: Blood   Result Value Ref Range    TSH 2.210 0.270 - 4.200 uIU/mL   Urinalysis With Culture If Indicated - Urine, Catheter In/Out    Collection Time: 11/11/21  7:40 PM    Specimen: Urine, Catheter In/Out   Result Value Ref Range    Color, UA Yellow Yellow, Straw    Appearance, UA Clear Clear    pH, UA 5.5 5.0 - 8.0    Specific Gravity, UA 1.018 1.001 - 1.030    Glucose, UA Negative Negative    Ketones, UA 80 mg/dL (3+) (A) Negative    Bilirubin, UA Negative Negative    Blood, UA Negative Negative    Protein, UA Trace (A) Negative    Leuk Esterase, UA Negative Negative    Nitrite, UA Negative Negative    Urobilinogen, UA 1.0 E.U./dL 0.2 - 1.0 E.U./dL   Lactic Acid, Plasma    Collection Time: 11/11/21 11:17 PM    Specimen: Blood   Result Value Ref Range    Lactate 1.7 0.5 - 2.0 mmol/L   Blood Gas, Venous With Co-Ox    Collection Time: 11/11/21 11:18 PM    Specimen: Venous Blood   Result Value Ref Range    Site Nurse/Dr Draw     pH, Venous 7.343 7.310 - 7.410 pH Units    pCO2, Venous 44.9 41.0 - 51.0 mm Hg    pO2, Venous 33.8 27.0 -  53.0 mm Hg    HCO3, Venous 24.4 22.0 - 28.0 mmol/L    Base Excess, Venous -1.6 -2.0 - 2.0 mmol/L    Hemoglobin, Blood Gas 12.9 (L) 14 - 18 g/dL    Oxyhemoglobin Venous 59.8 %    Methemoglobin Venous 0.4 %    Carboxyhemoglobin Venous 0.9 %    CO2 Content 25.7 22 - 33 mmol/L    Temperature 37.0 C    Barometric Pressure for Blood Gas      Modality Room Air     FIO2 21 %    Rate 0 Breaths/minute    PIP 0 cmH2O    IPAP 0     EPAP 0     Note       Note: In addition to lab results from this visit, the labs listed above may include labs taken at another facility or during a different encounter within the last 24 hours. Please correlate lab times with ED admission and discharge times for further clarification of the services performed during this visit.    No orders to display     Vitals:    11/11/21 1756 11/11/21 1815 11/11/21 2100 11/12/21 0130   BP:  129/100 137/87 125/84   BP Location:       Patient Position:       Pulse:   65 59   Resp:   18 18   Temp:       TempSrc:       SpO2: 99% 98% 96% 97%   Weight:       Height:         Medications   Sodium Chloride (PF) 0.9 % 10 mL (has no administration in time range)   sodium chloride 0.9 % bolus 1,000 mL (0 mL Intravenous Stopped 11/11/21 1932)   ondansetron (ZOFRAN) injection 4 mg (4 mg Intravenous Given 11/11/21 1905)   sodium chloride 0.9 % bolus 1,000 mL (0 mL Intravenous Stopped 11/12/21 0159)     ECG/EMG Results (last 24 hours)     ** No results found for the last 24 hours. **        No orders to display     Pt was dehydrated, consistent with her reported history. She was given 2 liters of IV fluid and case with discussed with Dr. Jay via her PA.  They states that they can see her in the bariatric surgery office tomorrow to discuss further treatment. Options.  Pt understands to return to the ED if symptoms return or other concerns arise.                                       MDM    Final diagnoses:   Non-intractable vomiting with nausea, unspecified vomiting type    Dehydration   Weakness   Lightheaded   Ketonuria       ED Disposition  ED Disposition     ED Disposition Condition Comment    Discharge Stable         DISCHARGE    Patient discharged in stable condition.    Reviewed implications of results, diagnosis, meds, responsibility to follow up, warning signs and symptoms of possible worsening, potential complications and reasons to return to ER.    Patient/Family voiced understanding of above instructions.    Discussed plan for discharge, as there is no emergent indication for admission.  Pt/family is agreeable and understands need for follow up and possible repeat testing.  Pt/family is aware that discharge does not mean that nothing is wrong but that it indicates no emergency is currently present that requires admission and they must continue care with follow-up as given below or with a physician of their choice.     FOLLOW-UP  Ayaka Andre MD  [address]     [place] [id number] [phone]    Schedule an appointment as soon as possible for a visit today      [facility]  [address] [id number] [phone]    If symptoms worsen    Lesli Ramirez PA-C  [address] [address]  [phone]    Schedule an appointment as soon as possible for a visit            Medication List      New Prescriptions    ondansetron 4 MG tablet  Commonly known as: ZOFRAN  Take 1 tablet by mouth Every 6 (Six) Hours As Needed for Nausea or Vomiting.        Changed    Eliquis 5 MG tablet tablet  Generic drug: apixaban  TAKE ONE TABLET BY MOUTH EVERY 12 HOURS  What changed:   · how much to take  · when to take this     HYDROcodone-acetaminophen 5-325 MG per tablet  Commonly known as: NORCO  Take 1-2 tablets by mouth Every 6 (Six) Hours As Needed for Severe Pain .  What changed: when to take this     spironolactone 100 MG tablet  Commonly known as: ALDACTONE  Take 1 tablet by  mouth Every Other Day. Takes 30 days on,  off 30 days.  What changed: additional instructions           Where to Get Your Medications      These medications were sent to [facility] - [place], [place] - [address] - [phone] PH - [phone] FX  [address] [place] [address]    Phone: [phone]   · ondansetron 4 MG tablet           No follow-up provider specified.       Medication List      No changes were made to your prescriptions during this visit.          David Beatty,   11/14/21 1227

## 2021-11-11 NOTE — TELEPHONE ENCOUNTER
----- Message from Ayaka Andre MD sent at 11/11/2021  2:38 PM EST -----  Please let her know I reviewed  her UGI and it looks very good.    Please get an update on her symptoms.    If she still has symptoms, let me know and I will get her set up for Dr. Hernandez to dilate again.

## 2021-11-12 ENCOUNTER — PREP FOR SURGERY (OUTPATIENT)
Dept: OTHER | Facility: HOSPITAL | Age: 36
End: 2021-11-12

## 2021-11-12 ENCOUNTER — TELEPHONE (OUTPATIENT)
Dept: GASTROENTEROLOGY | Facility: CLINIC | Age: 36
End: 2021-11-12

## 2021-11-12 ENCOUNTER — APPOINTMENT (OUTPATIENT)
Dept: PREADMISSION TESTING | Facility: HOSPITAL | Age: 36
End: 2021-11-12

## 2021-11-12 VITALS
BODY MASS INDEX: 34.75 KG/M2 | SYSTOLIC BLOOD PRESSURE: 135 MMHG | HEART RATE: 62 BPM | TEMPERATURE: 96.8 F | HEIGHT: 60 IN | DIASTOLIC BLOOD PRESSURE: 84 MMHG | WEIGHT: 177 LBS | OXYGEN SATURATION: 98 % | RESPIRATION RATE: 18 BRPM

## 2021-11-12 DIAGNOSIS — Z01.818 PREOP TESTING: ICD-10-CM

## 2021-11-12 DIAGNOSIS — R11.2 INTRACTABLE NAUSEA AND VOMITING: Primary | ICD-10-CM

## 2021-11-12 PROCEDURE — C9803 HOPD COVID-19 SPEC COLLECT: HCPCS

## 2021-11-12 PROCEDURE — U0004 COV-19 TEST NON-CDC HGH THRU: HCPCS | Performed by: INTERNAL MEDICINE

## 2021-11-12 RX ORDER — ONDANSETRON 4 MG/1
4 TABLET, FILM COATED ORAL EVERY 6 HOURS PRN
Qty: 8 TABLET | Refills: 0 | Status: SHIPPED | OUTPATIENT
Start: 2021-11-12 | End: 2021-11-30 | Stop reason: HOSPADM

## 2021-11-12 RX ADMIN — SODIUM CHLORIDE 1000 ML: 9 INJECTION, SOLUTION INTRAVENOUS at 01:23

## 2021-11-12 NOTE — DISCHARGE INSTRUCTIONS
You have been given IV fluids in the emergency department.  Do not eat or drink this coming morning.  Follow-up with the bariatric surgery clinic today.  As we discussed, return to the emergency department if your symptoms worsen or other concerns arise.

## 2021-11-12 NOTE — TELEPHONE ENCOUNTER
LASHANDA Coles contacted me to get patient scheduled for egd with dilation with Dr. Hernandez. I have put her on the schedule for Monday the 15th.    When asking if patient was on any anticoagulants, patient informed me she was not taking them right now. She states she has stopped taking all of her medication because she was not sure what was making her sick. She did it on her own.    Just wanted to let you know.

## 2021-11-14 LAB — SARS-COV-2 RNA PNL SPEC NAA+PROBE: NOT DETECTED

## 2021-11-15 ENCOUNTER — HOSPITAL ENCOUNTER (OUTPATIENT)
Facility: HOSPITAL | Age: 36
Setting detail: HOSPITAL OUTPATIENT SURGERY
Discharge: HOME | End: 2021-11-15
Attending: INTERNAL MEDICINE | Admitting: INTERNAL MEDICINE

## 2021-11-15 ENCOUNTER — PRIOR AUTHORIZATION (OUTPATIENT)
Dept: GASTROENTEROLOGY | Facility: CLINIC | Age: 36
End: 2021-11-15

## 2021-11-15 ENCOUNTER — ANESTHESIA (OUTPATIENT)
Dept: GASTROENTEROLOGY | Facility: HOSPITAL | Age: 36
End: 2021-11-15

## 2021-11-15 ENCOUNTER — ANESTHESIA EVENT (OUTPATIENT)
Dept: GASTROENTEROLOGY | Facility: HOSPITAL | Age: 36
End: 2021-11-15

## 2021-11-15 VITALS
OXYGEN SATURATION: 96 % | DIASTOLIC BLOOD PRESSURE: 100 MMHG | TEMPERATURE: 97.3 F | RESPIRATION RATE: 16 BRPM | HEART RATE: 61 BPM | SYSTOLIC BLOOD PRESSURE: 141 MMHG

## 2021-11-15 DIAGNOSIS — R11.2 INTRACTABLE NAUSEA AND VOMITING: ICD-10-CM

## 2021-11-15 LAB
GLUCOSE BLDC GLUCOMTR-MCNC: 100 MG/DL (ref 70–130)
GLUCOSE BLDC GLUCOMTR-MCNC: 108 MG/DL (ref 70–130)

## 2021-11-15 PROCEDURE — C1726 CATH, BAL DIL, NON-VASCULAR: HCPCS | Performed by: INTERNAL MEDICINE

## 2021-11-15 PROCEDURE — 25010000002 PROPOFOL 10 MG/ML EMULSION: Performed by: NURSE ANESTHETIST, CERTIFIED REGISTERED

## 2021-11-15 PROCEDURE — 0 LIDOCAINE 1 % SOLUTION: Performed by: NURSE ANESTHETIST, CERTIFIED REGISTERED

## 2021-11-15 PROCEDURE — 25010000002 ONDANSETRON PER 1 MG: Performed by: NURSE ANESTHETIST, CERTIFIED REGISTERED

## 2021-11-15 PROCEDURE — 88305 TISSUE EXAM BY PATHOLOGIST: CPT | Performed by: INTERNAL MEDICINE

## 2021-11-15 PROCEDURE — 82962 GLUCOSE BLOOD TEST: CPT

## 2021-11-15 PROCEDURE — 88342 IMHCHEM/IMCYTCHM 1ST ANTB: CPT | Performed by: INTERNAL MEDICINE

## 2021-11-15 PROCEDURE — 88341 IMHCHEM/IMCYTCHM EA ADD ANTB: CPT | Performed by: INTERNAL MEDICINE

## 2021-11-15 PROCEDURE — 43245 EGD DILATE STRICTURE: CPT | Performed by: INTERNAL MEDICINE

## 2021-11-15 PROCEDURE — 88360 TUMOR IMMUNOHISTOCHEM/MANUAL: CPT | Performed by: INTERNAL MEDICINE

## 2021-11-15 RX ORDER — PROPOFOL 10 MG/ML
VIAL (ML) INTRAVENOUS AS NEEDED
Status: DISCONTINUED | OUTPATIENT
Start: 2021-11-15 | End: 2021-11-15 | Stop reason: SURG

## 2021-11-15 RX ORDER — MIDAZOLAM HYDROCHLORIDE 1 MG/ML
1 INJECTION INTRAMUSCULAR; INTRAVENOUS
Status: CANCELLED | OUTPATIENT
Start: 2021-11-15

## 2021-11-15 RX ORDER — FAMOTIDINE 20 MG/1
20 TABLET, FILM COATED ORAL ONCE
Status: CANCELLED | OUTPATIENT
Start: 2021-11-15 | End: 2021-11-15

## 2021-11-15 RX ORDER — FAMOTIDINE 10 MG/ML
20 INJECTION, SOLUTION INTRAVENOUS ONCE
Status: COMPLETED | OUTPATIENT
Start: 2021-11-15 | End: 2021-11-15

## 2021-11-15 RX ORDER — SODIUM CHLORIDE 9 MG/ML
50 INJECTION, SOLUTION INTRAVENOUS ONCE
Status: DISCONTINUED | OUTPATIENT
Start: 2021-11-15 | End: 2021-11-15 | Stop reason: HOSPADM

## 2021-11-15 RX ORDER — DEXAMETHASONE SODIUM PHOSPHATE 4 MG/ML
8 INJECTION, SOLUTION INTRA-ARTICULAR; INTRALESIONAL; INTRAMUSCULAR; INTRAVENOUS; SOFT TISSUE ONCE AS NEEDED
Status: DISCONTINUED | OUTPATIENT
Start: 2021-11-15 | End: 2021-11-15 | Stop reason: HOSPADM

## 2021-11-15 RX ORDER — SODIUM CHLORIDE 0.9 % (FLUSH) 0.9 %
10 SYRINGE (ML) INJECTION EVERY 12 HOURS SCHEDULED
Status: DISCONTINUED | OUTPATIENT
Start: 2021-11-15 | End: 2021-11-15 | Stop reason: HOSPADM

## 2021-11-15 RX ORDER — HYDROMORPHONE HYDROCHLORIDE 1 MG/ML
0.5 INJECTION, SOLUTION INTRAMUSCULAR; INTRAVENOUS; SUBCUTANEOUS
Status: DISCONTINUED | OUTPATIENT
Start: 2021-11-15 | End: 2021-11-15 | Stop reason: HOSPADM

## 2021-11-15 RX ORDER — IMIPRAMINE HCL 25 MG
25 TABLET ORAL NIGHTLY
Qty: 30 TABLET | Refills: 1 | Status: SHIPPED | OUTPATIENT
Start: 2021-11-15 | End: 2021-11-30 | Stop reason: HOSPADM

## 2021-11-15 RX ORDER — LIDOCAINE HYDROCHLORIDE 10 MG/ML
INJECTION, SOLUTION INFILTRATION; PERINEURAL AS NEEDED
Status: DISCONTINUED | OUTPATIENT
Start: 2021-11-15 | End: 2021-11-15 | Stop reason: SURG

## 2021-11-15 RX ORDER — LIDOCAINE HYDROCHLORIDE 10 MG/ML
0.5 INJECTION, SOLUTION EPIDURAL; INFILTRATION; INTRACAUDAL; PERINEURAL ONCE AS NEEDED
Status: DISCONTINUED | OUTPATIENT
Start: 2021-11-15 | End: 2021-11-15 | Stop reason: HOSPADM

## 2021-11-15 RX ORDER — ONDANSETRON 4 MG/1
4 TABLET, ORALLY DISINTEGRATING ORAL EVERY 8 HOURS PRN
Qty: 30 TABLET | Refills: 2 | Status: ON HOLD | OUTPATIENT
Start: 2021-11-15 | End: 2021-11-30 | Stop reason: SDUPTHER

## 2021-11-15 RX ORDER — SODIUM CHLORIDE, SODIUM LACTATE, POTASSIUM CHLORIDE, CALCIUM CHLORIDE 600; 310; 30; 20 MG/100ML; MG/100ML; MG/100ML; MG/100ML
9 INJECTION, SOLUTION INTRAVENOUS CONTINUOUS
Status: DISCONTINUED | OUTPATIENT
Start: 2021-11-15 | End: 2021-11-15 | Stop reason: HOSPADM

## 2021-11-15 RX ORDER — PANTOPRAZOLE SODIUM 40 MG/1
40 TABLET, DELAYED RELEASE ORAL
Qty: 60 TABLET | Refills: 3 | Status: SHIPPED | OUTPATIENT
Start: 2021-11-15 | End: 2022-01-13

## 2021-11-15 RX ORDER — SODIUM CHLORIDE 0.9 % (FLUSH) 0.9 %
10 SYRINGE (ML) INJECTION AS NEEDED
Status: DISCONTINUED | OUTPATIENT
Start: 2021-11-15 | End: 2021-11-15 | Stop reason: HOSPADM

## 2021-11-15 RX ORDER — FENTANYL CITRATE 50 UG/ML
50 INJECTION, SOLUTION INTRAMUSCULAR; INTRAVENOUS
Status: DISCONTINUED | OUTPATIENT
Start: 2021-11-15 | End: 2021-11-15 | Stop reason: HOSPADM

## 2021-11-15 RX ORDER — ONDANSETRON 2 MG/ML
4 INJECTION INTRAMUSCULAR; INTRAVENOUS ONCE AS NEEDED
Status: COMPLETED | OUTPATIENT
Start: 2021-11-15 | End: 2021-11-15

## 2021-11-15 RX ADMIN — PROPOFOL 50 MG: 10 INJECTION, EMULSION INTRAVENOUS at 10:29

## 2021-11-15 RX ADMIN — PROPOFOL 50 MG: 10 INJECTION, EMULSION INTRAVENOUS at 10:31

## 2021-11-15 RX ADMIN — PROPOFOL 50 MG: 10 INJECTION, EMULSION INTRAVENOUS at 10:36

## 2021-11-15 RX ADMIN — PROPOFOL 50 MG: 10 INJECTION, EMULSION INTRAVENOUS at 10:28

## 2021-11-15 RX ADMIN — PROPOFOL 50 MG: 10 INJECTION, EMULSION INTRAVENOUS at 10:48

## 2021-11-15 RX ADMIN — PROPOFOL 50 MG: 10 INJECTION, EMULSION INTRAVENOUS at 10:43

## 2021-11-15 RX ADMIN — PROPOFOL 50 MG: 10 INJECTION, EMULSION INTRAVENOUS at 10:39

## 2021-11-15 RX ADMIN — PROPOFOL 50 MG: 10 INJECTION, EMULSION INTRAVENOUS at 10:34

## 2021-11-15 RX ADMIN — FAMOTIDINE 20 MG: 10 INJECTION INTRAVENOUS at 09:24

## 2021-11-15 RX ADMIN — LIDOCAINE HYDROCHLORIDE 100 MG: 10 INJECTION, SOLUTION INFILTRATION; PERINEURAL at 10:28

## 2021-11-15 RX ADMIN — ONDANSETRON 4 MG: 2 INJECTION INTRAMUSCULAR; INTRAVENOUS at 11:33

## 2021-11-15 NOTE — ANESTHESIA POSTPROCEDURE EVALUATION
Patient: Frances Hartman    Procedure Summary     Date: 11/15/21 Room / [facility]    Anesthesia Start: 1025 Anesthesia Stop: 1056    Procedure: ESOPHAGOGASTRODUODENOSCOPY WITH DILATATION (N/A ) Diagnosis:       Intractable nausea and vomiting      (Intractable nausea and vomiting [R11.2])    Surgeons: Jase Hernandez MD Provider: Greg Mcneil MD    Anesthesia Type: general ASA Status: 3          Anesthesia Type: general    Vitals  No vitals data found for the desired time range.          Post Anesthesia Care and Evaluation    Patient location during evaluation: PACU  Patient participation: complete - patient participated  Level of consciousness: responsive to verbal stimuli  Pain management: adequate  Airway patency: patent  Anesthetic complications: No anesthetic complications  PONV Status: none  Cardiovascular status: hemodynamically stable and acceptable  Respiratory status: nonlabored ventilation, acceptable and nasal cannula  Hydration status: acceptable

## 2021-11-15 NOTE — INTERVAL H&P NOTE
H&P updated. The patient was examined and   no response from her previous achalasia dilation.  States having trouble with liquids and solids.  Had an upper GI series with tablet which did not show obstruction.  States she is not sleeping well.  She is having difficulty liquids and solid.  She does not tolerate protein shakes states she is allergic to something in them.  When she does vomit is more bilious in nature.  Sometimes after eating she will also have diarrhea.  She denies any abdominal pain.    Will proceed with EGD and achalasia balloon dilation 35 mm.  Will also start her on imipramine 25 mg at bedtime.  She has stopped taking all of her other medications.

## 2021-11-15 NOTE — ANESTHESIA PREPROCEDURE EVALUATION
Anesthesia Evaluation     Patient summary reviewed and Nursing notes reviewed   no history of anesthetic complications:  NPO Solid Status: > 8 hours  NPO Liquid Status: > 8 hours           Airway   Mallampati: II  TM distance: >3 FB  Neck ROM: full  No difficulty expected  Dental      Pulmonary - normal exam   (+) asthma,shortness of breath,   Cardiovascular - normal exam    (+) hypertension, DVT, hyperlipidemia,       Neuro/Psych  (+) headaches, psychiatric history,     GI/Hepatic/Renal/Endo    (+) morbid obesity, GERD,  diabetes mellitus,     Musculoskeletal     Abdominal    Substance History      OB/GYN          Other                        Anesthesia Plan    ASA 3     general     intravenous induction     Anesthetic plan, all risks, benefits, and alternatives have been provided, discussed and informed consent has been obtained with: patient.    Plan discussed with CRNA.

## 2021-11-15 NOTE — DISCHARGE INSTRUCTIONS
Imipramine tablets  What is this medicine?  IMIPRAMINE (im IP ra meen) is used to treat depression. This medicine can also help children who have a nighttime bed wetting problem.  This medicine may be used for other purposes; ask your health care provider or pharmacist if you have questions.  COMMON BRAND NAME(S): Tofranil  What should I tell my health care provider before I take this medicine?  They need to know if you have any of these conditions:  · an alcohol problem  · asthma, difficulty breathing  · bipolar disorder or schizophrenia  · difficulty passing urine, prostate trouble  · fast or irregular heart beat  · glaucoma  · heart disease or recent heart attack  · kidney or liver disease  · seizures  · stroke  · thoughts or plans of suicide, a previous suicide attempt, or family history of suicide attempt  · thyroid disease  · an unusual or allergic reaction to imipramine, other medicines, foods, dyes, or preservatives  · pregnant or trying to get pregnant  · breast-feeding  How should I use this medicine?  Take this medicine by mouth with a glass of water. Follow the directions on the prescription label. Take your doses at regular intervals. Do not take your medicine more often than directed. Do not stop taking this medicine suddenly except upon the advice of your doctor. Stopping this medicine too quickly may cause serious side effects or your condition may worsen.  A special MedGuide will be given to you by the pharmacist with each prescription and refill. Be sure to read this information carefully each time.  Talk to your pediatrician regarding the use of this medicine in children. Special care may be needed. While this drug may be prescribed for children as young as 6 years for selected conditions, precautions do apply.  Overdosage: If you think you have taken too much of this medicine contact a poison control center or emergency room at once.  NOTE: This medicine is only for you. Do not share this  medicine with others.  What if I miss a dose?  If you miss a dose, take it as soon as you can. If it is almost time for your next dose, take only that dose. Do not take double or extra doses.  What may interact with this medicine?  Do not take this medicine with any of the following medications:  · amoxapine  · arsenic trioxide  · certain medicines used to regulate abnormal heartbeat or to treat other heart conditions  · cisapride  · cocaine  · grepafloxacin  · halofantrine  · levomethadyl  · linezolid  · MAOIs like Carbex, Eldepryl, Marplan, Nardil, and Parnate  · methylene blue (injected into a vein)  · other medicines for mental depression  · phenothiazines like perphenazine, thioridazine and chlorpromazine  · pimozide  · procarbazine  · sparfloxacin  · [place]'s Wort  This medicine may also interact with the following medications:  · atropine and related drugs like hyoscyamine, scopolamine, tolterodine and others  · barbiturate medicines for inducing sleep or treating seizures like phenobarbital  · cimetidine  · clonidine  · local anesthetics  · medicines for high blood pressure  · prescription pain medications  · seizure or epilepsy medicine such as carbamazepine or phenytoin  · stimulants like dexmethylphenidate or methylphenidate  · thyroid hormones  · ziprasidone  This list may not describe all possible interactions. Give your health care provider a list of all the medicines, herbs, non-prescription drugs, or dietary supplements you use. Also tell them if you smoke, drink alcohol, or use illegal drugs. Some items may interact with your medicine.  What should I watch for while using this medicine?  Tell your doctor if your symptoms do not get better or if they get worse. Visit your doctor or health care professional for regular checks on your progress. Because it may take several weeks to see the full effects of this medicine, it is important to continue your treatment as prescribed by your doctor.  Patients  and their families should watch out for new or worsening thoughts of suicide or depression. Also watch out for sudden changes in feelings such as feeling anxious, agitated, panicky, irritable, hostile, aggressive, impulsive, severely restless, overly excited and hyperactive, or not being able to sleep. If this happens, especially at the beginning of treatment or after a change in dose, call your health care professional.  You may get drowsy or dizzy. Do not drive, use machinery, or do anything that needs mental alertness until you know how this medicine affects you. Do not stand or sit up quickly, especially if you are an older patient. This reduces the risk of dizzy or fainting spells. Alcohol may interfere with the effect of this medicine. Avoid alcoholic drinks.  Do not treat yourself for coughs, colds, or allergies without asking your doctor or health care professional for advice. Some ingredients can increase possible side effects.  Your mouth may get dry. Chewing sugarless gum or sucking hard candy, and drinking plenty of water may help. Contact your doctor if the problem does not go away or is severe.  This medicine may cause dry eyes and blurred vision. If you wear contact lenses you may feel some discomfort. Lubricating drops may help. See your eye doctor if the problem does not go away or is severe.  This medicine can cause constipation. Try to have a bowel movement at least every 2 to 3 days. If you do not have a bowel movement for 3 days, call your doctor or health care professional.  This medicine can make you more sensitive to the sun. Keep out of the sun. If you cannot avoid being in the sun, wear protective clothing and use sunscreen. Do not use sun lamps or tanning beds/booths.  What side effects may I notice from receiving this medicine?  Side effects that you should report to your doctor or health care professional as soon as possible:  · allergic reactions like skin rash, itching or hives,  swelling of the face, lips, or tongue  · anxious  · breathing problems  · changes in vision  · confusion  · elevated mood, decreased need for sleep, racing thoughts, impulsive behavior  · eye pain  · fast, irregular heartbeat  · feeling faint or lightheaded, falls  · feeling agitated, angry, or irritable  · fever with increased sweating  · hallucination, loss of contact with reality  · seizures  · stiff muscles  · suicidal thoughts or other mood changes  · tingling, pain, or numbness in the feet or hands  · trouble passing urine or change in the amount of urine  · trouble sleeping  · unusually weak or tired  · vomiting  · yellowing of the eyes or skin  Side effects that usually do not require medical attention (report to your doctor or health care professional if they continue or are bothersome):  · change in sex drive or performance  · change in appetite or weight  · constipation  · dizziness  · dry mouth  · nausea  · tired  · tremors  · upset stomach  This list may not describe all possible side effects. Call your doctor for medical advice about side effects. You may report side effects to FDA at 0-606-PCK-7140.  Where should I keep my medicine?  Keep out of the reach of children.  Store at room temperature between 20 and 25 degrees C (68 and 77 degrees F). Keep container tightly closed. Throw away any unused medicine after the expiration date.  NOTE: This sheet is a summary. It may not cover all possible information. If you have questions about this medicine, talk to your doctor, pharmacist, or health care provider.  © 2021 Elsevier/Gold Standard (2019-12-10 13:22:02)  Soft-Food Eating Plan  A soft-food eating plan includes foods that are safe and easy to chew and swallow. Your health care provider or dietitian can help you find foods and flavors that fit into this plan. Follow this plan until your health care provider or dietitian says it is safe to start eating other foods and food textures.  What are tips for  following this plan?  General guidelines    · Take small bites of food, or cut food into pieces about ½ inch or smaller. Bite-sized pieces of food are easier to chew and swallow.  · Eat moist foods. Avoid overly dry foods.  · Avoid foods that:  ? Are difficult to swallow, such as dry, chunky, crispy, or sticky foods.  ? Are difficult to chew, such as hard, tough, or stringy foods.  ? Contain nuts, seeds, or fruits.  · Follow instructions from your dietitian about the types of liquids that are safe for you to swallow. You may be allowed to have:  ? Thick liquids only. This includes only liquids that are thicker than honey.  ? Thin and thick liquids. This includes all beverages and foods that become liquid at room temperature.  · To make thick liquids:  ? Purchase a commercial liquid thickening powder. These are available at grocery stores and pharmacies.  ? Mix the thickener into liquids according to instructions on the label.  ? Purchase ready-made thickened liquids.  ? Thicken soup by pureeing, straining to remove chunks, and adding flour, potato flakes, or corn starch.  ? Add commercial thickener to foods that become liquid at room temperature, such as milk shakes, yogurt, ice cream, gelatin, and sherbet.  · Ask your health care provider whether you need to take a fiber supplement.    Cooking  · Cook meats so they stay tender and moist. Use methods like braising, stewing, or baking in liquid.  · Cook vegetables and fruit until they are soft enough to be mashed with a fork.  · Peel soft, fresh fruits such as peaches, nectarines, and melons.  · When making soup, make sure chunks of meat and vegetables are smaller than ½ inch.  · Reheat leftover foods slowly so that a tough crust does not form.  What foods are allowed?  The items listed below may not be a complete list. Talk with your dietitian about what dietary choices are best for you.  Grains  Breads, muffins, pancakes, or waffles moistened with syrup, jelly, or  butter. Dry cereals well-moistened with milk. Moist, cooked cereals. Well-cooked pasta and rice.  Vegetables  All soft-cooked vegetables. Shredded lettuce.  Fruits  All canned and cooked fruits. Soft, peeled fresh fruits. Strawberries.  Dairy  Milk. Cream. Yogurt. Cottage cheese. Soft cheese without the rind.  Meats and other protein foods  Tender, moist ground meat, poultry, or fish. Meat cooked in gravy or sauces. Eggs.  Sweets and desserts  Ice cream. Milk shakes. Sherbet. Pudding.  Fats and oils  Butter. Margarine. Olive, canola, sunflower, and grapeseed oil. Smooth salad dressing. Smooth cream cheese. Mayonnaise. Gravy.  What foods are not allowed?  The items listed bemay not be a complete list. Talk with your dietitian about what dietary choices are best for you.  Grains  Coarse or dry cereals, such as bran, granola, and shredded wheat. Tough or chewy crusty breads, such as [place] bread or baguettes. Breads with nuts, seeds, or fruit.  Vegetables  All raw vegetables. Cooked corn. Cooked vegetables that are tough or stringy. Tough, crisp, fried potatoes and potato skins.  Fruits  Fresh fruits with skins or seeds, or both, such as apples, pears, and grapes. Stringy, high-pulp fruits, such as papaya, pineapple, coconut, and david. Fruit leather and all dried fruit.  Dairy  Yogurt with nuts or coconut.  Meats and other protein foods  Hard, dry sausages. Dry meat, poultry, or fish. Meats with gristle. Fish with bones. Fried meat or fish. Lunch meat and hotdogs. Nuts and seeds. Tappen peanut butter or other nut butters.  Sweets and desserts  Cakes or cookies that are very dry or chewy. Desserts with dried fruit, nuts, or coconut. Fried pastries. Very rich pastries.  Fats and oils  Cream cheese with fruit or nuts. Salad dressings with seeds or chunks.  Summary  · A soft-food eating plan includes foods that are safe and easy to swallow. Generally, the foods should be soft enough to be mashed with a fork.  · Avoid  foods that are dry, hard to chew, crunchy, sticky, stringy, or crispy.  · Ask your health care provider whether you need to thicken your liquids and if you need to take a fiber supplement.  This information is not intended to replace advice given to you by your health care provider. Make sure you discuss any questions you have with your health care provider.  Document Revised: 04/09/2020 Document Reviewed: 02/20/2018  [employer] Patient Education © 2021 [employer]  Monitored Anesthesia Care, Care After  This sheet gives you information about how to care for yourself after your procedure. Your health care provider may also give you more specific instructions. If you have problems or questions, contact your health care provider.  What can I expect after the procedure?  After the procedure, it is common to have:  · Tiredness.  · Forgetfulness about what happened after the procedure.  · Impaired judgment for important decisions.  · Nausea or vomiting.  · Some difficulty with balance.  Follow these instructions at home:  For the time period you were told by your health care provider:         · Rest as needed.  · Do not participate in activities where you could fall or become injured.  · Do not drive or use machinery.  · Do not drink alcohol.  · Do not take sleeping pills or medicines that cause drowsiness.  · Do not make important decisions or sign legal documents.  · Do not take care of children on your own.  Eating and drinking  · Follow the diet that is recommended by your health care provider.  · Drink enough fluid to keep your urine pale yellow.  · If you vomit:  ? Drink water, juice, or soup when you can drink without vomiting.  ? Make sure you have little or no nausea before eating solid foods.  General instructions  · Have a responsible adult stay with you for the time you are told. It is important to have someone help care for you until you are awake and alert.  · Take over-the-counter and prescription  medicines only as told by your health care provider.  · If you have sleep apnea, surgery and certain medicines can increase your risk for breathing problems. Follow instructions from your health care provider about wearing your sleep device:  ? Anytime you are sleeping, including during daytime naps.  ? While taking prescription pain medicines, sleeping medicines, or medicines that make you drowsy.  · Avoid smoking.  · Keep all follow-up visits as told by your health care provider. This is important.  Contact a health care provider if:  · You keep feeling nauseous or you keep vomiting.  · You feel light-headed.  · You are still sleepy or having trouble with balance after 24 hours.  · You develop a rash.  · You have a fever.  · You have redness or swelling around the IV site.  Get help right away if:  · You have trouble breathing.  · You have new-onset confusion at home.  Summary  · For several hours after your procedure, you may feel tired. You may also be forgetful and have poor judgment.  · Have a responsible adult stay with you for the time you are told. It is important to have someone help care for you until you are awake and alert.  · Rest as told. Do not drive or operate machinery. Do not drink alcohol or take sleeping pills.  · Get help right away if you have trouble breathing, or if you suddenly become confused.  This information is not intended to replace advice given to you by your health care provider. Make sure you discuss any questions you have with your health care provider.  Document Revised: 04/23/2021 Document Reviewed: 11/19/2020  Elsevier Patient Education © 2021 [employer].

## 2021-11-16 ENCOUNTER — PATIENT OUTREACH (OUTPATIENT)
Dept: CASE MANAGEMENT | Facility: OTHER | Age: 36
End: 2021-11-16

## 2021-11-16 NOTE — OUTREACH NOTE
Ambulatory Case Management Note    Patient Outreach    Called patient in regards to ED visit 11/11/21 with nausea, vomiting/ ED noted, Near Syncope, Dehydration, Weakness.  Reminded patient of role of RN-ACM and contact information.  Patient said she is feeling a little better; has held a little food and fluids down; had an EGD yesterday, with Dr. Hernandez. She voiced great appreciation for Dr. Hernandez and his office, as well as the ED staff and care.  Patient voiced good awareness of her body's reactions to foods and medications.  She said she is mindful to keep trying to get water in, and nutrition as well.  Said she has vomited only twice today, to this point.  Stated she does have Zofran and also has been ordered to take Protonix by Dr. Hernandez.  Patient stated the last 2 weeks, she has less strength and mobility, so she is staying with her mother, who is helpful to her with everything needed; mother is driving her to needed appts.  Patient declined RN-ACM assistance with making f/u appts with Bariatrics or PCP at this time.  She said she just saw PCP 11/11/21 and has another appt with PCP on 12/14/21.  She declined getting f/u appt with Bariatrics at this time, saying Dr. Hernandez is covering everything needed surrently; she has Bariatric appt already scheduled for 12-22-21.   Discussed [facility] 24/7 Nurse Phone Line #- sent this to patient via [employer], as she requested.  No other questions or concerns voiced at this time.  She voiced appreciation for the call.      General & Health Literacy Assessment    Questions/Answers      Most Recent Value   Assessment Completed With Patient   Living Arrangement Parent  [Patient is staying at her mother's home currently,  normally lives alone in her own place.]   Type of Residence Private Residence   Home Care Services No   Communication Device No   Bed or Wheelchair Confined No   Difficulty Keeping Appointments No   Health Literacy Good          Care  Evaluation    Questions/Answers      Most Recent Value   Care Gaps Addressed Diabetic A1C,  Diabetic Eye Exam,  Flu Shot,  Mammogram,  Pneumonia Vaccine,  Other (See Comment)  [Annual Physical: patient said she has had this within the last year.]   HbA1c Status Up to Date-within defined limits   Diabetic Eye Exam Status Up to Date   Flu Shot Status Refused   Mammogram Status Up to Date   Pneumonia Vaccine Status Up to Date   Other Patient Education/Resources  24/7 [facility] Nurse Call Line,  Advanced Care Planning   24/7 Nurse Call Line Education Method --  [sending via [employer]]   ACP Education Method Verbal   Advanced Directives: --  [Patient has material, and plans to complete this.]   Medication Adherence Medications understood   Healthy Lifestyle (Self-Efficacy) recognizes when to stop activity,  recognizes when to contact medical assistance,  self-reports important symptoms to medical professional          Yakelin Andrade RN  Ambulatory Case Management    11/16/2021, 15:01 EST

## 2021-11-17 ENCOUNTER — OFFICE VISIT (OUTPATIENT)
Dept: BEHAVIORAL HEALTH | Facility: CLINIC | Age: 36
End: 2021-11-17

## 2021-11-17 DIAGNOSIS — F33.1 MODERATE EPISODE OF RECURRENT MAJOR DEPRESSIVE DISORDER (HCC): Primary | ICD-10-CM

## 2021-11-17 DIAGNOSIS — R45.81 POOR SELF ESTEEM: ICD-10-CM

## 2021-11-17 DIAGNOSIS — R45.4 IRRITABILITY: ICD-10-CM

## 2021-11-17 DIAGNOSIS — R63.8 DIFFICULTY EATING: ICD-10-CM

## 2021-11-17 PROCEDURE — 90834 PSYTX W PT 45 MINUTES: CPT | Performed by: PSYCHOLOGIST

## 2021-11-17 NOTE — PROGRESS NOTES
PROGRESS NOTE    Data:  Frances Hartman is a 36 y.o. female who met with the undersigned for a scheduled individual outpatient therapy session from 2:15 - 3:00pm.      Clinical Maneuvering/Intervention:      The pt talked about recent stressors such as not being able to eat, frustrations with not feeling understood by others, and not liking where she lives. She talked about missing 'home,' or living in [place]. Stressors were processed individually and in detail. Venting of frustrations was conducted in order to help the pt feel less tense emotionally and gain insight into issues. Feelings were processed and validated several times in session. Perspective taking was conducted multiple times in order to help her feel less stuck, less overwhelmed, and see challenges as much more manageable. Active listening was conducted in order to help the pt make sense of stressors and start moving towards potential solutions. The pt was assisted with finding solutions based on existing skill-set and abilities. Active listening was conducted in order to help the pt make sense of stressors and start moving towards potential solutions. The pt was assisted with finding solutions based on existing skill-set and abilities. The pt's strengths were identified in order to help identify abilities to use to better face/overcome challenges. She was assisted, if not challenged at times, to apply her skills to solving her own issues. Homework was assigned tailored to pt's needs. The pt expressed gratitude for today's session.    Mental Status Exam  Hygiene:  good  Dress: normal  Attitude:  cooperative and proactive  Motor Activity: normal  Speech: normal  Mood:  depressed  Affect:  congruent  Thought Processes: normal  Thought Content:  normal  Suicidal Thoughts:  not endorsed  Homicidal Thoughts:  not endorsed  Crisis Safety Plan: not needed   Hallucinations:  none      Patient's Support Network Includes:  family, friends      Progress  toward goal: there is evidence to suggest that she is battling depression and often feels down about herself/her life      Functional Status: moderate to high      Prognosis: good    Assessment      The pt presented to be struggling with depression of a moderate level. Self-esteem seems impaired and cognitive distortions are often evident. She tends to benefit from highly supportive and strength-based counseling, but may also be open to challenge herself to grow as time goes on.        Plan      In order to diminish symptoms of depression and improve self-esteem, she will work her plan of action in terms doing 'first things first,' and improving her health before taking on the next task of seeking employment/career development. She will continue with counseling and building coping skills to better handle stress (ongoing).    Misty Nava, PhD, LP

## 2021-11-18 ENCOUNTER — PREP FOR SURGERY (OUTPATIENT)
Dept: OTHER | Facility: HOSPITAL | Age: 36
End: 2021-11-18

## 2021-11-18 DIAGNOSIS — Z01.818 PRE-OP TESTING: ICD-10-CM

## 2021-11-18 DIAGNOSIS — K22.2 ESOPHAGEAL STRICTURE: Primary | ICD-10-CM

## 2021-11-19 ENCOUNTER — HOSPITAL ENCOUNTER (OUTPATIENT)
Facility: HOSPITAL | Age: 36
Setting detail: HOSPITAL OUTPATIENT SURGERY
End: 2021-11-19
Attending: INTERNAL MEDICINE | Admitting: INTERNAL MEDICINE

## 2021-11-21 ENCOUNTER — NURSE TRIAGE (OUTPATIENT)
Dept: CALL CENTER | Facility: HOSPITAL | Age: 36
End: 2021-11-21

## 2021-11-21 NOTE — TELEPHONE ENCOUNTER
"Bariatric surgey 6 months ago and has been having trouble ever since Monday 11/15/2021 had stomach stretched for the 3rd time. Vomiting bile bright yellow  and acid every 2 hours. Double vision, arms and legs numb feeling. Care advice per guideline.    Reason for Disposition  • [1] SEVERE vomiting (e.g., 6 or more times/day) AND [2] present > 8 hours (Exception: patient sounds well, is drinking liquids, does not sound dehydrated, and vomiting has lasted less than 24 hours)    Additional Information  • Negative: Shock suspected (e.g., cold/pale/clammy skin, too weak to stand, low BP, rapid pulse)  • Negative: Difficult to awaken or acting confused (e.g., disoriented, slurred speech)  • Negative: Sounds like a life-threatening emergency to the triager  • Negative: Vomiting occurs only while coughing  • Negative: [1] Pregnant < 20 Weeks AND [2] nausea/vomiting began in early pregnancy (i.e., 4-8 weeks pregnant)  • Negative: Chest pain  • Negative: Headache is main symptom  • Negative: Vomiting (or Nausea) in a cancer patient who is currently (or recently) receiving chemotherapy or radiation therapy, or cancer patient who has metastatic or end-stage cancer and is receiving palliative care  • Negative: [1] Vomiting AND [2] contains red blood or black (\"coffee ground\") material  (Exception: few red streaks in vomit that only happened once)  • Negative: Severe pain in one eye  • Negative: Recent head injury (within last 3 days)  • Negative: Recent abdominal injury (within last 3 days)  • Negative: [1] Insulin-dependent diabetes (Type I) AND [2] glucose > 400 mg/dl (22 mmol/l)  • Negative: [1] Vomiting AND [2] hernia is more painful or swollen than usual    Answer Assessment - Initial Assessment Questions  1. VOMITING SEVERITY: \"How many times have you vomited in the past 24 hours?\"      - MILD:  1 - 2 times/day     - MODERATE: 3 - 5 times/day, decreased oral intake without significant weight loss or symptoms of " "dehydration     - SEVERE: 6 or more times/day, vomits everything or nearly everything, with significant weight loss, symptoms of dehydration       Over 12  2. ONSET: \"When did the vomiting begin?\"       Vomiting every 2 hours since Wednesday  3. FLUIDS: \"What fluids or food have you vomited up today?\" \"Have you been able to keep any fluids down?\"    Says no has not kept foods or fluids down in 3-4 days  4. ABDOMINAL PAIN: \"Are your having any abdominal pain?\" If yes : \"How bad is it and what does it feel like?\" (e.g., crampy, dull, intermittent, constant)       Not now but had discomfort and cramping last night  5. DIARRHEA: \"Is there any diarrhea?\" If Yes, ask: \"How many times today?\"     denies  6. CONTACTS: \"Is there anyone else in the family with the same symptoms?\"       *No Answer*  7. CAUSE: \"What do you think is causing your vomiting?\"      *No Answer*  8. HYDRATION STATUS: \"Any signs of dehydration?\" (e.g., dry mouth [not only dry lips], too weak to stand) \"When did you last urinate?\"    Says she is dehydrated yesterday or last night maybe says she has not urinated today  9. OTHER SYMPTOMS: \"Do you have any other symptoms?\" (e.g., fever, headache, vertigo, vomiting blood or coffee grounds, recent head injury)  Double vision light headed, dizzy  10. PREGNANCY: \"Is there any chance you are pregnant?\" \"When was your last menstrual period?\"   na    Protocols used: VOMITING-ADULT-AH      "

## 2021-11-22 ENCOUNTER — APPOINTMENT (OUTPATIENT)
Dept: PREADMISSION TESTING | Facility: HOSPITAL | Age: 36
End: 2021-11-22

## 2021-11-22 ENCOUNTER — HOSPITAL ENCOUNTER (INPATIENT)
Facility: HOSPITAL | Age: 36
LOS: 8 days | Discharge: HOME | End: 2021-11-30
Attending: EMERGENCY MEDICINE | Admitting: INTERNAL MEDICINE

## 2021-11-22 ENCOUNTER — APPOINTMENT (OUTPATIENT)
Dept: CT IMAGING | Facility: HOSPITAL | Age: 36
End: 2021-11-22

## 2021-11-22 DIAGNOSIS — R53.1 GENERALIZED WEAKNESS: ICD-10-CM

## 2021-11-22 DIAGNOSIS — E83.42 HYPOMAGNESEMIA: ICD-10-CM

## 2021-11-22 DIAGNOSIS — R13.10 DYSPHAGIA, UNSPECIFIED TYPE: ICD-10-CM

## 2021-11-22 DIAGNOSIS — R11.2 INTRACTABLE VOMITING WITH NAUSEA, UNSPECIFIED VOMITING TYPE: Primary | ICD-10-CM

## 2021-11-22 DIAGNOSIS — R79.89 ELEVATED LFTS: ICD-10-CM

## 2021-11-22 DIAGNOSIS — R63.4 WEIGHT LOSS: ICD-10-CM

## 2021-11-22 DIAGNOSIS — Z01.818 PRE-OP TESTING: ICD-10-CM

## 2021-11-22 DIAGNOSIS — K22.2 ESOPHAGEAL STRICTURE: ICD-10-CM

## 2021-11-22 PROBLEM — M54.50 LOW BACK PAIN: Status: RESOLVED | Noted: 2019-04-19 | Resolved: 2021-11-22

## 2021-11-22 PROBLEM — K21.9 GERD (GASTROESOPHAGEAL REFLUX DISEASE): Status: ACTIVE | Noted: 2021-11-22

## 2021-11-22 PROBLEM — G43.709 CHRONIC MIGRAINE WITHOUT AURA, NOT INTRACTABLE: Status: RESOLVED | Noted: 2020-02-04 | Resolved: 2021-11-22

## 2021-11-22 PROBLEM — M47.816 LUMBAR SPONDYLOSIS: Status: RESOLVED | Noted: 2019-06-12 | Resolved: 2021-11-22

## 2021-11-22 PROBLEM — M16.9 OSTEOARTHRITIS OF HIP: Status: RESOLVED | Noted: 2019-06-12 | Resolved: 2021-11-22

## 2021-11-22 PROBLEM — B02.9 HERPES ZOSTER: Status: RESOLVED | Noted: 2017-08-23 | Resolved: 2021-11-22

## 2021-11-22 PROBLEM — D64.9 ANEMIA: Status: RESOLVED | Noted: 2017-08-23 | Resolved: 2021-11-22

## 2021-11-22 PROBLEM — R11.10 INTRACTABLE VOMITING: Status: ACTIVE | Noted: 2021-11-22

## 2021-11-22 PROBLEM — M51.369 DEGENERATION OF LUMBAR INTERVERTEBRAL DISC: Status: RESOLVED | Noted: 2019-06-12 | Resolved: 2021-11-22

## 2021-11-22 PROBLEM — D25.1 FIBROIDS, INTRAMURAL: Status: RESOLVED | Noted: 2020-09-21 | Resolved: 2021-11-22

## 2021-11-22 PROBLEM — R53.83 FATIGUE: Status: ACTIVE | Noted: 2017-08-23

## 2021-11-22 PROBLEM — M53.3 PAIN IN THE COCCYX: Status: RESOLVED | Noted: 2020-03-16 | Resolved: 2021-11-22

## 2021-11-22 PROBLEM — N93.8 DUB (DYSFUNCTIONAL UTERINE BLEEDING): Status: RESOLVED | Noted: 2020-09-21 | Resolved: 2021-11-22

## 2021-11-22 PROBLEM — E88.89 KETOSIS (HCC): Status: ACTIVE | Noted: 2021-11-22

## 2021-11-22 PROBLEM — E66.01 MORBID OBESITY: Status: RESOLVED | Noted: 2021-05-18 | Resolved: 2021-11-22

## 2021-11-22 PROBLEM — D3A.8 NEUROENDOCRINE TUMOR: Status: ACTIVE | Noted: 2021-11-22

## 2021-11-22 PROBLEM — E66.813 OBESITY, CLASS III, BMI 40-49.9 (MORBID OBESITY): Status: RESOLVED | Noted: 2021-05-20 | Resolved: 2021-11-22

## 2021-11-22 PROBLEM — E66.01 OBESITY, CLASS III, BMI 40-49.9 (MORBID OBESITY): Status: RESOLVED | Noted: 2021-05-20 | Resolved: 2021-11-22

## 2021-11-22 PROBLEM — G89.4 CHRONIC PAIN SYNDROME: Status: RESOLVED | Noted: 2019-06-12 | Resolved: 2021-11-22

## 2021-11-22 PROBLEM — R11.10 INTRACTABLE VOMITING: Status: RESOLVED | Noted: 2021-11-22 | Resolved: 2021-11-22

## 2021-11-22 PROBLEM — D3A.8 NEUROENDOCRINE TUMOR: Status: RESOLVED | Noted: 2021-11-22 | Resolved: 2021-11-22

## 2021-11-22 PROBLEM — M51.36 DEGENERATION OF LUMBAR INTERVERTEBRAL DISC: Status: RESOLVED | Noted: 2019-06-12 | Resolved: 2021-11-22

## 2021-11-22 PROBLEM — J35.1 CHRONIC TONSILLAR HYPERTROPHY: Status: RESOLVED | Noted: 2017-08-23 | Resolved: 2021-11-22

## 2021-11-22 PROBLEM — E11.9 TYPE 2 DIABETES MELLITUS (HCC): Status: RESOLVED | Noted: 2017-08-23 | Resolved: 2021-11-22

## 2021-11-22 PROBLEM — R53.82 CHRONIC FATIGUE: Status: RESOLVED | Noted: 2017-08-23 | Resolved: 2021-11-22

## 2021-11-22 LAB
ALBUMIN SERPL-MCNC: 4.1 G/DL (ref 3.5–5.2)
ALBUMIN/GLOB SERPL: 1.1 G/DL
ALP SERPL-CCNC: 148 U/L (ref 39–117)
ALT SERPL W P-5'-P-CCNC: 70 U/L (ref 1–33)
ANION GAP SERPL CALCULATED.3IONS-SCNC: 18 MMOL/L (ref 5–15)
ANION GAP SERPL CALCULATED.3IONS-SCNC: 24 MMOL/L (ref 5–15)
AST SERPL-CCNC: 77 U/L (ref 1–32)
B-HCG UR QL: NEGATIVE
BASOPHILS # BLD AUTO: 0.04 10*3/MM3 (ref 0–0.2)
BASOPHILS NFR BLD AUTO: 0.4 % (ref 0–1.5)
BILIRUB SERPL-MCNC: 0.7 MG/DL (ref 0–1.2)
BILIRUB UR QL STRIP: NEGATIVE
BUN SERPL-MCNC: 5 MG/DL (ref 6–20)
BUN SERPL-MCNC: 6 MG/DL (ref 6–20)
BUN/CREAT SERPL: 6.7 (ref 7–25)
BUN/CREAT SERPL: 7.1 (ref 7–25)
CALCIUM SPEC-SCNC: 9 MG/DL (ref 8.6–10.5)
CALCIUM SPEC-SCNC: 9.6 MG/DL (ref 8.6–10.5)
CHLORIDE SERPL-SCNC: 95 MMOL/L (ref 98–107)
CHLORIDE SERPL-SCNC: 98 MMOL/L (ref 98–107)
CLARITY UR: CLEAR
CO2 SERPL-SCNC: 22 MMOL/L (ref 22–29)
CO2 SERPL-SCNC: 26 MMOL/L (ref 22–29)
COLOR UR: YELLOW
CREAT SERPL-MCNC: 0.75 MG/DL (ref 0.57–1)
CREAT SERPL-MCNC: 0.84 MG/DL (ref 0.57–1)
D-LACTATE SERPL-SCNC: 1.5 MMOL/L (ref 0.5–2)
DEPRECATED RDW RBC AUTO: 48.9 FL (ref 37–54)
EOSINOPHIL # BLD AUTO: 0.09 10*3/MM3 (ref 0–0.4)
EOSINOPHIL NFR BLD AUTO: 1 % (ref 0.3–6.2)
ERYTHROCYTE [DISTWIDTH] IN BLOOD BY AUTOMATED COUNT: 14.4 % (ref 12.3–15.4)
ETHANOL BLD-MCNC: <10 MG/DL (ref 0–10)
GFR SERPL CREATININE-BSD FRML MDRD: 105 ML/MIN/1.73
GFR SERPL CREATININE-BSD FRML MDRD: 92 ML/MIN/1.73
GLOBULIN UR ELPH-MCNC: 3.6 GM/DL
GLUCOSE BLDC GLUCOMTR-MCNC: 126 MG/DL (ref 70–130)
GLUCOSE SERPL-MCNC: 127 MG/DL (ref 65–99)
GLUCOSE SERPL-MCNC: 138 MG/DL (ref 65–99)
GLUCOSE UR STRIP-MCNC: NEGATIVE MG/DL
HCT VFR BLD AUTO: 39.6 % (ref 34–46.6)
HGB BLD-MCNC: 13.1 G/DL (ref 12–15.9)
HGB UR QL STRIP.AUTO: NEGATIVE
HOLD SPECIMEN: NORMAL
IMM GRANULOCYTES # BLD AUTO: 0.06 10*3/MM3 (ref 0–0.05)
IMM GRANULOCYTES NFR BLD AUTO: 0.6 % (ref 0–0.5)
KETONES UR QL STRIP: ABNORMAL
LEUKOCYTE ESTERASE UR QL STRIP.AUTO: NEGATIVE
LIPASE SERPL-CCNC: 32 U/L (ref 13–60)
LYMPHOCYTES # BLD AUTO: 2.14 10*3/MM3 (ref 0.7–3.1)
LYMPHOCYTES NFR BLD AUTO: 23 % (ref 19.6–45.3)
MAGNESIUM SERPL-MCNC: 1.4 MG/DL (ref 1.6–2.6)
MCH RBC QN AUTO: 30.9 PG (ref 26.6–33)
MCHC RBC AUTO-ENTMCNC: 33.1 G/DL (ref 31.5–35.7)
MCV RBC AUTO: 93.4 FL (ref 79–97)
MONOCYTES # BLD AUTO: 0.77 10*3/MM3 (ref 0.1–0.9)
MONOCYTES NFR BLD AUTO: 8.3 % (ref 5–12)
NEUTROPHILS NFR BLD AUTO: 6.22 10*3/MM3 (ref 1.7–7)
NEUTROPHILS NFR BLD AUTO: 66.7 % (ref 42.7–76)
NITRITE UR QL STRIP: NEGATIVE
NRBC BLD AUTO-RTO: 0.5 /100 WBC (ref 0–0.2)
OSMOLALITY SERPL: 300 MOSM/KG (ref 275–295)
PH UR STRIP.AUTO: 6 [PH] (ref 5–8)
PLATELET # BLD AUTO: 249 10*3/MM3 (ref 140–450)
PMV BLD AUTO: 12.2 FL (ref 6–12)
POTASSIUM SERPL-SCNC: 3.3 MMOL/L (ref 3.5–5.2)
POTASSIUM SERPL-SCNC: 3.4 MMOL/L (ref 3.5–5.2)
PROT SERPL-MCNC: 7.7 G/DL (ref 6–8.5)
PROT UR QL STRIP: NEGATIVE
QT INTERVAL: 396 MS
QTC INTERVAL: 424 MS
RBC # BLD AUTO: 4.24 10*6/MM3 (ref 3.77–5.28)
SARS-COV-2 RNA PNL SPEC NAA+PROBE: NOT DETECTED
SODIUM SERPL-SCNC: 141 MMOL/L (ref 136–145)
SODIUM SERPL-SCNC: 142 MMOL/L (ref 136–145)
SP GR UR STRIP: 1.01 (ref 1–1.03)
T4 FREE SERPL-MCNC: 1.04 NG/DL (ref 0.93–1.7)
TROPONIN T SERPL-MCNC: <0.01 NG/ML (ref 0–0.03)
TSH SERPL DL<=0.05 MIU/L-ACNC: 1.89 UIU/ML (ref 0.27–4.2)
UROBILINOGEN UR QL STRIP: ABNORMAL
WBC NRBC COR # BLD: 9.32 10*3/MM3 (ref 3.4–10.8)
WHOLE BLOOD HOLD SPECIMEN: NORMAL
WHOLE BLOOD HOLD SPECIMEN: NORMAL

## 2021-11-22 PROCEDURE — 82962 GLUCOSE BLOOD TEST: CPT

## 2021-11-22 PROCEDURE — G0378 HOSPITAL OBSERVATION PER HR: HCPCS

## 2021-11-22 PROCEDURE — 25010000002 MAGNESIUM SULFATE 2 GM/50ML SOLUTION: Performed by: EMERGENCY MEDICINE

## 2021-11-22 PROCEDURE — 80048 BASIC METABOLIC PNL TOTAL CA: CPT | Performed by: NURSE PRACTITIONER

## 2021-11-22 PROCEDURE — 99284 EMERGENCY DEPT VISIT MOD MDM: CPT

## 2021-11-22 PROCEDURE — 25010000002 IOPAMIDOL 61 % SOLUTION: Performed by: FAMILY MEDICINE

## 2021-11-22 PROCEDURE — 83930 ASSAY OF BLOOD OSMOLALITY: CPT | Performed by: NURSE PRACTITIONER

## 2021-11-22 PROCEDURE — 99223 1ST HOSP IP/OBS HIGH 75: CPT | Performed by: FAMILY MEDICINE

## 2021-11-22 PROCEDURE — 82077 ASSAY SPEC XCP UR&BREATH IA: CPT | Performed by: NURSE PRACTITIONER

## 2021-11-22 PROCEDURE — P9612 CATHETERIZE FOR URINE SPEC: HCPCS

## 2021-11-22 PROCEDURE — 83690 ASSAY OF LIPASE: CPT

## 2021-11-22 PROCEDURE — 82607 VITAMIN B-12: CPT | Performed by: NURSE PRACTITIONER

## 2021-11-22 PROCEDURE — 80050 GENERAL HEALTH PANEL: CPT

## 2021-11-22 PROCEDURE — 93005 ELECTROCARDIOGRAM TRACING: CPT | Performed by: EMERGENCY MEDICINE

## 2021-11-22 PROCEDURE — 83036 HEMOGLOBIN GLYCOSYLATED A1C: CPT | Performed by: NURSE PRACTITIONER

## 2021-11-22 PROCEDURE — 80306 DRUG TEST PRSMV INSTRMNT: CPT | Performed by: NURSE PRACTITIONER

## 2021-11-22 PROCEDURE — 80179 DRUG ASSAY SALICYLATE: CPT | Performed by: NURSE PRACTITIONER

## 2021-11-22 PROCEDURE — 74177 CT ABD & PELVIS W/CONTRAST: CPT

## 2021-11-22 PROCEDURE — 81003 URINALYSIS AUTO W/O SCOPE: CPT | Performed by: EMERGENCY MEDICINE

## 2021-11-22 PROCEDURE — 25010000002 THIAMINE PER 100 MG: Performed by: EMERGENCY MEDICINE

## 2021-11-22 PROCEDURE — U0004 COV-19 TEST NON-CDC HGH THRU: HCPCS

## 2021-11-22 PROCEDURE — 84439 ASSAY OF FREE THYROXINE: CPT | Performed by: NURSE PRACTITIONER

## 2021-11-22 PROCEDURE — 83605 ASSAY OF LACTIC ACID: CPT

## 2021-11-22 PROCEDURE — 80143 DRUG ASSAY ACETAMINOPHEN: CPT | Performed by: NURSE PRACTITIONER

## 2021-11-22 PROCEDURE — C9803 HOPD COVID-19 SPEC COLLECT: HCPCS

## 2021-11-22 PROCEDURE — 81025 URINE PREGNANCY TEST: CPT | Performed by: FAMILY MEDICINE

## 2021-11-22 PROCEDURE — 83735 ASSAY OF MAGNESIUM: CPT | Performed by: EMERGENCY MEDICINE

## 2021-11-22 PROCEDURE — 84484 ASSAY OF TROPONIN QUANT: CPT | Performed by: EMERGENCY MEDICINE

## 2021-11-22 RX ORDER — ONDANSETRON 4 MG/1
4 TABLET, FILM COATED ORAL EVERY 6 HOURS PRN
Status: DISCONTINUED | OUTPATIENT
Start: 2021-11-22 | End: 2021-11-30 | Stop reason: HOSPADM

## 2021-11-22 RX ORDER — FAMOTIDINE 10 MG/ML
20 INJECTION, SOLUTION INTRAVENOUS EVERY 12 HOURS SCHEDULED
Status: DISPENSED | OUTPATIENT
Start: 2021-11-23 | End: 2021-11-23

## 2021-11-22 RX ORDER — CARVEDILOL 6.25 MG/1
6.25 TABLET ORAL 2 TIMES DAILY WITH MEALS
Status: DISCONTINUED | OUTPATIENT
Start: 2021-11-23 | End: 2021-11-30 | Stop reason: HOSPADM

## 2021-11-22 RX ORDER — POLYETHYLENE GLYCOL 3350 17 G/17G
17 POWDER, FOR SOLUTION ORAL DAILY PRN
Status: DISCONTINUED | OUTPATIENT
Start: 2021-11-22 | End: 2021-11-30 | Stop reason: HOSPADM

## 2021-11-22 RX ORDER — MAGNESIUM SULFATE HEPTAHYDRATE 40 MG/ML
2 INJECTION, SOLUTION INTRAVENOUS ONCE
Status: COMPLETED | OUTPATIENT
Start: 2021-11-22 | End: 2021-11-22

## 2021-11-22 RX ORDER — SODIUM CHLORIDE 0.9 % (FLUSH) 0.9 %
10 SYRINGE (ML) INJECTION AS NEEDED
Status: DISCONTINUED | OUTPATIENT
Start: 2021-11-22 | End: 2021-11-30 | Stop reason: HOSPADM

## 2021-11-22 RX ORDER — SODIUM CHLORIDE 0.9 % (FLUSH) 0.9 %
10 SYRINGE (ML) INJECTION EVERY 12 HOURS SCHEDULED
Status: DISCONTINUED | OUTPATIENT
Start: 2021-11-23 | End: 2021-11-30 | Stop reason: HOSPADM

## 2021-11-22 RX ORDER — PANTOPRAZOLE SODIUM 40 MG/10ML
40 INJECTION, POWDER, LYOPHILIZED, FOR SOLUTION INTRAVENOUS
Status: DISCONTINUED | OUTPATIENT
Start: 2021-11-23 | End: 2021-11-30 | Stop reason: HOSPADM

## 2021-11-22 RX ORDER — ONDANSETRON 2 MG/ML
4 INJECTION INTRAMUSCULAR; INTRAVENOUS EVERY 6 HOURS PRN
Status: DISCONTINUED | OUTPATIENT
Start: 2021-11-22 | End: 2021-11-30 | Stop reason: HOSPADM

## 2021-11-22 RX ORDER — BISACODYL 5 MG/1
5 TABLET, DELAYED RELEASE ORAL DAILY PRN
Status: DISCONTINUED | OUTPATIENT
Start: 2021-11-22 | End: 2021-11-30 | Stop reason: HOSPADM

## 2021-11-22 RX ORDER — HYDROXYZINE HCL 10 MG/5 ML
10 SOLUTION, ORAL ORAL EVERY 8 HOURS PRN
Status: DISCONTINUED | OUTPATIENT
Start: 2021-11-22 | End: 2021-11-30 | Stop reason: HOSPADM

## 2021-11-22 RX ORDER — BISACODYL 10 MG
10 SUPPOSITORY, RECTAL RECTAL DAILY PRN
Status: DISCONTINUED | OUTPATIENT
Start: 2021-11-22 | End: 2021-11-30 | Stop reason: HOSPADM

## 2021-11-22 RX ORDER — ALUMINA, MAGNESIA, AND SIMETHICONE 2400; 2400; 240 MG/30ML; MG/30ML; MG/30ML
15 SUSPENSION ORAL EVERY 6 HOURS PRN
Status: DISCONTINUED | OUTPATIENT
Start: 2021-11-22 | End: 2021-11-30 | Stop reason: HOSPADM

## 2021-11-22 RX ORDER — SODIUM CHLORIDE 9 MG/ML
10 INJECTION INTRAVENOUS AS NEEDED
Status: DISCONTINUED | OUTPATIENT
Start: 2021-11-22 | End: 2021-11-30 | Stop reason: HOSPADM

## 2021-11-22 RX ORDER — NICOTINE POLACRILEX 4 MG
15 LOZENGE BUCCAL
Status: DISCONTINUED | OUTPATIENT
Start: 2021-11-22 | End: 2021-11-30 | Stop reason: HOSPADM

## 2021-11-22 RX ORDER — AMOXICILLIN 250 MG
2 CAPSULE ORAL 2 TIMES DAILY
Status: DISCONTINUED | OUTPATIENT
Start: 2021-11-23 | End: 2021-11-30 | Stop reason: HOSPADM

## 2021-11-22 RX ORDER — SODIUM CHLORIDE AND POTASSIUM CHLORIDE 150; 900 MG/100ML; MG/100ML
100 INJECTION, SOLUTION INTRAVENOUS CONTINUOUS
Status: ACTIVE | OUTPATIENT
Start: 2021-11-23 | End: 2021-11-23

## 2021-11-22 RX ORDER — DEXTROSE MONOHYDRATE 25 G/50ML
25 INJECTION, SOLUTION INTRAVENOUS
Status: DISCONTINUED | OUTPATIENT
Start: 2021-11-22 | End: 2021-11-30 | Stop reason: HOSPADM

## 2021-11-22 RX ADMIN — SODIUM CHLORIDE, PRESERVATIVE FREE 10 ML: 5 INJECTION INTRAVENOUS at 23:40

## 2021-11-22 RX ADMIN — THIAMINE HYDROCHLORIDE 100 MG: 100 INJECTION, SOLUTION INTRAMUSCULAR; INTRAVENOUS at 19:12

## 2021-11-22 RX ADMIN — MAGNESIUM SULFATE HEPTAHYDRATE 2 G: 2 INJECTION, SOLUTION INTRAVENOUS at 17:08

## 2021-11-22 RX ADMIN — SODIUM CHLORIDE 1000 ML: 9 INJECTION, SOLUTION INTRAVENOUS at 16:42

## 2021-11-22 RX ADMIN — IOPAMIDOL 100 ML: 612 INJECTION, SOLUTION INTRAVENOUS at 22:57

## 2021-11-22 RX ADMIN — POTASSIUM CHLORIDE AND SODIUM CHLORIDE 100 ML/HR: 900; 150 INJECTION, SOLUTION INTRAVENOUS at 23:40

## 2021-11-23 ENCOUNTER — APPOINTMENT (OUTPATIENT)
Dept: PREADMISSION TESTING | Facility: HOSPITAL | Age: 36
End: 2021-11-23

## 2021-11-23 ENCOUNTER — APPOINTMENT (OUTPATIENT)
Dept: ULTRASOUND IMAGING | Facility: HOSPITAL | Age: 36
End: 2021-11-23

## 2021-11-23 ENCOUNTER — APPOINTMENT (OUTPATIENT)
Dept: CARDIOLOGY | Facility: HOSPITAL | Age: 36
End: 2021-11-23

## 2021-11-23 ENCOUNTER — ANESTHESIA EVENT (OUTPATIENT)
Dept: GASTROENTEROLOGY | Facility: HOSPITAL | Age: 36
End: 2021-11-23

## 2021-11-23 PROBLEM — N94.89 ADNEXAL MASS: Status: ACTIVE | Noted: 2021-11-23

## 2021-11-23 PROBLEM — R07.89 CHEST PAIN, ATYPICAL: Status: ACTIVE | Noted: 2021-11-23

## 2021-11-23 LAB
ALBUMIN SERPL-MCNC: 3.7 G/DL (ref 3.5–5.2)
ALBUMIN/GLOB SERPL: 1.2 G/DL
ALP SERPL-CCNC: 127 U/L (ref 39–117)
ALT SERPL W P-5'-P-CCNC: 59 U/L (ref 1–33)
AMPHET+METHAMPHET UR QL: NEGATIVE
AMPHETAMINES UR QL: NEGATIVE
ANION GAP SERPL CALCULATED.3IONS-SCNC: 18 MMOL/L (ref 5–15)
APAP SERPL-MCNC: <5 MCG/ML (ref 0–30)
AST SERPL-CCNC: 61 U/L (ref 1–32)
BARBITURATES UR QL SCN: NEGATIVE
BASOPHILS # BLD AUTO: 0.04 10*3/MM3 (ref 0–0.2)
BASOPHILS NFR BLD AUTO: 0.4 % (ref 0–1.5)
BENZODIAZ UR QL SCN: NEGATIVE
BILIRUB SERPL-MCNC: 0.6 MG/DL (ref 0–1.2)
BUN SERPL-MCNC: 3 MG/DL (ref 6–20)
BUN/CREAT SERPL: 3.9 (ref 7–25)
BUPRENORPHINE SERPL-MCNC: NEGATIVE NG/ML
CALCIUM SPEC-SCNC: 9.1 MG/DL (ref 8.6–10.5)
CANNABINOIDS SERPL QL: NEGATIVE
CHLORIDE SERPL-SCNC: 99 MMOL/L (ref 98–107)
CO2 SERPL-SCNC: 24 MMOL/L (ref 22–29)
COCAINE UR QL: NEGATIVE
CREAT SERPL-MCNC: 0.77 MG/DL (ref 0.57–1)
DEPRECATED RDW RBC AUTO: 49.6 FL (ref 37–54)
EOSINOPHIL # BLD AUTO: 0.15 10*3/MM3 (ref 0–0.4)
EOSINOPHIL NFR BLD AUTO: 1.5 % (ref 0.3–6.2)
ERYTHROCYTE [DISTWIDTH] IN BLOOD BY AUTOMATED COUNT: 14.4 % (ref 12.3–15.4)
FOLATE SERPL-MCNC: 4.05 NG/ML (ref 4.78–24.2)
GFR SERPL CREATININE-BSD FRML MDRD: 102 ML/MIN/1.73
GLOBULIN UR ELPH-MCNC: 3 GM/DL
GLUCOSE BLDC GLUCOMTR-MCNC: 112 MG/DL (ref 70–130)
GLUCOSE BLDC GLUCOMTR-MCNC: 121 MG/DL (ref 70–130)
GLUCOSE BLDC GLUCOMTR-MCNC: 127 MG/DL (ref 70–130)
GLUCOSE BLDC GLUCOMTR-MCNC: 160 MG/DL (ref 70–130)
GLUCOSE SERPL-MCNC: 119 MG/DL (ref 65–99)
HAV IGM SERPL QL IA: NORMAL
HBA1C MFR BLD: 6.6 % (ref 4.8–5.6)
HBV CORE IGM SERPL QL IA: NORMAL
HBV SURFACE AG SERPL QL IA: NORMAL
HCT VFR BLD AUTO: 36.4 % (ref 34–46.6)
HCV AB SER DONR QL: NORMAL
HGB BLD-MCNC: 11.8 G/DL (ref 12–15.9)
IMM GRANULOCYTES # BLD AUTO: 0.04 10*3/MM3 (ref 0–0.05)
IMM GRANULOCYTES NFR BLD AUTO: 0.4 % (ref 0–0.5)
LYMPHOCYTES # BLD AUTO: 2.78 10*3/MM3 (ref 0.7–3.1)
LYMPHOCYTES NFR BLD AUTO: 27.4 % (ref 19.6–45.3)
MAGNESIUM SERPL-MCNC: 1.6 MG/DL (ref 1.6–2.6)
MCH RBC QN AUTO: 30.3 PG (ref 26.6–33)
MCHC RBC AUTO-ENTMCNC: 32.4 G/DL (ref 31.5–35.7)
MCV RBC AUTO: 93.3 FL (ref 79–97)
METHADONE UR QL SCN: NEGATIVE
MONOCYTES # BLD AUTO: 0.77 10*3/MM3 (ref 0.1–0.9)
MONOCYTES NFR BLD AUTO: 7.6 % (ref 5–12)
NEUTROPHILS NFR BLD AUTO: 6.37 10*3/MM3 (ref 1.7–7)
NEUTROPHILS NFR BLD AUTO: 62.7 % (ref 42.7–76)
NRBC BLD AUTO-RTO: 0.3 /100 WBC (ref 0–0.2)
OPIATES UR QL: NEGATIVE
OXYCODONE UR QL SCN: NEGATIVE
PCP UR QL SCN: NEGATIVE
PHOSPHATE SERPL-MCNC: 2.8 MG/DL (ref 2.5–4.5)
PLATELET # BLD AUTO: 240 10*3/MM3 (ref 140–450)
PMV BLD AUTO: 12.4 FL (ref 6–12)
POTASSIUM SERPL-SCNC: 3.3 MMOL/L (ref 3.5–5.2)
PROPOXYPH UR QL: NEGATIVE
PROT SERPL-MCNC: 6.7 G/DL (ref 6–8.5)
RBC # BLD AUTO: 3.9 10*6/MM3 (ref 3.77–5.28)
SALICYLATES SERPL-MCNC: <0.3 MG/DL
SODIUM SERPL-SCNC: 141 MMOL/L (ref 136–145)
TRICYCLICS UR QL SCN: NEGATIVE
TROPONIN T SERPL-MCNC: <0.01 NG/ML (ref 0–0.03)
VIT B12 BLD-MCNC: 869 PG/ML (ref 211–946)
WBC NRBC COR # BLD: 10.15 10*3/MM3 (ref 3.4–10.8)

## 2021-11-23 PROCEDURE — 99222 1ST HOSP IP/OBS MODERATE 55: CPT | Performed by: NURSE PRACTITIONER

## 2021-11-23 PROCEDURE — 25010000002 THIAMINE PER 100 MG: Performed by: NURSE PRACTITIONER

## 2021-11-23 PROCEDURE — 93306 TTE W/DOPPLER COMPLETE: CPT | Performed by: INTERNAL MEDICINE

## 2021-11-23 PROCEDURE — 0 POTASSIUM CHLORIDE 10 MEQ/100ML SOLUTION: Performed by: FAMILY MEDICINE

## 2021-11-23 PROCEDURE — 25010000002 ENOXAPARIN PER 10 MG: Performed by: INTERNAL MEDICINE

## 2021-11-23 PROCEDURE — 83735 ASSAY OF MAGNESIUM: CPT | Performed by: NURSE PRACTITIONER

## 2021-11-23 PROCEDURE — 97161 PT EVAL LOW COMPLEX 20 MIN: CPT

## 2021-11-23 PROCEDURE — 99222 1ST HOSP IP/OBS MODERATE 55: CPT | Performed by: SURGERY

## 2021-11-23 PROCEDURE — 82746 ASSAY OF FOLIC ACID SERUM: CPT | Performed by: NURSE PRACTITIONER

## 2021-11-23 PROCEDURE — G0378 HOSPITAL OBSERVATION PER HR: HCPCS

## 2021-11-23 PROCEDURE — 93306 TTE W/DOPPLER COMPLETE: CPT

## 2021-11-23 PROCEDURE — 80074 ACUTE HEPATITIS PANEL: CPT | Performed by: NURSE PRACTITIONER

## 2021-11-23 PROCEDURE — 93970 EXTREMITY STUDY: CPT | Performed by: INTERNAL MEDICINE

## 2021-11-23 PROCEDURE — 99222 1ST HOSP IP/OBS MODERATE 55: CPT | Performed by: INTERNAL MEDICINE

## 2021-11-23 PROCEDURE — 25010000002 THIAMINE PER 100 MG: Performed by: INTERNAL MEDICINE

## 2021-11-23 PROCEDURE — 80053 COMPREHEN METABOLIC PANEL: CPT | Performed by: NURSE PRACTITIONER

## 2021-11-23 PROCEDURE — 0 MAGNESIUM SULFATE 4 GM/100ML SOLUTION: Performed by: FAMILY MEDICINE

## 2021-11-23 PROCEDURE — 0 MAGNESIUM SULFATE 4 GM/100ML SOLUTION: Performed by: INTERNAL MEDICINE

## 2021-11-23 PROCEDURE — 97116 GAIT TRAINING THERAPY: CPT

## 2021-11-23 PROCEDURE — 25010000002 ENOXAPARIN PER 10 MG: Performed by: NURSE PRACTITIONER

## 2021-11-23 PROCEDURE — 25010000002 ENOXAPARIN PER 10 MG: Performed by: FAMILY MEDICINE

## 2021-11-23 PROCEDURE — 76830 TRANSVAGINAL US NON-OB: CPT

## 2021-11-23 PROCEDURE — 93970 EXTREMITY STUDY: CPT

## 2021-11-23 PROCEDURE — 85025 COMPLETE CBC W/AUTO DIFF WBC: CPT | Performed by: NURSE PRACTITIONER

## 2021-11-23 PROCEDURE — 99233 SBSQ HOSP IP/OBS HIGH 50: CPT | Performed by: FAMILY MEDICINE

## 2021-11-23 PROCEDURE — 82962 GLUCOSE BLOOD TEST: CPT

## 2021-11-23 PROCEDURE — 92610 EVALUATE SWALLOWING FUNCTION: CPT | Performed by: SPEECH-LANGUAGE PATHOLOGIST

## 2021-11-23 PROCEDURE — 84100 ASSAY OF PHOSPHORUS: CPT | Performed by: NURSE PRACTITIONER

## 2021-11-23 PROCEDURE — 25010000002 SODIUM CHLORIDE 0.9 % WITH KCL 20 MEQ 20-0.9 MEQ/L-% SOLUTION: Performed by: NURSE PRACTITIONER

## 2021-11-23 PROCEDURE — 82652 VIT D 1 25-DIHYDROXY: CPT | Performed by: NURSE PRACTITIONER

## 2021-11-23 PROCEDURE — 0 POTASSIUM CHLORIDE 10 MEQ/100ML SOLUTION: Performed by: INTERNAL MEDICINE

## 2021-11-23 PROCEDURE — 84484 ASSAY OF TROPONIN QUANT: CPT | Performed by: FAMILY MEDICINE

## 2021-11-23 RX ORDER — ACETAMINOPHEN 160 MG/5ML
650 SOLUTION ORAL EVERY 6 HOURS PRN
Status: DISCONTINUED | OUTPATIENT
Start: 2021-11-23 | End: 2021-11-30 | Stop reason: HOSPADM

## 2021-11-23 RX ORDER — POTASSIUM CHLORIDE 750 MG/1
40 CAPSULE, EXTENDED RELEASE ORAL AS NEEDED
Status: DISCONTINUED | OUTPATIENT
Start: 2021-11-23 | End: 2021-11-30 | Stop reason: HOSPADM

## 2021-11-23 RX ORDER — MAGNESIUM SULFATE HEPTAHYDRATE 40 MG/ML
2 INJECTION, SOLUTION INTRAVENOUS AS NEEDED
Status: DISCONTINUED | OUTPATIENT
Start: 2021-11-23 | End: 2021-11-30 | Stop reason: HOSPADM

## 2021-11-23 RX ORDER — MIDAZOLAM HYDROCHLORIDE 1 MG/ML
1 INJECTION INTRAMUSCULAR; INTRAVENOUS
Status: CANCELLED | OUTPATIENT
Start: 2021-11-23

## 2021-11-23 RX ORDER — FAMOTIDINE 10 MG/ML
20 INJECTION, SOLUTION INTRAVENOUS ONCE
Status: CANCELLED | OUTPATIENT
Start: 2021-11-23 | End: 2021-11-23

## 2021-11-23 RX ORDER — POTASSIUM CHLORIDE 7.45 MG/ML
10 INJECTION INTRAVENOUS
Status: DISCONTINUED | OUTPATIENT
Start: 2021-11-23 | End: 2021-11-30 | Stop reason: HOSPADM

## 2021-11-23 RX ORDER — MAGNESIUM SULFATE HEPTAHYDRATE 40 MG/ML
4 INJECTION, SOLUTION INTRAVENOUS AS NEEDED
Status: DISCONTINUED | OUTPATIENT
Start: 2021-11-23 | End: 2021-11-30 | Stop reason: HOSPADM

## 2021-11-23 RX ORDER — FAMOTIDINE 20 MG/1
20 TABLET, FILM COATED ORAL ONCE
Status: CANCELLED | OUTPATIENT
Start: 2021-11-23 | End: 2021-11-23

## 2021-11-23 RX ORDER — POTASSIUM CHLORIDE 1.5 G/1.77G
40 POWDER, FOR SOLUTION ORAL AS NEEDED
Status: DISCONTINUED | OUTPATIENT
Start: 2021-11-23 | End: 2021-11-30 | Stop reason: HOSPADM

## 2021-11-23 RX ORDER — SODIUM CHLORIDE, SODIUM LACTATE, POTASSIUM CHLORIDE, CALCIUM CHLORIDE 600; 310; 30; 20 MG/100ML; MG/100ML; MG/100ML; MG/100ML
9 INJECTION, SOLUTION INTRAVENOUS CONTINUOUS
Status: CANCELLED | OUTPATIENT
Start: 2021-11-23

## 2021-11-23 RX ORDER — SODIUM CHLORIDE 0.9 % (FLUSH) 0.9 %
10 SYRINGE (ML) INJECTION AS NEEDED
Status: CANCELLED | OUTPATIENT
Start: 2021-11-23

## 2021-11-23 RX ORDER — SODIUM CHLORIDE 0.9 % (FLUSH) 0.9 %
10 SYRINGE (ML) INJECTION EVERY 12 HOURS SCHEDULED
Status: CANCELLED | OUTPATIENT
Start: 2021-11-23

## 2021-11-23 RX ORDER — LIDOCAINE HYDROCHLORIDE 10 MG/ML
0.5 INJECTION, SOLUTION EPIDURAL; INFILTRATION; INTRACAUDAL; PERINEURAL ONCE AS NEEDED
Status: CANCELLED | OUTPATIENT
Start: 2021-11-23

## 2021-11-23 RX ADMIN — POTASSIUM CHLORIDE 10 MEQ: 7.46 INJECTION, SOLUTION INTRAVENOUS at 15:54

## 2021-11-23 RX ADMIN — PANTOPRAZOLE SODIUM 40 MG: 40 INJECTION, POWDER, FOR SOLUTION INTRAVENOUS at 18:30

## 2021-11-23 RX ADMIN — POTASSIUM CHLORIDE 10 MEQ: 7.46 INJECTION, SOLUTION INTRAVENOUS at 11:05

## 2021-11-23 RX ADMIN — POTASSIUM CHLORIDE AND SODIUM CHLORIDE 100 ML/HR: 900; 150 INJECTION, SOLUTION INTRAVENOUS at 11:07

## 2021-11-23 RX ADMIN — ENOXAPARIN SODIUM 80 MG: 80 INJECTION SUBCUTANEOUS at 00:51

## 2021-11-23 RX ADMIN — ENOXAPARIN SODIUM 80 MG: 80 INJECTION SUBCUTANEOUS at 21:25

## 2021-11-23 RX ADMIN — POTASSIUM CHLORIDE 10 MEQ: 7.46 INJECTION, SOLUTION INTRAVENOUS at 13:36

## 2021-11-23 RX ADMIN — SODIUM CHLORIDE, PRESERVATIVE FREE 10 ML: 5 INJECTION INTRAVENOUS at 21:25

## 2021-11-23 RX ADMIN — MAGNESIUM SULFATE HEPTAHYDRATE 4 G: 40 INJECTION, SOLUTION INTRAVENOUS at 11:06

## 2021-11-23 RX ADMIN — THIAMINE HYDROCHLORIDE 100 MG: 100 INJECTION, SOLUTION INTRAMUSCULAR; INTRAVENOUS at 21:26

## 2021-11-23 RX ADMIN — ACETAMINOPHEN ORAL SOLUTION 649.6 MG: 650 SOLUTION ORAL at 15:01

## 2021-11-23 RX ADMIN — POTASSIUM CHLORIDE 10 MEQ: 7.46 INJECTION, SOLUTION INTRAVENOUS at 18:24

## 2021-11-24 ENCOUNTER — ANESTHESIA (OUTPATIENT)
Dept: GASTROENTEROLOGY | Facility: HOSPITAL | Age: 36
End: 2021-11-24

## 2021-11-24 ENCOUNTER — APPOINTMENT (OUTPATIENT)
Dept: GENERAL RADIOLOGY | Facility: HOSPITAL | Age: 36
End: 2021-11-24

## 2021-11-24 ENCOUNTER — SPECIALTY PHARMACY (OUTPATIENT)
Dept: ONCOLOGY | Facility: HOSPITAL | Age: 36
End: 2021-11-24

## 2021-11-24 LAB
ALBUMIN SERPL-MCNC: 3.3 G/DL (ref 3.5–5.2)
ALBUMIN/GLOB SERPL: 1.2 G/DL
ALP SERPL-CCNC: 116 U/L (ref 39–117)
ALT SERPL W P-5'-P-CCNC: 49 U/L (ref 1–33)
ANION GAP SERPL CALCULATED.3IONS-SCNC: 11 MMOL/L (ref 5–15)
AST SERPL-CCNC: 51 U/L (ref 1–32)
BH CV ECHO MEAS - AO MAX PG (FULL): 3.1 MMHG
BH CV ECHO MEAS - AO MAX PG: 7 MMHG
BH CV ECHO MEAS - AO MEAN PG (FULL): 2 MMHG
BH CV ECHO MEAS - AO MEAN PG: 4 MMHG
BH CV ECHO MEAS - AO ROOT AREA (BSA CORRECTED): 1.6
BH CV ECHO MEAS - AO ROOT AREA: 6.2 CM^2
BH CV ECHO MEAS - AO ROOT DIAM: 2.8 CM
BH CV ECHO MEAS - AO V2 MAX: 131 CM/SEC
BH CV ECHO MEAS - AO V2 MEAN: 88.4 CM/SEC
BH CV ECHO MEAS - AO V2 VTI: 22.4 CM
BH CV ECHO MEAS - AVA(I,A): 2.9 CM^2
BH CV ECHO MEAS - AVA(I,D): 2.9 CM^2
BH CV ECHO MEAS - AVA(V,A): 2.4 CM^2
BH CV ECHO MEAS - AVA(V,D): 2.4 CM^2
BH CV ECHO MEAS - BSA(HAYCOCK): 1.9 M^2
BH CV ECHO MEAS - BSA: 1.8 M^2
BH CV ECHO MEAS - BZI_BMI: 34.6 KILOGRAMS/M^2
BH CV ECHO MEAS - BZI_METRIC_HEIGHT: 152.4 CM
BH CV ECHO MEAS - BZI_METRIC_WEIGHT: 80.3 KG
BH CV ECHO MEAS - EDV(CUBED): 59.3 ML
BH CV ECHO MEAS - EDV(MOD-SP2): 93.8 ML
BH CV ECHO MEAS - EDV(MOD-SP4): 109 ML
BH CV ECHO MEAS - EDV(TEICH): 65.9 ML
BH CV ECHO MEAS - EF(CUBED): 58.9 %
BH CV ECHO MEAS - EF(MOD-BP): 69.4 %
BH CV ECHO MEAS - EF(MOD-SP2): 65.4 %
BH CV ECHO MEAS - EF(MOD-SP4): 72.8 %
BH CV ECHO MEAS - EF(TEICH): 51.1 %
BH CV ECHO MEAS - ESV(CUBED): 24.4 ML
BH CV ECHO MEAS - ESV(MOD-SP2): 32.5 ML
BH CV ECHO MEAS - ESV(MOD-SP4): 29.6 ML
BH CV ECHO MEAS - ESV(TEICH): 32.2 ML
BH CV ECHO MEAS - FS: 25.6 %
BH CV ECHO MEAS - IVS/LVPW: 0.82
BH CV ECHO MEAS - IVSD: 0.9 CM
BH CV ECHO MEAS - LA DIMENSION: 2.6 CM
BH CV ECHO MEAS - LA/AO: 0.93
BH CV ECHO MEAS - LAD MAJOR: 4.6 CM
BH CV ECHO MEAS - LAT PEAK E' VEL: 13.9 CM/SEC
BH CV ECHO MEAS - LATERAL E/E' RATIO: 5.5
BH CV ECHO MEAS - LV DIASTOLIC VOL/BSA (35-75): 61.5 ML/M^2
BH CV ECHO MEAS - LV MASS(C)D: 122.1 GRAMS
BH CV ECHO MEAS - LV MASS(C)DI: 68.9 GRAMS/M^2
BH CV ECHO MEAS - LV MAX PG: 3.9 MMHG
BH CV ECHO MEAS - LV MEAN PG: 2 MMHG
BH CV ECHO MEAS - LV SYSTOLIC VOL/BSA (12-30): 16.7 ML/M^2
BH CV ECHO MEAS - LV V1 MAX: 99.2 CM/SEC
BH CV ECHO MEAS - LV V1 MEAN: 66 CM/SEC
BH CV ECHO MEAS - LV V1 VTI: 20.5 CM
BH CV ECHO MEAS - LVIDD: 3.9 CM
BH CV ECHO MEAS - LVIDS: 2.9 CM
BH CV ECHO MEAS - LVLD AP2: 7.1 CM
BH CV ECHO MEAS - LVLD AP4: 7.2 CM
BH CV ECHO MEAS - LVLS AP2: 6.4 CM
BH CV ECHO MEAS - LVLS AP4: 6.3 CM
BH CV ECHO MEAS - LVOT AREA (M): 3.1 CM^2
BH CV ECHO MEAS - LVOT AREA: 3.1 CM^2
BH CV ECHO MEAS - LVOT DIAM: 2 CM
BH CV ECHO MEAS - LVPWD: 1.1 CM
BH CV ECHO MEAS - MED PEAK E' VEL: 8.1 CM/SEC
BH CV ECHO MEAS - MEDIAL E/E' RATIO: 9.5
BH CV ECHO MEAS - MV A MAX VEL: 73.3 CM/SEC
BH CV ECHO MEAS - MV DEC TIME: 0.19 SEC
BH CV ECHO MEAS - MV E MAX VEL: 76.7 CM/SEC
BH CV ECHO MEAS - MV E/A: 1
BH CV ECHO MEAS - MV MAX PG: 3.2 MMHG
BH CV ECHO MEAS - MV MEAN PG: 1 MMHG
BH CV ECHO MEAS - MV V2 MAX: 89.1 CM/SEC
BH CV ECHO MEAS - MV V2 MEAN: 50.6 CM/SEC
BH CV ECHO MEAS - MV V2 VTI: 23.5 CM
BH CV ECHO MEAS - MVA(VTI): 2.7 CM^2
BH CV ECHO MEAS - PA MAX PG: 6.2 MMHG
BH CV ECHO MEAS - PA V2 MAX: 124 CM/SEC
BH CV ECHO MEAS - SI(AO): 77.8 ML/M^2
BH CV ECHO MEAS - SI(CUBED): 19.7 ML/M^2
BH CV ECHO MEAS - SI(LVOT): 36.3 ML/M^2
BH CV ECHO MEAS - SI(MOD-SP2): 34.6 ML/M^2
BH CV ECHO MEAS - SI(MOD-SP4): 44.8 ML/M^2
BH CV ECHO MEAS - SI(TEICH): 19 ML/M^2
BH CV ECHO MEAS - SV(AO): 137.9 ML
BH CV ECHO MEAS - SV(CUBED): 34.9 ML
BH CV ECHO MEAS - SV(LVOT): 64.4 ML
BH CV ECHO MEAS - SV(MOD-SP2): 61.3 ML
BH CV ECHO MEAS - SV(MOD-SP4): 79.4 ML
BH CV ECHO MEAS - SV(TEICH): 33.7 ML
BH CV ECHO MEAS - TAPSE (>1.6): 1.7 CM
BH CV ECHO MEASUREMENTS AVERAGE E/E' RATIO: 6.97
BH CV LOWER VASCULAR LEFT COMMON FEMORAL AUGMENT: NORMAL
BH CV LOWER VASCULAR LEFT COMMON FEMORAL COMPRESS: NORMAL
BH CV LOWER VASCULAR LEFT COMMON FEMORAL PHASIC: NORMAL
BH CV LOWER VASCULAR LEFT COMMON FEMORAL SPONT: NORMAL
BH CV LOWER VASCULAR LEFT DISTAL FEMORAL COMPRESS: NORMAL
BH CV LOWER VASCULAR LEFT DISTAL FEMORAL PHASIC: NORMAL
BH CV LOWER VASCULAR LEFT DISTAL FEMORAL SPONT: NORMAL
BH CV LOWER VASCULAR LEFT GASTRONEMIUS COMPRESS: NORMAL
BH CV LOWER VASCULAR LEFT GREATER SAPH AK COMPRESS: NORMAL
BH CV LOWER VASCULAR LEFT GREATER SAPH BK COMPRESS: NORMAL
BH CV LOWER VASCULAR LEFT LESSER SAPH COMPRESS: NORMAL
BH CV LOWER VASCULAR LEFT MID FEMORAL AUGMENT: NORMAL
BH CV LOWER VASCULAR LEFT MID FEMORAL COMPRESS: NORMAL
BH CV LOWER VASCULAR LEFT MID FEMORAL PHASIC: NORMAL
BH CV LOWER VASCULAR LEFT MID FEMORAL SPONT: NORMAL
BH CV LOWER VASCULAR LEFT PERONEAL COMPRESS: NORMAL
BH CV LOWER VASCULAR LEFT POPLITEAL AUGMENT: NORMAL
BH CV LOWER VASCULAR LEFT POPLITEAL COMPRESS: NORMAL
BH CV LOWER VASCULAR LEFT POPLITEAL PHASIC: NORMAL
BH CV LOWER VASCULAR LEFT POPLITEAL SPONT: NORMAL
BH CV LOWER VASCULAR LEFT POSTERIOR TIBIAL COMPRESS: NORMAL
BH CV LOWER VASCULAR LEFT PROFUNDA FEMORAL COMPRESS: NORMAL
BH CV LOWER VASCULAR LEFT PROFUNDA FEMORAL PHASIC: NORMAL
BH CV LOWER VASCULAR LEFT PROFUNDA FEMORAL SPONT: NORMAL
BH CV LOWER VASCULAR LEFT PROXIMAL FEMORAL COMPRESS: NORMAL
BH CV LOWER VASCULAR LEFT PROXIMAL FEMORAL PHASIC: NORMAL
BH CV LOWER VASCULAR LEFT PROXIMAL FEMORAL SPONT: NORMAL
BH CV LOWER VASCULAR LEFT SAPHENOFEMORAL JUNCTION COMPRESS: NORMAL
BH CV LOWER VASCULAR LEFT SAPHENOFEMORAL JUNCTION PHASIC: NORMAL
BH CV LOWER VASCULAR LEFT SAPHENOFEMORAL JUNCTION SPONT: NORMAL
BH CV LOWER VASCULAR RIGHT COMMON FEMORAL AUGMENT: NORMAL
BH CV LOWER VASCULAR RIGHT COMMON FEMORAL COMPRESS: NORMAL
BH CV LOWER VASCULAR RIGHT COMMON FEMORAL PHASIC: NORMAL
BH CV LOWER VASCULAR RIGHT COMMON FEMORAL SPONT: NORMAL
BH CV LOWER VASCULAR RIGHT DISTAL FEMORAL COMPRESS: NORMAL
BH CV LOWER VASCULAR RIGHT DISTAL FEMORAL PHASIC: NORMAL
BH CV LOWER VASCULAR RIGHT DISTAL FEMORAL SPONT: NORMAL
BH CV LOWER VASCULAR RIGHT GASTRONEMIUS COMPRESS: NORMAL
BH CV LOWER VASCULAR RIGHT GREATER SAPH AK COMPRESS: NORMAL
BH CV LOWER VASCULAR RIGHT GREATER SAPH BK COMPRESS: NORMAL
BH CV LOWER VASCULAR RIGHT LESSER SAPH COMPRESS: NORMAL
BH CV LOWER VASCULAR RIGHT MID FEMORAL AUGMENT: NORMAL
BH CV LOWER VASCULAR RIGHT MID FEMORAL COMPRESS: NORMAL
BH CV LOWER VASCULAR RIGHT MID FEMORAL PHASIC: NORMAL
BH CV LOWER VASCULAR RIGHT MID FEMORAL SPONT: NORMAL
BH CV LOWER VASCULAR RIGHT PERONEAL COMPRESS: NORMAL
BH CV LOWER VASCULAR RIGHT POPLITEAL AUGMENT: NORMAL
BH CV LOWER VASCULAR RIGHT POPLITEAL COMPRESS: NORMAL
BH CV LOWER VASCULAR RIGHT POPLITEAL PHASIC: NORMAL
BH CV LOWER VASCULAR RIGHT POPLITEAL SPONT: NORMAL
BH CV LOWER VASCULAR RIGHT POSTERIOR TIBIAL COMPRESS: NORMAL
BH CV LOWER VASCULAR RIGHT PROFUNDA FEMORAL COMPRESS: NORMAL
BH CV LOWER VASCULAR RIGHT PROFUNDA FEMORAL PHASIC: NORMAL
BH CV LOWER VASCULAR RIGHT PROFUNDA FEMORAL SPONT: NORMAL
BH CV LOWER VASCULAR RIGHT PROXIMAL FEMORAL COMPRESS: NORMAL
BH CV LOWER VASCULAR RIGHT PROXIMAL FEMORAL PHASIC: NORMAL
BH CV LOWER VASCULAR RIGHT PROXIMAL FEMORAL SPONT: NORMAL
BH CV LOWER VASCULAR RIGHT SAPHENOFEMORAL JUNCTION COMPRESS: NORMAL
BH CV LOWER VASCULAR RIGHT SAPHENOFEMORAL JUNCTION PHASIC: NORMAL
BH CV LOWER VASCULAR RIGHT SAPHENOFEMORAL JUNCTION SPONT: NORMAL
BH CV XLRA - RV BASE: 2.8 CM
BH CV XLRA - RV LENGTH: 5.8 CM
BH CV XLRA - RV MID: 2.3 CM
BH CV XLRA - TDI S': 12.9 CM/SEC
BILIRUB SERPL-MCNC: 0.5 MG/DL (ref 0–1.2)
BUN SERPL-MCNC: <2 MG/DL (ref 6–20)
BUN/CREAT SERPL: ABNORMAL
CALCIUM SPEC-SCNC: 8.9 MG/DL (ref 8.6–10.5)
CHLORIDE SERPL-SCNC: 103 MMOL/L (ref 98–107)
CO2 SERPL-SCNC: 24 MMOL/L (ref 22–29)
CREAT SERPL-MCNC: 0.61 MG/DL (ref 0.57–1)
DEPRECATED RDW RBC AUTO: 48.2 FL (ref 37–54)
ERYTHROCYTE [DISTWIDTH] IN BLOOD BY AUTOMATED COUNT: 14.2 % (ref 12.3–15.4)
GFR SERPL CREATININE-BSD FRML MDRD: 133 ML/MIN/1.73
GLOBULIN UR ELPH-MCNC: 2.7 GM/DL
GLUCOSE BLDC GLUCOMTR-MCNC: 108 MG/DL (ref 70–130)
GLUCOSE BLDC GLUCOMTR-MCNC: 113 MG/DL (ref 70–130)
GLUCOSE BLDC GLUCOMTR-MCNC: 97 MG/DL (ref 70–130)
GLUCOSE SERPL-MCNC: 103 MG/DL (ref 65–99)
HCT VFR BLD AUTO: 34.5 % (ref 34–46.6)
HGB BLD-MCNC: 11.2 G/DL (ref 12–15.9)
LEFT ATRIUM VOLUME INDEX: 15 ML/M2
LV EF 2D ECHO EST: 60 %
MAGNESIUM SERPL-MCNC: 1.8 MG/DL (ref 1.6–2.6)
MCH RBC QN AUTO: 30.4 PG (ref 26.6–33)
MCHC RBC AUTO-ENTMCNC: 32.5 G/DL (ref 31.5–35.7)
MCV RBC AUTO: 93.8 FL (ref 79–97)
PLATELET # BLD AUTO: 225 10*3/MM3 (ref 140–450)
PMV BLD AUTO: 12.5 FL (ref 6–12)
POTASSIUM SERPL-SCNC: 3.2 MMOL/L (ref 3.5–5.2)
PREALB SERPL-MCNC: 18 MG/DL (ref 20–40)
PROT SERPL-MCNC: 6 G/DL (ref 6–8.5)
RBC # BLD AUTO: 3.68 10*6/MM3 (ref 3.77–5.28)
SODIUM SERPL-SCNC: 138 MMOL/L (ref 136–145)
WBC NRBC COR # BLD: 7.8 10*3/MM3 (ref 3.4–10.8)

## 2021-11-24 PROCEDURE — 25010000002 ENOXAPARIN PER 10 MG: Performed by: INTERNAL MEDICINE

## 2021-11-24 PROCEDURE — 84134 ASSAY OF PREALBUMIN: CPT | Performed by: SURGERY

## 2021-11-24 PROCEDURE — G0378 HOSPITAL OBSERVATION PER HR: HCPCS

## 2021-11-24 PROCEDURE — 0 POTASSIUM CHLORIDE 10 MEQ/100ML SOLUTION: Performed by: INTERNAL MEDICINE

## 2021-11-24 PROCEDURE — 99024 POSTOP FOLLOW-UP VISIT: CPT | Performed by: SURGERY

## 2021-11-24 PROCEDURE — 43239 EGD BIOPSY SINGLE/MULTIPLE: CPT | Performed by: INTERNAL MEDICINE

## 2021-11-24 PROCEDURE — 80053 COMPREHEN METABOLIC PANEL: CPT | Performed by: FAMILY MEDICINE

## 2021-11-24 PROCEDURE — 92610 EVALUATE SWALLOWING FUNCTION: CPT

## 2021-11-24 PROCEDURE — 0 MAGNESIUM SULFATE 4 GM/100ML SOLUTION: Performed by: INTERNAL MEDICINE

## 2021-11-24 PROCEDURE — 43241 EGD TUBE/CATH INSERTION: CPT | Performed by: INTERNAL MEDICINE

## 2021-11-24 PROCEDURE — 88342 IMHCHEM/IMCYTCHM 1ST ANTB: CPT | Performed by: INTERNAL MEDICINE

## 2021-11-24 PROCEDURE — 85027 COMPLETE CBC AUTOMATED: CPT | Performed by: FAMILY MEDICINE

## 2021-11-24 PROCEDURE — 88341 IMHCHEM/IMCYTCHM EA ADD ANTB: CPT | Performed by: INTERNAL MEDICINE

## 2021-11-24 PROCEDURE — 0DHA7UZ INSERTION OF FEEDING DEVICE INTO JEJUNUM, VIA NATURAL OR ARTIFICIAL OPENING: ICD-10-PCS | Performed by: INTERNAL MEDICINE

## 2021-11-24 PROCEDURE — 83735 ASSAY OF MAGNESIUM: CPT | Performed by: FAMILY MEDICINE

## 2021-11-24 PROCEDURE — 43251 EGD REMOVE LESION SNARE: CPT | Performed by: INTERNAL MEDICINE

## 2021-11-24 PROCEDURE — 25010000002 MORPHINE PER 10 MG: Performed by: FAMILY MEDICINE

## 2021-11-24 PROCEDURE — 82962 GLUCOSE BLOOD TEST: CPT

## 2021-11-24 PROCEDURE — 88305 TISSUE EXAM BY PATHOLOGIST: CPT | Performed by: INTERNAL MEDICINE

## 2021-11-24 PROCEDURE — 99232 SBSQ HOSP IP/OBS MODERATE 35: CPT | Performed by: FAMILY MEDICINE

## 2021-11-24 PROCEDURE — 25010000002 THIAMINE PER 100 MG: Performed by: INTERNAL MEDICINE

## 2021-11-24 PROCEDURE — 0DB98ZX EXCISION OF DUODENUM, VIA NATURAL OR ARTIFICIAL OPENING ENDOSCOPIC, DIAGNOSTIC: ICD-10-PCS | Performed by: INTERNAL MEDICINE

## 2021-11-24 PROCEDURE — 97165 OT EVAL LOW COMPLEX 30 MIN: CPT

## 2021-11-24 PROCEDURE — 99233 SBSQ HOSP IP/OBS HIGH 50: CPT | Performed by: INTERNAL MEDICINE

## 2021-11-24 PROCEDURE — 3E0H76Z INTRODUCTION OF NUTRITIONAL SUBSTANCE INTO LOWER GI, VIA NATURAL OR ARTIFICIAL OPENING: ICD-10-PCS | Performed by: INTERNAL MEDICINE

## 2021-11-24 PROCEDURE — 74018 RADEX ABDOMEN 1 VIEW: CPT

## 2021-11-24 PROCEDURE — 25010000002 PROPOFOL 10 MG/ML EMULSION: Performed by: NURSE ANESTHETIST, CERTIFIED REGISTERED

## 2021-11-24 RX ORDER — SODIUM CHLORIDE, SODIUM LACTATE, POTASSIUM CHLORIDE, CALCIUM CHLORIDE 600; 310; 30; 20 MG/100ML; MG/100ML; MG/100ML; MG/100ML
9 INJECTION, SOLUTION INTRAVENOUS CONTINUOUS
Status: DISCONTINUED | OUTPATIENT
Start: 2021-11-24 | End: 2021-11-25

## 2021-11-24 RX ORDER — PROPOFOL 10 MG/ML
VIAL (ML) INTRAVENOUS AS NEEDED
Status: DISCONTINUED | OUTPATIENT
Start: 2021-11-24 | End: 2021-11-24 | Stop reason: SURG

## 2021-11-24 RX ORDER — MIDAZOLAM HYDROCHLORIDE 1 MG/ML
1 INJECTION INTRAMUSCULAR; INTRAVENOUS
Status: DISCONTINUED | OUTPATIENT
Start: 2021-11-24 | End: 2021-11-24

## 2021-11-24 RX ORDER — FAMOTIDINE 10 MG/ML
20 INJECTION, SOLUTION INTRAVENOUS ONCE
Status: COMPLETED | OUTPATIENT
Start: 2021-11-24 | End: 2021-11-24

## 2021-11-24 RX ORDER — SODIUM CHLORIDE 0.9 % (FLUSH) 0.9 %
10 SYRINGE (ML) INJECTION EVERY 12 HOURS SCHEDULED
Status: DISCONTINUED | OUTPATIENT
Start: 2021-11-24 | End: 2021-11-24

## 2021-11-24 RX ORDER — LIDOCAINE HYDROCHLORIDE 10 MG/ML
0.5 INJECTION, SOLUTION EPIDURAL; INFILTRATION; INTRACAUDAL; PERINEURAL ONCE AS NEEDED
Status: DISCONTINUED | OUTPATIENT
Start: 2021-11-24 | End: 2021-11-24

## 2021-11-24 RX ORDER — MORPHINE SULFATE 2 MG/ML
1 INJECTION, SOLUTION INTRAMUSCULAR; INTRAVENOUS EVERY 4 HOURS PRN
Status: DISCONTINUED | OUTPATIENT
Start: 2021-11-24 | End: 2021-11-30

## 2021-11-24 RX ORDER — SODIUM CHLORIDE 0.9 % (FLUSH) 0.9 %
10 SYRINGE (ML) INJECTION AS NEEDED
Status: DISCONTINUED | OUTPATIENT
Start: 2021-11-24 | End: 2021-11-24

## 2021-11-24 RX ORDER — LIDOCAINE HYDROCHLORIDE 10 MG/ML
INJECTION, SOLUTION EPIDURAL; INFILTRATION; INTRACAUDAL; PERINEURAL AS NEEDED
Status: DISCONTINUED | OUTPATIENT
Start: 2021-11-24 | End: 2021-11-24 | Stop reason: SURG

## 2021-11-24 RX ORDER — FAMOTIDINE 20 MG/1
20 TABLET, FILM COATED ORAL ONCE
Status: DISCONTINUED | OUTPATIENT
Start: 2021-11-24 | End: 2021-11-24

## 2021-11-24 RX ORDER — ONDANSETRON 2 MG/ML
4 INJECTION INTRAMUSCULAR; INTRAVENOUS ONCE AS NEEDED
Status: DISCONTINUED | OUTPATIENT
Start: 2021-11-24 | End: 2021-11-24

## 2021-11-24 RX ADMIN — FAMOTIDINE 20 MG: 10 INJECTION INTRAVENOUS at 08:28

## 2021-11-24 RX ADMIN — ENOXAPARIN SODIUM 80 MG: 80 INJECTION SUBCUTANEOUS at 20:18

## 2021-11-24 RX ADMIN — PHENOL 2 SPRAY: 1.5 LIQUID ORAL at 15:52

## 2021-11-24 RX ADMIN — POTASSIUM CHLORIDE 10 MEQ: 7.46 INJECTION, SOLUTION INTRAVENOUS at 20:19

## 2021-11-24 RX ADMIN — PROPOFOL 100 MG: 10 INJECTION, EMULSION INTRAVENOUS at 11:44

## 2021-11-24 RX ADMIN — SODIUM CHLORIDE, POTASSIUM CHLORIDE, SODIUM LACTATE AND CALCIUM CHLORIDE: 600; 310; 30; 20 INJECTION, SOLUTION INTRAVENOUS at 12:29

## 2021-11-24 RX ADMIN — THIAMINE HYDROCHLORIDE 100 MG: 100 INJECTION, SOLUTION INTRAMUSCULAR; INTRAVENOUS at 13:57

## 2021-11-24 RX ADMIN — LIDOCAINE HYDROCHLORIDE 50 MG: 10 INJECTION, SOLUTION EPIDURAL; INFILTRATION; INTRACAUDAL; PERINEURAL at 11:40

## 2021-11-24 RX ADMIN — PROPOFOL 100 MG: 10 INJECTION, EMULSION INTRAVENOUS at 11:54

## 2021-11-24 RX ADMIN — SODIUM CHLORIDE, POTASSIUM CHLORIDE, SODIUM LACTATE AND CALCIUM CHLORIDE 9 ML/HR: 600; 310; 30; 20 INJECTION, SOLUTION INTRAVENOUS at 08:26

## 2021-11-24 RX ADMIN — PROPOFOL 50 MG: 10 INJECTION, EMULSION INTRAVENOUS at 11:59

## 2021-11-24 RX ADMIN — SODIUM CHLORIDE, PRESERVATIVE FREE 10 ML: 5 INJECTION INTRAVENOUS at 08:29

## 2021-11-24 RX ADMIN — MORPHINE SULFATE 1 MG: 2 INJECTION, SOLUTION INTRAMUSCULAR; INTRAVENOUS at 20:18

## 2021-11-24 RX ADMIN — MORPHINE SULFATE 1 MG: 2 INJECTION, SOLUTION INTRAMUSCULAR; INTRAVENOUS at 15:52

## 2021-11-24 RX ADMIN — POTASSIUM CHLORIDE 10 MEQ: 7.46 INJECTION, SOLUTION INTRAVENOUS at 22:27

## 2021-11-24 RX ADMIN — MAGNESIUM SULFATE HEPTAHYDRATE 4 G: 40 INJECTION, SOLUTION INTRAVENOUS at 18:25

## 2021-11-24 RX ADMIN — ENOXAPARIN SODIUM 80 MG: 80 INJECTION SUBCUTANEOUS at 13:58

## 2021-11-24 RX ADMIN — POTASSIUM CHLORIDE 10 MEQ: 7.46 INJECTION, SOLUTION INTRAVENOUS at 18:25

## 2021-11-24 RX ADMIN — PROPOFOL 100 MG: 10 INJECTION, EMULSION INTRAVENOUS at 11:48

## 2021-11-24 RX ADMIN — PROPOFOL 100 MG: 10 INJECTION, EMULSION INTRAVENOUS at 11:40

## 2021-11-24 NOTE — ANESTHESIA POSTPROCEDURE EVALUATION
Patient: Frances Hartman    Procedure Summary     Date: 11/24/21 Room / Location:  [facility]    Anesthesia Start: 1136 Anesthesia Stop: 1236    Procedure: ESOPHAGOGASTRODUODENOSCOPY WTH DUODENAL POLYPECTOPMY AND NASAL JEJUNAL TUBE PLACEMENT (N/A ) Diagnosis:       Esophageal stricture      (Esophageal stricture [K22.2])    Surgeons: Jase Hernandez MD Provider: Luis Alcantar MD    Anesthesia Type: general, MAC ASA Status: 3          Anesthesia Type: general, MAC    Vitals  Vitals Value Taken Time   /95 11/24/21 1234   Temp 98 °F (36.7 °C) 11/24/21 1234   Pulse 74 11/24/21 1235   Resp 14 11/24/21 1234   SpO2 96 % 11/24/21 1235   Vitals shown include unvalidated device data.        Post Anesthesia Care and Evaluation    Patient location during evaluation: PACU  Patient participation: complete - patient participated  Level of consciousness: awake and alert  Pain management: adequate  Airway patency: patent  Anesthetic complications: No anesthetic complications  PONV Status: none  Cardiovascular status: hemodynamically stable and acceptable  Respiratory status: nonlabored ventilation, acceptable and nasal cannula  Hydration status: acceptable

## 2021-11-24 NOTE — ANESTHESIA PREPROCEDURE EVALUATION
Anesthesia Evaluation     Patient summary reviewed and Nursing notes reviewed   NPO Solid Status: > 8 hours  NPO Liquid Status: > 8 hours           Airway   Mallampati: III  TM distance: >3 FB  Possible difficult intubation, Small opening and Large neck circumference  Dental      Pulmonary    (+) asthma (stable),shortness of breath,   (-) recent URI, sleep apnea, not a smoker  Cardiovascular     ECG reviewed    (+) hypertension, DVT, hyperlipidemia,   (-) past MI, dysrhythmias, angina    ROS comment: ECG NSR  Moderate voltage criteria for LVH, may be normal variant NS TW abnormality    Echo 9/17: normal EF, mild TR    Stress test 11/19: no ischemia/low risk study    Neuro/Psych  (+) headaches, psychiatric history,     (-) seizures, CVA  GI/Hepatic/Renal/Endo    (+) morbid obesity, GERD,  diabetes mellitus type 2,   (-)  obesity, no renal disease    Musculoskeletal     Abdominal    Substance History      OB/GYN      Comment: PCOS      Other        ROS/Med Hx Other: Intractable nausea and vomiting  S/p bariatric surgery (6/2021)  Esophageal stricutre      Phys Exam Other: Mac 3 grade 1 view                 Anesthesia Plan    ASA 3     general and MAC   (PFL ETT ready )  intravenous induction     Anesthetic plan, all risks, benefits, and alternatives have been provided, discussed and informed consent has been obtained with: patient.    Plan discussed with CRNA.

## 2021-11-25 PROBLEM — R11.10 INTRACTABLE VOMITING: Status: ACTIVE | Noted: 2021-11-25

## 2021-11-25 LAB
1,25(OH)2D SERPL-MCNC: 54.3 PG/ML (ref 19.9–79.3)
DEPRECATED RDW RBC AUTO: 50.4 FL (ref 37–54)
ERYTHROCYTE [DISTWIDTH] IN BLOOD BY AUTOMATED COUNT: 14.5 % (ref 12.3–15.4)
GLUCOSE BLDC GLUCOMTR-MCNC: 109 MG/DL (ref 70–130)
GLUCOSE BLDC GLUCOMTR-MCNC: 121 MG/DL (ref 70–130)
GLUCOSE BLDC GLUCOMTR-MCNC: 196 MG/DL (ref 70–130)
HCT VFR BLD AUTO: 36.4 % (ref 34–46.6)
HGB BLD-MCNC: 11.8 G/DL (ref 12–15.9)
MAGNESIUM SERPL-MCNC: 1.9 MG/DL (ref 1.6–2.6)
MCH RBC QN AUTO: 30.8 PG (ref 26.6–33)
MCHC RBC AUTO-ENTMCNC: 32.4 G/DL (ref 31.5–35.7)
MCV RBC AUTO: 95 FL (ref 79–97)
PHOSPHATE SERPL-MCNC: 3.2 MG/DL (ref 2.5–4.5)
PLATELET # BLD AUTO: 212 10*3/MM3 (ref 140–450)
PMV BLD AUTO: 12.5 FL (ref 6–12)
POTASSIUM SERPL-SCNC: 3.4 MMOL/L (ref 3.5–5.2)
POTASSIUM SERPL-SCNC: 4.6 MMOL/L (ref 3.5–5.2)
RBC # BLD AUTO: 3.83 10*6/MM3 (ref 3.77–5.28)
WBC NRBC COR # BLD: 7 10*3/MM3 (ref 3.4–10.8)

## 2021-11-25 PROCEDURE — 25010000002 MORPHINE PER 10 MG: Performed by: FAMILY MEDICINE

## 2021-11-25 PROCEDURE — 99232 SBSQ HOSP IP/OBS MODERATE 35: CPT | Performed by: INTERNAL MEDICINE

## 2021-11-25 PROCEDURE — 99233 SBSQ HOSP IP/OBS HIGH 50: CPT | Performed by: INTERNAL MEDICINE

## 2021-11-25 PROCEDURE — 83735 ASSAY OF MAGNESIUM: CPT | Performed by: INTERNAL MEDICINE

## 2021-11-25 PROCEDURE — 25010000002 THIAMINE PER 100 MG: Performed by: INTERNAL MEDICINE

## 2021-11-25 PROCEDURE — 85027 COMPLETE CBC AUTOMATED: CPT | Performed by: FAMILY MEDICINE

## 2021-11-25 PROCEDURE — 84100 ASSAY OF PHOSPHORUS: CPT

## 2021-11-25 PROCEDURE — 99024 POSTOP FOLLOW-UP VISIT: CPT | Performed by: SURGERY

## 2021-11-25 PROCEDURE — 0 POTASSIUM CHLORIDE 10 MEQ/100ML SOLUTION: Performed by: INTERNAL MEDICINE

## 2021-11-25 PROCEDURE — 25010000002 ENOXAPARIN PER 10 MG: Performed by: INTERNAL MEDICINE

## 2021-11-25 PROCEDURE — 84132 ASSAY OF SERUM POTASSIUM: CPT | Performed by: INTERNAL MEDICINE

## 2021-11-25 PROCEDURE — 82962 GLUCOSE BLOOD TEST: CPT

## 2021-11-25 PROCEDURE — 0 MAGNESIUM SULFATE 4 GM/100ML SOLUTION: Performed by: INTERNAL MEDICINE

## 2021-11-25 PROCEDURE — 84132 ASSAY OF SERUM POTASSIUM: CPT | Performed by: FAMILY MEDICINE

## 2021-11-25 PROCEDURE — 25010000002 SODIUM CHLORIDE 0.9 % WITH KCL 20 MEQ 20-0.9 MEQ/L-% SOLUTION: Performed by: SURGERY

## 2021-11-25 RX ORDER — FOLIC ACID 1 MG/1
1 TABLET ORAL 2 TIMES DAILY
Status: DISCONTINUED | OUTPATIENT
Start: 2021-11-25 | End: 2021-11-30 | Stop reason: HOSPADM

## 2021-11-25 RX ORDER — LIDOCAINE HYDROCHLORIDE 20 MG/ML
5 SOLUTION OROPHARYNGEAL
Status: DISCONTINUED | OUTPATIENT
Start: 2021-11-25 | End: 2021-11-30 | Stop reason: HOSPADM

## 2021-11-25 RX ORDER — SODIUM CHLORIDE AND POTASSIUM CHLORIDE 150; 900 MG/100ML; MG/100ML
100 INJECTION, SOLUTION INTRAVENOUS CONTINUOUS
Status: DISCONTINUED | OUTPATIENT
Start: 2021-11-25 | End: 2021-11-28

## 2021-11-25 RX ORDER — SIMETHICONE 80 MG
80 TABLET,CHEWABLE ORAL 4 TIMES DAILY PRN
Status: DISCONTINUED | OUTPATIENT
Start: 2021-11-25 | End: 2021-11-30 | Stop reason: HOSPADM

## 2021-11-25 RX ADMIN — POTASSIUM CHLORIDE 40 MEQ: 1.5 POWDER, FOR SOLUTION ORAL at 14:44

## 2021-11-25 RX ADMIN — ENOXAPARIN SODIUM 80 MG: 80 INJECTION SUBCUTANEOUS at 21:05

## 2021-11-25 RX ADMIN — THIAMINE HYDROCHLORIDE 100 MG: 100 INJECTION, SOLUTION INTRAMUSCULAR; INTRAVENOUS at 10:15

## 2021-11-25 RX ADMIN — SIMETHICONE 80 MG: 80 TABLET, CHEWABLE ORAL at 21:06

## 2021-11-25 RX ADMIN — POTASSIUM CHLORIDE 10 MEQ: 7.46 INJECTION, SOLUTION INTRAVENOUS at 00:29

## 2021-11-25 RX ADMIN — MORPHINE SULFATE 1 MG: 2 INJECTION, SOLUTION INTRAMUSCULAR; INTRAVENOUS at 01:02

## 2021-11-25 RX ADMIN — LIDOCAINE HYDROCHLORIDE 5 ML: 20 SOLUTION OROPHARYNGEAL at 10:48

## 2021-11-25 RX ADMIN — PANTOPRAZOLE SODIUM 40 MG: 40 INJECTION, POWDER, FOR SOLUTION INTRAVENOUS at 18:26

## 2021-11-25 RX ADMIN — MORPHINE SULFATE 1 MG: 2 INJECTION, SOLUTION INTRAMUSCULAR; INTRAVENOUS at 06:23

## 2021-11-25 RX ADMIN — POTASSIUM CHLORIDE 40 MEQ: 1.5 POWDER, FOR SOLUTION ORAL at 18:26

## 2021-11-25 RX ADMIN — POTASSIUM CHLORIDE AND SODIUM CHLORIDE 100 ML/HR: 900; 150 INJECTION, SOLUTION INTRAVENOUS at 14:33

## 2021-11-25 RX ADMIN — MAGNESIUM SULFATE HEPTAHYDRATE 4 G: 40 INJECTION, SOLUTION INTRAVENOUS at 14:33

## 2021-11-25 RX ADMIN — FOLIC ACID 1 MG: 1 TABLET ORAL at 14:44

## 2021-11-25 RX ADMIN — FOLIC ACID 1 MG: 1 TABLET ORAL at 21:05

## 2021-11-26 LAB
GLUCOSE BLDC GLUCOMTR-MCNC: 159 MG/DL (ref 70–130)
GLUCOSE BLDC GLUCOMTR-MCNC: 159 MG/DL (ref 70–130)
GLUCOSE BLDC GLUCOMTR-MCNC: 164 MG/DL (ref 70–130)
GLUCOSE BLDC GLUCOMTR-MCNC: 171 MG/DL (ref 70–130)
GLUCOSE BLDC GLUCOMTR-MCNC: 213 MG/DL (ref 70–130)
MAGNESIUM SERPL-MCNC: 2 MG/DL (ref 1.6–2.6)
PHOSPHATE SERPL-MCNC: 1.3 MG/DL (ref 2.5–4.5)

## 2021-11-26 PROCEDURE — 82962 GLUCOSE BLOOD TEST: CPT

## 2021-11-26 PROCEDURE — 25010000002 ENOXAPARIN PER 10 MG: Performed by: INTERNAL MEDICINE

## 2021-11-26 PROCEDURE — 83735 ASSAY OF MAGNESIUM: CPT

## 2021-11-26 PROCEDURE — 25010000002 MORPHINE PER 10 MG: Performed by: FAMILY MEDICINE

## 2021-11-26 PROCEDURE — 99232 SBSQ HOSP IP/OBS MODERATE 35: CPT | Performed by: INTERNAL MEDICINE

## 2021-11-26 PROCEDURE — 84100 ASSAY OF PHOSPHORUS: CPT

## 2021-11-26 PROCEDURE — 25010000002 SODIUM CHLORIDE 0.9 % WITH KCL 20 MEQ 20-0.9 MEQ/L-% SOLUTION: Performed by: SURGERY

## 2021-11-26 PROCEDURE — 25010000002 THIAMINE PER 100 MG: Performed by: INTERNAL MEDICINE

## 2021-11-26 PROCEDURE — 99024 POSTOP FOLLOW-UP VISIT: CPT | Performed by: SURGERY

## 2021-11-26 PROCEDURE — 97530 THERAPEUTIC ACTIVITIES: CPT

## 2021-11-26 RX ORDER — DIPHENHYDRAMINE HYDROCHLORIDE 50 MG/ML
25 INJECTION INTRAMUSCULAR; INTRAVENOUS ONCE
Status: DISCONTINUED | OUTPATIENT
Start: 2021-11-26 | End: 2021-11-27

## 2021-11-26 RX ADMIN — LIDOCAINE HYDROCHLORIDE 5 ML: 20 SOLUTION OROPHARYNGEAL at 06:22

## 2021-11-26 RX ADMIN — FOLIC ACID 1 MG: 1 TABLET ORAL at 20:31

## 2021-11-26 RX ADMIN — SODIUM CHLORIDE, PRESERVATIVE FREE 10 ML: 5 INJECTION INTRAVENOUS at 20:32

## 2021-11-26 RX ADMIN — MORPHINE SULFATE 1 MG: 2 INJECTION, SOLUTION INTRAMUSCULAR; INTRAVENOUS at 23:03

## 2021-11-26 RX ADMIN — PANTOPRAZOLE SODIUM 40 MG: 40 INJECTION, POWDER, FOR SOLUTION INTRAVENOUS at 06:20

## 2021-11-26 RX ADMIN — ENOXAPARIN SODIUM 80 MG: 80 INJECTION SUBCUTANEOUS at 20:30

## 2021-11-26 RX ADMIN — ENOXAPARIN SODIUM 80 MG: 80 INJECTION SUBCUTANEOUS at 08:33

## 2021-11-26 RX ADMIN — PANTOPRAZOLE SODIUM 40 MG: 40 INJECTION, POWDER, FOR SOLUTION INTRAVENOUS at 17:27

## 2021-11-26 RX ADMIN — POTASSIUM PHOSPHATE, MONOBASIC POTASSIUM PHOSPHATE, DIBASIC 30 MMOL: 224; 236 INJECTION, SOLUTION, CONCENTRATE INTRAVENOUS at 09:33

## 2021-11-26 RX ADMIN — SIMETHICONE 80 MG: 80 TABLET, CHEWABLE ORAL at 17:27

## 2021-11-26 RX ADMIN — SIMETHICONE 80 MG: 80 TABLET, CHEWABLE ORAL at 08:33

## 2021-11-26 RX ADMIN — FOLIC ACID 1 MG: 1 TABLET ORAL at 08:23

## 2021-11-26 RX ADMIN — THIAMINE HYDROCHLORIDE 100 MG: 100 INJECTION, SOLUTION INTRAMUSCULAR; INTRAVENOUS at 08:23

## 2021-11-26 RX ADMIN — POTASSIUM CHLORIDE AND SODIUM CHLORIDE 100 ML/HR: 900; 150 INJECTION, SOLUTION INTRAVENOUS at 03:21

## 2021-11-27 LAB
ALBUMIN SERPL-MCNC: 3.4 G/DL (ref 3.5–5.2)
ALBUMIN/GLOB SERPL: 1.2 G/DL
ALP SERPL-CCNC: 125 U/L (ref 39–117)
ALT SERPL W P-5'-P-CCNC: 47 U/L (ref 1–33)
ANION GAP SERPL CALCULATED.3IONS-SCNC: 11 MMOL/L (ref 5–15)
AST SERPL-CCNC: 40 U/L (ref 1–32)
BILIRUB SERPL-MCNC: 0.3 MG/DL (ref 0–1.2)
BUN SERPL-MCNC: 9 MG/DL (ref 6–20)
BUN/CREAT SERPL: 13.4 (ref 7–25)
CALCIUM SPEC-SCNC: 9.3 MG/DL (ref 8.6–10.5)
CHLORIDE SERPL-SCNC: 104 MMOL/L (ref 98–107)
CO2 SERPL-SCNC: 22 MMOL/L (ref 22–29)
CREAT SERPL-MCNC: 0.67 MG/DL (ref 0.57–1)
GFR SERPL CREATININE-BSD FRML MDRD: 119 ML/MIN/1.73
GLOBULIN UR ELPH-MCNC: 2.9 GM/DL
GLUCOSE BLDC GLUCOMTR-MCNC: 184 MG/DL (ref 70–130)
GLUCOSE BLDC GLUCOMTR-MCNC: 194 MG/DL (ref 70–130)
GLUCOSE BLDC GLUCOMTR-MCNC: 208 MG/DL (ref 70–130)
GLUCOSE BLDC GLUCOMTR-MCNC: 271 MG/DL (ref 70–130)
GLUCOSE SERPL-MCNC: 196 MG/DL (ref 65–99)
MAGNESIUM SERPL-MCNC: 1.5 MG/DL (ref 1.6–2.6)
PHOSPHATE SERPL-MCNC: 3 MG/DL (ref 2.5–4.5)
POTASSIUM SERPL-SCNC: 4.1 MMOL/L (ref 3.5–5.2)
PROT SERPL-MCNC: 6.3 G/DL (ref 6–8.5)
SODIUM SERPL-SCNC: 137 MMOL/L (ref 136–145)

## 2021-11-27 PROCEDURE — 82962 GLUCOSE BLOOD TEST: CPT

## 2021-11-27 PROCEDURE — 99232 SBSQ HOSP IP/OBS MODERATE 35: CPT | Performed by: INTERNAL MEDICINE

## 2021-11-27 PROCEDURE — 83735 ASSAY OF MAGNESIUM: CPT

## 2021-11-27 PROCEDURE — 94799 UNLISTED PULMONARY SVC/PX: CPT

## 2021-11-27 PROCEDURE — 99024 POSTOP FOLLOW-UP VISIT: CPT | Performed by: SURGERY

## 2021-11-27 PROCEDURE — 25010000002 THIAMINE PER 100 MG: Performed by: INTERNAL MEDICINE

## 2021-11-27 PROCEDURE — 84100 ASSAY OF PHOSPHORUS: CPT | Performed by: INTERNAL MEDICINE

## 2021-11-27 PROCEDURE — 25010000002 ENOXAPARIN PER 10 MG: Performed by: INTERNAL MEDICINE

## 2021-11-27 PROCEDURE — 25010000002 SODIUM CHLORIDE 0.9 % WITH KCL 20 MEQ 20-0.9 MEQ/L-% SOLUTION: Performed by: SURGERY

## 2021-11-27 PROCEDURE — 80053 COMPREHEN METABOLIC PANEL: CPT

## 2021-11-27 PROCEDURE — 0 MAGNESIUM SULFATE 4 GM/100ML SOLUTION: Performed by: INTERNAL MEDICINE

## 2021-11-27 RX ADMIN — LIDOCAINE HYDROCHLORIDE 5 ML: 20 SOLUTION OROPHARYNGEAL at 17:05

## 2021-11-27 RX ADMIN — FOLIC ACID 1 MG: 1 TABLET ORAL at 09:35

## 2021-11-27 RX ADMIN — SIMETHICONE 80 MG: 80 TABLET, CHEWABLE ORAL at 09:39

## 2021-11-27 RX ADMIN — ENOXAPARIN SODIUM 80 MG: 80 INJECTION SUBCUTANEOUS at 21:08

## 2021-11-27 RX ADMIN — THIAMINE HYDROCHLORIDE 100 MG: 100 INJECTION, SOLUTION INTRAMUSCULAR; INTRAVENOUS at 09:35

## 2021-11-27 RX ADMIN — POTASSIUM CHLORIDE AND SODIUM CHLORIDE 100 ML/HR: 900; 150 INJECTION, SOLUTION INTRAVENOUS at 17:14

## 2021-11-27 RX ADMIN — ENOXAPARIN SODIUM 80 MG: 80 INJECTION SUBCUTANEOUS at 09:35

## 2021-11-27 RX ADMIN — FOLIC ACID 1 MG: 1 TABLET ORAL at 21:08

## 2021-11-27 RX ADMIN — PANTOPRAZOLE SODIUM 40 MG: 40 INJECTION, POWDER, FOR SOLUTION INTRAVENOUS at 09:35

## 2021-11-27 RX ADMIN — MAGNESIUM SULFATE HEPTAHYDRATE 4 G: 40 INJECTION, SOLUTION INTRAVENOUS at 17:15

## 2021-11-27 RX ADMIN — PANTOPRAZOLE SODIUM 40 MG: 40 INJECTION, POWDER, FOR SOLUTION INTRAVENOUS at 18:22

## 2021-11-27 RX ADMIN — SIMETHICONE 80 MG: 80 TABLET, CHEWABLE ORAL at 01:27

## 2021-11-28 ENCOUNTER — APPOINTMENT (OUTPATIENT)
Dept: GENERAL RADIOLOGY | Facility: HOSPITAL | Age: 36
End: 2021-11-28

## 2021-11-28 LAB
ALBUMIN SERPL-MCNC: 3.4 G/DL (ref 3.5–5.2)
ALBUMIN/GLOB SERPL: 1.1 G/DL
ALP SERPL-CCNC: 132 U/L (ref 39–117)
ALT SERPL W P-5'-P-CCNC: 67 U/L (ref 1–33)
ANION GAP SERPL CALCULATED.3IONS-SCNC: 11 MMOL/L (ref 5–15)
AST SERPL-CCNC: 63 U/L (ref 1–32)
BILIRUB SERPL-MCNC: 0.2 MG/DL (ref 0–1.2)
BUN SERPL-MCNC: 11 MG/DL (ref 6–20)
BUN/CREAT SERPL: 14.7 (ref 7–25)
CALCIUM SPEC-SCNC: 9.2 MG/DL (ref 8.6–10.5)
CHLORIDE SERPL-SCNC: 104 MMOL/L (ref 98–107)
CO2 SERPL-SCNC: 21 MMOL/L (ref 22–29)
CREAT SERPL-MCNC: 0.75 MG/DL (ref 0.57–1)
GFR SERPL CREATININE-BSD FRML MDRD: 105 ML/MIN/1.73
GLOBULIN UR ELPH-MCNC: 3.2 GM/DL
GLUCOSE BLDC GLUCOMTR-MCNC: 194 MG/DL (ref 70–130)
GLUCOSE BLDC GLUCOMTR-MCNC: 196 MG/DL (ref 70–130)
GLUCOSE BLDC GLUCOMTR-MCNC: 229 MG/DL (ref 70–130)
GLUCOSE SERPL-MCNC: 215 MG/DL (ref 65–99)
MAGNESIUM SERPL-MCNC: 2 MG/DL (ref 1.6–2.6)
PHOSPHATE SERPL-MCNC: 2.3 MG/DL (ref 2.5–4.5)
POTASSIUM SERPL-SCNC: 4.6 MMOL/L (ref 3.5–5.2)
PROT SERPL-MCNC: 6.6 G/DL (ref 6–8.5)
SODIUM SERPL-SCNC: 136 MMOL/L (ref 136–145)

## 2021-11-28 PROCEDURE — 92526 ORAL FUNCTION THERAPY: CPT

## 2021-11-28 PROCEDURE — 83735 ASSAY OF MAGNESIUM: CPT | Performed by: SURGERY

## 2021-11-28 PROCEDURE — 25010000002 ENOXAPARIN PER 10 MG: Performed by: INTERNAL MEDICINE

## 2021-11-28 PROCEDURE — 99232 SBSQ HOSP IP/OBS MODERATE 35: CPT | Performed by: INTERNAL MEDICINE

## 2021-11-28 PROCEDURE — 82962 GLUCOSE BLOOD TEST: CPT

## 2021-11-28 PROCEDURE — 71045 X-RAY EXAM CHEST 1 VIEW: CPT

## 2021-11-28 PROCEDURE — 25010000002 THIAMINE PER 100 MG: Performed by: INTERNAL MEDICINE

## 2021-11-28 PROCEDURE — 80053 COMPREHEN METABOLIC PANEL: CPT

## 2021-11-28 PROCEDURE — 25010000002 MORPHINE PER 10 MG: Performed by: FAMILY MEDICINE

## 2021-11-28 PROCEDURE — 84100 ASSAY OF PHOSPHORUS: CPT | Performed by: INTERNAL MEDICINE

## 2021-11-28 PROCEDURE — 99232 SBSQ HOSP IP/OBS MODERATE 35: CPT | Performed by: SURGERY

## 2021-11-28 RX ADMIN — SIMETHICONE 80 MG: 80 TABLET, CHEWABLE ORAL at 11:47

## 2021-11-28 RX ADMIN — SODIUM CHLORIDE, PRESERVATIVE FREE 10 ML: 5 INJECTION INTRAVENOUS at 20:15

## 2021-11-28 RX ADMIN — PANTOPRAZOLE SODIUM 40 MG: 40 INJECTION, POWDER, FOR SOLUTION INTRAVENOUS at 17:30

## 2021-11-28 RX ADMIN — THIAMINE HYDROCHLORIDE 100 MG: 100 INJECTION, SOLUTION INTRAMUSCULAR; INTRAVENOUS at 08:49

## 2021-11-28 RX ADMIN — ENOXAPARIN SODIUM 80 MG: 80 INJECTION SUBCUTANEOUS at 09:00

## 2021-11-28 RX ADMIN — SIMETHICONE 80 MG: 80 TABLET, CHEWABLE ORAL at 20:15

## 2021-11-28 RX ADMIN — PANTOPRAZOLE SODIUM 40 MG: 40 INJECTION, POWDER, FOR SOLUTION INTRAVENOUS at 06:03

## 2021-11-28 RX ADMIN — ENOXAPARIN SODIUM 80 MG: 80 INJECTION SUBCUTANEOUS at 20:15

## 2021-11-28 RX ADMIN — MORPHINE SULFATE 1 MG: 2 INJECTION, SOLUTION INTRAMUSCULAR; INTRAVENOUS at 17:30

## 2021-11-28 RX ADMIN — FOLIC ACID 1 MG: 1 TABLET ORAL at 20:15

## 2021-11-28 RX ADMIN — FOLIC ACID 1 MG: 1 TABLET ORAL at 09:00

## 2021-11-29 LAB
ALBUMIN SERPL-MCNC: 3.5 G/DL (ref 3.5–5.2)
ALBUMIN/GLOB SERPL: 1.1 G/DL
ALP SERPL-CCNC: 136 U/L (ref 39–117)
ALT SERPL W P-5'-P-CCNC: 97 U/L (ref 1–33)
ANION GAP SERPL CALCULATED.3IONS-SCNC: 16 MMOL/L (ref 5–15)
AST SERPL-CCNC: 95 U/L (ref 1–32)
BILIRUB SERPL-MCNC: 0.3 MG/DL (ref 0–1.2)
BUN SERPL-MCNC: 9 MG/DL (ref 6–20)
BUN/CREAT SERPL: 11.3 (ref 7–25)
CALCIUM SPEC-SCNC: 9.4 MG/DL (ref 8.6–10.5)
CHLORIDE SERPL-SCNC: 100 MMOL/L (ref 98–107)
CO2 SERPL-SCNC: 22 MMOL/L (ref 22–29)
CREAT SERPL-MCNC: 0.8 MG/DL (ref 0.57–1)
CYTO UR: NORMAL
GFR SERPL CREATININE-BSD FRML MDRD: 97 ML/MIN/1.73
GLOBULIN UR ELPH-MCNC: 3.2 GM/DL
GLUCOSE BLDC GLUCOMTR-MCNC: 155 MG/DL (ref 70–130)
GLUCOSE BLDC GLUCOMTR-MCNC: 173 MG/DL (ref 70–130)
GLUCOSE BLDC GLUCOMTR-MCNC: 225 MG/DL (ref 70–130)
GLUCOSE BLDC GLUCOMTR-MCNC: 240 MG/DL (ref 70–130)
GLUCOSE SERPL-MCNC: 283 MG/DL (ref 65–99)
LAB AP CASE REPORT: NORMAL
LAB AP CLINICAL INFORMATION: NORMAL
LAB AP SPECIAL STAINS: NORMAL
PATH REPORT.FINAL DX SPEC: NORMAL
PATH REPORT.GROSS SPEC: NORMAL
POTASSIUM SERPL-SCNC: 4.3 MMOL/L (ref 3.5–5.2)
PROT SERPL-MCNC: 6.7 G/DL (ref 6–8.5)
SODIUM SERPL-SCNC: 138 MMOL/L (ref 136–145)

## 2021-11-29 PROCEDURE — 99231 SBSQ HOSP IP/OBS SF/LOW 25: CPT | Performed by: INTERNAL MEDICINE

## 2021-11-29 PROCEDURE — 99024 POSTOP FOLLOW-UP VISIT: CPT | Performed by: SURGERY

## 2021-11-29 PROCEDURE — 25010000002 ENOXAPARIN PER 10 MG: Performed by: INTERNAL MEDICINE

## 2021-11-29 PROCEDURE — 25010000002 ONDANSETRON PER 1 MG: Performed by: INTERNAL MEDICINE

## 2021-11-29 PROCEDURE — 80053 COMPREHEN METABOLIC PANEL: CPT

## 2021-11-29 PROCEDURE — 99232 SBSQ HOSP IP/OBS MODERATE 35: CPT | Performed by: NURSE PRACTITIONER

## 2021-11-29 PROCEDURE — 82962 GLUCOSE BLOOD TEST: CPT

## 2021-11-29 PROCEDURE — 25010000002 THIAMINE PER 100 MG: Performed by: INTERNAL MEDICINE

## 2021-11-29 PROCEDURE — 25010000002 MORPHINE PER 10 MG: Performed by: FAMILY MEDICINE

## 2021-11-29 PROCEDURE — 25010000002 FUROSEMIDE PER 20 MG: Performed by: INTERNAL MEDICINE

## 2021-11-29 RX ORDER — FUROSEMIDE 20 MG/1
20 TABLET ORAL ONCE
Status: DISCONTINUED | OUTPATIENT
Start: 2021-11-29 | End: 2021-11-29

## 2021-11-29 RX ORDER — FUROSEMIDE 10 MG/ML
20 INJECTION INTRAMUSCULAR; INTRAVENOUS ONCE
Status: COMPLETED | OUTPATIENT
Start: 2021-11-29 | End: 2021-11-29

## 2021-11-29 RX ADMIN — FOLIC ACID 1 MG: 1 TABLET ORAL at 21:01

## 2021-11-29 RX ADMIN — ONDANSETRON 4 MG: 2 INJECTION INTRAMUSCULAR; INTRAVENOUS at 16:40

## 2021-11-29 RX ADMIN — SIMETHICONE 80 MG: 80 TABLET, CHEWABLE ORAL at 08:23

## 2021-11-29 RX ADMIN — SENNOSIDES AND DOCUSATE SODIUM 1 TABLET: 50; 8.6 TABLET ORAL at 21:01

## 2021-11-29 RX ADMIN — SIMETHICONE 80 MG: 80 TABLET, CHEWABLE ORAL at 21:02

## 2021-11-29 RX ADMIN — THIAMINE HYDROCHLORIDE 100 MG: 100 INJECTION, SOLUTION INTRAMUSCULAR; INTRAVENOUS at 07:58

## 2021-11-29 RX ADMIN — LIDOCAINE HYDROCHLORIDE 5 ML: 20 SOLUTION OROPHARYNGEAL at 09:58

## 2021-11-29 RX ADMIN — FUROSEMIDE 20 MG: 10 INJECTION INTRAMUSCULAR; INTRAVENOUS at 12:41

## 2021-11-29 RX ADMIN — SODIUM CHLORIDE, PRESERVATIVE FREE 10 ML: 5 INJECTION INTRAVENOUS at 21:02

## 2021-11-29 RX ADMIN — MORPHINE SULFATE 1 MG: 2 INJECTION, SOLUTION INTRAMUSCULAR; INTRAVENOUS at 17:02

## 2021-11-29 RX ADMIN — FOLIC ACID 1 MG: 1 TABLET ORAL at 07:59

## 2021-11-29 RX ADMIN — MORPHINE SULFATE 1 MG: 2 INJECTION, SOLUTION INTRAMUSCULAR; INTRAVENOUS at 21:00

## 2021-11-29 RX ADMIN — PANTOPRAZOLE SODIUM 40 MG: 40 INJECTION, POWDER, FOR SOLUTION INTRAVENOUS at 07:59

## 2021-11-29 RX ADMIN — MORPHINE SULFATE 1 MG: 2 INJECTION, SOLUTION INTRAMUSCULAR; INTRAVENOUS at 08:23

## 2021-11-29 RX ADMIN — MORPHINE SULFATE 1 MG: 2 INJECTION, SOLUTION INTRAMUSCULAR; INTRAVENOUS at 01:59

## 2021-11-29 RX ADMIN — ENOXAPARIN SODIUM 80 MG: 80 INJECTION SUBCUTANEOUS at 21:01

## 2021-11-29 RX ADMIN — ENOXAPARIN SODIUM 80 MG: 80 INJECTION SUBCUTANEOUS at 07:59

## 2021-11-29 RX ADMIN — SIMETHICONE 80 MG: 80 TABLET, CHEWABLE ORAL at 01:59

## 2021-11-30 ENCOUNTER — READMISSION MANAGEMENT (OUTPATIENT)
Dept: CALL CENTER | Facility: HOSPITAL | Age: 36
End: 2021-11-30

## 2021-11-30 VITALS
WEIGHT: 175.9 LBS | SYSTOLIC BLOOD PRESSURE: 109 MMHG | TEMPERATURE: 98.3 F | RESPIRATION RATE: 16 BRPM | BODY MASS INDEX: 34.54 KG/M2 | HEART RATE: 111 BPM | HEIGHT: 60 IN | OXYGEN SATURATION: 97 % | DIASTOLIC BLOOD PRESSURE: 80 MMHG

## 2021-11-30 LAB
ALBUMIN SERPL-MCNC: 3.4 G/DL (ref 3.5–5.2)
ALBUMIN/GLOB SERPL: 1 G/DL
ALP SERPL-CCNC: 166 U/L (ref 39–117)
ALT SERPL W P-5'-P-CCNC: 188 U/L (ref 1–33)
ANION GAP SERPL CALCULATED.3IONS-SCNC: 14 MMOL/L (ref 5–15)
AST SERPL-CCNC: 285 U/L (ref 1–32)
BILIRUB SERPL-MCNC: 0.5 MG/DL (ref 0–1.2)
BUN SERPL-MCNC: 12 MG/DL (ref 6–20)
BUN/CREAT SERPL: 15 (ref 7–25)
CALCIUM SPEC-SCNC: 9.5 MG/DL (ref 8.6–10.5)
CHLORIDE SERPL-SCNC: 98 MMOL/L (ref 98–107)
CO2 SERPL-SCNC: 24 MMOL/L (ref 22–29)
CREAT SERPL-MCNC: 0.8 MG/DL (ref 0.57–1)
GFR SERPL CREATININE-BSD FRML MDRD: 97 ML/MIN/1.73
GLOBULIN UR ELPH-MCNC: 3.3 GM/DL
GLUCOSE BLDC GLUCOMTR-MCNC: 184 MG/DL (ref 70–130)
GLUCOSE BLDC GLUCOMTR-MCNC: 224 MG/DL (ref 70–130)
GLUCOSE SERPL-MCNC: 180 MG/DL (ref 65–99)
POTASSIUM SERPL-SCNC: 4.2 MMOL/L (ref 3.5–5.2)
PROT SERPL-MCNC: 6.7 G/DL (ref 6–8.5)
SODIUM SERPL-SCNC: 136 MMOL/L (ref 136–145)

## 2021-11-30 PROCEDURE — 25010000002 MORPHINE PER 10 MG: Performed by: FAMILY MEDICINE

## 2021-11-30 PROCEDURE — 25010000002 ENOXAPARIN PER 10 MG: Performed by: INTERNAL MEDICINE

## 2021-11-30 PROCEDURE — 99239 HOSP IP/OBS DSCHRG MGMT >30: CPT | Performed by: NURSE PRACTITIONER

## 2021-11-30 PROCEDURE — 82962 GLUCOSE BLOOD TEST: CPT

## 2021-11-30 PROCEDURE — 97116 GAIT TRAINING THERAPY: CPT

## 2021-11-30 PROCEDURE — 80053 COMPREHEN METABOLIC PANEL: CPT

## 2021-11-30 PROCEDURE — 99024 POSTOP FOLLOW-UP VISIT: CPT | Performed by: SURGERY

## 2021-11-30 PROCEDURE — 25010000002 THIAMINE PER 100 MG: Performed by: INTERNAL MEDICINE

## 2021-11-30 PROCEDURE — 97535 SELF CARE MNGMENT TRAINING: CPT

## 2021-11-30 PROCEDURE — 97110 THERAPEUTIC EXERCISES: CPT

## 2021-11-30 RX ORDER — HYDROCODONE BITARTRATE AND ACETAMINOPHEN 5; 325 MG/1; MG/1
1 TABLET ORAL EVERY 6 HOURS PRN
Status: DISCONTINUED | OUTPATIENT
Start: 2021-11-30 | End: 2021-11-30 | Stop reason: HOSPADM

## 2021-11-30 RX ORDER — FOLIC ACID 1 MG/1
1 TABLET ORAL 2 TIMES DAILY
Qty: 60 TABLET | Refills: 0 | Status: SHIPPED | OUTPATIENT
Start: 2021-11-30 | End: 2022-02-01

## 2021-11-30 RX ORDER — LIDOCAINE HYDROCHLORIDE 20 MG/ML
5 SOLUTION OROPHARYNGEAL
Qty: 100 ML | Refills: 0 | Status: SHIPPED | OUTPATIENT
Start: 2021-11-30 | End: 2022-02-01

## 2021-11-30 RX ORDER — HYDROCODONE BITARTRATE AND ACETAMINOPHEN 5; 325 MG/1; MG/1
1 TABLET ORAL EVERY 6 HOURS PRN
Qty: 12 TABLET | Refills: 0 | Status: SHIPPED | OUTPATIENT
Start: 2021-11-30

## 2021-11-30 RX ORDER — SIMETHICONE 125 MG
125 TABLET,CHEWABLE ORAL EVERY 6 HOURS PRN
Qty: 30 TABLET | Refills: 0 | Status: SHIPPED | OUTPATIENT
Start: 2021-11-30 | End: 2021-12-07 | Stop reason: SDUPTHER

## 2021-11-30 RX ORDER — HYDROXYZINE HCL 10 MG/5 ML
10 SOLUTION, ORAL ORAL EVERY 8 HOURS PRN
Qty: 118 ML | Refills: 0 | Status: SHIPPED | OUTPATIENT
Start: 2021-11-30 | End: 2021-12-10

## 2021-11-30 RX ORDER — FUROSEMIDE 20 MG/1
20 TABLET ORAL DAILY PRN
Start: 2021-11-30 | End: 2022-01-24

## 2021-11-30 RX ORDER — ONDANSETRON 4 MG/1
4 TABLET, ORALLY DISINTEGRATING ORAL EVERY 8 HOURS PRN
Qty: 30 TABLET | Refills: 2 | Status: SHIPPED | OUTPATIENT
Start: 2021-11-30 | End: 2022-01-13

## 2021-11-30 RX ADMIN — ENOXAPARIN SODIUM 80 MG: 80 INJECTION SUBCUTANEOUS at 09:02

## 2021-11-30 RX ADMIN — FOLIC ACID 1 MG: 1 TABLET ORAL at 09:02

## 2021-11-30 RX ADMIN — THIAMINE HYDROCHLORIDE 100 MG: 100 INJECTION, SOLUTION INTRAMUSCULAR; INTRAVENOUS at 09:03

## 2021-11-30 RX ADMIN — MORPHINE SULFATE 1 MG: 2 INJECTION, SOLUTION INTRAMUSCULAR; INTRAVENOUS at 01:31

## 2021-11-30 RX ADMIN — SODIUM CHLORIDE, PRESERVATIVE FREE 10 ML: 5 INJECTION INTRAVENOUS at 09:03

## 2021-11-30 RX ADMIN — PANTOPRAZOLE SODIUM 40 MG: 40 INJECTION, POWDER, FOR SOLUTION INTRAVENOUS at 06:13

## 2021-11-30 RX ADMIN — SIMETHICONE 80 MG: 80 TABLET, CHEWABLE ORAL at 12:06

## 2021-11-30 RX ADMIN — SENNOSIDES AND DOCUSATE SODIUM 1 TABLET: 50; 8.6 TABLET ORAL at 09:02

## 2021-11-30 RX ADMIN — MORPHINE SULFATE 1 MG: 2 INJECTION, SOLUTION INTRAMUSCULAR; INTRAVENOUS at 06:13

## 2021-11-30 RX ADMIN — SIMETHICONE 80 MG: 80 TABLET, CHEWABLE ORAL at 06:13

## 2021-11-30 RX ADMIN — HYDROCODONE BITARTRATE AND ACETAMINOPHEN 1 TABLET: 5; 325 TABLET ORAL at 12:41

## 2021-11-30 NOTE — PROGRESS NOTES
Specialty Pharmacy Refill Coordination Note     Frances is a 36 y.o. female contacted today regarding refills of Botox 200 units. Medication is to be mailed to provider's office and administered at that visit.  Reviewed and verified with patient: Confirmed patient is ready for next treatment.  Specialty medication(s) and dose(s) confirmed: yes    Refill Questions      Most Recent Value   Changes to allergies? No   Changes to medications? No   New conditions since last clinic visit Yes   Unplanned office visit, urgent care, ED, or hospital admission in the last 4 weeks  Yes   How does patient/caregiver feel medication is working? Excellent   Financial problems or insurance changes  No   How many doses of your specialty medications were missed in the last 4 weeks? 0          Delivery Questions      Most Recent Value   Delivery method [employer]   Delivery address correct? Yes  [Ship Botox to Dr. Francois's office. [address] [place] [address] 201.]   Preferred delivery time? Anytime   Number of medications in delivery 1   Medication being filled and delivered Botox 200 units   Is there any medication that is due not being filled? No   Supplies needed? No supplies needed   Cooler needed? Yes   Do any medications need mixed or dated? No   Copay form of payment Credit card on file   Questions or concerns for the pharmacist? No   Are any medications first time fills? No            Medication Adherence    Any gaps in refill history greater than 2 weeks in the last 3 months: no  Demonstrates understanding of importance of adherence: yes  Informant: patient  Reliability of informant: reliable  Provider-estimated medication adherence level: good          Follow-up: 2/14/2022   Lesvia Xiao, Pharmacy Technician  Specialty Pharmacy Technician

## 2021-12-01 ENCOUNTER — TRANSITIONAL CARE MANAGEMENT TELEPHONE ENCOUNTER (OUTPATIENT)
Dept: CALL CENTER | Facility: HOSPITAL | Age: 36
End: 2021-12-01

## 2021-12-01 ENCOUNTER — TELEPHONE (OUTPATIENT)
Dept: NEUROLOGY | Facility: CLINIC | Age: 36
End: 2021-12-01

## 2021-12-01 NOTE — OUTREACH NOTE
Call Center TCM Note      Responses   [facility] patient discharged from? [facility]   Does the patient have one of the following disease processes/diagnoses(primary or secondary)? Other   TCM attempt successful? Yes   Call start time 1230   Call end time 1248   Discharge diagnosis Intractable nausea and vomiting   Person spoke with today (if not patient) and relationship Ada-mother and patient   Meds reviewed with patient/caregiver? Yes   Is the patient having any side effects they believe may be caused by any medication additions or changes? No   Does the patient have all medications ordered at discharge? No   What is keeping the patient from filling the prescriptions? --  [Pt's mother will pick Rx's up today]   Nursing Interventions Nurse provided patient education   Is the patient taking all medications as directed (includes completed medication regime)? Yes   Does the patient have a primary care provider?  Yes   Does the patient have an appointment with their PCP within 7 days of discharge? Yes   Comments regarding PCP Hospital d/c f/u appt on 12/7/21 @11am   Has the patient kept scheduled appointments due by today? N/A   Has home health visited the patient within 72 hours of discharge? N/A   Psychosocial issues? No   Did the patient receive a copy of their discharge instructions? Yes   Nursing interventions Reviewed instructions with patient   What is the patient's perception of their health status since discharge? Improving   Is the patient/caregiver able to teach back signs and symptoms related to disease process for when to call PCP? Yes   Is the patient/caregiver able to teach back signs and symptoms related to disease process for when to call 911? Yes   Is the patient/caregiver able to teach back the hierarchy of who to call/visit for symptoms/problems? PCP, Specialist, Home health nurse, Urgent Care, ED, 911 Yes   If the patient is a current smoker, are they able to teach back resources for  cessation? Not a smoker   TCM call completed? Yes          TAMIKO WILSON RN    12/1/2021, 12:53 EST

## 2021-12-01 NOTE — TELEPHONE ENCOUNTER
----- Message from Lesvia Xiao, Pharmacy Technician sent at 11/30/2021  2:40 PM EST -----  Regarding: Botox  [place] has [place] for Botox 200 units. [facility] will dispense and ship tomorrow. Should be delivered here Thursday 12/2/2021. Patient is already scheduled for 12/8/2021 with Shiela. Thanks.

## 2021-12-06 ENCOUNTER — TELEPHONE (OUTPATIENT)
Dept: GASTROENTEROLOGY | Facility: CLINIC | Age: 36
End: 2021-12-06

## 2021-12-07 ENCOUNTER — OFFICE VISIT (OUTPATIENT)
Dept: FAMILY MEDICINE CLINIC | Facility: CLINIC | Age: 36
End: 2021-12-07

## 2021-12-07 VITALS
HEIGHT: 60 IN | BODY MASS INDEX: 36.2 KG/M2 | WEIGHT: 184.4 LBS | SYSTOLIC BLOOD PRESSURE: 118 MMHG | OXYGEN SATURATION: 96 % | TEMPERATURE: 98.7 F | HEART RATE: 86 BPM | DIASTOLIC BLOOD PRESSURE: 80 MMHG

## 2021-12-07 DIAGNOSIS — E11.65 TYPE 2 DIABETES MELLITUS WITH HYPERGLYCEMIA, WITHOUT LONG-TERM CURRENT USE OF INSULIN (HCC): ICD-10-CM

## 2021-12-07 DIAGNOSIS — R79.89 ELEVATED LFTS: ICD-10-CM

## 2021-12-07 DIAGNOSIS — I10 ESSENTIAL HYPERTENSION: ICD-10-CM

## 2021-12-07 DIAGNOSIS — Z98.84 S/P BARIATRIC SURGERY: ICD-10-CM

## 2021-12-07 DIAGNOSIS — R11.2 INTRACTABLE NAUSEA AND VOMITING: ICD-10-CM

## 2021-12-07 DIAGNOSIS — I82.511 CHRONIC DEEP VEIN THROMBOSIS (DVT) OF FEMORAL VEIN OF RIGHT LOWER EXTREMITY (HCC): ICD-10-CM

## 2021-12-07 DIAGNOSIS — Z09 HOSPITAL DISCHARGE FOLLOW-UP: Primary | ICD-10-CM

## 2021-12-07 DIAGNOSIS — K21.9 GASTROESOPHAGEAL REFLUX DISEASE, UNSPECIFIED WHETHER ESOPHAGITIS PRESENT: ICD-10-CM

## 2021-12-07 PROCEDURE — 99214 OFFICE O/P EST MOD 30 MIN: CPT | Performed by: PHYSICIAN ASSISTANT

## 2021-12-07 RX ORDER — SIMETHICONE 125 MG
125 TABLET,CHEWABLE ORAL EVERY 6 HOURS PRN
Qty: 30 TABLET | Refills: 0 | Status: SHIPPED | OUTPATIENT
Start: 2021-12-07 | End: 2022-01-24

## 2021-12-07 NOTE — PROGRESS NOTES
Transitional Care Follow Up Visit  Subjective     Frances JANE Minnie is a 36 y.o. female who presents for a transitional care management visit.    Within 48 business hours after discharge our office contacted her via telephone to coordinate her care and needs.      I reviewed and discussed the details of that call along with the discharge summary, hospital problems, inpatient lab results, inpatient diagnostic studies, and consultation reports with Frances.     Current outpatient and discharge medications have been reconciled for the patient.    Date of TCM Phone Call 6/20/2021 11/30/2021   [facility]   Date of Admission 6/15/2021 11/22/2021   Date of Discharge 6/20/2021 11/30/2021   Discharge Disposition Home or Self Care Home or Self Care     Risk for Readmission (LACE) Score: 13 (11/30/2021  6:01 AM)      Patient presents for routine hospital follow-up after being hospitalized at [facility] from 11/22 to 11/30 for intractable nausea and vomiting.  Patient underwent bariatric surgery in June of 2021 and has struggled since.  She had an esophageal stricture which was dilated by GI during hospitalization.  She reports today that she is feeling much better.  She is tolerating food and liquids.  She has only had 2 episodes of vomiting and attributes this to eating too fast or too much.  She is still weak and having balance issues so she is using a walker.  She restarted her carvedilol 6.25 mg BID and januvia.  Her blood pressure is stable and well-controlled.  She had an ultrasound lower extremity which showed that her DVT has resolved.  She is no longer taking her eliquis.  She has a bruise along the right side of her abdomen.  She did have lovenox injections while hospitalized.  She has upcoming appointment for outpatient IVF, hospital follow-up with bariatric surgery and GI.  She has new patient appointment with endocrinology in February.  Overall patient seems to be improving well.     Duration of  Hospital Stay: 11/22 to 11/30     The following portions of the patient's history were reviewed and updated as appropriate: allergies, current medications, past family history, past medical history, past social history, past surgical history and problem list.    Review of Systems   Constitutional: Positive for fatigue. Negative for chills, diaphoresis and fever.   HENT: Negative for congestion, postnasal drip and rhinorrhea.    Respiratory: Negative for cough, shortness of breath and wheezing.    Cardiovascular: Negative for chest pain and leg swelling.   Gastrointestinal: Positive for abdominal pain. Negative for nausea, vomiting and indigestion.   Genitourinary: Negative for dysuria.   Musculoskeletal: Positive for gait problem.   Skin: Positive for bruise.   Neurological: Positive for weakness. Negative for dizziness and headache.       Current Outpatient Medications on File Prior to Visit   Medication Sig Dispense Refill   • acetaminophen (TYLENOL) 500 MG tablet Take 500-1,000 mg by mouth Every 6 (Six) Hours As Needed for Mild Pain .     • albuterol (PROVENTIL HFA;VENTOLIN HFA) 108 (90 BASE) MCG/ACT inhaler Inhale 2 puffs Every 4 (Four) Hours As Needed for wheezing.     • Blood Glucose Monitoring Suppl (ONE TOUCH ULTRA 2) w/Device kit      • Blood Glucose Monitoring Suppl device Use BID to test blood sugars dx e11.65 1 each 0   • carvedilol (COREG) 6.25 MG tablet Take 1 tab in AM and 2 tab in  tablet 0   • diphenhydrAMINE (BENADRYL) 25 mg capsule Take 25 mg by mouth Every 6 (Six) Hours As Needed for Itching or Sleep.     • docusate sodium (COLACE) 100 MG capsule Take 100-200 mg by mouth As Needed.     • folic acid (FOLVITE) 1 MG tablet Take 1 tablet by mouth 2 (Two) Times a Day. 60 tablet 0   • furosemide (Lasix) 20 MG tablet Take 1 tablet by mouth Daily As Needed (swelling).     • glucose blood test strip USe BID to test blood sugar DX.e11.65 100 each 3   • guaiFENesin (HUMIBID 3) 400 MG tablet Take 200  mg by mouth As Needed.     • HYDROcodone-acetaminophen (NORCO) 5-325 MG per tablet Take 1 tablet by mouth Every 6 (Six) Hours As Needed for Severe Pain . 12 tablet 0   • KETOTIFEN FUMARATE OP Apply  to eye(s) as directed by provider Daily As Needed.     • Lancets 33G misc 1 Each/kg 2 (two) times a day. To test blood sugars DxE11.65 100 each 2   • levonorgestrel (Mirena, 52 MG,) 20 MCG/24HR IUD 1 each by Intrauterine route.     • Lidocaine Viscous HCl (XYLOCAINE) 2 % solution Take 5 mL by mouth Every 3 (Three) Hours As Needed for Mild Pain . 100 mL 0   • loratadine (CLARITIN) 10 MG tablet TAKE ONE TABLET BY MOUTH DAILY (Patient taking differently: Take 10 mg by mouth Daily.) 90 tablet 0   • OnabotulinumtoxinA (Botox) 200 units reconstituted solution FOR . PHYSICIAN TO INJECT UP  UNITS INTRAMUSCULARLY INTO HEAD, NECK AND SHOULDERS EVERY 90 DAYS. 1 each 3   • ondansetron ODT (Zofran ODT) 4 MG disintegrating tablet Place 1 tablet on the tongue Every 8 (Eight) Hours As Needed for Nausea or Vomiting. 30 tablet 2   • pantoprazole (PROTONIX) 40 MG EC tablet Take 1 tablet by mouth 2 (Two) Times a Day Before Meals. 60 tablet 3   • phenol (CHLORASEPTIC) 1.4 % liquid liquid Apply 2 sprays to the mouth or throat Every 2 (Two) Hours As Needed (sore throat). 236 mL 0   • SITagliptin (Januvia) 50 MG tablet Take 1 tablet by mouth Daily. 30 tablet 2   • [DISCONTINUED] Eliquis 5 MG tablet tablet TAKE ONE TABLET BY MOUTH EVERY 12 HOURS (Patient taking differently: Take 5 mg by mouth Every 12 (Twelve) Hours.) 60 tablet 0   • [DISCONTINUED] simethicone (MYLICON) 125 MG chewable tablet Chew 1 tablet Every 6 (Six) Hours As Needed for Flatulence. 30 tablet 0   • hydrOXYzine (ATARAX) 10 MG/5ML syrup Take 5 mL by mouth Every 8 (Eight) Hours As Needed for Anxiety. 118 mL 0     Current Facility-Administered Medications on File Prior to Visit   Medication Dose Route Frequency Provider Last Rate Last Admin   •  OnabotulinumtoxinA 200 Units  200 Units Intramuscular Q3 Months Shiela Coelho APRN   200 Units at 06/11/21 1616       Results for orders placed or performed during the hospital encounter of 11/22/21   Comprehensive Metabolic Panel    Specimen: Blood   Result Value Ref Range    Glucose 138 (H) 65 - 99 mg/dL    BUN 6 6 - 20 mg/dL    Creatinine 0.84 0.57 - 1.00 mg/dL    Sodium 141 136 - 145 mmol/L    Potassium 3.3 (L) 3.5 - 5.2 mmol/L    Chloride 95 (L) 98 - 107 mmol/L    CO2 22.0 22.0 - 29.0 mmol/L    Calcium 9.6 8.6 - 10.5 mg/dL    Total Protein 7.7 6.0 - 8.5 g/dL    Albumin 4.10 3.50 - 5.20 g/dL    ALT (SGPT) 70 (H) 1 - 33 U/L    AST (SGOT) 77 (H) 1 - 32 U/L    Alkaline Phosphatase 148 (H) 39 - 117 U/L    Total Bilirubin 0.7 0.0 - 1.2 mg/dL    eGFR  African Amer 92 >60 mL/min/1.73    Globulin 3.6 gm/dL    A/G Ratio 1.1 g/dL    BUN/Creatinine Ratio 7.1 7.0 - 25.0    Anion Gap 24.0 (H) 5.0 - 15.0 mmol/L   Lipase    Specimen: Blood   Result Value Ref Range    Lipase 32 13 - 60 U/L   CBC Auto Differential    Specimen: Blood   Result Value Ref Range    WBC 9.32 3.40 - 10.80 10*3/mm3    RBC 4.24 3.77 - 5.28 10*6/mm3    Hemoglobin 13.1 12.0 - 15.9 g/dL    Hematocrit 39.6 34.0 - 46.6 %    MCV 93.4 79.0 - 97.0 fL    MCH 30.9 26.6 - 33.0 pg    MCHC 33.1 31.5 - 35.7 g/dL    RDW 14.4 12.3 - 15.4 %    RDW-SD 48.9 37.0 - 54.0 fl    MPV 12.2 (H) 6.0 - 12.0 fL    Platelets 249 140 - 450 10*3/mm3    Neutrophil % 66.7 42.7 - 76.0 %    Lymphocyte % 23.0 19.6 - 45.3 %    Monocyte % 8.3 5.0 - 12.0 %    Eosinophil % 1.0 0.3 - 6.2 %    Basophil % 0.4 0.0 - 1.5 %    Immature Grans % 0.6 (H) 0.0 - 0.5 %    Neutrophils, Absolute 6.22 1.70 - 7.00 10*3/mm3    Lymphocytes, Absolute 2.14 0.70 - 3.10 10*3/mm3    Monocytes, Absolute 0.77 0.10 - 0.90 10*3/mm3    Eosinophils, Absolute 0.09 0.00 - 0.40 10*3/mm3    Basophils, Absolute 0.04 0.00 - 0.20 10*3/mm3    Immature Grans, Absolute 0.06 (H) 0.00 - 0.05 10*3/mm3    nRBC 0.5 (H) 0.0 - 0.2  /100 WBC   Lactic Acid, Plasma    Specimen: Blood   Result Value Ref Range    Lactate 1.5 0.5 - 2.0 mmol/L   Magnesium    Specimen: Blood   Result Value Ref Range    Magnesium 1.4 (L) 1.6 - 2.6 mg/dL   Troponin    Specimen: Blood   Result Value Ref Range    Troponin T <0.010 0.000 - 0.030 ng/mL   Urinalysis With Microscopic If Indicated (No Culture) - Urine, Catheter    Specimen: Urine, Catheter   Result Value Ref Range    Color, UA Yellow Yellow, Straw    Appearance, UA Clear Clear    pH, UA 6.0 5.0 - 8.0    Specific Gravity, UA 1.015 1.001 - 1.030    Glucose, UA Negative Negative    Ketones, UA 80 mg/dL (3+) (A) Negative    Bilirubin, UA Negative Negative    Blood, UA Negative Negative    Protein, UA Negative Negative    Leuk Esterase, UA Negative Negative    Nitrite, UA Negative Negative    Urobilinogen, UA 1.0 E.U./dL 0.2 - 1.0 E.U./dL   Pregnancy, Urine - Urine, Catheter    Specimen: Urine, Catheter   Result Value Ref Range    HCG, Urine QL Negative Negative   Basic Metabolic Panel    Specimen: Blood   Result Value Ref Range    Glucose 127 (H) 65 - 99 mg/dL    BUN 5 (L) 6 - 20 mg/dL    Creatinine 0.75 0.57 - 1.00 mg/dL    Sodium 142 136 - 145 mmol/L    Potassium 3.4 (L) 3.5 - 5.2 mmol/L    Chloride 98 98 - 107 mmol/L    CO2 26.0 22.0 - 29.0 mmol/L    Calcium 9.0 8.6 - 10.5 mg/dL    eGFR  African Amer 105 >60 mL/min/1.73    BUN/Creatinine Ratio 6.7 (L) 7.0 - 25.0    Anion Gap 18.0 (H) 5.0 - 15.0 mmol/L   Urine Drug Screen - Urine, Clean Catch    Specimen: Urine, Clean Catch   Result Value Ref Range    THC, Screen, Urine Negative Negative    Phencyclidine (PCP), Urine Negative Negative    Cocaine Screen, Urine Negative Negative    Methamphetamine, Ur Negative Negative    Opiate Screen Negative Negative    Amphetamine Screen, Urine Negative Negative    Benzodiazepine Screen, Urine Negative Negative    Tricyclic Antidepressants Screen Negative Negative    Methadone Screen, Urine Negative Negative    Barbiturates  Screen, Urine Negative Negative    Oxycodone Screen, Urine Negative Negative    Propoxyphene Screen Negative Negative    Buprenorphine, Screen, Urine Negative Negative   Ethanol    Specimen: Blood   Result Value Ref Range    Ethanol <10 0 - 10 mg/dL   Osmolality, Serum    Specimen: Blood   Result Value Ref Range    Osmolality 300 (H) 275 - 295 mOsm/kg   Hemoglobin A1c    Specimen: Blood   Result Value Ref Range    Hemoglobin A1C 6.60 (H) 4.80 - 5.60 %   TSH    Specimen: Blood   Result Value Ref Range    TSH 1.890 0.270 - 4.200 uIU/mL   T4, Free    Specimen: Blood   Result Value Ref Range    Free T4 1.04 0.93 - 1.70 ng/dL   CBC Auto Differential    Specimen: Blood   Result Value Ref Range    WBC 10.15 3.40 - 10.80 10*3/mm3    RBC 3.90 3.77 - 5.28 10*6/mm3    Hemoglobin 11.8 (L) 12.0 - 15.9 g/dL    Hematocrit 36.4 34.0 - 46.6 %    MCV 93.3 79.0 - 97.0 fL    MCH 30.3 26.6 - 33.0 pg    MCHC 32.4 31.5 - 35.7 g/dL    RDW 14.4 12.3 - 15.4 %    RDW-SD 49.6 37.0 - 54.0 fl    MPV 12.4 (H) 6.0 - 12.0 fL    Platelets 240 140 - 450 10*3/mm3    Neutrophil % 62.7 42.7 - 76.0 %    Lymphocyte % 27.4 19.6 - 45.3 %    Monocyte % 7.6 5.0 - 12.0 %    Eosinophil % 1.5 0.3 - 6.2 %    Basophil % 0.4 0.0 - 1.5 %    Immature Grans % 0.4 0.0 - 0.5 %    Neutrophils, Absolute 6.37 1.70 - 7.00 10*3/mm3    Lymphocytes, Absolute 2.78 0.70 - 3.10 10*3/mm3    Monocytes, Absolute 0.77 0.10 - 0.90 10*3/mm3    Eosinophils, Absolute 0.15 0.00 - 0.40 10*3/mm3    Basophils, Absolute 0.04 0.00 - 0.20 10*3/mm3    Immature Grans, Absolute 0.04 0.00 - 0.05 10*3/mm3    nRBC 0.3 (H) 0.0 - 0.2 /100 WBC   Comprehensive Metabolic Panel    Specimen: Blood   Result Value Ref Range    Glucose 119 (H) 65 - 99 mg/dL    BUN 3 (L) 6 - 20 mg/dL    Creatinine 0.77 0.57 - 1.00 mg/dL    Sodium 141 136 - 145 mmol/L    Potassium 3.3 (L) 3.5 - 5.2 mmol/L    Chloride 99 98 - 107 mmol/L    CO2 24.0 22.0 - 29.0 mmol/L    Calcium 9.1 8.6 - 10.5 mg/dL    Total Protein 6.7 6.0 - 8.5  g/dL    Albumin 3.70 3.50 - 5.20 g/dL    ALT (SGPT) 59 (H) 1 - 33 U/L    AST (SGOT) 61 (H) 1 - 32 U/L    Alkaline Phosphatase 127 (H) 39 - 117 U/L    Total Bilirubin 0.6 0.0 - 1.2 mg/dL    eGFR  African Amer 102 >60 mL/min/1.73    Globulin 3.0 gm/dL    A/G Ratio 1.2 g/dL    BUN/Creatinine Ratio 3.9 (L) 7.0 - 25.0    Anion Gap 18.0 (H) 5.0 - 15.0 mmol/L   Magnesium    Specimen: Blood   Result Value Ref Range    Magnesium 1.6 1.6 - 2.6 mg/dL   Phosphorus    Specimen: Blood   Result Value Ref Range    Phosphorus 2.8 2.5 - 4.5 mg/dL   Vitamin B12    Specimen: Blood   Result Value Ref Range    Vitamin B-12 869 211 - 946 pg/mL   Folate    Specimen: Blood   Result Value Ref Range    Folate 4.05 (L) 4.78 - 24.20 ng/mL   Vitamin D 1,25 Dihydroxy    Specimen: Blood   Result Value Ref Range    1,25-Dihydroxy, Vitamin D 54.3 19.9 - 79.3 pg/mL   Hepatitis Panel, Acute    Specimen: Blood   Result Value Ref Range    Hepatitis B Surface Ag Non-Reactive Non-Reactive    Hep A IgM Non-Reactive Non-Reactive    Hep B C IgM Non-Reactive Non-Reactive    Hepatitis C Ab Non-Reactive Non-Reactive   Acetaminophen Level    Specimen: Blood   Result Value Ref Range    Acetaminophen <5.0 0.0 - 30.0 mcg/mL   Salicylate Level    Specimen: Blood   Result Value Ref Range    Salicylate <0.3 <=30.0 mg/dL   Troponin    Specimen: Blood   Result Value Ref Range    Troponin T <0.010 0.000 - 0.030 ng/mL   Prealbumin    Specimen: Blood   Result Value Ref Range    Prealbumin 18.0 (L) 20.0 - 40.0 mg/dL   CBC (No Diff)    Specimen: Blood   Result Value Ref Range    WBC 7.80 3.40 - 10.80 10*3/mm3    RBC 3.68 (L) 3.77 - 5.28 10*6/mm3    Hemoglobin 11.2 (L) 12.0 - 15.9 g/dL    Hematocrit 34.5 34.0 - 46.6 %    MCV 93.8 79.0 - 97.0 fL    MCH 30.4 26.6 - 33.0 pg    MCHC 32.5 31.5 - 35.7 g/dL    RDW 14.2 12.3 - 15.4 %    RDW-SD 48.2 37.0 - 54.0 fl    MPV 12.5 (H) 6.0 - 12.0 fL    Platelets 225 140 - 450 10*3/mm3   Comprehensive Metabolic Panel    Specimen: Blood    Result Value Ref Range    Glucose 103 (H) 65 - 99 mg/dL    BUN <2 (L) 6 - 20 mg/dL    Creatinine 0.61 0.57 - 1.00 mg/dL    Sodium 138 136 - 145 mmol/L    Potassium 3.2 (L) 3.5 - 5.2 mmol/L    Chloride 103 98 - 107 mmol/L    CO2 24.0 22.0 - 29.0 mmol/L    Calcium 8.9 8.6 - 10.5 mg/dL    Total Protein 6.0 6.0 - 8.5 g/dL    Albumin 3.30 (L) 3.50 - 5.20 g/dL    ALT (SGPT) 49 (H) 1 - 33 U/L    AST (SGOT) 51 (H) 1 - 32 U/L    Alkaline Phosphatase 116 39 - 117 U/L    Total Bilirubin 0.5 0.0 - 1.2 mg/dL    eGFR  African Amer 133 >60 mL/min/1.73    Globulin 2.7 gm/dL    A/G Ratio 1.2 g/dL    BUN/Creatinine Ratio      Anion Gap 11.0 5.0 - 15.0 mmol/L   Magnesium    Specimen: Blood   Result Value Ref Range    Magnesium 1.8 1.6 - 2.6 mg/dL   CBC (No Diff)    Specimen: Blood   Result Value Ref Range    WBC 7.00 3.40 - 10.80 10*3/mm3    RBC 3.83 3.77 - 5.28 10*6/mm3    Hemoglobin 11.8 (L) 12.0 - 15.9 g/dL    Hematocrit 36.4 34.0 - 46.6 %    MCV 95.0 79.0 - 97.0 fL    MCH 30.8 26.6 - 33.0 pg    MCHC 32.4 31.5 - 35.7 g/dL    RDW 14.5 12.3 - 15.4 %    RDW-SD 50.4 37.0 - 54.0 fl    MPV 12.5 (H) 6.0 - 12.0 fL    Platelets 212 140 - 450 10*3/mm3   Potassium    Specimen: Blood   Result Value Ref Range    Potassium 3.4 (L) 3.5 - 5.2 mmol/L   Phosphorus    Specimen: Blood   Result Value Ref Range    Phosphorus 3.2 2.5 - 4.5 mg/dL   Magnesium    Specimen: Blood   Result Value Ref Range    Magnesium 1.9 1.6 - 2.6 mg/dL   Potassium    Specimen: Blood   Result Value Ref Range    Potassium 4.6 3.5 - 5.2 mmol/L   Phosphorus    Specimen: Blood   Result Value Ref Range    Phosphorus 1.3 (C) 2.5 - 4.5 mg/dL   Magnesium    Specimen: Blood   Result Value Ref Range    Magnesium 2.0 1.6 - 2.6 mg/dL   Phosphorus    Specimen: Blood   Result Value Ref Range    Phosphorus 3.0 2.5 - 4.5 mg/dL   Comprehensive Metabolic Panel    Specimen: Blood   Result Value Ref Range    Glucose 196 (H) 65 - 99 mg/dL    BUN 9 6 - 20 mg/dL    Creatinine 0.67 0.57 - 1.00  mg/dL    Sodium 137 136 - 145 mmol/L    Potassium 4.1 3.5 - 5.2 mmol/L    Chloride 104 98 - 107 mmol/L    CO2 22.0 22.0 - 29.0 mmol/L    Calcium 9.3 8.6 - 10.5 mg/dL    Total Protein 6.3 6.0 - 8.5 g/dL    Albumin 3.40 (L) 3.50 - 5.20 g/dL    ALT (SGPT) 47 (H) 1 - 33 U/L    AST (SGOT) 40 (H) 1 - 32 U/L    Alkaline Phosphatase 125 (H) 39 - 117 U/L    Total Bilirubin 0.3 0.0 - 1.2 mg/dL    eGFR  African Amer 119 >60 mL/min/1.73    Globulin 2.9 gm/dL    A/G Ratio 1.2 g/dL    BUN/Creatinine Ratio 13.4 7.0 - 25.0    Anion Gap 11.0 5.0 - 15.0 mmol/L   Magnesium    Specimen: Blood   Result Value Ref Range    Magnesium 1.5 (L) 1.6 - 2.6 mg/dL   Comprehensive Metabolic Panel    Specimen: Blood   Result Value Ref Range    Glucose 215 (H) 65 - 99 mg/dL    BUN 11 6 - 20 mg/dL    Creatinine 0.75 0.57 - 1.00 mg/dL    Sodium 136 136 - 145 mmol/L    Potassium 4.6 3.5 - 5.2 mmol/L    Chloride 104 98 - 107 mmol/L    CO2 21.0 (L) 22.0 - 29.0 mmol/L    Calcium 9.2 8.6 - 10.5 mg/dL    Total Protein 6.6 6.0 - 8.5 g/dL    Albumin 3.40 (L) 3.50 - 5.20 g/dL    ALT (SGPT) 67 (H) 1 - 33 U/L    AST (SGOT) 63 (H) 1 - 32 U/L    Alkaline Phosphatase 132 (H) 39 - 117 U/L    Total Bilirubin 0.2 0.0 - 1.2 mg/dL    eGFR  African Amer 105 >60 mL/min/1.73    Globulin 3.2 gm/dL    A/G Ratio 1.1 g/dL    BUN/Creatinine Ratio 14.7 7.0 - 25.0    Anion Gap 11.0 5.0 - 15.0 mmol/L   Magnesium    Specimen: Blood   Result Value Ref Range    Magnesium 2.0 1.6 - 2.6 mg/dL   Phosphorus    Specimen: Blood   Result Value Ref Range    Phosphorus 2.3 (L) 2.5 - 4.5 mg/dL   Comprehensive Metabolic Panel    Specimen: Blood   Result Value Ref Range    Glucose 283 (H) 65 - 99 mg/dL    BUN 9 6 - 20 mg/dL    Creatinine 0.80 0.57 - 1.00 mg/dL    Sodium 138 136 - 145 mmol/L    Potassium 4.3 3.5 - 5.2 mmol/L    Chloride 100 98 - 107 mmol/L    CO2 22.0 22.0 - 29.0 mmol/L    Calcium 9.4 8.6 - 10.5 mg/dL    Total Protein 6.7 6.0 - 8.5 g/dL    Albumin 3.50 3.50 - 5.20 g/dL    ALT  (SGPT) 97 (H) 1 - 33 U/L    AST (SGOT) 95 (H) 1 - 32 U/L    Alkaline Phosphatase 136 (H) 39 - 117 U/L    Total Bilirubin 0.3 0.0 - 1.2 mg/dL    eGFR  African Amer 97 >60 mL/min/1.73    Globulin 3.2 gm/dL    A/G Ratio 1.1 g/dL    BUN/Creatinine Ratio 11.3 7.0 - 25.0    Anion Gap 16.0 (H) 5.0 - 15.0 mmol/L   Comprehensive Metabolic Panel    Specimen: Blood   Result Value Ref Range    Glucose 180 (H) 65 - 99 mg/dL    BUN 12 6 - 20 mg/dL    Creatinine 0.80 0.57 - 1.00 mg/dL    Sodium 136 136 - 145 mmol/L    Potassium 4.2 3.5 - 5.2 mmol/L    Chloride 98 98 - 107 mmol/L    CO2 24.0 22.0 - 29.0 mmol/L    Calcium 9.5 8.6 - 10.5 mg/dL    Total Protein 6.7 6.0 - 8.5 g/dL    Albumin 3.40 (L) 3.50 - 5.20 g/dL    ALT (SGPT) 188 (H) 1 - 33 U/L    AST (SGOT) 285 (H) 1 - 32 U/L    Alkaline Phosphatase 166 (H) 39 - 117 U/L    Total Bilirubin 0.5 0.0 - 1.2 mg/dL    eGFR  African Amer 97 >60 mL/min/1.73    Globulin 3.3 gm/dL    A/G Ratio 1.0 g/dL    BUN/Creatinine Ratio 15.0 7.0 - 25.0    Anion Gap 14.0 5.0 - 15.0 mmol/L   POC Glucose Once    Specimen: Blood   Result Value Ref Range    Glucose 126 70 - 130 mg/dL   POC Glucose Once    Specimen: Blood   Result Value Ref Range    Glucose 112 70 - 130 mg/dL   POC Glucose Once    Specimen: Blood   Result Value Ref Range    Glucose 121 70 - 130 mg/dL   POC Glucose Once    Specimen: Blood   Result Value Ref Range    Glucose 160 (H) 70 - 130 mg/dL   POC Glucose Once    Specimen: Blood   Result Value Ref Range    Glucose 127 70 - 130 mg/dL   POC Glucose Once    Specimen: Blood   Result Value Ref Range    Glucose 113 70 - 130 mg/dL   POC Glucose Once    Specimen: Blood   Result Value Ref Range    Glucose 108 70 - 130 mg/dL   POC Glucose Once    Specimen: Blood   Result Value Ref Range    Glucose 97 70 - 130 mg/dL   POC Glucose Once    Specimen: Blood   Result Value Ref Range    Glucose 109 70 - 130 mg/dL   POC Glucose Once    Specimen: Blood   Result Value Ref Range    Glucose 121 70 - 130  mg/dL   POC Glucose Once    Specimen: Blood   Result Value Ref Range    Glucose 196 (H) 70 - 130 mg/dL   POC Glucose Once    Specimen: Blood   Result Value Ref Range    Glucose 159 (H) 70 - 130 mg/dL   POC Glucose Once    Specimen: Blood   Result Value Ref Range    Glucose 171 (H) 70 - 130 mg/dL   POC Glucose Once    Specimen: Blood   Result Value Ref Range    Glucose 164 (H) 70 - 130 mg/dL   POC Glucose Once    Specimen: Blood   Result Value Ref Range    Glucose 159 (H) 70 - 130 mg/dL   POC Glucose Once    Specimen: Blood   Result Value Ref Range    Glucose 213 (H) 70 - 130 mg/dL   POC Glucose Once    Specimen: Blood   Result Value Ref Range    Glucose 184 (H) 70 - 130 mg/dL   POC Glucose Once    Specimen: Blood   Result Value Ref Range    Glucose 208 (H) 70 - 130 mg/dL   POC Glucose Once    Specimen: Blood   Result Value Ref Range    Glucose 194 (H) 70 - 130 mg/dL   POC Glucose Once    Specimen: Blood   Result Value Ref Range    Glucose 271 (H) 70 - 130 mg/dL   POC Glucose Once    Specimen: Blood   Result Value Ref Range    Glucose 196 (H) 70 - 130 mg/dL   POC Glucose Once    Specimen: Blood   Result Value Ref Range    Glucose 229 (H) 70 - 130 mg/dL   POC Glucose Once    Specimen: Blood   Result Value Ref Range    Glucose 194 (H) 70 - 130 mg/dL   POC Glucose Once    Specimen: Blood   Result Value Ref Range    Glucose 155 (H) 70 - 130 mg/dL   POC Glucose Once    Specimen: Blood   Result Value Ref Range    Glucose 173 (H) 70 - 130 mg/dL   POC Glucose Once    Specimen: Blood   Result Value Ref Range    Glucose 240 (H) 70 - 130 mg/dL   POC Glucose Once    Specimen: Blood   Result Value Ref Range    Glucose 225 (H) 70 - 130 mg/dL   POC Glucose Once    Specimen: Blood   Result Value Ref Range    Glucose 224 (H) 70 - 130 mg/dL   POC Glucose Once    Specimen: Blood   Result Value Ref Range    Glucose 184 (H) 70 - 130 mg/dL   ECG 12 Lead   Result Value Ref Range    QT Interval 396 ms    QTC Interval 424 ms   Adult  Transthoracic Echo Complete W/ Cont if Necessary Per Protocol   Result Value Ref Range    BSA 1.8 m^2    IVSd 0.9 cm    LVIDd 3.9 cm    LVIDs 2.9 cm    LVPWd 1.1 cm    IVS/LVPW 0.82     FS 25.6 %    EDV(Teich) 65.9 ml    ESV(Teich) 32.2 ml    EF(Teich) 51.1 %    EDV(cubed) 59.3 ml    ESV(cubed) 24.4 ml    EF(cubed) 58.9 %    LV mass(C)d 122.1 grams    LV mass(C)dI 68.9 grams/m^2    SV(Teich) 33.7 ml    SI(Teich) 19.0 ml/m^2    SV(cubed) 34.9 ml    SI(cubed) 19.7 ml/m^2    Ao root diam 2.8 cm    Ao root area 6.2 cm^2    LA dimension 2.6 cm    LA/Ao 0.93     LVOT diam 2.0 cm    LVOT area 3.1 cm^2    LVOT area(traced) 3.1 cm^2    LAd major 4.6 cm    LVLd ap4 7.2 cm    EDV(MOD-sp4) 109.0 ml    LVLs ap4 6.3 cm    ESV(MOD-sp4) 29.6 ml    EF(MOD-sp4) 72.8 %    LVLd ap2 7.1 cm    EDV(MOD-sp2) 93.8 ml    LVLs ap2 6.4 cm    ESV(MOD-sp2) 32.5 ml    EF(MOD-sp2) 65.4 %    EF(MOD-bp) 69.4 %    SV(MOD-sp4) 79.4 ml    SI(MOD-sp4) 44.8 ml/m^2    SV(MOD-sp2) 61.3 ml    SI(MOD-sp2) 34.6 ml/m^2    Ao root area (BSA corrected) 1.6     LV Vines Vol (BSA corrected) 61.5 ml/m^2    LV Sys Vol (BSA corrected) 16.7 ml/m^2    TAPSE (>1.6) 1.7 cm    MV E max herson 76.7 cm/sec    MV A max herson 73.3 cm/sec    MV E/A 1.0     MV V2 max 89.1 cm/sec    MV max PG 3.2 mmHg    MV V2 mean 50.6 cm/sec    MV mean PG 1.0 mmHg    MV V2 VTI 23.5 cm    MVA(VTI) 2.7 cm^2    MV dec time 0.19 sec    Ao pk herson 131.0 cm/sec    Ao max PG 7.0 mmHg    Ao max PG (full) 3.1 mmHg    Ao V2 mean 88.4 cm/sec    Ao mean PG 4.0 mmHg    Ao mean PG (full) 2.0 mmHg    Ao V2 VTI 22.4 cm    BECKY(I,A) 2.9 cm^2    BECKY(I,D) 2.9 cm^2    BECKY(V,A) 2.4 cm^2    BECKY(V,D) 2.4 cm^2    LV V1 max PG 3.9 mmHg    LV V1 mean PG 2.0 mmHg    LV V1 max 99.2 cm/sec    LV V1 mean 66.0 cm/sec    LV V1 VTI 20.5 cm    SV(Ao) 137.9 ml    SI(Ao) 77.8 ml/m^2    SV(LVOT) 64.4 ml    SI(LVOT) 36.3 ml/m^2    PA V2 max 124.0 cm/sec    PA max PG 6.2 mmHg    RV Base 2.8 cm    RV Length 5.8 cm    RV Mid 2.3 cm    RV S'  12.9 cm/sec    Lat E/e'  5.5     Med E/e' 9.5     Lat Peak E' Aron 13.9 cm/sec    Med Peak E' Aron 8.1 cm/sec     CV ECHO MIGUELINA - BZI_BMI 34.6 kilograms/m^2     CV ECHO MIGUELINA - BSA(HAYCOCK) 1.9 m^2     CV ECHO MIGUELINA - BZI_METRIC_WEIGHT 80.3 kg     CV ECHO MIGUELINA - BZI_METRIC_HEIGHT 152.4 cm    Avg E/e' ratio 6.97     LA Volume Index 15.0 mL/m2    Echo EF Estimated 60 %   Tissue Pathology Exam    Specimen: A: Small Intestine, Duodenum; Tissue    B: Small Intestine; Tissue   Result Value Ref Range    Case Report       Surgical Pathology Report                         Case: [id number]                                  Authorizing Provider:  Jase Hernandez MD        Collected:           11/24/2021 11:50 AM          Ordering Location:     [facility]   Received:            11/24/2021 12:51 PM                                 ENDO SUITES                                                                  Pathologist:           Armaan Hernández MD                                                        Specimens:   1) - Small Intestine, Duodenum, DUODENAL POLYP                                                      2) - Small Intestine, base of polyp in duodenum bx                                         Clinical Information       Intractable vomiting with nausea, hypomagnesemia, weight loss, generalized weakness, esophageal stricture      Final Diagnosis       1. DUODENUM, POLYPECTOMY:   Reactive small bowel mucosa.   Negative for significantly increased intraepithelial lymphocytes, dysplasia, or malignancy.  2. DUODENUM, BASE OF POLYP, BIOPSY:   Reactive small bowel mucosa.   Negative for significantly increased intraepithelial lymphocytes, dysplasia, or malignancy.  GJK      Gross Description          Specimen 1 received in formalin labeled duodenal polyp are 2 pieces of red-tan soft tissue aggregating 0.3 x 0.3 x 0.1 cm submitted entirely in a single cassette.    Specimen 2 received in formalin labeled base  of polyp and duodenum consists of multiple red-tan soft tissue fragments aggregating 1 x 1 x 0.2 cm submitted entirely in a single cassette. HM         Special Stains       IHC stains performed with adequate controls on blocks 1 and 2.  CD 56, synaptophysin, and chromogranin support the diagnosis.      Microscopic Description       The slides are reviewed and demonstrate histopathologic features supporting the above rendered diagnosis.       Green Top (Gel)   Result Value Ref Range    Extra Tube Hold for add-ons.    Lavender Top   Result Value Ref Range    Extra Tube hold for add-on    Gold Top - SST   Result Value Ref Range    Extra Tube Hold for add-ons.    Gray Top   Result Value Ref Range    Extra Tube Hold for add-ons.    Light Blue Top   Result Value Ref Range    Extra Tube hold for add-on    Duplex Venous Lower Extremity - Bilateral CAR   Result Value Ref Range    Right Common Femoral Spont Y     Right Common Femoral Phasic Y     Right Common Femoral Augment Y     Right Common Femoral Compress C     Right Saphenofemoral Junction Spont Y     Right Saphenofemoral Junction Phasic Y     Right Saphenofemoral Junction Compress C     Right Profunda Femoral Spont Y     Right Profunda Femoral Phasic Y     Right Profunda Femoral Compress C     Right Proximal Femoral Spont Y     Right Proximal Femoral Phasic Y     Right Proximal Femoral Compress C     Right Mid Femoral Spont Y     Right Mid Femoral Phasic Y     Right Mid Femoral Augment Y     Right Mid Femoral Compress C     Right Distal Femoral Spont Y     Right Distal Femoral Phasic Y     Right Distal Femoral Compress C     Right Popliteal Spont Y     Right Popliteal Phasic Y     Right Popliteal Augment Y     Right Popliteal Compress C     Right Posterior Tibial Compress C     Right Peroneal Compress C     Right Gastronemius Compress C     Right Greater Saph AK Compress C     Right Greater Saph BK Compress C     Right Lesser Saph Compress C     Left Common Femoral  "Spont Y     Left Common Femoral Phasic Y     Left Common Femoral Augment Y     Left Common Femoral Compress C     Left Saphenofemoral Junction Spont Y     Left Saphenofemoral Junction Phasic Y     Left Saphenofemoral Junction Compress C     Left Profunda Femoral Spont Y     Left Profunda Femoral Phasic Y     Left Profunda Femoral Compress C     Left Proximal Femoral Spont Y     Left Proximal Femoral Phasic Y     Left Proximal Femoral Compress C     Left Mid Femoral Spont Y     Left Mid Femoral Phasic Y     Left Mid Femoral Augment Y     Left Mid Femoral Compress C     Left Distal Femoral Spont Y     Left Distal Femoral Phasic Y     Left Distal Femoral Compress C     Left Popliteal Spont Y     Left Popliteal Phasic Y     Left Popliteal Augment Y     Left Popliteal Compress C     Left Posterior Tibial Compress C     Left Peroneal Compress C     Left Gastronemius Compress C     Left Greater Saph AK Compress C     Left Greater Saph BK Compress C     Left Lesser Saph Compress C        Visit Vitals  /80   Pulse 86   Temp 98.7 °F (37.1 °C)   Ht 152.4 cm (60\")   Wt 83.6 kg (184 lb 6.4 oz)   SpO2 96%   BMI 36.01 kg/m²     Body mass index is 36.01 kg/m².    Objective   Physical Exam  Vitals reviewed.   Constitutional:       General: She is not in acute distress.     Appearance: Normal appearance. She is well-developed. She is obese. She is not ill-appearing or diaphoretic.   HENT:      Head: Normocephalic and atraumatic.   Eyes:      Extraocular Movements: Extraocular movements intact.      Conjunctiva/sclera: Conjunctivae normal.   Cardiovascular:      Rate and Rhythm: Normal rate and regular rhythm.      Heart sounds: Normal heart sounds.   Pulmonary:      Effort: Pulmonary effort is normal.      Breath sounds: Normal breath sounds.   Abdominal:       Musculoskeletal:         General: Normal range of motion.      Cervical back: Normal range of motion.      Right lower leg: No edema.      Left lower leg: No edema. "   Skin:     General: Skin is warm.      Findings: No erythema or rash.   Neurological:      General: No focal deficit present.      Mental Status: She is alert.   Psychiatric:         Attention and Perception: She is attentive.         Mood and Affect: Mood normal.         Speech: Speech normal.         Behavior: Behavior normal. Behavior is cooperative.         Thought Content: Thought content normal.         Judgment: Judgment normal.         Assessment/Plan   Diagnoses and all orders for this visit:    1. Hospital discharge follow-up (Primary)    2. S/P bariatric surgery  June 2021.  Following up regularly with bariatric surgery and GI.    3. Intractable nausea and vomiting  Has improved since hospitalization.  Only 2 episodes since being discharged.  She feels this is from overeating.    4. Type 2 diabetes mellitus with hyperglycemia, without long-term current use of insulin ([facility])  Recent hemoglobin AIC was 6.6%.  Patient is monitoring glucose levels.  She restarted Januvia and is tolerating this well.    5. Essential hypertension  Stable, well-controlled.  Compliant on medication.    6. Chronic deep vein thrombosis (DVT) of femoral vein of right lower extremity ([facility])  Recent ultrasound did not show a DVT.  DVT occurred after bariatric surgery in June.  Patient has not been taking her eliquis and does not want to resume this.  She is aware of the symptoms and signs of a blood clot and will call if she has any concerns.    7. Elevated LFTs  Will follow-up with GI.  Labs were trending down during hospitalization.    8. Gastroesophageal reflux disease, unspecified whether esophagitis present  On protonix 40 mg BID.  -     simethicone (MYLICON) 125 MG chewable tablet; Chew 1 tablet Every 6 (Six) Hours As Needed for Flatulence.  Dispense: 30 tablet; Refill: 0

## 2021-12-08 ENCOUNTER — TELEMEDICINE (OUTPATIENT)
Dept: BEHAVIORAL HEALTH | Facility: CLINIC | Age: 36
End: 2021-12-08

## 2021-12-08 ENCOUNTER — READMISSION MANAGEMENT (OUTPATIENT)
Dept: CALL CENTER | Facility: HOSPITAL | Age: 36
End: 2021-12-08

## 2021-12-08 DIAGNOSIS — R45.81 POOR SELF ESTEEM: ICD-10-CM

## 2021-12-08 DIAGNOSIS — R63.8 DIFFICULTY EATING: ICD-10-CM

## 2021-12-08 DIAGNOSIS — F33.0 MILD EPISODE OF RECURRENT MAJOR DEPRESSIVE DISORDER (HCC): Primary | ICD-10-CM

## 2021-12-08 PROCEDURE — 90837 PSYTX W PT 60 MINUTES: CPT | Performed by: PSYCHOLOGIST

## 2021-12-08 NOTE — PROGRESS NOTES
PROGRESS NOTE    Data:  Frances Hartman is a 36 y.o. female who met with the undersigned for a scheduled individual telehealth therapy session from 3:05 - 4:00pm.    The pt consented to a video visit and connected via Zoom from [place], [place].      Clinical Maneuvering/Intervention:      The pt talked about recent stressors such as not being able to eat, getting dehydrated, and then having to go to the hospital for these issues. She talked about feeling weak, dizzy, and her balance being off. Stressors were processed individually and in detail. Venting of frustrations was conducted in order to help the pt feel less tense emotionally and gain insight into issues. Feelings were processed and validated several times in session. Perspective taking was conducted multiple times in order to help her feel less stuck, less overwhelmed, and see challenges as much more manageable. Active listening was conducted in order to help the pt make sense of stressors and start moving towards potential solutions. The pt was assisted with finding solutions based on existing skill-set and abilities. She was assisted with processing and making sense of her stressors, but also finding the good along the way. Instilling hope was conducted. The pt was assisted in recognizing progress in order to show encouragement and promote motivation to keep making positive changes in life. She is eating and drinking better, experiencing a sense of 'relief,' about that. She was assisted with noting how the excellent care she received by several nurses and others, was not only helpful and validating for her struggles, but also emotionally healing for her. The pt's strengths were identified in order to help identify abilities to use to better face/overcome challenges. Some issues were talked about in terms of her arguing with someone and then apologizing later. She was assisted in finding/noting the strength she has to own her mistakes or role in a  problematic interaction and encouraged to continue using this ability. The pt expressed gratitude for today's session.     Mental Status Exam  Hygiene:  good  Dress: normal  Attitude:  cooperative and proactive  Motor Activity: normal  Speech: normal  Mood:  depressed  Affect:  congruent  Thought Processes: normal  Thought Content:  normal  Suicidal Thoughts:  not endorsed  Homicidal Thoughts:  not endorsed  Crisis Safety Plan: not needed   Hallucinations:  none      Patient's Support Network Includes:  family, friends      Progress toward goal: there is evidence to suggest that she is battling depression and low-self esteem, but she is learning new skills to combat/heal from these issues      Functional Status: moderate to high      Prognosis: good    Assessment      The pt presented to be struggling with depression of a mild level. Self-esteem seems impaired and cognitive distortions are often evident. She tends to benefit from highly supportive and strength-based counseling, but may also be open to challenge herself to grow as time goes on.        Plan      In order to diminish symptoms of depression and improve self-esteem, she will continue to use her strength/ability of perseverance to increasing food/drink intake as instructed (as long as advised/beneficial) and to continue counseling in order to improve coping skills (ongoing).    Misty Nava, PhD, LP

## 2021-12-08 NOTE — OUTREACH NOTE
Medical Week 2 Survey      Responses   [facility] patient discharged from? [facility]   Does the patient have one of the following disease processes/diagnoses(primary or secondary)? Other   Week 2 attempt successful? Yes   Call start time 1359   Discharge diagnosis Intractable nausea and vomiting   Call end time 1409   Meds reviewed with patient/caregiver? Yes   Is the patient taking all medications as directed (includes completed medication regime)? Yes   Comments regarding appointments First infusion is on 12/9/21,  appt with pain provider is on 12/13/21   Has the patient kept scheduled appointments due by today? No   What is preventing the patient from keeping their appointments? Transportation  [Car would not start on 12/8/21 so she had to reschedule appt]   What is the patient's perception of their health status since discharge? Improving   Week 2 Call Completed? Yes          Stacey Aponte RN

## 2021-12-09 ENCOUNTER — HOSPITAL ENCOUNTER (OUTPATIENT)
Dept: ONCOLOGY | Facility: HOSPITAL | Age: 36
Setting detail: INFUSION SERIES
Discharge: HOME | End: 2021-12-09

## 2021-12-09 VITALS
SYSTOLIC BLOOD PRESSURE: 123 MMHG | TEMPERATURE: 98.4 F | DIASTOLIC BLOOD PRESSURE: 79 MMHG | HEIGHT: 60 IN | HEART RATE: 84 BPM | WEIGHT: 186 LBS | RESPIRATION RATE: 18 BRPM | BODY MASS INDEX: 36.52 KG/M2

## 2021-12-09 DIAGNOSIS — R11.10 INTRACTABLE VOMITING, PRESENCE OF NAUSEA NOT SPECIFIED, UNSPECIFIED VOMITING TYPE: Primary | ICD-10-CM

## 2021-12-09 PROCEDURE — 96361 HYDRATE IV INFUSION ADD-ON: CPT

## 2021-12-09 PROCEDURE — 96360 HYDRATION IV INFUSION INIT: CPT

## 2021-12-09 RX ADMIN — SODIUM CHLORIDE 2000 ML: 9 INJECTION, SOLUTION INTRAVENOUS at 13:50

## 2021-12-09 NOTE — PROGRESS NOTES
Ember Cardona, charge RN explained to patient infusion closes at 1630 and since she needed her IV fluids decreased in rate to 500ml/hr she would not be able to get all the fluids in today by 1630.  She could be rescheduled for tomorrow to get the remaining fluids if needed. Patient agreed and understands. She said she will call the MD office tomorrow if she needs additional fluids.

## 2021-12-10 ENCOUNTER — OFFICE VISIT (OUTPATIENT)
Dept: BARIATRICS/WEIGHT MGMT | Facility: CLINIC | Age: 36
End: 2021-12-10

## 2021-12-10 VITALS
BODY MASS INDEX: 36.71 KG/M2 | OXYGEN SATURATION: 98 % | TEMPERATURE: 96.8 F | HEIGHT: 60 IN | SYSTOLIC BLOOD PRESSURE: 116 MMHG | DIASTOLIC BLOOD PRESSURE: 74 MMHG | WEIGHT: 187 LBS | RESPIRATION RATE: 18 BRPM | HEART RATE: 93 BPM

## 2021-12-10 DIAGNOSIS — Z90.3 POSTGASTRECTOMY MALABSORPTION: ICD-10-CM

## 2021-12-10 DIAGNOSIS — K91.2 POSTGASTRECTOMY MALABSORPTION: ICD-10-CM

## 2021-12-10 DIAGNOSIS — Z13.0 SCREENING, IRON DEFICIENCY ANEMIA: ICD-10-CM

## 2021-12-10 DIAGNOSIS — E55.9 HYPOVITAMINOSIS D: ICD-10-CM

## 2021-12-10 DIAGNOSIS — Z98.84 STATUS POST BARIATRIC SURGERY: ICD-10-CM

## 2021-12-10 DIAGNOSIS — E66.9 OBESITY, CLASS II, BMI 35-39.9: ICD-10-CM

## 2021-12-10 DIAGNOSIS — Z13.21 MALNUTRITION SCREEN: ICD-10-CM

## 2021-12-10 DIAGNOSIS — R53.83 FATIGUE, UNSPECIFIED TYPE: Primary | ICD-10-CM

## 2021-12-10 PROCEDURE — 99214 OFFICE O/P EST MOD 30 MIN: CPT | Performed by: PHYSICIAN ASSISTANT

## 2021-12-10 NOTE — PROGRESS NOTES
[facility] Bariatric Surgery  [address]    [facility] [address]  [phone]        Patient Name:  Frances Hartman  :  1985      Date of Visit: 12/10/2021      Reason for Visit: 6 month postop Hospital follow-up    HPI: Frances Hartman is a 36 y.o. female s/p robotic assisted LSG/HHR by  on 6/15/21    EGD on 2021 for complaint of vomiting.  Findings were no recurrent hiatal hernia without stenosis at the hiatus.  Mild spasm of pylorus, and mild stricture at incisura which was dilated to 20 mm.    EGD 10/28/21 Dr. Hernandez-no definite stenosis; sleeve was empirically dilated to 30 mm with a pneumatic balloon    UGI 21: No evidence of leak or stricture; reflux to thoracic inlet, and a patulous GE junction versus small sliding type hiatal hernia.    EGD 11/15/2021: Dilation to 35 mm by Dr. Hernandez.    EGD 21: unremarkable     Patient last seen in the office in 2021 and at that time she had persistent dysphagia, nausea, and vomiting.  She was arranged for EGD with dilation with Dr. Spurling on 2021.  On that EGD he did not see definite stenosis in the sleeve was empirically dilated to 30 mm with a pneumatic balloon.  She initially improved after this intervention, but her symptoms returned and upper GI was performed on 21.  This revealed reflux to the thoracic inlet and possible small sliding-type hiatal hernia.  Her persistent symptoms she did undergo another EGD with Dr. Hernandez with dilation at 35 mm on November 15.  She was admitted to [facility] for intractable nausea and vomiting on 2021.  CT scan with no abnormality.  Another EGD performed by Dr. Hernandez 2021 and it was unremarkable.  Nasojejunal feeding tube was placed at time of EGD.  Her p.o. intake improved and her nasojejunal tube was DC'd on 2021.  She was felt stable for discharge on  and arranged for weekly infusions  "of 2 L of normal saline at infusion center.    Patient has greatly improved since discharge from the hospital.  She tells me today she has been doing really well.  She feels a little off today and she attributes this to getting IV fluids yesterday and feels that maybe it was \"a little too much\".  She has not had any further nausea and vomiting and her p.o. intake has been back to normal.  She is getting close to 64 ounces of fluid a day, but some days she is under.  Urine output is good and she reports it is clear.  Denies dysphagia, reflux, nausea, vomiting, abdominal pain, pulmonary issues and fevers.  Getting 75g prot/day.  Drinking 40-64 fluid oz/day. On Pantoprazole.         Past Medical History:   Diagnosis Date   • Anxiety    • Asthma     prn inhalers, does not follow w/ pulmonary   • Chronic back pain     previously followed w/ pain management, now just prn Tylenol + Gabapentin   • Chronic deep vein thrombosis (DVT) of femoral vein of right lower extremity ([facility]) 9/10/2021   • Diabetes mellitus (HCC)     Type II, dx 2014, never on insulin, A1C 7.6   • Dyspepsia    • Dyspnea on exertion    • Fatigue    • Gastroparesis    • GERD (gastroesophageal reflux disease)    • Heartburn     chronic, prn TUMS, denies prior eval   • Hirsutism    • Hx of radiation therapy     for treatments of Keloids   • Hypertension    • Irregular menses     infrequent spotting   • Leiomyoma, subserous     possible peduculated f3-4 cm fibroid on u/s at [facility]   • Migraines     botox q3 months, following Neurology @ [facility]   • Morbid obesity (HCC)    • Peripheral edema    • Polycystic ovaries     severe insulin resistance/hirsuitism   • Seasonal allergies    • Slow to wake up after anesthesia      Past Surgical History:   Procedure Laterality Date   • ENDOSCOPY N/A 6/15/2021    Procedure: ESOPHAGOGASTRODUODENOSCOPY;  Surgeon: Ayaka Andre MD;  [facility];  Service: Robotics - DaVinci;  Laterality: N/A;   • ENDOSCOPY N/A " 10/28/2021    Procedure: ESOPHAGOGASTRODUODENOSCOPY WITH ACHALASIA BALLOON DILATATION;  Surgeon: Jase Hernandez MD;  Location:  [facility];  Service: Gastroenterology;  Laterality: N/A;  58 F DILATOR   • ENDOSCOPY N/A 11/15/2021    Procedure: ESOPHAGOGASTRODUODENOSCOPY WITH DILATATION;  Surgeon: Jase Hernandez MD;  Location:  [facility];  Service: Gastroenterology;  Laterality: N/A;  achalasia balloon    • ENDOSCOPY N/A 11/24/2021    Procedure: ESOPHAGOGASTRODUODENOSCOPY WTH DUODENAL POLYPECTOPMY AND NASAL JEJUNAL TUBE PLACEMENT;  Surgeon: Jase Hernandez MD;  Location:  [facility] ENDOS[facility];  Service: Gastroenterology;  Laterality: N/A;  placement of feeding tube, checked position with KUB   • GASTRIC SLEEVE LAPAROSCOPIC N/A 6/15/2021    Procedure: GASTRIC SLEEVE LAPAROSCOPIC WITH DAVINCI ROBOT, LAPROSCOPIC HIATAL HERNIA REPAIR WITH DAVINCI ROBOT;  Surgeon: Ayaka Andre MD;  Location:  [facility];  Service: Robotics - DaVinci;  Laterality: N/A;   • KELOID EXCISION      1990,1993,1999,2015,2016,2019   • LAPAROSCOPIC CHOLECYSTECTOMY  2000    for stones   • RADIOFREQUENCY ABLATION  2019    x 2  for pt back   • UMBILICAL HERNIA REPAIR  1990   • WISDOM TOOTH EXTRACTION  1994     Outpatient Medications Marked as Taking for the 12/10/21 encounter (Office Visit) with Emily Lechuga PA   Medication Sig Dispense Refill   • acetaminophen (TYLENOL) 500 MG tablet Take 500-1,000 mg by mouth Every 6 (Six) Hours As Needed for Mild Pain .     • albuterol (PROVENTIL HFA;VENTOLIN HFA) 108 (90 BASE) MCG/ACT inhaler Inhale 2 puffs Every 4 (Four) Hours As Needed for wheezing.     • carvedilol (COREG) 6.25 MG tablet Take 1 tab in AM and 2 tab in  tablet 0   • diphenhydrAMINE (BENADRYL) 25 mg capsule Take 25 mg by mouth Every 6 (Six) Hours As Needed for Itching or Sleep.     • docusate sodium (COLACE) 100 MG capsule Take 100-200 mg by mouth As Needed.     • folic acid (FOLVITE) 1 MG tablet Take 1 tablet by mouth 2  "(Two) Times a Day. 60 tablet 0   • furosemide (Lasix) 20 MG tablet Take 1 tablet by mouth Daily As Needed (swelling).     • guaiFENesin (HUMIBID 3) 400 MG tablet Take 200 mg by mouth As Needed.     • HYDROcodone-acetaminophen (NORCO) 5-325 MG per tablet Take 1 tablet by mouth Every 6 (Six) Hours As Needed for Severe Pain . 12 tablet 0   • KETOTIFEN FUMARATE OP Apply  to eye(s) as directed by provider Daily As Needed.     • levonorgestrel (Mirena, 52 MG,) 20 MCG/24HR IUD 1 each by Intrauterine route.     • Lidocaine Viscous HCl (XYLOCAINE) 2 % solution Take 5 mL by mouth Every 3 (Three) Hours As Needed for Mild Pain . 100 mL 0   • loratadine (CLARITIN) 10 MG tablet TAKE ONE TABLET BY MOUTH DAILY (Patient taking differently: Take 10 mg by mouth Daily.) 90 tablet 0   • OnabotulinumtoxinA (Botox) 200 units reconstituted solution FOR . PHYSICIAN TO INJECT UP  UNITS INTRAMUSCULARLY INTO HEAD, NECK AND SHOULDERS EVERY 90 DAYS. 1 each 3   • ondansetron ODT (Zofran ODT) 4 MG disintegrating tablet Place 1 tablet on the tongue Every 8 (Eight) Hours As Needed for Nausea or Vomiting. 30 tablet 2   • phenol (CHLORASEPTIC) 1.4 % liquid liquid Apply 2 sprays to the mouth or throat Every 2 (Two) Hours As Needed (sore throat). 236 mL 0   • simethicone (MYLICON) 125 MG chewable tablet Chew 1 tablet Every 6 (Six) Hours As Needed for Flatulence. 30 tablet 0   • SITagliptin (Januvia) 50 MG tablet Take 1 tablet by mouth Daily. 30 tablet 2       Allergies   Allergen Reactions   • Capsicum Annuum Extract & Derivative (Bell Pepper) [Capsicum] Anaphylaxis   • Ibuprofen Other (See Comments)     \"makes me retain fluid and eventually go into CHF\"   • Peanut-Containing Drug Products Anaphylaxis   • Hydrochlorothiazide Swelling     eventually causes CHF    • Monosodium Glutamate Swelling and Headache   • Oatmeal Other (See Comments)     Dx on allergy testing   • Amitriptyline Mental Status Change     memory gaps + " "neuropathy + hair loss   • Aspartame Nausea Only   • Augmentin [Amoxicillin-Pot Clavulanate] Other (See Comments)     Syncope, not sure if allergic to cephalosporins.   • Codeine Headache      Can tolerate hydrocodone, no other adverse reactions to other narcotics.   • Diclofenac Headache   • Doxycycline Rash and Hallucinations   • Eggs Or Egg-Derived Products Other (See Comments)     dx on allergy testing, but does not completely avoid   • Naproxen Other (See Comments)     \"blackout\"       Social History     Socioeconomic History   • Marital status: Single   Tobacco Use   • Smoking status: Never Smoker   • Smokeless tobacco: Never Used   Vaping Use   • Vaping Use: Never used   Substance and Sexual Activity   • Alcohol use: Not Currently     Alcohol/week: 0.0 standard drinks     Comment: Very rarely drink socially. At most 2 drinks per month.   • Drug use: No   • Sexual activity: Defer     Partners: Male     Birth control/protection: Condom, I.U.D.     Social History     Social History Narrative    Patient lives in [place], [place], single. Unemployed.       /74 (BP Location: Left arm, Patient Position: Sitting, Cuff Size: Large Adult)   Pulse 93   Temp 96.8 °F (36 °C) (Temporal)   Resp 18   Ht 152.4 cm (60\")   Wt 84.8 kg (187 lb)   SpO2 98%   BMI 36.52 kg/m²     Physical Exam  Constitutional:       General: She is not in acute distress.     Appearance: She is obese.   HENT:      Head: Normocephalic and atraumatic.   Cardiovascular:      Rate and Rhythm: Normal rate and regular rhythm.      Heart sounds: Normal heart sounds.   Pulmonary:      Effort: Pulmonary effort is normal.      Breath sounds: Normal breath sounds. No wheezing, rhonchi or rales.   Abdominal:      General: Bowel sounds are normal. There is no distension.      Palpations: Abdomen is soft. There is no mass.      Tenderness: There is no abdominal tenderness.   Skin:     General: Skin is warm and dry.   Neurological:      General: No focal " deficit present.      Mental Status: She is alert and oriented to person, place, and time.   Psychiatric:         Mood and Affect: Mood normal.         Behavior: Behavior normal.           Assessment:  6 months s/p LSG/HHR 6/15/21 Dr. Andre; hosptial follow-up     ICD-10-CM ICD-9-CM   1. Fatigue, unspecified type  R53.83 780.79   2. Postgastrectomy malabsorption  K91.2 579.3    Z90.3    3. Obesity, Class II, BMI 35-39.9  E66.9 278.00   4. Malnutrition screen  Z13.21 V77.2   5. Hypovitaminosis D  E55.9 268.9   6. Screening, iron deficiency anemia  Z13.0 V78.0   7. Status post bariatric surgery  Z98.84 V45.86       Patient's Body mass index is 36.52 kg/m². indicating that she is morbidly obese (BMI > 40 or > 35 with obesity - related health condition). Obesity-related health conditions include the following: As above. Obesity is improving with treatment. BMI is is above average; BMI management plan is completed. We discussed low calorie, low carb based diet program, portion control and increasing exercise..      Plan: Overall doing well since discharge and is in high spirits today.  Her p.o. intake has been adequate and she does appear to be well-hydrated.  She will touch base with us next week prior to her next scheduled infusion.  If she continues to do this well I do not see any reason to continue IV fluid infusions and she agrees.  She mentioned she feels like she is retaining fluid.  She does not appear volume overloaded by physical exam and lungs are clear.  Her as needed Lasix was discontinued by the hospitalist, but she tells me she still has some at home and feels she would benefit from a dose of this because she knows her body.  I recommended she continue her current regimen and should she have any issues with volume overload she can contact her PCP or cardiologist.  Continue w/ good food choices and healthy habits.  Continue protein >70g/day.  Continue routine physical activity.  Routine bariatric labs  ordered-patient prefers to have these done at a [facility] facility on Monday.  Continue vitamins w/ adjustments pending lab results.  Call w/ problems/concerns.     The patient was instructed to follow up in 1 month or sooner if needed.    LASHANDA Gregory

## 2021-12-13 ENCOUNTER — PROCEDURE VISIT (OUTPATIENT)
Dept: NEUROLOGY | Facility: CLINIC | Age: 36
End: 2021-12-13

## 2021-12-13 ENCOUNTER — LAB (OUTPATIENT)
Dept: LAB | Facility: HOSPITAL | Age: 36
End: 2021-12-13

## 2021-12-13 DIAGNOSIS — Z13.0 SCREENING FOR IRON DEFICIENCY ANEMIA: ICD-10-CM

## 2021-12-13 DIAGNOSIS — K91.2 HYPOGLYCEMIA FOLLOWING GASTROINTESTINAL SURGERY: ICD-10-CM

## 2021-12-13 DIAGNOSIS — Z90.3 POSTGASTRECTOMY GASTRITIS: ICD-10-CM

## 2021-12-13 DIAGNOSIS — E66.9 OBESITY, UNSPECIFIED CLASSIFICATION, UNSPECIFIED OBESITY TYPE, UNSPECIFIED WHETHER SERIOUS COMORBIDITY PRESENT: ICD-10-CM

## 2021-12-13 DIAGNOSIS — Z13.21 SCREENING FOR MALNUTRITION: ICD-10-CM

## 2021-12-13 DIAGNOSIS — E55.9 AVITAMINOSIS D: ICD-10-CM

## 2021-12-13 DIAGNOSIS — G43.709 CHRONIC MIGRAINE WITHOUT AURA WITHOUT STATUS MIGRAINOSUS, NOT INTRACTABLE: Primary | ICD-10-CM

## 2021-12-13 DIAGNOSIS — K29.60 POSTGASTRECTOMY GASTRITIS: ICD-10-CM

## 2021-12-13 DIAGNOSIS — R53.83 FATIGUE, UNSPECIFIED TYPE: Primary | ICD-10-CM

## 2021-12-13 PROCEDURE — 82306 VITAMIN D 25 HYDROXY: CPT | Performed by: PHYSICIAN ASSISTANT

## 2021-12-13 PROCEDURE — 64615 CHEMODENERV MUSC MIGRAINE: CPT | Performed by: NURSE PRACTITIONER

## 2021-12-13 PROCEDURE — 83921 ORGANIC ACID SINGLE QUANT: CPT | Performed by: PHYSICIAN ASSISTANT

## 2021-12-13 PROCEDURE — 36415 COLL VENOUS BLD VENIPUNCTURE: CPT | Performed by: PHYSICIAN ASSISTANT

## 2021-12-13 PROCEDURE — 82746 ASSAY OF FOLIC ACID SERUM: CPT | Performed by: PHYSICIAN ASSISTANT

## 2021-12-13 NOTE — PROGRESS NOTES
Botulinum Toxin Injection Procedure    History:  Response to treatment has reduced HA's at least 7 fewer days a month and/or 100 hours fewer each month.     Pre-operative diagnosis: headache    Post-operative diagnosis: Same    Procedure: Chemical neurolysis    After risks and benefits were explained including bleeding, infection, worsening of pain, damage to the areas being injected, weakness of muscles, loss of muscle control, dysphagia if injecting the head or neck, facial droop if injecting the facial area, painful injection, allergic or other reaction to the medications being injected, and the failure of the procedure to help the problem, a signed consent was obtained.      The patient was placed in a comfortable area and the sites to be treated were identified. A time out was called and performed. The area to be treated was prepped three times with alcohol and the alcohol allowed to dry. Next, a 30 gauge needle was used to inject the medication in the area to be treated.    Area(s) injected: frontalis muscle, semispinalis capitis muscle and temporallis muscle    Medications used: botulinum toxin 200 mu, 4 mL of saline used to dilute the botulinum toxin.      The patient did tolerate the procedure well. There were no complications.       Physical Examination    Current Treatment (Units)    5 units on the right and 5 units on the left  Procerus 5 units  Frontalis 10 units on the right and 10 units on the left  Temporalis 20 units on the right and 20 units on the left  Occipitalis 30 units on the right and 30 units on the left  Cervical Paraspinal 10 units on the right and 10 units on the left  EMG Guidance none .   Complications: none .   The total amount injected in units is 155 .   The total amount wasted in units is 45 .   The total amount submitted in units is 200 .   Botox was supplied by the patient.

## 2021-12-14 LAB
25(OH)D3 SERPL-MCNC: 37.1 NG/ML (ref 30–100)
FOLATE SERPL-MCNC: >20 NG/ML (ref 4.78–24.2)

## 2021-12-15 ENCOUNTER — OFFICE VISIT (OUTPATIENT)
Dept: BEHAVIORAL HEALTH | Facility: CLINIC | Age: 36
End: 2021-12-15

## 2021-12-15 ENCOUNTER — READMISSION MANAGEMENT (OUTPATIENT)
Dept: CALL CENTER | Facility: HOSPITAL | Age: 36
End: 2021-12-15

## 2021-12-15 DIAGNOSIS — R45.4 FEELING IRRITABLE: ICD-10-CM

## 2021-12-15 DIAGNOSIS — F33.1 MODERATE EPISODE OF RECURRENT MAJOR DEPRESSIVE DISORDER (HCC): Primary | ICD-10-CM

## 2021-12-15 DIAGNOSIS — R45.81 POOR SELF ESTEEM: ICD-10-CM

## 2021-12-15 PROCEDURE — 90837 PSYTX W PT 60 MINUTES: CPT | Performed by: PSYCHOLOGIST

## 2021-12-16 ENCOUNTER — HOSPITAL ENCOUNTER (OUTPATIENT)
Dept: ONCOLOGY | Facility: HOSPITAL | Age: 36
Setting detail: INFUSION SERIES
Discharge: HOME | End: 2021-12-16

## 2021-12-16 VITALS
WEIGHT: 194 LBS | HEART RATE: 87 BPM | RESPIRATION RATE: 16 BRPM | BODY MASS INDEX: 38.09 KG/M2 | TEMPERATURE: 97.3 F | DIASTOLIC BLOOD PRESSURE: 76 MMHG | HEIGHT: 60 IN | SYSTOLIC BLOOD PRESSURE: 125 MMHG

## 2021-12-16 DIAGNOSIS — Z90.3 POSTGASTRECTOMY GASTRITIS: ICD-10-CM

## 2021-12-16 DIAGNOSIS — E55.9 AVITAMINOSIS D: ICD-10-CM

## 2021-12-16 DIAGNOSIS — R53.83 FATIGUE, UNSPECIFIED TYPE: ICD-10-CM

## 2021-12-16 DIAGNOSIS — Z13.0 SCREENING FOR IRON DEFICIENCY ANEMIA: ICD-10-CM

## 2021-12-16 DIAGNOSIS — K91.2 HYPOGLYCEMIA FOLLOWING GASTROINTESTINAL SURGERY: ICD-10-CM

## 2021-12-16 DIAGNOSIS — K29.60 POSTGASTRECTOMY GASTRITIS: ICD-10-CM

## 2021-12-16 DIAGNOSIS — E66.9 OBESITY, UNSPECIFIED CLASSIFICATION, UNSPECIFIED OBESITY TYPE, UNSPECIFIED WHETHER SERIOUS COMORBIDITY PRESENT: ICD-10-CM

## 2021-12-16 DIAGNOSIS — R11.10 INTRACTABLE VOMITING, PRESENCE OF NAUSEA NOT SPECIFIED, UNSPECIFIED VOMITING TYPE: Primary | ICD-10-CM

## 2021-12-16 DIAGNOSIS — Z13.21 SCREENING FOR MALNUTRITION: ICD-10-CM

## 2021-12-16 PROCEDURE — 96361 HYDRATE IV INFUSION ADD-ON: CPT

## 2021-12-16 PROCEDURE — 96360 HYDRATION IV INFUSION INIT: CPT

## 2021-12-16 RX ADMIN — SODIUM CHLORIDE 2000 ML: 9 INJECTION, SOLUTION INTRAVENOUS at 13:55
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
## 2021-12-16 NOTE — PROGRESS NOTES
Some labs were showing as due to be drawn in epic. Patient states all these were drawn yesterday and that she did not need to have these done today. ELY, RN

## 2021-12-18 LAB
ALBUMIN SERPL-MCNC: 3.3 G/DL (ref 3.8–4.8)
ALBUMIN/GLOB SERPL: 1.3 {RATIO} (ref 1.2–2.2)
ALP SERPL-CCNC: 138 IU/L (ref 44–121)
ALT SERPL-CCNC: 28 IU/L (ref 0–32)
AST SERPL-CCNC: 22 IU/L (ref 0–40)
BASOPHILS # BLD AUTO: 0 X10E3/UL (ref 0–0.2)
BASOPHILS NFR BLD AUTO: 0 %
BILIRUB SERPL-MCNC: 0.3 MG/DL (ref 0–1.2)
BUN SERPL-MCNC: 7 MG/DL (ref 6–20)
BUN/CREAT SERPL: 10 (ref 9–23)
CALCIUM SERPL-MCNC: 8.5 MG/DL (ref 8.7–10.2)
CHLORIDE SERPL-SCNC: 107 MMOL/L (ref 96–106)
CO2 SERPL-SCNC: 23 MMOL/L (ref 20–29)
CREAT SERPL-MCNC: 0.68 MG/DL (ref 0.57–1)
EOSINOPHIL # BLD AUTO: 0.2 X10E3/UL (ref 0–0.4)
EOSINOPHIL NFR BLD AUTO: 2 %
ERYTHROCYTE [DISTWIDTH] IN BLOOD BY AUTOMATED COUNT: 14.9 % (ref 11.7–15.4)
FERRITIN SERPL-MCNC: 213 NG/ML (ref 15–150)
GLOBULIN SER CALC-MCNC: 2.6 G/DL (ref 1.5–4.5)
GLUCOSE SERPL-MCNC: 125 MG/DL (ref 65–99)
HCT VFR BLD AUTO: 34.6 % (ref 34–46.6)
HGB BLD-MCNC: 11.1 G/DL (ref 11.1–15.9)
IMM GRANULOCYTES # BLD AUTO: 0 X10E3/UL (ref 0–0.1)
IMM GRANULOCYTES NFR BLD AUTO: 0 %
IRON SERPL-MCNC: 96 UG/DL (ref 27–159)
LYMPHOCYTES # BLD AUTO: 1.5 X10E3/UL (ref 0.7–3.1)
LYMPHOCYTES NFR BLD AUTO: 16 %
MCH RBC QN AUTO: 31.4 PG (ref 26.6–33)
MCHC RBC AUTO-ENTMCNC: 32.1 G/DL (ref 31.5–35.7)
MCV RBC AUTO: 98 FL (ref 79–97)
MONOCYTES # BLD AUTO: 0.5 X10E3/UL (ref 0.1–0.9)
MONOCYTES NFR BLD AUTO: 6 %
NEUTROPHILS # BLD AUTO: 7 X10E3/UL (ref 1.4–7)
NEUTROPHILS NFR BLD AUTO: 76 %
PLATELET # BLD AUTO: 329 X10E3/UL (ref 150–450)
POTASSIUM SERPL-SCNC: 3.9 MMOL/L (ref 3.5–5.2)
PREALB SERPL-MCNC: 19 MG/DL (ref 14–35)
PROT SERPL-MCNC: 5.9 G/DL (ref 6–8.5)
RBC # BLD AUTO: 3.54 X10E6/UL (ref 3.77–5.28)
SODIUM SERPL-SCNC: 143 MMOL/L (ref 134–144)
VIT B1 BLD-SCNC: 147.1 NMOL/L (ref 66.5–200)
WBC # BLD AUTO: 9.3 X10E3/UL (ref 3.4–10.8)

## 2021-12-19 LAB
Lab: NORMAL
METHYLMALONATE SERPL-SCNC: 155 NMOL/L (ref 0–378)

## 2021-12-20 ENCOUNTER — OFFICE VISIT (OUTPATIENT)
Dept: BEHAVIORAL HEALTH | Facility: CLINIC | Age: 36
End: 2021-12-20

## 2021-12-20 DIAGNOSIS — R45.4 FEELING IRRITABLE: ICD-10-CM

## 2021-12-20 DIAGNOSIS — F33.1 MODERATE EPISODE OF RECURRENT MAJOR DEPRESSIVE DISORDER (HCC): Primary | ICD-10-CM

## 2021-12-20 DIAGNOSIS — R45.81 POOR SELF ESTEEM: ICD-10-CM

## 2021-12-20 PROCEDURE — 90834 PSYTX W PT 45 MINUTES: CPT | Performed by: PSYCHOLOGIST

## 2021-12-20 RX ORDER — FUROSEMIDE 20 MG/1
TABLET ORAL
Qty: 135 TABLET | OUTPATIENT
Start: 2021-12-20
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
## 2021-12-20 NOTE — TELEPHONE ENCOUNTER
Rx Refill Note  Requested Prescriptions     Pending Prescriptions Disp Refills   • furosemide (LASIX) 20 MG tablet [Pharmacy Med Name: FUROSEMIDE 20 MG TABLET] 135 tablet      Sig: TAKE ONE TO TWO TABLETS BY MOUTH EVERY NIGHTLY      Last office visit with prescribing clinician: 12/7/2021      Next office visit with prescribing clinician: 3/8/2022            Yakov Mendez MA  12/20/21, 09:54 EST     MEDICATION HAS NOT BEEN PRESCRIBED BY THIS OFFICE YET

## 2021-12-23 ENCOUNTER — READMISSION MANAGEMENT (OUTPATIENT)
Dept: CALL CENTER | Facility: HOSPITAL | Age: 36
End: 2021-12-23

## 2021-12-23 NOTE — OUTREACH NOTE
Medical Week 4 Survey      Responses   [facility] patient discharged from? [place]   Does the patient have one of the following disease processes/diagnoses(primary or secondary)? Other   Week 4 attempt successful? No   Call start time [id number]          Jessica Sol, RN

## 2021-12-27 ENCOUNTER — OFFICE VISIT (OUTPATIENT)
Dept: BEHAVIORAL HEALTH | Facility: CLINIC | Age: 36
End: 2021-12-27

## 2021-12-27 DIAGNOSIS — R45.81 POOR SELF ESTEEM: ICD-10-CM

## 2021-12-27 DIAGNOSIS — F33.1 MODERATE EPISODE OF RECURRENT MAJOR DEPRESSIVE DISORDER (HCC): Primary | ICD-10-CM

## 2021-12-27 DIAGNOSIS — R45.4 IRRITABILITY: ICD-10-CM

## 2021-12-27 PROCEDURE — 90837 PSYTX W PT 60 MINUTES: CPT | Performed by: PSYCHOLOGIST

## 2021-12-28 NOTE — PROGRESS NOTES
PROGRESS NOTE    Data:  Frances Hartman is a 36 y.o. female who met with the undersigned for a scheduled individual therapy session from 3:00 - 3:55pm.      Clinical Maneuvering/Intervention:      The pt talked about recent stressors such as feeling stressed/irritated living with her mother. She talked about how difficult [place] had been, not receiving any gifts from her mother, but also feeling hurt by her what her mother said to her. Stressors were processed individually and in detail. Venting of frustrations was conducted in order to help the pt feel less tense emotionally and gain insight into issues. Feelings were processed and validated several times in session. Perspective taking was conducted multiple times in order to help her feel less stuck, less overwhelmed, and see challenges as much more manageable. Active listening was conducted in order to help the pt make sense of stressors and start moving towards potential solutions. The pt was assisted with finding solutions based on existing skill-set and abilities. Finding her own role in her stress was conducted. The pt was encouraged to note was she learned from the interactions with her mother, what she would not do in the future (or continue to do, despite the stressors). She talked about planning to move back into her own apartment soon, getting distance from her mother in that way, continuing to look for work, and working on connecting more with a friend of hers. The pt recognized how she would like not buy so many presents for her mother in the future, but that she would still buy presents. The pt was commended for learning from stress and continuing to be true to herself (being loving, giving, and loyal) despite feeling hurt, even going so far as to own her part in the stress. The pt's strengths were identified in order to help identify abilities to use to better face/overcome challenges. An action plan was designed to empower the pt to know what  to do. Simple steps of one, two, three were laid out in order to help the pt feel more in control of things and feel less stressed. Homework was assigned tailored to pt's needs. The pt expressed gratitude for today's session.    Mental Status Exam  Hygiene:  good  Dress: normal  Attitude:  cooperative and proactive  Motor Activity: normal  Speech: normal  Mood:  depressed and sad  Affect:  congruent  Thought Processes: normal  Thought Content:  normal  Suicidal Thoughts:  not endorsed  Homicidal Thoughts:  not endorsed  Crisis Safety Plan: not needed   Hallucinations:  none      Patient's Support Network Includes:  family, friends      Progress toward goal: there is evidence to suggest that she is battling depression and low-self esteem, but she is learning new skills to combat/heal from these issues      Functional Status: moderate to high      Prognosis: good    Assessment      The pt presented to be struggling with depression of a mild level. She often feels irritable. Self-esteem seems impaired and cognitive distortions are often evident. She tends to benefit from highly supportive and strength-based counseling, building up her strengths, while helping her adapt behaviors to improve her life.       Plan      In order to diminish symptoms of depression, irritability, and improve self-esteem; she will take steps towards 'getting her life back,' as discussed today in terms of moving back to her own place and obtaining employment (as soon as possible). She will continue not buying into the fear that she will go backwards, also as discussed in session today (this week). The pt also plans to connect better with a friend of hers and practice being open/transparent with him on how she feels (ongoing).     Misty Nava, PhD, LP

## 2022-01-05 ENCOUNTER — OFFICE VISIT (OUTPATIENT)
Dept: BEHAVIORAL HEALTH | Facility: CLINIC | Age: 37
End: 2022-01-05

## 2022-01-05 ENCOUNTER — PATIENT OUTREACH (OUTPATIENT)
Dept: CASE MANAGEMENT | Facility: OTHER | Age: 37
End: 2022-01-05

## 2022-01-05 DIAGNOSIS — F33.1 MODERATE EPISODE OF RECURRENT MAJOR DEPRESSIVE DISORDER: Primary | ICD-10-CM

## 2022-01-05 DIAGNOSIS — R45.4 FEELING IRRITABLE: ICD-10-CM

## 2022-01-05 DIAGNOSIS — R45.81 POOR SELF ESTEEM: ICD-10-CM

## 2022-01-05 PROCEDURE — 90834 PSYTX W PT 45 MINUTES: CPT | Performed by: PSYCHOLOGIST

## 2022-01-05 NOTE — OUTREACH NOTE
Ambulatory Case Management Note    Patient Outreach    Called patient following [facility] Nurse outreach completion; patient with recent hospitalization, 11/22/21 to 11/30/21, with intractable N & V.  Patient had PCP f/u appt on 12-7-21.   Patient said she is doing okay; still dealing with neuropathy in LE's; still using the Walker for ambulation for safety, as she feels unsteady on her feet.   Said she continues to stay at her mother's home until she feels stronger.  Reported her eating and drinking has been improved since hospitalization; last couple days has eaten less, feels full quicker.  When asked, patient said she is having regular BM's.  Patient reported her Blood Sugar has been mostly stable; when BS elevated, patient can relate it to what she had eaten previously.  Said she has all  Her medications and is compliant daily with her meds.  Reviewed with patient her upcoming appointments.  Discussed with patient option of Home PT or Outpatient PT for strengthening and safety. Encouraged patient to discuss this with her doctors and see if an order could be given for PT.  Patient said she would do this, she would like to have some PT.  No other questions or concerns voiced at this time.        Yakelin Andrade RN  Ambulatory Case Management    1/5/2022, 14:36 EST

## 2022-01-06 NOTE — PROGRESS NOTES
PROGRESS NOTE    Data:  Frances Hartman is a 36 y.o. female who met with the undersigned for a scheduled individual therapy session from 3:00 - 3:50pm.      Clinical Maneuvering/Intervention:      The pt talked about recent stressors such as feeling stressed/irritated living with her mother. She talked about how difficult [place] had been, not receiving any gifts from her mother, but also feeling hurt by her what her mother said to her. Stressors were processed individually and in detail. Venting of frustrations was conducted in order to help the pt feel less tense emotionally and gain insight into issues. Feelings were processed and validated several times in session. Perspective taking was conducted multiple times in order to help her feel less stuck, less overwhelmed, and see challenges as much more manageable. Active listening was conducted in order to help the pt make sense of stressors and start moving towards potential solutions. The pt was assisted with finding solutions based on existing skill-set and abilities. Finding her own role in her stress was conducted. The pt was encouraged to note was she learned from the interactions with her mother, what she would not do in the future (or continue to do, despite the stressors). She talked about planning to move back into her own apartment soon, getting distance from her mother in that way, continuing to look for work, and working on connecting more with a friend of hers. The pt recognized how she would like not buy so many presents for her mother in the future, but that she would still buy presents. The pt was commended for learning from stress and continuing to be true to herself (being loving, giving, and loyal) despite feeling hurt, even going so far as to own her part in the stress. The pt's strengths were identified in order to help identify abilities to use to better face/overcome challenges. An action plan was designed to empower the pt to know what  to do. Simple steps of one, two, three were laid out in order to help the pt feel more in control of things and feel less stressed. Homework was assigned tailored to pt's needs. The pt expressed gratitude for today's session.    Mental Status Exam  Hygiene:  good  Dress: normal  Attitude:  cooperative and proactive  Motor Activity: normal  Speech: normal  Mood:  depressed and sad  Affect:  congruent  Thought Processes: normal  Thought Content:  normal  Suicidal Thoughts:  not endorsed  Homicidal Thoughts:  not endorsed  Crisis Safety Plan: not needed   Hallucinations:  none      Patient's Support Network Includes:  family, friends      Progress toward goal: there is evidence to suggest that she is battling depression and low-self esteem, but she is learning new skills to combat these issues      Functional Status: moderate to high      Prognosis: good    Assessment      The pt presented to be struggling with depression of a mild level. She often feels irritable. Self-esteem seems impaired and cognitive distortions are often evident. She tends to benefit from highly supportive and strength-based counseling, building up her strengths, while helping her adapt behaviors to improve her life.       Plan      In order to diminish symptoms of depression, irritability, and improve self-esteem; she will take steps (as she said, 'piece by piece') in order to get her life back (next few weeks). She will give thought and/or remember too, that she has the ability to meet these needs, based on her intelligence and ability to use strategy to meet her needs.     Misty Nava, PhD, LP

## 2022-01-12 ENCOUNTER — OFFICE VISIT (OUTPATIENT)
Dept: BEHAVIORAL HEALTH | Facility: CLINIC | Age: 37
End: 2022-01-12

## 2022-01-12 DIAGNOSIS — F33.1 MODERATE EPISODE OF RECURRENT MAJOR DEPRESSIVE DISORDER: Primary | ICD-10-CM

## 2022-01-12 DIAGNOSIS — R45.81 POOR SELF ESTEEM: ICD-10-CM

## 2022-01-12 DIAGNOSIS — R45.4 FEELING IRRITABLE: ICD-10-CM

## 2022-01-12 PROCEDURE — 90837 PSYTX W PT 60 MINUTES: CPT | Performed by: PSYCHOLOGIST

## 2022-01-13 ENCOUNTER — OFFICE VISIT (OUTPATIENT)
Dept: BARIATRICS/WEIGHT MGMT | Facility: CLINIC | Age: 37
End: 2022-01-13

## 2022-01-13 VITALS
HEIGHT: 60 IN | TEMPERATURE: 97.3 F | OXYGEN SATURATION: 98 % | BODY MASS INDEX: 35.93 KG/M2 | DIASTOLIC BLOOD PRESSURE: 76 MMHG | WEIGHT: 183 LBS | SYSTOLIC BLOOD PRESSURE: 122 MMHG | HEART RATE: 86 BPM | RESPIRATION RATE: 18 BRPM

## 2022-01-13 DIAGNOSIS — Z90.3 POSTGASTRECTOMY MALABSORPTION: ICD-10-CM

## 2022-01-13 DIAGNOSIS — E55.9 HYPOVITAMINOSIS D: ICD-10-CM

## 2022-01-13 DIAGNOSIS — Z13.21 MALNUTRITION SCREEN: ICD-10-CM

## 2022-01-13 DIAGNOSIS — K91.2 POSTGASTRECTOMY MALABSORPTION: ICD-10-CM

## 2022-01-13 DIAGNOSIS — Z13.0 SCREENING, IRON DEFICIENCY ANEMIA: ICD-10-CM

## 2022-01-13 DIAGNOSIS — R53.83 FATIGUE, UNSPECIFIED TYPE: Primary | ICD-10-CM

## 2022-01-13 PROCEDURE — 99214 OFFICE O/P EST MOD 30 MIN: CPT | Performed by: SURGERY

## 2022-01-13 RX ORDER — GABAPENTIN 300 MG/1
600 CAPSULE ORAL DAILY
COMMUNITY
Start: 2021-12-13

## 2022-01-13 RX ORDER — CYCLOBENZAPRINE HCL 10 MG
10 TABLET ORAL AS NEEDED
COMMUNITY
End: 2022-12-14

## 2022-01-13 RX ORDER — SODIUM CHLORIDE 9 MG/ML
125 INJECTION, SOLUTION INTRAVENOUS ONCE
Status: CANCELLED | OUTPATIENT
Start: 2022-01-14

## 2022-01-13 NOTE — PROGRESS NOTES
PROGRESS NOTE    Data:  Frances Hartman is a 36 y.o. female who met with the undersigned for a scheduled individual therapy session from 3:00 - 3:55pm.      Clinical Maneuvering/Intervention:      The pt talked about recent stressors such as feeling stressed/irritated living with her mother, upset that she has not been able to move back into her own apartment, and upset about feeling physically weak/unbalanced related to weight loss/weight loss surgery. Stressors were processed individually and in detail. Venting of frustrations was conducted in order to help the pt feel less tense emotionally and gain insight into issues. Feelings were processed and validated several times in session. Perspective taking was conducted multiple times in order to help her feel less stuck, less overwhelmed, and see challenges as much more manageable. She was assisted in seeing the bigger picture and taking pressure off of herself that she 'should' be one thing or another. She was assisted with seeing how she lost weight and she can get strong and more stable with physical therapy she is starting. After that, she can start working again and have her own place. Maladaptive thought patterns were identified, challenged, and evaluated for validity in order to allow for the pt to chose different and more adaptive ways of thinking. Fears of things worsening health-wise were addressed and ameliorated via aforementioned interventions. Some humor was used in session as it is one of the pt's coping skills. Humor was used too, to help the pt see things as less challenging and more manageable than they first seemed. Humor was used as well, to model use of the skill as a way to decrease tension ongoing. This was useful in order to see her mother differently, lovable, at times difficult, but someone with whom the pt can handle. Homework was assigned tailored to pt's needs. The pt expressed gratitude for today's session.    Mental Status  Exam  Hygiene:  good  Dress: normal  Attitude:  cooperative and proactive  Motor Activity: normal  Speech: normal  Mood:  depressed and sad  Affect:  congruent  Thought Processes: normal  Thought Content:  normal  Suicidal Thoughts:  not endorsed  Homicidal Thoughts:  not endorsed  Crisis Safety Plan: not needed   Hallucinations:  none      Patient's Support Network Includes:  family, friends      Progress toward goal: there is evidence to suggest that she is battling depression and low-self esteem, but she is learning new skills to combat these issues      Functional Status: moderate      Prognosis: good    Assessment      The pt presented to be struggling with depression of a moderate level. Fear often keeps her stuck in depression, as do maladaptive thought patterns. She is intelligent, good at strategic thinking, and seems to benefit by 'talking things out' and finding solutions through breaking things up into steps.      Plan      In order to diminish symptoms of depression, irritability, and improve self-esteem; she will take steps (as she said, 'piece by piece') in order to get her life back (physical therapy as soon as possible, get physically stronger, seek out employment when feeling better, etc.). Proactive or solution-focused training will likely be a focus of upcoming sessions.     Misty Nava, PhD, LP

## 2022-01-13 NOTE — PROGRESS NOTES
[facility] Bariatric Surgery  [address]    [facility] [address] [phone]        Patient Name:  Frances Hartman.  :  1985      Date of Visit: 2022      Reason for Visit:   7 months postop     HPI: Frances Hartman is a 36 y.o. female s/p robotic assisted LSG/HHR by  on 6/15/21    Struggled with nausea and poor oral intake requiring more than 1 hospital admission post-operatively.  S/p several dilations for initial stricture, now resolved.  Has been receiving weekly IVF boluses via PICC line.    Doing better since LOV.  Only had two IVF infusions: felt like rate was way too fast and didn't tolerate 2L of fluid.  Prefers to have 1L fluid at slower rate every other week.    Struggling with access to resources as she gets her foot stamps reinstated.  Her mother is helping her some.  Not currently working.    Requests we send note to PCP that simethicone is necessary and ok.    Takes Rolaids a few times per week.  Denies reflux. All foods make her gassy.  Takes simethicone frequently.  Getting 40-50 g prot/day.  Drinking 64 fluid oz/day.  Last labs revealed prealbumin 19. Currently not on vitamins or PPI.    Saw pain management on Monday who is worried about her peripheral neuropathy.  I reviewed B1 and methylmalonic acid (B12 inverse): normal since surgery.  To start physical therapy soon.    Doing better with some ADLs: too weak/dizzy to  shower, can get in and out of tub now.  Stand at sink and wash face/brush teeth.    Has had a migraine since she left the hospital.  Working with neurologist.     Presurgery weight: 231 pounds.  Today's weight is 83 kg (183 lb) pounds, today's  Body mass index is 35.74 kg/m²., and@ weight loss since surgery is 48 pounds.      Past Medical History:   Diagnosis Date   • Anxiety    • Asthma     prn inhalers, does not follow w/ pulmonary   • Chronic back pain     previously followed w/ pain management, now  just prn Tylenol + Gabapentin   • Chronic deep vein thrombosis (DVT) of femoral vein of right lower extremity (HCC) 9/10/2021   • Diabetes mellitus (HCC)     Type II, dx 2014, never on insulin, A1C 7.6   • Dyspepsia    • Dyspnea on exertion    • Fatigue    • Gastroparesis    • GERD (gastroesophageal reflux disease)    • Heartburn     chronic, prn TUMS, denies prior eval   • Hirsutism    • Hx of radiation therapy     for treatments of Keloids   • Hypertension    • Irregular menses     infrequent spotting   • Leiomyoma, subserous     possible peduculated f3-4 cm fibroid on u/s at [facility]   • Migraines     botox q3 months, following Neurology @ [facility]   • Morbid obesity (HCC)    • Peripheral edema    • Polycystic ovaries     severe insulin resistance/hirsuitism   • Seasonal allergies    • Slow to wake up after anesthesia      Past Surgical History:   Procedure Laterality Date   • ENDOSCOPY N/A 6/15/2021    Procedure: ESOPHAGOGASTRODUODENOSCOPY;  Surgeon: Ayaka Andre MD;  Location:  [facility];  Service: Robotics - DaVinci;  Laterality: N/A;   • ENDOSCOPY N/A 10/28/2021    Procedure: ESOPHAGOGASTRODUODENOSCOPY WITH ACHALASIA BALLOON DILATATION;  Surgeon: Jase Hernandez MD;  Location:  [facility];  Service: Gastroenterology;  Laterality: N/A;  58 F DILATOR   • ENDOSCOPY N/A 11/15/2021    Procedure: ESOPHAGOGASTRODUODENOSCOPY WITH DILATATION;  Surgeon: Jase Hernandez MD;  Location:  [facility];  Service: Gastroenterology;  Laterality: N/A;  achalasia balloon    • ENDOSCOPY N/A 11/24/2021    Procedure: ESOPHAGOGASTRODUODENOSCOPY [facility] DUODENAL POLYPECTOPMY AND NASAL JEJUNAL TUBE PLACEMENT;  Surgeon: Jase Hernandez MD;  Location:  [facility];  Service: Gastroenterology;  Laterality: N/A;  placement of feeding tube, checked position with KUB   • GASTRIC SLEEVE LAPAROSCOPIC N/A 6/15/2021    Procedure: GASTRIC SLEEVE LAPAROSCOPIC WITH DAVINCI ROBOT, LAPROSCOPIC HIATAL HERNIA REPAIR WITH DAVINCI ROBOT;   Surgeon: Ayaka Andre MD;  [facility];  Service: Robotics - DaVinci;  Laterality: N/A;   • KELOID EXCISION      1990,1993,1999,2015,2016,2019   • LAPAROSCOPIC CHOLECYSTECTOMY  2000    for stones   • RADIOFREQUENCY ABLATION  2019    x 2  for pt back   • UMBILICAL HERNIA REPAIR  1990   • WISDOM TOOTH EXTRACTION  1994     Outpatient Medications Marked as Taking for the 1/13/22 encounter (Office Visit) with Ayaka Andre MD   Medication Sig Dispense Refill   • acetaminophen (TYLENOL) 500 MG tablet Take 500-1,000 mg by mouth Every 6 (Six) Hours As Needed for Mild Pain .     • albuterol (PROVENTIL HFA;VENTOLIN HFA) 108 (90 BASE) MCG/ACT inhaler Inhale 2 puffs Every 4 (Four) Hours As Needed for wheezing.     • carvedilol (COREG) 6.25 MG tablet Take 1 tab in AM and 2 tab in  tablet 0   • cyclobenzaprine (FLEXERIL) 10 MG tablet Take 10 mg by mouth 3 (Three) Times a Day As Needed for Muscle Spasms.     • diphenhydrAMINE (BENADRYL) 25 mg capsule Take 25 mg by mouth Every 6 (Six) Hours As Needed for Itching or Sleep.     • docusate sodium (COLACE) 100 MG capsule Take 100-200 mg by mouth As Needed.     • furosemide (Lasix) 20 MG tablet Take 1 tablet by mouth Daily As Needed (swelling).     • gabapentin (NEURONTIN) 300 MG capsule      • guaiFENesin (HUMIBID 3) 400 MG tablet Take 200 mg by mouth As Needed.     • HYDROcodone-acetaminophen (NORCO) 5-325 MG per tablet Take 1 tablet by mouth Every 6 (Six) Hours As Needed for Severe Pain . 12 tablet 0   • levonorgestrel (Mirena, 52 MG,) 20 MCG/24HR IUD 1 each by Intrauterine route.     • loratadine (CLARITIN) 10 MG tablet TAKE ONE TABLET BY MOUTH DAILY (Patient taking differently: Take 10 mg by mouth Daily.) 90 tablet 0   • OnabotulinumtoxinA (Botox) 200 units reconstituted solution FOR . PHYSICIAN TO INJECT UP  UNITS INTRAMUSCULARLY INTO HEAD, NECK AND SHOULDERS EVERY 90 DAYS. 1 each 3   • simethicone (MYLICON) 125 MG  "chewable tablet Chew 1 tablet Every 6 (Six) Hours As Needed for Flatulence. 30 tablet 0   • SITagliptin (Januvia) 50 MG tablet Take 1 tablet by mouth Daily. 30 tablet 2       Allergies   Allergen Reactions   • Capsicum Annuum Extract & Derivative (Bell Pepper) [Capsicum] Anaphylaxis   • Ibuprofen Other (See Comments)     \"makes me retain fluid and eventually go into CHF\"   • Peanut-Containing Drug Products Anaphylaxis   • Hydrochlorothiazide Swelling     eventually causes CHF    • Monosodium Glutamate Swelling and Headache   • Oatmeal Other (See Comments)     Dx on allergy testing   • Amitriptyline Mental Status Change     memory gaps + neuropathy + hair loss   • Aspartame Nausea Only   • Augmentin [Amoxicillin-Pot Clavulanate] Other (See Comments)     Syncope, not sure if allergic to cephalosporins.   • Codeine Headache      Can tolerate hydrocodone, no other adverse reactions to other narcotics.   • Diclofenac Headache   • Doxycycline Rash and Hallucinations   • Eggs Or Egg-Derived Products Other (See Comments)     dx on allergy testing, but does not completely avoid   • Naproxen Other (See Comments)     \"blackout\"       Social History     Socioeconomic History   • Marital status: Single   Tobacco Use   • Smoking status: Never Smoker   • Smokeless tobacco: Never Used   Vaping Use   • Vaping Use: Never used   Substance and Sexual Activity   • Alcohol use: Not Currently     Alcohol/week: 0.0 standard drinks     Comment: Very rarely drink socially. At most 2 drinks per month.   • Drug use: No   • Sexual activity: Defer     Partners: Male     Birth control/protection: Condom, I.U.D.       /76 (BP Location: Left arm, Patient Position: Sitting, Cuff Size: Large Adult)   Pulse 86   Temp 97.3 °F (36.3 °C) (Temporal)   Resp 18   Ht 152.4 cm (60\")   Wt 83 kg (183 lb)   SpO2 98%   BMI 35.74 kg/m²     Physical Exam  Constitutional:       General: She is not in acute distress.     Appearance: She is " well-developed. She is not diaphoretic.   HENT:      Head: Normocephalic and atraumatic.      Mouth/Throat:      Pharynx: No oropharyngeal exudate.   Eyes:      Conjunctiva/sclera: Conjunctivae normal.      Pupils: Pupils are equal, round, and reactive to light.   Pulmonary:      Effort: Pulmonary effort is normal. No respiratory distress.   Abdominal:      General: There is no distension.      Palpations: Abdomen is soft.   Skin:     General: Skin is warm and dry.      Coloration: Skin is not pale.   Neurological:      Mental Status: She is alert and oriented to person, place, and time.      Cranial Nerves: No cranial nerve deficit.      Comments: Ambulates with walker   Psychiatric:         Behavior: Behavior normal.         Thought Content: Thought content normal.           Assessment:  6 months s/p robotic assisted LSG/HHR by  on 6/15/21    ICD-10-CM ICD-9-CM   1. Fatigue, unspecified type  R53.83 780.79   2. Hypovitaminosis D  E55.9 268.9   3. Malnutrition screen  Z13.21 V77.2   4. Screening, iron deficiency anemia  Z13.0 V78.0   5. Postgastrectomy malabsorption  K91.2 579.3    Z90.3          Plan:  Doing well. Continue w/ good food choices and healthy habits.  Continue protein >70g/day.  Continue routine exercise.  No bariatric labs ordered--check in March.  Continue vitamins.  Call w/ problems/concerns.     Ok for PCP to continue her on simethicone for gas.      Follow with pain management re: deconditioning and peripheral neuropathy.  B vitamin deficiency could be in the differential, but on my review of labs has never had a deficiency and she has not had a malabsorptive bariatric surgery.  I think PT will be very good for her.    Follow with neurologist re: migraines.    Will set up every other week 1L normal saline over 4 hours infusions.    The patient was instructed to follow up in 2 months, sooner if needed.    note: approx 15 of the 25 minute visit was spent counseling on nutrition and  necessary dietary/lifestyle modifications face to face.    Ayaka Andre MD

## 2022-01-24 RX ORDER — FUROSEMIDE 20 MG/1
TABLET ORAL
Qty: 135 TABLET | Refills: 2 | Status: SHIPPED | OUTPATIENT
Start: 2022-01-24

## 2022-01-24 RX ORDER — SIMETHICONE 125 MG
TABLET,CHEWABLE ORAL
Qty: 30 TABLET | Refills: 0 | Status: SHIPPED | OUTPATIENT
Start: 2022-01-24 | End: 2022-04-06

## 2022-01-24 NOTE — TELEPHONE ENCOUNTER
Rx Refill Note  Requested Prescriptions     Pending Prescriptions Disp Refills   • simethicone (MYLICON) 125 MG chewable tablet [Pharmacy Med Name: SIMETHICONE 125 MG TAB CHEW] 30 tablet 0     Sig: CHEW ONE TABLET BY MOUTH EVERY 6 HOURS AS NEEDED FOR FLATULENCE   • furosemide (LASIX) 20 MG tablet [Pharmacy Med Name: FUROSEMIDE 20 MG TABLET] 135 tablet      Sig: TAKE ONE TO TWO TABLETS BY MOUTH EVERY NIGHTLY      Last office visit with prescribing clinician: 12/7/2021      Next office visit with prescribing clinician: 3/8/2022            Aracely Murillo MA  01/24/22, 08:23 EST

## 2022-01-26 ENCOUNTER — OFFICE VISIT (OUTPATIENT)
Dept: BEHAVIORAL HEALTH | Facility: CLINIC | Age: 37
End: 2022-01-26

## 2022-01-26 DIAGNOSIS — F33.1 MODERATE EPISODE OF RECURRENT MAJOR DEPRESSIVE DISORDER: Primary | ICD-10-CM

## 2022-01-26 DIAGNOSIS — R45.81 POOR SELF ESTEEM: ICD-10-CM

## 2022-01-26 DIAGNOSIS — R45.89 SAD MOOD: ICD-10-CM

## 2022-01-26 PROCEDURE — 90834 PSYTX W PT 45 MINUTES: CPT | Performed by: PSYCHOLOGIST

## 2022-01-26 NOTE — PROGRESS NOTES
PROGRESS NOTE    Data:  Frances Hartman is a 36 y.o. female who met with the undersigned for a scheduled individual therapy session from 1:45 - 2:30pm.      Clinical Maneuvering/Intervention:      The pt talked about recent stressors such as living with her mother, having a cold, still applying for jobs/not yet having a job, and having money. Stressors were processed individually and in detail. Venting of frustrations was conducted in order to help the pt feel less tense emotionally and gain insight into issues. Feelings were processed and validated several times in session. Active listening was conducted in order to help the pt make sense of stressors and start moving towards potential solutions. The pt was assisted with finding solutions based on existing skill-set and abilities. Evidence was provided for the pt to help her see that she has what it takes in order to get a job, get her own place again, and meet someone to date. An action plan was designed to empower the pt to know what to do. Simple steps of one, two, three were laid out in order to help the pt feel more in control of things and feel less stressed. Processing the loss and hurt in life was conducted in great detail today, in part to help take the weight off of her and find encouragement and motivation to push forward. The pt's strengths were identified in order to help identify abilities to use to better face/overcome challenges. Homework was assigned tailored to pt's needs. The pt expressed gratitude for today's session.    Mental Status Exam  Hygiene:  good  Dress: normal  Attitude:  cooperative and proactive  Motor Activity: normal  Speech: normal  Mood:  depressed and sad  Affect:  congruent  Thought Processes: normal  Thought Content:  normal  Suicidal Thoughts:  not endorsed  Homicidal Thoughts:  not endorsed  Crisis Safety Plan: not needed   Hallucinations:  none      Patient's Support Network Includes:  family, friends      Progress  toward goal: there is evidence to suggest that she is battling depression and low-self esteem, but she is learning new skills to combat these issues      Functional Status: moderate      Prognosis: good    Assessment      The pt presented to be struggling with depression of a moderate level. Fear often keeps her stuck in depression, as do maladaptive thought patterns. She is intelligent, good at strategic thinking, and seems to benefit by 'talking things out' and finding solutions through breaking things up into steps.      Plan      In order to diminish symptoms of depression, irritability, and improve self-esteem; she will give thought to the discussed 'evidence,' that she is quite skilled in life and how such skills will help her meet personal goals.     Misty Nava, PhD, LP

## 2022-02-01 ENCOUNTER — OFFICE VISIT (OUTPATIENT)
Dept: ENDOCRINOLOGY | Facility: CLINIC | Age: 37
End: 2022-02-01

## 2022-02-01 VITALS
HEIGHT: 60 IN | OXYGEN SATURATION: 98 % | DIASTOLIC BLOOD PRESSURE: 68 MMHG | WEIGHT: 183 LBS | BODY MASS INDEX: 35.93 KG/M2 | HEART RATE: 70 BPM | SYSTOLIC BLOOD PRESSURE: 120 MMHG

## 2022-02-01 DIAGNOSIS — E11.9 TYPE 2 DIABETES MELLITUS WITHOUT COMPLICATION, WITHOUT LONG-TERM CURRENT USE OF INSULIN: Primary | ICD-10-CM

## 2022-02-01 DIAGNOSIS — E28.2 PCOS (POLYCYSTIC OVARIAN SYNDROME): ICD-10-CM

## 2022-02-01 PROBLEM — E03.8 SUBCLINICAL HYPOTHYROIDISM: Status: RESOLVED | Noted: 2017-08-23 | Resolved: 2022-02-01

## 2022-02-01 LAB
EXPIRATION DATE: NORMAL
GLUCOSE BLDC GLUCOMTR-MCNC: 115 MG/DL (ref 70–130)
HBA1C MFR BLD: 5.6 %
Lab: NORMAL

## 2022-02-01 PROCEDURE — 99203 OFFICE O/P NEW LOW 30 MIN: CPT | Performed by: INTERNAL MEDICINE

## 2022-02-01 PROCEDURE — 83036 HEMOGLOBIN GLYCOSYLATED A1C: CPT | Performed by: INTERNAL MEDICINE

## 2022-02-01 PROCEDURE — 82947 ASSAY GLUCOSE BLOOD QUANT: CPT | Performed by: INTERNAL MEDICINE

## 2022-02-01 PROCEDURE — 3044F HG A1C LEVEL LT 7.0%: CPT | Performed by: INTERNAL MEDICINE

## 2022-02-01 RX ORDER — METFORMIN HYDROCHLORIDE 500 MG/1
500 TABLET, EXTENDED RELEASE ORAL
Qty: 90 TABLET | Refills: 3 | Status: SHIPPED | OUTPATIENT
Start: 2022-02-01 | End: 2023-03-06 | Stop reason: SDUPTHER

## 2022-03-01 ENCOUNTER — SPECIALTY PHARMACY (OUTPATIENT)
Dept: ONCOLOGY | Facility: HOSPITAL | Age: 37
End: 2022-03-01

## 2022-03-01 ENCOUNTER — TREATMENT (OUTPATIENT)
Dept: PHYSICAL THERAPY | Facility: CLINIC | Age: 37
End: 2022-03-01

## 2022-03-01 ENCOUNTER — LAB (OUTPATIENT)
Dept: LAB | Facility: HOSPITAL | Age: 37
End: 2022-03-01

## 2022-03-01 ENCOUNTER — TRANSCRIBE ORDERS (OUTPATIENT)
Dept: PHYSICAL THERAPY | Facility: CLINIC | Age: 37
End: 2022-03-01

## 2022-03-01 DIAGNOSIS — Z90.3 POSTGASTRECTOMY GASTRITIS: ICD-10-CM

## 2022-03-01 DIAGNOSIS — K29.60 POSTGASTRECTOMY GASTRITIS: ICD-10-CM

## 2022-03-01 DIAGNOSIS — K91.2 HYPOGLYCEMIA FOLLOWING GASTROINTESTINAL SURGERY: ICD-10-CM

## 2022-03-01 DIAGNOSIS — Z13.0 SCREENING FOR IRON DEFICIENCY ANEMIA: ICD-10-CM

## 2022-03-01 DIAGNOSIS — Z13.21 SCREENING FOR MALNUTRITION: ICD-10-CM

## 2022-03-01 DIAGNOSIS — M54.50 LOW BACK PAIN, UNSPECIFIED BACK PAIN LATERALITY, UNSPECIFIED CHRONICITY, UNSPECIFIED WHETHER SCIATICA PRESENT: ICD-10-CM

## 2022-03-01 DIAGNOSIS — R53.1 WEAKNESS: Primary | ICD-10-CM

## 2022-03-01 DIAGNOSIS — E55.9 AVITAMINOSIS D: ICD-10-CM

## 2022-03-01 DIAGNOSIS — R53.83 OTHER FATIGUE: Primary | ICD-10-CM

## 2022-03-01 LAB
25(OH)D3 SERPL-MCNC: 32.4 NG/ML (ref 30–100)
BASOPHILS # BLD AUTO: 0.05 10*3/MM3 (ref 0–0.2)
BASOPHILS NFR BLD AUTO: 0.6 % (ref 0–1.5)
DEPRECATED RDW RBC AUTO: 40.3 FL (ref 37–54)
EOSINOPHIL # BLD AUTO: 0.18 10*3/MM3 (ref 0–0.4)
EOSINOPHIL NFR BLD AUTO: 2 % (ref 0.3–6.2)
ERYTHROCYTE [DISTWIDTH] IN BLOOD BY AUTOMATED COUNT: 11.4 % (ref 12.3–15.4)
FOLATE SERPL-MCNC: >20 NG/ML (ref 4.78–24.2)
HCT VFR BLD AUTO: 42.5 % (ref 34–46.6)
HGB BLD-MCNC: 13.6 G/DL (ref 12–15.9)
IMM GRANULOCYTES # BLD AUTO: 0.02 10*3/MM3 (ref 0–0.05)
IMM GRANULOCYTES NFR BLD AUTO: 0.2 % (ref 0–0.5)
LYMPHOCYTES # BLD AUTO: 2.75 10*3/MM3 (ref 0.7–3.1)
LYMPHOCYTES NFR BLD AUTO: 31.3 % (ref 19.6–45.3)
MCH RBC QN AUTO: 30.7 PG (ref 26.6–33)
MCHC RBC AUTO-ENTMCNC: 32 G/DL (ref 31.5–35.7)
MCV RBC AUTO: 95.9 FL (ref 79–97)
MONOCYTES # BLD AUTO: 0.53 10*3/MM3 (ref 0.1–0.9)
MONOCYTES NFR BLD AUTO: 6 % (ref 5–12)
NEUTROPHILS NFR BLD AUTO: 5.27 10*3/MM3 (ref 1.7–7)
NEUTROPHILS NFR BLD AUTO: 59.9 % (ref 42.7–76)
NRBC BLD AUTO-RTO: 0 /100 WBC (ref 0–0.2)
PLATELET # BLD AUTO: 251 10*3/MM3 (ref 140–450)
PMV BLD AUTO: 11.3 FL (ref 6–12)
RBC # BLD AUTO: 4.43 10*6/MM3 (ref 3.77–5.28)
WBC NRBC COR # BLD: 8.8 10*3/MM3 (ref 3.4–10.8)

## 2022-03-01 PROCEDURE — 97162 PT EVAL MOD COMPLEX 30 MIN: CPT | Performed by: PHYSICAL THERAPIST

## 2022-03-01 PROCEDURE — 82746 ASSAY OF FOLIC ACID SERUM: CPT

## 2022-03-01 PROCEDURE — 84134 ASSAY OF PREALBUMIN: CPT

## 2022-03-01 PROCEDURE — 82306 VITAMIN D 25 HYDROXY: CPT

## 2022-03-01 PROCEDURE — 83540 ASSAY OF IRON: CPT

## 2022-03-01 PROCEDURE — 97110 THERAPEUTIC EXERCISES: CPT | Performed by: PHYSICAL THERAPIST

## 2022-03-01 PROCEDURE — 82728 ASSAY OF FERRITIN: CPT

## 2022-03-01 PROCEDURE — 84425 ASSAY OF VITAMIN B-1: CPT | Performed by: SURGERY

## 2022-03-01 PROCEDURE — 83921 ORGANIC ACID SINGLE QUANT: CPT | Performed by: SURGERY

## 2022-03-01 PROCEDURE — 36415 COLL VENOUS BLD VENIPUNCTURE: CPT | Performed by: SURGERY

## 2022-03-01 PROCEDURE — 85025 COMPLETE CBC W/AUTO DIFF WBC: CPT

## 2022-03-01 PROCEDURE — 80053 COMPREHEN METABOLIC PANEL: CPT

## 2022-03-01 NOTE — PROGRESS NOTES
Specialty Pharmacy Refill Coordination Note     Frances is a 36 y.o. female contacted today regarding refills of Botox specialty medication(s). Patient receiving in provider's office on 3/8/2022    Reviewed and verified with patient: Yes  Specialty medication(s) and dose(s) confirmed: yes    Refill Questions      Most Recent Value   Changes to allergies? No   Changes to medications? No   New conditions since last clinic visit No   Unplanned office visit, urgent care, ED, or hospital admission in the last 4 weeks  No   How does patient/caregiver feel medication is working? Excellent   Financial problems or insurance changes  No   If yes, describe changes in insurance or financial issues. N/A   How many doses of your specialty medications were missed in the last 4 weeks? None   Why were doses missed? N/A   Does this patient require a clinical escalation to a pharmacist? No          Delivery Questions      Most Recent Value   Delivery method [employer]  [MEDICAID SIGNATURE REQUIRED. Ship Wednesday 3/2/2022. Delivery Thursday 3/3/2022. Ship Botox to Dr. Francois's office: [address] [place] [address] [address].]   Delivery address correct? Yes   Preferred delivery time? Anytime   Number of medications in delivery 1   Medication being filled and delivered Botox   Doses left of specialty medications None. Given in provider's office.   Is there any medication that is due not being filled? No   Supplies needed? No supplies needed   Cooler needed? Yes   Do any medications need mixed or dated? No   Copay form of payment Credit card on file   Questions or concerns for the pharmacist? No   Are any medications first time fills? No                 Follow-up: 12 weeks.     Lesvia Xiao, Pharmacy Technician  Specialty Pharmacy Technician

## 2022-03-01 NOTE — PROGRESS NOTES
Physical Therapy Initial Evaluation and Plan of Care        Patient: Frances Hartman   : 1985  Diagnosis/ICD-10 Code:  Weakness [R53.1]  Referring practitioner: Adalberto Zayas MD  Date of Initial Visit: 3/1/2022  Today's Date: 3/1/2022  Patient seen for 1 sessions           Subjective Questionnaire: LEFS: 20 and Oswestry: 27/50      Subjective Evaluation    History of Present Illness  Mechanism of injury: Deconditioning, mid and low back pain.    Patient received bariatric surgery 6/15/21 and went home and was unable to successfully eat/drink. After a few months, she was so weak and fatigued she was unable to stand to brush her teeth and stand to shower. She was hospitalized for 10 days around [facility], diagnosed with dehydration and malnutrition, and was on bedrest the entire time. She was also experiencing B LE numbness and tingling, which she still experiences and does not change with activity or position. She has since been able to keep food and water down .     CLOF: uses RW for community ambulationdue to imbalance, sits for showers, occasional UE assist for sit to stand, standing limited to 30 min, gets hair done and blow dried by others due to UE fatigue and weakness, avoids carrying laundry due to weakness      Medical history: PCOS, improving diabetes, low back pain with repeated injections Q3 months for a year, severe migraines, hypertension    Pain  Current pain ratin  At best pain rating: 3  At worst pain ratin  Location: low back  Alleviating factors: sitting in the tub.  Exacerbated by: prolonged standing and sitting, lifting and carrying.    Social Support  Lives in: apartment (single story)  Lives with: alone    Patient Goals  Patient goals for therapy: decreased pain, improved balance, independence with ADLs/IADLs and increased strength             Objective          Strength/Myotome Testing     Left Shoulder     Planes of Motion   Extension: 4-   Abduction: 4   External  rotation at 0°: 4   Internal rotation at 0°: 4     Right Shoulder     Planes of Motion   Extension: 4-   Abduction: 4-   External rotation at 0°: 4-   Internal rotation at 0°: 4-     Left Elbow   Flexion: 4+  Extension: 4+    Right Elbow   Flexion: 4  Extension: 4    Left Wrist/Hand   Wrist extension: 4  Wrist flexion: 4+    Right Wrist/Hand   Wrist extension: 4+  Wrist flexion: 4+    Left Hip   Planes of Motion   Flexion: 4  Extension: 3+  Abduction: 3+  External rotation: 3+    Right Hip   Planes of Motion   Flexion: 4-  Extension: 3+  Abduction: 3+  External rotation: 3+    Left Knee   Flexion: 4  Extension: 4+    Right Knee   Flexion: 4  Extension: 4+    Left Ankle/Foot   Dorsiflexion: 4+  Inversion: 5  Eversion: 5    Right Ankle/Foot   Dorsiflexion: 4+  Inversion: 5  Eversion: 5    Ambulation     Comments   RW with reciprocal pattern (UEs for balance)    Functional Assessment     Comments  5x sit to stand: 31 sec          Assessment & Plan     Assessment  Impairments: abnormal coordination, abnormal gait, abnormal muscle firing, activity intolerance, impaired balance, impaired physical strength, lacks appropriate home exercise program, pain with function and safety issue  Functional Limitations: carrying objects, lifting, walking, sitting, standing and unable to perform repetitive tasks  Assessment details: Patient presents with generalized weakness and deconditioning s/p 10 days of bedrest. She also presents with mid and low back pain, but she wishes to prioritize strength and function over pain relief.    Barriers to therapy: comorbidities  Prognosis: good    Goals  Plan Goals: Short Term Goals 4 weeks  1. Patient to be compliant with initial HEP  2. 5x sit to  < 20 sec.  3. Gross UE and LE strength to 4/5  4. Patient to improve LEFS score to at least 30/80    Long Term Goals 8 weeks  1. Patient to be independent with HEP.  2. 5x sit to  < 10 sec.  3. Patient to return to community ambulation  without limitation or AD.  4. Patient to improve LEFS score to at least 40/80        Plan  Therapy options: will be seen for skilled therapy services  Planned modality interventions: cryotherapy, thermotherapy (hydrocollator packs) and TENS  Planned therapy interventions: ADL retraining, balance/weight-bearing training, functional ROM exercises, home exercise program, transfer training, therapeutic activities, strengthening, neuromuscular re-education, spinal/joint mobilization, motor coordination training and joint mobilization  Frequency: 3x week  Duration in weeks: 8  Plan details: 3x/week for 8 weeks. Patient to receive therapeutic exercise, therapeutic activity, neuromuscular re-education, and manual therapy to restore functional mobility, decrease fall risk, and reduce pain.         Timed:  Manual Therapy:    0     mins  36877;  Therapeutic Exercise:    10     mins  46133;     Neuromuscular Regina:    0    mins  25953;    Therapeutic Activity:     0     mins  15201;     Gait Trainin     mins  50632;     Ultrasound:     0     mins  17152;    Electrical Stimulation:    0     mins  75782 ( );    Untimed:  Electrical Stimulation:    0     mins  23385 ( );  Mechanical Traction:    0     mins  13534;     Timed Treatment:   10   mins   Total Treatment:     55   mins    PT SIGNATURE: Herminio Garcia, ANN   PT License [id number]   DATE TREATMENT INITIATED: 3/1/2022    Initial Certification  Certification Period: 2022  I certify that the therapy services are furnished while this patient is under my care.  The services outlined above are required by this patient, and will be reviewed every 90 days.     PHYSICIAN: Adalberto Zayas MD      DATE:     Please sign and return via fax to [phone].. Thank you, [facility].

## 2022-03-02 ENCOUNTER — OFFICE VISIT (OUTPATIENT)
Dept: GASTROENTEROLOGY | Facility: CLINIC | Age: 37
End: 2022-03-02

## 2022-03-02 ENCOUNTER — LAB (OUTPATIENT)
Dept: LAB | Facility: HOSPITAL | Age: 37
End: 2022-03-02

## 2022-03-02 VITALS
DIASTOLIC BLOOD PRESSURE: 70 MMHG | BODY MASS INDEX: 35.61 KG/M2 | WEIGHT: 181.4 LBS | HEART RATE: 69 BPM | SYSTOLIC BLOOD PRESSURE: 124 MMHG | OXYGEN SATURATION: 98 % | HEIGHT: 60 IN | TEMPERATURE: 97.3 F

## 2022-03-02 DIAGNOSIS — R79.89 ELEVATED LIVER FUNCTION TESTS: ICD-10-CM

## 2022-03-02 DIAGNOSIS — C7A.8 NEUROENDOCRINE CARCINOMA: Primary | ICD-10-CM

## 2022-03-02 DIAGNOSIS — K59.1 FUNCTIONAL DIARRHEA: ICD-10-CM

## 2022-03-02 LAB
ALBUMIN SERPL-MCNC: 4.2 G/DL (ref 3.5–5.2)
ALBUMIN/GLOB SERPL: 1.4 G/DL
ALP SERPL-CCNC: 165 U/L (ref 39–117)
ALT SERPL W P-5'-P-CCNC: 35 U/L (ref 1–33)
ANION GAP SERPL CALCULATED.3IONS-SCNC: 14.9 MMOL/L (ref 5–15)
AST SERPL-CCNC: 27 U/L (ref 1–32)
BILIRUB SERPL-MCNC: 0.4 MG/DL (ref 0–1.2)
BUN SERPL-MCNC: 5 MG/DL (ref 6–20)
BUN/CREAT SERPL: 5.3 (ref 7–25)
CALCIUM SPEC-SCNC: 10.1 MG/DL (ref 8.6–10.5)
CHLORIDE SERPL-SCNC: 100 MMOL/L (ref 98–107)
CO2 SERPL-SCNC: 24.1 MMOL/L (ref 22–29)
CREAT SERPL-MCNC: 0.95 MG/DL (ref 0.57–1)
EGFRCR SERPLBLD CKD-EPI 2021: 78.8 ML/MIN/1.73
FERRITIN SERPL-MCNC: 71.5 NG/ML (ref 13–150)
GLOBULIN UR ELPH-MCNC: 3 GM/DL
GLUCOSE SERPL-MCNC: 142 MG/DL (ref 65–99)
IRON 24H UR-MRATE: 75 MCG/DL (ref 37–145)
POTASSIUM SERPL-SCNC: 4.2 MMOL/L (ref 3.5–5.2)
PREALB SERPL-MCNC: 24 MG/DL (ref 20–40)
PROT SERPL-MCNC: 7.2 G/DL (ref 6–8.5)
SODIUM SERPL-SCNC: 139 MMOL/L (ref 136–145)

## 2022-03-02 PROCEDURE — 86381 MITOCHONDRIAL ANTIBODY EACH: CPT

## 2022-03-02 PROCEDURE — 82784 ASSAY IGA/IGD/IGG/IGM EACH: CPT

## 2022-03-02 PROCEDURE — 99215 OFFICE O/P EST HI 40 MIN: CPT | Performed by: NURSE PRACTITIONER

## 2022-03-02 PROCEDURE — 36415 COLL VENOUS BLD VENIPUNCTURE: CPT

## 2022-03-02 PROCEDURE — 86038 ANTINUCLEAR ANTIBODIES: CPT

## 2022-03-02 PROCEDURE — 82104 ALPHA-1-ANTITRYPSIN PHENO: CPT

## 2022-03-02 PROCEDURE — 82390 ASSAY OF CERULOPLASMIN: CPT

## 2022-03-02 PROCEDURE — 82103 ALPHA-1-ANTITRYPSIN TOTAL: CPT

## 2022-03-02 PROCEDURE — 86015 ACTIN ANTIBODY EACH: CPT

## 2022-03-02 NOTE — PROGRESS NOTES
Follow Up      Patient Name: Frances Hartman  : 1985   MRN: [id number]     Chief Complaint:    Chief Complaint   Patient presents with   • Hospital Follow Up Visit       History of Present Illness: Frances Hartman is a 36 y.o. female who is here today for follow up on nausea/vomiting, neuroendocrine tumor, and elevated liver enzymes.    Frances was admitted to [facility] in 2021 with nausea and vomiting.  She had been status post bariatric surgery in 2021.  She was diagnosed with esophageal stricture. She underwent 3 EGDs during her hospital stay.  Submucosal nodule was found in the duodenum and biopsies were significant for neuroendocrine carcinoma.  Repeat EGD shows no further tumor.     In her hospital stay she did have elevated liver enzymes.  Hepatitis panel was normal.  Scan of the liver and spleen are normal. Elevated liver enzymes began several years ago. There is no history of alchol abuse or IV drugs.  There is no family history of liver disease. She does have HTN,HLD, PCOS, obesity.    Having loose stools 3-4 times a day since surgery.  Fiber intake is limited as she is supposed to get a large amount of protein and low-carb.   Eating a lot of fatty meals.  She does not notice an increase in symptoms with dairy products.  CT Abdomen Pelvis With Contrast (2021 22:52)    Her abdominal surgical history includes Chandrika, umbilical hernia repair, and gastric sleeve.  Subjective      Review of Systems:   Review of Systems   Constitutional: Positive for appetite change and fatigue. Negative for unexpected weight loss.   HENT: Negative for trouble swallowing.    Gastrointestinal: Positive for abdominal distention, diarrhea, nausea and indigestion. Negative for abdominal pain, anal bleeding, blood in stool, constipation, rectal pain, vomiting and GERD.   Musculoskeletal: Positive for gait problem.   Skin: Negative for color change.   Neurological: Positive for  weakness. Negative for confusion.       Medications:     Current Outpatient Medications:   •  acetaminophen (TYLENOL) 500 MG tablet, Take 500-1,000 mg by mouth Every 6 (Six) Hours As Needed for Mild Pain ., Disp: , Rfl:   •  albuterol (PROVENTIL HFA;VENTOLIN HFA) 108 (90 BASE) MCG/ACT inhaler, Inhale 2 puffs Every 4 (Four) Hours As Needed for wheezing., Disp: , Rfl:   •  Blood Glucose Monitoring Suppl (ONE TOUCH ULTRA 2) w/Device kit, , Disp: , Rfl:   •  Blood Glucose Monitoring Suppl device, Use BID to test blood sugars dx e11.65, Disp: 1 each, Rfl: 0  •  carvedilol (COREG) 6.25 MG tablet, Take 1 tab in AM and 2 tab in PM, Disp: 270 tablet, Rfl: 0  •  cyclobenzaprine (FLEXERIL) 10 MG tablet, Take 10 mg by mouth 3 (Three) Times a Day As Needed for Muscle Spasms., Disp: , Rfl:   •  diphenhydrAMINE (BENADRYL) 25 mg capsule, Take 25 mg by mouth Every 6 (Six) Hours As Needed for Itching or Sleep., Disp: , Rfl:   •  furosemide (LASIX) 20 MG tablet, TAKE ONE TO TWO TABLETS BY MOUTH EVERY NIGHTLY, Disp: 135 tablet, Rfl: 2  •  gabapentin (NEURONTIN) 300 MG capsule, , Disp: , Rfl:   •  glucose blood test strip, USe BID to test blood sugar DX.e11.65, Disp: 100 each, Rfl: 3  •  guaiFENesin (HUMIBID 3) 400 MG tablet, Take 200 mg by mouth As Needed., Disp: , Rfl:   •  HYDROcodone-acetaminophen (NORCO) 5-325 MG per tablet, Take 1 tablet by mouth Every 6 (Six) Hours As Needed for Severe Pain ., Disp: 12 tablet, Rfl: 0  •  Lancets 33G misc, 1 Each/kg 2 (two) times a day. To test blood sugars DxE11.65, Disp: 100 each, Rfl: 2  •  levonorgestrel (Mirena, 52 MG,) 20 MCG/24HR IUD, 1 each by Intrauterine route., Disp: , Rfl:   •  loratadine (CLARITIN) 10 MG tablet, TAKE ONE TABLET BY MOUTH DAILY (Patient taking differently: Take 10 mg by mouth Daily.), Disp: 90 tablet, Rfl: 0  •  metFORMIN ER (GLUCOPHAGE-XR) 500 MG 24 hr tablet, Take 1 tablet by mouth Daily With Dinner., Disp: 90 tablet, Rfl: 3  •  OnabotulinumtoxinA (Botox) 200 units  "reconstituted solution, FOR . PHYSICIAN TO INJECT UP  UNITS INTRAMUSCULARLY INTO HEAD, NECK AND SHOULDERS EVERY 90 DAYS., Disp: 1 each, Rfl: 3  •  simethicone (MYLICON) 125 MG chewable tablet, CHEW ONE TABLET BY MOUTH EVERY 6 HOURS AS NEEDED FOR FLATULENCE, Disp: 30 tablet, Rfl: 0    Allergies:   Allergies   Allergen Reactions   • Capsicum Annuum Extract & Derivative (Bell Pepper) [Capsicum] Anaphylaxis   • Ibuprofen Other (See Comments)     \"makes me retain fluid and eventually go into CHF\"   • Peanut-Containing Drug Products Anaphylaxis   • Hydrochlorothiazide Swelling     eventually causes CHF    • Monosodium Glutamate Swelling and Headache   • Oatmeal Other (See Comments)     Dx on allergy testing   • Amitriptyline Mental Status Change     memory gaps + neuropathy + hair loss   • Aspartame Nausea Only   • Augmentin [Amoxicillin-Pot Clavulanate] Other (See Comments)     Syncope, not sure if allergic to cephalosporins.   • Codeine Headache      Can tolerate hydrocodone, no other adverse reactions to other narcotics.   • Diclofenac Headache   • Doxycycline Rash and Hallucinations   • Eggs Or Egg-Derived Products Other (See Comments)     dx on allergy testing, but does not completely avoid   • Naproxen Other (See Comments)     \"blackout\"       Social History:   Social History     Socioeconomic History   • Marital status: Single   Tobacco Use   • Smoking status: Never Smoker   • Smokeless tobacco: Never Used   Vaping Use   • Vaping Use: Never used   Substance and Sexual Activity   • Alcohol use: Not Currently     Alcohol/week: 0.0 standard drinks     Comment: Very rarely drink socially. At most 2 drinks per month.   • Drug use: Never   • Sexual activity: Yes     Partners: Male     Birth control/protection: Condom, I.U.D.        Surgical History:   Past Surgical History:   Procedure Laterality Date   • BARIATRIC SURGERY     • ENDOSCOPY N/A 6/15/2021    Procedure: ESOPHAGOGASTRODUODENOSCOPY;  " Surgeon: Ayaka Andre MD;  Location:  [facility];  Service: Robotics - DaVinci;  Laterality: N/A;   • ENDOSCOPY N/A 10/28/2021    Procedure: ESOPHAGOGASTRODUODENOSCOPY WITH ACHALASIA BALLOON DILATATION;  Surgeon: Jase Hernandez MD;  Location:  [facility];  Service: Gastroenterology;  Laterality: N/A;  58 F DILATOR   • ENDOSCOPY N/A 11/15/2021    Procedure: ESOPHAGOGASTRODUODENOSCOPY WITH DILATATION;  Surgeon: Jase Hernandez MD;  Location:  [facility];  Service: Gastroenterology;  Laterality: N/A;  achalasia balloon    • ENDOSCOPY N/A 11/24/2021    Procedure: ESOPHAGOGASTRODUODENOSCOPY WTH DUODENAL POLYPECTOPMY AND NASAL JEJUNAL TUBE PLACEMENT;  Surgeon: Jase Hernandez MD;  Location:  [facility];  Service: Gastroenterology;  Laterality: N/A;  placement of feeding tube, checked position with KUB   • GASTRIC RESTRICTION SURGERY  2021    Sleeve   • GASTRIC SLEEVE LAPAROSCOPIC N/A 6/15/2021    Procedure: GASTRIC SLEEVE LAPAROSCOPIC WITH DAVINCI ROBOT, LAPROSCOPIC HIATAL HERNIA REPAIR WITH DAVINCI ROBOT;  Surgeon: Ayaka Andre MD;  Location:  [facility];  Service: Robotics - DaVinci;  Laterality: N/A;   • KELOID EXCISION      1990,1993,1999,2015,2016,2019   • LAPAROSCOPIC CHOLECYSTECTOMY  2000    for stones   • RADIOFREQUENCY ABLATION  2019    x 2  for pt back   • UMBILICAL HERNIA REPAIR  1990   • WISDOM TOOTH EXTRACTION  1994        Medical History:   Past Medical History:   Diagnosis Date   • Anxiety    • Asthma     prn inhalers, does not follow w/ pulmonary   • Chronic back pain     previously followed w/ pain management, now just prn Tylenol + Gabapentin   • Chronic deep vein thrombosis (DVT) of femoral vein of right lower extremity (HCC) 9/10/2021   • Diabetes mellitus (HCC)     Type 2, dx 2014, never on insulin, A1C 7.6   • Dyspepsia    • Dyspnea on exertion    • Fatigue    • Gastroparesis    • GERD (gastroesophageal reflux disease)    • Heartburn     chronic, prn TUMS, denies  "prior eval   • Hirsutism    • Hx of radiation therapy     for treatments of Keloids   • Hypertension    • Irregular menses     infrequent spotting   • Leiomyoma, subserous     possible peduculated f3-4 cm fibroid on u/s at [facility]   • Migraines     botox q3 months, following Neurology @ [facility]   • Morbid obesity (HCC)     s/p sleeve gastrectomy   • Peripheral edema    • Polycystic ovary syndrome 2011   • Seasonal allergies    • Slow to wake up after anesthesia         Objective     Physical Exam:  Vital Signs:   Vitals:    03/02/22 1457   BP: 124/70   BP Location: Left arm   Patient Position: Sitting   Cuff Size: Adult   Pulse: 69   Temp: 97.3 °F (36.3 °C)   TempSrc: Temporal   SpO2: 98%   Weight: 82.3 kg (181 lb 6.4 oz)   Height: 152.4 cm (60\")     Body mass index is 35.43 kg/m².     Physical Exam  Vitals and nursing note reviewed.   Constitutional:       General: She is not in acute distress.     Appearance: She is well-developed. She is obese.   Eyes:      General: No scleral icterus.  Pulmonary:      Effort: Pulmonary effort is normal. No accessory muscle usage or respiratory distress.   Abdominal:      General: A surgical scar is present. Bowel sounds are normal. There is no distension.      Palpations: Abdomen is soft.   Musculoskeletal:      Right lower leg: No edema.      Left lower leg: No edema.      Comments: Using walker   Lymphadenopathy:      Cervical: No cervical adenopathy.   Skin:     Coloration: Skin is not pale.      Findings: No erythema.   Neurological:      Mental Status: She is alert and oriented to person, place, and time.      Motor: Weakness present.   Psychiatric:         Speech: Speech normal.         Behavior: Behavior normal.         Thought Content: Thought content normal.         Judgment: Judgment normal.         Assessment / Plan      Assessment/Plan:   Diagnoses and all orders for this visit:    1. Neuroendocrine carcinoma (HCC) (Primary)  -     NM pet skull base to mid thigh; Future  -   "   Chromogranin A  Found on EGD in November.  Repeat endoscopy biopsies normal.  2. Functional diarrhea  Recommend Benefiber 1-2 times daily.  If no improvement, could consider colestipol  3. Elevated liver function tests  -     DEBRA; Future  -     Anti-Smooth Muscle Antibody Titer; Future  -     Mitochondrial Antibodies, M2; Future  -     Alpha - 1 - Antitrypsin Phenotype; Future  -     Ceruloplasmin; Future  -     IgG, IgA, IgM; Future  Differentials include medication induced liver injury, autoimmune disease, fatty liver disease, genetic disease.  Continue to work on weight loss.  Avoid hepatotoxins.  Recommend vaccinating against hepatitis A and B.         Follow Up:   Return in about 3 months (around 6/2/2022).    Plan of care reviewed with the patient at the conclusion of today's visit.  Education was provided regarding diagnosis, management, and any prescribed or recommended OTC medications.  Patient verbalized understanding of and agreement with management plan.     Time Statement:   Discussed plan of care in detail with patient today. Patient verbally understands and agrees. I have spent 53 minutes reviewing available diagnostics, obtaining history, examining the patient, developing a treatment plan, and educating the patient on disease process and plan of care.     TANESHA Sexton  [facility]

## 2022-03-03 ENCOUNTER — TREATMENT (OUTPATIENT)
Dept: PHYSICAL THERAPY | Facility: CLINIC | Age: 37
End: 2022-03-03

## 2022-03-03 DIAGNOSIS — R53.1 WEAKNESS: Primary | ICD-10-CM

## 2022-03-03 DIAGNOSIS — M54.50 LOW BACK PAIN, UNSPECIFIED BACK PAIN LATERALITY, UNSPECIFIED CHRONICITY, UNSPECIFIED WHETHER SCIATICA PRESENT: ICD-10-CM

## 2022-03-03 LAB
CERULOPLASMIN SERPL-MCNC: 27 MG/DL (ref 19–39)
IGA1 MFR SER: 223 MG/DL (ref 70–400)
IGG1 SER-MCNC: 1246 MG/DL (ref 700–1600)
IGM SERPL-MCNC: 96 MG/DL (ref 40–230)

## 2022-03-03 PROCEDURE — 97110 THERAPEUTIC EXERCISES: CPT | Performed by: PHYSICAL THERAPIST

## 2022-03-03 PROCEDURE — 97530 THERAPEUTIC ACTIVITIES: CPT | Performed by: PHYSICAL THERAPIST

## 2022-03-03 PROCEDURE — 97112 NEUROMUSCULAR REEDUCATION: CPT | Performed by: PHYSICAL THERAPIST

## 2022-03-03 NOTE — PROGRESS NOTES
Physical Therapy Daily Progress Note        Patient: Frances Hartman   : 1985  Diagnosis/ICD-10 Code:  Weakness [R53.1]  Referring practitioner: Adalberto Zayas MD  Date of Initial Visit: Type: THERAPY  Noted: 3/1/2022  Today's Date: 3/3/2022  Patient seen for 2 sessions           Patient reports: shoulder soreness from HEP but her back hasn't been aggravated.      Objective   See Exercise, Manual, and Modality Logs for complete treatment.       Assessment/Plan  Progressed periscapular and closed chain LE strength, with patient easily fatiguing but demonstrating overall good tolerance. Quads were too weak to perform SLR for reps.            Timed:  Manual Therapy:    0     mins  94060;  Therapeutic Exercise:    15     mins  77107;     Neuromuscular Regina:   15    mins  61432;    Therapeutic Activity:     15     mins  43459;     Gait Trainin     mins  83998;     Ultrasound:     0     mins  67359;    Electrical Stimulation:    0     mins  78276 ( );    Untimed:  Electrical Stimulation:    0     mins  65663 ( );  Mechanical Traction:    0     mins  26797;     Timed Treatment:   45   mins   Total Treatment:     45   mins    Herminio Garcia PT  Physical Therapist

## 2022-03-04 ENCOUNTER — TELEPHONE (OUTPATIENT)
Dept: BARIATRICS/WEIGHT MGMT | Facility: CLINIC | Age: 37
End: 2022-03-04

## 2022-03-04 ENCOUNTER — OFFICE VISIT (OUTPATIENT)
Dept: BEHAVIORAL HEALTH | Facility: CLINIC | Age: 37
End: 2022-03-04

## 2022-03-04 ENCOUNTER — TELEPHONE (OUTPATIENT)
Dept: OBSTETRICS AND GYNECOLOGY | Facility: CLINIC | Age: 37
End: 2022-03-04

## 2022-03-04 ENCOUNTER — TELEPHONE (OUTPATIENT)
Dept: ENDOCRINOLOGY | Facility: CLINIC | Age: 37
End: 2022-03-04

## 2022-03-04 DIAGNOSIS — R45.89 SAD MOOD: ICD-10-CM

## 2022-03-04 DIAGNOSIS — R45.81 POOR SELF ESTEEM: ICD-10-CM

## 2022-03-04 DIAGNOSIS — F33.1 MODERATE EPISODE OF RECURRENT MAJOR DEPRESSIVE DISORDER: Primary | ICD-10-CM

## 2022-03-04 LAB
ANA SER QL: NEGATIVE
MITOCHONDRIA M2 IGG SER-ACNC: <20 UNITS (ref 0–20)
SMA IGG SER-ACNC: 7 UNITS (ref 0–19)

## 2022-03-04 PROCEDURE — 90837 PSYTX W PT 60 MINUTES: CPT | Performed by: PSYCHOLOGIST

## 2022-03-04 NOTE — TELEPHONE ENCOUNTER
Pt. States for the last 2 weeks she has been having spotting when wiping. Since having the IUD placed in 2020 she has not had a period. She did have gastric surgery in June 2021. She also reports cramping, a vaginal odor, and a sharp pain when beginning to urinate. Recommended she come in for eval on Monday. Pt. Agreed. Scheduled at 1 with THAO

## 2022-03-04 NOTE — TELEPHONE ENCOUNTER
Pt has been having issues with her IUD lately. She said she's been spotting and hasn't had a cycle in a while. She did say she's been experiencing a lot of issues after having her gastric surgey. Wanted to know if it could be something related to that or if she's having actually issues.

## 2022-03-04 NOTE — TELEPHONE ENCOUNTER
Patient came in office today to see Dr. Nava and requested to talk with someone regarding her IVF that she was suppose to have in January 2022.  Patient states that Dr. Andre was suppose to start her on IV Fluids, but she has never heard anything back.  Patient feels like she may be getting dehydrated.  Please advise.

## 2022-03-04 NOTE — TELEPHONE ENCOUNTER
Spoke to patient about her recent FSBGs. Is having other symptoms currently that will be evaluated during the next week. Asked pt to keep a FSBG log: pt to check blood glucose pre-meal three times a week, varying the meal each check. Example: Mon: pre- BK and pre-lunch, Wed: pre-lunch and predinner, Fri: pre-dinner and bedtime. Pt will send me data via [employer].   Signed: Carine Aden MD

## 2022-03-04 NOTE — TELEPHONE ENCOUNTER
PT called and states she is concerned about her blood sugar readings. They are 125 and only varying about 5+/-, but staying around this number. This is high per PT, she states her AM readings are typically between 70-90.     PT is having blurry vision.     States Dr. Aden took her off one of her medications and she is wondering if she needs to resume. She still has jenuvia and glipizide at home, but has not taken it.     Has only seen Dr. Aden once, but has a f/u with PCP on 3/8/22.    Please call PT at [phone], can leave a message with directions if needed on VM.     Last OV 2/1/22  F/U 6/22/22

## 2022-03-04 NOTE — PROGRESS NOTES
PROGRESS NOTE    Data:  Frances Hartman is a 36 y.o. female who met with the undersigned for a scheduled individual therapy session from 1:30 - 2:24pm.      Clinical Maneuvering/Intervention:      The pt talked about recent stressors such as struggling to eat enough, missing her late father, feeling stressed about not yet getting a job despite searching/interviewing, and having financial stress. Stressors were processed individually and in detail. Venting of frustrations was conducted in order to help the pt feel less tense emotionally and gain insight into issues. Feelings were processed and validated several times in session. Perspective taking was conducted multiple times in order to help her feel less stuck, less overwhelmed, and see challenges as much more manageable. Active listening was conducted in order to help the pt make sense of stressors and start moving towards potential solutions. The pt was assisted with finding solutions based on existing skill-set and abilities. She was assisted with finding her own role in her stressors, having feelings validated, but then also being helped to apply her logical/strategic skills in problem-solving. The pt was assisted in recognizing progress in order to show encouragement and promote motivation to keep making positive changes in life. She is looking more healthy and seems to have better energy then in earlier sessions. The pt's strengths were identified in order to help identify abilities to use to better face/overcome challenges. She was assisted with seeing things as less negative than they are, and it was modeled for her that with a perception change, that things can seem more possible/doable. Even though she feels stuck not having a job, expanding types of jobs, even including ones that would require additional training, were discussed. Self-esteem is rather low in the patient, so she was assisted with noting her own skills that can be applied to help  herself meet personal goals. The pt expressed gratitude for today's session.    Mental Status Exam  Hygiene:  good  Dress: normal  Attitude:  cooperative and proactive  Motor Activity: normal  Speech: normal  Mood:  depressed and sad  Affect:  congruent  Thought Processes: normal  Thought Content:  normal  Suicidal Thoughts:  not endorsed  Homicidal Thoughts:  not endorsed  Crisis Safety Plan: not needed   Hallucinations:  none      Patient's Support Network Includes:  family, friends      Progress toward goal: there is evidence to suggest that she is battling depression and low-self esteem, but she is learning new skills to combat these issues      Functional Status: moderate      Prognosis: good    Assessment      The pt presented to be struggling with depression of a moderate level. Fear often keeps her stuck in depression, as do maladaptive thought patterns. She is intelligent, good at strategic thinking, and seems to benefit by 'talking things out' and finding solutions through breaking things up into steps.      Plan      In order to diminish symptoms of depression and improve self-esteem; she will give thought to how she is not stuck, but has options and abilities to use in order to pursue and gain employment (as well as achieve other personal goals in life) (this week).    Misty Nava, PhD, LP

## 2022-03-07 ENCOUNTER — OFFICE VISIT (OUTPATIENT)
Dept: OBSTETRICS AND GYNECOLOGY | Facility: CLINIC | Age: 37
End: 2022-03-07

## 2022-03-07 VITALS
WEIGHT: 184.6 LBS | BODY MASS INDEX: 36.24 KG/M2 | SYSTOLIC BLOOD PRESSURE: 134 MMHG | HEIGHT: 60 IN | DIASTOLIC BLOOD PRESSURE: 90 MMHG

## 2022-03-07 DIAGNOSIS — Z30.431 IUD CHECK UP: ICD-10-CM

## 2022-03-07 DIAGNOSIS — Z12.4 SCREENING FOR CERVICAL CANCER: ICD-10-CM

## 2022-03-07 DIAGNOSIS — R30.0 DYSURIA: ICD-10-CM

## 2022-03-07 DIAGNOSIS — R82.90 ABNORMAL URINE ODOR: Primary | ICD-10-CM

## 2022-03-07 LAB
A1AT PHENOTYP SERPL IFE: NORMAL
A1AT SERPL-MCNC: 132 MG/DL (ref 100–188)
BILIRUB BLD-MCNC: ABNORMAL MG/DL
CLARITY, POC: CLEAR
COLOR UR: YELLOW
EXPIRATION DATE: 0
GLUCOSE UR STRIP-MCNC: NEGATIVE MG/DL
KETONES UR QL: ABNORMAL
LEUKOCYTE EST, POC: ABNORMAL
Lab: 0
METHYLMALONATE SERPL-SCNC: 123 NMOL/L (ref 0–378)
NITRITE UR-MCNC: NEGATIVE MG/ML
PH UR: 5 [PH] (ref 5–8)
PROT UR STRIP-MCNC: NEGATIVE MG/DL
RBC # UR STRIP: ABNORMAL /UL
SP GR UR: 1.03 (ref 1–1.03)
UROBILINOGEN UR QL: ABNORMAL

## 2022-03-07 PROCEDURE — 99213 OFFICE O/P EST LOW 20 MIN: CPT | Performed by: OBSTETRICS & GYNECOLOGY

## 2022-03-07 RX ORDER — MULTIPLE VITAMINS W/ MINERALS TAB 9MG-400MCG
1 TAB ORAL DAILY
COMMUNITY

## 2022-03-07 RX ORDER — TRAMADOL HYDROCHLORIDE 50 MG/1
50 TABLET ORAL EVERY 6 HOURS PRN
COMMUNITY

## 2022-03-07 RX ORDER — NITROFURANTOIN 25; 75 MG/1; MG/1
100 CAPSULE ORAL 2 TIMES DAILY
Qty: 14 CAPSULE | Refills: 0 | Status: SHIPPED | OUTPATIENT
Start: 2022-03-07 | End: 2022-03-14

## 2022-03-07 NOTE — PROGRESS NOTES
Chief Complaint   Patient presents with   • Follow-up     Urine odor and burning, slow flow. Vaginally spotting and cramping with IUD x 2 weeks               Subjective   HPI  Frances Hartman is a 36 y.o. female, No obstetric history on file., who presents for vaginally spotting dark in color. Urine odor and burning and slow flow for approx 2 weeks.       She states she has experienced this problem for 2 week.  She describes the severity as severe.  She states that the problem is worsening.  The patient reports additional symptoms as none.      Her last LMP was No LMP recorded. Patient has had an implant..  Periods are irregular, lasting 7 days.  Dysmenorrhea:mild, occurring throughout menses.  Patient reports problems with: none.  Partner Status: Marital Status: single.  New Partners since last visit: no.  Desires STD Screening: no.    Additional OB/GYN History   Current contraception: contraceptive methods: IUD.  Insertion date: May 2020  Desires to: continue contraception  Last Pap : 21  Last Completed Pap Smear          PAP SMEAR (Every 3 Years) Next due on 2021  SCANNED - PAP SMEAR    2019  Done - Dr. Malone              History of abnormal Pap smear: no  Last mammogram: 2020  Last Completed Mammogram          Ordered - MAMMOGRAM (Every 2 Years) Ordered on 2020  Mammo Diagnostic Digital Tomosynthesis Bilateral With CAD    05/15/2020  Mammo Diagnostic Bilateral With CAD    2019  Mammo Diagnostic Digital Tomosynthesis Bilateral With CAD    2019  SCANNED - MAMMO    10/01/2019  Mammo Screening Digital Tomosynthesis Bilateral With CAD              Tobacco Usage?: No   OB History             Para        Term   0            AB        Living           SAB        IAB        Ectopic        Molar        Multiple        Live Births                    Health Maintenance   Topic Date Due   • Hepatitis B (1 of 3 - Risk 3-dose series) Never  "done   • DIABETIC FOOT EXAM  Never done   • DIABETIC EYE EXAM  Never done   • LIPID PANEL  06/04/2021   • URINE MICROALBUMIN  06/04/2021   • ANNUAL PHYSICAL  06/13/2021   • INFLUENZA VACCINE  Never done   • HEMOGLOBIN A1C  08/01/2022   • Annual Gynecologic Pelvic and Breast Exam  08/05/2022   • MAMMOGRAM  11/05/2022   • PAP SMEAR  08/04/2024   • TDAP/TD VACCINES (2 - Td or Tdap) 01/15/2029   • Pneumococcal Vaccine 0-64 (2 of 2 - PPSV23) 11/16/2048   • HEPATITIS C SCREENING  Completed   • COVID-19 Vaccine  Completed       The additional following portions of the patient's history were reviewed and updated as appropriate: allergies, current medications, past family history, past medical history, past social history, past surgical history and problem list.    Review of Systems   Constitutional: Negative for chills, fever and unexpected weight loss.   Respiratory: Negative.    Cardiovascular: Negative.    Gastrointestinal: Negative for abdominal distention and abdominal pain.   Genitourinary: Positive for dysuria and urgency. Negative for difficulty urinating, vaginal bleeding and vaginal discharge.       I have reviewed and agree with the HPI, ROS, and historical information as entered above. Albert Malone MD    Objective   /90   Ht 152.4 cm (60\")   Wt 83.7 kg (184 lb 9.6 oz)   BMI 36.05 kg/m²     Physical Exam  Vitals and nursing note reviewed.   Constitutional:       Appearance: She is well-developed. She is obese.   HENT:      Head: Normocephalic and atraumatic.   Pulmonary:      Effort: Pulmonary effort is normal.   Abdominal:      General: There is no distension.      Palpations: Abdomen is soft. Abdomen is not rigid.      Tenderness: There is no abdominal tenderness.   Musculoskeletal:      Cervical back: Normal range of motion.   Neurological:      Mental Status: She is alert and oriented to person, place, and time.   Psychiatric:         Behavior: Behavior normal.         Assessment/Plan "     Assessment     Problem List Items Addressed This Visit     IUD check up    Overview     Mirena IUD insertion 5/20/2020.  Due for removal 5/20/2025           Dysuria    Overview     Dysuria and hematuria x 2 weeks.  3/7/22 urine culture sent.  Rx Macrobid 100 mg twice daily for 7 days.           Screening for cervical cancer    Overview     Pap smear 8/4/2021 was negative.  STD screening negative.             Other Visit Diagnoses     Abnormal urine odor    -  Primary    Relevant Orders    POC Urinalysis Dipstick, Automated (Completed)    Urine Culture - Urine, Urine, Clean Catch          1. Dysuria; probable urinary tract infection.  Clean-catch urinalysis suspicious for urinary tract infection.    Plan     Return in about 24 weeks (around 8/22/2022), or if symptoms worsen or fail to improve, for Annual physical.  1. Urine culture sent.  2. Rx Macrobid 100 mg twice daily for 7 days.      Albert Malone MD  03/07/2022

## 2022-03-08 ENCOUNTER — PROCEDURE VISIT (OUTPATIENT)
Dept: NEUROLOGY | Facility: CLINIC | Age: 37
End: 2022-03-08

## 2022-03-08 ENCOUNTER — OFFICE VISIT (OUTPATIENT)
Dept: FAMILY MEDICINE CLINIC | Facility: CLINIC | Age: 37
End: 2022-03-08

## 2022-03-08 VITALS
SYSTOLIC BLOOD PRESSURE: 124 MMHG | TEMPERATURE: 98.3 F | WEIGHT: 182.2 LBS | HEART RATE: 61 BPM | HEIGHT: 60 IN | DIASTOLIC BLOOD PRESSURE: 82 MMHG | BODY MASS INDEX: 35.77 KG/M2 | OXYGEN SATURATION: 98 %

## 2022-03-08 DIAGNOSIS — G43.709 CHRONIC MIGRAINE WITHOUT AURA WITHOUT STATUS MIGRAINOSUS, NOT INTRACTABLE: ICD-10-CM

## 2022-03-08 DIAGNOSIS — E28.2 POLYCYSTIC OVARIES: ICD-10-CM

## 2022-03-08 DIAGNOSIS — R94.5 ABNORMAL LIVER FUNCTION: ICD-10-CM

## 2022-03-08 DIAGNOSIS — G43.709 CHRONIC MIGRAINE WITHOUT AURA WITHOUT STATUS MIGRAINOSUS, NOT INTRACTABLE: Primary | ICD-10-CM

## 2022-03-08 DIAGNOSIS — R19.7 DIARRHEA, UNSPECIFIED TYPE: ICD-10-CM

## 2022-03-08 DIAGNOSIS — I10 ESSENTIAL HYPERTENSION: Primary | ICD-10-CM

## 2022-03-08 DIAGNOSIS — J30.2 SEASONAL ALLERGIES: ICD-10-CM

## 2022-03-08 PROBLEM — G44.52 NEW DAILY PERSISTENT HEADACHE: Status: ACTIVE | Noted: 2022-03-08

## 2022-03-08 PROCEDURE — 64615 CHEMODENERV MUSC MIGRAINE: CPT | Performed by: NURSE PRACTITIONER

## 2022-03-08 PROCEDURE — 99214 OFFICE O/P EST MOD 30 MIN: CPT | Performed by: PHYSICIAN ASSISTANT

## 2022-03-08 NOTE — PROGRESS NOTES
Botulinum Toxin Injection Procedure    History:  Response to treatment has reduced HA's at least 7 fewer days a month and/or 100 hours fewer each month.     Pre-operative diagnosis: headache    Post-operative diagnosis: Same    Procedure: Chemical neurolysis    After risks and benefits were explained including bleeding, infection, worsening of pain, damage to the areas being injected, weakness of muscles, loss of muscle control, dysphagia if injecting the head or neck, facial droop if injecting the facial area, painful injection, allergic or other reaction to the medications being injected, and the failure of the procedure to help the problem, a signed consent was obtained.      The patient was placed in a comfortable area and the sites to be treated were identified. A time out was called and performed. The area to be treated was prepped three times with alcohol and the alcohol allowed to dry. Next, a 30 gauge needle was used to inject the medication in the area to be treated.    Area(s) injected: frontalis muscle, semispinalis capitis muscle and temporallis muscle    Medications used: botulinum toxin 200 mu, 4 mL of saline used to dilute the botulinum toxin.      The patient did tolerate the procedure well. There were no complications.       Physical Examination    Current Treatment (Units)    5 units on the right and 5 units on the left  Procerus 5 units  Frontalis 10 units on the right and 10 units on the left  Temporalis 20 units on the right and 20 units on the left  Occipitalis 15 units on the right and 15 units on the left  Cervical Paraspinal 10 units on the right and 10 units on the left  Trapezius 15 units on the right and 15 units on the left  EMG Guidance none .   Complications: none .   The total amount injected in units is 155 .   The total amount wasted in units is 45 .   The total amount submitted in units is 200 .   Botox was supplied by the patient.     Start on Qulipta 60 mg PO daily,  given samples.     HA are daily     F/U in 6 weeks.

## 2022-03-09 ENCOUNTER — TELEPHONE (OUTPATIENT)
Dept: BARIATRICS/WEIGHT MGMT | Facility: CLINIC | Age: 37
End: 2022-03-09

## 2022-03-09 ENCOUNTER — TELEPHONE (OUTPATIENT)
Dept: PHYSICAL THERAPY | Facility: CLINIC | Age: 37
End: 2022-03-09

## 2022-03-09 LAB
BACTERIA UR CULT: NORMAL
BACTERIA UR CULT: NORMAL

## 2022-03-09 NOTE — TELEPHONE ENCOUNTER
Patient told she has an appointment at outpatient infusion at 12:30 pm on Friday 3/11/22.  Patient states she may have to cancel because of a cold.  I advised to keep appointment if she thinks she is dehydrated.  Patient states understanding.

## 2022-03-10 LAB — VIT B1 BLD-SCNC: 127.3 NMOL/L (ref 66.5–200)

## 2022-03-10 RX ORDER — SODIUM CHLORIDE 9 MG/ML
125 INJECTION, SOLUTION INTRAVENOUS ONCE
Status: CANCELLED | OUTPATIENT
Start: 2022-03-11

## 2022-03-11 ENCOUNTER — HOSPITAL ENCOUNTER (OUTPATIENT)
Dept: ONCOLOGY | Facility: HOSPITAL | Age: 37
Setting detail: INFUSION SERIES
Discharge: HOME | End: 2022-03-11

## 2022-03-11 VITALS
RESPIRATION RATE: 18 BRPM | HEART RATE: 84 BPM | SYSTOLIC BLOOD PRESSURE: 145 MMHG | TEMPERATURE: 97.7 F | BODY MASS INDEX: 35.61 KG/M2 | WEIGHT: 182.31 LBS | DIASTOLIC BLOOD PRESSURE: 94 MMHG

## 2022-03-11 DIAGNOSIS — R11.2 INTRACTABLE NAUSEA AND VOMITING: Primary | ICD-10-CM

## 2022-03-11 PROCEDURE — 96361 HYDRATE IV INFUSION ADD-ON: CPT

## 2022-03-11 PROCEDURE — 96360 HYDRATION IV INFUSION INIT: CPT

## 2022-03-11 RX ORDER — SODIUM CHLORIDE 9 MG/ML
125 INJECTION, SOLUTION INTRAVENOUS ONCE
Status: CANCELLED | OUTPATIENT
Start: 2022-05-06

## 2022-03-11 RX ORDER — SODIUM CHLORIDE 9 MG/ML
125 INJECTION, SOLUTION INTRAVENOUS ONCE
Status: COMPLETED | OUTPATIENT
Start: 2022-03-11 | End: 2022-03-11

## 2022-03-11 RX ADMIN — SODIUM CHLORIDE 250 ML/HR: 9 INJECTION, SOLUTION INTRAVENOUS at 13:08

## 2022-03-11 NOTE — PROGRESS NOTES
Chief Complaint   Patient presents with   • Follow-up     3 month  Pt. States her blood has been darker then usual   • Sinus Problem   • Memory Loss   • Headache       HPI     Frances Hartman is a pleasant 36 y.o. female who is here for routine follow-up of hypertension, PCOS, diarrhea, seasonal allergies, migraines and liver function abnormalities.    Patient appears to be doing better today since her gastric bypass surgery.  She has had multiple complications.  Diabetes medications have been discontinued.  Taking metformin 500 mg for PCOS.      Has diarrhea.  Trying to take psyllium fiber to help thicken her stool.  Trying to hydrate well with water.  Getting outpatient fluids.    Has seasonal allergies that are bothering her today.      Blood pressure is stable today.  She is on carvedilol 6.25 mg once daily.    Followed by bariatric surgery, GI, gynecology, cardiology, endocrinology, psychiatry, neurology and pain management.    Past Medical History:   Diagnosis Date   • Anxiety    • Asthma     prn inhalers, does not follow w/ pulmonary   • Chronic back pain     previously followed w/ pain management, now just prn Tylenol + Gabapentin   • Chronic deep vein thrombosis (DVT) of femoral vein of right lower extremity (HCC) 9/10/2021   • Diabetes mellitus (HCC)     Type 2, dx 2014, never on insulin, A1C 7.6   • Diabetes mellitus (HCC)     Type II, dx 2014, never on insulin, A1C 7.6    • Dyspepsia    • Dyspnea on exertion    • Fatigue    • Gastroparesis    • GERD (gastroesophageal reflux disease)    • Heartburn     chronic, prn TUMS, denies prior eval   • Hirsutism    • Hx of radiation therapy     for treatments of Keloids   • Hypertension    • Irregular menses     infrequent spotting   • Leiomyoma, subserous     possible peduculated f3-4 cm fibroid on u/s at [facility]   • Migraines     botox q3 months, following Neurology @ [facility]   • Morbid obesity (HCC)     s/p sleeve gastrectomy   • Peripheral edema    • Polycystic  ovary syndrome 2011   • Seasonal allergies    • Slow to wake up after anesthesia        Past Surgical History:   Procedure Laterality Date   • BARIATRIC SURGERY     • ENDOSCOPY N/A 6/15/2021    Procedure: ESOPHAGOGASTRODUODENOSCOPY;  Surgeon: Ayaka Andre MD;  Location:  [facility];  Service: Robotics - DaVinci;  Laterality: N/A;   • ENDOSCOPY N/A 10/28/2021    Procedure: ESOPHAGOGASTRODUODENOSCOPY WITH ACHALASIA BALLOON DILATATION;  Surgeon: Jase Hernandez MD;  Location:  [facility];  Service: Gastroenterology;  Laterality: N/A;  58 F DILATOR   • ENDOSCOPY N/A 11/15/2021    Procedure: ESOPHAGOGASTRODUODENOSCOPY WITH DILATATION;  Surgeon: Jase Hernandez MD;  Location:  [facility];  Service: Gastroenterology;  Laterality: N/A;  achalasia balloon    • ENDOSCOPY N/A 11/24/2021    Procedure: ESOPHAGOGASTRODUODENOSCOPY WTH DUODENAL POLYPECTOPMY AND NASAL JEJUNAL TUBE PLACEMENT;  Surgeon: Jase Hernandez MD;  Location:  [facility];  Service: Gastroenterology;  Laterality: N/A;  placement of feeding tube, checked position with KUB   • GASTRIC RESTRICTION SURGERY  2021    Sleeve   • GASTRIC SLEEVE LAPAROSCOPIC N/A 6/15/2021    Procedure: GASTRIC SLEEVE LAPAROSCOPIC WITH DAVINCI ROBOT, LAPROSCOPIC HIATAL HERNIA REPAIR WITH DAVINCI ROBOT;  Surgeon: Ayaka Andre MD;  Location:  [facility];  Service: Robotics - DaVinci;  Laterality: N/A;   • KELOID EXCISION      1990,1993,1999,2015,2016,2019   • LAPAROSCOPIC CHOLECYSTECTOMY  2000    for stones   • RADIOFREQUENCY ABLATION  2019    x 2  for pt back   • UMBILICAL HERNIA REPAIR  1990   • WISDOM TOOTH EXTRACTION  1994       Family History   Problem Relation Age of Onset   • Arthritis Mother    • Diabetes Mother    • Obesity Mother    • Arrhythmia Mother    • Hypertension Mother    • Asthma Mother    • Dementia Father    • Heart attack Father    • Arrhythmia Father    • Heart disease Father    • Stroke Other         CVA   • Obesity Other    •  "Ovarian cancer Maternal Aunt         40's   • Breast cancer Paternal Aunt         30's   • Heart disease Other    • Hypertension Other    • Endometrial cancer Neg Hx    • Colon cancer Neg Hx    • Colon polyps Neg Hx    • Esophageal cancer Neg Hx        Social History     Socioeconomic History   • Marital status: Single   Tobacco Use   • Smoking status: Never Smoker   • Smokeless tobacco: Never Used   Vaping Use   • Vaping Use: Never used   Substance and Sexual Activity   • Alcohol use: Not Currently     Alcohol/week: 0.0 standard drinks     Comment: Very rarely drink socially. At most 2 drinks per month.   • Drug use: Never   • Sexual activity: Yes     Partners: Male     Birth control/protection: Condom, I.U.D.       Allergies   Allergen Reactions   • Capsicum Annuum Extract & Derivative (Bell Pepper) [Capsicum] Anaphylaxis   • Ibuprofen Other (See Comments)     \"makes me retain fluid and eventually go into CHF\"   • Peanut-Containing Drug Products Anaphylaxis   • Hydrochlorothiazide Swelling     eventually causes CHF    • Monosodium Glutamate Swelling and Headache   • Oatmeal Other (See Comments)     Dx on allergy testing   • Amitriptyline Mental Status Change     memory gaps + neuropathy + hair loss   • Aspartame Nausea Only   • Augmentin [Amoxicillin-Pot Clavulanate] Other (See Comments)     Syncope, not sure if allergic to cephalosporins.   • Codeine Headache      Can tolerate hydrocodone, no other adverse reactions to other narcotics.   • Diclofenac Headache   • Doxycycline Rash and Hallucinations   • Eggs Or Egg-Derived Products Other (See Comments)     dx on allergy testing, but does not completely avoid   • Naproxen Other (See Comments)     \"blackout\"       ROS  Review of Systems   Constitutional: Positive for fatigue. Negative for chills and fever.   HENT: Positive for congestion, postnasal drip and rhinorrhea. Negative for ear pain, sinus pressure and sore throat.    Respiratory: Negative for cough, " "shortness of breath and wheezing.    Cardiovascular: Negative for chest pain and leg swelling.   Gastrointestinal: Positive for diarrhea. Negative for abdominal pain and blood in stool.   Neurological: Negative for dizziness and headache.       Vitals:    03/08/22 1320   BP: 124/82   BP Location: Right arm   Patient Position: Sitting   Cuff Size: Adult   Pulse: 61   Temp: 98.3 °F (36.8 °C)   SpO2: 98%   Weight: 82.6 kg (182 lb 3.2 oz)   Height: 152.4 cm (60\")   PainSc:   8     Body mass index is 35.58 kg/m².    Current Outpatient Medications on File Prior to Visit   Medication Sig Dispense Refill   • acetaminophen (TYLENOL) 500 MG tablet Take 500-1,000 mg by mouth Every 6 (Six) Hours As Needed for Mild Pain .     • albuterol (PROVENTIL HFA;VENTOLIN HFA) 108 (90 BASE) MCG/ACT inhaler Inhale 2 puffs Every 4 (Four) Hours As Needed for wheezing.     • Blood Glucose Monitoring Suppl (ONE TOUCH ULTRA 2) w/Device kit      • Blood Glucose Monitoring Suppl device Use BID to test blood sugars dx e11.65 1 each 0   • carvedilol (COREG) 6.25 MG tablet Take 1 tab in AM and 2 tab in  tablet 0   • cyclobenzaprine (FLEXERIL) 10 MG tablet Take 10 mg by mouth 3 (Three) Times a Day As Needed for Muscle Spasms.     • diphenhydrAMINE (BENADRYL) 25 mg capsule Take 25 mg by mouth Every 6 (Six) Hours As Needed for Itching or Sleep.     • furosemide (LASIX) 20 MG tablet TAKE ONE TO TWO TABLETS BY MOUTH EVERY NIGHTLY 135 tablet 2   • gabapentin (NEURONTIN) 300 MG capsule      • glucose blood test strip USe BID to test blood sugar DX.e11.65 100 each 3   • guaiFENesin (HUMIBID 3) 400 MG tablet Take 200 mg by mouth As Needed.     • HYDROcodone-acetaminophen (NORCO) 5-325 MG per tablet Take 1 tablet by mouth Every 6 (Six) Hours As Needed for Severe Pain . 12 tablet 0   • Lancets 33G misc 1 Each/kg 2 (two) times a day. To test blood sugars DxE11.65 100 each 2   • levonorgestrel (MIRENA) 20 MCG/24HR IUD 1 each by Intrauterine route.     • " metFORMIN ER (GLUCOPHAGE-XR) 500 MG 24 hr tablet Take 1 tablet by mouth Daily With Dinner. 90 tablet 3   • multivitamin with minerals tablet tablet Take 1 tablet by mouth Daily.     • nitrofurantoin, macrocrystal-monohydrate, (Macrobid) 100 MG capsule Take 1 capsule by mouth 2 (Two) Times a Day for 7 days. 14 capsule 0   • OnabotulinumtoxinA (Botox) 200 units reconstituted solution FOR . PHYSICIAN TO INJECT UP  UNITS INTRAMUSCULARLY INTO HEAD, NECK AND SHOULDERS EVERY 90 DAYS. 1 each 3   • simethicone (MYLICON) 125 MG chewable tablet CHEW ONE TABLET BY MOUTH EVERY 6 HOURS AS NEEDED FOR FLATULENCE 30 tablet 0   • traMADol (ULTRAM) 50 MG tablet Take 50 mg by mouth Every 6 (Six) Hours As Needed for Moderate Pain .     • vitamin D3 125 MCG (5000 UT) capsule capsule Take 5,000 Units by mouth Daily.       No current facility-administered medications on file prior to visit.       Results for orders placed or performed in visit on 03/07/22   Urine Culture - Urine, Urine, Clean Catch    Specimen: Urine, Clean Catch    Urine  Release to [facility]   Result Value Ref Range    Urine Culture Final report     Result 1 Comment    POC Urinalysis Dipstick, Automated    Specimen: Urine   Result Value Ref Range    Color Yellow Yellow, Straw, Dark Yellow, Maribel    Clarity, UA Clear Clear    Specific Gravity  1.030 1.005 - 1.030    pH, Urine 5.0 5.0 - 8.0    Leukocytes Trace (A) Negative    Nitrite, UA Negative Negative    Protein, POC Negative Negative mg/dL    Glucose, UA Negative Negative, 1000 mg/dL (3+) mg/dL    Ketones, UA Trace (A) Negative    Urobilinogen, UA 1 E.U./dL  (A) Normal    Bilirubin Small (1+) (A) Negative    Blood, UA Trace (A) Negative    Lot Number 0     Expiration Date 0        PE    Physical Exam  Vitals reviewed.   Constitutional:       General: She is not in acute distress.     Appearance: Normal appearance. She is well-developed. She is obese. She is not ill-appearing or diaphoretic.    HENT:      Head: Normocephalic and atraumatic.   Eyes:      Extraocular Movements: Extraocular movements intact.      Conjunctiva/sclera: Conjunctivae normal.   Cardiovascular:      Rate and Rhythm: Normal rate and regular rhythm.      Heart sounds: Normal heart sounds.   Pulmonary:      Effort: Pulmonary effort is normal.      Breath sounds: Normal breath sounds.   Musculoskeletal:         General: Normal range of motion.      Cervical back: Normal range of motion.      Right lower leg: No edema.      Left lower leg: No edema.   Skin:     General: Skin is warm.      Findings: No erythema or rash.   Neurological:      General: No focal deficit present.      Mental Status: She is alert.   Psychiatric:         Attention and Perception: She is attentive.         Mood and Affect: Mood normal.         Speech: Speech normal.         Behavior: Behavior normal. Behavior is cooperative.         Thought Content: Thought content normal.         Judgment: Judgment normal.         A/P    Diagnoses and all orders for this visit:    1. Essential hypertension (Primary)  Stable, well-controlled.  Compliant on medication.  Established with cardiology.    2. Polycystic ovaries  On metformin 500 mg daily.    3. Diarrhea, unspecified type  Established with GI.  Discussed fiber supplementation.    4. Seasonal allergies  Trial of Allegra.    5. Abnormal liver function  Established with GI.    6. Chronic migraine without aura without status migrainosus, not intractable  Established with neurology, Dr. Francois.  States she has failed multiple medications and the botox injections are not helping anymore.  Encouraged her to follow-up with neurology.       Plan of care reviewed with patient at the conclusion of today's visit. Education was provided regarding diagnosis, management and any prescribed or recommended OTC medications.  Patient verbalizes understanding of and agreement with management plan.    Return in about 6 months (around  9/8/2022) for Annual physical.     Lesli Ramirez PA-C

## 2022-03-14 ENCOUNTER — TELEPHONE (OUTPATIENT)
Dept: PHYSICAL THERAPY | Facility: CLINIC | Age: 37
End: 2022-03-14

## 2022-03-14 ENCOUNTER — SPECIALTY PHARMACY (OUTPATIENT)
Dept: ONCOLOGY | Facility: HOSPITAL | Age: 37
End: 2022-03-14

## 2022-03-14 ENCOUNTER — DOCUMENTATION (OUTPATIENT)
Dept: ONCOLOGY | Facility: HOSPITAL | Age: 37
End: 2022-03-14

## 2022-03-14 NOTE — PROGRESS NOTES
Specialty Pharmacy Refill Coordination Note     Frances is a 36 y.o. female contacted today regarding refills of  [facility] specialty medication(s).    Reviewed and verified with patient:  Allergies  Meds  Problems         Specialty medication(s) and dose(s) confirmed: yes        Delivery Questions    Flowsheet Row Most Recent Value   Delivery method [employer]   Delivery address correct? Yes   Preferred delivery time? Anytime   Number of medications in delivery 1   Medication being filled and delivered [facility]   Doses left of specialty medications [facility]   Is there any medication that is due not being filled? No   Supplies needed? No supplies needed   Cooler needed? Yes   Do any medications need mixed or dated? No   Copay form of payment Payment plan already set up   Questions or concerns for the pharmacist? No   Are any medications first time fills? Yes            Medication Adherence    Demonstrates understanding of importance of adherence: yes  Informant: patient  Reliability of informant: reliable  Provider-estimated medication adherence level: %  Reasons for non-adherence: no problems identified  Adherence tools used: cell phone  Support network for adherence: healthcare provider          Follow-up: 28 day(s)     Sandy Madden, Pharmacy Technician  Specialty Pharmacy Technician

## 2022-03-14 NOTE — PROGRESS NOTES
Specialty Pharmacy Patient Management Program  Neurology Initial Assessment     Frances Hartman is a 36 y.o. female with Migraines seen by a Neurology provider and enrolled in the Neurology Patient Management program offered by [facility].  An initial outreach was conducted, including assessment of therapy appropriateness and specialty medication education for Ajovy and Botox. The patient was introduced to services offered by [facility], including: regular assessments, refill coordination, curbside pick-up or mail order delivery options, prior authorization maintenance, and financial assistance programs as applicable. The patient was also provided with contact information for the pharmacy team. Qulipta 60 mg po once daily was prescribed on 3/8/22 , but the insurance will not cover until formulary Ajovy tried.  Notified provider and order changed to Ajovy.  Patient notified and educated.      Insurance Coverage & Financial Support  [place] Medicaid.    Relevant Past Medical History and Comorbidities  Relevant medical history and concomitant health conditions were discussed with the patient. The patient's chart has been reviewed for relevant past medical history and comorbid health conditions and updated as necessary.   Past Medical History:   Diagnosis Date   • Anxiety    • Asthma     prn inhalers, does not follow w/ pulmonary   • Chronic back pain     previously followed w/ pain management, now just prn Tylenol + Gabapentin   • Chronic deep vein thrombosis (DVT) of femoral vein of right lower extremity (HCC) 9/10/2021   • Diabetes mellitus (HCC)     Type 2, dx 2014, never on insulin, A1C 7.6   • Diabetes mellitus (HCC)     Type II, dx 2014, never on insulin, A1C 7.6    • Dyspepsia    • Dyspnea on exertion    • Fatigue    • Gastroparesis    • GERD (gastroesophageal reflux disease)    • Heartburn     chronic, prn TUMS, denies prior eval   • Hirsutism    • Hx of radiation  therapy     for treatments of Keloids   • Hypertension    • Irregular menses     infrequent spotting   • Leiomyoma, subserous     possible peduculated f3-4 cm fibroid on u/s at [facility]   • Migraines     botox q3 months, following Neurology @ [facility]   • Morbid obesity (HCC)     s/p sleeve gastrectomy   • Peripheral edema    • Polycystic ovary syndrome 2011   • Seasonal allergies    • Slow to wake up after anesthesia      Social History     Socioeconomic History   • Marital status: Single   Tobacco Use   • Smoking status: Never Smoker   • Smokeless tobacco: Never Used   Vaping Use   • Vaping Use: Never used   Substance and Sexual Activity   • Alcohol use: Not Currently     Alcohol/week: 0.0 standard drinks     Comment: Very rarely drink socially. At most 2 drinks per month.   • Drug use: Never   • Sexual activity: Yes     Partners: Male     Birth control/protection: Condom, I.U.D.       Problem list reviewed by Nancy Solano, PharmD on 3/14/2022 at  9:46 AM    Allergies  Known allergies and reactions were discussed with the patient. The patient's chart has been reviewed for  allergy information and updated as necessary.   Capsicum annuum extract & derivative (bell pepper) [capsicum], Ibuprofen, Peanut-containing drug products, Hydrochlorothiazide, Monosodium glutamate, Oatmeal, Amitriptyline, Aspartame, Augmentin [amoxicillin-pot clavulanate], Codeine, Diclofenac, Doxycycline, Eggs or egg-derived products, and Naproxen    Allergies reviewed by Nancy Solano, PharmD on 3/14/2022 at  9:39 AM    Current Medication List  This medication list has been reviewed with the patient and evaluated for any interactions or necessary modifications/recommendations, and updated to include all prescription medications, OTC medications, and supplements the patient is currently taking.  This list reflects what is contained in the patient's profile, which has also been marked as reviewed to communicate to other providers it is the most up to  date version of the patient's current medication therapy.     Current Outpatient Medications:   •  acetaminophen (TYLENOL) 500 MG tablet, Take 500-1,000 mg by mouth Every 6 (Six) Hours As Needed for Mild Pain ., Disp: , Rfl:   •  albuterol (PROVENTIL HFA;VENTOLIN HFA) 108 (90 BASE) MCG/ACT inhaler, Inhale 2 puffs Every 4 (Four) Hours As Needed for wheezing., Disp: , Rfl:   •  Blood Glucose Monitoring Suppl (ONE TOUCH ULTRA 2) w/Device kit, , Disp: , Rfl:   •  Blood Glucose Monitoring Suppl device, Use BID to test blood sugars dx e11.65, Disp: 1 each, Rfl: 0  •  carvedilol (COREG) 6.25 MG tablet, Take 1 tab in AM and 2 tab in PM, Disp: 270 tablet, Rfl: 0  •  cyclobenzaprine (FLEXERIL) 10 MG tablet, Take 10 mg by mouth 3 (Three) Times a Day As Needed for Muscle Spasms., Disp: , Rfl:   •  diphenhydrAMINE (BENADRYL) 25 mg capsule, Take 25 mg by mouth Every 6 (Six) Hours As Needed for Itching or Sleep., Disp: , Rfl:   •  Fremanezumab-vfrm 225 MG/1.5ML solution auto-injector, Inject 225 mg under the skin into the appropriate area as directed Every 28 (Twenty-Eight) Days., Disp: 1.5 mL, Rfl: 11  •  furosemide (LASIX) 20 MG tablet, TAKE ONE TO TWO TABLETS BY MOUTH EVERY NIGHTLY, Disp: 135 tablet, Rfl: 2  •  gabapentin (NEURONTIN) 300 MG capsule, , Disp: , Rfl:   •  glucose blood test strip, USe BID to test blood sugar DX.e11.65, Disp: 100 each, Rfl: 3  •  guaiFENesin (HUMIBID 3) 400 MG tablet, Take 200 mg by mouth As Needed., Disp: , Rfl:   •  HYDROcodone-acetaminophen (NORCO) 5-325 MG per tablet, Take 1 tablet by mouth Every 6 (Six) Hours As Needed for Severe Pain ., Disp: 12 tablet, Rfl: 0  •  Lancets 33G misc, 1 Each/kg 2 (two) times a day. To test blood sugars DxE11.65, Disp: 100 each, Rfl: 2  •  levonorgestrel (MIRENA) 20 MCG/24HR IUD, 1 each by Intrauterine route., Disp: , Rfl:   •  metFORMIN ER (GLUCOPHAGE-XR) 500 MG 24 hr tablet, Take 1 tablet by mouth Daily With Dinner., Disp: 90 tablet, Rfl: 3  •  multivitamin  with minerals tablet tablet, Take 1 tablet by mouth Daily., Disp: , Rfl:   •  nitrofurantoin, macrocrystal-monohydrate, (Macrobid) 100 MG capsule, Take 1 capsule by mouth 2 (Two) Times a Day for 7 days., Disp: 14 capsule, Rfl: 0  •  OnabotulinumtoxinA (Botox) 200 units reconstituted solution, FOR . PHYSICIAN TO INJECT UP  UNITS INTRAMUSCULARLY INTO HEAD, NECK AND SHOULDERS EVERY 90 DAYS., Disp: 1 each, Rfl: 3  •  simethicone (MYLICON) 125 MG chewable tablet, CHEW ONE TABLET BY MOUTH EVERY 6 HOURS AS NEEDED FOR FLATULENCE, Disp: 30 tablet, Rfl: 0  •  traMADol (ULTRAM) 50 MG tablet, Take 50 mg by mouth Every 6 (Six) Hours As Needed for Moderate Pain ., Disp: , Rfl:   •  vitamin D3 125 MCG (5000 UT) capsule capsule, Take 5,000 Units by mouth Daily., Disp: , Rfl:   No current facility-administered medications for this visit.    Medicines reviewed by Nancy Solano, PharmD on 3/14/2022 at  9:45 AM    Drug Interactions  none     Recommended Medications Assessment    None      Relevant Laboratory Values    Common labs    Common Labsle 12/15/21 12/15/21 2/1/22 3/1/22 3/1/22    1621 1621  1930 1930   Glucose  125 (A)   142 (A)   BUN  7   5 (A)   Creatinine  0.68   0.95   eGFR Non  Am  111      eGFR African Am  128      Sodium  143   139   Potassium  3.9   4.2   Chloride  107 (A)   100   Calcium  8.5 (A)   10.1   Total Protein  5.9 (A)      Albumin  3.3 (A)   4.20   Total Bilirubin  0.3   0.4   Alkaline Phosphatase  138 (A)   165 (A)   AST (SGOT)  22   27   ALT (SGPT)  28   35 (A)   WBC 9.3   8.80    Hemoglobin 11.1   13.6    Hematocrit 34.6   42.5    Platelets 329   251    Hemoglobin A1C   5.6     (A) Abnormal value       Comments are available for some flowsheets but are not being displayed.             Initial Education Provided for Specialty Medication  The patient has been provided with the following education and any applicable administration techniques (i.e. self-injection) have been  demonstrated for the therapies indicated. All questions and concerns have been addressed prior to the patient receiving the medication, and the patient has verbalized understanding of the education and any materials provided.  Additional patient education shall be provided and documented upon request by the patient, provider or payer.                   Ajovy (fremanezumab-vfrm) 225 mg subQ every 28 days           Medication Expectations   Why am I taking this medication? You are taking this medication for migraine prophylaxis.   What should I expect while on this medication? You should expect to a decrease in the frequency and severity of your migraines.   How does the medication work? Ajovy is a monoclonal antibody that binds to calcitonin gene-related peptide (CGRP) and blocks its binding to the receptor decreasing the severity of migraines.   How long will I be on this medication for? The amount of time you will be on this medication will be determined by your doctor and your response to the medication.    How do I take this medication? Take as directed on your prescription label. This medication is a self-injection given every 28 days.    What are some possible side effects? Injection site reactions and hypersensitivity reactions.   What happens if I miss a dose? If you miss a dose, take it as soon as you remember, and time next dose 28 days from last dose.                  Medication Safety   What are things I should warn my doctor immediately about? Cases of anaphylaxis and angioedema have been reported in the postmarketing setting. If a reaction occurs, notify your doctor immediately.   What are things that I should be cautious of? Injection site reaction and hypersensitivity reactions, including rash, pruritus, drug hypersensitivity, and urticaria   What are some medications that can interact with this one? No drug interactions identified.            Medication Storage/Handling   How should I handle this  medication? Keep this medication our of reach of pets/children in original container.  On the day your Ajovy is due let it set at room temperature for 30 minutes prior to injection. (do NOT warm using a heat source such as hot water or a microwave).  Administer in the abdomen, thigh, back of the upper arm, or buttocks.  Do not inject where the skin is tender, bruised, red or hard.  Rotate injection sites.   How does this medication need to be stored? Store in refrigerator and keep dry.   How should I dispose of this medication? You can dispose of the empty syringe in a sharps container, and if this is not available you may use an empty hard plastic container such as a milk jug or tide container.            Resources/Support   How can I remind myself to take this medication? You can download a reminder [email or web address] on your phone or use a calandar  to help with your monthly injection.   Is financial support available?  Yes, [employer] can provide co-pay cards if you have commercial insurance or patient assistance if you have Medicare or no insurance.    Which vaccines are recommended for me? Talk to your doctor about these vaccines: Flu, Coronavirus (COVID-19), Pneumococcal (pneumonia), Tdap, Hepatitis B, Zoster (shingles)                     Botox (onabotulinumtoxinA)      Medication Expectations   Why am I taking this medication? For prevention and relief of migraines.   What should I expect while on this medication? You should expect to see a decrease in the severity and frequency of migraines..     How does the medication work? Botox enters the nerve endings around where it is injected and blocks the release of chemicals involved in pain transmission.  This prevents activation of pain networks in the brain.   How long will I be on this medication for? The amount of time you will be on this medication will be determined by your doctor based your response.    How do I take this medication? This medication will be  given in the office.  It will be given IM into each of 31 sites divided across 7 specific head/neck muscle areas.     What are some possible side effects? Potential side effects including, but not limited to: neck pain and stiffness, neck weakness,  temporary drooping of the eye, rare flu-like symptoms (such as muscle aches and fever), dry eyes, injections site pain, bleeding, infection, failure of the procedure to help.  More serious side effect such as allergic reaction, dysphagia, respiratory distress, .  Discussed that it may take 10-14 days after first treatment to see improvement of headaches, and that the in-office treatments are done every 12 weeks.  I gave the patient my name and contact information for any additional questions or concerns.       What happens if I miss a dose? N/A                Medication Safety   What are things I should warn my doctor immediately about? Let the provider know immediately if you have difficulty breathing or swallowing, weakness of neck muscles.   What are things that I should be cautious of?  Injections near the eye: This medicine may reduce blinking, which can raise the risk of eye problems, including corneal exposure and ulcers. Tell your doctor right away if you notice that you are blinking less than usual or your eyes feel dry   What are some medications that can interact with this one? N/A          Medication Storage/Handling   How should I handle this medication? N/A   How does this medication need to be stored? N/A   How should I dispose of this medication? N/A          Resources/Support   How can I remind myself to take this medication? Patient will be scheduled every 3 months in clinic.   Is financial support available?  [employer] Botox savings program.   Which vaccines are recommended for me? Talk to your doctor about these vaccines: Flu, Coronavirus (COVID-19), Pneumococcal (pneumonia), Tdap, Hepatitis B, Zoster (shingles)               Adherence and  Self-Administration  • Barriers to Patient Adherence and/or Self-Administration: none    • Methods for Supporting Patient Adherence and/or Self-Administration: CGRP injection demonstration in office for Emality.  Discussed specifics with Ajovy, and told patient if needed to call back and we could walk through injection with a video call.       Goals of Therapy   Goals     • Specialty Pharmacy General Goal      Reduction in frequency and severity of kyle               Reassessment Plan & Follow-Up  1. Medication Therapy Changes: Stop Qulipta, and Start Ajovy 225 mg every 28 days.  2. Additional Plans, Therapy Recommendations, or Therapy Problems to Be Addressed: none   3. Pharmacist to perform regular reassessments no more than (6) months from the previous assessment.  4. Welcome information and patient satisfaction survey to be sent by retail team with patient's initial fill.  5. Care Coordinator to set up future refill outreaches, coordinate prescription delivery, and escalate clinical questions to pharmacist.     Attestation  I attest that the initiated specialty medication(s) are appropriate for the patient based on my assessment.  If the prescribed therapy is at any point deemed not appropriate based on the current or future assessments, a consultation will be initiated with the patient's specialty care provider to determine the best course of action. The revised plan of therapy will be documented along with any additional patient education provided.     Nancy Solano, PharmD  3/14/2022  10:14 EDT

## 2022-03-16 ENCOUNTER — HOSPITAL ENCOUNTER (OUTPATIENT)
Dept: PET IMAGING | Facility: HOSPITAL | Age: 37
Discharge: HOME | End: 2022-03-16

## 2022-03-16 DIAGNOSIS — C7A.8 NEUROENDOCRINE CARCINOMA: ICD-10-CM

## 2022-03-16 PROCEDURE — 78815 PET IMAGE W/CT SKULL-THIGH: CPT

## 2022-03-16 PROCEDURE — 0 GALLIUM GA 68 DOTATATE KIT: Performed by: NURSE PRACTITIONER

## 2022-03-16 PROCEDURE — A9587 GALLIUM GA-68: HCPCS | Performed by: NURSE PRACTITIONER

## 2022-03-16 RX ADMIN — 68GA-DOTATATE 1 EACH: KIT INTRAVENOUS at 10:30

## 2022-03-24 ENCOUNTER — TELEPHONE (OUTPATIENT)
Dept: NEUROLOGY | Facility: CLINIC | Age: 37
End: 2022-03-24

## 2022-03-24 NOTE — TELEPHONE ENCOUNTER
Provider: LOUISE FISHER   Caller: KAMALA   Relationship to Patient: PT   Phone Number: [phone]  Reason for Call: PT STATES THAT SHE USED THE AJOVY AND SHE DID THIS AS SHE WAS ADVISED, SHE STATES THAT SHE LEFT IT IN FOR THE SECONDS THAT IT ADVISED, SHE STATES THAT SHE HAD QUITE A BIT OF MEDICATION THAT CAME OUT, SHE STATES THAT THE INJECTION SITE IS RED, IRRITATED AND ITCHY.

## 2022-03-25 ENCOUNTER — HOSPITAL ENCOUNTER (OUTPATIENT)
Dept: ONCOLOGY | Facility: HOSPITAL | Age: 37
Setting detail: INFUSION SERIES
Discharge: HOME | End: 2022-03-25

## 2022-03-25 ENCOUNTER — OFFICE VISIT (OUTPATIENT)
Dept: BARIATRICS/WEIGHT MGMT | Facility: CLINIC | Age: 37
End: 2022-03-25

## 2022-03-25 ENCOUNTER — OFFICE VISIT (OUTPATIENT)
Dept: BEHAVIORAL HEALTH | Facility: CLINIC | Age: 37
End: 2022-03-25

## 2022-03-25 VITALS
HEIGHT: 60 IN | DIASTOLIC BLOOD PRESSURE: 72 MMHG | BODY MASS INDEX: 35.53 KG/M2 | WEIGHT: 181 LBS | TEMPERATURE: 96.8 F | RESPIRATION RATE: 18 BRPM | OXYGEN SATURATION: 97 % | SYSTOLIC BLOOD PRESSURE: 136 MMHG | HEART RATE: 93 BPM

## 2022-03-25 VITALS
TEMPERATURE: 96.8 F | HEIGHT: 60 IN | DIASTOLIC BLOOD PRESSURE: 77 MMHG | BODY MASS INDEX: 35.73 KG/M2 | WEIGHT: 182 LBS | SYSTOLIC BLOOD PRESSURE: 134 MMHG | RESPIRATION RATE: 14 BRPM | HEART RATE: 66 BPM

## 2022-03-25 DIAGNOSIS — Z13.21 MALNUTRITION SCREEN: ICD-10-CM

## 2022-03-25 DIAGNOSIS — K91.2 POSTGASTRECTOMY MALABSORPTION: ICD-10-CM

## 2022-03-25 DIAGNOSIS — E86.0 DEHYDRATION: ICD-10-CM

## 2022-03-25 DIAGNOSIS — Z90.3 POSTGASTRECTOMY MALABSORPTION: ICD-10-CM

## 2022-03-25 DIAGNOSIS — R53.83 FATIGUE, UNSPECIFIED TYPE: Primary | ICD-10-CM

## 2022-03-25 DIAGNOSIS — E55.9 HYPOVITAMINOSIS D: ICD-10-CM

## 2022-03-25 DIAGNOSIS — F33.1 MODERATE EPISODE OF RECURRENT MAJOR DEPRESSIVE DISORDER: Primary | ICD-10-CM

## 2022-03-25 DIAGNOSIS — Z13.0 SCREENING, IRON DEFICIENCY ANEMIA: ICD-10-CM

## 2022-03-25 DIAGNOSIS — R11.2 INTRACTABLE NAUSEA AND VOMITING: Primary | ICD-10-CM

## 2022-03-25 DIAGNOSIS — R45.81 POOR SELF ESTEEM: ICD-10-CM

## 2022-03-25 PROCEDURE — 96361 HYDRATE IV INFUSION ADD-ON: CPT

## 2022-03-25 PROCEDURE — 90837 PSYTX W PT 60 MINUTES: CPT | Performed by: PSYCHOLOGIST

## 2022-03-25 PROCEDURE — 99213 OFFICE O/P EST LOW 20 MIN: CPT | Performed by: SURGERY

## 2022-03-25 PROCEDURE — 96360 HYDRATION IV INFUSION INIT: CPT

## 2022-03-25 RX ORDER — SODIUM CHLORIDE 9 MG/ML
125 INJECTION, SOLUTION INTRAVENOUS ONCE
Status: CANCELLED | OUTPATIENT
Start: 2022-05-06

## 2022-03-25 RX ORDER — SODIUM CHLORIDE 9 MG/ML
125 INJECTION, SOLUTION INTRAVENOUS ONCE
Status: COMPLETED | OUTPATIENT
Start: 2022-03-25 | End: 2022-03-25

## 2022-03-25 RX ADMIN — SODIUM CHLORIDE 125 ML/HR: 9 INJECTION, SOLUTION INTRAVENOUS at 08:39

## 2022-03-25 NOTE — TELEPHONE ENCOUNTER
We can switch to Emgality to see if that does not cause any injection site reaction. I have sent a message to our pharmacy staff.

## 2022-03-25 NOTE — TELEPHONE ENCOUNTER
Patient states that she has tried both injections and is unhappy with the results.  She stated that she did not have a reaction the first day but the second day she started seeing a reaction and it has gotten worse.  She stated that it is red and itchy.  She stated that she was unable to inject all the medication and that only half was actually administered.  She stated that her insurance said that she had to try both injectables before they would authorize the tablet.  She is still having daily migraines.

## 2022-03-25 NOTE — PROGRESS NOTES
"[facility]  [address]    [facility] [id number] [phone]        Patient Name:  Frances Hartman.  :  1985      Date of Visit: 3/25/2022      Reason for Visit:   9 months postop     HPI: Frances Hartman is a 36 y.o. female s/p robotic assisted LSG/HHR by  on 6/15/21    \"Struggled with nausea and poor oral intake requiring more than 1 hospital admission post-operatively.  S/p several dilations for initial stricture, now resolved.  Has been receiving weekly IVF boluses via PICC line.    Doing better since LOV.  Only had two IVF infusions: felt like rate was way too fast and didn't tolerate 2L of fluid.  Prefers to have 1L fluid at slower rate every other week.    Struggling with access to resources as she gets her foot stamps reinstated.  Her mother is helping her some.  Not currently working.    Requests we send note to PCP that simethicone is necessary and ok.    Takes Rolaids a few times per week.  Denies reflux. All foods make her gassy.  Takes simethicone frequently.  Getting 40-50 g prot/day.  Drinking 64 fluid oz/day.  Last labs revealed prealbumin 19. Currently not on vitamins or PPI.    Saw pain management on Monday who is worried about her peripheral neuropathy.  I reviewed B1 and methylmalonic acid (B12 inverse): normal since surgery.  To start physical therapy soon.    Doing better with some ADLs: too weak/dizzy to  shower, can get in and out of tub now.  Stand at sink and wash face/brush teeth.    Has had a migraine since she left the hospital.  Working with neurologist.\"    Plan from last visit:    \"Ok for PCP to continue her on simethicone for gas.      Follow with pain management re: deconditioning and peripheral neuropathy.  B vitamin deficiency could be in the differential, but on my review of labs has never had a deficiency and she has not had a malabsorptive bariatric surgery.  I think PT will be very good " "for her.    Follow with neurologist re: migraines.    Will set up every other week 1L normal saline over 4 hours infusions.\"      03/25/22 Update:    Has been dehydrated.  Had IV fluids today.   1 month ago, had a set back with not feeling hungry, decreased oral intake, decreased fluid intake, and this is how she got dehydrated.  Had a cold this past month, suffered with loose stools.  Doing better now.  Drinking 64-80 fluid oz/day.     She discussed diarrhea with her GI provider.    Getting 60g prot/day--this is improved from previous..  Trying to increase options in her diet.  Still struggles with raw vegetables like salad but can tolerate cucumber.  Last labs revealed  no vitamin deficiencies--just reviewed in March. Taking MVI and Vit D.  Exercise: has not been able to due to weakness.  Ambulating today with walker.  Her pain doctor had ordered PT, but it was too rigorous for her at this time, so she is taking a break from PT for a little while.     Presurgery weight: 231 pounds.  Today's weight is 82.1 kg (181 lb) pounds, today's  Body mass index is 35.35 kg/m²., and weight loss since surgery is 50 pounds.    Her all time highest weight was around 280.    Past Medical History:   Diagnosis Date   • Anxiety    • Asthma     prn inhalers, does not follow w/ pulmonary   • Chronic back pain     previously followed w/ pain management, now just prn Tylenol + Gabapentin   • Chronic deep vein thrombosis (DVT) of femoral vein of right lower extremity (HCC) 9/10/2021   • Diabetes mellitus (HCC)     Type 2, dx 2014, never on insulin, A1C 7.6   • Diabetes mellitus (HCC)     Type II, dx 2014, never on insulin, A1C 7.6    • Dyspepsia    • Dyspnea on exertion    • Fatigue    • Gastroparesis    • GERD (gastroesophageal reflux disease)    • Heartburn     chronic, prn TUMS, denies prior eval   • Hirsutism    • Hx of radiation therapy     for treatments of Keloids   • Hypertension    • Irregular menses     infrequent spotting   • " Leiomyoma, subserous     possible peduculated f3-4 cm fibroid on u/s at [facility]   • Migraines     botox q3 months, following Neurology @ [facility]   • Morbid obesity (HCC)     s/p sleeve gastrectomy   • Peripheral edema    • Polycystic ovary syndrome 2011   • Seasonal allergies    • Slow to wake up after anesthesia      Past Surgical History:   Procedure Laterality Date   • BARIATRIC SURGERY     • ENDOSCOPY N/A 6/15/2021    Procedure: ESOPHAGOGASTRODUODENOSCOPY;  Surgeon: Ayaka Andre MD;  Location:  [facility];  Service: Robotics - DaVinci;  Laterality: N/A;   • ENDOSCOPY N/A 10/28/2021    Procedure: ESOPHAGOGASTRODUODENOSCOPY WITH ACHALASIA BALLOON DILATATION;  Surgeon: Jase Hernandez MD;  Location:  [facility];  Service: Gastroenterology;  Laterality: N/A;  58 F DILATOR   • ENDOSCOPY N/A 11/15/2021    Procedure: ESOPHAGOGASTRODUODENOSCOPY WITH DILATATION;  Surgeon: Jase Hernandez MD;  Location:  [facility];  Service: Gastroenterology;  Laterality: N/A;  achalasia balloon    • ENDOSCOPY N/A 11/24/2021    Procedure: ESOPHAGOGASTRODUODENOSCOPY [facility] DUODENAL POLYPECTOPMY AND NASAL JEJUNAL TUBE PLACEMENT;  Surgeon: Jase Hernandez MD;  Location:  [facility];  Service: Gastroenterology;  Laterality: N/A;  placement of feeding tube, checked position with KUB   • GASTRIC RESTRICTION SURGERY  2021    Sleeve   • GASTRIC SLEEVE LAPAROSCOPIC N/A 6/15/2021    Procedure: GASTRIC SLEEVE LAPAROSCOPIC WITH DAVINCI ROBOT, LAPROSCOPIC HIATAL HERNIA REPAIR WITH DAVINCI ROBOT;  Surgeon: Ayaka Andre MD;  Location:  [facility];  Service: Robotics - DaVinci;  Laterality: N/A;   • KELOID EXCISION      1990,1993,1999,2015,2016,2019   • LAPAROSCOPIC CHOLECYSTECTOMY  2000    for stones   • RADIOFREQUENCY ABLATION  2019    x 2  for pt back   • UMBILICAL HERNIA REPAIR  1990   • WISDOM TOOTH EXTRACTION  1994     Outpatient Medications Marked as Taking for the 3/25/22 encounter (Office Visit) with Ayaka Andre  MD Toño   Medication Sig Dispense Refill   • acetaminophen (TYLENOL) 500 MG tablet Take 500-1,000 mg by mouth Every 6 (Six) Hours As Needed for Mild Pain .     • albuterol (PROVENTIL HFA;VENTOLIN HFA) 108 (90 BASE) MCG/ACT inhaler Inhale 2 puffs Every 4 (Four) Hours As Needed for wheezing.     • carvedilol (COREG) 6.25 MG tablet Take 1 tab in AM and 2 tab in  tablet 0   • cyclobenzaprine (FLEXERIL) 10 MG tablet Take 10 mg by mouth 3 (Three) Times a Day As Needed for Muscle Spasms.     • diphenhydrAMINE (BENADRYL) 25 mg capsule Take 25 mg by mouth Every 6 (Six) Hours As Needed for Itching or Sleep.     • Fremanezumab-vfrm 225 MG/1.5ML solution auto-injector Inject 225 mg under the skin into the appropriate area as directed Every 28 (Twenty-Eight) Days. 1.5 mL 11   • furosemide (LASIX) 20 MG tablet TAKE ONE TO TWO TABLETS BY MOUTH EVERY NIGHTLY 135 tablet 2   • gabapentin (NEURONTIN) 300 MG capsule      • guaiFENesin (HUMIBID 3) 400 MG tablet Take 200 mg by mouth As Needed.     • HYDROcodone-acetaminophen (NORCO) 5-325 MG per tablet Take 1 tablet by mouth Every 6 (Six) Hours As Needed for Severe Pain . 12 tablet 0   • levonorgestrel (MIRENA) 20 MCG/24HR IUD 1 each by Intrauterine route.     • metFORMIN ER (GLUCOPHAGE-XR) 500 MG 24 hr tablet Take 1 tablet by mouth Daily With Dinner. 90 tablet 3   • multivitamin with minerals tablet tablet Take 1 tablet by mouth Daily.     • OnabotulinumtoxinA (Botox) 200 units reconstituted solution FOR . PHYSICIAN TO INJECT UP  UNITS INTRAMUSCULARLY INTO HEAD, NECK AND SHOULDERS EVERY 90 DAYS. 1 each 3   • simethicone (MYLICON) 125 MG chewable tablet CHEW ONE TABLET BY MOUTH EVERY 6 HOURS AS NEEDED FOR FLATULENCE 30 tablet 0   • traMADol (ULTRAM) 50 MG tablet Take 50 mg by mouth Every 6 (Six) Hours As Needed for Moderate Pain .     • vitamin D3 125 MCG (5000 UT) capsule capsule Take 5,000 Units by mouth Daily.         Allergies   Allergen Reactions  "  • Capsicum Annuum Extract & Derivative (Bell Pepper) [Capsicum] Anaphylaxis   • Ibuprofen Other (See Comments)     \"makes me retain fluid and eventually go into CHF\"   • Peanut-Containing Drug Products Anaphylaxis   • Hydrochlorothiazide Swelling     eventually causes CHF    • Monosodium Glutamate Swelling and Headache   • Oatmeal Other (See Comments)     Dx on allergy testing   • Amitriptyline Mental Status Change     memory gaps + neuropathy + hair loss   • Aspartame Nausea Only   • Augmentin [Amoxicillin-Pot Clavulanate] Other (See Comments)     Syncope, not sure if allergic to cephalosporins.   • Codeine Headache      Can tolerate hydrocodone, no other adverse reactions to other narcotics.   • Diclofenac Headache   • Doxycycline Rash and Hallucinations   • Eggs Or Egg-Derived Products Other (See Comments)     dx on allergy testing, but does not completely avoid   • Naproxen Other (See Comments)     \"blackout\"       Social History     Socioeconomic History   • Marital status: Single   Tobacco Use   • Smoking status: Never Smoker   • Smokeless tobacco: Never Used   Vaping Use   • Vaping Use: Never used   Substance and Sexual Activity   • Alcohol use: Not Currently     Alcohol/week: 0.0 standard drinks     Comment: Very rarely drink socially. At most 2 drinks per month.   • Drug use: Never   • Sexual activity: Yes     Partners: Male     Birth control/protection: Condom, I.U.D.       /72 (BP Location: Left arm, Patient Position: Sitting, Cuff Size: Large Adult)   Pulse 93   Temp 96.8 °F (36 °C) (Temporal)   Resp 18   Ht 152.4 cm (60\")   Wt 82.1 kg (181 lb)   SpO2 97%   BMI 35.35 kg/m²     Physical Exam  Constitutional:       General: She is not in acute distress.     Appearance: She is well-developed. She is not diaphoretic.   HENT:      Head: Normocephalic and atraumatic.      Mouth/Throat:      Pharynx: No oropharyngeal exudate.   Eyes:      Conjunctiva/sclera: Conjunctivae normal.      Pupils: " Pupils are equal, round, and reactive to light.   Pulmonary:      Effort: Pulmonary effort is normal. No respiratory distress.   Abdominal:      General: There is no distension.      Palpations: Abdomen is soft.   Skin:     General: Skin is warm and dry.      Coloration: Skin is not pale.   Neurological:      Mental Status: She is alert and oriented to person, place, and time.      Cranial Nerves: No cranial nerve deficit.   Psychiatric:         Behavior: Behavior normal.         Thought Content: Thought content normal.           Assessment:  9 months s/p robotic assisted LSG/HHR by  on 6/15/21    ICD-10-CM ICD-9-CM   1. Fatigue, unspecified type  R53.83 780.79   2. Hypovitaminosis D  E55.9 268.9   3. Malnutrition screen  Z13.21 V77.2   4. Screening, iron deficiency anemia  Z13.0 V78.0   5. Postgastrectomy malabsorption  K91.2 579.3    Z90.3    6. Dehydration  E86.0 276.51         Plan:  Doing well. Continue w/ good food choices and healthy habits.  Continue protein >70g/day.  Continue routine exercise.  Routine bariatric labs ordered.  Continue vitamins w/ adjustments pending lab results.  Call w/ problems/concerns.     Continue every other week IVF infusions to stave off dehydration.    The patient was instructed to follow up in 3 months, sooner if needed.    note: approx 15 of the 25 minute visit was spent counseling on nutrition and necessary dietary/lifestyle modifications face to face.    Ayaka Andre MD

## 2022-03-25 NOTE — TELEPHONE ENCOUNTER
Called patient to let her know that provider will send a message to pharmacy to retry for [id number].

## 2022-03-25 NOTE — TELEPHONE ENCOUNTER
PT CALLED BACK AND STATES THAT SHE DID RECEIVE THE MESSAGE SHE WILL WAIT FOR A CALL FROM THE PHARMACY.

## 2022-03-25 NOTE — PROGRESS NOTES
PROGRESS NOTE    Data:  Frances Hartman is a 36 y.o. female who met with the undersigned for a scheduled individual therapy session from 3:00 - 3:55pm.      Clinical Maneuvering/Intervention:      The pt talked about recent stressors such as struggling with health issues (having had a cold, still not feeling like she has good balance), feeling upset by her mother, stressed/upset about having to move back in with her mother, and frustrated with how long it is taking to get a job. She was also tearful about losing her father one year ago this week. Stressors were processed individually and in detail. Venting of frustrations was conducted in order to help the pt feel less tense emotionally and gain insight into issues. Feelings were processed and validated several times in session. Perspective taking was conducted multiple times in order to help her feel less stuck, less overwhelmed, and see challenges as much more manageable. Maladaptive thought patterns were identified, challenged, and evaluated for validity in order to allow for the pt to chose different and more adaptive ways of thinking. Active listening was conducted in order to help the pt make sense of stressors and start moving towards potential solutions. The pt was assisted with finding solutions based on existing skill-set and abilities. She was assisted with identifying and applying her own abilities, (if not her dry/witty humor at times) to cope with such stressors and solve problems. Several times over, she was led or assisted with noting how intelligence, past successes, and her ability to problem solve all led themselves to her ability to face current issues. Additional moments of strength were recognized in order to provide further evidence of the abilities to face/overcome challenges. Homework was assigned tailored to pt's needs. The pt expressed gratitude for today's session.    Mental Status Exam  Hygiene:  good  Dress: normal  Attitude:   cooperative and proactive  Motor Activity: normal  Speech: normal  Mood:  depressed and sad  Affect:  congruent  Thought Processes: normal  Thought Content:  normal  Suicidal Thoughts:  not endorsed  Homicidal Thoughts:  not endorsed  Crisis Safety Plan: not needed   Hallucinations:  none      Patient's Support Network Includes:  family, friends      Progress toward goal: there is evidence to suggest that she is battling depression and low-self esteem, but she is learning new skills to combat these issues      Functional Status: moderate      Prognosis: good    Assessment      The pt presented to be struggling with depression of a moderate level. Fear often keeps her stuck in depression, as do maladaptive thought patterns. She is intelligent, good at strategic thinking, and seems to benefit by 'talking things out' and finding solutions through breaking things up into steps.      Plan      In order to diminish symptoms of depression and improve self-esteem; she will give thought to the several examples of evidence of her abilities to solve problems and use this knowledge to face aforementioned stressors (this week).    Misty Nava, PhD, LP

## 2022-03-29 ENCOUNTER — SPECIALTY PHARMACY (OUTPATIENT)
Dept: ONCOLOGY | Facility: HOSPITAL | Age: 37
End: 2022-03-29

## 2022-03-29 ENCOUNTER — DOCUMENTATION (OUTPATIENT)
Dept: ONCOLOGY | Facility: HOSPITAL | Age: 37
End: 2022-03-29

## 2022-03-29 RX ORDER — ATOGEPANT 60 MG/1
60 TABLET ORAL DAILY
Qty: 30 TABLET | Refills: 11 | Status: SHIPPED | OUTPATIENT
Start: 2022-03-29 | End: 2022-03-29 | Stop reason: SDUPTHER

## 2022-03-29 RX ORDER — ATOGEPANT 60 MG/1
60 TABLET ORAL DAILY
Qty: 30 TABLET | Refills: 11 | Status: SHIPPED | OUTPATIENT
Start: 2022-03-29 | End: 2022-07-15

## 2022-03-29 NOTE — PROGRESS NOTES
Specialty Pharmacy Patient Management Program  Neurology Initial Assessment     Frances Hartman is a 36 y.o. female with migraines seen by a Neurology provider and enrolled in the Neurology Patient Management program offered by [facility].  An initial outreach was conducted, including assessment of therapy appropriateness and specialty medication education for new start Qulipta and continuation of Botox.  Qulipta appeal approved per insurance after ISR with Lola. The patient was introduced to services offered by [facility], including: regular assessments, refill coordination, curbside pick-up or mail order delivery options, prior authorization maintenance, and financial assistance programs as applicable. The patient was also provided with contact information for the pharmacy team. Qulipta samples were given to patient in the office on 3/8/22.    Insurance Coverage & Financial Support  [place] Medicaid    Relevant Past Medical History and Comorbidities  Relevant medical history and concomitant health conditions were discussed with the patient. The patient's chart has been reviewed for relevant past medical history and comorbid health conditions and updated as necessary.   Past Medical History:   Diagnosis Date   • Anxiety    • Asthma     prn inhalers, does not follow w/ pulmonary   • Chronic back pain     previously followed w/ pain management, now just prn Tylenol + Gabapentin   • Chronic deep vein thrombosis (DVT) of femoral vein of right lower extremity (HCC) 9/10/2021   • Diabetes mellitus (HCC)     Type 2, dx 2014, never on insulin, A1C 7.6   • Diabetes mellitus (HCC)     Type II, dx 2014, never on insulin, A1C 7.6    • Dyspepsia    • Dyspnea on exertion    • Fatigue    • Gastroparesis    • GERD (gastroesophageal reflux disease)    • Heartburn     chronic, prn TUMS, denies prior eval   • Hirsutism    • Hx of radiation therapy     for treatments of Keloids    • Hypertension    • Irregular menses     infrequent spotting   • Leiomyoma, subserous     possible peduculated f3-4 cm fibroid on u/s at [facility]   • Migraines     botox q3 months, following Neurology @ [facility]   • Morbid obesity (HCC)     s/p sleeve gastrectomy   • Peripheral edema    • Polycystic ovary syndrome 2011   • Seasonal allergies    • Slow to wake up after anesthesia      Social History     Socioeconomic History   • Marital status: Single   Tobacco Use   • Smoking status: Never Smoker   • Smokeless tobacco: Never Used   Vaping Use   • Vaping Use: Never used   Substance and Sexual Activity   • Alcohol use: Not Currently     Alcohol/week: 0.0 standard drinks     Comment: Very rarely drink socially. At most 2 drinks per month.   • Drug use: Never   • Sexual activity: Yes     Partners: Male     Birth control/protection: Condom, I.U.D.       Problem list reviewed by Nancy Solano, PharmD on 3/29/2022 at 11:04 AM    Allergies  Known allergies and reactions were discussed with the patient. The patient's chart has been reviewed for  allergy information and updated as necessary.   Capsicum annuum extract & derivative (bell pepper) [capsicum], Ibuprofen, Peanut-containing drug products, Hydrochlorothiazide, Monosodium glutamate, Oatmeal, Amitriptyline, Aspartame, Augmentin [amoxicillin-pot clavulanate], Codeine, Diclofenac, Doxycycline, Eggs or egg-derived products, and Naproxen    Allergies reviewed by Nancy Solano, PharmD on 3/29/2022 at 11:08 AM    Current Medication List  This medication list has been reviewed with the patient and evaluated for any interactions or necessary modifications/recommendations, and updated to include all prescription medications, OTC medications, and supplements the patient is currently taking.  This list reflects what is contained in the patient's profile, which has also been marked as reviewed to communicate to other providers it is the most up to date version of the patient's current  medication therapy.     Current Outpatient Medications:   •  acetaminophen (TYLENOL) 500 MG tablet, Take 500-1,000 mg by mouth Every 6 (Six) Hours As Needed for Mild Pain ., Disp: , Rfl:   •  albuterol (PROVENTIL HFA;VENTOLIN HFA) 108 (90 BASE) MCG/ACT inhaler, Inhale 2 puffs Every 4 (Four) Hours As Needed for wheezing., Disp: , Rfl:   •  Atogepant (Qulipta) 60 MG tablet, Take 1 tablet by mouth Daily., Disp: 30 tablet, Rfl: 11  •  Blood Glucose Monitoring Suppl (ONE TOUCH ULTRA 2) w/Device kit, , Disp: , Rfl:   •  Blood Glucose Monitoring Suppl device, Use BID to test blood sugars dx e11.65, Disp: 1 each, Rfl: 0  •  carvedilol (COREG) 6.25 MG tablet, Take 1 tab in AM and 2 tab in PM, Disp: 270 tablet, Rfl: 0  •  cyclobenzaprine (FLEXERIL) 10 MG tablet, Take 10 mg by mouth 3 (Three) Times a Day As Needed for Muscle Spasms., Disp: , Rfl:   •  diphenhydrAMINE (BENADRYL) 25 mg capsule, Take 25 mg by mouth Every 6 (Six) Hours As Needed for Itching or Sleep., Disp: , Rfl:   •  furosemide (LASIX) 20 MG tablet, TAKE ONE TO TWO TABLETS BY MOUTH EVERY NIGHTLY, Disp: 135 tablet, Rfl: 2  •  gabapentin (NEURONTIN) 300 MG capsule, , Disp: , Rfl:   •  glucose blood test strip, USe BID to test blood sugar DX.e11.65, Disp: 100 each, Rfl: 3  •  guaiFENesin (HUMIBID 3) 400 MG tablet, Take 200 mg by mouth As Needed., Disp: , Rfl:   •  HYDROcodone-acetaminophen (NORCO) 5-325 MG per tablet, Take 1 tablet by mouth Every 6 (Six) Hours As Needed for Severe Pain ., Disp: 12 tablet, Rfl: 0  •  Lancets 33G misc, 1 Each/kg 2 (two) times a day. To test blood sugars DxE11.65, Disp: 100 each, Rfl: 2  •  levonorgestrel (MIRENA) 20 MCG/24HR IUD, 1 each by Intrauterine route., Disp: , Rfl:   •  metFORMIN ER (GLUCOPHAGE-XR) 500 MG 24 hr tablet, Take 1 tablet by mouth Daily With Dinner., Disp: 90 tablet, Rfl: 3  •  multivitamin with minerals tablet tablet, Take 1 tablet by mouth Daily., Disp: , Rfl:   •  OnabotulinumtoxinA (Botox) 200 units  reconstituted solution, FOR . PHYSICIAN TO INJECT UP  UNITS INTRAMUSCULARLY INTO HEAD, NECK AND SHOULDERS EVERY 90 DAYS., Disp: 1 each, Rfl: 3  •  simethicone (MYLICON) 125 MG chewable tablet, CHEW ONE TABLET BY MOUTH EVERY 6 HOURS AS NEEDED FOR FLATULENCE, Disp: 30 tablet, Rfl: 0  •  traMADol (ULTRAM) 50 MG tablet, Take 50 mg by mouth Every 6 (Six) Hours As Needed for Moderate Pain ., Disp: , Rfl:   •  vitamin D3 125 MCG (5000 UT) capsule capsule, Take 5,000 Units by mouth Daily., Disp: , Rfl:   No current facility-administered medications for this visit.    Medicines reviewed by Nancy Solano, PharmD on 3/29/2022 at 10:35 AM    Drug Interactions  none     Recommended Medications Assessment    None      Relevant Laboratory Values    Common labs    Common Labsle 12/15/21 12/15/21 2/1/22 3/1/22 3/1/22    1621 1621  1930 1930   Glucose  125 (A)   142 (A)   BUN  7   5 (A)   Creatinine  0.68   0.95   eGFR Non  Am  111      eGFR African Am  128      Sodium  143   139   Potassium  3.9   4.2   Chloride  107 (A)   100   Calcium  8.5 (A)   10.1   Total Protein  5.9 (A)      Albumin  3.3 (A)   4.20   Total Bilirubin  0.3   0.4   Alkaline Phosphatase  138 (A)   165 (A)   AST (SGOT)  22   27   ALT (SGPT)  28   35 (A)   WBC 9.3   8.80    Hemoglobin 11.1   13.6    Hematocrit 34.6   42.5    Platelets 329   251    Hemoglobin A1C   5.6     (A) Abnormal value       Comments are available for some flowsheets but are not being displayed.             Initial Education Provided for Specialty Medication  The patient has been provided with the following education and any applicable administration techniques (i.e. self-injection) have been demonstrated for the therapies indicated. All questions and concerns have been addressed prior to the patient receiving the medication, and the patient has verbalized understanding of the education and any materials provided.  Additional patient education shall be  provided and documented upon request by the patient, provider or payer.      Atogepant (Qulipta) 60 mg po once daily           Medication Expectations   Why am I taking this medication? You are taking this medication for migraine prophylaxis.   What should I expect while on this medication? You should expect to a decrease in the frequency and severity of your migraines.   How does the medication work? Qulipta is a monoclonal antibody that binds to calcitonin gene-related peptide (CGRP) and blocks its binding to the receptor decreasing the severity of migraines.   How long will I be on this medication for? The amount of time you will be on this medication will be determined by your doctor and your response to the medication.    How do I take this medication? Take as directed on your prescription label.   Take one tablet daily with or without food.   What are some possible side effects? Potential side effects including, but not limited to nausea, constipation and fatigue. Pt verbalized understanding.   What happens if I miss a dose? If you miss a dose of this medicine, take it as soon as possible. However, if it is almost time for your next dose, skip the missed dose and go back to your regular dosing schedule. Do not double doses.                  Medication Safety   What are things I should warn my doctor immediately about? Hypersensitivity reactions, such as trouble breathing or swallowing.   What are things that I should be cautious of? Be cautious driving until you know how this drug will affect you.   What are some medications that can interact with this one? Ketoconazole, Itraconazole, cyclosporin, clarithromycin, rifampin, carbamazepine, phenytion, Eden's Wort, efavirenz, and etravine.            Medication Storage/Handling   How should I handle this medication? Keep this medication our of reach of pets/children in original container.   How does this medication need to be stored? Store at room temperature  away from heat/cold, sunlight or moisture.   How should I dispose of this medication? There should not be a need to dispose of this medication unless your provider decides to change the dose or therapy. If that is the case, take to your local police station for proper disposal. Some pharmacies also have take-back bins for medication drop-off.             Resources/Support   How can I remind myself to take this medication? You can download reminder apps to help you manage your refills. You may also set an alarm on your phone to remind you. The pharmacy carries pill boxes that you can place next to an area you pass everyday (such as where you place your car keys or where you charge your phone)   Is financial support available?  Yes, [email or web address] can provide co-pay cards if you have commercial insurance or patient assistance if you have Medicare or no insurance.    Which vaccines are recommended for me? Talk to your doctor about these vaccines: Flu, Coronavirus (COVID-19), Pneumococcal (pneumonia), Tdap, Hepatitis B, Zoster (shingles)            Adherence and Self-Administration  • Barriers to Patient Adherence and/or Self-Administration: none    • Methods for Supporting Patient Adherence and/or Self-Administration: none     Goals of Therapy   Goals     • Specialty Pharmacy General Goal      Reduction in frequency and severity of migraines  3/29/22 changing from Ajovy (ISR) to Qulipta               Reassessment Plan & Follow-Up  1. Medication Therapy Changes: D/C Ajovy due to ISR and start Qulpta 60 mg po once daily to start around 4/10/22. (did not receive all of Ajovy dose with last injection)  2. Additional Plans, Therapy Recommendations, or Therapy Problems to Be Addressed: none   3. Pharmacist to perform regular reassessments no more than (6) months from the previous assessment.  4. Welcome information and patient satisfaction survey to be sent by retail team with patient's initial fill.  5. Care Coordinator to set up  future refill outreaches, coordinate prescription delivery, and escalate clinical questions to pharmacist.     Attestation  I attest that the initiated specialty medication(s) are appropriate for the patient based on my assessment.  If the prescribed therapy is at any point deemed not appropriate based on the current or future assessments, a consultation will be initiated with the patient's specialty care provider to determine the best course of action. The revised plan of therapy will be documented along with any additional patient education provided.     Nancy Solano, PharmD  3/29/2022  11:23 EDT

## 2022-03-29 NOTE — PROGRESS NOTES
Specialty Pharmacy Refill Coordination Note     Frances is a 36 y.o. female contacted today regarding refills of  Qulipta specialty medication(s).    Reviewed and verified with patient:  Allergies  Meds  Problems         Specialty medication(s) and dose(s) confirmed: yes        Delivery Questions    Flowsheet Row Most Recent Value   Delivery method [employer]   Delivery address correct? No   Preferred delivery time? Anytime   Number of medications in delivery 1   Medication being filled and delivered Qulipta   Doses left of specialty medications New Start   Is there any medication that is due not being filled? No   Supplies needed? No supplies needed   Cooler needed? No   Do any medications need mixed or dated? No   Copay form of payment Payment plan already set up   Questions or concerns for the pharmacist? No   Are any medications first time fills? Yes            Medication Adherence    Demonstrates understanding of importance of adherence: yes  Informant: patient  Reliability of informant: reliable  Provider-estimated medication adherence level: %  Reasons for non-adherence: no problems identified  Adherence tools used: cell phone  Support network for adherence: healthcare provider          Follow-up: 1 month(s)     Sandy Madden, Pharmacy Technician  Specialty Pharmacy Technician

## 2022-03-30 ENCOUNTER — LAB (OUTPATIENT)
Dept: LAB | Facility: HOSPITAL | Age: 37
End: 2022-03-30

## 2022-03-30 ENCOUNTER — OFFICE VISIT (OUTPATIENT)
Dept: OBSTETRICS AND GYNECOLOGY | Facility: CLINIC | Age: 37
End: 2022-03-30

## 2022-03-30 VITALS
WEIGHT: 181.4 LBS | DIASTOLIC BLOOD PRESSURE: 84 MMHG | HEIGHT: 60 IN | BODY MASS INDEX: 35.61 KG/M2 | SYSTOLIC BLOOD PRESSURE: 132 MMHG

## 2022-03-30 DIAGNOSIS — E28.2 POLYCYSTIC OVARIES: ICD-10-CM

## 2022-03-30 DIAGNOSIS — E66.9 OBESITY (BMI 30-39.9): ICD-10-CM

## 2022-03-30 DIAGNOSIS — Z12.4 SCREENING FOR CERVICAL CANCER: ICD-10-CM

## 2022-03-30 DIAGNOSIS — N89.8 VAGINAL DISCHARGE: ICD-10-CM

## 2022-03-30 DIAGNOSIS — Z30.431 IUD CHECK UP: ICD-10-CM

## 2022-03-30 DIAGNOSIS — D25.2 LEIOMYOMA, SUBSEROUS: ICD-10-CM

## 2022-03-30 DIAGNOSIS — Z98.84 S/P BARIATRIC SURGERY: ICD-10-CM

## 2022-03-30 DIAGNOSIS — N93.9 ABNORMAL UTERINE BLEEDING: Primary | ICD-10-CM

## 2022-03-30 PROBLEM — R30.0 DYSURIA: Status: RESOLVED | Noted: 2022-03-07 | Resolved: 2022-03-30

## 2022-03-30 PROCEDURE — 87081 CULTURE SCREEN ONLY: CPT

## 2022-03-30 PROCEDURE — 99213 OFFICE O/P EST LOW 20 MIN: CPT | Performed by: OBSTETRICS & GYNECOLOGY

## 2022-03-30 NOTE — PROGRESS NOTES
Chief Complaint   Patient presents with   • Follow-up       Subjective   HPI  Frances Hartman is a 36 y.o. female, , who presents for follow up for bladder infection. Bladder symptoms have resolved other than some difficulty empying, also has a whitish pasty discharge and vaginal dryness.      She states she has experienced this problem for 2 days.  She describes the severity as mild.  She states that the problem is annoying.  The patient reports additional symptoms as none.      Her last LMP was Patient's last menstrual period was 2021 (approximate)..  Periods are rare, lasting 7 days.  Dysmenorrhea:mild, occurring first 1-2 days of flow.  Patient reports problems with: none.  Partner Status: Marital Status: single.  New Partners since last visit: no.  Desires STD Screening: no.    Additional OB/GYN History   Current contraception: contraceptive methods: None  Desires to: continue contraception  Last Pap : 21  Last Completed Pap Smear          PAP SMEAR (Every 3 Years) Next due on 2021  SCANNED - PAP SMEAR    2019  Done - Dr. Malone              History of abnormal Pap smear: no  Last mammogram: 20  Last Completed Mammogram          Ordered - MAMMOGRAM (Every 2 Years) Ordered on 2020  Mammo Diagnostic Digital Tomosynthesis Bilateral With CAD    05/15/2020  Mammo Diagnostic Bilateral With CAD    2019  Mammo Diagnostic Digital Tomosynthesis Bilateral With CAD    2019  SCANNED - MAMMO    10/01/2019  Mammo Screening Digital Tomosynthesis Bilateral With CAD              Tobacco Usage?: No   OB History        9    Para        Term   0            AB        Living   0       SAB        IAB        Ectopic        Molar        Multiple        Live Births                    Health Maintenance   Topic Date Due   • Hepatitis B (1 of 3 - Risk 3-dose series) Never done   • DIABETIC FOOT EXAM  Never done   • DIABETIC EYE EXAM  Never done  "  • LIPID PANEL  06/04/2021   • URINE MICROALBUMIN  06/04/2021   • ANNUAL PHYSICAL  06/13/2021   • INFLUENZA VACCINE  Never done   • HEMOGLOBIN A1C  08/01/2022   • Annual Gynecologic Pelvic and Breast Exam  08/05/2022   • MAMMOGRAM  11/05/2022   • PAP SMEAR  08/04/2024   • TDAP/TD VACCINES (2 - Td or Tdap) 01/15/2029   • Pneumococcal Vaccine 0-64 (2 of 2 - PPSV23) 11/16/2048   • HEPATITIS C SCREENING  Completed   • COVID-19 Vaccine  Completed       The additional following portions of the patient's history were reviewed and updated as appropriate: allergies, current medications, past family history, past medical history, past social history, past surgical history and problem list.    Review of Systems   Constitutional: Negative for unexpected weight loss.   Respiratory: Negative.    Cardiovascular: Negative.    Gastrointestinal: Negative for abdominal distention and abdominal pain.   Genitourinary: Positive for vaginal discharge. Negative for dysuria and frequency.   Musculoskeletal: Negative for back pain.       I have reviewed and agree with the HPI, ROS, and historical information as entered above. Albert Malone MD    Objective   /84   Ht 152.4 cm (60\")   Wt 82.3 kg (181 lb 6.4 oz)   LMP 09/13/2021 (Approximate)   Breastfeeding No   BMI 35.43 kg/m²     Physical Exam  Vitals and nursing note reviewed. Exam conducted with a chaperone present.   Genitourinary:     General: Normal vulva.      Exam position: Lithotomy position.      Labia:         Right: No rash, tenderness or lesion.         Left: No rash, tenderness or lesion.       Urethra: No urethral pain, urethral swelling or urethral lesion.      Vagina: Normal. No vaginal discharge, tenderness or lesions.      Cervix: No cervical motion tenderness, discharge, lesion or cervical bleeding.      Rectum: No external hemorrhoid.      Comments: Chaperone Present; minimal white vaginal discharge.  Cervix without lesions.  IUD string " present.        Assessment/Plan     Assessment     Problem List Items Addressed This Visit     Abnormal uterine bleeding - Primary    Overview     H/o; Improved with Mirena IUD.            Obesity (BMI 30-39.9)    Polycystic ovaries    Overview     severe insulin resistance/hirsuitism           Leiomyoma, subserous    Overview     possible peduculated f3-4 cm fibroid on u/s at [facility]           IUD check up    Overview     Mirena IUD insertion 5/20/2020.  Due for removal 5/20/2025           S/P bariatric surgery    Overview     Gastric Sleeve Deepthi 15/2021; Dr Trujillo           Screening for cervical cancer    Overview     Pap smear 8/4/2021 was negative.  STD screening negative.             Other Visit Diagnoses     Vaginal discharge        Wet prep was negative.  Culture sent for yeast and group B strep.    Relevant Orders    Candida panel, PCR - Swab, Vagina    Group B Streptococcus Culture - Swab, Vagina          1.  Vaginal discharge.  Patient complains of white vaginal discharge with mild itching.  For 2 days.  Wet mount was negative.  2.  Dysuria resolved post Macrodantin.  Patient did not tolerate Macrobid well due to GI upset.  Urine culture was negative.    Plan     Return for Annual physical.  1. Group B strep culture and new swab for yeast sent.  2. Based treatment depending on culture results.      Albert Malone MD  03/30/2022

## 2022-04-01 ENCOUNTER — OFFICE VISIT (OUTPATIENT)
Dept: BEHAVIORAL HEALTH | Facility: CLINIC | Age: 37
End: 2022-04-01

## 2022-04-01 DIAGNOSIS — R45.81 POOR SELF ESTEEM: ICD-10-CM

## 2022-04-01 DIAGNOSIS — F33.1 MODERATE EPISODE OF RECURRENT MAJOR DEPRESSIVE DISORDER: Primary | ICD-10-CM

## 2022-04-01 LAB
C ALBICANS DNA VAG QL NAA+PROBE: NEGATIVE
C GLABRATA DNA VAG QL NAA+PROBE: NEGATIVE
C KRUSEI DNA VAG QL NAA+PROBE: NEGATIVE
C LUSITANIAE DNA VAG QL NAA+PROBE: NEGATIVE
CANDIDA DNA VAG QL NAA+PROBE: NEGATIVE

## 2022-04-01 PROCEDURE — 90834 PSYTX W PT 45 MINUTES: CPT | Performed by: PSYCHOLOGIST

## 2022-04-01 NOTE — PROGRESS NOTES
PROGRESS NOTE    Data:  Frances Hartman is a 36 y.o. female who met with the undersigned for a scheduled individual therapy session from 2:00 - 3:00pm.      Clinical Maneuvering/Intervention:      The pt talked about recent stressors such as arguing with her mother, worried about moving back in with her mother, stressed about packing up/moving her things out of her place, and generally feeling down about her life. At the same time, some progress in life was noted in session over the course of the hour. Stressors were processed individually and in detail. Venting of frustrations was conducted in order to help the pt feel less tense emotionally and gain insight into issues. Feelings were processed and validated several times in session. Perspective taking was conducted multiple times in order to help her feel less stuck, less overwhelmed, and see challenges as much more manageable. Active listening was conducted in order to help the pt make sense of stressors and start moving towards potential solutions. The pt was assisted with finding solutions based on existing skill-set and abilities. The pt was assisted in recognizing progress in order to show encouragement and promote motivation to keep making positive changes in life. She is more centered, less distressed (despite having stress in life), a little more relaxed, etc. An intervention of leading her to her own progress/pointing out her own progress and then helping her note what she is (fully capable) of doing next to improve her life was conducted. She was able to note some about how she can and will work better with her mother, based on knowing what 'works' in interacting with her. Then the pt went so far as to express some respect she has for her mother for apologizing to the pt for being negative/critical. Some humor was used in session as it is one of the pt's coping skills. Humor was used too, to help the pt see things as less challenging and more  manageable than they first seemed. Humor was used as well, to model use of the skill as a way to decrease tension ongoing. The pt expressed gratitude for today's session.    Mental Status Exam  Hygiene:  good  Dress: normal  Attitude:  cooperative and proactive  Motor Activity: normal  Speech: normal  Mood:  depressed and sad  Affect:  congruent  Thought Processes: normal  Thought Content:  normal  Suicidal Thoughts:  not endorsed  Homicidal Thoughts:  not endorsed  Crisis Safety Plan: not needed   Hallucinations:  none      Patient's Support Network Includes:  family, friends      Progress toward goal: there is evidence to suggest that she is battling depression and low-self esteem, but she is learning new skills to combat these issues      Functional Status: moderate      Prognosis: good    Assessment      The pt presented to be struggling with depression of a moderate level. Fear often keeps her stuck in depression, as do maladaptive thought patterns. She is intelligent, good at strategic thinking, and seems to benefit by 'talking things out' and finding solutions through breaking things up into steps.      Plan      In order to diminish symptoms of depression and improve self-esteem; she will purposefully use what she knows about her mother and personal skills (intelligence, strategy, love for her) to interact with her more efficiently when she moves back in (this week). She will continue with counseling in order to improve her self-esteem and mood. Interventions of noticing progress that she elicited based on strengths will likely continue ongoing.    Misty Nava, PhD, LP

## 2022-04-02 LAB — BACTERIA SPEC AEROBE CULT: NORMAL

## 2022-04-06 RX ORDER — SIMETHICONE 125 MG
TABLET,CHEWABLE ORAL
Qty: 30 TABLET | Refills: 0 | Status: SHIPPED | OUTPATIENT
Start: 2022-04-06 | End: 2022-05-09

## 2022-04-06 NOTE — TELEPHONE ENCOUNTER
Rx Refill Note  Requested Prescriptions     Pending Prescriptions Disp Refills   • simethicone (MYLICON) 125 MG chewable tablet [Pharmacy Med Name: SIMETHICONE 125 MG TAB CHEW] 30 tablet 0     Sig: CHEW ONE TABLET BY MOUTH EVERY 6 HOURS AS NEEDED FOR FLATULENCE      Last office visit with prescribing clinician: 3/8/2022      Next office visit with prescribing clinician: 9/9/2022            Aracely Murillo MA  04/06/22, 09:14 EDT

## 2022-04-08 ENCOUNTER — HOSPITAL ENCOUNTER (OUTPATIENT)
Dept: ONCOLOGY | Facility: HOSPITAL | Age: 37
Setting detail: INFUSION SERIES
Discharge: HOME | End: 2022-04-08

## 2022-04-08 VITALS
BODY MASS INDEX: 35.73 KG/M2 | WEIGHT: 182 LBS | SYSTOLIC BLOOD PRESSURE: 139 MMHG | HEART RATE: 73 BPM | TEMPERATURE: 98 F | RESPIRATION RATE: 16 BRPM | DIASTOLIC BLOOD PRESSURE: 93 MMHG | HEIGHT: 60 IN

## 2022-04-08 DIAGNOSIS — R11.2 INTRACTABLE NAUSEA AND VOMITING: ICD-10-CM

## 2022-04-08 DIAGNOSIS — R11.10 INTRACTABLE VOMITING: Primary | ICD-10-CM

## 2022-04-08 PROCEDURE — 96360 HYDRATION IV INFUSION INIT: CPT

## 2022-04-08 PROCEDURE — 96361 HYDRATE IV INFUSION ADD-ON: CPT

## 2022-04-08 RX ORDER — SODIUM CHLORIDE 9 MG/ML
250 INJECTION, SOLUTION INTRAVENOUS ONCE
Status: COMPLETED | OUTPATIENT
Start: 2022-04-08 | End: 2022-04-08

## 2022-04-08 RX ORDER — SODIUM CHLORIDE 9 MG/ML
125 INJECTION, SOLUTION INTRAVENOUS ONCE
Status: CANCELLED | OUTPATIENT
Start: 2022-05-06

## 2022-04-08 RX ADMIN — SODIUM CHLORIDE 250 ML/HR: 9 INJECTION, SOLUTION INTRAVENOUS at 13:00

## 2022-04-09 PROCEDURE — 87086 URINE CULTURE/COLONY COUNT: CPT | Performed by: NURSE PRACTITIONER

## 2022-04-12 RX ORDER — SODIUM CHLORIDE 9 MG/ML
500 INJECTION, SOLUTION INTRAVENOUS ONCE
Status: CANCELLED | OUTPATIENT
Start: 2022-05-06

## 2022-04-15 ENCOUNTER — OFFICE VISIT (OUTPATIENT)
Dept: CARDIOLOGY | Facility: CLINIC | Age: 37
End: 2022-04-15

## 2022-04-15 VITALS
DIASTOLIC BLOOD PRESSURE: 88 MMHG | BODY MASS INDEX: 35.73 KG/M2 | WEIGHT: 182 LBS | OXYGEN SATURATION: 98 % | SYSTOLIC BLOOD PRESSURE: 130 MMHG | HEIGHT: 60 IN | HEART RATE: 64 BPM

## 2022-04-15 DIAGNOSIS — I10 ESSENTIAL HYPERTENSION: ICD-10-CM

## 2022-04-15 DIAGNOSIS — E78.2 MIXED HYPERLIPIDEMIA: ICD-10-CM

## 2022-04-15 DIAGNOSIS — R00.2 PALPITATIONS: Primary | ICD-10-CM

## 2022-04-15 PROCEDURE — 99213 OFFICE O/P EST LOW 20 MIN: CPT | Performed by: INTERNAL MEDICINE

## 2022-04-15 NOTE — PROGRESS NOTES
"[facility] Cardiology    Encounter Date: 04/15/2022    Patient ID: Frances Hartman is a 36 y.o. female.  : 1985     PCP: Lesli Ramirez PA-C       Chief Complaint: Palpitations      PROBLEM LIST:  1. Hypertension:  a. Echo, 2014: EF 55-60%. All valves are structurally and functionally normal with no hemodynamically significant valve disease.  b. Echo, 2017: EF 65%. Mild TR with normal RVSP.  c. Echo, 2021: EF 60%. No significant valvular abnormalities.  2. Hyperlipidemia.  3. Chest pain:  a. Pet MPS, 2019: EF > 70%. No evidence of reversible ischemia.  b. Normal bilateral lower extremity venous duplex, 2021  4. Palpitations:  a. 2-week Zio, 2020: SR. <1% PACs/PVCs. One short SVT (4 beats). Average HR 74 bpm.  5. DM2.  6. Morbid obesity (BMI 54.7)  7. Peripheral chronic venous insufficiency.  8. Polycystic ovaries.  9. GERD.  10. Subclinical hypothyroidism.  11. Keloid skin disorder.  12. Chronic fatigue.  13. Chronic tonsillar hypertrophy.  14. Depression.  15. Migraine with aura, onset age 8.    History of Present Illness  Patient presents today for a follow-up with history of palpitations hypertension and chest pain syndrome.  The patient underwent bariatric surgery and then was hospitalized in November with intractable vomiting and associated chest pain.  She had a prolonged hospital course.  Since then she has gradually recovered and continues to lose weight.  States that she does not have as much vomiting however still finds it difficult to eat.  Her blood pressure has been much better controlled and she has been taken off multiple medications.   She is denying chest pain, shortness of breath, orthopnea, palpitations, edema, dizziness, and syncope.       Allergies   Allergen Reactions   • Capsicum Annuum Extract & Derivative (Bell Pepper) [Capsicum] Anaphylaxis   • Ibuprofen Other (See Comments)     \"makes me retain fluid " "and eventually go into CHF\"   • Macrobid [Nitrofurantoin] Nausea Only   • Peanut-Containing Drug Products Anaphylaxis   • Hydrochlorothiazide Swelling     eventually causes CHF    • Monosodium Glutamate Swelling and Headache   • Oatmeal Other (See Comments)     Dx on allergy testing   • Amitriptyline Mental Status Change     memory gaps + neuropathy + hair loss   • Aspartame Nausea Only   • Augmentin [Amoxicillin-Pot Clavulanate] Other (See Comments)     Syncope, not sure if allergic to cephalosporins.   • Codeine Headache      Can tolerate hydrocodone, no other adverse reactions to other narcotics.   • Diclofenac Headache   • Doxycycline Rash and Hallucinations   • Eggs Or Egg-Derived Products Other (See Comments)     dx on allergy testing, but does not completely avoid   • Naproxen Other (See Comments)     \"blackout\"         Current Outpatient Medications:   •  acetaminophen (TYLENOL) 500 MG tablet, Take 500-1,000 mg by mouth Every 6 (Six) Hours As Needed for Mild Pain ., Disp: , Rfl:   •  albuterol (PROVENTIL HFA;VENTOLIN HFA) 108 (90 BASE) MCG/ACT inhaler, Inhale 2 puffs Every 4 (Four) Hours As Needed for wheezing., Disp: , Rfl:   •  Atogepant (Qulipta) 60 MG tablet, Take 1 tablet by mouth Daily., Disp: 30 tablet, Rfl: 11  •  Blood Glucose Monitoring Suppl (ONE TOUCH ULTRA 2) w/Device kit, , Disp: , Rfl:   •  Blood Glucose Monitoring Suppl device, Use BID to test blood sugars dx e11.65, Disp: 1 each, Rfl: 0  •  carvedilol (COREG) 6.25 MG tablet, Take 1 tab in AM and 2 tab in PM (Patient taking differently: Take 6.25 mg by mouth Daily.), Disp: 270 tablet, Rfl: 0  •  cephalexin (KEFLEX) 500 MG capsule, Take 1 capsule by mouth 2 (Two) Times a Day for 7 days., Disp: 14 capsule, Rfl: 0  •  cyclobenzaprine (FLEXERIL) 10 MG tablet, Take 10 mg by mouth 3 (Three) Times a Day As Needed for Muscle Spasms., Disp: , Rfl:   •  diphenhydrAMINE (BENADRYL) 25 mg capsule, Take 25 mg by mouth Every 6 (Six) Hours As Needed for " Itching or Sleep., Disp: , Rfl:   •  furosemide (LASIX) 20 MG tablet, TAKE ONE TO TWO TABLETS BY MOUTH EVERY NIGHTLY (Patient taking differently: Take 20 mg by mouth As Needed.), Disp: 135 tablet, Rfl: 2  •  gabapentin (NEURONTIN) 300 MG capsule, Take 600 mg by mouth Daily., Disp: , Rfl:   •  glucose blood test strip, USe BID to test blood sugar DX.e11.65, Disp: 100 each, Rfl: 3  •  guaiFENesin (HUMIBID 3) 400 MG tablet, Take 200 mg by mouth As Needed., Disp: , Rfl:   •  HYDROcodone-acetaminophen (NORCO) 5-325 MG per tablet, Take 1 tablet by mouth Every 6 (Six) Hours As Needed for Severe Pain ., Disp: 12 tablet, Rfl: 0  •  Lancets 33G misc, 1 Each/kg 2 (two) times a day. To test blood sugars DxE11.65, Disp: 100 each, Rfl: 2  •  levonorgestrel (MIRENA) 20 MCG/24HR IUD, 1 each by Intrauterine route., Disp: , Rfl:   •  metFORMIN ER (GLUCOPHAGE-XR) 500 MG 24 hr tablet, Take 1 tablet by mouth Daily With Dinner., Disp: 90 tablet, Rfl: 3  •  multivitamin with minerals tablet tablet, Take 1 tablet by mouth Daily., Disp: , Rfl:   •  OnabotulinumtoxinA (Botox) 200 units reconstituted solution, FOR . PHYSICIAN TO INJECT UP  UNITS INTRAMUSCULARLY INTO HEAD, NECK AND SHOULDERS EVERY 90 DAYS., Disp: 1 each, Rfl: 3  •  simethicone (MYLICON) 125 MG chewable tablet, CHEW ONE TABLET BY MOUTH EVERY 6 HOURS AS NEEDED FOR FLATULENCE, Disp: 30 tablet, Rfl: 0  •  traMADol (ULTRAM) 50 MG tablet, Take 50 mg by mouth Every 6 (Six) Hours As Needed for Moderate Pain ., Disp: , Rfl:   •  vitamin D3 125 MCG (5000 UT) capsule capsule, Take 5,000 Units by mouth Daily., Disp: , Rfl:     Current Facility-Administered Medications:   •  OnabotulinumtoxinA 200 Units, 200 Units, Intramuscular, Q3 Months, Shiela Coelho, APRN, 200 Units at 03/15/22 1331    The following portions of the patient's history were reviewed and updated as appropriate: allergies, current medications, past family history, past medical history,  "past social history, past surgical history and problem list.    ROS  Review of Systems   Constitution: Negative for chills, fever, fatigue, generalized weakness.   Cardiovascular: Negative for chest pain, dyspnea on exertion, leg swelling, palpitations, orthopnea, and syncope.   Respiratory: Negative for cough, shortness of breath, and wheezing.  HENT: Negative for ear pain, nosebleeds, and tinnitus.  Gastrointestinal: Negative for abdominal pain, constipation, diarrhea, nausea and vomiting.   Genitourinary: No urinary symptoms.  Musculoskeletal: Negative for muscle cramps.  Neurological: Negative for dizziness, headaches, loss of balance, numbness, and symptoms of stroke.  Psychiatric: Normal mental status.     All other systems reviewed and are negative.        Objective:     /88 (BP Location: Right arm, Patient Position: Sitting)   Pulse 64   Ht 152.4 cm (60\")   Wt 82.6 kg (182 lb)   SpO2 98%   BMI 35.54 kg/m²      Physical Exam  Constitutional: Patient appears well-developed and well-nourished.   HENT: HEENT exam unremarkable.   Neck: Neck supple. No JVD present. No carotid bruits.   Cardiovascular: Normal rate, regular rhythm and normal heart sounds. No murmur heard.   2+ symmetric pulses.   Pulmonary/Chest: Breath sounds normal. Does not exhibit tenderness.   Abdominal: Abdomen benign.   Musculoskeletal: Does not exhibit edema.   Neurological: Neurological exam unremarkable.   Vitals reviewed.    Data Review:   Lab Results   Component Value Date    GLUCOSE 142 (H) 03/01/2022    BUN 5 (L) 03/01/2022    CREATININE 0.95 03/01/2022    BCR 5.3 (L) 03/01/2022    K 4.2 03/01/2022    CO2 24.1 03/01/2022    CALCIUM 10.1 03/01/2022    ALBUMIN 4.20 03/01/2022    AST 27 03/01/2022    ALT 35 (H) 03/01/2022     Lab Results   Component Value Date    CHOL 194 06/04/2020    TRIG 214 (H) 06/04/2020    HDL 30 (L) 06/04/2020     (H) 06/04/2020      Lab Results   Component Value Date    WBC 8.80 03/01/2022    RBC " 4.43 03/01/2022    HGB 13.6 03/01/2022    HCT 42.5 03/01/2022    MCV 95.9 03/01/2022     03/01/2022     Lab Results   Component Value Date    TSH 1.890 11/22/2021     Lab Results   Component Value Date    HGBA1C 5.6 02/01/2022        Procedures       Assessment:      Diagnosis Plan   1. Palpitations  Stable and asymptomatic.    2. Essential hypertension  Continue on carvedilol 6.25 mg daily and lasix PRN.  Currently well controlled with   3. Mixed hyperlipidemia  Acceptable control.      Plan:   Continue regular exercise and current diet plan.   Her cardiac status is stable without any active complaints.  Continue current medications.   FU in 6 MO, sooner as needed.  Thank you for allowing us to participate in the care of your patient.     Scribed for Julianna Astudillo MD by Lashawn Hallman. 4/15/2022 14:56 EDT        I, Julianna Astudillo MD, personally performed the services described in this documentation as scribed by the above named individual in my presence, and it is both accurate and complete.  4/18/2022  16:56 EDT        Part of this note may be an electronic transcription/translation of spoken language to printed text using the Dragon Dictation System.

## 2022-04-21 ENCOUNTER — APPOINTMENT (OUTPATIENT)
Dept: ONCOLOGY | Facility: HOSPITAL | Age: 37
End: 2022-04-21

## 2022-04-21 ENCOUNTER — HOSPITAL ENCOUNTER (OUTPATIENT)
Dept: ONCOLOGY | Facility: HOSPITAL | Age: 37
Setting detail: INFUSION SERIES
Discharge: HOME | End: 2022-04-21

## 2022-04-21 VITALS
TEMPERATURE: 97.8 F | RESPIRATION RATE: 14 BRPM | SYSTOLIC BLOOD PRESSURE: 140 MMHG | DIASTOLIC BLOOD PRESSURE: 81 MMHG | BODY MASS INDEX: 34.96 KG/M2 | HEART RATE: 68 BPM | WEIGHT: 179 LBS

## 2022-04-21 DIAGNOSIS — R11.2 INTRACTABLE NAUSEA AND VOMITING: Primary | ICD-10-CM

## 2022-04-21 PROCEDURE — 96360 HYDRATION IV INFUSION INIT: CPT

## 2022-04-21 PROCEDURE — 96361 HYDRATE IV INFUSION ADD-ON: CPT

## 2022-04-21 PROCEDURE — 87086 URINE CULTURE/COLONY COUNT: CPT | Performed by: FAMILY MEDICINE

## 2022-04-21 RX ORDER — SODIUM CHLORIDE 9 MG/ML
500 INJECTION, SOLUTION INTRAVENOUS ONCE
Status: COMPLETED | OUTPATIENT
Start: 2022-04-21 | End: 2022-04-21

## 2022-04-21 RX ORDER — SODIUM CHLORIDE 9 MG/ML
500 INJECTION, SOLUTION INTRAVENOUS ONCE
Status: CANCELLED | OUTPATIENT
Start: 2022-05-06

## 2022-04-21 RX ADMIN — SODIUM CHLORIDE 500 ML/HR: 9 INJECTION, SOLUTION INTRAVENOUS at 14:30

## 2022-04-22 ENCOUNTER — OFFICE VISIT (OUTPATIENT)
Dept: BEHAVIORAL HEALTH | Facility: CLINIC | Age: 37
End: 2022-04-22

## 2022-04-22 ENCOUNTER — APPOINTMENT (OUTPATIENT)
Dept: ONCOLOGY | Facility: HOSPITAL | Age: 37
End: 2022-04-22

## 2022-04-22 DIAGNOSIS — R45.81 POOR SELF ESTEEM: ICD-10-CM

## 2022-04-22 DIAGNOSIS — F33.1 MODERATE EPISODE OF RECURRENT MAJOR DEPRESSIVE DISORDER: Primary | ICD-10-CM

## 2022-04-22 DIAGNOSIS — R45.4 IRRITABILITY: ICD-10-CM

## 2022-04-22 PROCEDURE — 90837 PSYTX W PT 60 MINUTES: CPT | Performed by: PSYCHOLOGIST

## 2022-04-22 NOTE — PROGRESS NOTES
PROGRESS NOTE    Data:  Frances Hartman is a 36 y.o. female who met with the undersigned for a scheduled individual therapy session from 3:05 - 4:00pm.      Clinical Maneuvering/Intervention:      The pt talked about recent stressors such as difficulty eating, moving into her mother's again, and arguing often with her mother. Stressors were processed individually and in detail. Venting of frustrations was conducted in order to help the pt feel less tense emotionally and gain insight into issues. Feelings were processed and validated several times in session. Perspective taking was conducted multiple times in order to help the pt feel less stuck, less overwhelmed, and see challenges as much more manageable. Active listening was conducted in order to help the pt make sense of stressors and start moving towards potential solutions. The pt was assisted with finding solutions based on existing skill-set and abilities. A main focus of today's session was on her relationship with her mother, possible core issues making communication difficult, and what solutions may exist in terms of improving their communication/relationship. Where there were points/times of when communication seemed effective, these were noted and room for improvement investigated. Time was allocated specifically to assess what is 'working' versus what is 'not working' in the relationship. The pt was assisted in recognizing progress in order to show encouragement and promote motivation to keep making positive changes in life. She is using assertive statements in order to stand up for herself, but she is also requesting needs from her mother (e.g., less criticizing, more transparency in what her mother wants). Some humor was used in session as it is one of the pt's coping skills. Humor was used too, to help the pt see things as less challenging and more manageable than they first seemed. Humor was used as well, to model use of the skill as a way to  decrease tension ongoing. Ways in which her mother is lovable, despite the pt's frustrations with her, were discussed as well. The pt expressed gratitude for today's session.     Mental Status Exam  Hygiene:  good  Dress: normal  Attitude:  cooperative and proactive  Motor Activity: normal  Speech: normal  Mood:  depressed and irritable  Affect:  congruent  Thought Processes: normal  Thought Content:  normal  Suicidal Thoughts:  not endorsed  Homicidal Thoughts:  not endorsed  Crisis Safety Plan: not needed   Hallucinations:  none      Patient's Support Network Includes:  family, friends      Progress toward goal: there is evidence to suggest that she is battling depression and low-self esteem, but she is learning new skills to combat these issues      Functional Status: moderate      Prognosis: good    Assessment      The pt presented to be struggling with depression of a moderate level. Negative or maladaptive thinking is often evident, and self-esteem still seems impaired. Helping her identify/clarify strength and abilities, and helping her apply these to find her own solutions seems beneficial.       Plan      In order to diminish symptoms of depression and improve self-esteem; she will continue with the assertiveness practice as needed and/or explaining clearly and calmly what she needs from her mother in their relationship (this week, ongoing as needed).     Misty Nava, PhD, LP

## 2022-04-29 ENCOUNTER — OFFICE VISIT (OUTPATIENT)
Dept: BEHAVIORAL HEALTH | Facility: CLINIC | Age: 37
End: 2022-04-29

## 2022-04-29 DIAGNOSIS — R45.81 POOR SELF ESTEEM: ICD-10-CM

## 2022-04-29 DIAGNOSIS — Z65.8 INTERPERSONAL PROBLEM: ICD-10-CM

## 2022-04-29 DIAGNOSIS — F33.1 MODERATE EPISODE OF RECURRENT MAJOR DEPRESSIVE DISORDER: Primary | ICD-10-CM

## 2022-04-29 PROCEDURE — 90837 PSYTX W PT 60 MINUTES: CPT | Performed by: PSYCHOLOGIST

## 2022-04-30 NOTE — PROGRESS NOTES
PROGRESS NOTE    Data:  Frances Hartman is a 36 y.o. female who met with the undersigned for a scheduled individual therapy session from 3:00 - 4:00pm.      Clinical Maneuvering/Intervention:      The pt talked about recent stressors such as arguing with her mother, arguing with a friend M, and feeling hurt by others in her family. Stressors were processed individually and in detail. Venting of frustrations was conducted in order to help the pt feel less tense emotionally and gain insight into issues. Feelings were processed and validated several times in session. Perspective taking was conducted multiple times in order to help the pt feel less stuck, less overwhelmed, and see challenges as much more manageable. Active listening was conducted in order to help the pt make sense of stressors and start moving towards potential solutions. The pt was assisted with finding solutions based on existing skill-set and abilities. Maladaptive thought patterns were identified, challenged, and evaluated for validity in order to allow for the pt to chose different and more adaptive ways of thinking. An action plan was designed to empower the pt to know what to do. Simple steps of one, two, three were laid out in order to help the pt feel more in control of things and feel less stressed. She was assisted with noting effective measures thus far in interacting more effectively with others: assertiveness while being respectful (with her mother, for example), knowing when to step away to calm down/re-center, and expressing what she needs in the relationship. The pt was assisted in recognizing progress in order to show encouragement and promote motivation to keep making positive changes in life. Homework was assigned tailored to pt's needs. The pt expressed gratitude for today's session.    Mental Status Exam  Hygiene:  good  Dress: normal  Attitude:  cooperative and proactive  Motor Activity: normal  Speech: normal  Mood:   depressed and irritable  Affect:  congruent  Thought Processes: normal  Thought Content:  normal  Suicidal Thoughts:  not endorsed  Homicidal Thoughts:  not endorsed  Crisis Safety Plan: not needed   Hallucinations:  none      Patient's Support Network Includes:  family, friends      Progress toward goal: there is evidence to suggest that she is battling depression and low-self esteem, but she is learning new skills to combat these issues      Functional Status: moderate      Prognosis: good    Assessment      The pt presented to be struggling with depression of a moderate level. Negative or maladaptive thinking is often evident, and self-esteem still seems impaired. Helping her identify/clarify strength and abilities, and helping her apply these to find her own solutions seems beneficial.       Plan      In order to diminish symptoms of depression and improve self-esteem; she will continue with the assertiveness practice as needed and/or explaining clearly and calmly what she needs in relationships such as with her mother and her friend M (this week, ongoing as needed).     Misty Nava, PhD, LP

## 2022-05-03 ENCOUNTER — SPECIALTY PHARMACY (OUTPATIENT)
Dept: ONCOLOGY | Facility: HOSPITAL | Age: 37
End: 2022-05-03

## 2022-05-03 NOTE — PROGRESS NOTES
Specialty Pharmacy Refill Coordination Note     Frances is a 36 y.o. female contacted today regarding refills of  Qulipta specialty medication(s).     Reviewed and verified with patient:         Specialty medication(s) and dose(s) confirmed: yes    Refill Questions    Flowsheet Row Most Recent Value   Changes to allergies? No   Changes to medications? No   New conditions since last clinic visit No   Unplanned office visit, urgent care, ED, or hospital admission in the last 4 weeks  No   How does patient/caregiver feel medication is working? Excellent   Financial problems or insurance changes  No   If yes, describe changes in insurance or financial issues. N/A   How many doses of your specialty medications were missed in the last 4 weeks? 1   Why were doses missed? N/A   Does this patient require a clinical escalation to a pharmacist? No          Delivery Questions    Flowsheet Row Most Recent Value   Delivery method [employer]   Delivery address correct? Yes   Preferred delivery time? Anytime   Number of medications in delivery 1   Medication being filled and delivered Qulipta   Doses left of specialty medications 10   Is there any medication that is due not being filled? No   Supplies needed? No supplies needed   Cooler needed? No   Do any medications need mixed or dated? No   Copay form of payment Payment plan already set up   Questions or concerns for the pharmacist? No   Are any medications first time fills? No            Medication Adherence    Adherence tools used: cell phone  Support network for adherence: healthcare provider          Follow-up: 1 month(s)     Sandy Madden, Pharmacy Technician  Specialty Pharmacy Technician

## 2022-05-05 ENCOUNTER — HOSPITAL ENCOUNTER (OUTPATIENT)
Dept: ONCOLOGY | Facility: HOSPITAL | Age: 37
Setting detail: INFUSION SERIES
Discharge: HOME | End: 2022-05-05

## 2022-05-05 DIAGNOSIS — R11.10 INTRACTABLE VOMITING: Primary | ICD-10-CM

## 2022-05-05 PROCEDURE — 96360 HYDRATION IV INFUSION INIT: CPT

## 2022-05-05 PROCEDURE — 96361 HYDRATE IV INFUSION ADD-ON: CPT

## 2022-05-05 RX ADMIN — SODIUM CHLORIDE 1000 ML: 9 INJECTION, SOLUTION INTRAVENOUS at 14:11

## 2022-05-06 ENCOUNTER — OFFICE VISIT (OUTPATIENT)
Dept: BEHAVIORAL HEALTH | Facility: CLINIC | Age: 37
End: 2022-05-06

## 2022-05-06 DIAGNOSIS — F33.1 MODERATE EPISODE OF RECURRENT MAJOR DEPRESSIVE DISORDER: Primary | ICD-10-CM

## 2022-05-06 DIAGNOSIS — Z65.8 LONELY: ICD-10-CM

## 2022-05-06 PROCEDURE — 90837 PSYTX W PT 60 MINUTES: CPT | Performed by: PSYCHOLOGIST

## 2022-05-06 NOTE — PROGRESS NOTES
PROGRESS NOTE    Data:  Frances Hartman is a 36 y.o. female who met with the undersigned for a scheduled individual therapy session from 2:10 - 3:03pm.       Clinical Maneuvering/Intervention:      The pt talked about recent stressors such as missing her boyfriend (R) who passed away several years ago. She had an argument and falling out with M, which led to a feeling of loss, but also wanting to have a boyfriend/partner in life. She then thought of R. She expressed that she can never quite meet someone who is relationship material and does not live far away. Maladaptive thought patterns were identified, challenged, and evaluated for validity in order to allow for the pt to chose different and more adaptive ways of thinking over the course of the session. Stressors were processed individually and in detail. Venting of frustrations was conducted in order to help the pt feel less tense emotionally and gain insight into issues. Feelings were processed and validated several times in session. Perspective taking was conducted multiple times in order to help the pt feel less stuck, less overwhelmed, and see challenges as much more manageable. Active listening was conducted in order to help the pt make sense of stressors and start moving towards potential solutions. The pt was assisted with finding solutions based on existing skill-set and abilities. She was assisted with processing the many happy memories involving R in order to continue to heal from the loss, but to also note what she learned from that relationship/how she grew so she can apply this to meeting someone new. Later in session she was more positive/hopeful about meeting someone and could note quite clearly that she knows herself well and what she wants in a partner. The pt's strengths were identified in order to help identify abilities to use to better face/overcome challenges. Some humor was used in session as it is one of the pt's coping skills. Humor  was used too, to help the pt see things as less challenging and more manageable than they first seemed. Humor was used as well, to model use of the skill as a way to decrease tension ongoing. The pt expressed gratitude for today's session.    Mental Status Exam  Hygiene:  good  Dress: normal  Attitude:  cooperative and proactive  Motor Activity: normal  Speech: normal  Mood:  sad, lonely  Affect:  congruent  Thought Processes: normal  Thought Content:  normal  Suicidal Thoughts:  not endorsed  Homicidal Thoughts:  not endorsed  Crisis Safety Plan: not needed   Hallucinations:  none      Patient's Support Network Includes:  family, friends      Progress toward goal: there is evidence to suggest that she is battling depression and low-self esteem, but she is learning new skills to combat these issues      Functional Status: moderate      Prognosis: good    Assessment      The pt presented to be struggling with depression of a moderate level. Negative or maladaptive thinking is often evident, and she seems to feel more lonely as of late. Helping her identify/clarify strength and abilities, and helping her apply these to find her own solutions seems beneficial.  She is intelligent and thoughtful.       Plan      In order to diminish symptoms of depression and loneliness, she is to give thought to the evidence noted today that she will meet someone good for her over time and in the meantime, she plans to go out more/be more social (this week).     Misty Nava, PhD, LP

## 2022-05-09 RX ORDER — SIMETHICONE 125 MG
TABLET,CHEWABLE ORAL
Qty: 30 TABLET | Refills: 2 | Status: SHIPPED | OUTPATIENT
Start: 2022-05-09 | End: 2022-10-03

## 2022-05-18 ENCOUNTER — OFFICE VISIT (OUTPATIENT)
Dept: NEUROLOGY | Facility: CLINIC | Age: 37
End: 2022-05-18

## 2022-05-18 VITALS
TEMPERATURE: 97.5 F | WEIGHT: 176.4 LBS | BODY MASS INDEX: 34.63 KG/M2 | HEIGHT: 60 IN | OXYGEN SATURATION: 99 % | DIASTOLIC BLOOD PRESSURE: 80 MMHG | SYSTOLIC BLOOD PRESSURE: 128 MMHG | HEART RATE: 61 BPM

## 2022-05-18 DIAGNOSIS — G43.709 CHRONIC MIGRAINE WITHOUT AURA WITHOUT STATUS MIGRAINOSUS, NOT INTRACTABLE: Primary | ICD-10-CM

## 2022-05-18 PROCEDURE — 99213 OFFICE O/P EST LOW 20 MIN: CPT | Performed by: NURSE PRACTITIONER

## 2022-05-18 RX ORDER — LORATADINE 10 MG/1
10 TABLET ORAL DAILY
COMMUNITY
End: 2022-06-06

## 2022-05-18 NOTE — PROGRESS NOTES
Subjective:     Patient ID: Frances Hartman is a 36 y.o. female.    CC:   Chief Complaint   Patient presents with   • Migraine       HPI:   History of Present Illness   36 y.o. female returns in follow up for migraines.  Last visit on 3/8/22 continued Botox and started Qulipta.     Having headaches 5 days per week, intensity has decreased with the addition of Qulipta to a mild to moderate headache. Worst days about a 6, best days nothing. Denies side effects of Qulipta, denies constipation.      Last for entire day.  Located in varying locations of head.   OTC meds helpful.      Greater than 15 HA days a month, moderate to severe intensity lasting for over 4 - 6 hours.  Continues on Botox.       Taking GBP, Flexeril, and Norco for back pain once every few months.       Preventatives:  Elavil, TPM, VPA, Emgality, Ajovy, Botox, GBP, Flexeril, Norco   Abortives: Imitrex, Maxalt    Problem history:     HA frequency is daily.  Located in front of head with squeezing and throbbing sensation moderate to severe intensity.  Awakens with HA and will decrease slightly by bedtime.  Treating with Tylenol, tramadol, Lortab on a daily.  Previous trial of triptans with minimal response.        Reviewed Dr Astudillo's notes:     Presented to [facility] ED on with /122 and left A/L numbness and confusion.  Treated with clonidine and BP decreased.  H/O migraines since 8 yoa.  2 - 3 HA a week treated with OTC.  Echo  - EF 55-60,      HCT, 7/11/17 normal  CBC, CMP, TSH, Hep B, HIV, RPR, INR - N[facility]     The following portions of the patient's history were reviewed and updated as appropriate: allergies, current medications, past family history, past medical history, past social history, past surgical history and problem list.    Past Medical History:   Diagnosis Date   • Anxiety    • Asthma     prn inhalers, does not follow w/ pulmonary   • Chronic back pain     previously followed w/ pain management, now just prn Tylenol + Gabapentin   •  Chronic deep vein thrombosis (DVT) of femoral vein of right lower extremity (HCC) 09/10/2021   • Clotting disorder (HCC) 2015   • Diabetes mellitus (HCC)     Type 2, dx 2014, never on insulin, A1C 7.6   • Diabetes mellitus (HCC)     Type II, dx 2014, never on insulin, A1C 7.6    • Dyspepsia    • Dyspnea on exertion    • Dysuria 03/07/2022    Dysuria and hematuria x 2 weeks. 3/7/22 urine culture sent. Rx Macrobid 100 mg twice daily for 7 days. Patient did not tolerate Macrobid well due to GI upset. Urine culture was negative.   • Fatigue    • Gastroparesis    • GERD (gastroesophageal reflux disease)    • Heartburn     chronic, prn TUMS, denies prior eval   • Hirsutism    • Hx of radiation therapy     for treatments of Keloids   • Hypertension    • Irregular menses     infrequent spotting   • Leiomyoma, subserous     possible peduculated f3-4 cm fibroid on u/s at [facility]   • Migraines     botox q3 months, following Neurology @ [facility]   • Morbid obesity (HCC)     s/p sleeve gastrectomy   • Peripheral edema    • Polycystic ovary syndrome 2011   • Seasonal allergies    • Slow to wake up after anesthesia        Past Surgical History:   Procedure Laterality Date   • BARIATRIC SURGERY     • ENDOSCOPY N/A 6/15/2021    Procedure: ESOPHAGOGASTRODUODENOSCOPY;  Surgeon: Ayaka Andre MD;  Location: [facility] OR;  Service: Robotics - DaVinci;  Laterality: N/A;   • ENDOSCOPY N/A 10/28/2021    Procedure: ESOPHAGOGASTRODUODENOSCOPY WITH ACHALASIA BALLOON DILATATION;  Surgeon: Jase Hernandez MD;  Location:  [facility] ENDOSCOPY;  Service: Gastroenterology;  Laterality: N/A;  58 F DILATOR   • ENDOSCOPY N/A 11/15/2021    Procedure: ESOPHAGOGASTRODUODENOSCOPY WITH DILATATION;  Surgeon: Jase Hernandez MD;  Location:  [facility] ENDOSCOPY;  Service: Gastroenterology;  Laterality: N/A;  achalasia balloon    • ENDOSCOPY N/A 11/24/2021    Procedure: ESOPHAGOGASTRODUODENOSCOPY [facility] DUODENAL POLYPECTOPMY AND NASAL JEJUNAL TUBE PLACEMENT;  Surgeon:  "Jase Hernandez MD;  Location:  [facility] ENDOSCOPY;  Service: Gastroenterology;  Laterality: N/A;  placement of feeding tube, checked position with KUB   • GASTRIC RESTRICTION SURGERY  2021    Sleeve   • GASTRIC SLEEVE LAPAROSCOPIC N/A 6/15/2021    Procedure: GASTRIC SLEEVE LAPAROSCOPIC WITH DAVINCI ROBOT, LAPROSCOPIC HIATAL HERNIA REPAIR WITH DAVINCI ROBOT;  Surgeon: Ayaka Andre MD;  Location:  [facility] OR;  Service: Robotics - DaVinci;  Laterality: N/A;   • KELOID EXCISION      1990,1993,1999,2015,2016,2019   • LAPAROSCOPIC CHOLECYSTECTOMY  2000    for stones   • RADIOFREQUENCY ABLATION  2019    x 2  for pt back   • UMBILICAL HERNIA REPAIR  1990   • WISDOM TOOTH EXTRACTION  1994       Social History     Socioeconomic History   • Marital status: Single   Tobacco Use   • Smoking status: Never Smoker   • Smokeless tobacco: Never Used   Vaping Use   • Vaping Use: Never used   Substance and Sexual Activity   • Alcohol use: Not Currently     Comment: Very rarely drink socially. At most 2 drinks per month.   • Drug use: Never   • Sexual activity: Yes     Partners: Male     Birth control/protection: Condom, I.U.D.       Family History   Problem Relation Age of Onset   • Arthritis Mother    • Diabetes Mother    • Obesity Mother    • Arrhythmia Mother    • Hypertension Mother    • Asthma Mother    • Dementia Father    • Heart attack Father    • Arrhythmia Father    • Heart disease Father    • Stroke Other         CVA   • Obesity Other    • Ovarian cancer Maternal Aunt         40's   • Breast cancer Paternal Aunt         30's   • Heart disease Other    • Hypertension Other    • Endometrial cancer Neg Hx    • Colon cancer Neg Hx    • Colon polyps Neg Hx    • Esophageal cancer Neg Hx         Objective:  /80   Pulse 61   Temp 97.5 °F (36.4 °C)   Ht 152.4 cm (60\")   Wt 80 kg (176 lb 6.4 oz)   SpO2 99%   BMI 34.45 kg/m²     Neurologic Exam     Mental Status   Oriented to person, place, and time.   Follows 3 step " commands.   Attention: normal. Concentration: normal.   Speech: speech is normal   Level of consciousness: alert  Knowledge: good and consistent with education.   Able to name object. Able to read. Able to repeat. Able to write. Normal comprehension.     Cranial Nerves     CN II   Visual fields full to confrontation.   Visual acuity: normal  Right visual field deficit: none  Left visual field deficit: none     CN III, IV, VI   Pupils are equal, round, and reactive to light.  Extraocular motions are normal.   Right pupil: Shape: regular. Reactivity: brisk. Consensual response: intact.   Left pupil: Shape: regular. Reactivity: brisk. Consensual response: intact.   Nystagmus: none   Diplopia: none  Ophthalmoparesis: none  Upgaze: normal  Downgaze: normal  Conjugate gaze: present  Vestibulo-ocular reflex: present    CN V   Facial sensation intact.   Right corneal reflex: normal  Left corneal reflex: normal    CN VII   Right facial weakness: none  Left facial weakness: none    CN VIII   Hearing: intact    CN IX, X   Palate: symmetric  Right gag reflex: normal  Left gag reflex: normal    CN XI   Right sternocleidomastoid strength: normal  Left sternocleidomastoid strength: normal    CN XII   Tongue: not atrophic  Fasciculations: absent  Tongue deviation: none    Motor Exam   Muscle bulk: normal  Overall muscle tone: normal    Strength   Strength 5/5 throughout.     Gait, Coordination, and Reflexes     Gait  Gait: normal    Tremor   Resting tremor: absent  Intention tremor: absent  Action tremor: absent    Reflexes   Reflexes 2+ except as noted.       Physical Exam  Vitals and nursing note reviewed.   Constitutional:       Appearance: Normal appearance.   HENT:      Head: Normocephalic and atraumatic.   Eyes:      Extraocular Movements: Extraocular movements intact and EOM normal.      Pupils: Pupils are equal, round, and reactive to light.   Skin:     General: Skin is warm and dry.   Neurological:      Mental Status: She  is alert and oriented to person, place, and time.      Gait: Gait is intact.      Deep Tendon Reflexes: Strength normal.   Psychiatric:         Mood and Affect: Mood normal.         Speech: Speech normal.         Assessment/Plan:       Diagnoses and all orders for this visit:    1. Chronic migraine without aura without status migrainosus, not intractable (Primary)  Assessment & Plan:  Headaches are improving with treatment.  Medication changes per orders.     Continue Botox and Qulipta, add Nurtec 75 mg SL PRN for severe headache.     F/U for next botox injection                  Reviewed medications, potential side effects and signs and symptoms to report. Discussed risk versus benefits of treatment plan with patient and/or family-including medications, labs and radiology that may be ordered. Addressed questions and concerns during visit. Patient and/or family verbalized understanding and agree with plan. Patient instructed to call the office with questions or concerns and report to ED with life-threatening symptoms.     AS THE PROVIDER, I PERSONALLY WORE PPE DURING ENTIRE FACE TO FACE ENCOUNTER IN CLINIC WITH THE PATIENT. PATIENT ALSO WORE PPE DURING ENTIRE FACE TO FACE ENCOUNTER EXCEPT FOR A MAX OF 30 SECONDS DURING NEUROLOGICAL EVALUATION OF CRANIAL NERVES AND THEN MASK WAS PLACED BACK OVER PATIENT FACE FOR REMAINDER OF VISIT. I WASHED MY HANDS BEFORE AND AFTER VISIT.    During this visit the following were done:  Labs Reviewed []    Labs Ordered []    Radiology Reports Reviewed []    Radiology Ordered []    PCP Records Reviewed []    Referring Provider Records Reviewed []    ER Records Reviewed []    Hospital Records Reviewed []    History Obtained From Family []    Radiology Images Reviewed []    Other Reviewed []    Records Requested []      Return for for botox .      Shiela Coelho, TANESHA  5/18/2022

## 2022-05-18 NOTE — ASSESSMENT & PLAN NOTE
Headaches are improving with treatment.  Medication changes per orders.     Continue Botox and Qulipta, add Nurtec 75 mg SL PRN for severe headache.     F/U for next botox injection

## 2022-05-19 ENCOUNTER — HOSPITAL ENCOUNTER (OUTPATIENT)
Dept: ONCOLOGY | Facility: HOSPITAL | Age: 37
Setting detail: INFUSION SERIES
Discharge: HOME | End: 2022-05-19

## 2022-05-19 VITALS
RESPIRATION RATE: 16 BRPM | TEMPERATURE: 98.8 F | SYSTOLIC BLOOD PRESSURE: 134 MMHG | BODY MASS INDEX: 34.75 KG/M2 | WEIGHT: 177 LBS | HEART RATE: 78 BPM | HEIGHT: 60 IN | DIASTOLIC BLOOD PRESSURE: 88 MMHG

## 2022-05-19 DIAGNOSIS — R11.2 INTRACTABLE NAUSEA AND VOMITING: Primary | ICD-10-CM

## 2022-05-19 PROCEDURE — 96360 HYDRATION IV INFUSION INIT: CPT

## 2022-05-19 PROCEDURE — 96361 HYDRATE IV INFUSION ADD-ON: CPT

## 2022-05-19 RX ORDER — SODIUM CHLORIDE 9 MG/ML
1000 INJECTION, SOLUTION INTRAVENOUS ONCE
Status: COMPLETED | OUTPATIENT
Start: 2022-05-19 | End: 2022-05-19

## 2022-05-19 RX ORDER — SODIUM CHLORIDE 9 MG/ML
1000 INJECTION, SOLUTION INTRAVENOUS ONCE
Status: CANCELLED
Start: 2022-05-20 | End: 2022-05-20

## 2022-05-19 RX ADMIN — SODIUM CHLORIDE 1000 ML: 9 INJECTION, SOLUTION INTRAVENOUS at 14:35

## 2022-05-27 ENCOUNTER — OFFICE VISIT (OUTPATIENT)
Dept: BEHAVIORAL HEALTH | Facility: CLINIC | Age: 37
End: 2022-05-27

## 2022-05-27 DIAGNOSIS — F33.0 MDD (MAJOR DEPRESSIVE DISORDER), RECURRENT EPISODE, MILD: Primary | ICD-10-CM

## 2022-05-27 DIAGNOSIS — R45.81 LOW SELF ESTEEM: ICD-10-CM

## 2022-05-27 PROCEDURE — 90791 PSYCH DIAGNOSTIC EVALUATION: CPT | Performed by: PSYCHOLOGIST

## 2022-05-29 NOTE — PROGRESS NOTES
PROGRESS NOTE    Data:  Frances Hartman is a 36 y.o. female who met with the undersigned for a scheduled individual therapy session from 3:00 - 3:45pm.      Clinical Maneuvering/Intervention:      The pt talked about recent stressors involving friction between herself and her grandmother. She also talked about stress involving navigating relationships with men and problems in dating. Stressors were processed individually and in detail. Venting of frustrations was conducted in order to help the pt feel less tense emotionally and gain insight into issues. Feelings were processed and validated several times in session. Perspective taking was conducted multiple times in order to help the pt feel less stuck, less overwhelmed, and see challenges as much more manageable. Active listening was conducted in order to help the pt make sense of stressors and start moving towards potential solutions. The pt was assisted with finding solutions based on existing skill-set and abilities. She was assisted with noting, based on evidence, what seems to help her get along better with her mother and grandmother. She expressed not being sure. Time was allocated specifically to assess what is 'working' in the pt's life, versus what is 'not working,' (if anything) in terms of helping to improve mood and quality of life (whether it be distance from her mother at the right time, being involved in her own pursuits in life, etc). She did tend to smile more today and looked healthier/nicer today. She was able to process these improvement. The pt was assisted in recognizing progress in order to show encouragement and promote motivation to keep making positive changes in life. The pt's strengths were identified in order to help identify abilities to use to better face/overcome challenges. She was assisted in spotting what she has done and continues to do in order to experience this progress. She has persevered, for example, a long process of  both losing weight with weight loss surgery and in obtaining employment. She was commended for her success, but it was made a point to help her own her successes in order to promote mental health. Homework was assigned tailored to pt's needs. The pt expressed gratitude for today's session.    Mental Status Exam  Hygiene:  good  Dress: normal  Attitude:  cooperative and proactive  Motor Activity: normal  Speech: normal  Mood:  somewhat hopeful   Affect:  congruent  Thought Processes: normal  Thought Content:  normal  Suicidal Thoughts:  not endorsed  Homicidal Thoughts:  not endorsed  Crisis Safety Plan: not needed   Hallucinations:  none      Patient's Support Network Includes:  family, friends      Progress toward goal: there is evidence to suggest that she is becoming a stronger person over time.      Functional Status: moderate      Prognosis: good    Assessment      The pt presented to be struggling with depression of a mild level. Self-esteem is something that still needs attention, but the pt is open to challenging herself and growing over time, which will benefit her. She is more hopeful as of late due to different types of progress. She is intelligent and thoughtful. This pt benefits particularly from empowering, supportive, and positive counseling interventions.       Plan      In order to diminish symptoms of depression and improve self-esteem, she is to give thought to the evidence noted today that her skills such as perseverance/intelligence/thoughtfulness tend to pay off for her (ongoing).     Misty Nava, PhD, LP

## 2022-06-02 ENCOUNTER — HOSPITAL ENCOUNTER (OUTPATIENT)
Dept: ONCOLOGY | Facility: HOSPITAL | Age: 37
Setting detail: INFUSION SERIES
Discharge: HOME | End: 2022-06-02

## 2022-06-02 DIAGNOSIS — R11.2 INTRACTABLE NAUSEA AND VOMITING: Primary | ICD-10-CM

## 2022-06-02 PROCEDURE — 96366 THER/PROPH/DIAG IV INF ADDON: CPT

## 2022-06-02 PROCEDURE — 96360 HYDRATION IV INFUSION INIT: CPT

## 2022-06-02 PROCEDURE — 96365 THER/PROPH/DIAG IV INF INIT: CPT

## 2022-06-02 PROCEDURE — 96361 HYDRATE IV INFUSION ADD-ON: CPT

## 2022-06-02 RX ORDER — SODIUM CHLORIDE 9 MG/ML
1000 INJECTION, SOLUTION INTRAVENOUS ONCE
Status: COMPLETED | OUTPATIENT
Start: 2022-06-02 | End: 2022-06-02

## 2022-06-02 RX ORDER — SODIUM CHLORIDE 9 MG/ML
1000 INJECTION, SOLUTION INTRAVENOUS ONCE
Status: CANCELLED
Start: 2022-06-03 | End: 2022-06-03

## 2022-06-02 RX ADMIN — SODIUM CHLORIDE 1000 ML: 9 INJECTION, SOLUTION INTRAVENOUS at 14:06

## 2022-06-03 ENCOUNTER — SPECIALTY PHARMACY (OUTPATIENT)
Dept: ONCOLOGY | Facility: HOSPITAL | Age: 37
End: 2022-06-03

## 2022-06-03 ENCOUNTER — OFFICE VISIT (OUTPATIENT)
Dept: BEHAVIORAL HEALTH | Facility: CLINIC | Age: 37
End: 2022-06-03

## 2022-06-03 DIAGNOSIS — F33.0 MDD (MAJOR DEPRESSIVE DISORDER), RECURRENT EPISODE, MILD: Primary | ICD-10-CM

## 2022-06-03 DIAGNOSIS — R45.81 LOW SELF ESTEEM: ICD-10-CM

## 2022-06-03 PROCEDURE — 90834 PSYTX W PT 45 MINUTES: CPT | Performed by: PSYCHOLOGIST

## 2022-06-03 NOTE — PROGRESS NOTES
Specialty Pharmacy Refill Coordination Note     Frances is a 36 y.o. female contacted today regarding refills of  Qulipta specialty medication(s).    Reviewed and verified with patient:         Specialty medication(s) and dose(s) confirmed: yes    Refill Questions    Flowsheet Row Most Recent Value   Changes to allergies? No   Changes to medications? No   New conditions since last clinic visit No   Unplanned office visit, urgent care, ED, or hospital admission in the last 4 weeks  No   How does patient/caregiver feel medication is working? Excellent   Financial problems or insurance changes  No   If yes, describe changes in insurance or financial issues. N/A   Since the previous refill, were any specialty medication doses or scheduled injections missed or delayed?  No   If yes, please provide the amount N/A   Why were doses missed? N/A   Does this patient require a clinical escalation to a pharmacist? No          Delivery Questions    Flowsheet Row Most Recent Value   Delivery method  at Pharmacy   Preferred delivery time? Anytime   Number of medications in delivery 1   Medication being filled and delivered Qulipta   Doses left of specialty medications 8   Is there any medication that is due not being filled? No   Supplies needed? No supplies needed   Cooler needed? No   Do any medications need mixed or dated? No   Copay form of payment Pay at pickup   Questions or concerns for the pharmacist? No   Are any medications first time fills? No            Medication Adherence    Adherence tools used: cell phone  Support network for adherence: healthcare provider          Follow-up: 1 month(s)     Sandy Madden, Pharmacy Technician  Specialty Pharmacy Technician

## 2022-06-05 NOTE — PROGRESS NOTES
PROGRESS NOTE    Data:  Frances Hartman is a 36 y.o. female who met with the undersigned for a scheduled individual telehealth therapy session from 2:15 - 3:00pm.    Pt was offered a video visit (as the office is currently under renovations), but expressed preference for a telephone visit; she consented to a telephone visit and connected from her home in [place], [place]. Provider connected via telephone at: [facility], [address], [place], [place].      Clinical Maneuvering/Intervention:      The pt talked about recent stressors involving starting a new job on Monday, experiencing tension between herself and her mother (though not as much as in the past), and feeling worried about her health. Stressors were processed individually and in detail. Venting of frustrations was conducted in order to help the pt feel less tense emotionally and gain insight into issues. Feelings were processed and validated several times in session. Perspective taking was conducted multiple times in order to help the pt feel less stuck, less overwhelmed, and see challenges as much more manageable. Active listening was conducted in order to help the pt make sense of stressors and start moving towards potential solutions. The pt was assisted with finding solutions based on existing skill-set and abilities. Time was allocated specifically to assess what is 'working' in the pt's life, versus what is 'not working,' (if anything) in terms of helping to improve mood and quality of life. She was assisted with owning her successes (weight loss, improved mobility, obtaining employment, less tension at home with her mother) in order to both diminish stress, but also improve her self-esteem. The pt was assisted in recognizing progress in order to show encouragement and promote motivation to keep making positive changes in life. The pt's strengths were identified in order to help identify abilities to use to  better face/overcome challenges (e.g., perseverance). She was assisted with diminishing worry/stress about her new job by being asked several questions: what she hopes will happen Monday, how she learned things from previous jobs (e.g., social interactions) that will help her in her current position, etc. She did well answering all questions in a thoughtful manner. She was assisted with believing in herself more than she can interact with others in a positive manner. Homework was assigned tailored to pt's needs. The pt expressed gratitude for today's session.    Mental Status Exam  Hygiene:  good  Dress: normal  Attitude:  cooperative and proactive  Motor Activity: normal  Speech: normal  Mood:  nervous   Affect:  congruent  Thought Processes: normal  Thought Content:  normal  Suicidal Thoughts:  not endorsed  Homicidal Thoughts:  not endorsed  Crisis Safety Plan: not needed   Hallucinations:  none      Patient's Support Network Includes:  family, friends      Progress toward goal: there is evidence to suggest that she is becoming a stronger person over time.      Functional Status: moderate to high      Prognosis: good    Assessment      The pt presented to be struggling with depression of a mild level. Self-esteem is something that still needs attention, but she is seeing progress in her life which will likely help with self-esteem. She is more hopeful as of late due to different types of progress. She is intelligent and thoughtful. This pt benefits particularly from empowering, supportive, and positive counseling interventions.       Plan      In order to diminish symptoms of depression and improve self-esteem, she is to give thought to the evidence noted today that she can adjust well to a new job (this week). She will also give thought to how she can overcome challenges in life based on her skill set (this week).     Misty Nava, PhD, LP

## 2022-06-06 RX ORDER — LORATADINE 10 MG/1
TABLET ORAL
Qty: 30 TABLET | Refills: 2 | Status: SHIPPED | OUTPATIENT
Start: 2022-06-06

## 2022-06-06 NOTE — TELEPHONE ENCOUNTER
Rx Refill Note  Requested Prescriptions     Pending Prescriptions Disp Refills   • loratadine (CLARITIN) 10 MG tablet [Pharmacy Med Name: LORATADINE 10 MG TABLET] 30 tablet      Sig: TAKE ONE TABLET BY MOUTH DAILY      Last office visit with prescribing clinician: 3/8/2022      Next office visit with prescribing clinician: 9/9/2022            Sulma Castrejon MA  06/06/22, 13:25 EDT

## 2022-06-14 ENCOUNTER — LAB (OUTPATIENT)
Dept: LAB | Facility: HOSPITAL | Age: 37
End: 2022-06-14

## 2022-06-14 ENCOUNTER — OFFICE VISIT (OUTPATIENT)
Dept: GASTROENTEROLOGY | Facility: CLINIC | Age: 37
End: 2022-06-14

## 2022-06-14 VITALS
WEIGHT: 175.2 LBS | SYSTOLIC BLOOD PRESSURE: 138 MMHG | DIASTOLIC BLOOD PRESSURE: 88 MMHG | BODY MASS INDEX: 34.4 KG/M2 | OXYGEN SATURATION: 98 % | HEIGHT: 60 IN | TEMPERATURE: 97.4 F | HEART RATE: 59 BPM

## 2022-06-14 DIAGNOSIS — C7A.8 NEUROENDOCRINE CARCINOMA: Primary | ICD-10-CM

## 2022-06-14 DIAGNOSIS — R79.89 ELEVATED LIVER FUNCTION TESTS: ICD-10-CM

## 2022-06-14 DIAGNOSIS — K59.1 FUNCTIONAL DIARRHEA: ICD-10-CM

## 2022-06-14 DIAGNOSIS — R11.2 INTRACTABLE NAUSEA AND VOMITING: ICD-10-CM

## 2022-06-14 DIAGNOSIS — K21.9 GASTROESOPHAGEAL REFLUX DISEASE, UNSPECIFIED WHETHER ESOPHAGITIS PRESENT: ICD-10-CM

## 2022-06-14 LAB
ALBUMIN SERPL-MCNC: 3.8 G/DL (ref 3.5–5.2)
ALBUMIN/GLOB SERPL: 1.3 G/DL
ALP SERPL-CCNC: 128 U/L (ref 39–117)
ALT SERPL W P-5'-P-CCNC: 50 U/L (ref 1–33)
ANION GAP SERPL CALCULATED.3IONS-SCNC: 13 MMOL/L (ref 5–15)
AST SERPL-CCNC: 22 U/L (ref 1–32)
BASOPHILS # BLD AUTO: 0.05 10*3/MM3 (ref 0–0.2)
BASOPHILS NFR BLD AUTO: 0.6 % (ref 0–1.5)
BILIRUB SERPL-MCNC: 0.4 MG/DL (ref 0–1.2)
BUN SERPL-MCNC: 7 MG/DL (ref 6–20)
BUN/CREAT SERPL: 9.3 (ref 7–25)
CALCIUM SPEC-SCNC: 9.6 MG/DL (ref 8.6–10.5)
CHLORIDE SERPL-SCNC: 104 MMOL/L (ref 98–107)
CO2 SERPL-SCNC: 19 MMOL/L (ref 22–29)
CREAT SERPL-MCNC: 0.75 MG/DL (ref 0.57–1)
DEPRECATED RDW RBC AUTO: 42.8 FL (ref 37–54)
EGFRCR SERPLBLD CKD-EPI 2021: 104.7 ML/MIN/1.73
EOSINOPHIL # BLD AUTO: 0.13 10*3/MM3 (ref 0–0.4)
EOSINOPHIL NFR BLD AUTO: 1.6 % (ref 0.3–6.2)
ERYTHROCYTE [DISTWIDTH] IN BLOOD BY AUTOMATED COUNT: 12.3 % (ref 12.3–15.4)
GLOBULIN UR ELPH-MCNC: 3 GM/DL
GLUCOSE SERPL-MCNC: 93 MG/DL (ref 65–99)
HCT VFR BLD AUTO: 39.4 % (ref 34–46.6)
HGB BLD-MCNC: 13 G/DL (ref 12–15.9)
IMM GRANULOCYTES # BLD AUTO: 0.01 10*3/MM3 (ref 0–0.05)
IMM GRANULOCYTES NFR BLD AUTO: 0.1 % (ref 0–0.5)
LYMPHOCYTES # BLD AUTO: 3.52 10*3/MM3 (ref 0.7–3.1)
LYMPHOCYTES NFR BLD AUTO: 43.2 % (ref 19.6–45.3)
MCH RBC QN AUTO: 31.6 PG (ref 26.6–33)
MCHC RBC AUTO-ENTMCNC: 33 G/DL (ref 31.5–35.7)
MCV RBC AUTO: 95.9 FL (ref 79–97)
MONOCYTES # BLD AUTO: 0.4 10*3/MM3 (ref 0.1–0.9)
MONOCYTES NFR BLD AUTO: 4.9 % (ref 5–12)
NEUTROPHILS NFR BLD AUTO: 4.04 10*3/MM3 (ref 1.7–7)
NEUTROPHILS NFR BLD AUTO: 49.6 % (ref 42.7–76)
NRBC BLD AUTO-RTO: 0 /100 WBC (ref 0–0.2)
PLATELET # BLD AUTO: 215 10*3/MM3 (ref 140–450)
PMV BLD AUTO: 11.1 FL (ref 6–12)
POTASSIUM SERPL-SCNC: 3.6 MMOL/L (ref 3.5–5.2)
PROT SERPL-MCNC: 6.8 G/DL (ref 6–8.5)
RBC # BLD AUTO: 4.11 10*6/MM3 (ref 3.77–5.28)
SODIUM SERPL-SCNC: 136 MMOL/L (ref 136–145)
WBC NRBC COR # BLD: 8.15 10*3/MM3 (ref 3.4–10.8)

## 2022-06-14 PROCEDURE — 86316 IMMUNOASSAY TUMOR OTHER: CPT | Performed by: NURSE PRACTITIONER

## 2022-06-14 PROCEDURE — 36415 COLL VENOUS BLD VENIPUNCTURE: CPT | Performed by: NURSE PRACTITIONER

## 2022-06-14 PROCEDURE — 99214 OFFICE O/P EST MOD 30 MIN: CPT | Performed by: NURSE PRACTITIONER

## 2022-06-14 PROCEDURE — 85025 COMPLETE CBC W/AUTO DIFF WBC: CPT

## 2022-06-14 PROCEDURE — 80053 COMPREHEN METABOLIC PANEL: CPT

## 2022-06-14 RX ORDER — MONTELUKAST SODIUM 4 MG/1
1 TABLET, CHEWABLE ORAL 2 TIMES DAILY
Qty: 60 TABLET | Refills: 5 | Status: SHIPPED | OUTPATIENT
Start: 2022-06-14 | End: 2022-10-05

## 2022-06-14 NOTE — PROGRESS NOTES
"     Follow Up      Patient Name: Frances Hartman  : 1985   MRN: [id number]     Chief Complaint:    Chief Complaint   Patient presents with   • Follow-up       History of Present Illness: Frances Hartman is a 36 y.o. female who is here today for follow up on nausea, vomiting.    Interval history: Frances feels much improved since last visit.  She is no longer having nausea and vomiting.  She still has trouble with certain vegetables.  Frequency varies based on what she eats- on average 1-4 times. Stools are yellow. Symptoms not always bothersome but wants to know \"if this is ok\".  Still needing IV fluids for dehydration. She does report reflux at nighttime.     Frances was admitted to [facility] in 2021 with nausea and vomiting.  She had been status post bariatric surgery in 2021.  She was diagnosed with esophageal stricture. She underwent 3 EGDs during her hospital stay.  Submucosal nodule was found in the duodenum and biopsies were significant for neuroendocrine carcinoma.  Repeat EGD shows no further tumor.       NM PET/CT Skull Base to Mid Thigh (2022 11:42)  Negative Ga-68 Dotatate scan  CT Abdomen Pelvis With Contrast (2021 22:52)     Her abdominal surgical history includes Chandrika, umbilical hernia repair, and gastric sleeve 2021.    Subjective      Review of Systems:   Review of Systems    Medications:     Current Outpatient Medications:   •  acetaminophen (TYLENOL) 500 MG tablet, Take 500-1,000 mg by mouth Every 6 (Six) Hours As Needed for Mild Pain ., Disp: , Rfl:   •  albuterol (PROVENTIL HFA;VENTOLIN HFA) 108 (90 BASE) MCG/ACT inhaler, Inhale 2 puffs Every 4 (Four) Hours As Needed for wheezing., Disp: , Rfl:   •  Atogepant (Qulipta) 60 MG tablet, Take 1 tablet by mouth Daily., Disp: 30 tablet, Rfl: 11  •  Blood Glucose Monitoring Suppl (ONE TOUCH ULTRA 2) w/Device kit, , Disp: , Rfl:   •  Blood Glucose Monitoring Suppl device, Use BID to test " blood sugars dx e11.65, Disp: 1 each, Rfl: 0  •  carvedilol (COREG) 6.25 MG tablet, Take 1 tab in AM and 2 tab in PM (Patient taking differently: Take 6.25 mg by mouth Daily.), Disp: 270 tablet, Rfl: 0  •  cyclobenzaprine (FLEXERIL) 10 MG tablet, Take 10 mg by mouth 3 (Three) Times a Day As Needed for Muscle Spasms., Disp: , Rfl:   •  diphenhydrAMINE (BENADRYL) 25 mg capsule, Take 25 mg by mouth Every 6 (Six) Hours As Needed for Itching or Sleep., Disp: , Rfl:   •  furosemide (LASIX) 20 MG tablet, TAKE ONE TO TWO TABLETS BY MOUTH EVERY NIGHTLY (Patient taking differently: Take 20 mg by mouth As Needed.), Disp: 135 tablet, Rfl: 2  •  gabapentin (NEURONTIN) 300 MG capsule, Take 600 mg by mouth Daily., Disp: , Rfl:   •  glucose blood test strip, USe BID to test blood sugar DX.e11.65, Disp: 100 each, Rfl: 3  •  guaiFENesin (HUMIBID 3) 400 MG tablet, Take 200 mg by mouth As Needed., Disp: , Rfl:   •  HYDROcodone-acetaminophen (NORCO) 5-325 MG per tablet, Take 1 tablet by mouth Every 6 (Six) Hours As Needed for Severe Pain ., Disp: 12 tablet, Rfl: 0  •  Lancets 33G misc, 1 Each/kg 2 (two) times a day. To test blood sugars DxE11.65, Disp: 100 each, Rfl: 2  •  levonorgestrel (MIRENA) 20 MCG/24HR IUD, 1 each by Intrauterine route., Disp: , Rfl:   •  loratadine (CLARITIN) 10 MG tablet, TAKE ONE TABLET BY MOUTH DAILY, Disp: 30 tablet, Rfl: 2  •  metFORMIN ER (GLUCOPHAGE-XR) 500 MG 24 hr tablet, Take 1 tablet by mouth Daily With Dinner., Disp: 90 tablet, Rfl: 3  •  multivitamin with minerals tablet tablet, Take 1 tablet by mouth Daily., Disp: , Rfl:   •  OnabotulinumtoxinA (Botox) 200 units reconstituted solution, FOR . PHYSICIAN TO INJECT UP  UNITS INTRAMUSCULARLY INTO HEAD, NECK AND SHOULDERS EVERY 90 DAYS., Disp: 1 each, Rfl: 3  •  simethicone (MYLICON) 125 MG chewable tablet, CHEW ONE TABLET BY MOUTH EVERY 6 HOURS AS NEEDED FOR FLATULENCE, Disp: 30 tablet, Rfl: 2  •  traMADol (ULTRAM) 50 MG  "tablet, Take 50 mg by mouth Every 6 (Six) Hours As Needed for Moderate Pain ., Disp: , Rfl:   •  vitamin D3 125 MCG (5000 UT) capsule capsule, Take 5,000 Units by mouth Daily., Disp: , Rfl:   •  colestipol (COLESTID) 1 g tablet, Take 1 tablet by mouth 2 (Two) Times a Day., Disp: 60 tablet, Rfl: 5    Current Facility-Administered Medications:   •  OnabotulinumtoxinA 200 Units, 200 Units, Intramuscular, Q3 Months, Shiela Coelho, TANESHA, 200 Units at 03/15/22 1331    Allergies:   Allergies   Allergen Reactions   • Capsicum Annuum Extract & Derivative (Bell Pepper) [Capsicum] Anaphylaxis   • Ibuprofen Other (See Comments)     \"makes me retain fluid and eventually go into CHF\"   • Macrobid [Nitrofurantoin] Nausea Only   • Peanut-Containing Drug Products Anaphylaxis   • Hydrochlorothiazide Swelling     eventually causes CHF    • Monosodium Glutamate Swelling and Headache   • Oatmeal Other (See Comments)     Dx on allergy testing   • Amitriptyline Mental Status Change     memory gaps + neuropathy + hair loss   • Aspartame Nausea Only   • Augmentin [Amoxicillin-Pot Clavulanate] Other (See Comments)     Syncope, not sure if allergic to cephalosporins.   • Codeine Headache      Can tolerate hydrocodone, no other adverse reactions to other narcotics.   • Diclofenac Headache   • Doxycycline Rash and Hallucinations   • Eggs Or Egg-Derived Products Other (See Comments)     dx on allergy testing, but does not completely avoid   • Naproxen Other (See Comments)     \"blackout\"       Social History:   Social History     Socioeconomic History   • Marital status: Single   Tobacco Use   • Smoking status: Never Smoker   • Smokeless tobacco: Never Used   Vaping Use   • Vaping Use: Never used   Substance and Sexual Activity   • Alcohol use: Not Currently     Comment: Very rarely drink socially. At most 2 drinks per month.   • Drug use: Never   • Sexual activity: Yes     Partners: Male     Birth control/protection: Coitus interruptus, " Condom, I.U.ZARINA.        Surgical History:   Past Surgical History:   Procedure Laterality Date   • ABDOMINAL SURGERY  06/15/2021    Bariatric Sleeve Surgery   • BARIATRIC SURGERY     • ENDOSCOPY N/A 06/15/2021    Procedure: ESOPHAGOGASTRODUODENOSCOPY;  Surgeon: Ayaka Andre MD;  Location:  [facility];  Service: Robotics - DaVinci;  Laterality: N/A;   • ENDOSCOPY N/A 10/28/2021    Procedure: ESOPHAGOGASTRODUODENOSCOPY WITH ACHALASIA BALLOON DILATATION;  Surgeon: Jase Hernandez MD;  Location:  [facility];  Service: Gastroenterology;  Laterality: N/A;  58 F DILATOR   • ENDOSCOPY N/A 11/15/2021    Procedure: ESOPHAGOGASTRODUODENOSCOPY WITH DILATATION;  Surgeon: Jase Hernandez MD;  Location:  [facility];  Service: Gastroenterology;  Laterality: N/A;  achalasia balloon    • ENDOSCOPY N/A 11/24/2021    Procedure: ESOPHAGOGASTRODUODENOSCOPY WTH DUODENAL POLYPECTOPMY AND NASAL JEJUNAL TUBE PLACEMENT;  Surgeon: Jase Hernandez MD;  Location:  [facility];  Service: Gastroenterology;  Laterality: N/A;  placement of feeding tube, checked position with KUB   • GASTRIC RESTRICTION SURGERY  2021    Sleeve   • GASTRIC SLEEVE LAPAROSCOPIC N/A 06/15/2021    Procedure: GASTRIC SLEEVE LAPAROSCOPIC WITH DAVINCI ROBOT, LAPROSCOPIC HIATAL HERNIA REPAIR WITH DAVINCI ROBOT;  Surgeon: Ayaka Andre MD;  Location:  [facility];  Service: Robotics - DaVinci;  Laterality: N/A;   • KELOID EXCISION      1990,1993,1999,2015,2016,2019   • LAPAROSCOPIC CHOLECYSTECTOMY  2000    for stones   • RADIOFREQUENCY ABLATION  2019    x 2  for pt back   • UMBILICAL HERNIA REPAIR  1990   • WISDOM TOOTH EXTRACTION  1994        Medical History:   Past Medical History:   Diagnosis Date   • Anxiety    • Asthma     prn inhalers, does not follow w/ pulmonary   • Chronic back pain     previously followed w/ pain management, now just prn Tylenol + Gabapentin   • Chronic deep vein thrombosis (DVT) of femoral vein of right lower extremity  "([facility]) 09/10/2021   • Clotting disorder ([facility]) 2015   • Diabetes mellitus ([facility])     Type 2, dx 2014, never on insulin, A1C 7.6   • Diabetes mellitus (HCC)     Type II, dx 2014, never on insulin, A1C 7.6    • Dyspepsia    • Dyspnea on exertion    • Dysuria 03/07/2022    Dysuria and hematuria x 2 weeks. 3/7/22 urine culture sent. Rx Macrobid 100 mg twice daily for 7 days. Patient did not tolerate Macrobid well due to GI upset. Urine culture was negative.   • Fatigue    • Gastroparesis    • GERD (gastroesophageal reflux disease)    • Heartburn     chronic, prn TUMS, denies prior eval   • Hirsutism    • Hx of radiation therapy     for treatments of Keloids   • Hypertension    • Irregular menses     infrequent spotting   • Leiomyoma, subserous     possible peduculated f3-4 cm fibroid on u/s at [facility]   • Migraines     botox q3 months, following Neurology @ [facility]   • Morbid obesity ([facility])     s/p sleeve gastrectomy   • Peripheral edema    • Polycystic ovary syndrome 2011   • Seasonal allergies    • Slow to wake up after anesthesia         Objective     Physical Exam:  Vital Signs:   Vitals:    06/14/22 1449   BP: 138/88   BP Location: Left arm   Patient Position: Sitting   Cuff Size: Adult   Pulse: 59   Temp: 97.4 °F (36.3 °C)   TempSrc: Temporal   SpO2: 98%   Weight: 79.5 kg (175 lb 3.2 oz)   Height: 152.4 cm (60\")     Body mass index is 34.22 kg/m².     Physical Exam    Assessment / Plan      Assessment/Plan:   Diagnoses and all orders for this visit:    1. Neuroendocrine carcinoma (HCC) (Primary)  -     Ambulatory Referral to Gastroenterology  Dotatate scan normal, refer to  to follow  2. Functional diarrhea  -     colestipol (COLESTID) 1 g tablet; Take 1 tablet by mouth 2 (Two) Times a Day.  Dispense: 60 tablet; Refill: 5  -     Calprotectin, Fecal - Stool, Per Rectum; Future  -     Ova & Parasite Examination - Stool, Per Rectum; Future  -     Gastrointestinal Panel, PCR - Stool, Per Rectum; Future  We discussed diarrhea " "sometimes seen after bariatric surgery-would like to obtain stool studies.  Colestipol would likely benefit her but she does not want to be on a regular medication.  I advised her she could use this as needed.  We discussed importance of keeping separate from other medications by several hours.  3. Gastroesophageal reflux disease, unspecified whether esophagitis present  Avoid eating late at night, elevate head of bed.  She is a \"stomach sleeper\".  4. Intractable nausea and vomiting  Significantly improved-now only rare occurrences  5. Elevated liver function tests  -     Comprehensive Metabolic Panel; Future  -     CBC & Differential; Future  -     Protime-INR; Future         Follow Up:   Return in about 4 months (around 10/14/2022).    Plan of care reviewed with the patient at the conclusion of today's visit.  Education was provided regarding diagnosis, management, and any prescribed or recommended OTC medications.  Patient verbalized understanding of and agreement with management plan.         TANESHA Sexton  [facility]  "

## 2022-06-16 ENCOUNTER — HOSPITAL ENCOUNTER (OUTPATIENT)
Dept: ONCOLOGY | Facility: HOSPITAL | Age: 37
Setting detail: INFUSION SERIES
Discharge: HOME | End: 2022-06-16

## 2022-06-16 VITALS
SYSTOLIC BLOOD PRESSURE: 121 MMHG | RESPIRATION RATE: 18 BRPM | TEMPERATURE: 97.8 F | HEART RATE: 66 BPM | BODY MASS INDEX: 33.57 KG/M2 | HEIGHT: 60 IN | DIASTOLIC BLOOD PRESSURE: 76 MMHG | WEIGHT: 171 LBS

## 2022-06-16 DIAGNOSIS — R11.2 INTRACTABLE NAUSEA AND VOMITING: Primary | ICD-10-CM

## 2022-06-16 PROCEDURE — 96360 HYDRATION IV INFUSION INIT: CPT

## 2022-06-16 PROCEDURE — 96361 HYDRATE IV INFUSION ADD-ON: CPT

## 2022-06-16 RX ORDER — SODIUM CHLORIDE 9 MG/ML
1000 INJECTION, SOLUTION INTRAVENOUS ONCE
Status: CANCELLED
Start: 2022-06-17 | End: 2022-06-17

## 2022-06-16 RX ORDER — SODIUM CHLORIDE 9 MG/ML
1000 INJECTION, SOLUTION INTRAVENOUS ONCE
Status: COMPLETED | OUTPATIENT
Start: 2022-06-16 | End: 2022-06-16

## 2022-06-16 RX ADMIN — SODIUM CHLORIDE 1000 ML: 9 INJECTION, SOLUTION INTRAVENOUS at 08:33

## 2022-06-17 LAB — CGA SERPL-MCNC: 23.9 NG/ML (ref 0–101.8)

## 2022-06-20 DIAGNOSIS — R79.89 ELEVATED LFTS: Primary | ICD-10-CM

## 2022-06-22 ENCOUNTER — TELEPHONE (OUTPATIENT)
Dept: BARIATRICS/WEIGHT MGMT | Facility: CLINIC | Age: 37
End: 2022-06-22

## 2022-06-22 NOTE — TELEPHONE ENCOUNTER
While speaking with the patient when rescheduling appointment, she wanted to know if there was going to be any bloodwork done for this appointment, and if so, if she could go ahead and get the orders put in so that it'll be completed pre-appointment. Patient also wanted to note that she would like communication back for this via [employer] messages. Please advise.

## 2022-06-23 ENCOUNTER — DOCUMENTATION (OUTPATIENT)
Dept: ONCOLOGY | Facility: HOSPITAL | Age: 37
End: 2022-06-23

## 2022-06-25 NOTE — TELEPHONE ENCOUNTER
PATIENT HAD AN E-VISIT WITH DR. SHABAZZ ON 04/03/2020 CONCERNING A POSSIBLE RESPIRATORY INFECTION. SHE STATED SHE HAD SOME PHLEGM COME UP THAT WAS SLIGHTLY GREEN, TROUBLE BREATHING, HER BACK WAS HURTING.   SHE WAS INFORMED THAT SOME MEDICATION WAS SUPPOSED TO BE CALLED INTO HER PHARMACY BUT SHE HAS CHECKED WITH [facility] [employer] [address] [address] BUT THEY HAVEN'T RECEIVED ANYTHING. PATIENT WOULD LIKE TO KNOW IF HER MEDICATION CAN BE CALLED IN ASAP.   PLEASE RETURN PATIENT'S CALL FOR ANY QUESTIONS OR CONCERNS   [phone]  
Patient contacted, Message given  
Sent in antibiotic. 
sinus rhythm with 1st degree AV block with PAC

## 2022-06-27 NOTE — TELEPHONE ENCOUNTER
Called and spoke with patient adised her labs are normally done in office and put in during the appointment. Patient states that she has to get her labs done elsewhere due to the person in our office is unable to get her labs every time. Patient also advised that her 1 year follow up was rescheduled to August and she has not had her 1 year blood work done. Patient states that she would like to see where her one year levels are because she is still having some issues and is still doing infusions.    Advised I woud ask if provider would want to do labs now or wait until her august visit.

## 2022-06-28 DIAGNOSIS — Z13.21 MALNUTRITION SCREEN: ICD-10-CM

## 2022-06-28 DIAGNOSIS — Z13.0 SCREENING, IRON DEFICIENCY ANEMIA: ICD-10-CM

## 2022-06-28 DIAGNOSIS — R53.83 FATIGUE, UNSPECIFIED TYPE: Primary | ICD-10-CM

## 2022-06-28 DIAGNOSIS — K91.2 POSTGASTRECTOMY MALABSORPTION: ICD-10-CM

## 2022-06-28 DIAGNOSIS — E55.9 HYPOVITAMINOSIS D: ICD-10-CM

## 2022-06-28 DIAGNOSIS — Z90.3 POSTGASTRECTOMY MALABSORPTION: ICD-10-CM

## 2022-06-28 NOTE — TELEPHONE ENCOUNTER
Called and spoke with patient, informed her labs have been placed for her to go have done at her convince.

## 2022-06-30 ENCOUNTER — HOSPITAL ENCOUNTER (OUTPATIENT)
Dept: ONCOLOGY | Facility: HOSPITAL | Age: 37
Setting detail: INFUSION SERIES
Discharge: HOME | End: 2022-06-30

## 2022-06-30 VITALS
SYSTOLIC BLOOD PRESSURE: 128 MMHG | HEART RATE: 66 BPM | DIASTOLIC BLOOD PRESSURE: 80 MMHG | TEMPERATURE: 97.7 F | BODY MASS INDEX: 34.95 KG/M2 | HEIGHT: 60 IN | RESPIRATION RATE: 16 BRPM | WEIGHT: 178 LBS

## 2022-06-30 DIAGNOSIS — R11.2 INTRACTABLE NAUSEA AND VOMITING: Primary | ICD-10-CM

## 2022-06-30 PROCEDURE — 96361 HYDRATE IV INFUSION ADD-ON: CPT

## 2022-06-30 PROCEDURE — 96360 HYDRATION IV INFUSION INIT: CPT

## 2022-06-30 RX ORDER — SODIUM CHLORIDE 9 MG/ML
1000 INJECTION, SOLUTION INTRAVENOUS ONCE
Status: COMPLETED | OUTPATIENT
Start: 2022-06-30 | End: 2022-06-30

## 2022-06-30 RX ORDER — SODIUM CHLORIDE 9 MG/ML
1000 INJECTION, SOLUTION INTRAVENOUS ONCE
Status: CANCELLED
Start: 2022-06-30 | End: 2022-06-30

## 2022-06-30 RX ADMIN — SODIUM CHLORIDE 1000 ML: 9 INJECTION, SOLUTION INTRAVENOUS at 08:55

## 2022-07-08 ENCOUNTER — PROCEDURE VISIT (OUTPATIENT)
Dept: NEUROLOGY | Facility: CLINIC | Age: 37
End: 2022-07-08

## 2022-07-08 DIAGNOSIS — G43.709 CHRONIC MIGRAINE WITHOUT AURA WITHOUT STATUS MIGRAINOSUS, NOT INTRACTABLE: Primary | ICD-10-CM

## 2022-07-08 PROCEDURE — 64615 CHEMODENERV MUSC MIGRAINE: CPT | Performed by: NURSE PRACTITIONER

## 2022-07-08 NOTE — PROGRESS NOTES
Botox Procedure Note       Patient Name: Frances Hartman  : 1985   MRN: [id number]   PCP: LASHANDA Rooney  Chief Complaint:    Chief Complaint   Patient presents with   • Botulinum Toxin Injection       History of Present Illness: Frances Hartman is a 36 y.o. female who is here today for Botox injections for migraines.     BTX GBP, Qulipita    Headaches are daily but less severe. Recently returned to work after bariatric surgery and hospitalization.    We have discussed risk and benefits of this Botox procedure and common side effects including headache, bleeding, infection, worsening of pain, neck pain, neck stiffness or weakness, damage to the areas being injected, weakness of muscles, loss of muscle control, ptosis, flu-like symptoms as well as more serious possible adverse effects including possible dysphagia, facial droop if injecting the facial area, painful injection, respiratory distress or even death (death has only been reported once with adults for Botox for migraines in another state when mixed with lidocaine solution which we do not use lidocaine solution in our practice for mixing Botox). The patient verbally understands and accepts risks and agrees with moving forward with Botox injections for chronic migraine prevention.    Verbal and written consent has been obtained from the patient prior to beginning treatment injections.     Pertinent Medical History:  Response to treatment has reduced headaches at least 7 fewer days a month and / or 100 hours fewer each month.       Subjective        The following portions of the patient's history were reviewed an updated as appropriate: past family history, past medical history, past social history, past surgical history.     Medications:     Current Outpatient Medications:   •  acetaminophen (TYLENOL) 500 MG tablet, Take 500-1,000 mg by mouth Every 6 (Six) Hours As Needed for Mild Pain ., Disp: , Rfl:   •  albuterol (PROVENTIL  HFA;VENTOLIN HFA) 108 (90 BASE) MCG/ACT inhaler, Inhale 2 puffs Every 4 (Four) Hours As Needed for wheezing., Disp: , Rfl:   •  Atogepant (Qulipta) 60 MG tablet, Take 1 tablet by mouth Daily., Disp: 30 tablet, Rfl: 11  •  Blood Glucose Monitoring Suppl (ONE TOUCH ULTRA 2) w/Device kit, , Disp: , Rfl:   •  Blood Glucose Monitoring Suppl device, Use BID to test blood sugars dx e11.65, Disp: 1 each, Rfl: 0  •  carvedilol (COREG) 6.25 MG tablet, Take 1 tab in AM and 2 tab in PM (Patient taking differently: Take 6.25 mg by mouth Daily.), Disp: 270 tablet, Rfl: 0  •  colestipol (COLESTID) 1 g tablet, Take 1 tablet by mouth 2 (Two) Times a Day., Disp: 60 tablet, Rfl: 5  •  cyclobenzaprine (FLEXERIL) 10 MG tablet, Take 10 mg by mouth 3 (Three) Times a Day As Needed for Muscle Spasms., Disp: , Rfl:   •  diphenhydrAMINE (BENADRYL) 25 mg capsule, Take 25 mg by mouth Every 6 (Six) Hours As Needed for Itching or Sleep., Disp: , Rfl:   •  furosemide (LASIX) 20 MG tablet, TAKE ONE TO TWO TABLETS BY MOUTH EVERY NIGHTLY (Patient taking differently: Take 20 mg by mouth As Needed.), Disp: 135 tablet, Rfl: 2  •  gabapentin (NEURONTIN) 300 MG capsule, Take 600 mg by mouth Daily., Disp: , Rfl:   •  glucose blood test strip, USe BID to test blood sugar DX.e11.65, Disp: 100 each, Rfl: 3  •  guaiFENesin (HUMIBID 3) 400 MG tablet, Take 200 mg by mouth As Needed., Disp: , Rfl:   •  HYDROcodone-acetaminophen (NORCO) 5-325 MG per tablet, Take 1 tablet by mouth Every 6 (Six) Hours As Needed for Severe Pain ., Disp: 12 tablet, Rfl: 0  •  Lancets 33G misc, 1 Each/kg 2 (two) times a day. To test blood sugars DxE11.65, Disp: 100 each, Rfl: 2  •  levonorgestrel (MIRENA) 20 MCG/24HR IUD, 1 each by Intrauterine route., Disp: , Rfl:   •  loratadine (CLARITIN) 10 MG tablet, TAKE ONE TABLET BY MOUTH DAILY, Disp: 30 tablet, Rfl: 2  •  metFORMIN ER (GLUCOPHAGE-XR) 500 MG 24 hr tablet, Take 1 tablet by mouth Daily With Dinner., Disp: 90 tablet, Rfl: 3  •   "multivitamin with minerals tablet tablet, Take 1 tablet by mouth Daily., Disp: , Rfl:   •  OnabotulinumtoxinA (Botox) 200 units reconstituted solution, FOR . PHYSICIAN TO INJECT UP  UNITS INTRAMUSCULARLY INTO HEAD, NECK AND SHOULDERS EVERY 90 DAYS., Disp: 1 each, Rfl: 3  •  simethicone (MYLICON) 125 MG chewable tablet, CHEW ONE TABLET BY MOUTH EVERY 6 HOURS AS NEEDED FOR FLATULENCE, Disp: 30 tablet, Rfl: 2  •  traMADol (ULTRAM) 50 MG tablet, Take 50 mg by mouth Every 6 (Six) Hours As Needed for Moderate Pain ., Disp: , Rfl:   •  vitamin D3 125 MCG (5000 UT) capsule capsule, Take 5,000 Units by mouth Daily., Disp: , Rfl:     Current Facility-Administered Medications:   •  OnabotulinumtoxinA 200 Units, 200 Units, Intramuscular, Q3 Months, Shiela Coelho APRN, 200 Units at 03/15/22 1331    Allergies:   Allergies   Allergen Reactions   • Capsicum Annuum Extract & Derivative (Bell Pepper) [Capsicum] Anaphylaxis   • Ibuprofen Other (See Comments)     \"makes me retain fluid and eventually go into CHF\"   • Macrobid [Nitrofurantoin] Nausea Only   • Peanut-Containing Drug Products Anaphylaxis   • Hydrochlorothiazide Swelling     eventually causes CHF    • Monosodium Glutamate Swelling and Headache   • Oatmeal Other (See Comments)     Dx on allergy testing   • Amitriptyline Mental Status Change     memory gaps + neuropathy + hair loss   • Aspartame Nausea Only   • Augmentin [Amoxicillin-Pot Clavulanate] Other (See Comments)     Syncope, not sure if allergic to cephalosporins.   • Codeine Headache      Can tolerate hydrocodone, no other adverse reactions to other narcotics.   • Diclofenac Headache   • Doxycycline Rash and Hallucinations   • Eggs Or Egg-Derived Products Other (See Comments)     dx on allergy testing, but does not completely avoid   • Naproxen Other (See Comments)     \"blackout\"       Objective     Physical Exam:  Vital Signs: There were no vitals filed for this visit.  There is " no height or weight on file to calculate BMI.     Physical Exam    Alert and oriented x 3  BOTOX PROCEDURE:     The patient was placed in a comfortable area and the sites to be treated were identified. A time out was called and performed. The area to be treated was prepped three times with alcohol and the alcohol allowed to dry. Next, a 30 gauge needle was used to inject the medication in the area to be treated.     Date of Last Injection: 3/8/22  Response: Good  Onset of response: immediate   Wearing off: none  Side Effects: none  : Sazneo  Lot #: [id number]  Expiration Date: 01/2025  NDC: [id number]   Botox is buy and bill    Botox was injected using FDA approved protocol for chronic migraine prevention.   200 unit vial Botox was re-constituted with 4 mL 0.9 NS to 5 unit/0.1 mL using standard techniques.  10 units were given at the  muscle in 2 divided sites  5 units were given at the Procerus muscle  20 units were given at the Frontalis muscle in 4 divided sites  40 units were given at the Temporalis muscle in 8 divided sites  30 units were given at the Occipitalis muscle in 6 divided sites  20 units were given at the Cervical paraspinal muscle in 4 divided sites  30 units were given at the Trapezius muscle in 6 divided sites    The total amount injected in units is 155  The total amount wasted in units is 45  The total amount submitted in units is 200.   Botox was administered by Nurse practitioner.     Patient tolerated procedure well with no immediate complications.     Assessment / Plan      Assessment/Plan:   Diagnoses and all orders for this visit:    1. Chronic migraine without aura without status migrainosus, not intractable (Primary)  Comments:  Cont BTX         Patient Education:       Reviewed medications, potential side effects and signs and symptoms to report. Discussed risk versus benefits of treatment plan with patient and/or family-including medications, labs and radiology  that may be ordered. Addressed questions and concerns during visit. Patient and/or family verbalized understanding and agree with plan. Instructed to call the office with any questions and report to ER with any life-threatening symptoms.     Follow Up:   It has been determined that [unfilled] has chronic migraine headaches. We will proceed with a 12 week regimen of 200 units of Botox injections, to treat the patient's chronic migraines.  Return in about 3 months (around 10/8/2022).    During this visit the following were done:  Labs Reviewed []    Labs Ordered []    Radiology Reports Reviewed []    Radiology Ordered []    PCP Records Reviewed []    Referring Provider Records Reviewed []    ER Records Reviewed []    Hospital Records Reviewed []    History Obtained From Family []    Radiology Images Reviewed []    Other Reviewed [x]    Records Requested []        Nancy Kaba, DNP, APRN

## 2022-07-11 ENCOUNTER — SPECIALTY PHARMACY (OUTPATIENT)
Dept: ONCOLOGY | Facility: HOSPITAL | Age: 37
End: 2022-07-11

## 2022-07-12 ENCOUNTER — TELEPHONE (OUTPATIENT)
Dept: OBSTETRICS AND GYNECOLOGY | Facility: CLINIC | Age: 37
End: 2022-07-12

## 2022-07-12 LAB
CYTO UR: NORMAL
LAB AP CASE REPORT: NORMAL
LAB AP CLINICAL INFORMATION: NORMAL
LAB AP OUTSIDE REPORT, ADDENDUM: NORMAL
LAB AP SPECIAL STAINS: NORMAL
PATH REPORT.FINAL DX SPEC: NORMAL
PATH REPORT.GROSS SPEC: NORMAL

## 2022-07-12 NOTE — TELEPHONE ENCOUNTER
NEW PATIENT    S/w pt she states she has been having UTI symptoms such as straining with urination and having trouble emptying her bladder and alsp was concerned about her U/S report from ED on 11/2021 and wanted to establish care with someone about this and have U/S.     Patient has H/O PCOS as well.     I transferred patient to the front office to get this appt scheduled.

## 2022-07-12 NOTE — TELEPHONE ENCOUNTER
Pt thinks she has a uti, and has possibly had a miscarriage. She also wants to follow up on a cyst that she has been seen for. Was recently seen elsewhere and provider was concerned and recommending follow up with gyn.

## 2022-07-13 ENCOUNTER — HOSPITAL ENCOUNTER (OUTPATIENT)
Dept: ULTRASOUND IMAGING | Facility: HOSPITAL | Age: 37
Discharge: HOME | End: 2022-07-13
Admitting: NURSE PRACTITIONER

## 2022-07-13 DIAGNOSIS — R79.89 ELEVATED LFTS: ICD-10-CM

## 2022-07-13 PROCEDURE — 76705 ECHO EXAM OF ABDOMEN: CPT

## 2022-07-14 ENCOUNTER — HOSPITAL ENCOUNTER (OUTPATIENT)
Dept: ONCOLOGY | Facility: HOSPITAL | Age: 37
Setting detail: INFUSION SERIES
Discharge: HOME | End: 2022-07-14

## 2022-07-14 ENCOUNTER — TELEPHONE (OUTPATIENT)
Dept: GASTROENTEROLOGY | Facility: CLINIC | Age: 37
End: 2022-07-14

## 2022-07-14 VITALS
WEIGHT: 174 LBS | RESPIRATION RATE: 16 BRPM | SYSTOLIC BLOOD PRESSURE: 110 MMHG | TEMPERATURE: 98.1 F | DIASTOLIC BLOOD PRESSURE: 75 MMHG | HEIGHT: 60 IN | HEART RATE: 75 BPM | BODY MASS INDEX: 34.16 KG/M2

## 2022-07-14 DIAGNOSIS — R11.2 INTRACTABLE NAUSEA AND VOMITING: Primary | ICD-10-CM

## 2022-07-14 PROCEDURE — 96360 HYDRATION IV INFUSION INIT: CPT

## 2022-07-14 PROCEDURE — 96361 HYDRATE IV INFUSION ADD-ON: CPT

## 2022-07-14 RX ORDER — SODIUM CHLORIDE 9 MG/ML
1000 INJECTION, SOLUTION INTRAVENOUS ONCE
Status: DISCONTINUED | OUTPATIENT
Start: 2022-07-14 | End: 2022-07-14 | Stop reason: HOSPADM

## 2022-07-14 RX ORDER — SODIUM CHLORIDE 9 MG/ML
1000 INJECTION, SOLUTION INTRAVENOUS ONCE
Status: CANCELLED
Start: 2022-07-14 | End: 2022-07-14

## 2022-07-14 RX ADMIN — SODIUM CHLORIDE 1000 ML: 9 INJECTION, SOLUTION INTRAVENOUS at 13:22

## 2022-07-14 NOTE — TELEPHONE ENCOUNTER
----- Message from TANESHA Guzman sent at 7/14/2022  7:49 AM EDT -----  Ultrasound shows fatty liver disease.  This is treated with weight loss through heart healthy diet and regular exercise.  She is already working on losing weight after bariatric surgery which is good.

## 2022-07-15 ENCOUNTER — SPECIALTY PHARMACY (OUTPATIENT)
Dept: ONCOLOGY | Facility: HOSPITAL | Age: 37
End: 2022-07-15

## 2022-07-25 ENCOUNTER — OFFICE VISIT (OUTPATIENT)
Dept: BEHAVIORAL HEALTH | Facility: CLINIC | Age: 37
End: 2022-07-25

## 2022-07-25 DIAGNOSIS — R45.81 LOW SELF ESTEEM: ICD-10-CM

## 2022-07-25 DIAGNOSIS — F33.0 MDD (MAJOR DEPRESSIVE DISORDER), RECURRENT EPISODE, MILD: Primary | ICD-10-CM

## 2022-07-25 PROCEDURE — 90834 PSYTX W PT 45 MINUTES: CPT | Performed by: PSYCHOLOGIST

## 2022-07-25 NOTE — PROGRESS NOTES
PROGRESS NOTE    Data:  Frances Hartman is a 36 y.o. female who met with the undersigned for a scheduled individual therapy session from 2:15 - 3:00pm.      Clinical Maneuvering/Intervention:      The pt talked about recent stressors involving starting a new job, difficulty eating certain foods, and arguing with her mother. Stressors were processed individually and in detail. Venting of frustrations was conducted in order to help the pt feel less tense emotionally and gain insight into issues. Feelings were processed and validated several times in session. Perspective taking was conducted multiple times in order to help the pt feel less stuck, less overwhelmed, and see challenges as much more manageable. She was assisted with spotting the good things about her new job, including hopes she may have related to it, in order to balance out some of the irritating/stressful parts. Empowerment was conducted to help improve self-esteem, helping her see her own role in many things as of late, but specific to getting a job, sticking with the training, and obtaining one that seems to be a good fit. The pt talked about how she actually is naturally a more positive person and so this was supported in session. Believing in herself has been a focus of the session, as was her becoming more herself. The pt was assisted in recognizing progress in order to show encouragement and promote motivation to keep making positive changes in life. She was assisted a few times in noting what she has done to help her improve. Stressors, such as dealing with her mother, was addressed in terms of venting frustrations, but she was also assisted with finding the humor in it and loving her mother as is/not expecting her to change. Some humor was used in session as it is one of the pt's coping skills. Humor was used too, to help the pt see things as less challenging and more manageable than they first seemed. Humor was used as well, to model use of  the skill as a way to decrease tension ongoing. The pt expressed gratitude for today's session.     Mental Status Exam  Hygiene:  good  Dress: normal  Attitude:  cooperative and proactive  Motor Activity: normal  Speech: normal  Mood:  stressed  Affect:  congruent  Thought Processes: normal  Thought Content:  normal  Suicidal Thoughts:  not endorsed  Homicidal Thoughts:  not endorsed  Crisis Safety Plan: not needed   Hallucinations:  none      Patient's Support Network Includes:  family, friends      Progress toward goal: there is evidence to suggest that she is becoming a stronger person over time.      Functional Status: moderate to high      Prognosis: good    Assessment      The pt presented to be struggling with depression of a mild level. Self-esteem is something that still needs attention, but she is seeing progress in her life which will likely help with self-esteem. She is 'getting her life back,' as noted today in session.      Plan      In order to diminish symptoms of depression and improve self-esteem, she is to give thought to the progress she has made and the things she has done in order to bring about this progress in her own life (this week).    Misty Nava, PhD, LP

## 2022-07-25 NOTE — ADDENDUM NOTE
Encounter addended by: Nelida Gonzalez, RN on: 7/25/2022 3:37 PM   Actions taken: [employer] properties accepted, MAR administration accepted, Flowsheet accepted

## 2022-07-28 ENCOUNTER — HOSPITAL ENCOUNTER (OUTPATIENT)
Dept: ONCOLOGY | Facility: HOSPITAL | Age: 37
Setting detail: INFUSION SERIES
Discharge: HOME | End: 2022-07-28

## 2022-07-28 VITALS
HEART RATE: 92 BPM | SYSTOLIC BLOOD PRESSURE: 134 MMHG | DIASTOLIC BLOOD PRESSURE: 83 MMHG | WEIGHT: 174 LBS | TEMPERATURE: 97.5 F | BODY MASS INDEX: 33.98 KG/M2 | RESPIRATION RATE: 16 BRPM

## 2022-07-28 DIAGNOSIS — R11.2 INTRACTABLE NAUSEA AND VOMITING: Primary | ICD-10-CM

## 2022-07-28 PROCEDURE — 96361 HYDRATE IV INFUSION ADD-ON: CPT

## 2022-07-28 PROCEDURE — 96360 HYDRATION IV INFUSION INIT: CPT

## 2022-07-28 RX ORDER — SODIUM CHLORIDE 9 MG/ML
1000 INJECTION, SOLUTION INTRAVENOUS ONCE
Status: COMPLETED | OUTPATIENT
Start: 2022-07-28 | End: 2022-07-28

## 2022-07-28 RX ORDER — SODIUM CHLORIDE 9 MG/ML
1000 INJECTION, SOLUTION INTRAVENOUS ONCE
Status: CANCELLED
Start: 2022-07-28 | End: 2022-07-28

## 2022-07-28 RX ADMIN — SODIUM CHLORIDE 1000 ML: 9 INJECTION, SOLUTION INTRAVENOUS at 14:04

## 2022-07-29 ENCOUNTER — OFFICE VISIT (OUTPATIENT)
Dept: BEHAVIORAL HEALTH | Facility: CLINIC | Age: 37
End: 2022-07-29

## 2022-07-29 DIAGNOSIS — R45.81 POOR SELF ESTEEM: ICD-10-CM

## 2022-07-29 DIAGNOSIS — F33.0 MDD (MAJOR DEPRESSIVE DISORDER), RECURRENT EPISODE, MILD: Primary | ICD-10-CM

## 2022-07-29 PROCEDURE — 90834 PSYTX W PT 45 MINUTES: CPT | Performed by: PSYCHOLOGIST

## 2022-07-30 NOTE — PROGRESS NOTES
PROGRESS NOTE    Data:  Frances Hartman is a 36 y.o. female who met with the undersigned for a scheduled individual therapy session from 2:15 - 3:00pm.      Clinical Maneuvering/Intervention:      The pt talked about recent stressors involving starting a new job and expressing that she had a miscarriage. Stressors were processed individually and in detail. Venting of frustrations was conducted in order to help the pt feel less tense emotionally and gain insight into issues. Feelings were processed and validated several times in session. Perspective taking was conducted multiple times in order to help the pt feel less stuck, less overwhelmed, and see challenges as much more manageable. She was assisted with spotting the good things about her new job, what she is learning, and how she will handle a difficult relationship at work. She was assisted with processing the loss of an expected pregnancy. She expressed having had 10 miscarriages in life now. Empowerment was conducted to help improve self-esteem, helping her see her own role in many things as of late, but specific to getting a job, sticking with the training, and obtaining one that seems to be a good fit. The pt talked about how she actually is naturally a more positive person and so this was supported in session. She was assisted again to process how she will deal with a difficult employee, but from a point of strength and love of this other person as too, one of God's children. The pt was assisted in recognizing progress in order to show encouragement and promote motivation to keep making positive changes in life. She was assisted a few times in noting what she has done to help her improve. Stressors, such as dealing with her mother, was addressed in terms of venting frustrations, but she was also assisted with finding the humor in it and loving her mother as is/not expecting her to change. Some humor was used in session as it is one of the pt's coping  skills. Humor was used too, to help the pt see things as less challenging and more manageable than they first seemed. Humor was used as well, to model use of the skill as a way to decrease tension ongoing. The pt expressed gratitude for today's session.     Mental Status Exam  Hygiene:  good  Dress: normal  Attitude:  cooperative and proactive  Motor Activity: normal  Speech: normal  Mood:  stressed  Affect:  congruent  Thought Processes: normal  Thought Content:  normal  Suicidal Thoughts:  not endorsed  Homicidal Thoughts:  not endorsed  Crisis Safety Plan: not needed   Hallucinations:  none      Patient's Support Network Includes:  family, friends      Progress toward goal: there is evidence to suggest that she is becoming a stronger person over time.      Functional Status: moderate to high      Prognosis: good    Assessment      The pt presented to be struggling with depression of a mild level. Self-esteem is something that still needs attention, but she is seeing progress in her life which will likely help with self-esteem. She is 'getting her life back,' as noted today in session.      Plan      In order to diminish symptoms of depression and improve self-esteem, she will make a point to handle a difficult relationship with a co-worker from a point of strength as reviewed and practiced in detail today (this week).     Misty Nava, PhD, LP

## 2022-08-05 ENCOUNTER — OFFICE VISIT (OUTPATIENT)
Dept: BEHAVIORAL HEALTH | Facility: CLINIC | Age: 37
End: 2022-08-05

## 2022-08-05 DIAGNOSIS — Z65.8 LONELY: ICD-10-CM

## 2022-08-05 DIAGNOSIS — F33.0 MDD (MAJOR DEPRESSIVE DISORDER), RECURRENT EPISODE, MILD: Primary | ICD-10-CM

## 2022-08-05 DIAGNOSIS — R45.81 POOR SELF ESTEEM: ICD-10-CM

## 2022-08-05 PROCEDURE — 90834 PSYTX W PT 45 MINUTES: CPT | Performed by: PSYCHOLOGIST

## 2022-08-06 NOTE — PROGRESS NOTES
PROGRESS NOTE    Data:  Frances Hartman is a 36 y.o. female who met with the undersigned for a scheduled individual therapy session from 2:15 - 3:00pm.      Clinical Maneuvering/Intervention:      The pt talked about recent stressors involving starting a new job, not getting along with a  there, and struggles along her weight loss journey post weight loss surgery. Stressors were processed individually and in detail. Venting of frustrations was conducted in order to help the pt feel less tense emotionally and gain insight into issues. Feelings were processed and validated several times in session. Perspective taking was conducted multiple times in order to help the pt feel less stuck, less overwhelmed, and see challenges as much more manageable. Active listening was conducted in order to help the pt make sense of stressors and start moving towards potential solutions. The pt was assisted with finding solutions based on existing skill-set and abilities. The pt was assisted with applying what was addressed in the previous sessions, choosing to love this co-worker as one of God's children, and continuing to be assertive/respectful with co-workers who can be more challenging. This was applied to family members as well, building on the positive experiences she had lately visiting family in [place]. She talked in detail about seeing numerous family members, feeling loved by them, and how it felt good seeing them after not seeing them for well over 20 years. The pt was assisted in recognizing progress in order to show encouragement and promote motivation to keep making positive changes in life. The pt's strengths were identified in order to help identify abilities to use to better face/overcome challenges. She was assisted with seeing her own hand in making positive things happen. Self-esteem was assessed, noting that work can be done in this area. The pt's strengths were identified in order to help identify  abilities to use to better face/overcome challenges. The pt expressed gratitude for today's session.     Mental Status Exam  Hygiene:  good  Dress: normal  Attitude:  cooperative and proactive  Motor Activity: normal  Speech: normal  Mood:  grateful, lonely  Affect:  congruent  Thought Processes: normal  Thought Content:  normal  Suicidal Thoughts:  not endorsed  Homicidal Thoughts:  not endorsed  Crisis Safety Plan: not needed   Hallucinations:  none      Patient's Support Network Includes:  family, friends      Progress toward goal: there is evidence to suggest that she is becoming a stronger person over time.      Functional Status: moderate to high      Prognosis: good    Assessment      The pt presented to be struggling with depression of a mild level. Self-esteem is something that still needs attention, but she is seeing progress in her life which will likely help with self-esteem. She is 'getting her life back,' as noted in the session today.       Plan      In order to diminish symptoms of depression and improve self-esteem, she will make a point apply the love she felt by so many family member to recognizing that she is a lovable person (this week). Self-talk will likely be addressed in subsequent sessions.     Misty Nava, PhD, LP

## 2022-08-11 ENCOUNTER — HOSPITAL ENCOUNTER (OUTPATIENT)
Dept: ONCOLOGY | Facility: HOSPITAL | Age: 37
Setting detail: INFUSION SERIES
Discharge: HOME | End: 2022-08-11

## 2022-08-11 VITALS
HEIGHT: 60 IN | WEIGHT: 181 LBS | HEART RATE: 78 BPM | DIASTOLIC BLOOD PRESSURE: 76 MMHG | TEMPERATURE: 97.8 F | RESPIRATION RATE: 18 BRPM | SYSTOLIC BLOOD PRESSURE: 124 MMHG | BODY MASS INDEX: 35.53 KG/M2

## 2022-08-11 DIAGNOSIS — R11.10 INTRACTABLE VOMITING: Primary | ICD-10-CM

## 2022-08-11 DIAGNOSIS — R11.2 INTRACTABLE NAUSEA AND VOMITING: ICD-10-CM

## 2022-08-11 PROCEDURE — 96361 HYDRATE IV INFUSION ADD-ON: CPT

## 2022-08-11 PROCEDURE — 96360 HYDRATION IV INFUSION INIT: CPT

## 2022-08-11 RX ORDER — SODIUM CHLORIDE 9 MG/ML
1000 INJECTION, SOLUTION INTRAVENOUS ONCE
Status: COMPLETED | OUTPATIENT
Start: 2022-08-11 | End: 2022-08-11

## 2022-08-11 RX ORDER — SODIUM CHLORIDE 9 MG/ML
1000 INJECTION, SOLUTION INTRAVENOUS ONCE
Status: CANCELLED
Start: 2022-08-11 | End: 2022-08-11

## 2022-08-11 RX ADMIN — SODIUM CHLORIDE 1000 ML: 9 INJECTION, SOLUTION INTRAVENOUS at 14:52

## 2022-08-25 ENCOUNTER — HOSPITAL ENCOUNTER (OUTPATIENT)
Dept: ONCOLOGY | Facility: HOSPITAL | Age: 37
Setting detail: INFUSION SERIES
Discharge: HOME | End: 2022-08-25

## 2022-08-25 VITALS
TEMPERATURE: 98.4 F | HEART RATE: 81 BPM | BODY MASS INDEX: 36.71 KG/M2 | WEIGHT: 187 LBS | SYSTOLIC BLOOD PRESSURE: 132 MMHG | RESPIRATION RATE: 16 BRPM | HEIGHT: 60 IN | DIASTOLIC BLOOD PRESSURE: 87 MMHG

## 2022-08-25 DIAGNOSIS — R11.2 INTRACTABLE NAUSEA AND VOMITING: Primary | ICD-10-CM

## 2022-08-25 PROCEDURE — 96360 HYDRATION IV INFUSION INIT: CPT

## 2022-08-25 PROCEDURE — 96361 HYDRATE IV INFUSION ADD-ON: CPT

## 2022-08-25 RX ORDER — SODIUM CHLORIDE 9 MG/ML
1000 INJECTION, SOLUTION INTRAVENOUS ONCE
Status: COMPLETED | OUTPATIENT
Start: 2022-08-25 | End: 2022-08-25

## 2022-08-25 RX ORDER — SODIUM CHLORIDE 9 MG/ML
1000 INJECTION, SOLUTION INTRAVENOUS ONCE
Status: CANCELLED
Start: 2022-08-25 | End: 2022-08-25

## 2022-08-25 RX ADMIN — SODIUM CHLORIDE 1000 ML: 9 INJECTION, SOLUTION INTRAVENOUS at 14:51

## 2022-08-26 ENCOUNTER — OFFICE VISIT (OUTPATIENT)
Dept: BARIATRICS/WEIGHT MGMT | Facility: CLINIC | Age: 37
End: 2022-08-26

## 2022-08-26 ENCOUNTER — OFFICE VISIT (OUTPATIENT)
Dept: BEHAVIORAL HEALTH | Facility: CLINIC | Age: 37
End: 2022-08-26

## 2022-08-26 VITALS
SYSTOLIC BLOOD PRESSURE: 116 MMHG | HEART RATE: 76 BPM | DIASTOLIC BLOOD PRESSURE: 72 MMHG | BODY MASS INDEX: 36.42 KG/M2 | OXYGEN SATURATION: 97 % | RESPIRATION RATE: 18 BRPM | WEIGHT: 185.5 LBS | TEMPERATURE: 97.5 F | HEIGHT: 60 IN

## 2022-08-26 DIAGNOSIS — F33.0 MDD (MAJOR DEPRESSIVE DISORDER), RECURRENT EPISODE, MILD: Primary | ICD-10-CM

## 2022-08-26 DIAGNOSIS — R45.81 LOW SELF ESTEEM: ICD-10-CM

## 2022-08-26 DIAGNOSIS — Z65.8 LONELY: ICD-10-CM

## 2022-08-26 DIAGNOSIS — R53.83 FATIGUE, UNSPECIFIED TYPE: Primary | ICD-10-CM

## 2022-08-26 DIAGNOSIS — Z90.3 POSTGASTRECTOMY MALABSORPTION: ICD-10-CM

## 2022-08-26 DIAGNOSIS — R45.81 POOR SELF ESTEEM: ICD-10-CM

## 2022-08-26 DIAGNOSIS — E55.9 HYPOVITAMINOSIS D: ICD-10-CM

## 2022-08-26 DIAGNOSIS — E66.9 OBESITY, CLASS II, BMI 35-39.9: ICD-10-CM

## 2022-08-26 DIAGNOSIS — Z13.0 SCREENING, IRON DEFICIENCY ANEMIA: ICD-10-CM

## 2022-08-26 DIAGNOSIS — Z13.21 MALNUTRITION SCREEN: ICD-10-CM

## 2022-08-26 DIAGNOSIS — K91.2 POSTGASTRECTOMY MALABSORPTION: ICD-10-CM

## 2022-08-26 PROCEDURE — 90834 PSYTX W PT 45 MINUTES: CPT | Performed by: PSYCHOLOGIST

## 2022-08-26 PROCEDURE — 99213 OFFICE O/P EST LOW 20 MIN: CPT | Performed by: SURGERY

## 2022-08-26 NOTE — PROGRESS NOTES
[facility] Bariatric Surgery  [address]    [facility] [id number] [phone]        Patient Name:  Frances Hartman.  :  1985      Date of Visit: 2022      Reason for Visit:   1 years postop      HPI: Frances Hartman is a 36 y.o. female s/p robotic assisted LSG/HHR by  on 6/15/21.      Initally struggled greatly with nausea and had dilations of mild stricture.  Has suffered with fatigue and malaise, and has required IV infusions b/c very sensitive to even mild dehydration.    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 Hernandez.  Has appt with  GI in next few weeks.  Pt to have repeat EGD soon.      Since LOV, starting working in a [employer] center.    C/o reflux.  F/u with GI at .  Attributed to medicine. Has intermittent nighttime reflux.  Not on PPI.  Declines rx workup at this time, thinks she can improve with dietary changes and taking pills with more meds.  Has gastroparesis, therefore struggles with delayed gastric emptying and getting meds to digest.  Working with GI also re: loose stools, which are improving.    Getting ?? g prot/day.  Does not track protein, eats protein first and has with each meal.  Eats protein snacks.  Eats 2-3 meals per day.  Has 3 snacks per day.  Still gets IV infusions q2 weeks.  She thinks they really help her and wants to continue.    Drinking more fluids than usual--64-80 oz. Last labs revealed  no vitamin deficiencies. Taking MVI and Vit D.   Exercise: limited due to chronic back pain, but she is walking and increasing steps.  Aspires to start going to gym soon when her work schedule changes.     Presurgery weight: 231 pounds.  Today's weight is 84.1 kg (185 lb 8 oz) pounds, today's  Body mass index is 36.23 kg/m²., and weight loss since surgery is 46 pounds.      Past Medical History:   Diagnosis Date   • Anxiety    • Asthma     prn inhalers,  does not follow w/ pulmonary   • Chronic back pain     previously followed w/ pain management, now just prn Tylenol + Gabapentin   • Chronic deep vein thrombosis (DVT) of femoral vein of right lower extremity (HCC) 09/10/2021   • Clotting disorder ([facility]) 2015   • Diabetes mellitus (HCC)     Type 2, dx 2014, never on insulin, A1C 7.6   • Diabetes mellitus (HCC)     Type II, dx 2014, never on insulin, A1C 7.6    • Dyspepsia    • Dyspnea on exertion    • Dysuria 03/07/2022    Dysuria and hematuria x 2 weeks. 3/7/22 urine culture sent. Rx Macrobid 100 mg twice daily for 7 days. Patient did not tolerate Macrobid well due to GI upset. Urine culture was negative.   • Fatigue    • Gastroparesis    • GERD (gastroesophageal reflux disease)    • Heartburn     chronic, prn TUMS, denies prior eval   • Hirsutism    • Hx of radiation therapy     for treatments of Keloids   • Hypertension    • Irregular menses     infrequent spotting   • Leiomyoma, subserous     possible peduculated f3-4 cm fibroid on u/s at [facility]   • Migraines     botox q3 months, following Neurology @ [facility]   • Morbid obesity ([facility])     s/p sleeve gastrectomy   • Peripheral edema    • Polycystic ovary syndrome 2011   • Seasonal allergies    • Slow to wake up after anesthesia      Past Surgical History:   Procedure Laterality Date   • ABDOMINAL SURGERY  06/15/2021    Bariatric Sleeve Surgery   • BARIATRIC SURGERY     • ENDOSCOPY N/A 06/15/2021    Procedure: ESOPHAGOGASTRODUODENOSCOPY;  Surgeon: Ayaka Andre MD;  Location: [facility] OR;  Service: Robotics - DaVinci;  Laterality: N/A;   • ENDOSCOPY N/A 10/28/2021    Procedure: ESOPHAGOGASTRODUODENOSCOPY WITH ACHALASIA BALLOON DILATATION;  Surgeon: Jase Hernandez MD;  Location:  [facility];  Service: Gastroenterology;  Laterality: N/A;  58 F DILATOR   • ENDOSCOPY N/A 11/15/2021    Procedure: ESOPHAGOGASTRODUODENOSCOPY WITH DILATATION;  Surgeon: Jase Hernandez MD;  Location:  [facility];  Service:  Gastroenterology;  Laterality: N/A;  achalasia balloon    • ENDOSCOPY N/A 11/24/2021    Procedure: ESOPHAGOGASTRODUODENOSCOPY WTH DUODENAL POLYPECTOPMY AND NASAL JEJUNAL TUBE PLACEMENT;  Surgeon: Jase Hernandez MD;  Location:  [facility];  Service: Gastroenterology;  Laterality: N/A;  placement of feeding tube, checked position with KUB   • GASTRIC RESTRICTION SURGERY  2021    Sleeve   • GASTRIC SLEEVE LAPAROSCOPIC N/A 06/15/2021    Procedure: GASTRIC SLEEVE LAPAROSCOPIC WITH DAVINCI ROBOT, LAPROSCOPIC HIATAL HERNIA REPAIR WITH DAVINCI ROBOT;  Surgeon: Ayaka Andre MD;  Location:  [facility] OR;  Service: Robotics - DaVinci;  Laterality: N/A;   • KELOID EXCISION      1990,1993,1999,2015,2016,2019   • LAPAROSCOPIC CHOLECYSTECTOMY  2000    for stones   • RADIOFREQUENCY ABLATION  2019    x 2  for pt back   • UMBILICAL HERNIA REPAIR  1990   • WISDOM TOOTH EXTRACTION  1994     Outpatient Medications Marked as Taking for the 8/26/22 encounter (Office Visit) with Ayaka Andre MD   Medication Sig Dispense Refill   • acetaminophen (TYLENOL) 500 MG tablet Take 500-1,000 mg by mouth Every 6 (Six) Hours As Needed for Mild Pain .     • albuterol (PROVENTIL HFA;VENTOLIN HFA) 108 (90 BASE) MCG/ACT inhaler Inhale 2 puffs Every 4 (Four) Hours As Needed for wheezing.     • Blood Glucose Monitoring Suppl (ONE TOUCH ULTRA 2) w/Device kit      • Blood Glucose Monitoring Suppl device Use BID to test blood sugars dx e11.65 1 each 0   • carvedilol (COREG) 6.25 MG tablet Take 1 tab in AM and 2 tab in PM (Patient taking differently: Take 6.25 mg by mouth Daily.) 270 tablet 0   • cyclobenzaprine (FLEXERIL) 10 MG tablet Take 10 mg by mouth 3 (Three) Times a Day As Needed for Muscle Spasms.     • diphenhydrAMINE (BENADRYL) 25 mg capsule Take 25 mg by mouth Every 6 (Six) Hours As Needed for Itching or Sleep.     • furosemide (LASIX) 20 MG tablet TAKE ONE TO TWO TABLETS BY MOUTH EVERY NIGHTLY (Patient taking differently:  "Take 20 mg by mouth As Needed.) 135 tablet 2   • gabapentin (NEURONTIN) 300 MG capsule Take 600 mg by mouth Daily.     • glucose blood test strip USe BID to test blood sugar DX.e11.65 100 each 3   • guaiFENesin (HUMIBID 3) 400 MG tablet Take 200 mg by mouth As Needed.     • HYDROcodone-acetaminophen (NORCO) 5-325 MG per tablet Take 1 tablet by mouth Every 6 (Six) Hours As Needed for Severe Pain . 12 tablet 0   • Lancets 33G misc 1 Each/kg 2 (two) times a day. To test blood sugars DxE11.65 100 each 2   • levonorgestrel (MIRENA) 20 MCG/24HR IUD 1 each by Intrauterine route.     • loratadine (CLARITIN) 10 MG tablet TAKE ONE TABLET BY MOUTH DAILY 30 tablet 2   • metFORMIN ER (GLUCOPHAGE-XR) 500 MG 24 hr tablet Take 1 tablet by mouth Daily With Dinner. 90 tablet 3   • multivitamin with minerals tablet tablet Take 1 tablet by mouth Daily.     • OnabotulinumtoxinA (Botox) 200 units reconstituted solution FOR . PHYSICIAN TO INJECT UP  UNITS INTRAMUSCULARLY INTO HEAD, NECK AND SHOULDERS EVERY 90 DAYS. 1 each 3   • simethicone (MYLICON) 125 MG chewable tablet CHEW ONE TABLET BY MOUTH EVERY 6 HOURS AS NEEDED FOR FLATULENCE 30 tablet 2   • traMADol (ULTRAM) 50 MG tablet Take 50 mg by mouth Every 6 (Six) Hours As Needed for Moderate Pain .     • vitamin D3 125 MCG (5000 UT) capsule capsule Take 5,000 Units by mouth Daily.       Current Facility-Administered Medications for the 8/26/22 encounter (Office Visit) with Ayaka Andre MD   Medication Dose Route Frequency Provider Last Rate Last Admin   • OnabotulinumtoxinA 200 Units  200 Units Intramuscular Q3 Months Shiela Coelho APRN   200 Units at 07/08/22 1043       Allergies   Allergen Reactions   • Capsicum Annuum Extract & Derivative (Bell Pepper) [Capsicum] Anaphylaxis   • Ibuprofen Other (See Comments)     \"makes me retain fluid and eventually go into CHF\"   • Macrobid [Nitrofurantoin] Nausea Only   • Peanut-Containing Drug Products " "Anaphylaxis   • Hydrochlorothiazide Swelling     eventually causes CHF    • Monosodium Glutamate Swelling and Headache   • Oatmeal Other (See Comments)     Dx on allergy testing   • Amitriptyline Mental Status Change     memory gaps + neuropathy + hair loss   • Aspartame Nausea Only   • Augmentin [Amoxicillin-Pot Clavulanate] Other (See Comments)     Syncope, not sure if allergic to cephalosporins.   • Codeine Headache      Can tolerate hydrocodone, no other adverse reactions to other narcotics.   • Diclofenac Headache   • Doxycycline Rash and Hallucinations   • Eggs Or Egg-Derived Products Other (See Comments)     dx on allergy testing, but does not completely avoid   • Naproxen Other (See Comments)     \"blackout\"       Social History     Socioeconomic History   • Marital status: Single   Tobacco Use   • Smoking status: Never Smoker   • Smokeless tobacco: Never Used   Vaping Use   • Vaping Use: Never used   Substance and Sexual Activity   • Alcohol use: Not Currently     Comment: Very rarely drink socially. At most 2 drinks per month.   • Drug use: Never   • Sexual activity: Yes     Partners: Male     Birth control/protection: Coitus interruptus, Condom, I.U.D.       /72 (BP Location: Left arm, Patient Position: Sitting)   Pulse 76   Temp 97.5 °F (36.4 °C)   Resp 18   Ht 152.4 cm (60\")   Wt 84.1 kg (185 lb 8 oz)   SpO2 97%   BMI 36.23 kg/m²     Physical Exam  Constitutional:       General: She is not in acute distress.     Appearance: She is well-developed. She is not diaphoretic.   HENT:      Head: Normocephalic and atraumatic.      Mouth/Throat:      Pharynx: No oropharyngeal exudate.   Eyes:      Conjunctiva/sclera: Conjunctivae normal.      Pupils: Pupils are equal, round, and reactive to light.   Pulmonary:      Effort: Pulmonary effort is normal. No respiratory distress.   Abdominal:      General: There is no distension.      Palpations: Abdomen is soft.   Skin:     General: Skin is warm and dry. "      Coloration: Skin is not pale.   Neurological:      Mental Status: She is alert and oriented to person, place, and time.      Cranial Nerves: No cranial nerve deficit.   Psychiatric:         Behavior: Behavior normal.         Thought Content: Thought content normal.           Assessment:  1 years s/p  robotic assisted LSG/HHR      ICD-10-CM ICD-9-CM   1. Fatigue, unspecified type  R53.83 780.79   2. Hypovitaminosis D  E55.9 268.9   3. Malnutrition screen  Z13.21 V77.2   4. Screening, iron deficiency anemia  Z13.0 V78.0   5. Postgastrectomy malabsorption  K91.2 579.3    Z90.3    6. Obesity, Class II, BMI 35-39.9  E66.9 278.00         Plan:  Doing well. Continue w/ good food choices and healthy habits.  Continue protein >70g/day.  Continue routine exercise.  Routine bariatric labs ordered.  Continue vitamins w/ adjustments pending lab results.  Call w/ problems/concerns.     Advise log intake.  Will start gym soon.  Overall doing so much better.    The patient was instructed to follow up in 6 months, sooner if needed.    note: approx 15 of the 25 minute visit was spent counseling on nutrition and necessary dietary/lifestyle modifications face to face.    Ayaka Andre MD

## 2022-08-27 NOTE — PROGRESS NOTES
PROGRESS NOTE    Data:  Frances Hartman is a 36 y.o. female who met with the undersigned for a scheduled individual therapy session from 2:15 - 3:00pm.      Clinical Maneuvering/Intervention:      The pt talked about recent stressors involving starting a new job, not getting along with her  at work, and feeling lonely lately. Stressors were processed individually and in detail. Venting of frustrations was conducted in order to help the pt feel less tense emotionally and gain insight into issues. Feelings were processed and validated several times in session. Perspective taking was conducted multiple times in order to help the pt feel less stuck, less overwhelmed, and see challenges as much more manageable. Active listening was conducted in order to help the pt make sense of stressors and start moving towards potential solutions. The pt was assisted with finding solutions based on existing skill-set and abilities. She was assisted with processing what happened at work more over the course of the session. Time was allocated specifically to assess what is 'working' in the pt's life, versus what is 'not working,' (if anything) in terms of helping to improve mood and quality of life. She did well talking to B, which led to her getting a different shift, one the pt wanted anyway. Additional work was conducted in order to help the pt build self-esteem, less time than would be spent noting her errors/mistakes, as the pt tends to come to her own role by herself after a period of time and encouraging therapy. The pt was assisted in recognizing progress in order to show encouragement and promote motivation to keep making positive changes in life. She is physically healthier and sticking with her job despite stressors. Worries and wants to meet a man to /date were addressed some towards the end of session. Homework was assigned tailored to pt's needs. The pt expressed gratitude for today's session.    Mental  Status Exam  Hygiene:  good  Dress: normal  Attitude:  cooperative and proactive  Motor Activity: normal  Speech: normal  Mood:  hurt, lonely  Affect:  congruent  Thought Processes: normal  Thought Content:  normal  Suicidal Thoughts:  not endorsed  Homicidal Thoughts:  not endorsed  Crisis Safety Plan: not needed   Hallucinations:  none      Patient's Support Network Includes:  family, friends      Progress toward goal: there is evidence to suggest that she is becoming a stronger person over time.      Functional Status: moderate to high      Prognosis: good    Assessment      The pt presented to be struggling with depression of a mild level. Self-esteem is something that still needs attention, but she is seeing progress in her life which will likely help with self-esteem. She is 'getting her life back,' and then some as discussed in session.      Plan      In order to diminish symptoms of depression and improve self-esteem, she will give herself credit for her progress thus far and do some specific praying in terms the sort of man she would like in her life.     Misty Nava, PhD, LP

## 2022-09-08 ENCOUNTER — APPOINTMENT (OUTPATIENT)
Dept: ONCOLOGY | Facility: HOSPITAL | Age: 37
End: 2022-09-08

## 2022-09-08 ENCOUNTER — HOSPITAL ENCOUNTER (OUTPATIENT)
Dept: ONCOLOGY | Facility: HOSPITAL | Age: 37
Setting detail: INFUSION SERIES
Discharge: HOME | End: 2022-09-08

## 2022-09-08 DIAGNOSIS — R11.10 INTRACTABLE VOMITING: Primary | ICD-10-CM

## 2022-09-08 DIAGNOSIS — R11.2 INTRACTABLE NAUSEA AND VOMITING: ICD-10-CM

## 2022-09-08 PROCEDURE — 96360 HYDRATION IV INFUSION INIT: CPT

## 2022-09-08 PROCEDURE — 96361 HYDRATE IV INFUSION ADD-ON: CPT

## 2022-09-08 RX ORDER — SODIUM CHLORIDE 9 MG/ML
1000 INJECTION, SOLUTION INTRAVENOUS ONCE
Status: CANCELLED
Start: 2022-09-08 | End: 2022-09-08

## 2022-09-08 RX ORDER — SODIUM CHLORIDE 9 MG/ML
1000 INJECTION, SOLUTION INTRAVENOUS ONCE
Status: COMPLETED | OUTPATIENT
Start: 2022-09-08 | End: 2022-09-08

## 2022-09-08 RX ADMIN — SODIUM CHLORIDE 1000 ML: 9 INJECTION, SOLUTION INTRAVENOUS at 08:33

## 2022-09-26 ENCOUNTER — TELEPHONE (OUTPATIENT)
Dept: NEUROLOGY | Facility: CLINIC | Age: 37
End: 2022-09-26

## 2022-09-26 NOTE — TELEPHONE ENCOUNTER
Caller: Frances Hartman    Relationship: Self    Best call back number: [phone]; PLEASE LEAVE DETAILED VM IF UNABLE TO REACH PT DIRECTLY.    What was the call regarding: PT HAS REQUESTED TO RESCHEDULE HER BOTOX APPT W/ TANESHA TRIANA ON 10/6/22. PT ASKS IF OFFICE HAS ANY AVAILABILITY ON THE 7TH INSTEAD. IF NOT, PT WOULD NEED FIRST AVAILABLE APPT IN THE MORNING WHEN RESCHEDULING.    HUB UNABLE TO MAKE CHANGES TO BOTOX VISIT TYPE.    Do you require a callback: YES, PLEASE.    PLEASE REVIEW AND ADVISE.

## 2022-09-28 ENCOUNTER — SPECIALTY PHARMACY (OUTPATIENT)
Dept: ONCOLOGY | Facility: HOSPITAL | Age: 37
End: 2022-09-28

## 2022-09-28 RX ORDER — ONABOTULINUMTOXINA 200 [USP'U]/1
INJECTION, POWDER, LYOPHILIZED, FOR SOLUTION INTRADERMAL; INTRAMUSCULAR
Qty: 1 EACH | Refills: 3 | Status: SHIPPED | OUTPATIENT
Start: 2022-09-28

## 2022-09-28 NOTE — PROGRESS NOTES
Specialty Pharmacy Refill Coordination Note     Frances is a 36 y.o. female contacted today regarding refills of Botox specialty medication(s). Patient due Botox in provider's office on 10/6/2022. Shipped from [facility] to provider's office.    Reviewed and verified with patient: Yes  Specialty medication(s) and dose(s) confirmed: yes    Refill Questions    Flowsheet Row Most Recent Value   Changes to allergies? No   Changes to medications? No   New conditions since last clinic visit No   Unplanned office visit, urgent care, ED, or hospital admission in the last 4 weeks  No   How does patient/caregiver feel medication is working? Excellent   Financial problems or insurance changes  No   If yes, describe changes in insurance or financial issues. N/A   Since the previous refill, were any specialty medication doses or scheduled injections missed or delayed?  No   If yes, please provide the amount N/A   Why were doses missed? N/A   Does this patient require a clinical escalation to a pharmacist? No          Delivery Questions    Flowsheet Row Most Recent Value   Delivery method [employer]  [Ship Botox to Dr. Francois's office: [address] [place] [address] 201.]   Delivery address correct? Yes   Preferred delivery time? Anytime   Number of medications in delivery 1   Medication being filled and delivered Botox   Doses left of specialty medications None. Administered in the provider's office.   Is there any medication that is due not being filled? No   Supplies needed? No supplies needed   Cooler needed? Yes   Do any medications need mixed or dated? No   Copay form of payment Credit card on file   Questions or concerns for the pharmacist? No   Are any medications first time fills? No            Medication Adherence    Adherence tools used: cell phone  Support network for adherence: healthcare provider          Follow-up:  12 weeks.     Lesvia Xiao, Pharmacy Technician  Specialty Pharmacy Technician

## 2022-09-30 ENCOUNTER — OFFICE VISIT (OUTPATIENT)
Dept: BEHAVIORAL HEALTH | Facility: CLINIC | Age: 37
End: 2022-09-30

## 2022-09-30 DIAGNOSIS — F33.0 MDD (MAJOR DEPRESSIVE DISORDER), RECURRENT EPISODE, MILD: Primary | ICD-10-CM

## 2022-09-30 DIAGNOSIS — R45.81 LOW SELF ESTEEM: ICD-10-CM

## 2022-09-30 PROCEDURE — 90834 PSYTX W PT 45 MINUTES: CPT | Performed by: PSYCHOLOGIST

## 2022-10-03 ENCOUNTER — APPOINTMENT (OUTPATIENT)
Dept: GENERAL RADIOLOGY | Facility: HOSPITAL | Age: 37
End: 2022-10-03

## 2022-10-03 ENCOUNTER — HOSPITAL ENCOUNTER (EMERGENCY)
Facility: HOSPITAL | Age: 37
Discharge: HOME | End: 2022-10-03
Attending: EMERGENCY MEDICINE | Admitting: EMERGENCY MEDICINE

## 2022-10-03 VITALS
SYSTOLIC BLOOD PRESSURE: 119 MMHG | DIASTOLIC BLOOD PRESSURE: 64 MMHG | RESPIRATION RATE: 16 BRPM | HEIGHT: 60 IN | OXYGEN SATURATION: 96 % | TEMPERATURE: 98.4 F | HEART RATE: 90 BPM | WEIGHT: 191 LBS | BODY MASS INDEX: 37.5 KG/M2

## 2022-10-03 DIAGNOSIS — R07.9 CHEST PAIN, UNSPECIFIED TYPE: Primary | ICD-10-CM

## 2022-10-03 LAB
ALBUMIN SERPL-MCNC: 4.1 G/DL (ref 3.5–5.2)
ALBUMIN/GLOB SERPL: 1.3 G/DL
ALP SERPL-CCNC: 100 U/L (ref 39–117)
ALT SERPL W P-5'-P-CCNC: 43 U/L (ref 1–33)
ANION GAP SERPL CALCULATED.3IONS-SCNC: 11 MMOL/L (ref 5–15)
AST SERPL-CCNC: 26 U/L (ref 1–32)
BASOPHILS # BLD AUTO: 0.03 10*3/MM3 (ref 0–0.2)
BASOPHILS NFR BLD AUTO: 0.2 % (ref 0–1.5)
BILIRUB SERPL-MCNC: 0.2 MG/DL (ref 0–1.2)
BUN SERPL-MCNC: 8 MG/DL (ref 6–20)
BUN/CREAT SERPL: 11.8 (ref 7–25)
CALCIUM SPEC-SCNC: 9.7 MG/DL (ref 8.6–10.5)
CHLORIDE SERPL-SCNC: 105 MMOL/L (ref 98–107)
CO2 SERPL-SCNC: 24 MMOL/L (ref 22–29)
CREAT SERPL-MCNC: 0.68 MG/DL (ref 0.57–1)
D DIMER PPP FEU-MCNC: 0.28 MCGFEU/ML (ref 0.01–0.5)
DEPRECATED RDW RBC AUTO: 44.3 FL (ref 37–54)
EGFRCR SERPLBLD CKD-EPI 2021: 114.5 ML/MIN/1.73
EOSINOPHIL # BLD AUTO: 0.48 10*3/MM3 (ref 0–0.4)
EOSINOPHIL NFR BLD AUTO: 3.5 % (ref 0.3–6.2)
ERYTHROCYTE [DISTWIDTH] IN BLOOD BY AUTOMATED COUNT: 11.9 % (ref 12.3–15.4)
GLOBULIN UR ELPH-MCNC: 3.2 GM/DL
GLUCOSE SERPL-MCNC: 104 MG/DL (ref 65–99)
HCT VFR BLD AUTO: 39.3 % (ref 34–46.6)
HGB BLD-MCNC: 13 G/DL (ref 12–15.9)
HOLD SPECIMEN: NORMAL
IMM GRANULOCYTES # BLD AUTO: 0.04 10*3/MM3 (ref 0–0.05)
IMM GRANULOCYTES NFR BLD AUTO: 0.3 % (ref 0–0.5)
LIPASE SERPL-CCNC: 23 U/L (ref 13–60)
LYMPHOCYTES # BLD AUTO: 3.01 10*3/MM3 (ref 0.7–3.1)
LYMPHOCYTES NFR BLD AUTO: 21.8 % (ref 19.6–45.3)
MCH RBC QN AUTO: 33.1 PG (ref 26.6–33)
MCHC RBC AUTO-ENTMCNC: 33.1 G/DL (ref 31.5–35.7)
MCV RBC AUTO: 100 FL (ref 79–97)
MONOCYTES # BLD AUTO: 0.72 10*3/MM3 (ref 0.1–0.9)
MONOCYTES NFR BLD AUTO: 5.2 % (ref 5–12)
NEUTROPHILS NFR BLD AUTO: 69 % (ref 42.7–76)
NEUTROPHILS NFR BLD AUTO: 9.55 10*3/MM3 (ref 1.7–7)
NRBC BLD AUTO-RTO: 0 /100 WBC (ref 0–0.2)
NT-PROBNP SERPL-MCNC: 11.3 PG/ML (ref 0–450)
PLATELET # BLD AUTO: 281 10*3/MM3 (ref 140–450)
PMV BLD AUTO: 10.7 FL (ref 6–12)
POTASSIUM SERPL-SCNC: 4 MMOL/L (ref 3.5–5.2)
PROT SERPL-MCNC: 7.3 G/DL (ref 6–8.5)
RBC # BLD AUTO: 3.93 10*6/MM3 (ref 3.77–5.28)
SODIUM SERPL-SCNC: 140 MMOL/L (ref 136–145)
TROPONIN T SERPL-MCNC: <0.01 NG/ML (ref 0–0.03)
TROPONIN T SERPL-MCNC: <0.01 NG/ML (ref 0–0.03)
WBC NRBC COR # BLD: 13.83 10*3/MM3 (ref 3.4–10.8)
WHOLE BLOOD HOLD COAG: NORMAL
WHOLE BLOOD HOLD SPECIMEN: NORMAL

## 2022-10-03 PROCEDURE — 93005 ELECTROCARDIOGRAM TRACING: CPT | Performed by: EMERGENCY MEDICINE

## 2022-10-03 PROCEDURE — 25010000002 MORPHINE PER 10 MG: Performed by: EMERGENCY MEDICINE

## 2022-10-03 PROCEDURE — 83690 ASSAY OF LIPASE: CPT | Performed by: EMERGENCY MEDICINE

## 2022-10-03 PROCEDURE — 99284 EMERGENCY DEPT VISIT MOD MDM: CPT

## 2022-10-03 PROCEDURE — 71045 X-RAY EXAM CHEST 1 VIEW: CPT

## 2022-10-03 PROCEDURE — 36415 COLL VENOUS BLD VENIPUNCTURE: CPT

## 2022-10-03 PROCEDURE — 96376 TX/PRO/DX INJ SAME DRUG ADON: CPT

## 2022-10-03 PROCEDURE — 96375 TX/PRO/DX INJ NEW DRUG ADDON: CPT

## 2022-10-03 PROCEDURE — 85025 COMPLETE CBC W/AUTO DIFF WBC: CPT | Performed by: EMERGENCY MEDICINE

## 2022-10-03 PROCEDURE — 83880 ASSAY OF NATRIURETIC PEPTIDE: CPT | Performed by: EMERGENCY MEDICINE

## 2022-10-03 PROCEDURE — 80053 COMPREHEN METABOLIC PANEL: CPT | Performed by: EMERGENCY MEDICINE

## 2022-10-03 PROCEDURE — 84484 ASSAY OF TROPONIN QUANT: CPT | Performed by: EMERGENCY MEDICINE

## 2022-10-03 PROCEDURE — 85379 FIBRIN DEGRADATION QUANT: CPT | Performed by: EMERGENCY MEDICINE

## 2022-10-03 PROCEDURE — 96374 THER/PROPH/DIAG INJ IV PUSH: CPT

## 2022-10-03 RX ORDER — ALUMINA, MAGNESIA, AND SIMETHICONE 2400; 2400; 240 MG/30ML; MG/30ML; MG/30ML
15 SUSPENSION ORAL ONCE
Status: COMPLETED | OUTPATIENT
Start: 2022-10-03 | End: 2022-10-03

## 2022-10-03 RX ORDER — FAMOTIDINE 20 MG/1
20 TABLET, FILM COATED ORAL 2 TIMES DAILY
Qty: 20 TABLET | Refills: 0 | Status: SHIPPED | OUTPATIENT
Start: 2022-10-03 | End: 2022-10-14

## 2022-10-03 RX ORDER — PANTOPRAZOLE SODIUM 40 MG/1
40 TABLET, DELAYED RELEASE ORAL DAILY
Qty: 8 TABLET | Refills: 0 | Status: SHIPPED | OUTPATIENT
Start: 2022-10-03 | End: 2022-10-14

## 2022-10-03 RX ORDER — MAGNESIUM HYDROXIDE/ALUMINUM HYDROXICE/SIMETHICONE 120; 1200; 1200 MG/30ML; MG/30ML; MG/30ML
10 SUSPENSION ORAL ONCE
Status: DISCONTINUED | OUTPATIENT
Start: 2022-10-03 | End: 2022-10-03

## 2022-10-03 RX ORDER — PANTOPRAZOLE SODIUM 40 MG/1
40 TABLET, DELAYED RELEASE ORAL ONCE
Status: COMPLETED | OUTPATIENT
Start: 2022-10-03 | End: 2022-10-03

## 2022-10-03 RX ORDER — SIMETHICONE 80 MG
80 TABLET,CHEWABLE ORAL EVERY 6 HOURS PRN
Qty: 10 TABLET | Refills: 0 | Status: SHIPPED | OUTPATIENT
Start: 2022-10-03

## 2022-10-03 RX ORDER — SUCRALFATE 1 G/1
1 TABLET ORAL 4 TIMES DAILY
Qty: 20 TABLET | Refills: 0 | Status: SHIPPED | OUTPATIENT
Start: 2022-10-03 | End: 2022-10-14

## 2022-10-03 RX ORDER — SIMETHICONE 80 MG
80 TABLET,CHEWABLE ORAL 4 TIMES DAILY PRN
Status: DISCONTINUED | OUTPATIENT
Start: 2022-10-03 | End: 2022-10-03 | Stop reason: HOSPADM

## 2022-10-03 RX ORDER — LIDOCAINE HYDROCHLORIDE 20 MG/ML
15 SOLUTION OROPHARYNGEAL ONCE
Status: COMPLETED | OUTPATIENT
Start: 2022-10-03 | End: 2022-10-03

## 2022-10-03 RX ORDER — MORPHINE SULFATE 2 MG/ML
2 INJECTION, SOLUTION INTRAMUSCULAR; INTRAVENOUS
Status: DISCONTINUED | OUTPATIENT
Start: 2022-10-03 | End: 2022-10-03 | Stop reason: HOSPADM

## 2022-10-03 RX ORDER — SUCRALFATE 1 G/1
1 TABLET ORAL ONCE
Status: COMPLETED | OUTPATIENT
Start: 2022-10-03 | End: 2022-10-03

## 2022-10-03 RX ORDER — FAMOTIDINE 10 MG/ML
20 INJECTION, SOLUTION INTRAVENOUS ONCE
Status: COMPLETED | OUTPATIENT
Start: 2022-10-03 | End: 2022-10-03

## 2022-10-03 RX ORDER — SODIUM CHLORIDE 0.9 % (FLUSH) 0.9 %
10 SYRINGE (ML) INJECTION AS NEEDED
Status: DISCONTINUED | OUTPATIENT
Start: 2022-10-03 | End: 2022-10-03 | Stop reason: HOSPADM

## 2022-10-03 RX ORDER — ONDANSETRON 4 MG/1
4 TABLET, FILM COATED ORAL EVERY 6 HOURS PRN
Qty: 4 TABLET | Refills: 0 | OUTPATIENT
Start: 2022-10-03 | End: 2023-03-09

## 2022-10-03 RX ADMIN — ALUMINUM HYDROXIDE, MAGNESIUM HYDROXIDE, AND DIMETHICONE 15 ML: 400; 400; 40 SUSPENSION ORAL at 15:21

## 2022-10-03 RX ADMIN — LIDOCAINE HYDROCHLORIDE 15 ML: 20 SOLUTION ORAL; TOPICAL at 15:21

## 2022-10-03 RX ADMIN — FAMOTIDINE 20 MG: 10 INJECTION, SOLUTION INTRAVENOUS at 15:21

## 2022-10-03 RX ADMIN — MORPHINE SULFATE 2 MG: 2 INJECTION, SOLUTION INTRAMUSCULAR; INTRAVENOUS at 11:02

## 2022-10-03 RX ADMIN — PANTOPRAZOLE SODIUM 40 MG: 40 TABLET, DELAYED RELEASE ORAL at 15:22

## 2022-10-03 RX ADMIN — MORPHINE SULFATE 2 MG: 2 INJECTION, SOLUTION INTRAMUSCULAR; INTRAVENOUS at 09:08

## 2022-10-03 RX ADMIN — SUCRALFATE 1 G: 1 TABLET ORAL at 15:22

## 2022-10-03 NOTE — ED PROVIDER NOTES
Subjective   History of Present Illness  Patient is a pleasant 36-year-old female who presents with chest pain.  She works third shift and states that approximately 1 AM she developed a substernal chest pressure.  Around 5 AM it was at its worst and at that time was rated as a sharp sensation, rated as 9 out of 10.  Currently rated as an 8 out of 10.  It is nonradiating.  Associated with nausea but no vomiting or significant shortness of breath.    Chest Pain  Pain location:  Substernal area  Pain quality: aching and sharp    Pain radiates to:  Does not radiate  Pain severity:  Severe  Onset quality:  Gradual  Timing:  Constant  Progression:  Improving  Chronicity:  New  Context: at rest    Context: not breathing    Relieved by:  Nothing  Exacerbated by: eating/drinking.  Ineffective treatments:  None tried  Associated symptoms: no back pain, no cough, no diaphoresis, no fever, no lower extremity edema, no shortness of breath and no weakness        Review of Systems   Constitutional: Negative for diaphoresis and fever.   Respiratory: Negative for cough and shortness of breath.    Cardiovascular: Positive for chest pain.   Musculoskeletal: Negative for back pain.   Neurological: Negative for weakness.   All other systems reviewed and are negative.      Past Medical History:   Diagnosis Date   • Anxiety    • Asthma     prn inhalers, does not follow w/ pulmonary   • Chronic back pain     previously followed w/ pain management, now just prn Tylenol + Gabapentin   • Chronic deep vein thrombosis (DVT) of femoral vein of right lower extremity (HCC) 09/10/2021   • Clotting disorder (HCC) 2015   • Diabetes mellitus (HCC)     Type 2, dx 2014, never on insulin, A1C 7.6   • Diabetes mellitus (HCC)     Type II, dx 2014, never on insulin, A1C 7.6    • Dyspepsia    • Dyspnea on exertion    • Dysuria 03/07/2022    Dysuria and hematuria x 2 weeks. 3/7/22 urine culture sent. Rx Macrobid 100 mg twice daily for 7 days. Patient did not  "tolerate Macrobid well due to GI upset. Urine culture was negative.   • Fatigue    • Gastroparesis    • GERD (gastroesophageal reflux disease)    • Heartburn     chronic, prn TUMS, denies prior eval   • Hirsutism    • Hx of radiation therapy     for treatments of Keloids   • Hypertension    • Irregular menses     infrequent spotting   • Leiomyoma, subserous     possible peduculated f3-4 cm fibroid on u/s at [facility]   • Migraines     botox q3 months, following Neurology @ [facility]   • Morbid obesity (HCC)     s/p sleeve gastrectomy   • Peripheral edema    • Polycystic ovary syndrome 2011   • Seasonal allergies    • Slow to wake up after anesthesia        Allergies   Allergen Reactions   • Capsicum Annuum Extract & Derivative (Bell Pepper) [Capsicum] Anaphylaxis   • Ibuprofen Other (See Comments)     \"makes me retain fluid and eventually go into CHF\"   • Macrobid [Nitrofurantoin] Nausea Only   • Peanut-Containing Drug Products Anaphylaxis   • Hydrochlorothiazide Swelling     eventually causes CHF    • Monosodium Glutamate Swelling and Headache   • Oatmeal Other (See Comments)     Dx on allergy testing   • Amitriptyline Mental Status Change     memory gaps + neuropathy + hair loss   • Aspartame Nausea Only   • Augmentin [Amoxicillin-Pot Clavulanate] Other (See Comments)     Syncope, not sure if allergic to cephalosporins.   • Codeine Headache      Can tolerate hydrocodone, no other adverse reactions to other narcotics.   • Diclofenac Headache   • Doxycycline Rash and Hallucinations   • Eggs Or Egg-Derived Products Other (See Comments)     dx on allergy testing, but does not completely avoid   • Naproxen Other (See Comments)     \"blackout\"       Past Surgical History:   Procedure Laterality Date   • ABDOMINAL SURGERY  06/15/2021    Bariatric Sleeve Surgery   • BARIATRIC SURGERY     • ENDOSCOPY N/A 06/15/2021    Procedure: ESOPHAGOGASTRODUODENOSCOPY;  Surgeon: Ayaka Andre MD;  Location: [facility];  Service: Robotics - " DaVinci;  Laterality: N/A;   • ENDOSCOPY N/A 10/28/2021    Procedure: ESOPHAGOGASTRODUODENOSCOPY WITH ACHALASIA BALLOON DILATATION;  Surgeon: Jase Hernandez MD;  Location:  [facility];  Service: Gastroenterology;  Laterality: N/A;  58 F DILATOR   • ENDOSCOPY N/A 11/15/2021    Procedure: ESOPHAGOGASTRODUODENOSCOPY WITH DILATATION;  Surgeon: Jase Hernandez MD;  Location:  [facility];  Service: Gastroenterology;  Laterality: N/A;  achalasia balloon    • ENDOSCOPY N/A 11/24/2021    Procedure: ESOPHAGOGASTRODUODENOSCOPY WTH DUODENAL POLYPECTOPMY AND NASAL JEJUNAL TUBE PLACEMENT;  Surgeon: Jase Hernandez MD;  Location:  [facility];  Service: Gastroenterology;  Laterality: N/A;  placement of feeding tube, checked position with KUB   • GASTRIC RESTRICTION SURGERY  2021    Sleeve   • GASTRIC SLEEVE LAPAROSCOPIC N/A 06/15/2021    Procedure: GASTRIC SLEEVE LAPAROSCOPIC WITH DAVINCI ROBOT, LAPROSCOPIC HIATAL HERNIA REPAIR WITH DAVINCI ROBOT;  Surgeon: Ayaka Andre MD;  Location:  [facility];  Service: Robotics - DaVinci;  Laterality: N/A;   • KELOID EXCISION      1990,1993,1999,2015,2016,2019   • LAPAROSCOPIC CHOLECYSTECTOMY  2000    for stones   • RADIOFREQUENCY ABLATION  2019    x 2  for pt back   • UMBILICAL HERNIA REPAIR  1990   • WISDOM TOOTH EXTRACTION  1994       Family History   Problem Relation Age of Onset   • Arthritis Mother    • Diabetes Mother    • Obesity Mother    • Arrhythmia Mother    • Hypertension Mother    • Asthma Mother    • Dementia Father    • Heart attack Father    • Arrhythmia Father    • Heart disease Father    • Stroke Other         CVA   • Obesity Other    • Ovarian cancer Maternal Aunt         40's   • Breast cancer Paternal Aunt         30's   • Heart disease Other    • Hypertension Other    • Endometrial cancer Neg Hx    • Colon cancer Neg Hx    • Colon polyps Neg Hx    • Esophageal cancer Neg Hx        Social History     Socioeconomic History   • Marital status:  Single   Tobacco Use   • Smoking status: Never Smoker   • Smokeless tobacco: Never Used   Vaping Use   • Vaping Use: Never used   Substance and Sexual Activity   • Alcohol use: Not Currently     Comment: Very rarely drink socially. At most 2 drinks per month.   • Drug use: Never   • Sexual activity: Yes     Partners: Male     Birth control/protection: Coitus interruptus, Condom, I.U.D.           Objective   Physical Exam  Vitals and nursing note reviewed.   Constitutional:       General: She is not in acute distress.     Appearance: She is well-developed.   HENT:      Head: Normocephalic and atraumatic.   Eyes:      Extraocular Movements: Extraocular movements intact.      Conjunctiva/sclera: Conjunctivae normal.      Pupils: Pupils are equal, round, and reactive to light.   Neck:      Thyroid: No thyromegaly.   Cardiovascular:      Rate and Rhythm: Normal rate and regular rhythm.      Heart sounds: Normal heart sounds. No murmur heard.    No friction rub. No gallop.   Pulmonary:      Effort: Pulmonary effort is normal. No respiratory distress.      Breath sounds: Normal breath sounds.   Chest:      Chest wall: No tenderness.   Abdominal:      General: Bowel sounds are normal.      Palpations: Abdomen is soft.      Tenderness: There is no abdominal tenderness.   Musculoskeletal:         General: Normal range of motion.      Cervical back: Normal range of motion and neck supple.      Right lower leg: No edema.      Left lower leg: No edema.   Lymphadenopathy:      Cervical: No cervical adenopathy.   Skin:     General: Skin is warm and dry.   Neurological:      Mental Status: She is alert and oriented to person, place, and time.   Psychiatric:         Behavior: Behavior normal.         Procedures           ED Course  ED Course as of 10/04/22 1031   Mon Oct 03, 2022   1352 HEART SCORE 3 [CP]   1425 Troponin T: <0.010 [CP]      ED Course User Index  [CP] David Beatty DO                                           MDM  Pt  feels better following GI cocktail and is comfortable with DC at this time.  She understands to have a low threshold to return to the ED if symptoms persist, worsen, or other concerns arise.      Final diagnoses:   Chest pain, unspecified type       ED Disposition  ED Disposition     ED Disposition   Discharge    Condition   Stable    Comment   --             DISCHARGE    Patient discharged in stable condition.    Reviewed implications of results, diagnosis, meds, responsibility to follow up, warning signs and symptoms of possible worsening, potential complications and reasons to return to ER.    Patient/Family voiced understanding of above instructions.    Discussed plan for discharge, as there is no emergent indication for admission.  Pt/family is agreeable and understands need for follow up and possible repeat testing.  Pt/family is aware that discharge does not mean that nothing is wrong but that it indicates no emergency is currently present that requires admission and they must continue care with follow-up as given below or with a physician of their choice.     FOLLOW-UP  [facility] CARDIOLOGY  [address]  [place] [address] [phone]  Schedule an appointment as soon as possible for a visit       Lesli Ramirez PA-C  [address] [place] [place] [address]  [phone]    Schedule an appointment as soon as possible for a visit       [facility] Emergency Department  [address] [place] [id number] [phone]    If symptoms worsen    Ember Lopez APRN  [address]  [place] [address]  [phone]    Schedule an appointment as soon as possible for a visit            Medication List      New Prescriptions    famotidine 20 MG tablet  Commonly known as: PEPCID  Take 1 tablet by mouth 2 (Two) Times a Day.     ondansetron 4 MG tablet  Commonly known as: ZOFRAN  Take 1 tablet by mouth Every 6  (Six) Hours As Needed for Nausea or Vomiting.     pantoprazole 40 MG EC tablet  Commonly known as: PROTONIX  Take 1 tablet by mouth Daily.     simethicone 80 MG chewable tablet  Commonly known as: Gas-X  Chew 1 tablet Every 6 (Six) Hours As Needed for Flatulence.     sucralfate 1 g tablet  Commonly known as: CARAFATE  Take 1 tablet by mouth 4 (Four) Times a Day.        Changed    carvedilol 6.25 MG tablet  Commonly known as: COREG  Take 1 tab in AM and 2 tab in PM  What changed:   · how much to take  · how to take this  · when to take this  · additional instructions     furosemide 20 MG tablet  Commonly known as: LASIX  TAKE ONE TO TWO TABLETS BY MOUTH EVERY NIGHTLY  What changed: See the new instructions.           Where to Get Your Medications      These medications were sent to [facility] [id number] - [place], [place] - [address] [place] [address] [phone]  - [phone] [address] [address]    Phone: [phone]   · famotidine 20 MG tablet  · ondansetron 4 MG tablet  · pantoprazole 40 MG EC tablet  · simethicone 80 MG chewable tablet  · sucralfate 1 g tablet            David Beatty DO  10/04/22 0740

## 2022-10-04 LAB
QT INTERVAL: 354 MS
QT INTERVAL: 382 MS
QTC INTERVAL: 371 MS
QTC INTERVAL: 374 MS

## 2022-10-05 ENCOUNTER — OFFICE VISIT (OUTPATIENT)
Dept: FAMILY MEDICINE CLINIC | Facility: CLINIC | Age: 37
End: 2022-10-05

## 2022-10-05 ENCOUNTER — LAB (OUTPATIENT)
Dept: LAB | Facility: HOSPITAL | Age: 37
End: 2022-10-05

## 2022-10-05 VITALS
HEIGHT: 60 IN | HEART RATE: 88 BPM | DIASTOLIC BLOOD PRESSURE: 80 MMHG | OXYGEN SATURATION: 96 % | SYSTOLIC BLOOD PRESSURE: 124 MMHG | WEIGHT: 192.4 LBS | TEMPERATURE: 98.3 F | BODY MASS INDEX: 37.77 KG/M2

## 2022-10-05 DIAGNOSIS — L68.0 HIRSUTISM: ICD-10-CM

## 2022-10-05 DIAGNOSIS — I10 ESSENTIAL HYPERTENSION: ICD-10-CM

## 2022-10-05 DIAGNOSIS — R73.02 IMPAIRED GLUCOSE TOLERANCE: ICD-10-CM

## 2022-10-05 DIAGNOSIS — K91.2 POSTGASTRECTOMY MALABSORPTION: ICD-10-CM

## 2022-10-05 DIAGNOSIS — Z00.00 PHYSICAL EXAM, ANNUAL: Primary | ICD-10-CM

## 2022-10-05 DIAGNOSIS — R21 RASH: ICD-10-CM

## 2022-10-05 DIAGNOSIS — Z13.21 MALNUTRITION SCREEN: ICD-10-CM

## 2022-10-05 DIAGNOSIS — R53.83 FATIGUE, UNSPECIFIED TYPE: ICD-10-CM

## 2022-10-05 DIAGNOSIS — E78.2 MIXED HYPERLIPIDEMIA: ICD-10-CM

## 2022-10-05 DIAGNOSIS — Z98.84 S/P BARIATRIC SURGERY: ICD-10-CM

## 2022-10-05 DIAGNOSIS — E55.9 HYPOVITAMINOSIS D: ICD-10-CM

## 2022-10-05 DIAGNOSIS — R79.89 ELEVATED LFTS: ICD-10-CM

## 2022-10-05 DIAGNOSIS — Z90.3 POSTGASTRECTOMY MALABSORPTION: ICD-10-CM

## 2022-10-05 DIAGNOSIS — R07.89 ATYPICAL CHEST PAIN: ICD-10-CM

## 2022-10-05 DIAGNOSIS — Z13.29 SCREENING FOR THYROID DISORDER: ICD-10-CM

## 2022-10-05 DIAGNOSIS — Z13.0 SCREENING, IRON DEFICIENCY ANEMIA: ICD-10-CM

## 2022-10-05 DIAGNOSIS — Z23 NEED FOR COVID-19 VACCINE: ICD-10-CM

## 2022-10-05 DIAGNOSIS — E66.9 OBESITY (BMI 30-39.9): ICD-10-CM

## 2022-10-05 PROBLEM — D3A.8 BENIGN NEUROENDOCRINE TUMOR OF DUODENUM: Status: ACTIVE | Noted: 2022-07-11

## 2022-10-05 PROCEDURE — 83540 ASSAY OF IRON: CPT

## 2022-10-05 PROCEDURE — 36415 COLL VENOUS BLD VENIPUNCTURE: CPT

## 2022-10-05 PROCEDURE — 82728 ASSAY OF FERRITIN: CPT

## 2022-10-05 PROCEDURE — 83036 HEMOGLOBIN GLYCOSYLATED A1C: CPT

## 2022-10-05 PROCEDURE — 84134 ASSAY OF PREALBUMIN: CPT

## 2022-10-05 PROCEDURE — 99395 PREV VISIT EST AGE 18-39: CPT | Performed by: PHYSICIAN ASSISTANT

## 2022-10-05 PROCEDURE — 91312 COVID-19 (PFIZER) BIVALENT BOOSTER 12+YRS: CPT | Performed by: PHYSICIAN ASSISTANT

## 2022-10-05 PROCEDURE — 0124A PR ADM SARSCOV2 30MCG/0.3ML BST: CPT | Performed by: PHYSICIAN ASSISTANT

## 2022-10-05 PROCEDURE — 80050 GENERAL HEALTH PANEL: CPT

## 2022-10-05 PROCEDURE — 82746 ASSAY OF FOLIC ACID SERUM: CPT

## 2022-10-05 PROCEDURE — 83921 ORGANIC ACID SINGLE QUANT: CPT

## 2022-10-05 PROCEDURE — 82306 VITAMIN D 25 HYDROXY: CPT

## 2022-10-05 PROCEDURE — 84425 ASSAY OF VITAMIN B-1: CPT

## 2022-10-05 PROCEDURE — 80061 LIPID PANEL: CPT

## 2022-10-05 RX ORDER — MULTIVIT WITH MINERALS/LUTEIN
250 TABLET ORAL DAILY
COMMUNITY

## 2022-10-05 RX ORDER — SPIRONOLACTONE 25 MG/1
25 TABLET ORAL DAILY
Qty: 30 TABLET | Refills: 0 | Status: SHIPPED | OUTPATIENT
Start: 2022-10-05 | End: 2022-11-22 | Stop reason: SDUPTHER

## 2022-10-05 NOTE — PROGRESS NOTES
Patient Care Team:  Lesli Ramirez PA-C as PCP - General (Physician Assistant)  Albert Malone MD as Consulting Physician (Obstetrics and Gynecology)  Julianna Astudlilo MD as Consulting Physician (Cardiology)  Misty Nava, PhD as Psychologist (Psychology)  Ayaka Andre MD as Surgeon (General Surgery)  Adalberto Zayas MD (Pain Medicine)  Carine Aden MD as Consulting Physician (Endocrinology)     Chief complaint: Patient is in today for a physical     Frances Hartman is a 36 y.o. female who presents for her yearly physical exam.     Patient presents for annual physical exam and management of obesity, hypertension, hyperlipidemia, impaired glucose tolerance, elevated LFTs, hirsutism and rash.  Patient is compliant on all medications.  Blood pressure is stable and well-controlled.  She wants to restart spironolactone as this helped with her hirsutism.  She also reports that her rash is getting worse and requests refill on diflucan.  Patient was previously taking Diflucan 200 mg twice daily and this helped with rash.  Was started by ID years ago.    Patient was recently seen in ER for chest pain.  Initial work-up was reassuring.  She is on protonix and reports that her pain has improved slightly.  She has appointment with GI on 10/14 and is established with cardiology.       Review of Systems   Constitutional: Positive for fatigue. Negative for chills, diaphoresis and fever.   HENT: Negative for congestion, ear pain, hearing loss, postnasal drip, rhinorrhea and sore throat.    Eyes: Negative for blurred vision and pain.   Respiratory: Negative for cough, shortness of breath and wheezing.    Cardiovascular: Positive for chest pain. Negative for leg swelling.   Gastrointestinal: Positive for GERD. Negative for abdominal pain, blood in stool, constipation, diarrhea, nausea, vomiting and indigestion.   Endocrine: Negative for polyuria.   Genitourinary: Negative for dysuria, flank pain and  hematuria.   Musculoskeletal: Negative for arthralgias, gait problem and myalgias.   Skin: Positive for rash. Negative for skin lesions.   Neurological: Negative for dizziness and headache.   Psychiatric/Behavioral: Positive for stress. Negative for self-injury, sleep disturbance, suicidal ideas and depressed mood. The patient is not nervous/anxious.         History  Past Medical History:   Diagnosis Date   • Allergic     Birth   • Anxiety    • Arthritis    • Asthma     prn inhalers, does not follow w/ pulmonary   • Chronic back pain     previously followed w/ pain management, now just prn Tylenol + Gabapentin   • Chronic deep vein thrombosis (DVT) of femoral vein of right lower extremity ([facility]) 09/10/2021   • Clotting disorder ([facility]) 2015   • Depression    • Diabetes mellitus ([facility])     Type 2, dx 2014, never on insulin, A1C 7.6   • Diabetes mellitus ([facility])     Type II, dx 2014, never on insulin, A1C 7.6    • Dyspepsia    • Dyspnea on exertion    • Dysuria 03/07/2022    Dysuria and hematuria x 2 weeks. 3/7/22 urine culture sent. Rx Macrobid 100 mg twice daily for 7 days. Patient did not tolerate Macrobid well due to GI upset. Urine culture was negative.   • Fatigue    • Gastroparesis    • GERD (gastroesophageal reflux disease)    • Heartburn     chronic, prn TUMS, denies prior eval   • Hirsutism    • History of medical problems     PCOS   • Hx of radiation therapy     for treatments of Keloids   • Hypertension    • Irregular menses     infrequent spotting   • Leiomyoma, subserous     possible peduculated f3-4 cm fibroid on u/s at [facility]   • Low back pain    • Migraines     botox q3 months, following Neurology @ [facility]   • Morbid obesity ([facility])     s/p sleeve gastrectomy   • Peripheral edema    • Polycystic ovary syndrome 2011   • Seasonal allergies    • Slow to wake up after anesthesia    • Urinary tract infection    • Visual impairment     Astigmatism, Nearsightedness      Past Surgical History:   Procedure Laterality Date  "  • ABDOMINAL SURGERY  06/15/2021    Bariatric Sleeve Surgery   • BARIATRIC SURGERY     • COSMETIC SURGERY      Multiple Keloid surgeries   • ENDOSCOPY N/A 06/15/2021    Procedure: ESOPHAGOGASTRODUODENOSCOPY;  Surgeon: Ayaka Andre MD;  Location:  [facility];  Service: Robotics - [employer];  Laterality: N/A;   • ENDOSCOPY N/A 10/28/2021    Procedure: ESOPHAGOGASTRODUODENOSCOPY WITH ACHALASIA BALLOON DILATATION;  Surgeon: Jase Hernandez MD;  Location:  [facility];  Service: Gastroenterology;  Laterality: N/A;  58 F DILATOR   • ENDOSCOPY N/A 11/15/2021    Procedure: ESOPHAGOGASTRODUODENOSCOPY WITH DILATATION;  Surgeon: Jase Hernandez MD;  Location:  [facility];  Service: Gastroenterology;  Laterality: N/A;  achalasia balloon    • ENDOSCOPY N/A 11/24/2021    Procedure: ESOPHAGOGASTRODUODENOSCOPY WTH DUODENAL POLYPECTOPMY AND NASAL JEJUNAL TUBE PLACEMENT;  Surgeon: Jase Hernandez MD;  Location:  [facility];  Service: Gastroenterology;  Laterality: N/A;  placement of feeding tube, checked position with KUB   • GASTRIC RESTRICTION SURGERY  2021    Sleeve   • GASTRIC SLEEVE LAPAROSCOPIC N/A 06/15/2021    Procedure: GASTRIC SLEEVE LAPAROSCOPIC WITH DAVINCI ROBOT, LAPROSCOPIC HIATAL HERNIA REPAIR WITH DAVINCI ROBOT;  Surgeon: Ayaka Andre MD;  Location:  [facility];  [facility];  Laterality: N/A;   • KELOID EXCISION      1990,1993,1999,2015,2016,2019   • LAPAROSCOPIC CHOLECYSTECTOMY  2000    for stones   • RADIOFREQUENCY ABLATION  2019    x 2  for pt back   • UMBILICAL HERNIA REPAIR  1990   • WISDOM TOOTH EXTRACTION  1994      Allergies   Allergen Reactions   • Capsicum Annuum Extract & Derivative (Bell Pepper) [Capsicum] Anaphylaxis   • Ibuprofen Other (See Comments)     \"makes me retain fluid and eventually go into CHF\"   • Macrobid [Nitrofurantoin] Nausea Only   • Peanut-Containing Drug Products Anaphylaxis   • Hydrochlorothiazide Swelling     eventually causes CHF    • " "Monosodium Glutamate Swelling and Headache   • Oatmeal Other (See Comments)     Dx on allergy testing   • Amitriptyline Mental Status Change     memory gaps + neuropathy + hair loss   • Aspartame Nausea Only   • Augmentin [Amoxicillin-Pot Clavulanate] Other (See Comments)     Syncope, not sure if allergic to cephalosporins.   • Codeine Headache      Can tolerate hydrocodone, no other adverse reactions to other narcotics.   • Diclofenac Headache   • Doxycycline Rash and Hallucinations   • Eggs Or Egg-Derived Products Other (See Comments)     dx on allergy testing, but does not completely avoid   • Naproxen Other (See Comments)     \"blackout\"      Family History   Problem Relation Age of Onset   • Arthritis Mother    • Diabetes Mother    • Obesity Mother    • Arrhythmia Mother    • Hypertension Mother    • Asthma Mother    • Dementia Father    • Heart attack Father    • Arrhythmia Father    • Heart disease Father    • Alcohol abuse Father    • Stroke Other         CVA   • Obesity Other    • Ovarian cancer Maternal Aunt         40's   • Breast cancer Paternal Aunt         30's   • Heart disease Other    • Hypertension Other    • Endometrial cancer Neg Hx    • Colon cancer Neg Hx    • Colon polyps Neg Hx    • Esophageal cancer Neg Hx       Social History     Socioeconomic History   • Marital status: Single   Tobacco Use   • Smoking status: Never Smoker   • Smokeless tobacco: Never Used   Vaping Use   • Vaping Use: Never used   Substance and Sexual Activity   • Alcohol use: Not Currently     Comment: Very rarely drink socially. At most 2 drinks per month.   • Drug use: Never   • Sexual activity: Yes     Partners: Male     Birth control/protection: Condom, I.U.D., Coitus interruptus      Current Outpatient Medications on File Prior to Visit   Medication Sig Dispense Refill   • acetaminophen (TYLENOL) 500 MG tablet Take 500-1,000 mg by mouth Every 6 (Six) Hours As Needed for Mild Pain .     • albuterol (PROVENTIL " HFA;VENTOLIN HFA) 108 (90 BASE) MCG/ACT inhaler Inhale 2 puffs Every 4 (Four) Hours As Needed for wheezing.     • Blood Glucose Monitoring Suppl (ONE TOUCH ULTRA 2) w/Device kit      • carvedilol (COREG) 6.25 MG tablet Take 1 tab in AM and 2 tab in PM (Patient taking differently: Take 6.25 mg by mouth Daily.) 270 tablet 0   • cyclobenzaprine (FLEXERIL) 10 MG tablet Take 10 mg by mouth 3 (Three) Times a Day As Needed for Muscle Spasms.     • diphenhydrAMINE (BENADRYL) 25 mg capsule Take 25 mg by mouth Every 6 (Six) Hours As Needed for Itching or Sleep.     • famotidine (PEPCID) 20 MG tablet Take 1 tablet by mouth 2 (Two) Times a Day. 20 tablet 0   • furosemide (LASIX) 20 MG tablet TAKE ONE TO TWO TABLETS BY MOUTH EVERY NIGHTLY (Patient taking differently: Take 20 mg by mouth As Needed.) 135 tablet 2   • gabapentin (NEURONTIN) 300 MG capsule Take 600 mg by mouth Daily.     • glucose blood test strip USe BID to test blood sugar DX.[id number] 100 each 3   • guaiFENesin (HUMIBID 3) 400 MG tablet Take 200 mg by mouth As Needed.     • HYDROcodone-acetaminophen (NORCO) 5-325 MG per tablet Take 1 tablet by mouth Every 6 (Six) Hours As Needed for Severe Pain . 12 tablet 0   • Lancets 33G misc 1 Each/kg 2 (two) times a day. To test blood sugars DxE11.65 100 each 2   • levonorgestrel (MIRENA) 20 MCG/24HR IUD 1 each by Intrauterine route.     • loratadine (CLARITIN) 10 MG tablet TAKE ONE TABLET BY MOUTH DAILY 30 tablet 2   • metFORMIN ER (GLUCOPHAGE-XR) 500 MG 24 hr tablet Take 1 tablet by mouth Daily With Dinner. 90 tablet 3   • multivitamin with minerals tablet tablet Take 1 tablet by mouth Daily.     • OnabotulinumtoxinA (Botox) 200 units reconstituted solution FOR . PHYSICIAN TO INJECT UP  UNITS INTRAMUSCULARLY INTO HEAD, NECK AND SHOULDERS EVERY 90 DAYS. 1 each 3   • ondansetron (ZOFRAN) 4 MG tablet Take 1 tablet by mouth Every 6 (Six) Hours As Needed for Nausea or Vomiting. 4 tablet 0   •  pantoprazole (PROTONIX) 40 MG EC tablet Take 1 tablet by mouth Daily. 8 tablet 0   • simethicone (Gas-X) 80 MG chewable tablet Chew 1 tablet Every 6 (Six) Hours As Needed for Flatulence. 10 tablet 0   • sucralfate (CARAFATE) 1 g tablet Take 1 tablet by mouth 4 (Four) Times a Day. 20 tablet 0   • traMADol (ULTRAM) 50 MG tablet Take 50 mg by mouth Every 6 (Six) Hours As Needed for Moderate Pain .     • vitamin C (ASCORBIC ACID) 250 MG tablet Take 250 mg by mouth Daily.     • vitamin D3 125 MCG (5000 UT) capsule capsule Take 5,000 Units by mouth Daily.       Current Facility-Administered Medications on File Prior to Visit   Medication Dose Route Frequency Provider Last Rate Last Admin   • OnabotulinumtoxinA 200 Units  200 Units Intramuscular Q3 Months Shiela Coelho APRN   200 Units at 10/06/22 1043       Results for orders placed or performed during the hospital encounter of 10/03/22   Troponin    Specimen: Blood   Result Value Ref Range    Troponin T <0.010 0.000 - 0.030 ng/mL   Troponin    Specimen: Blood   Result Value Ref Range    Troponin T <0.010 0.000 - 0.030 ng/mL   Comprehensive Metabolic Panel    Specimen: Blood   Result Value Ref Range    Glucose 104 (H) 65 - 99 mg/dL    BUN 8 6 - 20 mg/dL    Creatinine 0.68 0.57 - 1.00 mg/dL    Sodium 140 136 - 145 mmol/L    Potassium 4.0 3.5 - 5.2 mmol/L    Chloride 105 98 - 107 mmol/L    CO2 24.0 22.0 - 29.0 mmol/L    Calcium 9.7 8.6 - 10.5 mg/dL    Total Protein 7.3 6.0 - 8.5 g/dL    Albumin 4.10 3.50 - 5.20 g/dL    ALT (SGPT) 43 (H) 1 - 33 U/L    AST (SGOT) 26 1 - 32 U/L    Alkaline Phosphatase 100 39 - 117 U/L    Total Bilirubin 0.2 0.0 - 1.2 mg/dL    Globulin 3.2 gm/dL    A/G Ratio 1.3 g/dL    BUN/Creatinine Ratio 11.8 7.0 - 25.0    Anion Gap 11.0 5.0 - 15.0 mmol/L    eGFR 114.5 >60.0 mL/min/1.73   Lipase    Specimen: Blood   Result Value Ref Range    Lipase 23 13 - 60 U/L   BNP    Specimen: Blood   Result Value Ref Range    proBNP 11.3 0.0 - 450.0 pg/mL    CBC Auto Differential    Specimen: Blood   Result Value Ref Range    WBC 13.83 (H) 3.40 - 10.80 10*3/mm3    RBC 3.93 3.77 - 5.28 10*6/mm3    Hemoglobin 13.0 12.0 - 15.9 g/dL    Hematocrit 39.3 34.0 - 46.6 %    .0 (H) 79.0 - 97.0 fL    MCH 33.1 (H) 26.6 - 33.0 pg    MCHC 33.1 31.5 - 35.7 g/dL    RDW 11.9 (L) 12.3 - 15.4 %    RDW-SD 44.3 37.0 - 54.0 fl    MPV 10.7 6.0 - 12.0 fL    Platelets 281 140 - 450 10*3/mm3    Neutrophil % 69.0 42.7 - 76.0 %    Lymphocyte % 21.8 19.6 - 45.3 %    Monocyte % 5.2 5.0 - 12.0 %    Eosinophil % 3.5 0.3 - 6.2 %    Basophil % 0.2 0.0 - 1.5 %    Immature Grans % 0.3 0.0 - 0.5 %    Neutrophils, Absolute 9.55 (H) 1.70 - 7.00 10*3/mm3    Lymphocytes, Absolute 3.01 0.70 - 3.10 10*3/mm3    Monocytes, Absolute 0.72 0.10 - 0.90 10*3/mm3    Eosinophils, Absolute 0.48 (H) 0.00 - 0.40 10*3/mm3    Basophils, Absolute 0.03 0.00 - 0.20 10*3/mm3    Immature Grans, Absolute 0.04 0.00 - 0.05 10*3/mm3    nRBC 0.0 0.0 - 0.2 /100 WBC   D-dimer, Quantitative    Specimen: Blood   Result Value Ref Range    D-Dimer, Quantitative 0.28 0.01 - 0.50 MCGFEU/mL   ECG 12 Lead   Result Value Ref Range    QT Interval 382 ms    QTC Interval 371 ms   ECG 12 Lead   Result Value Ref Range    QT Interval 354 ms    QTC Interval 374 ms   Green Top (Gel)   Result Value Ref Range    Extra Tube Hold for add-ons.    Lavender Top   Result Value Ref Range    Extra Tube hold for add-on    Gold Top - SST   Result Value Ref Range    Extra Tube Hold for add-ons.    Gray Top   Result Value Ref Range    Extra Tube Hold for add-ons.    Light Blue Top   Result Value Ref Range    Extra Tube Hold for add-ons.        Health Maintenance   Topic Date Due   • Hepatitis B (1 of 3 - Risk 3-dose series) Never done   • DIABETIC FOOT EXAM  Never done   • URINE MICROALBUMIN  06/04/2021   • Pneumococcal Vaccine 0-64 (2 - PCV) 06/12/2021   • MAMMOGRAM  11/05/2022   • HEMOGLOBIN A1C  04/05/2023   • DIABETIC EYE EXAM  06/16/2023   • LIPID PANEL   10/05/2023   • ANNUAL PHYSICAL  10/06/2023   • PAP SMEAR  08/04/2024   • TDAP/TD VACCINES (2 - Td or Tdap) 01/15/2029   • HEPATITIS C SCREENING  Completed   • COVID-19 Vaccine  Completed   • INFLUENZA VACCINE  Discontinued       Immunization History   Administered Date(s) Administered   • COVID-19 (PFIZER) BIVALENT BOOSTER 12+YRS 10/05/2022   • COVID-19 (PFIZER) PURPLE CAP 03/19/2021, 04/09/2021, 11/11/2021   • Hepatitis A 01/15/2019, 01/30/2020   • Pneumococcal Polysaccharide (PPSV23) 06/12/2020   • Tdap 01/15/2019       Class 2 Severe Obesity (BMI >=35 and <=39.9). Obesity-related health conditions include the following: hypertension, diabetes mellitus, dyslipidemias and GERD. Obesity is improving with treatment. BMI is is above average; BMI management plan is completed. We discussed portion control, increasing exercise and s/p bariatric surgery.      Results for orders placed or performed during the hospital encounter of 10/03/22   Troponin    Specimen: Blood   Result Value Ref Range    Troponin T <0.010 0.000 - 0.030 ng/mL   Troponin    Specimen: Blood   Result Value Ref Range    Troponin T <0.010 0.000 - 0.030 ng/mL   Comprehensive Metabolic Panel    Specimen: Blood   Result Value Ref Range    Glucose 104 (H) 65 - 99 mg/dL    BUN 8 6 - 20 mg/dL    Creatinine 0.68 0.57 - 1.00 mg/dL    Sodium 140 136 - 145 mmol/L    Potassium 4.0 3.5 - 5.2 mmol/L    Chloride 105 98 - 107 mmol/L    CO2 24.0 22.0 - 29.0 mmol/L    Calcium 9.7 8.6 - 10.5 mg/dL    Total Protein 7.3 6.0 - 8.5 g/dL    Albumin 4.10 3.50 - 5.20 g/dL    ALT (SGPT) 43 (H) 1 - 33 U/L    AST (SGOT) 26 1 - 32 U/L    Alkaline Phosphatase 100 39 - 117 U/L    Total Bilirubin 0.2 0.0 - 1.2 mg/dL    Globulin 3.2 gm/dL    A/G Ratio 1.3 g/dL    BUN/Creatinine Ratio 11.8 7.0 - 25.0    Anion Gap 11.0 5.0 - 15.0 mmol/L    eGFR 114.5 >60.0 mL/min/1.73   Lipase    Specimen: Blood   Result Value Ref Range    Lipase 23 13 - 60 U/L   BNP    Specimen: Blood   Result Value Ref  "Range    proBNP 11.3 0.0 - 450.0 pg/mL   CBC Auto Differential    Specimen: Blood   Result Value Ref Range    WBC 13.83 (H) 3.40 - 10.80 10*3/mm3    RBC 3.93 3.77 - 5.28 10*6/mm3    Hemoglobin 13.0 12.0 - 15.9 g/dL    Hematocrit 39.3 34.0 - 46.6 %    .0 (H) 79.0 - 97.0 fL    MCH 33.1 (H) 26.6 - 33.0 pg    MCHC 33.1 31.5 - 35.7 g/dL    RDW 11.9 (L) 12.3 - 15.4 %    RDW-SD 44.3 37.0 - 54.0 fl    MPV 10.7 6.0 - 12.0 fL    Platelets 281 140 - 450 10*3/mm3    Neutrophil % 69.0 42.7 - 76.0 %    Lymphocyte % 21.8 19.6 - 45.3 %    Monocyte % 5.2 5.0 - 12.0 %    Eosinophil % 3.5 0.3 - 6.2 %    Basophil % 0.2 0.0 - 1.5 %    Immature Grans % 0.3 0.0 - 0.5 %    Neutrophils, Absolute 9.55 (H) 1.70 - 7.00 10*3/mm3    Lymphocytes, Absolute 3.01 0.70 - 3.10 10*3/mm3    Monocytes, Absolute 0.72 0.10 - 0.90 10*3/mm3    Eosinophils, Absolute 0.48 (H) 0.00 - 0.40 10*3/mm3    Basophils, Absolute 0.03 0.00 - 0.20 10*3/mm3    Immature Grans, Absolute 0.04 0.00 - 0.05 10*3/mm3    nRBC 0.0 0.0 - 0.2 /100 WBC   D-dimer, Quantitative    Specimen: Blood   Result Value Ref Range    D-Dimer, Quantitative 0.28 0.01 - 0.50 MCGFEU/mL   ECG 12 Lead   Result Value Ref Range    QT Interval 382 ms    QTC Interval 371 ms   ECG 12 Lead   Result Value Ref Range    QT Interval 354 ms    QTC Interval 374 ms   Green Top (Gel)   Result Value Ref Range    Extra Tube Hold for add-ons.    Lavender Top   Result Value Ref Range    Extra Tube hold for add-on    Gold Top - SST   Result Value Ref Range    Extra Tube Hold for add-ons.    Gray Top   Result Value Ref Range    Extra Tube Hold for add-ons.    Light Blue Top   Result Value Ref Range    Extra Tube Hold for add-ons.             Vitals:    10/05/22 0914   BP: 124/80   BP Location: Left arm   Patient Position: Sitting   Cuff Size: Large Adult   Pulse: 88   Temp: 98.3 °F (36.8 °C)   SpO2: 96%   Weight: 87.3 kg (192 lb 6.4 oz)   Height: 152.4 cm (60\")   PainSc:   6       Body mass index is 37.58 " kg/m².    Physical Exam  Vitals reviewed.   Constitutional:       General: She is not in acute distress.     Appearance: Normal appearance. She is well-developed. She is obese. She is not ill-appearing or diaphoretic.   HENT:      Head: Normocephalic and atraumatic.      Right Ear: Hearing, tympanic membrane, ear canal and external ear normal.      Left Ear: Hearing, tympanic membrane, ear canal and external ear normal.      Nose: Nose normal.      Right Sinus: No maxillary sinus tenderness or frontal sinus tenderness.      Left Sinus: No maxillary sinus tenderness or frontal sinus tenderness.      Mouth/Throat:      Pharynx: Uvula midline.   Eyes:      General: Lids are normal.      Extraocular Movements: Extraocular movements intact.      Conjunctiva/sclera: Conjunctivae normal.   Neck:      Thyroid: No thyroid mass or thyromegaly.      Trachea: Trachea and phonation normal.   Cardiovascular:      Rate and Rhythm: Normal rate and regular rhythm.      Heart sounds: Normal heart sounds.   Pulmonary:      Effort: Pulmonary effort is normal.      Breath sounds: Normal breath sounds.   Abdominal:      General: Bowel sounds are normal. There is no distension.      Palpations: Abdomen is soft. Abdomen is not rigid.      Tenderness: There is abdominal tenderness in the epigastric area. There is no guarding.   Musculoskeletal:         General: Normal range of motion.      Cervical back: Normal range of motion.      Right lower leg: No edema.      Left lower leg: No edema.   Lymphadenopathy:      Cervical: No cervical adenopathy.      Right cervical: No superficial cervical adenopathy.     Left cervical: No superficial cervical adenopathy.   Skin:     General: Skin is warm.      Findings: No erythema or rash.      Nails: There is no clubbing.   Neurological:      Mental Status: She is alert and oriented to person, place, and time.      Coordination: Coordination normal.      Gait: Gait normal.      Deep Tendon Reflexes:  Reflexes are normal and symmetric.      Comments: CN grossly intact   Psychiatric:         Attention and Perception: Attention and perception normal. She is attentive.         Mood and Affect: Mood and affect normal.         Speech: Speech normal.         Behavior: Behavior normal. Behavior is cooperative.         Thought Content: Thought content normal.         Cognition and Memory: Cognition and memory normal.         Judgment: Judgment normal.             Counseling provided on diet and nutrition, exercise, supplements and flu prevention.    Diagnoses and all orders for this visit:    1. Physical exam, annual (Primary)  PE completed  Preventative labs reviewed  Gynecologist-established  Dentist-goes regularly  Ophthalmologist-goes regularly  Dermatologist-not established  Vaccinations discussed.    2. Atypical chest pain  Recently seen at ER for chest pain.  Work-up was reassuring.  Improved with GI cocktail.  Suspect this is related to reflux.  TTP along epigastric region on exam.  Continue on PPI and follow-up with GI on 10/14.    3. S/P bariatric surgery  4. Obesity (BMI 30-39.9)  Recently underwent bariatric surgery.    5. Mixed hyperlipidemia  -     Lipid Panel; Future  Not on any medications.    6. Essential hypertension  Stable, well-controlled.  Compliant on medication.  Followed by cardiology.    7. Impaired glucose tolerance  -     Hemoglobin A1c; Future  Not on any diabetes medications.  Previously diabetes which resolved after bariatric surgery.    8. Hirsutism  -     spironolactone (Aldactone) 25 MG tablet; Take 1 tablet by mouth Daily.  Dispense: 30 tablet; Refill: 0    9. Rash  Previously treated with Diflucan 200 mg twice daily.  Started by ID years ago.  With elevation in liver enzymes, I would not be comfortable continuing this.  If patient wants, I can refer her to dermatology to discuss further and determine appropriate treatment to be managed by them.    10. Elevated LFTs  Reviewed recent  labs.  Established with GI.    11. Screening for thyroid disorder  -     TSH Rfx On Abnormal To Free T4; Future    12. Need for COVID-19 vaccine  -     COVID-19 Bivalent Booster (Pfizer) 12+yrs       Lesli Ramirez PA-C   10/7/2022   22:25 EDT

## 2022-10-06 ENCOUNTER — APPOINTMENT (OUTPATIENT)
Dept: ONCOLOGY | Facility: HOSPITAL | Age: 37
End: 2022-10-06

## 2022-10-06 ENCOUNTER — SPECIALTY PHARMACY (OUTPATIENT)
Dept: ONCOLOGY | Facility: HOSPITAL | Age: 37
End: 2022-10-06

## 2022-10-06 ENCOUNTER — PROCEDURE VISIT (OUTPATIENT)
Dept: NEUROLOGY | Facility: CLINIC | Age: 37
End: 2022-10-06

## 2022-10-06 ENCOUNTER — INFUSION (OUTPATIENT)
Dept: ONCOLOGY | Facility: HOSPITAL | Age: 37
End: 2022-10-06

## 2022-10-06 VITALS
DIASTOLIC BLOOD PRESSURE: 74 MMHG | TEMPERATURE: 98.7 F | RESPIRATION RATE: 18 BRPM | HEART RATE: 77 BPM | SYSTOLIC BLOOD PRESSURE: 120 MMHG

## 2022-10-06 DIAGNOSIS — G43.709 CHRONIC MIGRAINE WITHOUT AURA WITHOUT STATUS MIGRAINOSUS, NOT INTRACTABLE: Primary | ICD-10-CM

## 2022-10-06 DIAGNOSIS — R11.2 INTRACTABLE NAUSEA AND VOMITING: Primary | ICD-10-CM

## 2022-10-06 LAB
25(OH)D3 SERPL-MCNC: 46.9 NG/ML (ref 30–100)
ALBUMIN SERPL-MCNC: 4.3 G/DL (ref 3.5–5.2)
ALBUMIN/GLOB SERPL: 1.7 G/DL
ALP SERPL-CCNC: 167 U/L (ref 39–117)
ALT SERPL W P-5'-P-CCNC: 155 U/L (ref 1–33)
ANION GAP SERPL CALCULATED.3IONS-SCNC: 8.7 MMOL/L (ref 5–15)
AST SERPL-CCNC: 53 U/L (ref 1–32)
BASOPHILS # BLD AUTO: 0.05 10*3/MM3 (ref 0–0.2)
BASOPHILS NFR BLD AUTO: 0.6 % (ref 0–1.5)
BILIRUB SERPL-MCNC: 0.4 MG/DL (ref 0–1.2)
BUN SERPL-MCNC: 8 MG/DL (ref 6–20)
BUN/CREAT SERPL: 10.4 (ref 7–25)
CALCIUM SPEC-SCNC: 9.3 MG/DL (ref 8.6–10.5)
CHLORIDE SERPL-SCNC: 103 MMOL/L (ref 98–107)
CHOLEST SERPL-MCNC: 140 MG/DL (ref 0–200)
CO2 SERPL-SCNC: 28.3 MMOL/L (ref 22–29)
CREAT SERPL-MCNC: 0.77 MG/DL (ref 0.57–1)
DEPRECATED RDW RBC AUTO: 42.2 FL (ref 37–54)
EGFRCR SERPLBLD CKD-EPI 2021: 101.4 ML/MIN/1.73
EOSINOPHIL # BLD AUTO: 0.36 10*3/MM3 (ref 0–0.4)
EOSINOPHIL NFR BLD AUTO: 4 % (ref 0.3–6.2)
ERYTHROCYTE [DISTWIDTH] IN BLOOD BY AUTOMATED COUNT: 11.8 % (ref 12.3–15.4)
FERRITIN SERPL-MCNC: 106 NG/ML (ref 13–150)
FOLATE SERPL-MCNC: 20 NG/ML (ref 4.78–24.2)
GLOBULIN UR ELPH-MCNC: 2.5 GM/DL
GLUCOSE SERPL-MCNC: 124 MG/DL (ref 65–99)
HBA1C MFR BLD: 6.5 % (ref 4.8–5.6)
HCT VFR BLD AUTO: 37.7 % (ref 34–46.6)
HDLC SERPL-MCNC: 54 MG/DL (ref 40–60)
HGB BLD-MCNC: 12.7 G/DL (ref 12–15.9)
IMM GRANULOCYTES # BLD AUTO: 0.03 10*3/MM3 (ref 0–0.05)
IMM GRANULOCYTES NFR BLD AUTO: 0.3 % (ref 0–0.5)
IRON 24H UR-MRATE: 67 MCG/DL (ref 37–145)
LDLC SERPL CALC-MCNC: 72 MG/DL (ref 0–100)
LDLC/HDLC SERPL: 1.33 {RATIO}
LYMPHOCYTES # BLD AUTO: 3.3 10*3/MM3 (ref 0.7–3.1)
LYMPHOCYTES NFR BLD AUTO: 36.3 % (ref 19.6–45.3)
MCH RBC QN AUTO: 32.6 PG (ref 26.6–33)
MCHC RBC AUTO-ENTMCNC: 33.7 G/DL (ref 31.5–35.7)
MCV RBC AUTO: 96.9 FL (ref 79–97)
MONOCYTES # BLD AUTO: 0.52 10*3/MM3 (ref 0.1–0.9)
MONOCYTES NFR BLD AUTO: 5.7 % (ref 5–12)
NEUTROPHILS NFR BLD AUTO: 4.82 10*3/MM3 (ref 1.7–7)
NEUTROPHILS NFR BLD AUTO: 53.1 % (ref 42.7–76)
NRBC BLD AUTO-RTO: 0 /100 WBC (ref 0–0.2)
PLATELET # BLD AUTO: 252 10*3/MM3 (ref 140–450)
PMV BLD AUTO: 10.6 FL (ref 6–12)
POTASSIUM SERPL-SCNC: 4.2 MMOL/L (ref 3.5–5.2)
PREALB SERPL-MCNC: 24.4 MG/DL (ref 20–40)
PROT SERPL-MCNC: 6.8 G/DL (ref 6–8.5)
RBC # BLD AUTO: 3.89 10*6/MM3 (ref 3.77–5.28)
SODIUM SERPL-SCNC: 140 MMOL/L (ref 136–145)
TRIGL SERPL-MCNC: 72 MG/DL (ref 0–150)
TSH SERPL DL<=0.05 MIU/L-ACNC: 2.27 UIU/ML (ref 0.27–4.2)
VLDLC SERPL-MCNC: 14 MG/DL (ref 5–40)
WBC NRBC COR # BLD: 9.08 10*3/MM3 (ref 3.4–10.8)

## 2022-10-06 PROCEDURE — 96361 HYDRATE IV INFUSION ADD-ON: CPT

## 2022-10-06 PROCEDURE — 96360 HYDRATION IV INFUSION INIT: CPT

## 2022-10-06 PROCEDURE — 64615 CHEMODENERV MUSC MIGRAINE: CPT | Performed by: NURSE PRACTITIONER

## 2022-10-06 RX ORDER — SODIUM CHLORIDE 9 MG/ML
1000 INJECTION, SOLUTION INTRAVENOUS ONCE
Status: COMPLETED | OUTPATIENT
Start: 2022-10-06 | End: 2022-10-06

## 2022-10-06 RX ORDER — SODIUM CHLORIDE 9 MG/ML
1000 INJECTION, SOLUTION INTRAVENOUS ONCE
Status: CANCELLED
Start: 2022-10-06 | End: 2022-10-06

## 2022-10-06 RX ADMIN — SODIUM CHLORIDE 1000 ML: 9 INJECTION, SOLUTION INTRAVENOUS at 10:58

## 2022-10-06 NOTE — PROGRESS NOTES
Specialty Pharmacy Patient Management Program  Neurology Reassessment     Frances Hartman is a 36 y.o. female with chronic migraines seen by a Neurology provider and enrolled in the Neurology Patient Management program offered by [facility].  A follow-up outreach was conducted, including assessment of continued therapy appropriateness, medication adherence, and side effect incidence and management for  Botox.     Changes to Insurance Coverage or Financial Support  [place] MEDICA[place]     Relevant Past Medical History and Comorbidities  Relevant medical history and concomitant health conditions were discussed with the patient. The patient's chart has been reviewed for relevant past medical history and comorbid health conditions and updated as necessary.   Past Medical History:   Diagnosis Date   • Allergic     Birth   • Anxiety    • Arthritis    • Asthma     prn inhalers, does not follow w/ pulmonary   • Chronic back pain     previously followed w/ pain management, now just prn Tylenol + Gabapentin   • Chronic deep vein thrombosis (DVT) of femoral vein of right lower extremity (HCC) 09/10/2021   • Clotting disorder (HCC) 2015   • Depression    • Diabetes mellitus (HCC)     Type 2, dx 2014, never on insulin, A1C 7.6   • Diabetes mellitus (HCC)     Type II, dx 2014, never on insulin, A1C 7.6    • Dyspepsia    • Dyspnea on exertion    • Dysuria 03/07/2022    Dysuria and hematuria x 2 weeks. 3/7/22 urine culture sent. Rx Macrobid 100 mg twice daily for 7 days. Patient did not tolerate Macrobid well due to GI upset. Urine culture was negative.   • Fatigue    • Gastroparesis    • GERD (gastroesophageal reflux disease)    • Heartburn     chronic, prn TUMS, denies prior eval   • Hirsutism    • History of medical problems     PCOS   • Hx of radiation therapy     for treatments of Keloids   • Hypertension    • Irregular menses     infrequent spotting   • Leiomyoma, subserous     possible  peduculated f3-4 cm fibroid on u/s at [facility]   • Low back pain    • Migraines     botox q3 months, following Neurology @ [facility]   • Morbid obesity (HCC)     s/p sleeve gastrectomy   • Peripheral edema    • Polycystic ovary syndrome 2011   • Seasonal allergies    • Slow to wake up after anesthesia    • Urinary tract infection    • Visual impairment     Astigmatism, Nearsightedness     Social History     Socioeconomic History   • Marital status: Single   Tobacco Use   • Smoking status: Never Smoker   • Smokeless tobacco: Never Used   Vaping Use   • Vaping Use: Never used   Substance and Sexual Activity   • Alcohol use: Not Currently     Comment: Very rarely drink socially. At most 2 drinks per month.   • Drug use: Never   • Sexual activity: Yes     Partners: Male     Birth control/protection: Condom, I.U.D., Coitus interruptus       Problem list reviewed by Nancy Solano, PharmD on 10/6/2022 at 11:55 AM    Allergies  Known allergies and reactions were discussed with the patient. The patient's chart has been reviewed for allergy information and updated as necessary.   Capsicum annuum extract & derivative (bell pepper) [capsicum], Ibuprofen, Macrobid [nitrofurantoin], Peanut-containing drug products, Hydrochlorothiazide, Monosodium glutamate, Oatmeal, Amitriptyline, Aspartame, Augmentin [amoxicillin-pot clavulanate], Codeine, Diclofenac, Doxycycline, Eggs or egg-derived products, and Naproxen    Allergies reviewed by Nancy Solano, PharmD on 10/6/2022 at 11:54 AM    Relevant Laboratory Values    Common labs    Common Labs 6/14/22 6/14/22 10/3/22 10/3/22 10/5/22 10/5/22 10/5/22 10/5/22    1556 1556 0755 0902 1054 1054 1054 1054   Glucose  93  104 (A)   124 (A)    BUN  7  8   8    Creatinine  0.75  0.68   0.77    Sodium  136  140   140    Potassium  3.6  4.0   4.2    Chloride  104  105   103    Calcium  9.6  9.7   9.3    Albumin  3.80  4.10   4.30    Total Bilirubin  0.4  0.2   0.4    Alkaline Phosphatase  128 (A)  100    167 (A)    AST (SGOT)  22  26   53 (A)    ALT (SGPT)  50 (A)  43 (A)   155 (A)    WBC 8.15  13.83 (A)  9.08      Hemoglobin 13.0  13.0  12.7      Hematocrit 39.4  39.3  37.7      Platelets 215  281  252      Total Cholesterol      140     Triglycerides      72     HDL Cholesterol      54     LDL Cholesterol       72     Hemoglobin A1C        6.50 (A)   (A) Abnormal value       Comments are available for some flowsheets but are not being displayed.               Current Medication List  This medication list has been reviewed with the patient and evaluated for any interactions or necessary modifications/recommendations, and updated to include all prescription medications, OTC medications, and supplements the patient is currently taking.  This list reflects what is contained in the patient's profile, which has also been marked as reviewed to communicate to other providers it is the most up to date version of the patient's current medication therapy.     Current Outpatient Medications:   •  acetaminophen (TYLENOL) 500 MG tablet, Take 500-1,000 mg by mouth Every 6 (Six) Hours As Needed for Mild Pain ., Disp: , Rfl:   •  albuterol (PROVENTIL HFA;VENTOLIN HFA) 108 (90 BASE) MCG/ACT inhaler, Inhale 2 puffs Every 4 (Four) Hours As Needed for wheezing., Disp: , Rfl:   •  Blood Glucose Monitoring Suppl (ONE TOUCH ULTRA 2) w/Device kit, , Disp: , Rfl:   •  carvedilol (COREG) 6.25 MG tablet, Take 1 tab in AM and 2 tab in PM (Patient taking differently: Take 6.25 mg by mouth Daily.), Disp: 270 tablet, Rfl: 0  •  cyclobenzaprine (FLEXERIL) 10 MG tablet, Take 10 mg by mouth 3 (Three) Times a Day As Needed for Muscle Spasms., Disp: , Rfl:   •  diphenhydrAMINE (BENADRYL) 25 mg capsule, Take 25 mg by mouth Every 6 (Six) Hours As Needed for Itching or Sleep., Disp: , Rfl:   •  famotidine (PEPCID) 20 MG tablet, Take 1 tablet by mouth 2 (Two) Times a Day., Disp: 20 tablet, Rfl: 0  •  furosemide (LASIX) 20 MG tablet, TAKE ONE TO TWO  TABLETS BY MOUTH EVERY NIGHTLY (Patient taking differently: Take 20 mg by mouth As Needed.), Disp: 135 tablet, Rfl: 2  •  gabapentin (NEURONTIN) 300 MG capsule, Take 600 mg by mouth Daily., Disp: , Rfl:   •  glucose blood test strip, USe BID to test blood sugar DX.e11.65, Disp: 100 each, Rfl: 3  •  guaiFENesin (HUMIBID 3) 400 MG tablet, Take 200 mg by mouth As Needed., Disp: , Rfl:   •  HYDROcodone-acetaminophen (NORCO) 5-325 MG per tablet, Take 1 tablet by mouth Every 6 (Six) Hours As Needed for Severe Pain ., Disp: 12 tablet, Rfl: 0  •  Lancets 33G misc, 1 Each/kg 2 (two) times a day. To test blood sugars DxE11.65, Disp: 100 each, Rfl: 2  •  levonorgestrel (MIRENA) 20 MCG/24HR IUD, 1 each by Intrauterine route., Disp: , Rfl:   •  loratadine (CLARITIN) 10 MG tablet, TAKE ONE TABLET BY MOUTH DAILY, Disp: 30 tablet, Rfl: 2  •  metFORMIN ER (GLUCOPHAGE-XR) 500 MG 24 hr tablet, Take 1 tablet by mouth Daily With Dinner., Disp: 90 tablet, Rfl: 3  •  multivitamin with minerals tablet tablet, Take 1 tablet by mouth Daily., Disp: , Rfl:   •  OnabotulinumtoxinA (Botox) 200 units reconstituted solution, FOR . PHYSICIAN TO INJECT UP  UNITS INTRAMUSCULARLY INTO HEAD, NECK AND SHOULDERS EVERY 90 DAYS., Disp: 1 each, Rfl: 3  •  ondansetron (ZOFRAN) 4 MG tablet, Take 1 tablet by mouth Every 6 (Six) Hours As Needed for Nausea or Vomiting., Disp: 4 tablet, Rfl: 0  •  pantoprazole (PROTONIX) 40 MG EC tablet, Take 1 tablet by mouth Daily., Disp: 8 tablet, Rfl: 0  •  simethicone (Gas-X) 80 MG chewable tablet, Chew 1 tablet Every 6 (Six) Hours As Needed for Flatulence., Disp: 10 tablet, Rfl: 0  •  spironolactone (Aldactone) 25 MG tablet, Take 1 tablet by mouth Daily., Disp: 30 tablet, Rfl: 0  •  sucralfate (CARAFATE) 1 g tablet, Take 1 tablet by mouth 4 (Four) Times a Day., Disp: 20 tablet, Rfl: 0  •  traMADol (ULTRAM) 50 MG tablet, Take 50 mg by mouth Every 6 (Six) Hours As Needed for Moderate Pain ., Disp: ,  Rfl:   •  vitamin C (ASCORBIC ACID) 250 MG tablet, Take 250 mg by mouth Daily., Disp: , Rfl:   •  vitamin D3 125 MCG (5000 UT) capsule capsule, Take 5,000 Units by mouth Daily., Disp: , Rfl:     Current Facility-Administered Medications:   •  OnabotulinumtoxinA 200 Units, 200 Units, Intramuscular, Q3 Months, Shiela Coelho, APRN, 200 Units at 07/08/22 1043    Facility-Administered Medications Ordered in Other Visits:   •  sodium chloride 0.9 % infusion 1,000 mL, 1,000 mL, Intravenous, Once, Cindy Lopez PA-C, Last Rate: 500 mL/hr at 10/06/22 1058, 1,000 mL at 10/06/22 1058    Medicines reviewed by Nancy Solano, PharmD on 10/6/2022 at 11:54 AM    Drug Interactions  No drug interactions     Adverse Drug Reactions  • Adverse Reactions Experienced: No significant reactions reported.   • Plan for ADR Management: N/A    Hospitalizations and Urgent Care Since Last Assessment  • Hospitalizations or Admissions: None.   • ED Visits: None.   • Urgent Office Visits: None.     Adherence and Self-Administration  Adherence related patient's specialty therapy was discussed with the patient. The Adherence segment of this outreach has been reviewed and updated.     Medication Adherence    Adherence tools used: cell phone  Support network for adherence: healthcare provider        • Ongoing or New Barriers to Patient Adherence and/or Self-Administration: No barriers identified.    • Methods for Supporting Patient Adherence and/or Self-Administration: No further support needed at this time.      Goals of Therapy  Goals related to the patient's specialty therapy was discussed with the patient. The Patient Goals segment of this outreach has been reviewed and updated.    Goals     • Specialty Pharmacy General Goal      Reduction in frequency and severity of migraines  3/29/22 changing from Ajovy (ISR) to Qulipta             Quality of Life Assessment   Quality of Life related to the patient's specialty therapy was discussed  with the patient. The QOL segment of this outreach has been reviewed and updated.     Quality of Life Assessment  Quality of Life Improvement Scale: Moderately better  Comments on Quality of Life: Quality of life improving, describes effect wearing off 3-4 weeks before next injection    Reassessment Plan & Follow-Up  1. Medication Therapy Changes: None.   2. Additional Plans, Therapy Recommendations, or Therapy Problems to Be Addressed: None at this time.   3. Pharmacist to perform regular reassessments no more than (6) months from the previous assessment.  4. Care Coordinator to set up future refill outreaches, coordinate prescription delivery, and escalate clinical questions to pharmacist.     Attestation  I attest that the specialty medication(s) addressed above are appropriate for the patient based on my reassessment.  If the prescribed therapy is at any point deemed not appropriate based on the current or future assessments, a consultation will be initiated with the patient's specialty care provider to determine the best course of action. The revised plan of therapy will be documented along with any additional patient education provided.     Nancy Solano, Aishwarya  10/6/2022  12:04 EDT

## 2022-10-06 NOTE — PROGRESS NOTES
Botox Procedure Note       Patient Name: Frances Hartman  : 1985   MRN: [id number]   PCP: LASHANDA Rooney  Chief Complaint:    Chief Complaint   Patient presents with   • Botulinum Toxin Injection       History of Present Illness: Frances Hartman is a 36 y.o. female who is here today for Botox injections for migraines.     Currently having daily headache. 7/10.  Headaches are less severe after botox but wears off after 8 weeks.  Taking GBP 300mg 2 tabs daily since 22 but decreasing severity.  Taking Ultram several times a week. Prescribed from pain management for headaches and back pain. Ultram does help headache  Taking norco once a week for back pain  Metoprolol was not effective.  Interested in Vyepti.    She is not taking quilipta. Tried other cgrp without relief.  Elavil causes neuropathy and memory loss and hair loss      We have discussed risk and benefits of this Botox procedure and common side effects including headache, bleeding, infection, worsening of pain, neck pain, neck stiffness or weakness, damage to the areas being injected, weakness of muscles, loss of muscle control, ptosis, flu-like symptoms as well as more serious possible adverse effects including possible dysphagia, facial droop if injecting the facial area, painful injection, respiratory distress or even death (death has only been reported once with adults for Botox for migraines in another state when mixed with lidocaine solution which we do not use lidocaine solution in our practice for mixing Botox). The patient verbally understands and accepts risks and agrees with moving forward with Botox injections for chronic migraine prevention.    Verbal and written consent has been obtained from the patient prior to beginning treatment injections.     Pertinent Medical History:  Response to treatment has reduced headache severity    Subjective        The following portions of the patient's history were reviewed an  updated as appropriate: past family history, past medical history, past social history, past surgical history.     Medications:     Current Outpatient Medications:   •  acetaminophen (TYLENOL) 500 MG tablet, Take 500-1,000 mg by mouth Every 6 (Six) Hours As Needed for Mild Pain ., Disp: , Rfl:   •  albuterol (PROVENTIL HFA;VENTOLIN HFA) 108 (90 BASE) MCG/ACT inhaler, Inhale 2 puffs Every 4 (Four) Hours As Needed for wheezing., Disp: , Rfl:   •  carvedilol (COREG) 6.25 MG tablet, Take 1 tab in AM and 2 tab in PM (Patient taking differently: Take 6.25 mg by mouth Daily.), Disp: 270 tablet, Rfl: 0  •  cyclobenzaprine (FLEXERIL) 10 MG tablet, Take 10 mg by mouth 3 (Three) Times a Day As Needed for Muscle Spasms., Disp: , Rfl:   •  diphenhydrAMINE (BENADRYL) 25 mg capsule, Take 25 mg by mouth Every 6 (Six) Hours As Needed for Itching or Sleep., Disp: , Rfl:   •  famotidine (PEPCID) 20 MG tablet, Take 1 tablet by mouth 2 (Two) Times a Day., Disp: 20 tablet, Rfl: 0  •  furosemide (LASIX) 20 MG tablet, TAKE ONE TO TWO TABLETS BY MOUTH EVERY NIGHTLY (Patient taking differently: Take 20 mg by mouth As Needed.), Disp: 135 tablet, Rfl: 2  •  gabapentin (NEURONTIN) 300 MG capsule, Take 600 mg by mouth Daily., Disp: , Rfl:   •  glucose blood test strip, USe BID to test blood sugar DX.e11.65, Disp: 100 each, Rfl: 3  •  guaiFENesin (HUMIBID 3) 400 MG tablet, Take 200 mg by mouth As Needed., Disp: , Rfl:   •  HYDROcodone-acetaminophen (NORCO) 5-325 MG per tablet, Take 1 tablet by mouth Every 6 (Six) Hours As Needed for Severe Pain ., Disp: 12 tablet, Rfl: 0  •  Lancets 33G misc, 1 Each/kg 2 (two) times a day. To test blood sugars DxE11.65, Disp: 100 each, Rfl: 2  •  levonorgestrel (MIRENA) 20 MCG/24HR IUD, 1 each by Intrauterine route., Disp: , Rfl:   •  loratadine (CLARITIN) 10 MG tablet, TAKE ONE TABLET BY MOUTH DAILY, Disp: 30 tablet, Rfl: 2  •  metFORMIN ER (GLUCOPHAGE-XR) 500 MG 24 hr tablet, Take 1 tablet by mouth Daily With  "Dinner., Disp: 90 tablet, Rfl: 3  •  multivitamin with minerals tablet tablet, Take 1 tablet by mouth Daily., Disp: , Rfl:   •  OnabotulinumtoxinA (Botox) 200 units reconstituted solution, FOR . PHYSICIAN TO INJECT UP  UNITS INTRAMUSCULARLY INTO HEAD, NECK AND SHOULDERS EVERY 90 DAYS., Disp: 1 each, Rfl: 3  •  ondansetron (ZOFRAN) 4 MG tablet, Take 1 tablet by mouth Every 6 (Six) Hours As Needed for Nausea or Vomiting., Disp: 4 tablet, Rfl: 0  •  pantoprazole (PROTONIX) 40 MG EC tablet, Take 1 tablet by mouth Daily., Disp: 8 tablet, Rfl: 0  •  simethicone (Gas-X) 80 MG chewable tablet, Chew 1 tablet Every 6 (Six) Hours As Needed for Flatulence., Disp: 10 tablet, Rfl: 0  •  spironolactone (Aldactone) 25 MG tablet, Take 1 tablet by mouth Daily., Disp: 30 tablet, Rfl: 0  •  sucralfate (CARAFATE) 1 g tablet, Take 1 tablet by mouth 4 (Four) Times a Day., Disp: 20 tablet, Rfl: 0  •  traMADol (ULTRAM) 50 MG tablet, Take 50 mg by mouth Every 6 (Six) Hours As Needed for Moderate Pain ., Disp: , Rfl:   •  vitamin C (ASCORBIC ACID) 250 MG tablet, Take 250 mg by mouth Daily., Disp: , Rfl:   •  vitamin D3 125 MCG (5000 UT) capsule capsule, Take 5,000 Units by mouth Daily., Disp: , Rfl:   •  Blood Glucose Monitoring Suppl (ONE TOUCH ULTRA 2) w/Device kit, , Disp: , Rfl:     Current Facility-Administered Medications:   •  OnabotulinumtoxinA 200 Units, 200 Units, Intramuscular, Q3 Months, Shiela Coelho, TANESHA, 200 Units at 07/08/22 1043    Allergies:   Allergies   Allergen Reactions   • Capsicum Annuum Extract & Derivative (Bell Pepper) [Capsicum] Anaphylaxis   • Ibuprofen Other (See Comments)     \"makes me retain fluid and eventually go into CHF\"   • Macrobid [Nitrofurantoin] Nausea Only   • Peanut-Containing Drug Products Anaphylaxis   • Hydrochlorothiazide Swelling     eventually causes CHF    • Monosodium Glutamate Swelling and Headache   • Oatmeal Other (See Comments)     Dx on allergy testing " "  • Amitriptyline Mental Status Change     memory gaps + neuropathy + hair loss   • Aspartame Nausea Only   • Augmentin [Amoxicillin-Pot Clavulanate] Other (See Comments)     Syncope, not sure if allergic to cephalosporins.   • Codeine Headache      Can tolerate hydrocodone, no other adverse reactions to other narcotics.   • Diclofenac Headache   • Doxycycline Rash and Hallucinations   • Eggs Or Egg-Derived Products Other (See Comments)     dx on allergy testing, but does not completely avoid   • Naproxen Other (See Comments)     \"blackout\"       Objective     Physical Exam:  Vital Signs: There were no vitals filed for this visit.  There is no height or weight on file to calculate BMI.     Physical Exam  Alert and oriented x 3    BOTOX PROCEDURE:     The patient was placed in a comfortable area and the sites to be treated were identified. A time out was called and performed. The area to be treated was prepped three times with alcohol and the alcohol allowed to dry. Next, a 30 gauge needle was used to inject the medication in the area to be treated.     Date of Last Injection: 7/8/2022  Response: good  Onset of response: immediate   Wearing off: yes  Side Effects: none  : [employer]  Lot #: [id number]  Expiration Date: 03/2025  NDC: 71161sh09   Botox is pt supplied    Botox was injected using FDA approved protocol for chronic migraine prevention.   200 unit vial Botox was re-constituted with 4 mL 0.9 NS to 5 unit/0.1 mL using standard techniques.  10 units were given at the  muscle in 2 divided sites  5 units were given at the Procerus muscle  20 units were given at the Frontalis muscle in 4 divided sites  40 units were given at the Temporalis muscle in 8 divided sites  30 units were given at the Occipitalis muscle in 6 divided sites  20 units were given at the Cervical paraspinal muscle in 4 divided sites  30 units were given at the Trapezius muscle in 6 divided sites    The total amount injected in " units is 155  The total amount wasted in units is 45  The total amount submitted in units is 200.   Botox was administered by Nurse practitioner.     Patient tolerated procedure well with no immediate complications.     Assessment / Plan      Assessment/Plan:   Diagnoses and all orders for this visit:    1. Chronic migraine without aura without status migrainosus, not intractable (Primary)  Comments:  Will sent message to pharmacy team to see if Vyepti would be covered.         Patient Education:     Reviewed medications, potential side effects and signs and symptoms to report. Discussed risk versus benefits of treatment plan with patient and/or family-including medications, labs and radiology that may be ordered. Addressed questions and concerns during visit. Patient and/or family verbalized understanding and agree with plan. Instructed to call the office with any questions and report to ER with any life-threatening symptoms.     Follow Up:   It has been determined that this patient has chronic migraine headaches. We will proceed with a 12 week regimen of 200 units of Botox injections, to treat the patient's chronic migraines.  Return in about 3 months (around 1/6/2023).    During this visit the following were done:  Labs Reviewed []    Labs Ordered []    Radiology Reports Reviewed []    Radiology Ordered []    PCP Records Reviewed []    Referring Provider Records Reviewed []    ER Records Reviewed []    Hospital Records Reviewed []    History Obtained From Family []    Radiology Images Reviewed []    Other Reviewed [x]    Records Requested []        Nancy Kaba, DNP, APRN

## 2022-10-07 RX ORDER — SODIUM CHLORIDE 9 MG/ML
250 INJECTION, SOLUTION INTRAVENOUS ONCE
OUTPATIENT
Start: 2022-10-07

## 2022-10-10 LAB
METHYLMALONATE SERPL-SCNC: 155 NMOL/L (ref 0–378)
VIT B1 BLD-SCNC: 131.2 NMOL/L (ref 66.5–200)

## 2022-10-14 ENCOUNTER — LAB (OUTPATIENT)
Dept: LAB | Facility: HOSPITAL | Age: 37
End: 2022-10-14

## 2022-10-14 ENCOUNTER — OFFICE VISIT (OUTPATIENT)
Dept: GASTROENTEROLOGY | Facility: CLINIC | Age: 37
End: 2022-10-14

## 2022-10-14 VITALS
SYSTOLIC BLOOD PRESSURE: 118 MMHG | DIASTOLIC BLOOD PRESSURE: 72 MMHG | HEART RATE: 64 BPM | OXYGEN SATURATION: 99 % | BODY MASS INDEX: 37.69 KG/M2 | HEIGHT: 60 IN | TEMPERATURE: 97.2 F | WEIGHT: 192 LBS

## 2022-10-14 DIAGNOSIS — K21.9 GASTROESOPHAGEAL REFLUX DISEASE, UNSPECIFIED WHETHER ESOPHAGITIS PRESENT: ICD-10-CM

## 2022-10-14 DIAGNOSIS — K59.1 FUNCTIONAL DIARRHEA: Primary | ICD-10-CM

## 2022-10-14 DIAGNOSIS — R11.2 INTRACTABLE NAUSEA AND VOMITING: ICD-10-CM

## 2022-10-14 DIAGNOSIS — K76.0 NAFLD (NONALCOHOLIC FATTY LIVER DISEASE): ICD-10-CM

## 2022-10-14 DIAGNOSIS — R07.89 ATYPICAL CHEST PAIN: ICD-10-CM

## 2022-10-14 DIAGNOSIS — C7A.8 NEUROENDOCRINE CARCINOMA: ICD-10-CM

## 2022-10-14 LAB — HCV AB SER DONR QL: NORMAL

## 2022-10-14 PROCEDURE — 86803 HEPATITIS C AB TEST: CPT

## 2022-10-14 PROCEDURE — 36415 COLL VENOUS BLD VENIPUNCTURE: CPT

## 2022-10-14 PROCEDURE — 99214 OFFICE O/P EST MOD 30 MIN: CPT | Performed by: NURSE PRACTITIONER

## 2022-10-14 NOTE — PROGRESS NOTES
"     Follow Up      Patient Name: Frances Hartman  : 1985   MRN: [id number]     Chief Complaint:    Chief Complaint   Patient presents with   • Follow-up     Intermittent diarrhea        History of Present Illness: Frances Hartman is a 36 y.o. female who is here today for follow up on NAFLD, nausea r/t delayed emptying, and reflux  Nausea/vomiting: has not had nausea or vomiting for \"a while- doing well.    NAFLD: Adding in fiber to diet- losing weight. She just got a gym membership but has not used it yet due to recent chest pain.  Increasing her steps each day.     Diarrhea has improved- now just episodic.  At times using a stool softener for occasional constipation.     Was recently having chest pains and went to ED. Thought to be GI related.  She denies reflux recently.  Had not been on ppi but started those after ED visit.  She does not feel like reflux meds reduced the chest pain but it is causing abnormal stools. She feels that stress may be the cause.      Frances was admitted to [facility] in 2021 with nausea and vomiting.  She had been status post bariatric surgery in 2021.  She was diagnosed with esophageal stricture. She underwent 3 EGDs during her hospital stay.  Submucosal nodule was found in the duodenum and biopsies were significant for neuroendocrine carcinoma.  Repeat EGD shows no further tumor.       Follows with Dr Bhatia with  for her neuroendocrine tumor who recommends repeat EGD.       US Liver (2022 16:20)-Diffusely increased echogenicity of liver parenchyma likely related to  steatosis. No focal lesions. No acute process.    NM PET/CT Skull Base to Mid Thigh (2022 11:42)  Negative Ga-68 Dotatate scan  CT Abdomen Pelvis With Contrast (2021 22:52)   FL Upper GI Single Contrast With KUB (2021 14:22)1. Status post vertical sleeve gastrectomy x5 months. There was no  evidence of extraluminal contrast. No postoperative " strictures were  seen.  2. Moderate gastroesophageal reflux to the level of the thoracic inlet  3. Patulous gastroesophageal junction versus small sized sliding-type  hiatal hernia    NM Gastric Emptying (11/06/2020 13:14)-IMPRESSION:  Abnormal exam with delay in gastric emptying.    UPPER GI ENDOSCOPY (11/24/2021 11:37)       Her abdominal surgical history includes Chandrika, umbilical hernia repair, and gastric sleeve June 2021.  Subjective      Review of Systems:   Review of Systems   Constitutional: Negative for appetite change and unexpected weight loss.   HENT: Negative for trouble swallowing.    Cardiovascular: Positive for chest pain.   Gastrointestinal: Positive for constipation and diarrhea. Negative for abdominal distention, abdominal pain, anal bleeding, blood in stool, nausea, rectal pain, vomiting, GERD and indigestion.       Medications:     Current Outpatient Medications:   •  acetaminophen (TYLENOL) 500 MG tablet, Take 500-1,000 mg by mouth Every 6 (Six) Hours As Needed for Mild Pain ., Disp: , Rfl:   •  albuterol (PROVENTIL HFA;VENTOLIN HFA) 108 (90 BASE) MCG/ACT inhaler, Inhale 2 puffs Every 4 (Four) Hours As Needed for wheezing., Disp: , Rfl:   •  Blood Glucose Monitoring Suppl (ONE TOUCH ULTRA 2) w/Device kit, , Disp: , Rfl:   •  carvedilol (COREG) 6.25 MG tablet, Take 1 tab in AM and 2 tab in PM (Patient taking differently: Take 6.25 mg by mouth Daily.), Disp: 270 tablet, Rfl: 0  •  cyclobenzaprine (FLEXERIL) 10 MG tablet, Take 10 mg by mouth 3 (Three) Times a Day As Needed for Muscle Spasms., Disp: , Rfl:   •  diphenhydrAMINE (BENADRYL) 25 mg capsule, Take 25 mg by mouth Every 6 (Six) Hours As Needed for Itching or Sleep., Disp: , Rfl:   •  furosemide (LASIX) 20 MG tablet, TAKE ONE TO TWO TABLETS BY MOUTH EVERY NIGHTLY (Patient taking differently: Take 20 mg by mouth As Needed.), Disp: 135 tablet, Rfl: 2  •  gabapentin (NEURONTIN) 300 MG capsule, Take 600 mg by mouth Daily., Disp: , Rfl:   •   glucose blood test strip, USe BID to test blood sugar DX.[id number], Disp: 100 each, Rfl: 3  •  guaiFENesin (HUMIBID 3) 400 MG tablet, Take 200 mg by mouth As Needed., Disp: , Rfl:   •  HYDROcodone-acetaminophen (NORCO) 5-325 MG per tablet, Take 1 tablet by mouth Every 6 (Six) Hours As Needed for Severe Pain ., Disp: 12 tablet, Rfl: 0  •  Lancets 33G misc, 1 Each/kg 2 (two) times a day. To test blood sugars DxE11.65, Disp: 100 each, Rfl: 2  •  levonorgestrel (MIRENA) 20 MCG/24HR IUD, 1 each by Intrauterine route., Disp: , Rfl:   •  loratadine (CLARITIN) 10 MG tablet, TAKE ONE TABLET BY MOUTH DAILY, Disp: 30 tablet, Rfl: 2  •  metFORMIN ER (GLUCOPHAGE-XR) 500 MG 24 hr tablet, Take 1 tablet by mouth Daily With Dinner., Disp: 90 tablet, Rfl: 3  •  multivitamin with minerals tablet tablet, Take 1 tablet by mouth Daily., Disp: , Rfl:   •  OnabotulinumtoxinA (Botox) 200 units reconstituted solution, FOR . PHYSICIAN TO INJECT UP  UNITS INTRAMUSCULARLY INTO HEAD, NECK AND SHOULDERS EVERY 90 DAYS., Disp: 1 each, Rfl: 3  •  ondansetron (ZOFRAN) 4 MG tablet, Take 1 tablet by mouth Every 6 (Six) Hours As Needed for Nausea or Vomiting., Disp: 4 tablet, Rfl: 0  •  simethicone (Gas-X) 80 MG chewable tablet, Chew 1 tablet Every 6 (Six) Hours As Needed for Flatulence., Disp: 10 tablet, Rfl: 0  •  spironolactone (Aldactone) 25 MG tablet, Take 1 tablet by mouth Daily., Disp: 30 tablet, Rfl: 0  •  traMADol (ULTRAM) 50 MG tablet, Take 50 mg by mouth Every 6 (Six) Hours As Needed for Moderate Pain ., Disp: , Rfl:   •  vitamin C (ASCORBIC ACID) 250 MG tablet, Take 250 mg by mouth Daily., Disp: , Rfl:   •  vitamin D3 125 MCG (5000 UT) capsule capsule, Take 5,000 Units by mouth Daily., Disp: , Rfl:     Current Facility-Administered Medications:   •  OnabotulinumtoxinA 200 Units, 200 Units, Intramuscular, Q3 Months, Shiela Coelho, TANESHA, 200 Units at 10/06/22 1043    Allergies:   Allergies   Allergen Reactions  "  • Capsicum Annuum Extract & Derivative (Bell Pepper) [Capsicum] Anaphylaxis   • Ibuprofen Other (See Comments)     \"makes me retain fluid and eventually go into CHF\"   • Macrobid [Nitrofurantoin] Nausea Only   • Peanut-Containing Drug Products Anaphylaxis   • Hydrochlorothiazide Swelling     eventually causes CHF    • Monosodium Glutamate Swelling and Headache   • Oatmeal Other (See Comments)     Dx on allergy testing   • Amitriptyline Mental Status Change     memory gaps + neuropathy + hair loss   • Aspartame Nausea Only   • Augmentin [Amoxicillin-Pot Clavulanate] Other (See Comments)     Syncope, not sure if allergic to cephalosporins.   • Codeine Headache      Can tolerate hydrocodone, no other adverse reactions to other narcotics.   • Diclofenac Headache   • Doxycycline Rash and Hallucinations   • Eggs Or Egg-Derived Products Other (See Comments)     dx on allergy testing, but does not completely avoid   • Naproxen Other (See Comments)     \"blackout\"       Social History:   Social History     Socioeconomic History   • Marital status: Single   Tobacco Use   • Smoking status: Never   • Smokeless tobacco: Never   Vaping Use   • Vaping Use: Never used   Substance and Sexual Activity   • Alcohol use: Not Currently     Comment: Very rarely drink socially. At most 2 drinks per month.   • Drug use: Never   • Sexual activity: Yes     Partners: Male     Birth control/protection: Condom, I.U.D., Coitus interruptus        Surgical History:   Past Surgical History:   Procedure Laterality Date   • ABDOMINAL SURGERY  06/15/2021    Bariatric Sleeve Surgery   • BARIATRIC SURGERY     • COSMETIC SURGERY      Multiple Keloid surgeries   • ENDOSCOPY N/A 06/15/2021    Procedure: ESOPHAGOGASTRODUODENOSCOPY;  Surgeon: Ayaka Andre MD;  Location: [facility];  Service: Robotics - DaVinci;  Laterality: N/A;   • ENDOSCOPY N/A 10/28/2021    Procedure: ESOPHAGOGASTRODUODENOSCOPY WITH ACHALASIA BALLOON DILATATION;  Surgeon: " Jase Hernandez MD;  Location:  [facility];  Service: Gastroenterology;  Laterality: N/A;  58 F DILATOR   • ENDOSCOPY N/A 11/15/2021    Procedure: ESOPHAGOGASTRODUODENOSCOPY WITH DILATATION;  Surgeon: Jase Hernandez MD;  Location:  [facility];  Service: Gastroenterology;  Laterality: N/A;  achalasia balloon    • ENDOSCOPY N/A 11/24/2021    Procedure: ESOPHAGOGASTRODUODENOSCOPY WTH DUODENAL POLYPECTOPMY AND NASAL JEJUNAL TUBE PLACEMENT;  Surgeon: Jase Hernandez MD;  Location:  [facility];  Service: Gastroenterology;  Laterality: N/A;  placement of feeding tube, checked position with KUB   • GASTRIC RESTRICTION SURGERY  2021    Sleeve   • GASTRIC SLEEVE LAPAROSCOPIC N/A 06/15/2021    Procedure: GASTRIC SLEEVE LAPAROSCOPIC WITH DAVINCI ROBOT, LAPROSCOPIC HIATAL HERNIA REPAIR WITH DAVINCI ROBOT;  Surgeon: Ayaka Andre MD;  Location:  [facility];  Service: Robotics - DaVinci;  Laterality: N/A;   • KELOID EXCISION      1990,1993,1999,2015,2016,2019   • LAPAROSCOPIC CHOLECYSTECTOMY  2000    for stones   • RADIOFREQUENCY ABLATION  2019    x 2  for pt back   • UMBILICAL HERNIA REPAIR  1990   • UPPER GASTROINTESTINAL ENDOSCOPY     • WISDOM TOOTH EXTRACTION  1994        Medical History:   Past Medical History:   Diagnosis Date   • Allergic     Birth   • Anxiety    • Arthritis    • Asthma     prn inhalers, does not follow w/ pulmonary   • Chronic back pain     previously followed w/ pain management, now just prn Tylenol + Gabapentin   • Chronic deep vein thrombosis (DVT) of femoral vein of right lower extremity ([facility]) 09/10/2021   • Clotting disorder ([facility]) 2015   • Depression    • Diabetes mellitus (HCC)     Type 2, dx 2014, never on insulin, A1C 7.6   • Diabetes mellitus (HCC)     Type II, dx 2014, never on insulin, A1C 7.6    • Dyspepsia    • Dyspnea on exertion    • Dysuria 03/07/2022    Dysuria and hematuria x 2 weeks. 3/7/22 urine culture sent. Rx Macrobid 100 mg twice daily for 7 days. Patient  "did not tolerate Macrobid well due to GI upset. Urine culture was negative.   • Fatigue    • Gastroparesis    • GERD (gastroesophageal reflux disease)    • Heartburn     chronic, prn TUMS, denies prior eval   • Hirsutism    • History of medical problems     PCOS   • Hx of radiation therapy     for treatments of Keloids   • Hypertension    • Irregular menses     infrequent spotting   • Leiomyoma, subserous     possible peduculated f3-4 cm fibroid on u/s at [facility]   • Low back pain    • Migraines     botox q3 months, following [facility]rology @ [facility]   • Morbid obesity (HCC)     s/p sleeve gastrectomy   • Peripheral edema    • Polycystic ovary syndrome 2011   • Seasonal allergies    • Slow to wake up after anesthesia    • Urinary tract infection    • Visual impairment     Astigmatism, Nearsightedness        Objective     Physical Exam:  Vital Signs:   Vitals:    10/14/22 0758   BP: 118/72   BP Location: Left arm   Patient Position: Sitting   Cuff Size: Adult   Pulse: 64   Temp: 97.2 °F (36.2 °C)   TempSrc: Temporal   SpO2: 99%   Weight: 87.1 kg (192 lb)   Height: 152.4 cm (60\")     Body mass index is 37.5 kg/m².     Physical Exam  Constitutional:       General: She is not in acute distress.     Appearance: She is well-developed.   Pulmonary:      Effort: Pulmonary effort is normal. No accessory muscle usage or respiratory distress.   Abdominal:      General: Bowel sounds are normal. There is no distension.      Palpations: Abdomen is soft.      Tenderness: There is no abdominal tenderness.   Skin:     Coloration: Skin is not pale.      Findings: No erythema.   Neurological:      Mental Status: She is alert and oriented to person, place, and time.   Psychiatric:         Speech: Speech normal.         Behavior: Behavior normal.         Thought Content: Thought content normal.         Judgment: Judgment normal.         Assessment / Plan      Assessment/Plan:   Diagnoses and all orders for this visit:    1. Functional diarrhea " (Primary)  -     Ambulatory Referral For Screening Colonoscopy  Diarrhea since bariatric surgery.  Patient has declined regular medication for this.  It has reduced in frequency with time.  2. Gastroesophageal reflux disease, unspecified whether esophagitis present  -     Ambulatory referral for Screening EGD  Denies recent reflux but was in the ED with chest pain.  She feels this is more likely related to stress.  She did not see a reduction with her PPI.  She also plans to follow-up with cardiology.  3. Intractable nausea and vomiting  History of gastroparesis, no recent issues  4. NAFLD (nonalcoholic fatty liver disease)  -     Hepatitis C Antibody; Future  Continue with diet and exercise  5. Neuroendocrine carcinoma (HCC)  -     Ambulatory referral for Screening EGD  -     Ambulatory Referral For Screening Colonoscopy  Due for repeat EGD, followed by   6. Atypical chest pain  -     Ambulatory referral for Screening EGD  Denies recent reflux but was in the ED with chest pain.  She feels this is more likely related to stress.  She did not see a reduction with her PPI.  She also plans to follow-up with cardiology.       Follow Up:   Return in about 6 months (around 4/14/2023), or if symptoms worsen or fail to improve.    Plan of care reviewed with the patient at the conclusion of today's visit.  Education was provided regarding diagnosis, management, and any prescribed or recommended OTC medications.  Patient verbalized understanding of and agreement with management plan.       TANESHA Sexton  [facility]

## 2022-10-17 ENCOUNTER — OFFICE VISIT (OUTPATIENT)
Dept: BEHAVIORAL HEALTH | Facility: CLINIC | Age: 37
End: 2022-10-17

## 2022-10-17 DIAGNOSIS — R45.81 LOW SELF ESTEEM: ICD-10-CM

## 2022-10-17 DIAGNOSIS — F43.9 FEELING STRESSED OUT: ICD-10-CM

## 2022-10-17 DIAGNOSIS — F33.0 MDD (MAJOR DEPRESSIVE DISORDER), RECURRENT EPISODE, MILD: Primary | ICD-10-CM

## 2022-10-17 PROCEDURE — 90834 PSYTX W PT 45 MINUTES: CPT | Performed by: PSYCHOLOGIST

## 2022-10-17 NOTE — PROGRESS NOTES
PROGRESS NOTE    Data:  Frances Hartman is a 36 y.o. female who met with the undersigned for a scheduled individual therapy session from 9:00 - 9:45am.      Clinical Maneuvering/Intervention:      The pt talked about recent stressors involving starting a new job, having a health scare (tightening in her chest, ER visit, etc), and feeling disappointed that a relationship with a geovany did not work out. Stressors were processed individually and in detail. Venting of frustrations was conducted in order to help the pt feel less tense emotionally and gain insight into issues. Feelings were processed and validated several times in session. Perspective taking was conducted multiple times in order to help the pt feel less stuck, less overwhelmed, and see challenges as much more manageable. Active listening was conducted in order to help the pt make sense of stressors and start moving towards potential solutions. The pt was assisted with finding solutions based on existing skill-set and abilities. The pt started talking about her love life not going as plan, but redirected to first notice major areas of progress (in order to then address problem areas from a point of strength). The pt was assisted in recognizing progress in order to show encouragement and promote motivation to keep making positive changes in life. She is enjoying work more, connecting more with others, and arguing less with her mother (if not, just choosing not to argue). She did not find out what caused the chest pain, so stress managing and de-stressing were addressed in detail. The pt's strengths were identified in order to help identify abilities to use to better face/overcome challenges. Some humor was used in session as it is one of the pt's coping skills. Humor was used too, to help the pt see things as less challenging and more manageable than they first seemed. Humor was used as well, to model use of the skill as a way to decrease tension ongoing.  Homework was assigned tailored to pt's needs. The pt expressed gratitude for today's session.    Mental Status Exam  Hygiene:  good  Dress: normal  Attitude:  cooperative and proactive  Motor Activity: normal  Speech: normal  Mood:  stressed  Affect:  congruent  Thought Processes: normal  Thought Content:  normal  Suicidal Thoughts:  not endorsed  Homicidal Thoughts:  not endorsed  Crisis Safety Plan: not needed   Hallucinations:  none      Patient's Support Network Includes:  family, friends      Progress toward goal: there is evidence to suggest that she is becoming a stronger person over time.      Functional Status: moderate to high      Prognosis: good    Assessment      The pt presented to be struggling with depression of a mild level. Self-esteem is something that still needs attention, but she is seeing progress in her life which will likely help with self-esteem. She is intelligent, thoughtful, and witty person.       Plan      In order to diminish symptoms of depression and improve self-esteem, she will give herself credit for her progress thus far, not just this session, but overall (this week). She will continue to practice the 'rising above,' in terms of being kind to others, despite frustrations she feels with individuals at work (ongoing).    Misty Nava, PhD, LP

## 2022-10-19 ENCOUNTER — TELEPHONE (OUTPATIENT)
Dept: NEUROLOGY | Facility: CLINIC | Age: 37
End: 2022-10-19

## 2022-10-19 NOTE — TELEPHONE ENCOUNTER
Called and left vm with provider's note.    The eyelid can be a side effect of the botox. Take a selfie and bring it to your next appt. Not sure why the areas are still sore. Make sure they are not infected. They would be swollen red and draining. Use a warm compress on the areas to see if that eases the pain.

## 2022-10-19 NOTE — TELEPHONE ENCOUNTER
"  Caller: KAMALA  Relationship to Patient: SELF  Phone Number: [phone] (BEST TIME TO REACH HER IS BEFORE 2PM , IF NOT A DETAILED VM CAN BE LEFT)    Reason for Call: PT STATES THAT SHE IS HAVING \"DROOPING RIGHT EYE LID\" PROBLEM SINCE BOTOX, ALSO STATED WHERE SHE HAD THE LAST BOTOX TREATMENT IN HER FACE THOSE SPOTS ARE STILL SORE. PLEASE REVIEW AND ADVISE  "

## 2022-10-20 ENCOUNTER — HOSPITAL ENCOUNTER (OUTPATIENT)
Dept: ONCOLOGY | Facility: HOSPITAL | Age: 37
Setting detail: INFUSION SERIES
Discharge: HOME | End: 2022-10-20

## 2022-10-20 VITALS
RESPIRATION RATE: 16 BRPM | BODY MASS INDEX: 36.52 KG/M2 | HEART RATE: 78 BPM | WEIGHT: 187 LBS | TEMPERATURE: 98 F | SYSTOLIC BLOOD PRESSURE: 114 MMHG | DIASTOLIC BLOOD PRESSURE: 93 MMHG

## 2022-10-20 DIAGNOSIS — R11.2 INTRACTABLE NAUSEA AND VOMITING: Primary | ICD-10-CM

## 2022-10-20 PROCEDURE — 96361 HYDRATE IV INFUSION ADD-ON: CPT

## 2022-10-20 PROCEDURE — 96360 HYDRATION IV INFUSION INIT: CPT

## 2022-10-20 RX ORDER — SODIUM CHLORIDE 9 MG/ML
1000 INJECTION, SOLUTION INTRAVENOUS ONCE
Status: CANCELLED
Start: 2022-10-20 | End: 2022-10-20

## 2022-10-20 RX ORDER — SODIUM CHLORIDE 9 MG/ML
1000 INJECTION, SOLUTION INTRAVENOUS ONCE
Status: COMPLETED | OUTPATIENT
Start: 2022-10-20 | End: 2022-10-20

## 2022-10-20 RX ADMIN — SODIUM CHLORIDE 1000 ML: 9 INJECTION, SOLUTION INTRAVENOUS at 07:45

## 2022-10-26 ENCOUNTER — OFFICE VISIT (OUTPATIENT)
Dept: NEUROLOGY | Facility: CLINIC | Age: 37
End: 2022-10-26

## 2022-10-26 ENCOUNTER — LAB (OUTPATIENT)
Dept: LAB | Facility: HOSPITAL | Age: 37
End: 2022-10-26

## 2022-10-26 VITALS
TEMPERATURE: 97.3 F | SYSTOLIC BLOOD PRESSURE: 114 MMHG | BODY MASS INDEX: 37.42 KG/M2 | OXYGEN SATURATION: 98 % | DIASTOLIC BLOOD PRESSURE: 84 MMHG | WEIGHT: 190.6 LBS | HEIGHT: 60 IN | HEART RATE: 96 BPM

## 2022-10-26 DIAGNOSIS — H02.401 PTOSIS OF RIGHT EYELID: ICD-10-CM

## 2022-10-26 DIAGNOSIS — R74.8 ELEVATED LIVER ENZYMES: ICD-10-CM

## 2022-10-26 DIAGNOSIS — G43.709 CHRONIC MIGRAINE WITHOUT AURA WITHOUT STATUS MIGRAINOSUS, NOT INTRACTABLE: ICD-10-CM

## 2022-10-26 DIAGNOSIS — H02.401 PTOSIS OF RIGHT EYELID: Primary | ICD-10-CM

## 2022-10-26 PROCEDURE — 36415 COLL VENOUS BLD VENIPUNCTURE: CPT

## 2022-10-26 PROCEDURE — 99214 OFFICE O/P EST MOD 30 MIN: CPT | Performed by: NURSE PRACTITIONER

## 2022-10-26 PROCEDURE — 83519 RIA NONANTIBODY: CPT

## 2022-10-26 NOTE — PROGRESS NOTES
Neuro Office Visit      Encounter Date: 10/26/2022   Patient Name: Frances Hartman  : 1985   MRN: [id number]     Chief Complaint:    Chief Complaint   Patient presents with   • Migraine       History of Present Illness: Frances Hartman is a 36 y.o. female who is here today in Neurology for headaches and ptosis.    Last office visit 22 w Shiela-add Qulipta, Nurtec, and botox.    Interval Hx: elevated liver enzymes. Followed by TANESHA Sexton. Last checked 10/5/22  AST 53. Did not start Nurtec.  Started new job with stress and heavy computer use. Using blue tint glasses    Ptosis  After last botox injection she had pain at the injection sites. No erythema, discharge or bruising. Noted ptosis of right lid but right eyebrow was not affected. Feels the ptosis waxes and wanes in severity and can interfere with her eye sight. Upon further questions has noted some fatigue with chewing. No arm fatigue.  She is recovering from a complicated bariatric bypass surgery with prolonged hospitalization due to malnutrition and dehydration. Was on a walker for several weeks. Now back at work full time and is more fatigued. Now with elevated liver enzymes under the care of GI.  Denies diplopia, dysphagia, slurred speech, unilateral weakness, falls.    Headaches    Days per month: have mild daily headache and severe headache is 15 days a month. She can tell Botox helps because she was late on her injections  Location: Right temple, Left temple , Right Eye and Left Eye      Quality: Aching and Throbbing        Duration: hours  Severity: 9/10  Triggers: stress, computer use, flourescent lighting and loud sounds or repetitive sounds  Associated Symptoms: Photophobia, Phonophobia, Scent sensitivity , Dizziness and  Vision changes blurred OU Denies pulsatile tinnitus  Aura: no visual aura, feels pain accelerating      Preventatives:  Elavil, TPM, VPA, Emgality, Ajovy, Botox, GBP, Flexeril, Norco, ultram,  Qulipta. Beta blockers are contrindicated with asthma  Abortives: Imitrex, Maxalt     PH  HA frequency is daily.  Located in front of head with squeezing and throbbing sensation moderate to severe intensity.  Awakens with HA and will decrease slightly by bedtime.  Treating with Tylenol, tramadol, Lortab on a daily.  Previous trial of triptans with minimal response.        Presented to [facility] ED on with /122 and left A/L numbness and confusion.  Treated with clonidine and BP decreased.  H/O migraines since 8 yoa.  2 - 3 HA a week treated with OTC.  Echo  - EF 55-60,     Subjective      Past Medical History:   Past Medical History:   Diagnosis Date   • Allergic     Birth   • Anxiety    • Arthritis    • Asthma     prn inhalers, does not follow w/ pulmonary   • Chronic back pain     previously followed w/ pain management, now just prn Tylenol + Gabapentin   • Chronic deep vein thrombosis (DVT) of femoral vein of right lower extremity (HCC) 09/10/2021   • Clotting disorder ([facility]) 2015   • Cluster headache    • Depression    • Diabetes mellitus (HCC)     Type 2, dx 2014, never on insulin, A1C 7.6   • Diabetes mellitus (HCC)     Type II, dx 2014, never on insulin, A1C 7.6    • Dyspepsia    • Dyspnea on exertion    • Dysuria 03/07/2022    Dysuria and hematuria x 2 weeks. 3/7/22 urine culture sent. Rx Macrobid 100 mg twice daily for 7 days. Patient did not tolerate Macrobid well due to GI upset. Urine culture was negative.   • Fatigue    • Gastroparesis    • GERD (gastroesophageal reflux disease)    • Headache, tension-type    • Heartburn     chronic, prn TUMS, denies prior eval   • Hirsutism    • History of medical problems     PCOS   • Hx of radiation therapy     for treatments of Keloids   • Hypertension    • Irregular menses     infrequent spotting   • Leiomyoma, subserous     possible peduculated f3-4 cm fibroid on u/s at [facility]   • Low back pain    • Migraines     botox q3 months, following Neurology @ [facility]   • Morbid  obesity (HCC)     s/p sleeve gastrectomy   • Peripheral edema    • Polycystic ovary syndrome 2011   • Seasonal allergies    • Slow to wake up after anesthesia    • Urinary tract infection    • Visual impairment     Astigmatism, Nearsightedness       Past Surgical History:   Past Surgical History:   Procedure Laterality Date   • ABDOMINAL SURGERY  06/15/2021    Bariatric Sleeve Surgery   • BARIATRIC SURGERY     • COSMETIC SURGERY      Multiple Keloid surgeries   • ENDOSCOPY N/A 06/15/2021    Procedure: ESOPHAGOGASTRODUODENOSCOPY;  Surgeon: Ayaka Andre MD;  Location:  [facility];  Service: Robotics - DaVinci;  Laterality: N/A;   • ENDOSCOPY N/A 10/28/2021    Procedure: ESOPHAGOGASTRODUODENOSCOPY WITH ACHALASIA BALLOON DILATATION;  Surgeon: Jase Hernandez MD;  Location:  [facility];  Service: Gastroenterology;  Laterality: N/A;  58 F DILATOR   • ENDOSCOPY N/A 11/15/2021    Procedure: ESOPHAGOGASTRODUODENOSCOPY WITH DILATATION;  Surgeon: Jase Hernandez MD;  Location:  [facility];  Service: Gastroenterology;  Laterality: N/A;  achalasia balloon    • ENDOSCOPY N/A 11/24/2021    Procedure: ESOPHAGOGASTRODUODENOSCOPY WTH DUODENAL POLYPECTOPMY AND NASAL JEJUNAL TUBE PLACEMENT;  Surgeon: Jase Hernandez MD;  Location:  [facility];  Service: Gastroenterology;  Laterality: N/A;  placement of feeding tube, checked position with KUB   • GASTRIC RESTRICTION SURGERY  2021    Sleeve   • GASTRIC SLEEVE LAPAROSCOPIC N/A 06/15/2021    Procedure: GASTRIC SLEEVE LAPAROSCOPIC WITH DAVINCI ROBOT, LAPROSCOPIC HIATAL HERNIA REPAIR WITH DAVINCI ROBOT;  Surgeon: Ayaka Andre MD;  Location:  [facility];  Service: Robotics - Anonymessinci;  Laterality: N/A;   • KELOID EXCISION      1990,1993,1999,2015,2016,2019   • LAPAROSCOPIC CHOLECYSTECTOMY  2000    for stones   • RADIOFREQUENCY ABLATION  2019    x 2  for pt back   • UMBILICAL HERNIA REPAIR  1990   • UPPER GASTROINTESTINAL ENDOSCOPY     • WISDOM TOOTH EXTRACTION   1994       Family History:   Family History   Problem Relation Age of Onset   • Arthritis Mother    • Diabetes Mother    • Obesity Mother    • Arrhythmia Mother    • Hypertension Mother    • Asthma Mother    • Migraines Mother    • Dementia Father    • Heart attack Father    • Arrhythmia Father    • Heart disease Father    • Alcohol abuse Father    • Stroke Other         CVA   • Obesity Other    • Ovarian cancer Maternal Aunt         40's   • Breast cancer Paternal Aunt         30's   • Heart disease Other    • Hypertension Other    • Endometrial cancer Neg Hx    • Colon cancer Neg Hx    • Colon polyps Neg Hx    • Esophageal cancer Neg Hx        Social History:   Social History     Socioeconomic History   • Marital status: Single   Tobacco Use   • Smoking status: Never   • Smokeless tobacco: Never   Vaping Use   • Vaping Use: Never used   Substance and Sexual Activity   • Alcohol use: Not Currently     Comment: Very rarely drink socially. At most 2 drinks per month.   • Drug use: Never   • Sexual activity: Yes     Partners: Male     Birth control/protection: Condom, I.U.D., Coitus interruptus       Medications:     Current Outpatient Medications:   •  acetaminophen (TYLENOL) 500 MG tablet, Take 500-1,000 mg by mouth Every 6 (Six) Hours As Needed for Mild Pain ., Disp: , Rfl:   •  albuterol (PROVENTIL HFA;VENTOLIN HFA) 108 (90 BASE) MCG/ACT inhaler, Inhale 2 puffs Every 4 (Four) Hours As Needed for wheezing., Disp: , Rfl:   •  Blood Glucose Monitoring Suppl (ONE TOUCH ULTRA 2) w/Device kit, , Disp: , Rfl:   •  carvedilol (COREG) 6.25 MG tablet, Take 1 tab in AM and 2 tab in PM (Patient taking differently: Take 1 tablet by mouth Daily.), Disp: 270 tablet, Rfl: 0  •  cyclobenzaprine (FLEXERIL) 10 MG tablet, Take 10 mg by mouth 3 (Three) Times a Day As Needed for Muscle Spasms., Disp: , Rfl:   •  diphenhydrAMINE (BENADRYL) 25 mg capsule, Take 25 mg by mouth Every 6 (Six) Hours As Needed for Itching or Sleep., Disp: ,  Rfl:   •  furosemide (LASIX) 20 MG tablet, TAKE ONE TO TWO TABLETS BY MOUTH EVERY NIGHTLY (Patient taking differently: Take 1 tablet by mouth As Needed.), Disp: 135 tablet, Rfl: 2  •  gabapentin (NEURONTIN) 300 MG capsule, Take 600 mg by mouth Daily., Disp: , Rfl:   •  glucose blood test strip, USe BID to test blood sugar DX.[id number], Disp: 100 each, Rfl: 3  •  guaiFENesin (HUMIBID 3) 400 MG tablet, Take 200 mg by mouth As Needed., Disp: , Rfl:   •  HYDROcodone-acetaminophen (NORCO) 5-325 MG per tablet, Take 1 tablet by mouth Every 6 (Six) Hours As Needed for Severe Pain ., Disp: 12 tablet, Rfl: 0  •  Lancets 33G misc, 1 Each/kg 2 (two) times a day. To test blood sugars DxE11.65, Disp: 100 each, Rfl: 2  •  levonorgestrel (MIRENA) 20 MCG/24HR IUD, 1 each by Intrauterine route., Disp: , Rfl:   •  loratadine (CLARITIN) 10 MG tablet, TAKE ONE TABLET BY MOUTH DAILY, Disp: 30 tablet, Rfl: 2  •  metFORMIN ER (GLUCOPHAGE-XR) 500 MG 24 hr tablet, Take 1 tablet by mouth Daily With Dinner., Disp: 90 tablet, Rfl: 3  •  multivitamin with minerals tablet tablet, Take 1 tablet by mouth Daily., Disp: , Rfl:   •  OnabotulinumtoxinA (Botox) 200 units reconstituted solution, FOR . PHYSICIAN TO INJECT UP  UNITS INTRAMUSCULARLY INTO HEAD, NECK AND SHOULDERS EVERY 90 DAYS., Disp: 1 each, Rfl: 3  •  ondansetron (ZOFRAN) 4 MG tablet, Take 1 tablet by mouth Every 6 (Six) Hours As Needed for Nausea or Vomiting., Disp: 4 tablet, Rfl: 0  •  simethicone (Gas-X) 80 MG chewable tablet, Chew 1 tablet Every 6 (Six) Hours As Needed for Flatulence., Disp: 10 tablet, Rfl: 0  •  spironolactone (Aldactone) 25 MG tablet, Take 1 tablet by mouth Daily., Disp: 30 tablet, Rfl: 0  •  traMADol (ULTRAM) 50 MG tablet, Take 50 mg by mouth Every 6 (Six) Hours As Needed for Moderate Pain ., Disp: , Rfl:   •  vitamin C (ASCORBIC ACID) 250 MG tablet, Take 250 mg by mouth Daily., Disp: , Rfl:   •  vitamin D3 125 MCG (5000 UT) capsule capsule,  "Take 5,000 Units by mouth Daily., Disp: , Rfl:     Current Facility-Administered Medications:   •  OnabotulinumtoxinA 200 Units, 200 Units, Intramuscular, Q3 Months, Shiela Coelho APRN, 200 Units at 10/06/22 1043    Allergies:   Allergies   Allergen Reactions   • Capsicum Annuum Extract & Derivative (Bell Pepper) [Capsicum] Anaphylaxis   • Ibuprofen Other (See Comments)     \"makes me retain fluid and eventually go into CHF\"   • Macrobid [Nitrofurantoin] Nausea Only   • Peanut-Containing Drug Products Anaphylaxis   • Hydrochlorothiazide Swelling     eventually causes CHF    • Monosodium Glutamate Swelling and Headache   • Oatmeal Other (See Comments)     Dx on allergy testing   • Amitriptyline Mental Status Change     memory gaps + neuropathy + hair loss   • Aspartame Nausea Only   • Augmentin [Amoxicillin-Pot Clavulanate] Other (See Comments)     Syncope, not sure if allergic to cephalosporins.   • Codeine Headache      Can tolerate hydrocodone, no other adverse reactions to other narcotics.   • Diclofenac Headache   • Doxycycline Rash and Hallucinations   • Eggs Or Egg-Derived Products Other (See Comments)     dx on allergy testing, but does not completely avoid   • Naproxen Other (See Comments)     \"blackout\"       PHQ-9 Total Score:     [facility] Fall Risk Assessment has not been completed.    Objective     Physical Exam:   Physical Exam  Eyes:      Pupils: Pupils are equal, round, and reactive to light.   Neurological:      Mental Status: She is oriented to person, place, and time.      Coordination: Finger-Nose-Finger Test, Heel to Shin Test and Romberg Test normal.      Gait: Gait is intact. Tandem walk normal.      Deep Tendon Reflexes:      Reflex Scores:       Tricep reflexes are 2+ on the right side and 2+ on the left side.       Bicep reflexes are 2+ on the right side and 2+ on the left side.       Brachioradialis reflexes are 2+ on the right side and 2+ on the left side.       Patellar reflexes are " 2+ on the right side and 2+ on the left side.       Achilles reflexes are 2+ on the right side and 2+ on the left side.  Psychiatric:         Speech: Speech normal.         Neurologic Exam     Mental Status   Oriented to person, place, and time.   Follows 3 step commands.   Attention: normal. Concentration: normal.   Speech: speech is normal   Level of consciousness: alert  Knowledge: consistent with education.   Normal comprehension.     Cranial Nerves     CN III, IV, VI   Pupils are equal, round, and reactive to light.  Right pupil: Accommodation: intact.   Left pupil: Accommodation: intact.   CN III: no CN III palsy  CN VI: no CN VI palsy  Nystagmus: none   Diplopia: none  Upgaze: normal  Downgaze: normal  Conjugate gaze: present    CN VII   Facial expression full, symmetric.     CN VIII   Hearing: intact  Slight asymmetry of eyes with right lid ptosis. Not as noticeable after blinking several times.     Motor Exam   Muscle bulk: normal  Overall muscle tone: normal    Strength   Right biceps: 5/5  Left biceps: 5/5  Right triceps: 5/5  Left triceps: 5/5  Right interossei: 5/5  Left interossei: 5/5  Right quadriceps: 5/5  Left quadriceps: 5/5  Right anterior tibial: 5/5  Left anterior tibial: 5/5  Right posterior tibial: 5/5  Left posterior tibial: 5/5    Sensory Exam   Light touch normal.     Gait, Coordination, and Reflexes     Gait  Gait: normal    Coordination   Romberg: negative  Finger to nose coordination: normal  Heel to shin coordination: normal  Tandem walking coordination: normal    Tremor   Resting tremor: absent  Action tremor: absent    Reflexes   Right brachioradialis: 2+  Left brachioradialis: 2+  Right biceps: 2+  Left biceps: 2+  Right triceps: 2+  Left triceps: 2+  Right patellar: 2+  Left patellar: 2+  Right achilles: 2+  Left achilles: 2+  Right : 2+  Left : 2+       Vital Signs:   Vitals:    10/26/22 0826   BP: 114/84   Pulse: 96   Temp: 97.3 °F (36.3 °C)   SpO2: 98%   Weight: 86.5 kg  "(190 lb 9.6 oz)   Height: 152.4 cm (60\")     Body mass index is 37.22 kg/m².     Results:   Imaging:   US Liver    Result Date: 7/13/2022  Diffusely increased echogenicity of liver parenchyma likely related to steatosis. No focal lesions. No acute process.  This report was finalized on 7/13/2022 4:25 PM by Nelly Johnson MD.      XR Chest 1 View    Result Date: 10/3/2022  No acute cardiopulmonary findings.  This report was finalized on 10/3/2022 8:40 AM by Brian Brown MD.             Assessment / Plan      Assessment/Plan:   Diagnoses and all orders for this visit:    1. Ptosis of right eyelid (Primary)  Comments:  Check MG. IF ptosis persists after 3 months follow up with opthamology for visual field testing.  Orders:  -     Acetylcholine Receptor Antibody Panel; Future    2. Chronic migraine without aura without status migrainosus, not intractable  Comments:  Cont Botox. Have different provider do next injection. Omit right . Receiving GBP, Ultram and Norco from pain management.    3. Elevated liver enzymes       Patient Education:     Reviewed medications, potential side effects and signs and symptoms to report. Discussed risk versus benefits of treatment plan with patient and/or family-including medications, labs and radiology that may be ordered. Addressed questions and concerns during visit. Patient and/or family verbalized understanding and agree with plan. Instructed to call the office with any questions and report to ER with any life-threatening symptoms.     Follow Up:   Return in about 3 months (around 1/26/2023) for please switch her botox inj to Dr Francois for next injection.    During this visit the following were done:  Labs Reviewed []    Labs Ordered []    Radiology Reports Reviewed []    Radiology Ordered []    PCP Records Reviewed []    Referring Provider Records Reviewed []    ER Records Reviewed []    Hospital Records Reviewed []    History Obtained From Family []    Radiology Images " Reviewed []    Other Reviewed [x]    Records Requested []      Nancy Kaba, DNP, APRN

## 2022-11-03 ENCOUNTER — HOSPITAL ENCOUNTER (OUTPATIENT)
Dept: ONCOLOGY | Facility: HOSPITAL | Age: 37
Setting detail: INFUSION SERIES
Discharge: HOME | End: 2022-11-03

## 2022-11-03 ENCOUNTER — TELEPHONE (OUTPATIENT)
Dept: FAMILY MEDICINE CLINIC | Facility: CLINIC | Age: 37
End: 2022-11-03

## 2022-11-03 VITALS
DIASTOLIC BLOOD PRESSURE: 83 MMHG | BODY MASS INDEX: 37.5 KG/M2 | WEIGHT: 192 LBS | TEMPERATURE: 98.3 F | RESPIRATION RATE: 14 BRPM | HEART RATE: 73 BPM | SYSTOLIC BLOOD PRESSURE: 116 MMHG

## 2022-11-03 DIAGNOSIS — R11.2 INTRACTABLE NAUSEA AND VOMITING: Primary | ICD-10-CM

## 2022-11-03 PROCEDURE — 96361 HYDRATE IV INFUSION ADD-ON: CPT

## 2022-11-03 PROCEDURE — 96360 HYDRATION IV INFUSION INIT: CPT

## 2022-11-03 RX ORDER — SODIUM CHLORIDE 9 MG/ML
1000 INJECTION, SOLUTION INTRAVENOUS ONCE
Status: COMPLETED | OUTPATIENT
Start: 2022-11-03 | End: 2022-11-03

## 2022-11-03 RX ORDER — SODIUM CHLORIDE 9 MG/ML
1000 INJECTION, SOLUTION INTRAVENOUS ONCE
Status: CANCELLED
Start: 2022-11-03 | End: 2022-11-03

## 2022-11-03 RX ADMIN — SODIUM CHLORIDE 1000 ML: 9 INJECTION, SOLUTION INTRAVENOUS at 08:01

## 2022-11-03 NOTE — TELEPHONE ENCOUNTER
Caller: Frances Hartman    Relationship: Self    Best call back number: [phone]    What is the medical concern/diagnosis: SKIN DISCOLORATION AND FLAKINESS     What is the provider, practice or medical service name: [facility]    What is the office location: [address] [place] [address] [address], [place] [address]    What is the office phone number: [phone]    Any additional details:     PATIENT WORKS 3RD SHIFT BEST TIME TO CONTACT :    MORNINGS AFTER 9AM AND BEFORE 2PM

## 2022-11-04 ENCOUNTER — OFFICE VISIT (OUTPATIENT)
Dept: BEHAVIORAL HEALTH | Facility: CLINIC | Age: 37
End: 2022-11-04

## 2022-11-04 DIAGNOSIS — R45.4 IRRITABILITY: ICD-10-CM

## 2022-11-04 DIAGNOSIS — F33.0 MDD (MAJOR DEPRESSIVE DISORDER), RECURRENT EPISODE, MILD: Primary | ICD-10-CM

## 2022-11-04 DIAGNOSIS — R45.81 LOW SELF ESTEEM: ICD-10-CM

## 2022-11-04 DIAGNOSIS — L08.9 SKIN INFECTION: Primary | ICD-10-CM

## 2022-11-04 PROCEDURE — 90834 PSYTX W PT 45 MINUTES: CPT | Performed by: PSYCHOLOGIST

## 2022-11-04 NOTE — TELEPHONE ENCOUNTER
Patient will first need to see a dermatologist.  Is she established with someone?      ID will refuse referral unless she has recently been seen by dermatology and dermatology is the one requesting the consult.  If she is seeing dermatology, recommend asking that they request referral.

## 2022-11-04 NOTE — TELEPHONE ENCOUNTER
Contacted pt & relayed PCP's message. Pt is frustrated and states she already discussed this with PCP at her recent physical. She states she already gave all the details that she's had this in the past and was told by dermatology that they can't help her and she went to Infectious Disease. She states she's already reached out to Infectious Disease but since she hasn't been in a while she needs a referral. She is requesting a referral and does not want to go to the dermatology because they were not able to help her previously.    Pt is requesting a referral to Infectious Disease of skin discoloration.

## 2022-11-05 NOTE — PROGRESS NOTES
PROGRESS NOTE    Data:  Frances Hartman is a 36 y.o. female who met with the undersigned for a scheduled individual therapy session from 10:30 - 11:15am.      Clinical Maneuvering/Intervention:      The pt talked about recent stressors involving starting a new job, difficulty in dating/feeling lonely, and having an argument recently with C. Stressors were processed individually and in detail. Venting of frustrations was conducted in order to help the pt feel less tense emotionally and gain insight into issues. Feelings were processed and validated several times in session. Perspective taking was conducted multiple times in order to help the pt feel less stuck, less overwhelmed, and see challenges as much more manageable. Active listening was conducted in order to help the pt make sense of stressors and start moving towards potential solutions. The pt was assisted with finding solutions based on existing skill-set and abilities. She talked about the difficulties she had with C, that things were going well, and then they argued and now there is tension. The pt was assisted in noting what she did to possibly cause friction and what healthy things she did/can do in her relationship with him. She was assisted in practicing the technique of avoid criticizing/defensiveness when the other person seems to attack. She was supported while she did the exercise: noting what is really happening, calmly responding, and offering a solution. Knowing when to aim to resolve versus stepping away for some time were processed today. Role playing was conducted in order to help the pt gain insight into how to improve her communication skills, decrease tension with the other person, and notice things the pt may be doing (and needs to stop doing) in order to improve the quality of the relationship. Homework was assigned tailored to pt's needs. The pt expressed gratitude for today's session.    Mental Status Exam  Hygiene:  good  Dress:  normal  Attitude:  cooperative and proactive  Motor Activity: normal  Speech: normal  Mood:  irritated, sad  Affect:  congruent  Thought Processes: normal  Thought Content:  normal  Suicidal Thoughts:  not endorsed  Homicidal Thoughts:  not endorsed  Crisis Safety Plan: not needed   Hallucinations:  none      Patient's Support Network Includes:  family, friends      Progress toward goal: there is evidence to suggest that she is becoming a stronger person over time.      Functional Status: moderate to high      Prognosis: good    Assessment      The pt presented to be struggling with depression of a mild level. Self-esteem is something that still needs attention, but she is seeing progress in her life which will likely help with self-esteem. She is intelligent, thoughtful, and witty person.       Plan      In order to diminish symptoms of depression and improve self-esteem, she will give herself credit for having the ability to resolve conflict when pauses before responding and does the steps delineated above (this week). She will use the calm resolution steps with C (or her mother, or other closed loved one) when she gets the chance.    Misty Nava, PhD, LP

## 2022-11-06 LAB
ACHR BIND AB SER-SCNC: 0.05 NMOL/L (ref 0–0.24)
ACHR BLOCK AB SER-ACNC: 22 % (ref 0–25)
ACHR MOD AB SER QL FC: 0 % (ref 0–45)

## 2022-11-07 ENCOUNTER — TELEPHONE (OUTPATIENT)
Dept: NEUROLOGY | Facility: CLINIC | Age: 37
End: 2022-11-07

## 2022-11-07 NOTE — TELEPHONE ENCOUNTER
Called patient and gave results.  Patient was understanding and appreciative.    ----- Message from Nancy Kaba DNP, APRN sent at 11/7/2022 10:51 AM EST -----  Please notify pt her labs were negative for myasthenia gravis.

## 2022-11-11 ENCOUNTER — OFFICE VISIT (OUTPATIENT)
Dept: CARDIOLOGY | Facility: CLINIC | Age: 37
End: 2022-11-11

## 2022-11-11 VITALS
HEART RATE: 74 BPM | DIASTOLIC BLOOD PRESSURE: 72 MMHG | HEIGHT: 60 IN | BODY MASS INDEX: 37.3 KG/M2 | SYSTOLIC BLOOD PRESSURE: 118 MMHG | OXYGEN SATURATION: 99 % | WEIGHT: 190 LBS

## 2022-11-11 DIAGNOSIS — R00.2 PALPITATIONS: ICD-10-CM

## 2022-11-11 DIAGNOSIS — R42 DIZZINESS: Primary | ICD-10-CM

## 2022-11-11 DIAGNOSIS — I10 ESSENTIAL HYPERTENSION: ICD-10-CM

## 2022-11-11 DIAGNOSIS — E78.2 MIXED HYPERLIPIDEMIA: ICD-10-CM

## 2022-11-11 PROCEDURE — 99214 OFFICE O/P EST MOD 30 MIN: CPT

## 2022-11-11 NOTE — PROGRESS NOTES
[facility]    Encounter Date: 2022    Patient ID: Frances Hartman is a 36 y.o. female.  : 1985     PCP: Lesli Ramirez PA-C       Chief Complaint: Follow-up      PROBLEM LIST:  1. Hypertension:  a. Echo, 2014: EF 55-60%. All valves are structurally and functionally normal with no hemodynamically significant valve disease.  b. Echo, 2017: EF 65%. Mild TR with normal RVSP.  c. Echo, 2021: EF 60%. No significant valvular abnormalities.  2. Hyperlipidemia.  3. Chest pain:  a. Pet MPS, 2019: EF > 70%. No evidence of reversible ischemia.  b. Normal bilateral lower extremity venous duplex, 2021  4. Palpitations:  a. 2-week Zio, 2020: SR. <1% PACs/PVCs. One short SVT (4 beats). Average HR 74 bpm.  5. DM2.  6. Morbid obesity (BMI 54.7)  7. Peripheral chronic venous insufficiency.  8. Polycystic ovaries.  9. GERD.  10. Subclinical hypothyroidism.  11. Keloid skin disorder.  12. Chronic fatigue.  13. Chronic tonsillar hypertrophy.  14. Depression.  15. Migraine with aura, onset age 8.    History of Present Illness  Patient presents today for a 6 month follow-up with a history of palpitations and cardiac risk factors. Since last visit, patient has been doing well overall from a cardiovascular standpoint. However, she has been experiencing episodes of syncope with associated dizziness onset 1 month ago every 3-4 days. She states that her blood pressure (120/80 mmHg) and heart rate (60-65 bpm) were normal during these episodes. Additionally, patient reports that she experiences heart racing on occasion. This typically occur when sitting. Patient also has been having swelling in her legs. She had chest pain before she went to the hospital last month, but has not experienced symptoms since. Her nausea has lessened in severity as a side effect from bariatric surgery. She has infusions regularly. Patient denies orthopnea and  "palpitations or syncopal episodes.     Allergies   Allergen Reactions   • Capsicum Annuum Extract & Derivative (Bell Pepper) [Capsicum] Anaphylaxis   • Ibuprofen Other (See Comments)     \"makes me retain fluid and eventually go into CHF\"   • Macrobid [Nitrofurantoin] Nausea Only   • Peanut-Containing Drug Products Anaphylaxis   • Hydrochlorothiazide Swelling     eventually causes CHF    • Monosodium Glutamate Swelling and Headache   • Oatmeal Other (See Comments)     Dx on allergy testing   • Amitriptyline Mental Status Change     memory gaps + neuropathy + hair loss   • Aspartame Nausea Only   • Augmentin [Amoxicillin-Pot Clavulanate] Other (See Comments)     Syncope, not sure if allergic to cephalosporins.   • Codeine Headache      Can tolerate hydrocodone, no other adverse reactions to other narcotics.   • Diclofenac Headache   • Doxycycline Rash and Hallucinations   • Eggs Or Egg-Derived Products Other (See Comments)     dx on allergy testing, but does not completely avoid   • Naproxen Other (See Comments)     \"blackout\"         Current Outpatient Medications:   •  acetaminophen (TYLENOL) 500 MG tablet, Take 500-1,000 mg by mouth Every 6 (Six) Hours As Needed for Mild Pain ., Disp: , Rfl:   •  albuterol (PROVENTIL HFA;VENTOLIN HFA) 108 (90 BASE) MCG/ACT inhaler, Inhale 2 puffs Every 4 (Four) Hours As Needed for wheezing., Disp: , Rfl:   •  Blood Glucose Monitoring Suppl (ONE TOUCH ULTRA 2) w/Device kit, , Disp: , Rfl:   •  carvedilol (COREG) 6.25 MG tablet, Take 1 tab in AM and 2 tab in PM (Patient taking differently: Take 1 tablet by mouth Every Night.), Disp: 270 tablet, Rfl: 0  •  cyclobenzaprine (FLEXERIL) 10 MG tablet, Take 1 tablet by mouth As Needed for Muscle Spasms., Disp: , Rfl:   •  diphenhydrAMINE (BENADRYL) 25 mg capsule, Take 25 mg by mouth Every 6 (Six) Hours As Needed for Itching or Sleep., Disp: , Rfl:   •  furosemide (LASIX) 20 MG tablet, TAKE ONE TO TWO TABLETS BY MOUTH EVERY NIGHTLY (Patient " taking differently: Take 1 tablet by mouth As Needed.), Disp: 135 tablet, Rfl: 2  •  gabapentin (NEURONTIN) 300 MG capsule, Take 600 mg by mouth Daily., Disp: , Rfl:   •  glucose blood test strip, USe BID to test blood sugar DX.[id number], Disp: 100 each, Rfl: 3  •  guaiFENesin (HUMIBID 3) 400 MG tablet, Take 200 mg by mouth As Needed., Disp: , Rfl:   •  HYDROcodone-acetaminophen (NORCO) 5-325 MG per tablet, Take 1 tablet by mouth Every 6 (Six) Hours As Needed for Severe Pain ., Disp: 12 tablet, Rfl: 0  •  Lancets 33G misc, 1 Each/kg 2 (two) times a day. To test blood sugars DxE11.65, Disp: 100 each, Rfl: 2  •  levonorgestrel (MIRENA) 20 MCG/24HR IUD, 1 each by Intrauterine route., Disp: , Rfl:   •  loratadine (CLARITIN) 10 MG tablet, TAKE ONE TABLET BY MOUTH DAILY, Disp: 30 tablet, Rfl: 2  •  metFORMIN ER (GLUCOPHAGE-XR) 500 MG 24 hr tablet, Take 1 tablet by mouth Daily With Dinner., Disp: 90 tablet, Rfl: 3  •  multivitamin with minerals tablet tablet, Take 1 tablet by mouth Daily., Disp: , Rfl:   •  OnabotulinumtoxinA (Botox) 200 units reconstituted solution, FOR . PHYSICIAN TO INJECT UP  UNITS INTRAMUSCULARLY INTO HEAD, NECK AND SHOULDERS EVERY 90 DAYS., Disp: 1 each, Rfl: 3  •  ondansetron (ZOFRAN) 4 MG tablet, Take 1 tablet by mouth Every 6 (Six) Hours As Needed for Nausea or Vomiting., Disp: 4 tablet, Rfl: 0  •  simethicone (Gas-X) 80 MG chewable tablet, Chew 1 tablet Every 6 (Six) Hours As Needed for Flatulence., Disp: 10 tablet, Rfl: 0  •  spironolactone (Aldactone) 25 MG tablet, Take 1 tablet by mouth Daily., Disp: 30 tablet, Rfl: 0  •  traMADol (ULTRAM) 50 MG tablet, Take 50 mg by mouth Every 6 (Six) Hours As Needed for Moderate Pain ., Disp: , Rfl:   •  vitamin C (ASCORBIC ACID) 250 MG tablet, Take 250 mg by mouth Daily., Disp: , Rfl:   •  vitamin D3 125 MCG (5000 UT) capsule capsule, Take 5,000 Units by mouth Daily., Disp: , Rfl:     Current Facility-Administered Medications:   •   "OnabotulinumtoxinA 200 Units, 200 Units, Intramuscular, Q3 Months, Shiela Coelho APRN, 200 Units at 10/06/22 1043    The following portions of the patient's history were reviewed and updated as appropriate: allergies, current medications, past family history, past medical history, past social history, past surgical history and problem list.    ROS  Review of Systems   14 point ROS negative except for that listed in the HPI.         Objective:     /72 (BP Location: Right arm, Patient Position: Sitting)   Pulse 74   Ht 152.4 cm (60\")   Wt 86.2 kg (190 lb)   SpO2 99%   BMI 37.11 kg/m²      Physical Exam  Constitutional: Patient appears well-developed and well-nourished.   HENT: HEENT exam unremarkable.   Neck: Neck supple. No JVD present. No carotid bruits.   Cardiovascular: Normal rate, regular rhythm and normal heart sounds. No murmur heard.   2+ symmetric pulses.   Pulmonary/Chest: Breath sounds normal. Does not exhibit tenderness.   Abdominal: Abdomen benign.   Musculoskeletal: Does not exhibit edema.   Neurological: Neurological exam unremarkable.   Vitals reviewed.    Data Review:   Lab Results   Component Value Date    GLUCOSE 124 (H) 10/05/2022    BUN 8 10/05/2022    CREATININE 0.77 10/05/2022    BCR 10.4 10/05/2022     10/05/2022    K 4.2 10/05/2022    CO2 28.3 10/05/2022    CALCIUM 9.3 10/05/2022    ALBUMIN 4.30 10/05/2022    AST 53 (H) 10/05/2022     (H) 10/05/2022     Lab Results   Component Value Date    CHOL 140 10/05/2022    TRIG 72 10/05/2022    HDL 54 10/05/2022    LDL 72 10/05/2022      Lab Results   Component Value Date    WBC 9.08 10/05/2022    RBC 3.89 10/05/2022    HGB 12.7 10/05/2022    HCT 37.7 10/05/2022    MCV 96.9 10/05/2022     10/05/2022     Lab Results   Component Value Date    TSH 2.270 10/05/2022     Lab Results   Component Value Date    HGBA1C 6.50 (H) 10/05/2022        Procedures   ECG reviewed from the emergency department and there were no " acute ST-T changes.  No evidence of ischemia or infarction.          Assessment:      Diagnosis Plan   1. Dizziness  72h Holter monitor to be ordered to assess rate and rhythm. Carotid ultrasound to be ordered to assess for carotid artery disease. Strongly encouraged patient to maintain hydration by drinking 6-8 glasses of water a day. Continue routinely monitoring blood pressure at home especially when experiencing dizziness.    2. Palpitations   continues to have occasional episodes of palpitations but notes that these do not coincide with episodes of dizziness.  We will obtain a 72-hour heart monitor to assess rate and rhythm.   3. Essential hypertension  Well controlled. Continue on carvedilol 6.25 mg BID for hypertension. Continue on Lasix 20 mg daily and spironolactone 25 mg daily for fluid retention.   4. Mixed hyperlipidemia  Well controlled. LDL 72 on 10/05/2022.      Plan:   Stable cardiac status.  No angina or CHF symptoms  72h Holter monitor to be ordered to assess rate and rhythm of heart for possible etiology of dizziness.   Carotid ultrasound to be ordered to assess for carotid disease as possible etiology for dizziness.   Strongly encouraged patient to maintain hydration by drinking 6-8 glasses of water a day.  Continue routinely monitoring blood pressure at home especially when experiencing dizziness.   Continue current medications.   FU in 12 MO, sooner as needed.  Thank you for allowing us to participate in the care of your patient.     Scribed for Meg Palmer PA-C by Carlyn Weaver. 11/11/2022 11:38 EST    I have seen and examined the patient, reviewed the above note, necessary changes were made and I agree with the final note.   Meg Palmer PA-C      Please note that portions of this note may have been completed with a voice recognition program. Efforts were made to edit the dictations, but occasionally words are mistranscribed.

## 2022-11-17 ENCOUNTER — HOSPITAL ENCOUNTER (OUTPATIENT)
Dept: ONCOLOGY | Facility: HOSPITAL | Age: 37
Setting detail: INFUSION SERIES
Discharge: HOME | End: 2022-11-17

## 2022-11-17 VITALS — RESPIRATION RATE: 18 BRPM | WEIGHT: 189.4 LBS | TEMPERATURE: 97.4 F | BODY MASS INDEX: 36.99 KG/M2 | HEART RATE: 61 BPM

## 2022-11-17 DIAGNOSIS — R11.2 INTRACTABLE NAUSEA AND VOMITING: Primary | ICD-10-CM

## 2022-11-17 PROCEDURE — 96360 HYDRATION IV INFUSION INIT: CPT

## 2022-11-17 PROCEDURE — 96361 HYDRATE IV INFUSION ADD-ON: CPT

## 2022-11-17 RX ORDER — SODIUM CHLORIDE 9 MG/ML
1000 INJECTION, SOLUTION INTRAVENOUS ONCE
Status: CANCELLED
Start: 2022-11-17 | End: 2022-11-17

## 2022-11-17 RX ORDER — SODIUM CHLORIDE 9 MG/ML
1000 INJECTION, SOLUTION INTRAVENOUS ONCE
Status: COMPLETED | OUTPATIENT
Start: 2022-11-17 | End: 2022-11-17

## 2022-11-17 RX ADMIN — SODIUM CHLORIDE 1000 ML: 9 INJECTION, SOLUTION INTRAVENOUS at 08:30

## 2022-11-17 NOTE — ADDENDUM NOTE
Encounter addended by: Leticia De La Vega RN on: 11/17/2022 10:49 AM   Actions taken: MAR administration accepted

## 2022-11-21 ENCOUNTER — OFFICE VISIT (OUTPATIENT)
Dept: ENDOCRINOLOGY | Facility: CLINIC | Age: 37
End: 2022-11-21

## 2022-11-21 VITALS
SYSTOLIC BLOOD PRESSURE: 116 MMHG | HEART RATE: 68 BPM | OXYGEN SATURATION: 98 % | DIASTOLIC BLOOD PRESSURE: 68 MMHG | WEIGHT: 190 LBS | BODY MASS INDEX: 37.3 KG/M2 | HEIGHT: 60 IN

## 2022-11-21 DIAGNOSIS — E28.2 PCOS (POLYCYSTIC OVARIAN SYNDROME): ICD-10-CM

## 2022-11-21 DIAGNOSIS — E11.9 TYPE 2 DIABETES MELLITUS WITHOUT COMPLICATION, WITHOUT LONG-TERM CURRENT USE OF INSULIN: Primary | ICD-10-CM

## 2022-11-21 LAB
EXPIRATION DATE: NORMAL
GLUCOSE BLDC GLUCOMTR-MCNC: 118 MG/DL (ref 70–130)
HBA1C MFR BLD: 6.6 %
Lab: NORMAL

## 2022-11-21 PROCEDURE — 83036 HEMOGLOBIN GLYCOSYLATED A1C: CPT | Performed by: INTERNAL MEDICINE

## 2022-11-21 PROCEDURE — 99213 OFFICE O/P EST LOW 20 MIN: CPT | Performed by: INTERNAL MEDICINE

## 2022-11-21 PROCEDURE — 82947 ASSAY GLUCOSE BLOOD QUANT: CPT | Performed by: INTERNAL MEDICINE

## 2022-11-21 NOTE — PROGRESS NOTES
Chief Complaint   Patient presents with   • Diabetes     Follow up        HPI:   Frances Hartman is a 37 y.o.female who returns to endocrine clinic for follow-up evaluation of her Type 2 Diabetes. Last visit 02/01/2022.  Her history is as follows:    Interim Events:  - Pt's post-operative complications after sleeve gastrectomy are improving. Is able to tolerate PO fluids, solid foods. Still gets IV fluids every 2 weeks. Also takes Lasix 20 mg approximately five times per week. Has occasional nausea.  - She is working now as a  (3rd shift)  - Sleeping only 4-5 hrs per night. Melatonin helping her fall asleep  - PCP recently started spironolactone 25 mg daily for hirsutism    1) Type 2 DM with no known associated complications at this time:   - Diagnosed with diabetes in 2014. (A1C% was 7.6). Pt was already on metformin ER for PCOS prior to diagnosis. Tolerated metformin ER and stopped in 06/2021 after bariatric surgery.  - Had Sleeve gastrectomy on 06/15/2021. Had post-op complication of pouch stricture and underwent dilation of pouch in 07/2021, 10/2021, and 11/2021 by gastroenterology. Highest wt 290 lbs, Lost 50 lbs on her own. Weight 238 pre-op. Currently 190 lbs.   - Was hospitalized 11/26/2021 for N/V/dehydration.   - Now able to eat solid foods / drink enough PO liquids to stay hydrated. Still getting IVF every 2 weeks as an outpatient.   - Takes Lasix 20 mg q evening approximately 5 days/week    Current DM Medications:  Metformin  mg daily with food    Glucometer Review: no meter for review today    DM Health Maintenance:  Ophtho: DUE  Podiatry: no recent visit  Monofilament / Foot exam: DUE  Lipids: (10/2022) Tchol 140, TG 72, HDL 54, LDL 72 - not on statin at this time  Urine Microalb/Cr ratio: (06/2020) 10.2  TSH: (10/2022) 2.270  CBC: (10/2022) Hgb 12.7  CMP: (10/2022) Cr 0.77, .4, , AST 53  Vit B12: (11/2021) 869    2) PCOS:  - diagnosed in 2011. Was prescribed  "metformin ER and spironolactone at time of diagnosis.Had stopped both medications prior to bariatric surgery.  - Restarted metformin  mg daily in 02/2022  - Has Mirena IUD in place currently    3) Hirsutism:  - Was on spironolactone 100 mg daily in the past. Medication stopped prior to bariatric surgery in 06/2021  - PCP restarted spironolactone 25 mg daily in 10/2022    Review of Systems   Constitutional: Negative for activity change and fatigue.   Eyes: Negative.  Negative for discharge.   Respiratory: Negative.    Cardiovascular: Negative.    Gastrointestinal: Positive for nausea (occasional). Negative for diarrhea.   Endocrine: Negative.    Genitourinary: Negative.    Musculoskeletal: Positive for back pain (better) and myalgias. Negative for arthralgias.   Skin: Negative.         Hair loss   Allergic/Immunologic: Positive for environmental allergies and food allergies.   Neurological: Positive for headaches. Negative for dizziness, weakness, light-headedness and numbness.   Hematological: Negative.  Does not bruise/bleed easily.   Psychiatric/Behavioral: Positive for sleep disturbance.       The following portions of the patient's history were reviewed and updated as appropriate: allergies, current medications, past family history, past medical history, past social history, past surgical history and problem list.      /68   Pulse 68   Ht 152.4 cm (60\")   Wt 86.2 kg (190 lb)   SpO2 98%   BMI 37.11 kg/m²   Physical Exam  Vitals reviewed.   Constitutional:       General: She is not in acute distress.     Appearance: Normal appearance. She is well-developed. She is not diaphoretic.      Comments: BMI 37.11   HENT:      Head: Normocephalic.   Eyes:      Conjunctiva/sclera: Conjunctivae normal.      Pupils: Pupils are equal, round, and reactive to light.   Neck:      Thyroid: No thyromegaly.      Trachea: No tracheal deviation.      Comments: No palpable thyroid nodules    Cardiovascular:      Rate " and Rhythm: Normal rate and regular rhythm.      Heart sounds: Normal heart sounds. No murmur heard.  Pulmonary:      Effort: Pulmonary effort is normal. No respiratory distress.      Breath sounds: Normal breath sounds.   Lymphadenopathy:      Cervical: No cervical adenopathy.   Skin:     General: Skin is warm and dry.      Findings: No erythema.      Comments: + acanthosis nigricans, + facial hirsutism, hyperpigmentation on chin   Neurological:      Mental Status: She is alert and oriented to person, place, and time.      Cranial Nerves: No cranial nerve deficit.   Psychiatric:         Behavior: Behavior normal.       LABS/IMAGING: prior labs / outside records reviewed and summarized in HPI  Results for orders placed or performed in visit on 11/21/22   POC Glycosylated Hemoglobin (Hb A1C)    Specimen: Blood   Result Value Ref Range    Hemoglobin A1C 6.6 %    Lot Number [id number]     Expiration Date 07/04/24    POC Glucose, Blood    Specimen: Blood   Result Value Ref Range    Glucose 118 70 - 130 mg/dL       ASSESSMENT/PLAN:    1) Type 2 DM with no known associated complications at this time: controlled, A1C% 6.6 today  - currently well controlled after significant weight loss and on metformin  mg daily.  - Continue metformin  mg daily with food.   - Discussed with patient about possible addition of a GLP-1 agonist at a much later date once she has fully recovered from her bariatric surgery (ie no longer receiving IVF, no longer with nausea)     2) PCOS:   - pt currently with IUD  - on metformin  mg daily  - Was on spironolactone 100 mg daily in the past. Medication stopped prior to bariatric surgery in 06/2021  - PCP restarted spironolactone 25 mg daily in 10/2022. Advised pt to increase dose slowly and not take 100 mg at this time as she is taking  Lasix and getting IVF every 2 weeks. Recommended she increase to 50 mg daily first. Ideally would increase to 100 mcg when she is off Lasix and she  is no longer at risk for dehydration.    RTC 6 months    Signed: Carine Aden MD

## 2022-11-22 ENCOUNTER — OFFICE VISIT (OUTPATIENT)
Dept: FAMILY MEDICINE CLINIC | Facility: CLINIC | Age: 37
End: 2022-11-22

## 2022-11-22 VITALS
HEART RATE: 72 BPM | SYSTOLIC BLOOD PRESSURE: 118 MMHG | WEIGHT: 191.2 LBS | DIASTOLIC BLOOD PRESSURE: 82 MMHG | BODY MASS INDEX: 37.54 KG/M2 | OXYGEN SATURATION: 99 % | HEIGHT: 60 IN | TEMPERATURE: 98.2 F

## 2022-11-22 DIAGNOSIS — J45.30 MILD PERSISTENT ASTHMA WITHOUT COMPLICATION: ICD-10-CM

## 2022-11-22 DIAGNOSIS — L68.0 HIRSUTISM: ICD-10-CM

## 2022-11-22 DIAGNOSIS — Z12.31 ENCOUNTER FOR SCREENING MAMMOGRAM FOR MALIGNANT NEOPLASM OF BREAST: ICD-10-CM

## 2022-11-22 DIAGNOSIS — R79.89 ELEVATED LFTS: ICD-10-CM

## 2022-11-22 DIAGNOSIS — E28.2 PCOS (POLYCYSTIC OVARIAN SYNDROME): Primary | ICD-10-CM

## 2022-11-22 PROCEDURE — 99213 OFFICE O/P EST LOW 20 MIN: CPT | Performed by: PHYSICIAN ASSISTANT

## 2022-11-22 RX ORDER — ALBUTEROL SULFATE 90 UG/1
2 AEROSOL, METERED RESPIRATORY (INHALATION) EVERY 4 HOURS PRN
Qty: 18 G | Refills: 5 | Status: SHIPPED | OUTPATIENT
Start: 2022-11-22

## 2022-11-22 RX ORDER — SPIRONOLACTONE 50 MG/1
50 TABLET, FILM COATED ORAL DAILY
Qty: 90 TABLET | Refills: 1 | Status: SHIPPED | OUTPATIENT
Start: 2022-11-22

## 2022-11-23 NOTE — PROGRESS NOTES
Chief Complaint   Patient presents with   • Hypertension   • Skin Problem     F/U   • Tailbone Pain       Frances Hartman is a pleasant 37 y.o. female who is here for PCOS, hirsutism, elevated LFTs, asthma.  Patient is followed by endocrinology.  Recently started on Spironolactone 25 mg daily and tolerating well.  Reports improvement in asthma with albuterol inhaler.    Past Medical History:   Diagnosis Date   • Allergic     Birth   • Anxiety    • Arthritis    • Asthma     prn inhalers, does not follow w/ pulmonary   • Chronic back pain     previously followed w/ pain management, now just prn Tylenol + Gabapentin   • Chronic deep vein thrombosis (DVT) of femoral vein of right lower extremity ([facility]) 09/10/2021   • Clotting disorder ([facility]) 2015   • Cluster headache    • Depression    • Diabetes mellitus ([facility])     Type 2, dx 2014, never on insulin, A1C 7.6   • Diabetes mellitus ([facility])     Type II, dx 2014, never on insulin, A1C 7.6    • Dyspepsia    • Dyspnea on exertion    • Dysuria 03/07/2022    Dysuria and hematuria x 2 weeks. 3/7/22 urine culture sent. Rx Macrobid 100 mg twice daily for 7 days. Patient did not tolerate Macrobid well due to GI upset. Urine culture was negative.   • Fatigue    • Gastroparesis    • GERD (gastroesophageal reflux disease)    • Headache, tension-type    • Heartburn     chronic, prn TUMS, denies prior eval   • Hirsutism    • History of medical problems     PCOS   • Hx of radiation therapy     for treatments of Keloids   • Hypertension    • Irregular menses     infrequent spotting   • Leiomyoma, subserous     possible peduculated f3-4 cm fibroid on u/s at [facility]   • Low back pain    • Migraines     botox q3 months, following Neurology @ [facility]   • Morbid obesity ([facility])     s/p sleeve gastrectomy   • Peripheral edema    • Polycystic ovary syndrome 2011   • Seasonal allergies    • Slow to wake up after anesthesia    • Urinary tract infection    • Visual impairment     Astigmatism, Nearsightedness        Past Surgical History:   Procedure Laterality Date   • ABDOMINAL SURGERY  06/15/2021    Bariatric Sleeve Surgery   • BARIATRIC SURGERY     • COSMETIC SURGERY      Multiple Keloid surgeries   • ENDOSCOPY N/A 06/15/2021    Procedure: ESOPHAGOGASTRODUODENOSCOPY;  Surgeon: Ayaka Andre MD;  Location:  [facility];  Service: Robotics - [employer];  Laterality: N/A;   • ENDOSCOPY N/A 10/28/2021    Procedure: ESOPHAGOGASTRODUODENOSCOPY WITH ACHALASIA BALLOON DILATATION;  Surgeon: Jase Hernandez MD;  Location:  [facility];  Service: Gastroenterology;  Laterality: N/A;  58 F DILATOR   • ENDOSCOPY N/A 11/15/2021    Procedure: ESOPHAGOGASTRODUODENOSCOPY WITH DILATATION;  Surgeon: Jase Hernandez MD;  Location:  [facility];  Service: Gastroenterology;  Laterality: N/A;  achalasia balloon    • ENDOSCOPY N/A 11/24/2021    Procedure: ESOPHAGOGASTRODUODENOSCOPY WTH DUODENAL POLYPECTOPMY AND NASAL JEJUNAL TUBE PLACEMENT;  Surgeon: Jase Hernandez MD;  Location:  [facility];  Service: Gastroenterology;  Laterality: N/A;  placement of feeding tube, checked position with KUB   • GASTRIC RESTRICTION SURGERY  2021    Sleeve   • GASTRIC SLEEVE LAPAROSCOPIC N/A 06/15/2021    Procedure: GASTRIC SLEEVE LAPAROSCOPIC WITH DAVINCI ROBOT, LAPROSCOPIC HIATAL HERNIA REPAIR WITH DAVINCI ROBOT;  Surgeon: Ayaka Andre MD;  Location:  [facility];  [facility];  Laterality: N/A;   • KELOID EXCISION      1990,1993,1999,2015,2016,2019   • LAPAROSCOPIC CHOLECYSTECTOMY  2000    for stones   • RADIOFREQUENCY ABLATION  2019    x 2  for pt back   • UMBILICAL HERNIA REPAIR  1990   • UPPER GASTROINTESTINAL ENDOSCOPY     • WISDOM TOOTH EXTRACTION  1994       Family History   Problem Relation Age of Onset   • Arthritis Mother    • Diabetes Mother    • Obesity Mother    • Arrhythmia Mother    • Hypertension Mother    • Asthma Mother    • Migraines Mother    • Dementia Father    • Heart attack Father    •  "Arrhythmia Father    • Heart disease Father    • Alcohol abuse Father    • Stroke Other         CVA   • Obesity Other    • Ovarian cancer Maternal Aunt         40's   • Breast cancer Paternal Aunt         30's   • Heart disease Other    • Hypertension Other    • Endometrial cancer Neg Hx    • Colon cancer Neg Hx    • Colon polyps Neg Hx    • Esophageal cancer Neg Hx        Social History     Socioeconomic History   • Marital status: Single   Tobacco Use   • Smoking status: Never   • Smokeless tobacco: Never   Vaping Use   • Vaping Use: Never used   Substance and Sexual Activity   • Alcohol use: Not Currently     Comment: Very rarely drink socially. At most 2 drinks per month.   • Drug use: Never   • Sexual activity: Yes     Partners: Male     Birth control/protection: Condom, I.U.D., Coitus interruptus       Allergies   Allergen Reactions   • Capsicum Annuum Extract & Derivative (Bell Pepper) [Capsicum] Anaphylaxis   • Ibuprofen Other (See Comments)     \"makes me retain fluid and eventually go into CHF\"   • Macrobid [Nitrofurantoin] Nausea Only   • Peanut-Containing Drug Products Anaphylaxis   • Hydrochlorothiazide Swelling     eventually causes CHF    • Monosodium Glutamate Swelling and Headache   • Oatmeal Other (See Comments)     Dx on allergy testing   • Amitriptyline Mental Status Change     memory gaps + neuropathy + hair loss   • Aspartame Nausea Only   • Augmentin [Amoxicillin-Pot Clavulanate] Other (See Comments)     Syncope, not sure if allergic to cephalosporins.   • Codeine Headache      Can tolerate hydrocodone, no other adverse reactions to other narcotics.   • Diclofenac Headache   • Doxycycline Rash and Hallucinations   • Eggs Or Egg-Derived Products Other (See Comments)     dx on allergy testing, but does not completely avoid   • Naproxen Other (See Comments)     \"blackout\"       ROS  Review of Systems   Constitutional: Negative for chills and fever.   Respiratory: Positive for chest tightness and " "shortness of breath. Negative for cough and wheezing.    Cardiovascular: Negative for chest pain.   Skin: Positive for rash.   Neurological: Negative for dizziness and headache.       Vitals:    11/22/22 1250   BP: 118/82   BP Location: Left arm   Patient Position: Sitting   Cuff Size: Adult   Pulse: 72   Temp: 98.2 °F (36.8 °C)   TempSrc: Temporal   SpO2: 99%   Weight: 86.7 kg (191 lb 3.2 oz)   Height: 152.4 cm (60\")   PainSc:   2   PainLoc: Back     Body mass index is 37.34 kg/m².    Current Outpatient Medications on File Prior to Visit   Medication Sig Dispense Refill   • acetaminophen (TYLENOL) 500 MG tablet Take 500-1,000 mg by mouth Every 6 (Six) Hours As Needed for Mild Pain .     • Blood Glucose Monitoring Suppl (ONE TOUCH ULTRA 2) w/Device kit      • carvedilol (COREG) 6.25 MG tablet Take 1 tab in AM and 2 tab in PM (Patient taking differently: Take 6.25 mg by mouth Every Night.) 270 tablet 0   • cyclobenzaprine (FLEXERIL) 10 MG tablet Take 1 tablet by mouth As Needed for Muscle Spasms.     • diphenhydrAMINE (BENADRYL) 25 mg capsule Take 25 mg by mouth Every 6 (Six) Hours As Needed for Itching or Sleep.     • furosemide (LASIX) 20 MG tablet TAKE ONE TO TWO TABLETS BY MOUTH EVERY NIGHTLY (Patient taking differently: Take 20 mg by mouth As Needed.) 135 tablet 2   • gabapentin (NEURONTIN) 300 MG capsule Take 600 mg by mouth Daily.     • glucose blood test strip USe BID to test blood sugar DX.[id number] 100 each 3   • guaiFENesin (HUMIBID 3) 400 MG tablet Take 200 mg by mouth As Needed.     • HYDROcodone-acetaminophen (NORCO) 5-325 MG per tablet Take 1 tablet by mouth Every 6 (Six) Hours As Needed for Severe Pain . 12 tablet 0   • Lancets 33G misc 1 Each/kg 2 (two) times a day. To test blood sugars DxE11.65 100 each 2   • levonorgestrel (MIRENA) 20 MCG/24HR IUD 1 each by Intrauterine route.     • loratadine (CLARITIN) 10 MG tablet TAKE ONE TABLET BY MOUTH DAILY 30 tablet 2   • metFORMIN ER (GLUCOPHAGE-XR) 500 MG 24 " hr tablet Take 1 tablet by mouth Daily With Dinner. 90 tablet 3   • multivitamin with minerals tablet tablet Take 1 tablet by mouth Daily.     • OnabotulinumtoxinA (Botox) 200 units reconstituted solution FOR . PHYSICIAN TO INJECT UP  UNITS INTRAMUSCULARLY INTO HEAD, NECK AND SHOULDERS EVERY 90 DAYS. 1 each 3   • ondansetron (ZOFRAN) 4 MG tablet Take 1 tablet by mouth Every 6 (Six) Hours As Needed for Nausea or Vomiting. 4 tablet 0   • simethicone (Gas-X) 80 MG chewable tablet Chew 1 tablet Every 6 (Six) Hours As Needed for Flatulence. 10 tablet 0   • traMADol (ULTRAM) 50 MG tablet Take 50 mg by mouth Every 6 (Six) Hours As Needed for Moderate Pain .     • vitamin C (ASCORBIC ACID) 250 MG tablet Take 250 mg by mouth Daily.     • vitamin D3 125 MCG (5000 UT) capsule capsule Take 5,000 Units by mouth Daily.     • [DISCONTINUED] albuterol (PROVENTIL HFA;VENTOLIN HFA) 108 (90 BASE) MCG/ACT inhaler Inhale 2 puffs Every 4 (Four) Hours As Needed for wheezing.     • [DISCONTINUED] spironolactone (Aldactone) 25 MG tablet Take 1 tablet by mouth Daily. 30 tablet 0     Current Facility-Administered Medications on File Prior to Visit   Medication Dose Route Frequency Provider Last Rate Last Admin   • OnabotulinumtoxinA 200 Units  200 Units Intramuscular Q3 Months Shiela Coelho APRN   200 Units at 10/06/22 1043       Results for orders placed or performed in visit on 11/21/22   POC Glycosylated Hemoglobin (Hb A1C)    Specimen: Blood   Result Value Ref Range    Hemoglobin A1C 6.6 %    Lot Number [id number]     Expiration Date 07/04/24    POC Glucose, Blood    Specimen: Blood   Result Value Ref Range    Glucose 118 70 - 130 mg/dL       PE    Physical Exam  Vitals reviewed.   Constitutional:       General: She is not in acute distress.     Appearance: Normal appearance. She is well-developed. She is obese. She is not ill-appearing or diaphoretic.   HENT:      Head: Normocephalic and atraumatic.    Eyes:      Extraocular Movements: Extraocular movements intact.      Conjunctiva/sclera: Conjunctivae normal.   Pulmonary:      Effort: No respiratory distress.   Musculoskeletal:         General: Normal range of motion.      Cervical back: Normal range of motion.   Neurological:      General: No focal deficit present.      Mental Status: She is alert.   Psychiatric:         Attention and Perception: She is attentive.         Mood and Affect: Mood normal.         Speech: Speech normal.         Behavior: Behavior normal. Behavior is cooperative.         Thought Content: Thought content normal.         Judgment: Judgment normal.         A/P    Diagnoses and all orders for this visit:    1. PCOS (polycystic ovarian syndrome) (Primary)  2. Hirsutism  -     spironolactone (Aldactone) 50 MG tablet; Take 1 tablet by mouth Daily.  Dispense: 90 tablet; Refill: 1  Trial increase in spironolactone to 50 mg daily.  Consider increase to 100 mg when patient is off of IV fluids.  Repeat CMP in a few weeks to evaluate kidney function and potassium.    3. Elevated LFTs  -     Comprehensive Metabolic Panel; Future    4. Mild persistent asthma without complication  -     albuterol sulfate  (90 Base) MCG/ACT inhaler; Inhale 2 puffs Every 4 (Four) Hours As Needed for Wheezing or Shortness of Air.  Dispense: 18 g; Refill: 5    5. Encounter for screening mammogram for malignant neoplasm of breast  -     Mammo Screening Digital Tomosynthesis Bilateral With CAD; Future         Plan of care reviewed with patient at the conclusion of today's visit. Education was provided regarding diagnosis, management and any prescribed or recommended OTC medications.  Patient verbalizes understanding of and agreement with management plan.    Dictated Utilizing Dragon Dictation     Please note that portions of this note were completed with a voice recognition program.     Part of this note may be an electronic transcription/translation of spoken  language to printed text using the Dragon Dictation System.    Return in about 3 months (around 2/22/2023) for Recheck, PCOS.     Lesli Ramirez PA-C

## 2022-11-28 ENCOUNTER — OFFICE VISIT (OUTPATIENT)
Dept: BEHAVIORAL HEALTH | Facility: CLINIC | Age: 37
End: 2022-11-28

## 2022-11-28 DIAGNOSIS — F33.41 MDD (MAJOR DEPRESSIVE DISORDER), RECURRENT, IN PARTIAL REMISSION: Primary | ICD-10-CM

## 2022-11-28 PROCEDURE — 90834 PSYTX W PT 45 MINUTES: CPT | Performed by: PSYCHOLOGIST

## 2022-11-28 NOTE — PROGRESS NOTES
PROGRESS NOTE    Data:  Frances Hartman is a 37 y.o. female who met with the undersigned for a scheduled individual therapy session from 9:00 - 9:45am.      Clinical Maneuvering/Intervention:      The pt talked about recent stressors involving going through a breakup and adjusting to a new job including working night shifts. Stressors were processed individually and in detail. Venting of frustrations was conducted in order to help the pt feel less tense emotionally and gain insight into issues. Feelings were processed and validated several times in session. Perspective taking was conducted multiple times in order to help the pt feel less stuck, less overwhelmed, and see challenges as much more manageable. Active listening was conducted in order to help the pt make sense of stressors and start moving towards potential solutions. The pt was assisted with finding solutions based on existing skill-set and abilities. She was assisted with noting her role in her own struggles, but also finding the good in a new relationship. Healthy and unhealthy aspects of both were processed in order to promote pt's insight and mental health. The pt was assisted in recognizing progress in order to show encouragement and promote motivation to keep making positive changes in life. It was noted that her quality of life has improved over time; she was assisted with noting and owning her role in this. The pt's strengths were identified in order to help identify abilities to use to better face/overcome challenges. Some time was also dedicated to process the quality of her relationship with her mother and what is 'working,' in terms of them living together and communicating better. Homework was assigned tailored to pt's needs. The pt expressed gratitude for today's session    Mental Status Exam  Hygiene:  good  Dress: normal  Attitude:  cooperative and proactive  Motor Activity: normal  Speech: normal  Mood:  level  Affect:   congruent  Thought Processes: normal  Thought Content:  normal  Suicidal Thoughts:  not endorsed  Homicidal Thoughts:  not endorsed  Crisis Safety Plan: not needed   Hallucinations:  none      Patient's Support Network Includes:  family, friends      Progress toward goal: there is evidence to suggest that she is becoming a stronger person over time.      Functional Status: moderate to high      Prognosis: good    Assessment      The pt presented to be struggling with depression, symptoms partially remitted. Self-esteem seems to be improving as she is meeting personal goals and needs. She is intelligent, thoughtful, and witty person.       Plan      In order to diminish symptoms of depression and strengthen self-esteem; she will give herself credit for her progress in areas of work, physical health, and relationships (this week).    Misty Nava, PhD, LP

## 2022-12-01 ENCOUNTER — HOSPITAL ENCOUNTER (OUTPATIENT)
Dept: ONCOLOGY | Facility: HOSPITAL | Age: 37
Setting detail: INFUSION SERIES
Discharge: HOME | End: 2022-12-01
Payer: COMMERCIAL

## 2022-12-01 VITALS
HEART RATE: 69 BPM | SYSTOLIC BLOOD PRESSURE: 126 MMHG | BODY MASS INDEX: 37.5 KG/M2 | HEIGHT: 60 IN | TEMPERATURE: 98.5 F | WEIGHT: 191 LBS | RESPIRATION RATE: 18 BRPM | DIASTOLIC BLOOD PRESSURE: 79 MMHG

## 2022-12-01 DIAGNOSIS — R11.2 INTRACTABLE NAUSEA AND VOMITING: Primary | ICD-10-CM

## 2022-12-01 PROCEDURE — 96360 HYDRATION IV INFUSION INIT: CPT

## 2022-12-01 PROCEDURE — 96361 HYDRATE IV INFUSION ADD-ON: CPT

## 2022-12-01 RX ORDER — SODIUM CHLORIDE 9 MG/ML
1000 INJECTION, SOLUTION INTRAVENOUS ONCE
Status: CANCELLED
Start: 2022-12-01 | End: 2022-12-01

## 2022-12-01 RX ORDER — SODIUM CHLORIDE 9 MG/ML
1000 INJECTION, SOLUTION INTRAVENOUS ONCE
Status: COMPLETED | OUTPATIENT
Start: 2022-12-01 | End: 2022-12-01

## 2022-12-01 RX ADMIN — SODIUM CHLORIDE 1000 ML: 9 INJECTION, SOLUTION INTRAVENOUS at 07:56

## 2022-12-02 ENCOUNTER — HOSPITAL ENCOUNTER (OUTPATIENT)
Dept: MAMMOGRAPHY | Facility: HOSPITAL | Age: 37
Discharge: HOME | End: 2022-12-02
Admitting: PHYSICIAN ASSISTANT

## 2022-12-02 DIAGNOSIS — Z12.31 ENCOUNTER FOR SCREENING MAMMOGRAM FOR MALIGNANT NEOPLASM OF BREAST: ICD-10-CM

## 2022-12-02 PROCEDURE — 77063 BREAST TOMOSYNTHESIS BI: CPT

## 2022-12-02 PROCEDURE — 77063 BREAST TOMOSYNTHESIS BI: CPT | Performed by: RADIOLOGY

## 2022-12-02 PROCEDURE — 77067 SCR MAMMO BI INCL CAD: CPT

## 2022-12-02 PROCEDURE — 77067 SCR MAMMO BI INCL CAD: CPT | Performed by: RADIOLOGY

## 2022-12-04 ENCOUNTER — HOSPITAL ENCOUNTER (EMERGENCY)
Facility: HOSPITAL | Age: 37
Discharge: HOME | End: 2022-12-05
Attending: EMERGENCY MEDICINE | Admitting: EMERGENCY MEDICINE

## 2022-12-04 ENCOUNTER — APPOINTMENT (OUTPATIENT)
Dept: GENERAL RADIOLOGY | Facility: HOSPITAL | Age: 37
End: 2022-12-04

## 2022-12-04 DIAGNOSIS — S90.121A CONTUSION OF LESSER TOE OF RIGHT FOOT WITHOUT DAMAGE TO NAIL, INITIAL ENCOUNTER: Primary | ICD-10-CM

## 2022-12-04 PROCEDURE — 99283 EMERGENCY DEPT VISIT LOW MDM: CPT

## 2022-12-04 PROCEDURE — 73660 X-RAY EXAM OF TOE(S): CPT

## 2022-12-04 RX ORDER — HYDROCODONE BITARTRATE AND ACETAMINOPHEN 5; 325 MG/1; MG/1
1 TABLET ORAL ONCE
Status: COMPLETED | OUTPATIENT
Start: 2022-12-04 | End: 2022-12-04

## 2022-12-04 RX ADMIN — HYDROCODONE BITARTRATE AND ACETAMINOPHEN 1 TABLET: 5; 325 TABLET ORAL at 22:41

## 2022-12-05 VITALS
DIASTOLIC BLOOD PRESSURE: 70 MMHG | SYSTOLIC BLOOD PRESSURE: 127 MMHG | TEMPERATURE: 97.8 F | HEART RATE: 79 BPM | RESPIRATION RATE: 16 BRPM | WEIGHT: 190 LBS | BODY MASS INDEX: 37.3 KG/M2 | OXYGEN SATURATION: 98 % | HEIGHT: 60 IN

## 2022-12-05 NOTE — ED PROVIDER NOTES
Subjective   History of Present Illness  Frances Hartman is a 37 yr old female presents to the emergency department complaints of right fifth toe pain.  Patient advises approximately 2 hours ago she got out of bed and hit her toe on the bed frame.  Patient felt a pop.  Patient is able to bear weight but reports increasing discomfort.    History provided by:  Patient   used: No    Toe Injury  Location:  Toe  Time since incident:  2 hours  Injury: yes    Mechanism of injury comment:  Hit on bed frame  Toe location:  R little toe  Pain details:     Quality:  Throbbing    Severity:  Moderate    Timing:  Constant    Progression:  Worsening  Associated symptoms: decreased ROM and swelling    Associated symptoms: no numbness and no tingling        Review of Systems   Musculoskeletal:        Rt 5th toe pain       Past Medical History:   Diagnosis Date   • Allergic     Birth   • Anxiety    • Arthritis    • Asthma     prn inhalers, does not follow w/ pulmonary   • Chronic back pain     previously followed w/ pain management, now just prn Tylenol + Gabapentin   • Chronic deep vein thrombosis (DVT) of femoral vein of right lower extremity ([facility]) 09/10/2021   • Clotting disorder ([facility]) 2015   • Cluster headache    • Depression    • Diabetes mellitus ([facility])     Type 2, dx 2014, never on insulin, A1C 7.6   • Diabetes mellitus ([facility])     Type II, dx 2014, never on insulin, A1C 7.6    • Dyspepsia    • Dyspnea on exertion    • Dysuria 03/07/2022    Dysuria and hematuria x 2 weeks. 3/7/22 urine culture sent. Rx Macrobid 100 mg twice daily for 7 days. Patient did not tolerate Macrobid well due to GI upset. Urine culture was negative.   • Fatigue    • Gastroparesis    • GERD (gastroesophageal reflux disease)    • Headache, tension-type    • Heartburn     chronic, prn TUMS, denies prior eval   • Hirsutism    • History of medical problems     PCOS   • Hx of radiation therapy     for treatments of Keloids   •  "Hypertension    • Irregular menses     infrequent spotting   • Leiomyoma, subserous     possible peduculated f3-4 cm fibroid on u/s at [facility]   • Low back pain    • Migraines     botox q3 months, following Neurology @ [facility]   • Morbid obesity (HCC)     s/p sleeve gastrectomy   • Peripheral edema    • Polycystic ovary syndrome 2011   • Seasonal allergies    • Slow to wake up after anesthesia    • Urinary tract infection    • Visual impairment     Astigmatism, Nearsightedness       Allergies   Allergen Reactions   • Capsicum Annuum Extract & Derivative (Bell Pepper) [Capsicum] Anaphylaxis   • Ibuprofen Other (See Comments)     \"makes me retain fluid and eventually go into CHF\"   • Macrobid [Nitrofurantoin] Nausea Only   • Peanut-Containing Drug Products Anaphylaxis   • Hydrochlorothiazide Swelling     eventually causes CHF    • Monosodium Glutamate Swelling and Headache   • Oatmeal Other (See Comments)     Dx on allergy testing   • Amitriptyline Mental Status Change     memory gaps + neuropathy + hair loss   • Aspartame Nausea Only   • Augmentin [Amoxicillin-Pot Clavulanate] Other (See Comments)     Syncope, not sure if allergic to cephalosporins.   • Codeine Headache      Can tolerate hydrocodone, no other adverse reactions to other narcotics.   • Diclofenac Headache   • Doxycycline Rash and Hallucinations   • Eggs Or Egg-Derived Products Other (See Comments)     dx on allergy testing, but does not completely avoid   • Naproxen Other (See Comments)     \"blackout\"       Past Surgical History:   Procedure Laterality Date   • ABDOMINAL SURGERY  06/15/2021    Bariatric Sleeve Surgery   • BARIATRIC SURGERY     • COSMETIC SURGERY      Multiple Keloid surgeries   • ENDOSCOPY N/A 06/15/2021    Procedure: ESOPHAGOGASTRODUODENOSCOPY;  Surgeon: Ayaka Andre MD;  Location: [facility];  Service: Robotics - DaVinci;  Laterality: N/A;   • ENDOSCOPY N/A 10/28/2021    Procedure: ESOPHAGOGASTRODUODENOSCOPY WITH ACHALASIA BALLOON " DILATATION;  Surgeon: Jase Hernandez MD;  Location:  [facility];  Service: Gastroenterology;  Laterality: N/A;  58 F DILATOR   • ENDOSCOPY N/A 11/15/2021    Procedure: ESOPHAGOGASTRODUODENOSCOPY WITH DILATATION;  Surgeon: Jase Hernandez MD;  Location:  [facility];  Service: Gastroenterology;  Laterality: N/A;  achalasia balloon    • ENDOSCOPY N/A 11/24/2021    Procedure: ESOPHAGOGASTRODUODENOSCOPY WTH DUODENAL POLYPECTOPMY AND NASAL JEJUNAL TUBE PLACEMENT;  Surgeon: Jase Hernandez MD;  Location:  [facility];  Service: Gastroenterology;  Laterality: N/A;  placement of feeding tube, checked position with KUB   • GASTRIC RESTRICTION SURGERY  2021    Sleeve   • GASTRIC SLEEVE LAPAROSCOPIC N/A 06/15/2021    Procedure: GASTRIC SLEEVE LAPAROSCOPIC WITH DAVINCI ROBOT, LAPROSCOPIC HIATAL HERNIA REPAIR WITH DAVINCI ROBOT;  Surgeon: Ayaka Andre MD;  Location:  [facility];  Service: Robotics - DaVinci;  Laterality: N/A;   • KELOID EXCISION      1990,1993,1999,2015,2016,2019   • LAPAROSCOPIC CHOLECYSTECTOMY  2000    for stones   • RADIOFREQUENCY ABLATION  2019    x 2  for pt back   • UMBILICAL HERNIA REPAIR  1990   • UPPER GASTROINTESTINAL ENDOSCOPY     • WISDOM TOOTH EXTRACTION  1994       Family History   Problem Relation Age of Onset   • Arthritis Mother    • Diabetes Mother    • Obesity Mother    • Arrhythmia Mother    • Hypertension Mother    • Asthma Mother    • Migraines Mother    • Dementia Father    • Heart attack Father    • Arrhythmia Father    • Heart disease Father    • Alcohol abuse Father    • Stroke Other         CVA   • Obesity Other    • Ovarian cancer Maternal Aunt         40's   • Breast cancer Paternal Aunt         30's   • Heart disease Other    • Hypertension Other    • Endometrial cancer Neg Hx    • Colon cancer Neg Hx    • Colon polyps Neg Hx    • Esophageal cancer Neg Hx        Social History     Socioeconomic History   • Marital status: Single   Tobacco Use   • Smoking  status: Never   • Smokeless tobacco: Never   Vaping Use   • Vaping Use: Never used   Substance and Sexual Activity   • Alcohol use: Not Currently     Comment: Very rarely drink socially. At most 2 drinks per month.   • Drug use: Never   • Sexual activity: Yes     Partners: Male     Birth control/protection: Condom, I.U.D., Coitus interruptus           Objective   Physical Exam  Vitals and nursing note reviewed.   Constitutional:       Appearance: Normal appearance. She is well-developed. She is not toxic-appearing.   HENT:      Head: Normocephalic and atraumatic.   Eyes:      General: Lids are normal.   Neck:      Trachea: Trachea normal.   Cardiovascular:      Rate and Rhythm: Regular rhythm.      Pulses: Normal pulses.      Heart sounds: Normal heart sounds.   Pulmonary:      Effort: Pulmonary effort is normal. No respiratory distress.      Breath sounds: Normal breath sounds. No decreased breath sounds, wheezing, rhonchi or rales.   Musculoskeletal:      Cervical back: Full passive range of motion without pain and normal range of motion.        Feet:    Skin:     General: Skin is warm and dry.      Findings: No rash.   Neurological:      Mental Status: She is alert and oriented to person, place, and time.      Cranial Nerves: No cranial nerve deficit.   Psychiatric:         Speech: Speech normal.         Behavior: Behavior normal. Behavior is cooperative.         Procedures           ED Course  ED Course as of 12/05/22 0358   Mon Dec 05, 2022   0026 Results are discussed with patient at this time.  Patient be discharged home.  Patient to rest, ice, elevate.  Patient to return to the ED as needed.  Patient agrees and verbalized understanding. [KG]      ED Course User Index  [KG] Angelika Lopez, APRN           No results found for this or any previous visit (from the past 24 hour(s)).  Note: In addition to lab results from this visit, the labs listed above may include labs taken at another facility or during a  different encounter within the last 24 hours. Please correlate lab times with ED admission and discharge times for further clarification of the services performed during this visit.    XR Toe 2+ View Right   Final Result      Normal right fifth toe.      Electronically signed by:  Albert Lay M.D.     12/4/2022 9:19 PM Mountain Time        Vitals:    12/04/22 2330 12/05/22 0000 12/05/22 0029 12/05/22 0030   BP: 120/72 118/68  127/70   BP Location:       Patient Position:       Pulse:   79    Resp:       Temp:       TempSrc:       SpO2: 98% 97%  98%   Weight:       Height:         Medications   HYDROcodone-acetaminophen (NORCO) 5-325 MG per tablet 1 tablet (1 tablet Oral Given 12/4/22 2241)     ECG/EMG Results (last 24 hours)     ** No results found for the last 24 hours. **        No orders to display                                       MDM    Final diagnoses:   Contusion of lesser toe of right foot without damage to nail, initial encounter       ED Disposition  ED Disposition     ED Disposition   Discharge    Condition   Stable    Comment   --             Lesli Ramirez PA-C  [address] [facility] [address] [phone]               Medication List      Changed    carvedilol 6.25 MG tablet  Commonly known as: COREG  Take 1 tab in AM and 2 tab in PM  What changed:   · how much to take  · how to take this  · when to take this  · additional instructions     furosemide 20 MG tablet  Commonly known as: LASIX  TAKE ONE TO TWO TABLETS BY MOUTH EVERY NIGHTLY  What changed: See the new instructions.             Angelika Lopez, APRN  12/05/22 0358

## 2022-12-12 RX ORDER — SODIUM, POTASSIUM,MAG SULFATES 17.5-3.13G
1 SOLUTION, RECONSTITUTED, ORAL ORAL TAKE AS DIRECTED
Qty: 354 ML | Refills: 0 | Status: SHIPPED | OUTPATIENT
Start: 2022-12-12 | End: 2023-01-23

## 2022-12-14 ENCOUNTER — APPOINTMENT (OUTPATIENT)
Dept: MRI IMAGING | Facility: HOSPITAL | Age: 37
End: 2022-12-14

## 2022-12-14 ENCOUNTER — APPOINTMENT (OUTPATIENT)
Dept: CT IMAGING | Facility: HOSPITAL | Age: 37
End: 2022-12-14

## 2022-12-14 ENCOUNTER — HOSPITAL ENCOUNTER (EMERGENCY)
Facility: HOSPITAL | Age: 37
Discharge: HOME | End: 2022-12-14
Attending: EMERGENCY MEDICINE | Admitting: EMERGENCY MEDICINE

## 2022-12-14 VITALS
RESPIRATION RATE: 16 BRPM | HEIGHT: 60 IN | DIASTOLIC BLOOD PRESSURE: 81 MMHG | BODY MASS INDEX: 37.69 KG/M2 | OXYGEN SATURATION: 99 % | SYSTOLIC BLOOD PRESSURE: 124 MMHG | WEIGHT: 192 LBS | TEMPERATURE: 98.4 F | HEART RATE: 61 BPM

## 2022-12-14 DIAGNOSIS — M54.2 NECK PAIN ON LEFT SIDE: ICD-10-CM

## 2022-12-14 DIAGNOSIS — G44.89 HEADACHE SYNDROME: ICD-10-CM

## 2022-12-14 DIAGNOSIS — R29.90 NEUROLOGICAL SYMPTOMS: Primary | ICD-10-CM

## 2022-12-14 LAB
ALBUMIN SERPL-MCNC: 4.1 G/DL (ref 3.5–5.2)
ALBUMIN/GLOB SERPL: 1.2 G/DL
ALP SERPL-CCNC: 103 U/L (ref 39–117)
ALT SERPL W P-5'-P-CCNC: 67 U/L (ref 1–33)
AMPHET+METHAMPHET UR QL: NEGATIVE
AMPHETAMINES UR QL: NEGATIVE
ANION GAP SERPL CALCULATED.3IONS-SCNC: 12 MMOL/L (ref 5–15)
AST SERPL-CCNC: 40 U/L (ref 1–32)
BACTERIA UR QL AUTO: ABNORMAL /HPF
BARBITURATES UR QL SCN: NEGATIVE
BASOPHILS # BLD AUTO: 0.06 10*3/MM3 (ref 0–0.2)
BASOPHILS NFR BLD AUTO: 0.5 % (ref 0–1.5)
BENZODIAZ UR QL SCN: NEGATIVE
BILIRUB SERPL-MCNC: 0.4 MG/DL (ref 0–1.2)
BILIRUB UR QL STRIP: NEGATIVE
BUN SERPL-MCNC: 8 MG/DL (ref 6–20)
BUN/CREAT SERPL: 11.1 (ref 7–25)
BUPRENORPHINE SERPL-MCNC: NEGATIVE NG/ML
CALCIUM SPEC-SCNC: 9.7 MG/DL (ref 8.6–10.5)
CANNABINOIDS SERPL QL: NEGATIVE
CHLORIDE SERPL-SCNC: 103 MMOL/L (ref 98–107)
CLARITY UR: CLEAR
CO2 SERPL-SCNC: 25 MMOL/L (ref 22–29)
COCAINE UR QL: NEGATIVE
COLOR UR: YELLOW
CREAT SERPL-MCNC: 0.72 MG/DL (ref 0.57–1)
CRP SERPL-MCNC: <0.3 MG/DL (ref 0–0.5)
DEPRECATED RDW RBC AUTO: 41.1 FL (ref 37–54)
EGFRCR SERPLBLD CKD-EPI 2021: 109.2 ML/MIN/1.73
EOSINOPHIL # BLD AUTO: 0.34 10*3/MM3 (ref 0–0.4)
EOSINOPHIL NFR BLD AUTO: 2.9 % (ref 0.3–6.2)
ERYTHROCYTE [DISTWIDTH] IN BLOOD BY AUTOMATED COUNT: 11.5 % (ref 12.3–15.4)
ERYTHROCYTE [SEDIMENTATION RATE] IN BLOOD: 35 MM/HR (ref 0–20)
FLUAV SUBTYP SPEC NAA+PROBE: NOT DETECTED
FLUBV RNA ISLT QL NAA+PROBE: NOT DETECTED
GLOBULIN UR ELPH-MCNC: 3.5 GM/DL
GLUCOSE SERPL-MCNC: 154 MG/DL (ref 65–99)
GLUCOSE UR STRIP-MCNC: NEGATIVE MG/DL
HCT VFR BLD AUTO: 38.7 % (ref 34–46.6)
HGB BLD-MCNC: 12.9 G/DL (ref 12–15.9)
HGB UR QL STRIP.AUTO: NEGATIVE
HYALINE CASTS UR QL AUTO: ABNORMAL /LPF
IMM GRANULOCYTES # BLD AUTO: 0.04 10*3/MM3 (ref 0–0.05)
IMM GRANULOCYTES NFR BLD AUTO: 0.3 % (ref 0–0.5)
KETONES UR QL STRIP: NEGATIVE
LEUKOCYTE ESTERASE UR QL STRIP.AUTO: ABNORMAL
LYMPHOCYTES # BLD AUTO: 3.58 10*3/MM3 (ref 0.7–3.1)
LYMPHOCYTES NFR BLD AUTO: 30.9 % (ref 19.6–45.3)
MCH RBC QN AUTO: 32.2 PG (ref 26.6–33)
MCHC RBC AUTO-ENTMCNC: 33.3 G/DL (ref 31.5–35.7)
MCV RBC AUTO: 96.5 FL (ref 79–97)
METHADONE UR QL SCN: NEGATIVE
MONOCYTES # BLD AUTO: 0.72 10*3/MM3 (ref 0.1–0.9)
MONOCYTES NFR BLD AUTO: 6.2 % (ref 5–12)
NEUTROPHILS NFR BLD AUTO: 59.2 % (ref 42.7–76)
NEUTROPHILS NFR BLD AUTO: 6.86 10*3/MM3 (ref 1.7–7)
NITRITE UR QL STRIP: NEGATIVE
NRBC BLD AUTO-RTO: 0.2 /100 WBC (ref 0–0.2)
OPIATES UR QL: NEGATIVE
OXYCODONE UR QL SCN: NEGATIVE
PCP UR QL SCN: NEGATIVE
PH UR STRIP.AUTO: <=5 [PH] (ref 5–8)
PLATELET # BLD AUTO: 211 10*3/MM3 (ref 140–450)
PMV BLD AUTO: 11.1 FL (ref 6–12)
POTASSIUM SERPL-SCNC: 4 MMOL/L (ref 3.5–5.2)
PROPOXYPH UR QL: NEGATIVE
PROT SERPL-MCNC: 7.6 G/DL (ref 6–8.5)
PROT UR QL STRIP: NEGATIVE
QT INTERVAL: 386 MS
QTC INTERVAL: 378 MS
RBC # BLD AUTO: 4.01 10*6/MM3 (ref 3.77–5.28)
RBC # UR STRIP: ABNORMAL /HPF
REF LAB TEST METHOD: ABNORMAL
SARS-COV-2 RNA PNL SPEC NAA+PROBE: NOT DETECTED
SODIUM SERPL-SCNC: 140 MMOL/L (ref 136–145)
SP GR UR STRIP: 1.03 (ref 1–1.03)
SQUAMOUS #/AREA URNS HPF: ABNORMAL /HPF
TRICYCLICS UR QL SCN: NEGATIVE
TROPONIN T SERPL-MCNC: <0.01 NG/ML (ref 0–0.03)
UROBILINOGEN UR QL STRIP: ABNORMAL
WBC # UR STRIP: ABNORMAL /HPF
WBC NRBC COR # BLD: 11.6 10*3/MM3 (ref 3.4–10.8)

## 2022-12-14 PROCEDURE — 25010000002 DEXAMETHASONE PER 1 MG: Performed by: EMERGENCY MEDICINE

## 2022-12-14 PROCEDURE — 25010000002 THIAMINE PER 100 MG: Performed by: EMERGENCY MEDICINE

## 2022-12-14 PROCEDURE — 99283 EMERGENCY DEPT VISIT LOW MDM: CPT

## 2022-12-14 PROCEDURE — 25010000002 DIPHENHYDRAMINE PER 50 MG: Performed by: EMERGENCY MEDICINE

## 2022-12-14 PROCEDURE — 93005 ELECTROCARDIOGRAM TRACING: CPT | Performed by: EMERGENCY MEDICINE

## 2022-12-14 PROCEDURE — 85652 RBC SED RATE AUTOMATED: CPT | Performed by: EMERGENCY MEDICINE

## 2022-12-14 PROCEDURE — 0 GADOBENATE DIMEGLUMINE 529 MG/ML SOLUTION: Performed by: EMERGENCY MEDICINE

## 2022-12-14 PROCEDURE — 96375 TX/PRO/DX INJ NEW DRUG ADDON: CPT

## 2022-12-14 PROCEDURE — 96374 THER/PROPH/DIAG INJ IV PUSH: CPT

## 2022-12-14 PROCEDURE — 80053 COMPREHEN METABOLIC PANEL: CPT | Performed by: EMERGENCY MEDICINE

## 2022-12-14 PROCEDURE — 99284 EMERGENCY DEPT VISIT MOD MDM: CPT

## 2022-12-14 PROCEDURE — 86140 C-REACTIVE PROTEIN: CPT | Performed by: EMERGENCY MEDICINE

## 2022-12-14 PROCEDURE — 25010000002 METOCLOPRAMIDE PER 10 MG: Performed by: EMERGENCY MEDICINE

## 2022-12-14 PROCEDURE — 84484 ASSAY OF TROPONIN QUANT: CPT | Performed by: EMERGENCY MEDICINE

## 2022-12-14 PROCEDURE — 70450 CT HEAD/BRAIN W/O DYE: CPT

## 2022-12-14 PROCEDURE — 70544 MR ANGIOGRAPHY HEAD W/O DYE: CPT

## 2022-12-14 PROCEDURE — 87086 URINE CULTURE/COLONY COUNT: CPT

## 2022-12-14 PROCEDURE — 85025 COMPLETE CBC W/AUTO DIFF WBC: CPT | Performed by: EMERGENCY MEDICINE

## 2022-12-14 PROCEDURE — A9577 INJ MULTIHANCE: HCPCS | Performed by: EMERGENCY MEDICINE

## 2022-12-14 PROCEDURE — 70551 MRI BRAIN STEM W/O DYE: CPT

## 2022-12-14 PROCEDURE — 87636 SARSCOV2 & INF A&B AMP PRB: CPT | Performed by: EMERGENCY MEDICINE

## 2022-12-14 PROCEDURE — 80306 DRUG TEST PRSMV INSTRMNT: CPT

## 2022-12-14 PROCEDURE — 81001 URINALYSIS AUTO W/SCOPE: CPT

## 2022-12-14 PROCEDURE — 25010000002 KETOROLAC TROMETHAMINE PER 15 MG: Performed by: EMERGENCY MEDICINE

## 2022-12-14 PROCEDURE — 70548 MR ANGIOGRAPHY NECK W/DYE: CPT

## 2022-12-14 RX ORDER — CYCLOBENZAPRINE HCL 10 MG
10 TABLET ORAL 3 TIMES DAILY PRN
Qty: 15 TABLET | Refills: 0 | Status: SHIPPED | OUTPATIENT
Start: 2022-12-14 | End: 2023-01-13

## 2022-12-14 RX ORDER — KETOROLAC TROMETHAMINE 15 MG/ML
15 INJECTION, SOLUTION INTRAMUSCULAR; INTRAVENOUS ONCE
Status: COMPLETED | OUTPATIENT
Start: 2022-12-14 | End: 2022-12-14

## 2022-12-14 RX ORDER — CEFUROXIME AXETIL 500 MG/1
500 TABLET ORAL 2 TIMES DAILY
Qty: 10 TABLET | Refills: 0 | Status: SHIPPED | OUTPATIENT
Start: 2022-12-14 | End: 2022-12-19

## 2022-12-14 RX ORDER — DIPHENHYDRAMINE HYDROCHLORIDE 50 MG/ML
25 INJECTION INTRAMUSCULAR; INTRAVENOUS ONCE
Status: COMPLETED | OUTPATIENT
Start: 2022-12-14 | End: 2022-12-14

## 2022-12-14 RX ORDER — SODIUM CHLORIDE 0.9 % (FLUSH) 0.9 %
10 SYRINGE (ML) INJECTION AS NEEDED
Status: DISCONTINUED | OUTPATIENT
Start: 2022-12-14 | End: 2022-12-14 | Stop reason: HOSPADM

## 2022-12-14 RX ORDER — THIAMINE HYDROCHLORIDE 100 MG/ML
100 INJECTION, SOLUTION INTRAMUSCULAR; INTRAVENOUS ONCE
Status: COMPLETED | OUTPATIENT
Start: 2022-12-14 | End: 2022-12-14

## 2022-12-14 RX ORDER — FLUCONAZOLE 150 MG/1
150 TABLET ORAL
Qty: 2 TABLET | Refills: 0 | Status: SHIPPED | OUTPATIENT
Start: 2022-12-14 | End: 2023-01-23

## 2022-12-14 RX ORDER — DEXAMETHASONE SODIUM PHOSPHATE 4 MG/ML
8 INJECTION, SOLUTION INTRA-ARTICULAR; INTRALESIONAL; INTRAMUSCULAR; INTRAVENOUS; SOFT TISSUE ONCE
Status: COMPLETED | OUTPATIENT
Start: 2022-12-14 | End: 2022-12-14

## 2022-12-14 RX ORDER — METOCLOPRAMIDE HYDROCHLORIDE 5 MG/ML
5 INJECTION INTRAMUSCULAR; INTRAVENOUS ONCE
Status: COMPLETED | OUTPATIENT
Start: 2022-12-14 | End: 2022-12-14

## 2022-12-14 RX ADMIN — KETOROLAC TROMETHAMINE 15 MG: 15 INJECTION, SOLUTION INTRAMUSCULAR; INTRAVENOUS at 03:05

## 2022-12-14 RX ADMIN — GADOBENATE DIMEGLUMINE 18 ML: 529 INJECTION, SOLUTION INTRAVENOUS at 05:13

## 2022-12-14 RX ADMIN — METOCLOPRAMIDE 5 MG: 5 INJECTION, SOLUTION INTRAMUSCULAR; INTRAVENOUS at 02:25

## 2022-12-14 RX ADMIN — DIPHENHYDRAMINE HYDROCHLORIDE 25 MG: 50 INJECTION INTRAMUSCULAR; INTRAVENOUS at 02:25

## 2022-12-14 RX ADMIN — DEXAMETHASONE SODIUM PHOSPHATE 8 MG: 4 INJECTION INTRA-ARTICULAR; INTRALESIONAL; INTRAMUSCULAR; INTRAVENOUS; SOFT TISSUE at 06:47

## 2022-12-14 RX ADMIN — SODIUM CHLORIDE 1000 ML: 9 INJECTION, SOLUTION INTRAVENOUS at 02:25

## 2022-12-14 RX ADMIN — THIAMINE HYDROCHLORIDE 100 MG: 100 INJECTION, SOLUTION INTRAMUSCULAR; INTRAVENOUS at 07:29

## 2022-12-14 NOTE — CONSULTS
Stroke Consult Note    Patient Name: Frances Hartman   MRN: [id number]  Age: 37 y.o.  Sex: female  : 1985    Primary Care Physician: Lesli Ramirez PA-C  Referring Physician:  Calvin Gatica MD    TIME STROKE TEAM CALLED: 0154 EST     TIME PATIENT SEEN: 0200 EST    Handedness: Right  Race:     Chief Complaint/Reason for Consultation: Dysequilibrium x 2 weeks    HPI: Frances Hartman is a 37 year old female with known medical history of anxiety, arthritis, asthma, chronic back pain, deep vein thrombosis (not on any anticoagulation), clotting disorder, cluster headache, depression, diabetes mellitus, gastroparesis, GERD, chronic migraines (for which she takes tramadol, gabapentin, hydrocodone and also receives Botox, follows with Nancy ALMENDAREZ with neurology), birth control with Mirena, hypertension, and obesity status post sleeve gastrectomy who presents to [facility] for evaluation of sharp pain behind her left ear.    ED physician requested stroke evaluation due to patient report of feeling off balance for approximately 2 weeks, noticeable when patient is walking.  In interview with the patient she tells me that she has not fallen but she has lost her balance several times and often has to catch herself.  She denies recent medication changes, she does tell me that for her migraines she takes tramadol, gabapentin, and hydrocodone.  She has appreciated double and blurry vision for the last 3 days, intermittent nausea, and has had bilateral lower extremity decreased sensation for approximately 1 month.  She states that the pain behind her left ear is severe in nature and that it started around 2100 this evening, the pain radiates down her neck.     On exam patient is alert and oriented. She demonstrates equal strength in BUE/BLE, she does report that LLE has decreased sensation as compared to right and that she first noticed onset of mildly decreased  sensation in BLE approximately 1 month ago. PERRL, EOMI, no visual field loss. Speech is clear and articulate, face symmetric at rest and with movement, tongue midline, no ataxia. NIHSS = 1. /85.    She does not routinely take any antiplatelet or anticoagulant medications.  She states that she previously had a DVT after her bariatric surgery and that she was briefly on anticoagulation, but that she is not currently anticoagulated. Denies personal history of stroke but reports family history of stroke.     Last Known Normal Date/Time: 2 weeks ago EST     Review of Systems   Constitutional: Negative for fatigue and fever.   HENT: Negative for trouble swallowing.    Eyes: Positive for photophobia and visual disturbance.        Intermittent photophobia associated with migraines, reports daily migraines   Respiratory: Negative for cough and shortness of breath.    Cardiovascular: Negative for chest pain and palpitations.   Gastrointestinal: Positive for nausea. Negative for diarrhea and vomiting.   Genitourinary: Negative for dysuria and frequency.   Musculoskeletal: Positive for back pain.        Chronic back pain   Neurological: Positive for headaches.        Feeling off balance, she also reports that she feels foggy and that she has had memory issues since her bariatric surgery approximately 1 year ago      Past Medical History:   Diagnosis Date   • Allergic     Birth   • Anxiety    • Arthritis    • Asthma     prn inhalers, does not follow w/ pulmonary   • Chronic back pain     previously followed w/ pain management, now just prn Tylenol + Gabapentin   • Chronic deep vein thrombosis (DVT) of femoral vein of right lower extremity (HCC) 09/10/2021   • Clotting disorder ([facility]) 2015   • Cluster headache    • Depression    • Diabetes mellitus (HCC)     Type 2, dx 2014, never on insulin, A1C 7.6   • Diabetes mellitus (HCC)     Type II, dx 2014, never on insulin, A1C 7.6    • Dyspepsia    • Dyspnea on exertion    •  Dysuria 03/07/2022    Dysuria and hematuria x 2 weeks. 3/7/22 urine culture sent. Rx Macrobid 100 mg twice daily for 7 days. Patient did not tolerate Macrobid well due to GI upset. Urine culture was negative.   • Fatigue    • Gastroparesis    • GERD (gastroesophageal reflux disease)    • Headache, tension-type    • Heartburn     chronic, prn TUMS, denies prior eval   • Hirsutism    • History of medical problems     PCOS   • Hx of radiation therapy     for treatments of Keloids   • Hypertension    • Irregular menses     infrequent spotting   • Leiomyoma, subserous     possible peduculated f3-4 cm fibroid on u/s at [facility]   • Low back pain    • Migraines     botox q3 months, following Neurology @ [facility]   • Morbid obesity (HCC)     s/p sleeve gastrectomy   • Peripheral edema    • Polycystic ovary syndrome 2011   • Seasonal allergies    • Slow to wake up after anesthesia    • Urinary tract infection    • Visual impairment     Astigmatism, Nearsightedness     Past Surgical History:   Procedure Laterality Date   • ABDOMINAL SURGERY  06/15/2021    Bariatric Sleeve Surgery   • BARIATRIC SURGERY     • COSMETIC SURGERY      Multiple Keloid surgeries   • ENDOSCOPY N/A 06/15/2021    Procedure: ESOPHAGOGASTRODUODENOSCOPY;  Surgeon: Ayaka Andre MD;  Location: [facility] OR;  Service: Robotics - DaVinci;  Laterality: N/A;   • ENDOSCOPY N/A 10/28/2021    Procedure: ESOPHAGOGASTRODUODENOSCOPY WITH ACHALASIA BALLOON DILATATION;  Surgeon: Jase Hernandez MD;  Location:  [facility];  Service: Gastroenterology;  Laterality: N/A;  58 F DILATOR   • ENDOSCOPY N/A 11/15/2021    Procedure: ESOPHAGOGASTRODUODENOSCOPY WITH DILATATION;  Surgeon: Jase Hernandez MD;  Location:  [facility]COPY;  Service: Gastroenterology;  Laterality: N/A;  achalasia balloon    • ENDOSCOPY N/A 11/24/2021    Procedure: ESOPHAGOGASTRODUODENOSCOPY [facility] DUODENAL POLYPECTOPMY AND NASAL JEJUNAL TUBE PLACEMENT;  Surgeon: Jase Hernandez MD;  Location:  [facility]  "ENDOSCOPY;  Service: Gastroenterology;  Laterality: N/A;  placement of feeding tube, checked position with KUB   • GASTRIC RESTRICTION SURGERY  2021    Sleeve   • GASTRIC SLEEVE LAPAROSCOPIC N/A 06/15/2021    Procedure: GASTRIC SLEEVE LAPAROSCOPIC WITH DAVINCI ROBOT, LAPROSCOPIC HIATAL HERNIA REPAIR WITH DAVINCI ROBOT;  Surgeon: Ayaka Andre MD;  Location: [facility];  Service: Robotics - DaVinci;  Laterality: N/A;   • KELOID EXCISION      1990,1993,1999,2015,2016,2019   • LAPAROSCOPIC CHOLECYSTECTOMY  2000    for stones   • RADIOFREQUENCY ABLATION  2019    x 2  for pt back   • UMBILICAL HERNIA REPAIR  1990   • UPPER GASTROINTESTINAL ENDOSCOPY     • WISDOM TOOTH EXTRACTION  1994     Family History   Problem Relation Age of Onset   • Arthritis Mother    • Diabetes Mother    • Obesity Mother    • Arrhythmia Mother    • Hypertension Mother    • Asthma Mother    • Migraines Mother    • Dementia Father    • Heart attack Father    • Arrhythmia Father    • Heart disease Father    • Alcohol abuse Father    • Stroke Other         CVA   • Obesity Other    • Ovarian cancer Maternal Aunt         40's   • Breast cancer Paternal Aunt         30's   • Heart disease Other    • Hypertension Other    • Endometrial cancer Neg Hx    • Colon cancer Neg Hx    • Colon polyps Neg Hx    • Esophageal cancer Neg Hx      Social History     Socioeconomic History   • Marital status: Single   Tobacco Use   • Smoking status: Never   • Smokeless tobacco: Never   Vaping Use   • Vaping Use: Never used   Substance and Sexual Activity   • Alcohol use: Not Currently     Comment: Very rarely drink socially. At most 2 drinks per month.   • Drug use: Never   • Sexual activity: Yes     Partners: Male     Birth control/protection: Condom, I.U.D., Coitus interruptus     Allergies   Allergen Reactions   • Capsicum Annuum Extract & Derivative (Bell Pepper) [Capsicum] Anaphylaxis   • Ibuprofen Other (See Comments)     \"makes me retain fluid and " "eventually go into CHF\"   • Macrobid [Nitrofurantoin] Nausea Only   • Peanut-Containing Drug Products Anaphylaxis   • Hydrochlorothiazide Swelling     eventually causes CHF    • Monosodium Glutamate Swelling and Headache   • Oatmeal Other (See Comments)     Dx on allergy testing   • Amitriptyline Mental Status Change     memory gaps + neuropathy + hair loss   • Aspartame Nausea Only   • Augmentin [Amoxicillin-Pot Clavulanate] Other (See Comments)     Syncope, not sure if allergic to cephalosporins.   • Codeine Headache      Can tolerate hydrocodone, no other adverse reactions to other narcotics.   • Diclofenac Headache   • Doxycycline Rash and Hallucinations   • Eggs Or Egg-Derived Products Other (See Comments)     dx on allergy testing, but does not completely avoid   • Naproxen Other (See Comments)     \"blackout\"     Prior to Admission medications    Medication Sig Start Date End Date Taking? Authorizing Provider   acetaminophen (TYLENOL) 500 MG tablet Take 500-1,000 mg by mouth Every 6 (Six) Hours As Needed for Mild Pain .    Rosendo Resendez MD   albuterol sulfate  (90 Base) MCG/ACT inhaler Inhale 2 puffs Every 4 (Four) Hours As Needed for Wheezing or Shortness of Air. 11/22/22   Lesli Ramirez PA-C   Blood Glucose Monitoring Suppl (ONE TOUCH ULTRA 2) w/Device kit  8/12/21   Rosendo Resendez MD   carvedilol (COREG) 6.25 MG tablet Take 1 tab in AM and 2 tab in PM  Patient taking differently: Take 6.25 mg by mouth Every Night. 10/14/21   Lesli Ramirez PA-C   cyclobenzaprine (FLEXERIL) 10 MG tablet Take 1 tablet by mouth As Needed for Muscle Spasms.    ProviderRosendo MD   diphenhydrAMINE (BENADRYL) 25 mg capsule Take 25 mg by mouth Every 6 (Six) Hours As Needed for Itching or Sleep.    Rosendo Resendez MD   furosemide (LASIX) 20 MG tablet TAKE ONE TO TWO TABLETS BY MOUTH EVERY NIGHTLY  Patient taking differently: Take 20 mg by mouth As Needed. 1/24/22   James" Lesli JANE PA-C   gabapentin (NEURONTIN) 300 MG capsule Take 600 mg by mouth Daily. 12/13/21   Rosendo Resendez MD   glucose blood test strip USe BID to test blood sugar DX.[id number] 10/14/21   Lesli Ramirez PA-C   guaiFENesin (HUMIBID 3) 400 MG tablet Take 200 mg by mouth As Needed.    Rosendo Resendez MD   HYDROcodone-acetaminophen (NORCO) 5-325 MG per tablet Take 1 tablet by mouth Every 6 (Six) Hours As Needed for Severe Pain . 11/30/21   Ana Broussard APRN   Lancets 33G misc 1 Each/kg 2 (two) times a day. To test blood sugars DxE11.65 10/14/21   Lesli Ramirez PA-C   levonorgestrel (MIRENA) 20 MCG/24HR IUD 1 each by Intrauterine route. 5/20/20   Rosendo Resendez MD   loratadine (CLARITIN) 10 MG tablet TAKE ONE TABLET BY MOUTH DAILY 6/6/22   Lesli Ramirez PA-C   metFORMIN ER (GLUCOPHAGE-XR) 500 MG 24 hr tablet Take 1 tablet by mouth Daily With Dinner. 2/1/22   Carine Aden MD   multivitamin with minerals tablet tablet Take 1 tablet by mouth Daily.    Rosendo Resendez MD   OnabotulinumtoxinA (Botox) 200 units reconstituted solution FOR . PHYSICIAN TO INJECT UP  UNITS INTRAMUSCULARLY INTO HEAD, NECK AND SHOULDERS EVERY 90 DAYS. 9/28/22   Nancy Kaba, KECIA, APRN   ondansetron (ZOFRAN) 4 MG tablet Take 1 tablet by mouth Every 6 (Six) Hours As Needed for Nausea or Vomiting. 10/3/22   David Beatty, DO   simethicone (Gas-X) 80 MG chewable tablet Chew 1 tablet Every 6 (Six) Hours As Needed for Flatulence. 10/3/22   David Beatty, DO   sodium-potassium-magnesium sulfates (Suprep Bowel Prep Kit) 17.5-3.13-1.6 GM/177ML solution oral solution Take 1 bottle by mouth Take As Directed. Follow instructions that were mailed to your home. If you didn't receive these call [phone]. 12/12/22   Enrico Singh MD   spironolactone (Aldactone) 50 MG tablet Take 1 tablet by mouth Daily. 11/22/22   Lesli Ramirez PA-C   traMADol (ULTRAM)  50 MG tablet Take 50 mg by mouth Every 6 (Six) Hours As Needed for Moderate Pain .    Rosendo Resendez MD   vitamin C (ASCORBIC ACID) 250 MG tablet Take 250 mg by mouth Daily.    Rosendo Resendez MD   vitamin D3 125 MCG (5000 UT) capsule capsule Take 5,000 Units by mouth Daily.    Rosendo Resendez MD         Temp:  [98.4 °F (36.9 °C)] 98.4 °F (36.9 °C)  Heart Rate:  [67] 67  Resp:  [16] 16  BP: (130)/(85) 130/85     Neurological Exam  Mental Status  Awake, alert and oriented to person, place and time.Alert. Oriented to person, place and time. Oriented to person, place, and time. Speech is normal. Language is fluent with no aphasia.    Cranial Nerves  CN II: Visual fields full to confrontation.  CN III, IV, VI: Extraocular movements intact bilaterally. Normal lids and orbits bilaterally. Pupils equal round and reactive to light bilaterally.  CN V: Facial sensation is normal.  CN VII: Full and symmetric facial movement.  CN VIII: Hearing appears intact.  CN IX, X: Palate elevates symmetrically  CN XI: Shoulder shrug strength is normal.  CN XII: Tongue midline without atrophy or fasciculations.    Motor  Normal muscle bulk throughout. No fasciculations present. Normal muscle tone. No abnormal involuntary movements. Strength is 5/5 throughout all four extremities.    Sensory  Light touch abnormality: Reports bilateral lower extremity decreased sensation, states that left lower extremity has decreased sensation to light touch as compared to right lower extremity.     Reflexes                                            Right                      Left  Plantar                           Downgoing                Downgoing    Coordination  Right: Finger-to-nose normal. Heel-to-shin normal.Left: Finger-to-nose normal. Heel-to-shin normal.    Gait    Not observed.      Physical Exam  Constitutional:       General: She is not in acute distress.     Appearance: Normal appearance. She is not ill-appearing.   HENT:       Head: Normocephalic and atraumatic.      Mouth/Throat:      Mouth: Mucous membranes are moist.      Pharynx: Oropharynx is clear.   Eyes:      General: Lids are normal.      Extraocular Movements: Extraocular movements intact.      Pupils: Pupils are equal, round, and reactive to light.   Cardiovascular:      Rate and Rhythm: Normal rate and regular rhythm.   Pulmonary:      Effort: Pulmonary effort is normal. No respiratory distress.      Comments: Room air  Musculoskeletal:      Right lower leg: No edema.      Left lower leg: No edema.   Skin:     General: Skin is warm and dry.   Neurological:      Mental Status: She is alert and oriented to person, place, and time.      Sensory: Sensory deficit present.      Motor: Motor strength is normal.   Psychiatric:         Speech: Speech normal.         Acute Stroke Data    Thrombolytic Inclusion / Exclusion Criteria    Time: 01:54 EST  Person Administering Scale: TANESHA Prajapati    Inclusion Criteria  [x]   18 years of age or greater   []   Onset of symptoms < 4.5 hours before beginning treatment (stroke onset = time patient was last seen well or without symptoms).   []   Diagnosis of acute ischemic stroke causing measurable disabling deficit (Complete Hemianopia, Any Aphasia, Visual or Sensory Extinction, Any weakness limiting sustained effort against gravity)   []   Any remaining deficit considered potentially disabling in view of patient and practitioner   Exclusion criteria (Do not proceed with Alteplase if any are checked under exclusion criteria)  [x]   Onset unknown or GREATER than 4.5 hours   []   ICH on CT/MRI   []   CT demonstrates hypodensity representing acute or subacute infarct   []   Significant head trauma or prior stroke in the previous 3 months   []   Symptoms suggestive of subarachnoid hemorrhage   []   History of un-ruptured intracranial aneurysm GREATER than 10 mm   []   Recent intracranial or intraspinal surgery within the last 3 months    []   Arterial puncture at a non-compressible site in the previous 7 days   []   Active internal bleeding   []   Acute bleeding tendency   []   Platelet count LESS than 100,000 for known hematological diseases such as leukemia, thrombocytopenia or chronic cirrhosis   []   Current use of anticoagulant with INR GREATER than 1.7 or PT GREATER than 15 seconds, aPTT GREATER than 40 seconds   []   Heparin received within 48 hours, resulting in abnormally elevated aPTT GREATER than upper limit of normal   []   Current use of direct thrombin inhibitors or direct factor Xa inhibitors in the past 48 hours   []   Elevated blood pressure refractory to treatment (systolic GREATER than 185 mm/Hg or diastolic  GREATER than 110 mm/Hg   []   Suspected infective endocarditis and aortic arch dissection   []   Current use of therapeutic treatment dose of low-molecular-weight heparin (LMWH) within the previous 24 hours   []   Structural GI malignancy or bleed   Relative exclusion for all patients  []   Only minor non-disabling symptoms   []   Pregnancy   []   Seizure at onset with postictal residual neurological impairments   []   Major surgery or previous trauma within past 14 days   []   History of previous spontaneous ICH, intracranial neoplasm, or AV malformation   []   Postpartum (within previous 14 days)   []   Recent GI or urinary tract hemorrhage (within previous 21 days)   []   Recent acute MI (within previous 3 months)   []   History of un-ruptured intracranial aneurysm LESS than 10 mm   []   History of ruptured intracranial aneurysm   []   Blood glucose LESS than 50 mg/dL (2.7 mmol/L)   []   Dural puncture within the last 7 days   []   Known GREATER than 10 cerebral microbleeds   Additional exclusions for patients with symptoms onset between 3 and 4.5 hours.  []   Age > 80.   []   On any anticoagulants regardless of INR  >>> Warfarin (Coumadin), Heparin, Enoxaparin (Lovenox), fondaparinux (Arixtra), bivalirudin (Angiomax),  Argatroban, dabigatran (Pradaxa), rivaroxaban (Xarelto), or apixaban (Eliquis)   []   Severe stroke (NIHSS > 25).   []   History of BOTH diabetes and previous ischemic stroke.   []   The risks and benefits have been discussed with the patient or family related to the administration of IV thrombolytic therapy for stroke symptoms.   []   I have discussed and reviewed the patient's case and imaging with the attending prior to IV thrombolytic therapy.   n/a Time IV thrombolytic administered       Hospital Meds:  Scheduled- diphenhydrAMINE, 25 mg, Intravenous, Once  metoclopramide, 5 mg, Intravenous, Once  OnabotulinumtoxinA, 200 Units, Intramuscular, Q3 Months  sodium chloride, 1,000 mL, Intravenous, Once      Infusions-     PRNs- •  Insert Peripheral IV **AND** sodium chloride    Functional Status Prior to Current Stroke/Nikki Score: 0    NIH Stroke Scale  Time: 01:54 EST  Person Administering Scale: TANESHA Prajapati  Interval: baseline  1a. Level of Consciousness: 0-->Alert, keenly responsive  1b. LOC Questions: 0-->Answers both questions correctly  1c. LOC Commands: 0-->Performs both tasks correctly  2. Best Gaze: 0-->Normal  3. Visual: 0-->No visual loss  4. Facial Palsy: 0-->Normal symmetrical movements  5a. Motor Arm, Left: 0-->No drift, limb holds 90 (or 45) degrees for full 10 secs  5b. Motor Arm, Right: 0-->No drift, limb holds 90 (or 45) degrees for full 10 secs  6a. Motor Leg, Left: 0-->No drift, leg holds 30 degree position for full 5 secs  6b. Motor Leg, Right: 0-->No drift, leg holds 30 degree position for full 5 secs  7. Limb Ataxia: 0-->Absent  8. Sensory: 1-->Mild-to-moderate sensory loss, patient feels pinprick is less sharp or is dull on the affected side, or there is a loss of superficial pain with pinprick, but patient is aware of being touched  9. Best Language: 0-->No aphasia, normal  10. Dysarthria: 0-->Normal  11. Extinction and Inattention (formerly Neglect): 0-->No abnormality    Total  (NIH Stroke Scale): 1     Results Reviewed:  I have personally reviewed current lab, radiology, and data and agree with results.    Results for orders placed during the hospital encounter of 11/22/21    Adult Transthoracic Echo Complete W/ Cont if Necessary Per Protocol    Interpretation Summary  · Normal left ventricular systolic function, estimated EF 60%.  · No significant valvular abnormalities.     CT Head Without Contrast    Result Date: 12/14/2022  1. No acute intracranial process. MRI is more sensitive for the detection of acute nonhemorrhagic infarct. Report called to stroke navigator duarte ware at 12/14/2022 3:12 AM Electronically signed by:  Kev Louie M.D.  12/14/2022 1:15 AM Mountain Time    MRI Angiogram Head Without Contrast    Result Date: 12/14/2022  HEAD MRI: No intracranial abnormality identified. HEAD MRA: No intracranial arterial stenosis, occlusion, or aneurysm identified. NECK MRA: Unremarkable neck MRA. Electronically signed by:  Rolando Mathis M.D.  12/14/2022 5:04 AM Mountain Time    MRI Angiogram Neck With Contrast    Result Date: 12/14/2022  HEAD MRI: No intracranial abnormality identified. HEAD MRA: No intracranial arterial stenosis, occlusion, or aneurysm identified. NECK MRA: Unremarkable neck MRA. Electronically signed by:  Rolando Mathis M.D.  12/14/2022 5:04 AM Mountain Time    MRI Brain Without Contrast    Result Date: 12/14/2022  HEAD MRI: No intracranial abnormality identified. HEAD MRA: No intracranial arterial stenosis, occlusion, or aneurysm identified. NECK MRA: Unremarkable neck MRA. Electronically signed by:  Rolando Mathis M.D.  12/14/2022 5:04 AM Mountain Time    MRI Angiogram Venogram Head    Result Date: 12/14/2022  No evidence of thrombosis. Electronically signed by:  Rolando Mathis M.D.  12/14/2022 4:55 AM Mountain Time    Glucose 154  Sodium 140  Creatinine 0.72  ALT 67  AST 40  WBC 11.60  Hemoglobin 12.9  Hematocrit 38.7  Platelets 211  Sed  rate 35  C-reactive protein <0.30      CT head negative for acute intracranial abnormality, no hemorrhage  MRI negative for acute intracranial abnormality, no infarct  MRA H/N negative for arterial stenosis, occlusion, or aneurysm  MRV: No evidence of thrombosis    Assessment/Plan:    Frances Hartman is a 37 year old female with known medical history of anxiety, arthritis, asthma, chronic back pain, deep vein thrombosis (not on any anticoagulation), clotting disorder, cluster headache, depression, diabetes mellitus, gastroparesis, GERD, chronic migraines (for which she takes tramadol, gabapentin, hydrocodone and also receives Botox), birth control with Mirena, hypertension, and obesity status post sleeve gastrectomy who presents to [facility] for evaluation of sharp pain behind her left ear. ED physician requested stroke evaluation due to patient report of feeling off balance for approximately 2 weeks, noticeable when patient is walking.    Patient is not a candidate for IV TNK secondary to LKW >4.5H  Patient is not a candidate for emergent endovascular intervention secondary to LKW 2 weeks ago    Antiplatelet PTA: None  Anticoagulant PTA: None        1. Dysequilibrium  1. CT head negative for acute intracranial abnormality, no hemorrhage  2. MRA head/neck negative for arterial stenosis, occlusion, or aneurysm  3. MRI brain without contrast negative for acute intracranial abnormality, no hemorrhage  4. MRV negative for thrombosis   5. Migraine cocktail per ED physician  6. Discussed patient case with ED physician, patient's acute stroke imaging is negative, will not initiate TIA/ischemic stroke order set at this time, Dr. Gatica plans to discharge patient with instructions to follow up closely with general neurology    Thank you for allowing us to participate in the care of this patient, stroke neurology will sign off at this time.             Sarah Spencer, APRN  December 14, 2022  01:54 EST

## 2022-12-14 NOTE — ED PROVIDER NOTES
EMERGENCY DEPARTMENT ENCOUNTER    Pt Name: Frances Hartman  MRN: [id number]  Pt :   1985  Room Number:    Date of encounter:  2022  PCP: Lesli Ramirez PA-C  ED Provider: Calvin Gatica MD    Historian: Patient      HPI:  Chief Complaint: Head pain        Context: Frances Hartman is a 37 y.o. female with a history of chronic and regular migraine headaches who presents to the ED c/o complains of pain behind the left earlobe, with some radiation to the temple, and the jaw, with a history of some scalp sensitivity as well.  She does receive gabapentin as well as tramadol, for use as needed, with a history of headaches, she says this does not feel like a typical headache.  She also has had some disequilibrium for a length of time of 2 weeks, she is concerned over a family history of stroke or regarding the pain she is here with tonight and this disequilibrium.      PAST MEDICAL HISTORY  Past Medical History:   Diagnosis Date   • Allergic     Birth   • Anxiety    • Arthritis    • Asthma     prn inhalers, does not follow w/ pulmonary   • Chronic back pain     previously followed w/ pain management, now just prn Tylenol + Gabapentin   • Chronic deep vein thrombosis (DVT) of femoral vein of right lower extremity (HCC) 09/10/2021   • Clotting disorder (HCC)    • Cluster headache    • Depression    • Diabetes mellitus (HCC)     Type 2, dx , never on insulin, A1C 7.6   • Diabetes mellitus (HCC)     Type II, dx , never on insulin, A1C 7.6    • Dyspepsia    • Dyspnea on exertion    • Dysuria 2022    Dysuria and hematuria x 2 weeks. 3/7/22 urine culture sent. Rx Macrobid 100 mg twice daily for 7 days. Patient did not tolerate Macrobid well due to GI upset. Urine culture was negative.   • Fatigue    • Gastroparesis    • GERD (gastroesophageal reflux disease)    • Headache, tension-type    • Heartburn     chronic, prn TUMS, denies prior eval   • Hirsutism    • History of  medical problems     PCOS   • Hx of radiation therapy     for treatments of Keloids   • Hypertension    • Irregular menses     infrequent spotting   • Leiomyoma, subserous     possible peduculated f3-4 cm fibroid on u/s at [facility]   • Low back pain    • Migraines     botox q3 months, following Neurology @ [facility]   • Morbid obesity (HCC)     s/p sleeve gastrectomy   • Peripheral edema    • Polycystic ovary syndrome 2011   • Seasonal allergies    • Slow to wake up after anesthesia    • Urinary tract infection    • Visual impairment     Astigmatism, Nearsightedness         PAST SURGICAL HISTORY  Past Surgical History:   Procedure Laterality Date   • ABDOMINAL SURGERY  06/15/2021    Bariatric Sleeve Surgery   • BARIATRIC SURGERY     • COSMETIC SURGERY      Multiple Keloid surgeries   • ENDOSCOPY N/A 06/15/2021    Procedure: ESOPHAGOGASTRODUODENOSCOPY;  Surgeon: Ayaka Andre MD;  Location:  [facility];  Service: Robotics - DaVinci;  Laterality: N/A;   • ENDOSCOPY N/A 10/28/2021    Procedure: ESOPHAGOGASTRODUODENOSCOPY WITH ACHALASIA BALLOON DILATATION;  Surgeon: Jase Hernandez MD;  Location:  [facility];  Service: Gastroenterology;  Laterality: N/A;  58 F DILATOR   • ENDOSCOPY N/A 11/15/2021    Procedure: ESOPHAGOGASTRODUODENOSCOPY WITH DILATATION;  Surgeon: Jase Hernandez MD;  Location:  [facility];  Service: Gastroenterology;  Laterality: N/A;  achalasia balloon    • ENDOSCOPY N/A 11/24/2021    Procedure: ESOPHAGOGASTRODUODENOSCOPY [facility] DUODENAL POLYPECTOPMY AND NASAL JEJUNAL TUBE PLACEMENT;  Surgeon: Jase Hernandez MD;  Location:  [facility];  Service: Gastroenterology;  Laterality: N/A;  placement of feeding tube, checked position with KUB   • GASTRIC RESTRICTION SURGERY  2021    Sleeve   • GASTRIC SLEEVE LAPAROSCOPIC N/A 06/15/2021    Procedure: GASTRIC SLEEVE LAPAROSCOPIC WITH DAVINCI ROBOT, LAPROSCOPIC HIATAL HERNIA REPAIR WITH DAVINCI ROBOT;  Surgeon: Ayaka Andre MD;  Location:   [facility];  Service: Robotics - DaVinci;  Laterality: N/A;   • KELOID EXCISION      1990,1993,1999,2015,2016,2019   • LAPAROSCOPIC CHOLECYSTECTOMY  2000    for stones   • RADIOFREQUENCY ABLATION  2019    x 2  for pt back   • UMBILICAL HERNIA REPAIR  1990   • UPPER GASTROINTESTINAL ENDOSCOPY     • WISDOM TOOTH EXTRACTION  1994         FAMILY HISTORY  Family History   Problem Relation Age of Onset   • Arthritis Mother    • Diabetes Mother    • Obesity Mother    • Arrhythmia Mother    • Hypertension Mother    • Asthma Mother    • Migraines Mother    • Dementia Father    • Heart attack Father    • Arrhythmia Father    • Heart disease Father    • Alcohol abuse Father    • Stroke Other         CVA   • Obesity Other    • Ovarian cancer Maternal Aunt         40's   • Breast cancer Paternal Aunt         30's   • Heart disease Other    • Hypertension Other    • Endometrial cancer Neg Hx    • Colon cancer Neg Hx    • Colon polyps Neg Hx    • Esophageal cancer Neg Hx          SOCIAL HISTORY  Social History     Socioeconomic History   • Marital status: Single   Tobacco Use   • Smoking status: Never   • Smokeless tobacco: Never   Vaping Use   • Vaping Use: Never used   Substance and Sexual Activity   • Alcohol use: Not Currently     Comment: Very rarely drink socially. At most 2 drinks per month.   • Drug use: Never   • Sexual activity: Yes     Partners: Male     Birth control/protection: Condom, I.U.D., Coitus interruptus         ALLERGIES  Capsicum annuum extract & derivative (bell pepper) [capsicum], Ibuprofen, Macrobid [nitrofurantoin], Peanut-containing drug products, Hydrochlorothiazide, Monosodium glutamate, Oatmeal, Amitriptyline, Aspartame, Augmentin [amoxicillin-pot clavulanate], Codeine, Diclofenac, Doxycycline, Eggs or egg-derived products, and Naproxen        REVIEW OF SYSTEMS  Review of Systems       Constitutional: Negative. No fever, no weakness.   HENT: Negative for sneezing and sore throat.    Respiratory: Negative  for cough. Negative for shortness of breath.    Cardiovascular: Negative.  Negative for chest pain.   Gastrointestinal: Negative.  Negative for abdominal pain.   Genitourinary: Negative.  Negative for difficulty urinating.     All systems reviewwed and negative except as noted in HPI.    PHYSICAL EXAM    I have reviewed the triage vital signs and nursing notes.    ED Triage Vitals [12/14/22 0131]   Temp Heart Rate Resp BP SpO2   98.4 °F (36.9 °C) 67 16 130/85 98 %      Temp src Heart Rate Source Patient Position BP Location FiO2 (%)   Oral Monitor Sitting Right arm --       Physical Exam  GENERAL:   Appears alert, oriented  HENT: Nares patent.  EYES: No scleral icterus.  CV: Regular rhythm, regular rate.  RESPIRATORY: Normal effort.  No audible wheezes, rales or rhonchi.  ABDOMEN: Soft, nontender  MUSCULOSKELETAL: No deformities.   NEURO: Alert, moves all extremities, no facial weakness, no dysarthria she does answer questions, with appropriate detail, and attention extraocular muscles are intact.  SKIN: Warm, dry, no rash visualized.        LAB RESULTS  Recent Results (from the past 24 hour(s))   Comprehensive Metabolic Panel    Collection Time: 12/14/22  2:23 AM    Specimen: Blood   Result Value Ref Range    Glucose 154 (H) 65 - 99 mg/dL    BUN 8 6 - 20 mg/dL    Creatinine 0.72 0.57 - 1.00 mg/dL    Sodium 140 136 - 145 mmol/L    Potassium 4.0 3.5 - 5.2 mmol/L    Chloride 103 98 - 107 mmol/L    CO2 25.0 22.0 - 29.0 mmol/L    Calcium 9.7 8.6 - 10.5 mg/dL    Total Protein 7.6 6.0 - 8.5 g/dL    Albumin 4.10 3.50 - 5.20 g/dL    ALT (SGPT) 67 (H) 1 - 33 U/L    AST (SGOT) 40 (H) 1 - 32 U/L    Alkaline Phosphatase 103 39 - 117 U/L    Total Bilirubin 0.4 0.0 - 1.2 mg/dL    Globulin 3.5 gm/dL    A/G Ratio 1.2 g/dL    BUN/Creatinine Ratio 11.1 7.0 - 25.0    Anion Gap 12.0 5.0 - 15.0 mmol/L    eGFR 109.2 >60.0 mL/min/1.73   CBC Auto Differential    Collection Time: 12/14/22  2:23 AM    Specimen: Blood   Result Value Ref Range     WBC 11.60 (H) 3.40 - 10.80 10*3/mm3    RBC 4.01 3.77 - 5.28 10*6/mm3    Hemoglobin 12.9 12.0 - 15.9 g/dL    Hematocrit 38.7 34.0 - 46.6 %    MCV 96.5 79.0 - 97.0 fL    MCH 32.2 26.6 - 33.0 pg    MCHC 33.3 31.5 - 35.7 g/dL    RDW 11.5 (L) 12.3 - 15.4 %    RDW-SD 41.1 37.0 - 54.0 fl    MPV 11.1 6.0 - 12.0 fL    Platelets 211 140 - 450 10*3/mm3    Neutrophil % 59.2 42.7 - 76.0 %    Lymphocyte % 30.9 19.6 - 45.3 %    Monocyte % 6.2 5.0 - 12.0 %    Eosinophil % 2.9 0.3 - 6.2 %    Basophil % 0.5 0.0 - 1.5 %    Immature Grans % 0.3 0.0 - 0.5 %    Neutrophils, Absolute 6.86 1.70 - 7.00 10*3/mm3    Lymphocytes, Absolute 3.58 (H) 0.70 - 3.10 10*3/mm3    Monocytes, Absolute 0.72 0.10 - 0.90 10*3/mm3    Eosinophils, Absolute 0.34 0.00 - 0.40 10*3/mm3    Basophils, Absolute 0.06 0.00 - 0.20 10*3/mm3    Immature Grans, Absolute 0.04 0.00 - 0.05 10*3/mm3    nRBC 0.2 0.0 - 0.2 /100 WBC   Sedimentation Rate    Collection Time: 12/14/22  2:23 AM    Specimen: Blood   Result Value Ref Range    Sed Rate 35 (H) 0 - 20 mm/hr   C-reactive Protein    Collection Time: 12/14/22  2:23 AM    Specimen: Blood   Result Value Ref Range    C-Reactive Protein <0.30 0.00 - 0.50 mg/dL   Troponin    Collection Time: 12/14/22  2:23 AM    Specimen: Blood   Result Value Ref Range    Troponin T <0.010 0.000 - 0.030 ng/mL   COVID-19 and FLU A/B PCR - Swab, Nasopharynx    Collection Time: 12/14/22  2:24 AM    Specimen: Nasopharynx; Swab   Result Value Ref Range    COVID19 Not Detected Not Detected - Ref. Range    Influenza A PCR Not Detected Not Detected    Influenza B PCR Not Detected Not Detected   ECG 12 Lead Chest Pain    Collection Time: 12/14/22  3:04 AM   Result Value Ref Range    QT Interval 386 ms    QTC Interval 378 ms   Urinalysis With Culture If Indicated - Urine, Clean Catch    Collection Time: 12/14/22  4:33 AM    Specimen: Urine, Clean Catch   Result Value Ref Range    Color, UA Yellow Yellow, Straw    Appearance, UA Clear Clear    pH, UA  <=5.0 5.0 - 8.0    Specific Gravity, UA 1.026 1.001 - 1.030    Glucose, UA Negative Negative    Ketones, UA Negative Negative    Bilirubin, UA Negative Negative    Blood, UA Negative Negative    Protein, UA Negative Negative    Leuk Esterase, UA Small (1+) (A) Negative    Nitrite, UA Negative Negative    Urobilinogen, UA 0.2 E.U./dL 0.2 - 1.0 E.U./dL   Urine Drug Screen - Urine, Clean Catch    Collection Time: 12/14/22  4:33 AM    Specimen: Urine, Clean Catch   Result Value Ref Range    THC, Screen, Urine Negative Negative    Phencyclidine (PCP), Urine Negative Negative    Cocaine Screen, Urine Negative Negative    Methamphetamine, Ur Negative Negative    Opiate Screen Negative Negative    Amphetamine Screen, Urine Negative Negative    Benzodiazepine Screen, Urine Negative Negative    Tricyclic Antidepressants Screen Negative Negative    Methadone Screen, Urine Negative Negative    Barbiturates Screen, Urine Negative Negative    Oxycodone Screen, Urine Negative Negative    Propoxyphene Screen Negative Negative    Buprenorphine, Screen, Urine Negative Negative   Urinalysis, Microscopic Only - Urine, Clean Catch    Collection Time: 12/14/22  4:33 AM    Specimen: Urine, Clean Catch   Result Value Ref Range    RBC, UA 0-2 None Seen, 0-2 /HPF    WBC, UA 6-12 (A) None Seen, 0-2 /HPF    Bacteria, UA None Seen None Seen, Trace /HPF    Squamous Epithelial Cells, UA 3-6 (A) None Seen, 0-2 /HPF    Hyaline Casts, UA 0-6 0 - 6 /LPF    Methodology Automated Microscopy        If labs were ordered, I independently reviewed the results.        RADIOLOGY  CT Head Without Contrast    Result Date: 12/14/2022  EXAMINATION: CT HEAD WITHOUT CONTRAST DATE: 12/14/2022 2:56 AM  INDICATION: headache, stroke Headache, dizzinessSTEREOTACTIC GUIDANCE PROTOCOL?->NoWill fiducial markers be needed for this procedure?->NoPatient Pregnant->No  COMPARISON: 7/11/2017.  TECHNIQUE:  Routine axial images through the head without contrast. Coronal  reformations. Low-dose CT acquisition technique included one or more of the following options: 1. Automated exposure control, 2. Adjustment of mA and/or KV according to patient's size and/or 3. Use of iterative reconstruction. FINDINGS:  Intracranial contents: The ventricles and cisternal spaces are normal in size, shape and configuration for a patient of this age. Grey white differentiation is preserved.  No dominant mass, midline shift, hydrocephalus, extra-axial fluid collection or acute hemorrhage.  No asymmetric hyperdensity of the proximal major cerebral arteries. Craniocervical junction is patent. Bones and extracranial soft tissues: Mild mucosal thickening ethmoid air cells. Otherwise, Visualized paranasal sinuses and mastoid air cells are clear.  Skull is intact. Visualized intraorbital contents are unremarkable. Degenerative changes temporomandibular joints.     1. No acute intracranial process. MRI is more sensitive for the detection of acute nonhemorrhagic infarct. Report called to stroke navigator duarte ware at 12/14/2022 3:12 AM Electronically signed by:  Kev Louie M.D.  12/14/2022 1:15 AM Mountain Time    MRI Angiogram Head Without Contrast    Result Date: 12/14/2022  EXAM: MRI BRAIN WO CONTRAST, MRI ANGIOGRAM HEAD WO CONTRAST, MRI ANGIOGRAM NECK W CONTRAST EXAM DATE: 12/14/2022 4:27 AM INDICATION: Earache, headache. COMPARISON: 12/14/2022 . TECHNIQUE: Multisequence, multiangle images were obtained through the brain without the administration of intravenous contrast. MR angiogram images of the head without intravenous contrast.  MR angiogram images of the neck with intravenous contrast. NASCET criteria used for interpretation. Maximum intensity projections (MIPs) and/or 3D reconstructions constructed and reviewed at time of study interpretation. CONTRAST: Administered. FINDINGS: TECHNICAL: Technically adequate study. HEAD MRI: INTRACRANIAL CONTENTS: No intracranial mass. Incidental  developmental venous anomaly in the right cerebellar hemisphere. No intracranial hemorrhage. No acute infarct. Ventricles, sulci, and cisterns within normal limits. SOFT TISSUES: No significant abnormality.  BONES / BONE MARROW: No suspicious bone marrow signal abnormality. ORBITS: Within normal limits. MASTOID AIR CELLS: No significant opacification. MAJOR INTRACRANIAL FLOW VOIDS: Preserved. Grossly normal signal within major venous structures. HEAD MRA: RIGHT INTERNAL CAROTID ARTERY (ICA): Described in neck MRA below. LEFT INTERNAL CAROTID ARTERY (ICA): Described in neck MRA below. RIGHT VERTEBRAL ARTERY: Described in neck MRA below. LEFT VERTEBRAL ARTERY: Described in neck MRA below.  ANTERIOR CEREBRAL ARTERIES (ACAs): No significant stenosis identified. RIGHT MIDDLE CEREBRAL ARTERY (MCA): No significant stenosis identified. LEFT MIDDLE CEREBRAL ARTERY (MCA): No significant stenosis identified. RIGHT POSTERIOR CEREBRAL ARTERY (PCA): No significant stenosis identified. Near fetal PCA. LEFT POSTERIOR CEREBRAL ARTERY (PCA): No significant stenosis identified. Near fetal PCA. BASILAR ARTERY: No significant stenosis identified. NECK MRA: AORTIC ARCH: No significant stenosis identified.  BRACHIOCEPHALIC, SUBCLAVIAN, AND VISUALIZED AXILLARY ARTERIES: No significant stenosis identified. RIGHT COMMON CAROTID ARTERY (CCA): No significant stenosis identified. RIGHT INTERNAL CAROTID ARTERY (ICA): No stenosis of the proximal internal carotid artery identified near the carotid bifurcation. Elsewhere, no significant stenosis identified. LEFT COMMON CAROTID ARTERY (CCA): No significant stenosis identified. LEFT INTERNAL CAROTID ARTERY (ICA): No stenosis of the proximal internal carotid artery identified near the carotid bifurcation. Elsewhere, no significant stenosis identified. RIGHT VERTEBRAL ARTERY: No significant stenosis identified. LEFT VERTEBRAL ARTERY: No significant stenosis identified. ANEURYSM / OTHER VASCULAR: No  other significant vascular abnormality identified.  OTHER SIGNIFICANT FINDINGS: None.     HEAD MRI: No intracranial abnormality identified. HEAD MRA: No intracranial arterial stenosis, occlusion, or aneurysm identified. NECK MRA: Unremarkable neck MRA. Electronically signed by:  Rolando Mathis M.D.  12/14/2022 5:04 AM Mountain Time    MRI Angiogram Neck With Contrast    Result Date: 12/14/2022  EXAM: MRI BRAIN WO CONTRAST, MRI ANGIOGRAM HEAD WO CONTRAST, MRI ANGIOGRAM NECK W CONTRAST EXAM DATE: 12/14/2022 4:27 AM INDICATION: Earache, headache. COMPARISON: 12/14/2022 . TECHNIQUE: Multisequence, multiangle images were obtained through the brain without the administration of intravenous contrast. MR angiogram images of the head without intravenous contrast.  MR angiogram images of the neck with intravenous contrast. NASCET criteria used for interpretation. Maximum intensity projections (MIPs) and/or 3D reconstructions constructed and reviewed at time of study interpretation. CONTRAST: Administered. FINDINGS: TECHNICAL: Technically adequate study. HEAD MRI: INTRACRANIAL CONTENTS: No intracranial mass. Incidental developmental venous anomaly in the right cerebellar hemisphere. No intracranial hemorrhage. No acute infarct. Ventricles, sulci, and cisterns within normal limits. SOFT TISSUES: No significant abnormality.  BONES / BONE MARROW: No suspicious bone marrow signal abnormality. ORBITS: Within normal limits. MASTOID AIR CELLS: No significant opacification. MAJOR INTRACRANIAL FLOW VOIDS: Preserved. Grossly normal signal within major venous structures. HEAD MRA: RIGHT INTERNAL CAROTID ARTERY (ICA): Described in neck MRA below. LEFT INTERNAL CAROTID ARTERY (ICA): Described in neck MRA below. RIGHT VERTEBRAL ARTERY: Described in neck MRA below. LEFT VERTEBRAL ARTERY: Described in neck MRA below.  ANTERIOR CEREBRAL ARTERIES (ACAs): No significant stenosis identified. RIGHT MIDDLE CEREBRAL ARTERY (MCA): No significant  stenosis identified. LEFT MIDDLE CEREBRAL ARTERY (MCA): No significant stenosis identified. RIGHT POSTERIOR CEREBRAL ARTERY (PCA): No significant stenosis identified. Near fetal PCA. LEFT POSTERIOR CEREBRAL ARTERY (PCA): No significant stenosis identified. Near fetal PCA. BASILAR ARTERY: No significant stenosis identified. NECK MRA: AORTIC ARCH: No significant stenosis identified.  BRACHIOCEPHALIC, SUBCLAVIAN, AND VISUALIZED AXILLARY ARTERIES: No significant stenosis identified. RIGHT COMMON CAROTID ARTERY (CCA): No significant stenosis identified. RIGHT INTERNAL CAROTID ARTERY (ICA): No stenosis of the proximal internal carotid artery identified near the carotid bifurcation. Elsewhere, no significant stenosis identified. LEFT COMMON CAROTID ARTERY (CCA): No significant stenosis identified. LEFT INTERNAL CAROTID ARTERY (ICA): No stenosis of the proximal internal carotid artery identified near the carotid bifurcation. Elsewhere, no significant stenosis identified. RIGHT VERTEBRAL ARTERY: No significant stenosis identified. LEFT VERTEBRAL ARTERY: No significant stenosis identified. ANEURYSM / OTHER VASCULAR: No other significant vascular abnormality identified.  OTHER SIGNIFICANT FINDINGS: None.     HEAD MRI: No intracranial abnormality identified. HEAD MRA: No intracranial arterial stenosis, occlusion, or aneurysm identified. NECK MRA: Unremarkable neck MRA. Electronically signed by:  Rolando Mathis M.D.  12/14/2022 5:04 AM Mountain Time    MRI Brain Without Contrast    Result Date: 12/14/2022  EXAM: MRI BRAIN WO CONTRAST, MRI ANGIOGRAM HEAD WO CONTRAST, MRI ANGIOGRAM NECK W CONTRAST EXAM DATE: 12/14/2022 4:27 AM INDICATION: Earache, headache. COMPARISON: 12/14/2022 . TECHNIQUE: Multisequence, multiangle images were obtained through the brain without the administration of intravenous contrast. MR angiogram images of the head without intravenous contrast.  MR angiogram images of the neck with intravenous contrast.  NASCET criteria used for interpretation. Maximum intensity projections (MIPs) and/or 3D reconstructions constructed and reviewed at time of study interpretation. CONTRAST: Administered. FINDINGS: TECHNICAL: Technically adequate study. HEAD MRI: INTRACRANIAL CONTENTS: No intracranial mass. Incidental developmental venous anomaly in the right cerebellar hemisphere. No intracranial hemorrhage. No acute infarct. Ventricles, sulci, and cisterns within normal limits. SOFT TISSUES: No significant abnormality.  BONES / BONE MARROW: No suspicious bone marrow signal abnormality. ORBITS: Within normal limits. MASTOID AIR CELLS: No significant opacification. MAJOR INTRACRANIAL FLOW VOIDS: Preserved. Grossly normal signal within major venous structures. HEAD MRA: RIGHT INTERNAL CAROTID ARTERY (ICA): Described in neck MRA below. LEFT INTERNAL CAROTID ARTERY (ICA): Described in neck MRA below. RIGHT VERTEBRAL ARTERY: Described in neck MRA below. LEFT VERTEBRAL ARTERY: Described in neck MRA below.  ANTERIOR CEREBRAL ARTERIES (ACAs): No significant stenosis identified. RIGHT MIDDLE CEREBRAL ARTERY (MCA): No significant stenosis identified. LEFT MIDDLE CEREBRAL ARTERY (MCA): No significant stenosis identified. RIGHT POSTERIOR CEREBRAL ARTERY (PCA): No significant stenosis identified. Near fetal PCA. LEFT POSTERIOR CEREBRAL ARTERY (PCA): No significant stenosis identified. Near fetal PCA. BASILAR ARTERY: No significant stenosis identified. NECK MRA: AORTIC ARCH: No significant stenosis identified.  BRACHIOCEPHALIC, SUBCLAVIAN, AND VISUALIZED AXILLARY ARTERIES: No significant stenosis identified. RIGHT COMMON CAROTID ARTERY (CCA): No significant stenosis identified. RIGHT INTERNAL CAROTID ARTERY (ICA): No stenosis of the proximal internal carotid artery identified near the carotid bifurcation. Elsewhere, no significant stenosis identified. LEFT COMMON CAROTID ARTERY (CCA): No significant stenosis identified. LEFT INTERNAL CAROTID  ARTERY (ICA): No stenosis of the proximal internal carotid artery identified near the carotid bifurcation. Elsewhere, no significant stenosis identified. RIGHT VERTEBRAL ARTERY: No significant stenosis identified. LEFT VERTEBRAL ARTERY: No significant stenosis identified. ANEURYSM / OTHER VASCULAR: No other significant vascular abnormality identified.  OTHER SIGNIFICANT FINDINGS: None.     HEAD MRI: No intracranial abnormality identified. HEAD MRA: No intracranial arterial stenosis, occlusion, or aneurysm identified. NECK MRA: Unremarkable neck MRA. Electronically signed by:  Rolando Mathis M.D.  12/14/2022 5:04 AM Mountain Time    MRI Angiogram Venogram Head    Result Date: 12/14/2022  EXAM: MRI ANGIOGRAM VENOGRAM HEAD EXAM DATE: 12/14/2022 4:27 AM INDICATION: Headache, earache. COMPARISON: 12/14/2022 . TECHNIQUE: MR venogram images of the head without intravenous contrast. Maximum intensity projections (MIPs) and/or 3D reconstructions constructed and reviewed at time of study interpretation. CONTRAST: None. FINDINGS: TECHNICAL: Technically adequate study. MAJOR VENOUS STRUCTURES: Flow related signal within normal limits without evidence of thrombus. OTHER SIGNIFICANT FINDINGS: None.      No evidence of thrombosis. Electronically signed by:  Rolando Mathis M.D.  12/14/2022 4:55 AM Mountain Time      I ordered and reviewed the above noted radiographic studies.      I viewed images of CT head which showed no intracranial hemorrhage per my independent interpretation.    See radiologist's dictation for official interpretation.        PROCEDURES    Procedures    ECG 12 Lead Chest Pain   Final Result   Test Reason : HEADACHE   Blood Pressure :   */*   mmHG   Vent. Rate :  58 BPM     Atrial Rate :  58 BPM      P-R Int : 126 ms          QRS Dur :  70 ms       QT Int : 386 ms       P-R-T Axes :  65   4  29 degrees      QTc Int : 378 ms      Sinus bradycardia with sinus arrhythmia   Otherwise normal ECG   When compared  with ECG of 03-OCT-2022 10:33,   No significant change was found   Confirmed by CALVIN LEBRON ([id number]) on 12/14/2022 3:24:59 AM      Referred By: ED MD           Confirmed By: CALVIN LEBRON          MEDICATIONS GIVEN IN ER    Medications   sodium chloride 0.9 % flush 10 mL (has no administration in time range)   thiamine (B-1) injection 100 mg (has no administration in time range)   sodium chloride 0.9 % bolus 1,000 mL (0 mL Intravenous Stopped 12/14/22 0420)   metoclopramide (REGLAN) injection 5 mg (5 mg Intravenous Given 12/14/22 0225)   diphenhydrAMINE (BENADRYL) injection 25 mg (25 mg Intravenous Given 12/14/22 0225)   ketorolac (TORADOL) injection 15 mg (15 mg Intravenous Given 12/14/22 0305)   gadobenate dimeglumine (MULTIHANCE) injection 18 mL (18 mL Intravenous Given 12/14/22 0513)   dexamethasone (DECADRON) injection 8 mg (8 mg Intravenous Given 12/14/22 0647)         PROGRESS, DATA ANALYSIS, CONSULTS, AND MEDICAL DECISION MAKING    All labs have been independently reviewed by me.  All radiology studies have been reviewed by me and the radiologist dictating the report.   EKG's have been independently viewed and interpreted by me.      Differential diagnoses: Neuropathic pain, headache syndrome, cervical cephalgia, musculoskeletal pain, stroke, viral illness.      ED Course as of 12/14/22 0730   Wed Dec 14, 2022   0642 On reevaluation, Ms. Hartman says the pain behind her left ear, is somewhat improved, after the migraine cocktail of Toradol Benadryl and Reglan here was given.  She notes some continued pain but improvement with migraine treatment. [TA]   0703 Reevaluation, waiting on MRI results [TA]      ED Course User Index  [TA] Calvin Lebron MD     Stroke team consultation, done, and I reviewed their notes and recommendations.    MRI, and MRV to assess for cerebral venous thrombosis is negative for acute thrombosis and for stroke.  On reexamination patient says she does feel somewhat better,  I do not find a clinical syndrome consistent with acute neurologic unstable condition at this time.  I discussed with her treatment for UTI as the UA does show some white blood cells and she has noticed some symptoms related to this, as well as Flexeril for the findings of musculoskeletal neck pain, and/or nerve pain on the left side of her scalp behind her ear.  No findings of acute sinusitis on imaging, or mastoiditis.      AS OF 07:30 EST VITALS:    BP - 112/71  HR - 61  TEMP - 98.4 °F (36.9 °C) (Oral)  O2 SATS - 98%                  DIAGNOSIS  Final diagnoses:   Neurological symptoms   Neck pain on left side   Headache syndrome         DISPOSITION  DISCHARGE    Patient discharged in stable condition.    Reviewed implications of results, diagnosis, meds, responsibility to follow up, warning signs and symptoms of possible worsening, potential complications and reasons to return to ER.    Patient/Family voiced understanding of above instructions.    Discussed plan for discharge, as there is no emergent indication for admission.  Pt/family is agreeable and understands need for follow up and possible repeat testing.  Pt/family is aware that discharge does not mean that nothing is wrong but that it indicates no emergency is currently present that requires admission and they must continue care with follow-up as given below or with a physician of their choice.     FOLLOW-UP  Lesli Ramirez PA-C  [address] [address]  [phone]          Ciro Francois MD  [address] [facility] [address]  [place] [address]  [phone]    Call in 1 week           Medication List      New Prescriptions    cefuroxime 500 MG tablet  Commonly known as: CEFTIN  Take 1 tablet by mouth 2 (Two) Times a Day for 5 days.     fluconazole 150 MG tablet  Commonly known as: DIFLUCAN  Take 1 tablet by mouth Every 72 (Seventy-Two) Hours.        Changed    cyclobenzaprine 10 MG tablet  Commonly known as: FLEXERIL  Take  1 tablet by mouth 3 (Three) Times a Day As Needed for Muscle Spasms for up to 30 days.  What changed: when to take this           Where to Get Your Medications      These medications were sent to [facility] [id number] - [place], [place] - [address] [place] [address] - [phone]  - [phone]   [address] [place] [address]    Phone: [phone]   · cefuroxime 500 MG tablet  · cyclobenzaprine 10 MG tablet  · fluconazole 150 MG tablet                  Calvin Gatica MD  12/14/22 0729       Calvin Gatica MD  12/14/22 1806

## 2022-12-15 ENCOUNTER — APPOINTMENT (OUTPATIENT)
Dept: ONCOLOGY | Facility: HOSPITAL | Age: 37
End: 2022-12-15
Payer: COMMERCIAL

## 2022-12-15 ENCOUNTER — PATIENT OUTREACH (OUTPATIENT)
Dept: CASE MANAGEMENT | Facility: OTHER | Age: 37
End: 2022-12-15

## 2022-12-15 LAB — BACTERIA SPEC AEROBE CULT: NORMAL

## 2022-12-15 NOTE — OUTREACH NOTE
"AMBULATORY CASE MANAGEMENT NOTE    Name and Relationship of Patient/Support Person: Minnie Libradojolie A - Self    Patient Outreach    Called patient regarding ED visit 12/14/22, for possible HRCM needs.  Explained role of Ambulatory  and contact information.  Reviewed the ED discharge recommendations/ AVS.  Patient voiced compliance with all medications as directed.  Stated her symptoms are currently better than they were.  Said she is \"doing a lot better\".  Reviewed upcoming appts with PCP and Neuro.  Patient said she is going to call Neuro office and see if a sooner appt is available to follow up.  No other questions or concerns voiced at this time.      Adult Patient Profile  Questions/Answers    Flowsheet Row Most Recent Value   Symptoms/Conditions Managed at Home neurological  [ED visit 12/14/22 with Neuro symptoms, Left neck pain- patient said she has Migraine Headaches every day.]   Barriers to Managing Health none   Neurological Symptoms/Conditions headache   Neurological Management Strategies medication therapy   Barriers to Taking Medication as Prescribed none   Concentrating, Remembering or Making Decisions Difficulty no   Difficulty Communicating no   Difficulty Eating/Swallowing no   Walking or Climbing Stairs Difficulty no   Primary Source of Support/Comfort parent   Name of Support/Comfort Primary Source Mother   People in Home parent(s)   Name(s) of People in Home Lives with her mother   Current Living Arrangements home   Resource/Environmental Concerns none        Social Work Assessment  Questions/Answers    Flowsheet Row Most Recent Value   People in Home parent(s)   Name(s) of People in Home Lives with her mother   Current Living Arrangements home   Functional Status Comments Walks independently without any assistive device   Medications independent        Send Education  Questions/Answers    Flowsheet Row Most Recent Value   Advanced Directives: --  [Has information]        SDOH " updated and reviewed with the patient during this program:  Food Insecurity: No Food Insecurity   • Worried About Running Out of Food in the Last Year: Never true   • Ran Out of Food in the Last Year: Never true      Transportation Needs: No Transportation Needs   • Lack of Transportation (Medical): No   • Lack of Transportation (Non-Medical): No       STEVE REARDON  Ambulatory Case Management    12/15/2022, 11:46 EST

## 2022-12-28 ENCOUNTER — OUTSIDE FACILITY SERVICE (OUTPATIENT)
Dept: GASTROENTEROLOGY | Facility: CLINIC | Age: 37
End: 2022-12-28
Payer: COMMERCIAL

## 2022-12-28 PROCEDURE — 45385 COLONOSCOPY W/LESION REMOVAL: CPT | Performed by: INTERNAL MEDICINE

## 2022-12-28 PROCEDURE — 88305 TISSUE EXAM BY PATHOLOGIST: CPT

## 2022-12-28 PROCEDURE — 88313 SPECIAL STAINS GROUP 2: CPT

## 2022-12-28 PROCEDURE — 43239 EGD BIOPSY SINGLE/MULTIPLE: CPT | Performed by: INTERNAL MEDICINE

## 2022-12-29 ENCOUNTER — LAB REQUISITION (OUTPATIENT)
Dept: LAB | Facility: HOSPITAL | Age: 37
End: 2022-12-29
Payer: COMMERCIAL

## 2022-12-29 DIAGNOSIS — K21.00 GASTRO-ESOPHAGEAL REFLUX DISEASE WITH ESOPHAGITIS, WITHOUT BLEEDING: ICD-10-CM

## 2022-12-29 DIAGNOSIS — D12.8 BENIGN NEOPLASM OF RECTUM: ICD-10-CM

## 2022-12-29 DIAGNOSIS — Z98.84 BARIATRIC SURGERY STATUS: ICD-10-CM

## 2022-12-29 DIAGNOSIS — K29.70 GASTRITIS, UNSPECIFIED, WITHOUT BLEEDING: ICD-10-CM

## 2022-12-29 DIAGNOSIS — K57.30 DIVERTICULOSIS OF LARGE INTESTINE WITHOUT PERFORATION OR ABSCESS WITHOUT BLEEDING: ICD-10-CM

## 2022-12-29 DIAGNOSIS — K44.9 DIAPHRAGMATIC HERNIA WITHOUT OBSTRUCTION OR GANGRENE: ICD-10-CM

## 2022-12-29 DIAGNOSIS — K31.89 OTHER DISEASES OF STOMACH AND DUODENUM: ICD-10-CM

## 2022-12-29 DIAGNOSIS — Z12.11 ENCOUNTER FOR SCREENING FOR MALIGNANT NEOPLASM OF COLON: ICD-10-CM

## 2022-12-29 DIAGNOSIS — K21.9 GASTRO-ESOPHAGEAL REFLUX DISEASE WITHOUT ESOPHAGITIS: ICD-10-CM

## 2022-12-30 ENCOUNTER — OFFICE VISIT (OUTPATIENT)
Dept: BEHAVIORAL HEALTH | Facility: CLINIC | Age: 37
End: 2022-12-30

## 2022-12-30 DIAGNOSIS — F33.41 MDD (MAJOR DEPRESSIVE DISORDER), RECURRENT, IN PARTIAL REMISSION: Primary | ICD-10-CM

## 2022-12-30 PROCEDURE — 90834 PSYTX W PT 45 MINUTES: CPT | Performed by: PSYCHOLOGIST

## 2022-12-30 NOTE — PROGRESS NOTES
PROGRESS NOTE    Data:  Frances Hartman is a 37 y.o. female who met with the undersigned for a scheduled individual therapy session from 9:00 - 9:45am.      Clinical Maneuvering/Intervention:      The pt talked about recent stressors including arguing with her mother, stress at work, and adjusting to life post-weight loss surgery. Stressors were processed individually and in detail. Venting of frustrations was conducted in order to help the pt feel less tense emotionally and gain insight into issues. Feelings were processed and validated several times in session. Active listening was conducted in order to help the pt make sense of stressors and start moving towards potential solutions. The pt was assisted with finding solutions based on existing skill-set and abilities. Perspective taking was conducted multiple times in order to help the pt feel less stuck, less overwhelmed, and see challenges as much more manageable. She was assisted with talking out each stressor and encouraged throughout the session to solve her own issue. She was assisted with answers/guidance as the case may be, noting how, for example, how she can talk to her mother more efficiently. Role playing was conducted in order to help the pt gain insight into how to improve her communication skills, decrease tension with the other person, and notice things the pt may be doing (and needs to stop doing) in order to improve the quality of the relationship. Maladaptive thought patterns were identified, challenged, and evaluated for validity in order to allow for the pt to chose different and more adaptive ways of thinking. The pt was assisted in recognizing progress in order to show encouragement and promote motivation to keep making positive changes in life. She is feeling better, dating someone new, and stepping away from negativity more often. Homework was assigned tailored to pt's needs. The pt expressed gratitude for today's session.    Mental  Status Exam  Hygiene:  good  Dress: normal  Attitude:  cooperative and proactive  Motor Activity: normal  Speech: normal  Mood:  level  Affect:  congruent  Thought Processes: normal  Thought Content:  normal  Suicidal Thoughts:  not endorsed  Homicidal Thoughts:  not endorsed  Crisis Safety Plan: not needed   Hallucinations:  none      Patient's Support Network Includes:  family, friends      Progress toward goal: there is evidence to suggest that she is becoming a stronger person over time.      Functional Status: moderate to high      Prognosis: good    Assessment      The pt presented to be struggling with depression, symptoms partially remitted. Self-esteem seems to be improving as she is meeting personal goals and needs. She is intelligent, thoughtful, and witty person.       Plan      In order to diminish symptoms of depression and strengthen self-esteem; she will give herself credit for her progress thus far: physical health, relationship with her mother, and improvement in mood overall (this week).    Misty Nava, PhD, LP

## 2023-01-04 LAB — REF LAB TEST METHOD: NORMAL

## 2023-01-06 ENCOUNTER — TELEPHONE (OUTPATIENT)
Dept: GASTROENTEROLOGY | Facility: CLINIC | Age: 38
End: 2023-01-06
Payer: COMMERCIAL

## 2023-01-06 NOTE — TELEPHONE ENCOUNTER
Enrico Singh MD   1/5/2023  4:43 PM EST       The biopsy of the duodenal bulb did not demonstrate any recurrent neuroendocrine tumor.  There was no evidence for H. pylori on the gastric biopsies.  There was evidence of reflux on esophageal biopsies.  The colon polyp was an inflammatory polyp.

## 2023-01-06 NOTE — TELEPHONE ENCOUNTER
----- Message from TANESHA Guzman sent at 1/6/2023  7:34 AM EST -----  Please relay dr laguna's result note

## 2023-01-10 ENCOUNTER — SPECIALTY PHARMACY (OUTPATIENT)
Dept: ONCOLOGY | Facility: HOSPITAL | Age: 38
End: 2023-01-10
Payer: COMMERCIAL

## 2023-01-10 NOTE — PROGRESS NOTES
Specialty Pharmacy Refill Coordination Note     Frances is a 37 y.o. female contacted today regarding refills of  Botox specialty medication(s). Joleen has an appointment scheduled 1/23.     Reviewed and verified with patient:       Specialty medication(s) and dose(s) confirmed: yes    Refill Questions    Flowsheet Row Most Recent Value   Changes to allergies? No   Changes to medications? No   New conditions since last clinic visit No   Unplanned office visit, urgent care, ED, or hospital admission in the last 4 weeks  No   How does patient/caregiver feel medication is working? Excellent   Financial problems or insurance changes  No   If yes, describe changes in insurance or financial issues. N/A   Since the previous refill, were any specialty medication doses or scheduled injections missed or delayed?  No   If yes, please provide the amount N/A   Why were doses missed? N/A   Does this patient require a clinical escalation to a pharmacist? No          Delivery Questions    Flowsheet Row Most Recent Value   Delivery method [employer]   Delivery address correct? Yes   Preferred delivery time? Anytime   Number of medications in delivery 1   Medication being filled and delivered Botox-[facility] to [facility]   Doses left of specialty medications 0   Is there any medication that is due not being filled? No   Supplies needed? No supplies needed   Cooler needed? Yes   Do any medications need mixed or dated? No   Copay form of payment Payment plan already set up   Questions or concerns for the pharmacist? No   Are any medications first time fills? No            Medication Adherence    Adherence tools used: cell phone  Support network for adherence: healthcare provider          Follow-up: 3 month(s)     Sandy Madden, Pharmacy Technician  Specialty Pharmacy Technician

## 2023-01-12 ENCOUNTER — HOSPITAL ENCOUNTER (OUTPATIENT)
Dept: ONCOLOGY | Facility: HOSPITAL | Age: 38
Setting detail: INFUSION SERIES
Discharge: HOME | End: 2023-01-12
Payer: COMMERCIAL

## 2023-01-12 VITALS
RESPIRATION RATE: 18 BRPM | HEART RATE: 83 BPM | DIASTOLIC BLOOD PRESSURE: 93 MMHG | HEIGHT: 60 IN | TEMPERATURE: 96.7 F | SYSTOLIC BLOOD PRESSURE: 138 MMHG | WEIGHT: 196 LBS | BODY MASS INDEX: 38.48 KG/M2

## 2023-01-12 DIAGNOSIS — R11.2 INTRACTABLE NAUSEA AND VOMITING: Primary | ICD-10-CM

## 2023-01-12 PROCEDURE — 96360 HYDRATION IV INFUSION INIT: CPT

## 2023-01-12 PROCEDURE — 96361 HYDRATE IV INFUSION ADD-ON: CPT

## 2023-01-12 RX ORDER — SODIUM CHLORIDE 9 MG/ML
1000 INJECTION, SOLUTION INTRAVENOUS ONCE
Status: COMPLETED | OUTPATIENT
Start: 2023-01-12 | End: 2023-01-12

## 2023-01-12 RX ORDER — SODIUM CHLORIDE 9 MG/ML
1000 INJECTION, SOLUTION INTRAVENOUS ONCE
Status: CANCELLED
Start: 2023-01-12 | End: 2023-01-12

## 2023-01-12 RX ADMIN — SODIUM CHLORIDE 1000 ML: 9 INJECTION, SOLUTION INTRAVENOUS at 07:47

## 2023-01-23 ENCOUNTER — PROCEDURE VISIT (OUTPATIENT)
Dept: NEUROLOGY | Facility: CLINIC | Age: 38
End: 2023-01-23
Payer: COMMERCIAL

## 2023-01-23 DIAGNOSIS — G43.709 CHRONIC MIGRAINE WITHOUT AURA WITHOUT STATUS MIGRAINOSUS, NOT INTRACTABLE: Primary | ICD-10-CM

## 2023-01-23 PROCEDURE — 64615 CHEMODENERV MUSC MIGRAINE: CPT | Performed by: PSYCHIATRY & NEUROLOGY

## 2023-01-23 NOTE — PROGRESS NOTES
Botulinum Toxin Injection Procedure    Chief Complaint   Patient presents with   • Botulinum Toxin Injection        History:  Response to treatment has reduced HA's at least 7 fewer days a month and/or 100 hours fewer each month.     Pre-operative diagnosis: headache    Post-operative diagnosis: Same    Procedure: Chemical neurolysis    After risks and benefits were explained including bleeding, infection, worsening of pain, damage to the areas being injected, weakness of muscles, loss of muscle control, dysphagia if injecting the head or neck, facial droop if injecting the facial area, painful injection, allergic or other reaction to the medications being injected, and the failure of the procedure to help the problem, a signed consent was obtained.      The patient was placed in a comfortable area and the sites to be treated were identified. A time out was called and performed. The area to be treated was prepped three times with alcohol and the alcohol allowed to dry. Next, a 27 gauge needle was used to inject the medication in the area to be treated.    Area(s) injected: frontalis muscle, semispinalis capitis muscle and temporallis muscle    Medications used: botulinum toxin 200 mu, 2 mL of saline used to dilute the botulinum toxin.      The patient did tolerate the procedure well. There were no complications.       Physical Examination    Current Treatment (Units)   Splenius Capitis 25 units on the right and 25 units on the left.   Temporalis 25 units on the right and 25 units on the left.   Frontalis 27 units on the right and 28 units on the left.   EMG Guidance none .   Complications: none .   The total amount injected in units is 155 .   The total amount wasted in units is 45 .   The total amount submitted in units is 200 .

## 2023-01-26 ENCOUNTER — HOSPITAL ENCOUNTER (OUTPATIENT)
Dept: ONCOLOGY | Facility: HOSPITAL | Age: 38
Setting detail: INFUSION SERIES
Discharge: HOME | End: 2023-01-26
Payer: COMMERCIAL

## 2023-01-26 VITALS
HEART RATE: 84 BPM | WEIGHT: 195.9 LBS | BODY MASS INDEX: 38.26 KG/M2 | SYSTOLIC BLOOD PRESSURE: 130 MMHG | TEMPERATURE: 97.7 F | DIASTOLIC BLOOD PRESSURE: 78 MMHG

## 2023-01-26 DIAGNOSIS — R11.2 INTRACTABLE NAUSEA AND VOMITING: Primary | ICD-10-CM

## 2023-01-26 PROCEDURE — 96361 HYDRATE IV INFUSION ADD-ON: CPT

## 2023-01-26 PROCEDURE — 96360 HYDRATION IV INFUSION INIT: CPT

## 2023-01-26 RX ORDER — SODIUM CHLORIDE 9 MG/ML
1000 INJECTION, SOLUTION INTRAVENOUS ONCE
Status: COMPLETED | OUTPATIENT
Start: 2023-01-26 | End: 2023-01-26

## 2023-01-26 RX ORDER — SODIUM CHLORIDE 9 MG/ML
1000 INJECTION, SOLUTION INTRAVENOUS ONCE
Status: CANCELLED
Start: 2023-01-26 | End: 2023-01-26

## 2023-01-26 RX ADMIN — SODIUM CHLORIDE 1000 ML: 9 INJECTION, SOLUTION INTRAVENOUS at 07:47

## 2023-01-26 NOTE — ADDENDUM NOTE
Encounter addended by: Rere Gonzales RN on: 1/26/2023 10:14 AM   Actions taken: Order Reconciliation Section accessed, Allergies reviewed, Flowsheet accepted

## 2023-01-31 ENCOUNTER — OFFICE VISIT (OUTPATIENT)
Dept: NEUROLOGY | Facility: CLINIC | Age: 38
End: 2023-01-31
Payer: COMMERCIAL

## 2023-01-31 VITALS
BODY MASS INDEX: 39.78 KG/M2 | WEIGHT: 202.6 LBS | HEART RATE: 74 BPM | TEMPERATURE: 98 F | SYSTOLIC BLOOD PRESSURE: 120 MMHG | DIASTOLIC BLOOD PRESSURE: 76 MMHG | HEIGHT: 60 IN | OXYGEN SATURATION: 98 %

## 2023-01-31 DIAGNOSIS — G43.709 CHRONIC MIGRAINE WITHOUT AURA WITHOUT STATUS MIGRAINOSUS, NOT INTRACTABLE: Primary | ICD-10-CM

## 2023-01-31 PROCEDURE — 99214 OFFICE O/P EST MOD 30 MIN: CPT | Performed by: NURSE PRACTITIONER

## 2023-01-31 NOTE — PROGRESS NOTES
Neuro Office Visit      Encounter Date: 2023   Patient Name: Frances Hartman  : 1985   MRN: [id number]     Chief Complaint:    Chief Complaint   Patient presents with   • Migraine       History of Present Illness: Frances Hartman is a 37 y.o. female who is here today in Neurology for headaches.    Headaches have improved in severity from a 9/10 to 6/10 at times. She is still having more than 15 migraines a month. Botox has also reduced the frequency of mild daily headaches.     bid does help headache as well.    She feels headache intensity/severity and improved while botox is on board.    Occipital neuralgia. Seen in ED 22ED Provider Notes by Calvin Gatica MD (2022 01:48)  MRI brain  MRA head and neck all neg. See below.      Headaches  Days per month: have mild daily headache and severe headache is 15 days a month. She can tell Botox helps because she was late on her injections  Location: Right temple, Left temple, Right Eye and Left Eye      Quality: Aching and Throbbing        Duration: hours  Severity: 9/10.  Triggers: stress, computer use, flourescent lighting and loud sounds or repetitive sounds  Associated Symptoms: Photophobia, Phonophobia, Scent sensitivity , Dizziness and  Vision changes blurred OU Denies pulsatile tinnitus  Aura: no visual aura, feels pain accelerating     Rx:   Abortives: Tylenol one dose a day. Tramadol one daily. Not using hydrocodone for headaches.  Preventative:  bid, Spironlactone, coreg, lasix    Subjective      Past Medical History:   Past Medical History:   Diagnosis Date   • Allergic     Birth   • Anxiety    • Arthritis    • Asthma     prn inhalers, does not follow w/ pulmonary   • Chronic back pain     previously followed w/ pain management, now just prn Tylenol + Gabapentin   • Chronic deep vein thrombosis (DVT) of femoral vein of right lower extremity ([facility]) 09/10/2021   • Clotting disorder ([facility]) 2015   • Cluster  headache    • Depression    • Diabetes mellitus (HCC)     Type 2, dx 2014, never on insulin, A1C 7.6   • Diabetes mellitus (HCC)     Type II, dx 2014, never on insulin, A1C 7.6    • Dyspepsia    • Dyspnea on exertion    • Dysuria 03/07/2022    Dysuria and hematuria x 2 weeks. 3/7/22 urine culture sent. Rx Macrobid 100 mg twice daily for 7 days. Patient did not tolerate Macrobid well due to GI upset. Urine culture was negative.   • Fatigue    • Gastroparesis    • GERD (gastroesophageal reflux disease)    • Headache, tension-type    • Heartburn     chronic, prn TUMS, denies prior eval   • Hirsutism    • History of medical problems     PCOS   • Hx of radiation therapy     for treatments of Keloids   • Hypertension    • Irregular menses     infrequent spotting   • Leiomyoma, subserous     possible peduculated f3-4 cm fibroid on u/s at [facility]   • Low back pain    • Migraines     botox q3 months, following Neurology @ [facility]   • Morbid obesity (HCC)     s/p sleeve gastrectomy   • Peripheral edema    • Polycystic ovary syndrome 2011   • Seasonal allergies    • Slow to wake up after anesthesia    • Urinary tract infection    • Visual impairment     Astigmatism, Nearsightedness       Past Surgical History:   Past Surgical History:   Procedure Laterality Date   • ABDOMINAL SURGERY  06/15/2021    Bariatric Sleeve Surgery   • BARIATRIC SURGERY     • COSMETIC SURGERY      Multiple Keloid surgeries   • ENDOSCOPY N/A 06/15/2021    Procedure: ESOPHAGOGASTRODUODENOSCOPY;  Surgeon: Ayaka Andre MD;  Location: [facility] OR;  Service: Robotics - DaVinci;  Laterality: N/A;   • ENDOSCOPY N/A 10/28/2021    Procedure: ESOPHAGOGASTRODUODENOSCOPY WITH ACHALASIA BALLOON DILATATION;  Surgeon: Jase Hernandez MD;  Location:  [facility] ENDOSCOPY;  Service: Gastroenterology;  Laterality: N/A;  58 F DILATOR   • ENDOSCOPY N/A 11/15/2021    Procedure: ESOPHAGOGASTRODUODENOSCOPY WITH DILATATION;  Surgeon: Jase Hernandez MD;  Location:  [facility]  ENDOSCOPY;  Service: Gastroenterology;  Laterality: N/A;  achalasia balloon    • ENDOSCOPY N/A 11/24/2021    Procedure: ESOPHAGOGASTRODUODENOSCOPY WTH DUODENAL POLYPECTOPMY AND NASAL JEJUNAL TUBE PLACEMENT;  Surgeon: Jase Hernandez MD;  Location:  [facility] ENDOSCOPY;  Service: Gastroenterology;  Laterality: N/A;  placement of feeding tube, checked position with KUB   • GASTRIC RESTRICTION SURGERY  2021    Sleeve   • GASTRIC SLEEVE LAPAROSCOPIC N/A 06/15/2021    Procedure: GASTRIC SLEEVE LAPAROSCOPIC WITH DAVINCI ROBOT, LAPROSCOPIC HIATAL HERNIA REPAIR WITH DAVINCI ROBOT;  Surgeon: Ayaka Andre MD;  Location:  [facility] OR;  Service: Robotics - DaVinci;  Laterality: N/A;   • KELOID EXCISION      1990,1993,1999,2015,2016,2019   • LAPAROSCOPIC CHOLECYSTECTOMY  2000    for stones   • RADIOFREQUENCY ABLATION  2019    x 2  for pt back   • UMBILICAL HERNIA REPAIR  1990   • UPPER GASTROINTESTINAL ENDOSCOPY     • WISDOM TOOTH EXTRACTION  1994       Family History:   Family History   Problem Relation Age of Onset   • Arthritis Mother    • Diabetes Mother    • Obesity Mother    • Arrhythmia Mother    • Hypertension Mother    • Asthma Mother    • Migraines Mother    • Dementia Father    • Heart attack Father    • Arrhythmia Father    • Heart disease Father    • Alcohol abuse Father    • Stroke Other         CVA   • Obesity Other    • Ovarian cancer Maternal Aunt         40's   • Breast cancer Paternal Aunt         30's   • Heart disease Other    • Hypertension Other    • Endometrial cancer Neg Hx    • Colon cancer Neg Hx    • Colon polyps Neg Hx    • Esophageal cancer Neg Hx        Social History:   Social History     Socioeconomic History   • Marital status: Single   Tobacco Use   • Smoking status: Never   • Smokeless tobacco: Never   Vaping Use   • Vaping Use: Never used   Substance and Sexual Activity   • Alcohol use: Not Currently     Comment: Very rarely drink socially. At most 2 drinks per month.   • Drug use: Never    • Sexual activity: Yes     Partners: Male     Birth control/protection: Condom, I.U.D., Coitus interruptus       Medications:     Current Outpatient Medications:   •  acetaminophen (TYLENOL) 500 MG tablet, Take 500-1,000 mg by mouth Every 6 (Six) Hours As Needed for Mild Pain ., Disp: , Rfl:   •  albuterol sulfate  (90 Base) MCG/ACT inhaler, Inhale 2 puffs Every 4 (Four) Hours As Needed for Wheezing or Shortness of Air., Disp: 18 g, Rfl: 5  •  Blood Glucose Monitoring Suppl (ONE TOUCH ULTRA 2) w/Device kit, , Disp: , Rfl:   •  carvedilol (COREG) 6.25 MG tablet, Take 1 tab in AM and 2 tab in PM (Patient taking differently: Take 6.25 mg by mouth Every Night.), Disp: 270 tablet, Rfl: 0  •  diphenhydrAMINE (BENADRYL) 25 mg capsule, Take 25 mg by mouth Every 6 (Six) Hours As Needed for Itching or Sleep., Disp: , Rfl:   •  furosemide (LASIX) 20 MG tablet, TAKE ONE TO TWO TABLETS BY MOUTH EVERY NIGHTLY (Patient taking differently: Take 20 mg by mouth As Needed.), Disp: 135 tablet, Rfl: 2  •  gabapentin (NEURONTIN) 300 MG capsule, Take 600 mg by mouth Daily., Disp: , Rfl:   •  glucose blood test strip, USe BID to test blood sugar DX.[id number], Disp: 100 each, Rfl: 3  •  guaiFENesin (HUMIBID 3) 400 MG tablet, Take 200 mg by mouth As Needed., Disp: , Rfl:   •  HYDROcodone-acetaminophen (NORCO) 5-325 MG per tablet, Take 1 tablet by mouth Every 6 (Six) Hours As Needed for Severe Pain ., Disp: 12 tablet, Rfl: 0  •  Lancets 33G misc, 1 Each/kg 2 (two) times a day. To test blood sugars DxE11.65, Disp: 100 each, Rfl: 2  •  levonorgestrel (MIRENA) 20 MCG/24HR IUD, 1 each by Intrauterine route., Disp: , Rfl:   •  loratadine (CLARITIN) 10 MG tablet, TAKE ONE TABLET BY MOUTH DAILY, Disp: 30 tablet, Rfl: 2  •  metFORMIN ER (GLUCOPHAGE-XR) 500 MG 24 hr tablet, Take 1 tablet by mouth Daily With Dinner., Disp: 90 tablet, Rfl: 3  •  multivitamin with minerals tablet tablet, Take 1 tablet by mouth Daily., Disp: , Rfl:   •   "OnabotulinumtoxinA (Botox) 200 units reconstituted solution, FOR . PHYSICIAN TO INJECT UP  UNITS INTRAMUSCULARLY INTO HEAD, NECK AND SHOULDERS EVERY 90 DAYS., Disp: 1 each, Rfl: 3  •  ondansetron (ZOFRAN) 4 MG tablet, Take 1 tablet by mouth Every 6 (Six) Hours As Needed for Nausea or Vomiting., Disp: 4 tablet, Rfl: 0  •  simethicone (Gas-X) 80 MG chewable tablet, Chew 1 tablet Every 6 (Six) Hours As Needed for Flatulence., Disp: 10 tablet, Rfl: 0  •  spironolactone (Aldactone) 50 MG tablet, Take 1 tablet by mouth Daily., Disp: 90 tablet, Rfl: 1  •  traMADol (ULTRAM) 50 MG tablet, Take 50 mg by mouth Every 6 (Six) Hours As Needed for Moderate Pain ., Disp: , Rfl:   •  vitamin C (ASCORBIC ACID) 250 MG tablet, Take 250 mg by mouth Daily., Disp: , Rfl:   •  vitamin D3 125 MCG (5000 UT) capsule capsule, Take 5,000 Units by mouth Daily., Disp: , Rfl:     Current Facility-Administered Medications:   •  OnabotulinumtoxinA 200 Units, 200 Units, Intramuscular, Q3 Months, Shiela Coelho, APRN, 200 Units at 01/23/23 1200    Allergies:   Allergies   Allergen Reactions   • Capsicum Annuum Extract & Derivative (Bell Pepper) [Capsicum] Anaphylaxis   • Ibuprofen Other (See Comments)     \"makes me retain fluid and eventually go into CHF\"   • Macrobid [Nitrofurantoin] Nausea Only   • Peanut-Containing Drug Products Anaphylaxis   • Hydrochlorothiazide Swelling     eventually causes CHF    • Monosodium Glutamate Swelling and Headache   • Oatmeal Other (See Comments)     Dx on allergy testing   • Doxycycline Monohydrate Other (See Comments)   • Amitriptyline Mental Status Change     memory gaps + neuropathy + hair loss   • Aspartame Nausea Only   • Augmentin [Amoxicillin-Pot Clavulanate] Other (See Comments)     Syncope, not sure if allergic to cephalosporins.   • Codeine Headache      Can tolerate hydrocodone, no other adverse reactions to other narcotics.   • Diclofenac Headache   • Doxycycline Rash " "and Hallucinations   • Eggs Or Egg-Derived Products Other (See Comments)     dx on allergy testing, but does not completely avoid   • Naproxen Other (See Comments)     \"blackout\"       PHQ-9 Total Score:     SUNSHINE Fall Risk Assessment has not been completed.    Objective     Physical Exam:   Physical Exam  Eyes:      Pupils: Pupils are equal, round, and reactive to light.   Neurological:      Mental Status: She is oriented to person, place, and time.      Coordination: Finger-Nose-Finger Test, Heel to Shin Test and Romberg Test normal.      Gait: Gait is intact.      Deep Tendon Reflexes:      Reflex Scores:       Tricep reflexes are 2+ on the right side and 2+ on the left side.       Bicep reflexes are 2+ on the right side and 2+ on the left side.       Brachioradialis reflexes are 2+ on the right side and 2+ on the left side.       Patellar reflexes are 2+ on the right side and 2+ on the left side.       Achilles reflexes are 2+ on the right side and 2+ on the left side.  Psychiatric:         Speech: Speech normal.         Neurologic Exam     Mental Status   Oriented to person, place, and time.   Follows 3 step commands.   Attention: normal. Concentration: normal.   Speech: speech is normal   Level of consciousness: alert  Knowledge: consistent with education.   Normal comprehension.     Cranial Nerves     CN III, IV, VI   Pupils are equal, round, and reactive to light.  Right pupil: Accommodation: intact.   Left pupil: Accommodation: intact.   CN III: no CN III palsy  CN VI: no CN VI palsy  Nystagmus: none   Diplopia: none  Upgaze: normal  Downgaze: normal  Conjugate gaze: present    CN VII   Facial expression full, symmetric.     CN VIII   Hearing: intact    CN XII   CN XII normal.     Motor Exam   Muscle bulk: normal  Overall muscle tone: normal    Strength   Right biceps: 5/5  Left biceps: 5/5  Right triceps: 5/5  Left triceps: 5/5  Right interossei: 5/5  Left interossei: 5/5  Right quadriceps: 5/5  Left " "quadriceps: 5/5  Right anterior tibial: 5/5  Left anterior tibial: 5/5  Right posterior tibial: 5/5  Left posterior tibial: 5/5    Sensory Exam   Light touch normal.     Gait, Coordination, and Reflexes     Gait  Gait: normal    Coordination   Romberg: negative  Finger to nose coordination: normal  Heel to shin coordination: normal    Tremor   Resting tremor: absent  Action tremor: absent    Reflexes   Right brachioradialis: 2+  Left brachioradialis: 2+  Right biceps: 2+  Left biceps: 2+  Right triceps: 2+  Left triceps: 2+  Right patellar: 2+  Left patellar: 2+  Right achilles: 2+  Left achilles: 2+  Right : 2+  Left : 2+       Vital Signs:   Vitals:    01/31/23 0824   BP: 120/76   Pulse: 74   Temp: 98 °F (36.7 °C)   SpO2: 98%   Weight: 91.9 kg (202 lb 9.6 oz)   Height: 152.4 cm (60\")     Body mass index is 39.57 kg/m².     Results:   Imaging:   CT Head Without Contrast    Result Date: 12/14/2022  1. No acute intracranial process. MRI is more sensitive for the detection of acute nonhemorrhagic infarct. Report called to stroke navigator duarte ware at 12/14/2022 3:12 AM Electronically signed by:  Kev Louie M.D.  12/14/2022 1:15 AM Mountain Time    MRI Angiogram Head Without Contrast    Result Date: 12/14/2022  HEAD MRI: No intracranial abnormality identified. HEAD MRA: No intracranial arterial stenosis, occlusion, or aneurysm identified. NECK MRA: Unremarkable neck MRA. Electronically signed by:  Rolando Mathis M.D.  12/14/2022 5:04 AM Mountain Time    MRI Angiogram Neck With Contrast    Result Date: 12/14/2022  HEAD MRI: No intracranial abnormality identified. HEAD MRA: No intracranial arterial stenosis, occlusion, or aneurysm identified. NECK MRA: Unremarkable neck MRA. Electronically signed by:  Rolando Mathis M.D.  12/14/2022 5:04 AM Mountain Time    MRI Brain Without Contrast    Result Date: 12/14/2022  HEAD MRI: No intracranial abnormality identified. HEAD MRA: No intracranial " arterial stenosis, occlusion, or aneurysm identified. NECK MRA: Unremarkable neck MRA. Electronically signed by:  Rolando Mathis M.D.  12/14/2022 5:04 AM Mountain Time      MRI Angiogram Venogram Head    Result Date: 12/14/2022  No evidence of thrombosis. Electronically signed by:  Rolando Mathis M.D.  12/14/2022 4:55 AM Mountain Time        Assessment / Plan      Assessment/Plan:   Diagnoses and all orders for this visit:    1. Chronic migraine without aura without status migrainosus, not intractable (Primary)  Comments:  Cont botox as patient is receiving benefit.     Cont GBP per pain management, coreg, tylenol, w prn Ultram      Patient Education:       Reviewed medications, potential side effects and signs and symptoms to report. Discussed risk versus benefits of treatment plan with patient and/or family-including medications, labs and radiology that may be ordered. Addressed questions and concerns during visit. Patient and/or family verbalized understanding and agree with plan. Instructed to call the office with any questions and report to ER with any life-threatening symptoms.     Follow Up:   Return in about 6 months (around 7/31/2023) for Recheck.    During this visit the following were done:  Labs Reviewed []    Labs Ordered []    Radiology Reports Reviewed []    Radiology Ordered []    PCP Records Reviewed []    Referring Provider Records Reviewed []    ER Records Reviewed []    Hospital Records Reviewed []    History Obtained From Family []    Radiology Images Reviewed []    Other Reviewed [x]    Records Requested []      Nancy Kaba, KECIA, APRN

## 2023-02-06 ENCOUNTER — OFFICE VISIT (OUTPATIENT)
Dept: BEHAVIORAL HEALTH | Facility: CLINIC | Age: 38
End: 2023-02-06
Payer: COMMERCIAL

## 2023-02-06 DIAGNOSIS — R45.82 FEELING WORRIED: ICD-10-CM

## 2023-02-06 DIAGNOSIS — F33.0 MDD (MAJOR DEPRESSIVE DISORDER), RECURRENT EPISODE, MILD: Primary | ICD-10-CM

## 2023-02-06 DIAGNOSIS — R45.4 IRRITABILITY: ICD-10-CM

## 2023-02-06 PROCEDURE — 90834 PSYTX W PT 45 MINUTES: CPT | Performed by: PSYCHOLOGIST

## 2023-02-06 NOTE — PROGRESS NOTES
PROGRESS NOTE    Data:  Frances Hartman is a 37 y.o. female who met with the undersigned for a scheduled individual therapy session from 9:45 - 10:30pm.      Clinical Maneuvering/Intervention:      The pt talked about recent stressors including concerns about her mother's health and feeling stressed by unexpected requirements put on her at work. Stressors were processed individually and in detail. Venting of frustrations was conducted in order to help the pt feel less tense emotionally and gain insight into issues. Feelings were processed and validated several times in session. Perspective taking was conducted multiple times in order to help the pt feel less stuck, less overwhelmed, and see challenges as much more manageable. Active listening was conducted in order to help the pt make sense of stressors and start moving towards potential solutions. The pt was assisted with finding solutions based on existing skill-set and abilities.  She was assisted in noting her role in her stress with work and finding her own solutions to her challenges. Kind assertiveness was reviewed. Examples were given. The pt's strengths were identified in order to help identify abilities to use to better face/overcome challenges. The pt was assisted in recognizing progress in order to show encouragement and promote motivation to keep making positive changes in life. She is getting along better with her mother and setting good boundaries. She was assisted with letting go of irritability with work, going so far as to apply her ability to find the humor in it eventually. Some humor was used in session as it is one of the pt's coping skills. Humor was used too, to help the pt see things as less challenging and more manageable than they first seemed. Humor was used as well, to model use of the skill as a way to decrease tension ongoing. Homework was assigned tailored to pt's needs. The pt expressed gratitude for today's session. The pt  expressed gratitude for today's session.    Mental Status Exam  Hygiene:  good  Dress: normal  Attitude:  cooperative and proactive  Motor Activity: normal  Speech: normal  Mood:  Irritable and worried  Affect:  congruent  Thought Processes: normal  Thought Content:  normal  Suicidal Thoughts:  not endorsed  Homicidal Thoughts:  not endorsed  Crisis Safety Plan: not needed   Hallucinations:  none      Patient's Support Network Includes:  family, friends      Progress toward goal: there is evidence to suggest that she is becoming a stronger person over time.      Functional Status: moderate to high      Prognosis: good    Assessment      The pt presented to be struggling with depression, often manifested as irritability. She worries about her mother's health as of late, which leads to emotional distress as well.  Self-esteem seems to be improving as she is meeting personal goals and needs. She is intelligent, thoughtful, and witty person.       Plan      In order to diminish depression, worry, and irritability; she will give herself credit for her progress thus far: physical health, relationship with her mother, and improving in kind assertiveness (this week).    Misty Nava, PhD, LP

## 2023-02-09 ENCOUNTER — HOSPITAL ENCOUNTER (OUTPATIENT)
Dept: ONCOLOGY | Facility: HOSPITAL | Age: 38
Discharge: HOME | End: 2023-02-09
Admitting: PHYSICIAN ASSISTANT
Payer: COMMERCIAL

## 2023-02-09 VITALS
SYSTOLIC BLOOD PRESSURE: 128 MMHG | HEART RATE: 86 BPM | DIASTOLIC BLOOD PRESSURE: 86 MMHG | RESPIRATION RATE: 16 BRPM | WEIGHT: 199.7 LBS | BODY MASS INDEX: 39 KG/M2 | TEMPERATURE: 97.4 F

## 2023-02-09 DIAGNOSIS — R11.2 INTRACTABLE NAUSEA AND VOMITING: Primary | ICD-10-CM

## 2023-02-09 PROCEDURE — 96361 HYDRATE IV INFUSION ADD-ON: CPT

## 2023-02-09 PROCEDURE — 96360 HYDRATION IV INFUSION INIT: CPT

## 2023-02-09 RX ORDER — SODIUM CHLORIDE 9 MG/ML
1000 INJECTION, SOLUTION INTRAVENOUS ONCE
Status: CANCELLED
Start: 2023-02-09 | End: 2023-02-09

## 2023-02-09 RX ORDER — SODIUM CHLORIDE 9 MG/ML
1000 INJECTION, SOLUTION INTRAVENOUS ONCE
Status: COMPLETED | OUTPATIENT
Start: 2023-02-09 | End: 2023-02-09

## 2023-02-09 RX ADMIN — SODIUM CHLORIDE 1000 ML: 9 INJECTION, SOLUTION INTRAVENOUS at 07:41

## 2023-02-13 ENCOUNTER — OFFICE VISIT (OUTPATIENT)
Dept: BEHAVIORAL HEALTH | Facility: CLINIC | Age: 38
End: 2023-02-13
Payer: COMMERCIAL

## 2023-02-13 DIAGNOSIS — F33.0 MDD (MAJOR DEPRESSIVE DISORDER), RECURRENT EPISODE, MILD: Primary | ICD-10-CM

## 2023-02-13 DIAGNOSIS — R45.82 FEELING WORRIED: ICD-10-CM

## 2023-02-13 DIAGNOSIS — R45.4 IRRITABILITY: ICD-10-CM

## 2023-02-13 PROCEDURE — 90834 PSYTX W PT 45 MINUTES: CPT | Performed by: PSYCHOLOGIST

## 2023-02-13 NOTE — PROGRESS NOTES
PROGRESS NOTE    Data:  Frances Hartman is a 37 y.o. female who met with the undersigned for a scheduled individual therapy session from 10:30 - 11:15am.      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 Stressors were processed individually and in detail. Venting of frustrations was conducted in order to help the pt feel less tense emotionally and gain insight into issues. Feelings were processed and validated several times in session. Perspective taking was conducted multiple times in order to help the pt feel less stuck, less overwhelmed, and see challenges as much more manageable. Active listening was conducted in order to help the pt make sense of stressors and start moving towards potential solutions. The pt was assisted with finding solutions based on existing skill-set and abilities. She was assisted with applying her ability to be thoughtful and be driven in life to find a way to see current situations as less stressful. The pt's strengths were identified in order to help identify abilities to use to better face/overcome challenges. An action plan was designed to empower the pt to know what to do. Simple steps of one, two, three were laid out in order to help the pt feel more in control of things and feel less stressed.  Some humor was used in session as it is one of the pt's coping skills. Humor was used too, to help the pt see things as less challenging and more manageable than they first seemed. Humor was used as well, to model use of the skill as a way to decrease tension ongoing. Homework was assigned tailored to pt's needs. The pt expressed gratitude for today's session.    Mental Status Exam  Hygiene:  good  Dress: normal  Attitude:  cooperative and proactive  Motor Activity: normal  Speech: normal  Mood:  Irritable and  worried  Affect:  congruent  Thought Processes: normal  Thought Content:  normal  Suicidal Thoughts:  not endorsed  Homicidal Thoughts:  not endorsed  Crisis Safety Plan: not needed   Hallucinations:  none      Patient's Support Network Includes:  family, friends      Progress toward goal: there is evidence to suggest that she is becoming a stronger person over time.      Functional Status: moderate to high      Prognosis: good    Assessment      The pt presented to be struggling with depression, often manifested as irritability. She worries about her mother's health as of late, which leads to emotional distress as well.  Self-esteem seems to be improving as she is meeting personal goals and needs. She is intelligent, thoughtful, and witty person.       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    Misty Nava, PhD, LP

## 2023-03-06 DIAGNOSIS — E11.9 TYPE 2 DIABETES MELLITUS WITHOUT COMPLICATION, WITHOUT LONG-TERM CURRENT USE OF INSULIN: ICD-10-CM

## 2023-03-06 RX ORDER — METFORMIN HYDROCHLORIDE 500 MG/1
500 TABLET, EXTENDED RELEASE ORAL
Qty: 90 TABLET | Refills: 3 | Status: SHIPPED | OUTPATIENT
Start: 2023-03-06

## 2023-03-07 DIAGNOSIS — I10 ESSENTIAL HYPERTENSION: ICD-10-CM

## 2023-03-07 RX ORDER — CARVEDILOL 6.25 MG/1
6.25 TABLET ORAL NIGHTLY
Qty: 90 TABLET | Refills: 1 | Status: SHIPPED | OUTPATIENT
Start: 2023-03-07

## 2023-03-07 NOTE — TELEPHONE ENCOUNTER
Caller: Frances Hartman    Relationship: Self    Best call back number: [phone]    Requested Prescriptions:   Requested Prescriptions     Pending Prescriptions Disp Refills   • carvedilol (COREG) 6.25 MG tablet 270 tablet 0     Sig: Take 1 tab in AM and 2 tab in PM        Pharmacy where request should be sent: [facility] [address] [facility] - [phone]  - [phone] FX     Additional details provided by patient: PATIENT HAS A LITTLE MORE THAN 3 DAYS. PATIENT CHANGED HOW SHE IS TAKING THIS WITH HER PCP. SHE TAKES THIS ONCE DAILY.     Does the patient have less than a 3 day supply:  [] Yes  [x] No    Would you like a call back once the refill request has been completed: [] Yes [x] No    If the office needs to give you a call back, can they leave a voicemail: [x] Yes [] No    Ana Grayson Rep   03/07/23 11:25 EST

## 2023-03-08 ENCOUNTER — HOSPITAL ENCOUNTER (EMERGENCY)
Facility: HOSPITAL | Age: 38
Discharge: HOME | End: 2023-03-08
Attending: EMERGENCY MEDICINE
Payer: COMMERCIAL

## 2023-03-08 VITALS
OXYGEN SATURATION: 100 % | DIASTOLIC BLOOD PRESSURE: 85 MMHG | HEIGHT: 60 IN | WEIGHT: 199 LBS | SYSTOLIC BLOOD PRESSURE: 135 MMHG | HEART RATE: 84 BPM | BODY MASS INDEX: 39.07 KG/M2 | TEMPERATURE: 98.6 F | RESPIRATION RATE: 16 BRPM

## 2023-03-08 DIAGNOSIS — R10.9 ABDOMINAL PAIN, UNSPECIFIED ABDOMINAL LOCATION: Primary | ICD-10-CM

## 2023-03-08 PROCEDURE — 99211 OFF/OP EST MAY X REQ PHY/QHP: CPT

## 2023-03-09 NOTE — ED PROVIDER NOTES
Subjective  History of Present Illness:  Patient eloped prior to being evaluated by provider.  No involvement patient care  Review of Systems    Past Medical History:   Diagnosis Date   • Allergic     Birth   • Anxiety    • Arthritis    • Asthma     prn inhalers, does not follow w/ pulmonary   • Cholelithiasis 2000   • Chronic back pain     previously followed w/ pain management, now just prn Tylenol + Gabapentin   • Chronic deep vein thrombosis (DVT) of femoral vein of right lower extremity ([facility]) 09/10/2021   • Clotting disorder ([facility]) 2015   • Cluster headache    • Depression    • Diabetes mellitus ([facility])     Type 2, dx 2014, never on insulin, A1C 7.6   • Diabetes mellitus ([facility])     Type II, dx 2014, never on insulin, A1C 7.6    • Dyspepsia    • Dyspnea on exertion    • Dysuria 03/07/2022    Dysuria and hematuria x 2 weeks. 3/7/22 urine culture sent. Rx Macrobid 100 mg twice daily for 7 days. Patient did not tolerate Macrobid well due to GI upset. Urine culture was negative.   • Fatigue    • Gastroparesis    • GERD (gastroesophageal reflux disease)    • Headache, tension-type    • Heartburn     chronic, prn TUMS, denies prior eval   • Hirsutism    • History of medical problems     PCOS   • Hx of radiation therapy     for treatments of Keloids   • Hypertension    • Irregular menses     infrequent spotting   • Leiomyoma, subserous     possible peduculated f3-4 cm fibroid on u/s at [facility]   • Low back pain    • Migraines     botox q3 months, following Neurology @ [facility]   • Morbid obesity ([facility])     s/p sleeve gastrectomy   • Peripheral edema    • Polycystic ovary syndrome 2011   • Seasonal allergies    • Slow to wake up after anesthesia    • Urinary tract infection    • Visual impairment     Astigmatism, Nearsightedness       Allergies:    Capsicum annuum extract & derivative (bell pepper) [capsicum], Ibuprofen, Macrobid [nitrofurantoin], Peanut-containing drug products, Hydrochlorothiazide, Monosodium glutamate, Oatmeal,  Doxycycline monohydrate, Amitriptyline, Aspartame, Augmentin [amoxicillin-pot clavulanate], Codeine, Diclofenac, Doxycycline, Eggs or egg-derived products, and Naproxen      Past Surgical History:   Procedure Laterality Date   • ABDOMINAL SURGERY  06/15/2021    Bariatric Sleeve Surgery   • BARIATRIC SURGERY     • COLONOSCOPY  December 2022   • COSMETIC SURGERY      Multiple Keloid surgeries   • ENDOSCOPY N/A 06/15/2021    Procedure: ESOPHAGOGASTRODUODENOSCOPY;  Surgeon: Ayaka Andre MD;  [facility]:  [facility];  Service: Robotics - DaVinci;  Laterality: N/A;   • ENDOSCOPY N/A 10/28/2021    Procedure: ESOPHAGOGASTRODUODENOSCOPY WITH ACHALASIA BALLOON DILATATION;  Surgeon: Jase Hernandez MD;  Location:  [facility];  Service: Gastroenterology;  Laterality: N/A;  58 F DILATOR   • ENDOSCOPY N/A 11/15/2021    Procedure: ESOPHAGOGASTRODUODENOSCOPY WITH DILATATION;  Surgeon: Jase Hernandez MD;  Location:  [facility];  Service: Gastroenterology;  Laterality: N/A;  achalasia balloon    • ENDOSCOPY N/A 11/24/2021    Procedure: ESOPHAGOGASTRODUODENOSCOPY [facility] DUODENAL POLYPECTOPMY AND NASAL JEJUNAL TUBE PLACEMENT;  Surgeon: Jase Hernandez MD;  Location:  [facility];  Service: Gastroenterology;  Laterality: N/A;  placement of feeding tube, checked position with KUB   • GASTRIC RESTRICTION SURGERY  2021    Sleeve   • GASTRIC SLEEVE LAPAROSCOPIC N/A 06/15/2021    Procedure: GASTRIC SLEEVE LAPAROSCOPIC WITH DAVINCI ROBOT, LAPROSCOPIC HIATAL HERNIA REPAIR WITH DAVINCI ROBOT;  Surgeon: Ayaka Andre MD;  Location:  [facility];  Service: Robotics - DaVinci;  Laterality: N/A;   • KELOID EXCISION      1990,1993,1999,2015,2016,2019   • LAPAROSCOPIC CHOLECYSTECTOMY  2000    for stones   • RADIOFREQUENCY ABLATION  2019    x 2  for pt back   • UMBILICAL HERNIA REPAIR  1990   • UPPER GASTROINTESTINAL ENDOSCOPY     • WISDOM TOOTH EXTRACTION  1994         Social History     Socioeconomic History   • Marital  "status: Single   Tobacco Use   • Smoking status: Never   • Smokeless tobacco: Never   Vaping Use   • Vaping Use: Never used   Substance and Sexual Activity   • Alcohol use: Not Currently     Comment: Very rarely drink socially. At most 2 drinks per month.   • Drug use: Never   • Sexual activity: Yes     Partners: Male     Birth control/protection: Condom, I.U.D., Coitus interruptus         Family History   Problem Relation Age of Onset   • Arthritis Mother    • Diabetes Mother    • Obesity Mother    • Arrhythmia Mother    • Hypertension Mother    • Asthma Mother    • Migraines Mother    • Dementia Father    • Heart attack Father    • Arrhythmia Father    • Heart disease Father    • Alcohol abuse Father    • Stroke Other         CVA   • Obesity Other    • Ovarian cancer Maternal Aunt         40's   • Breast cancer Paternal Aunt         30's   • Heart disease Other    • Hypertension Other    • Endometrial cancer Neg Hx    • Colon cancer Neg Hx    • Colon polyps Neg Hx    • Esophageal cancer Neg Hx        Objective  Physical Exam:  /85 (BP Location: Left arm, Patient Position: Sitting)   Pulse 84   Temp 98.6 °F (37 °C) (Oral)   Resp 16   Ht 152.4 cm (60\")   Wt 90.3 kg (199 lb)   LMP 02/24/2023   SpO2 100%   BMI 38.86 kg/m²      Physical Exam      Procedures    ED Course:         Lab Results (last 24 hours)     ** No results found for the last 24 hours. **           No radiology results from the last 24 hrs       MDM  Patient eloped prior awaiting provider evaluation.      Final diagnoses:   Abdominal pain, unspecified abdominal location        Luis Ford, DO  03/22/23 0412    "

## 2023-03-12 ENCOUNTER — TELEPHONE (OUTPATIENT)
Dept: URGENT CARE | Facility: CLINIC | Age: 38
End: 2023-03-12
Payer: COMMERCIAL

## 2023-03-12 DIAGNOSIS — B96.89 BV (BACTERIAL VAGINOSIS): Primary | ICD-10-CM

## 2023-03-12 DIAGNOSIS — N76.0 BV (BACTERIAL VAGINOSIS): Primary | ICD-10-CM

## 2023-03-12 RX ORDER — METRONIDAZOLE 500 MG/1
500 TABLET ORAL 2 TIMES DAILY
Qty: 14 TABLET | Refills: 0 | Status: SHIPPED | OUTPATIENT
Start: 2023-03-12 | End: 2023-03-21

## 2023-03-13 ENCOUNTER — TELEPHONE (OUTPATIENT)
Dept: URGENT CARE | Facility: CLINIC | Age: 38
End: 2023-03-13
Payer: COMMERCIAL

## 2023-03-13 NOTE — TELEPHONE ENCOUNTER
Returned call to patient  I explained what BV is.  I explained I do not think it is causing all of her symptoms but it could be a contributing factor.  She has an appt with a gynecologist 3-22-23.

## 2023-03-15 ENCOUNTER — TELEPHONE (OUTPATIENT)
Dept: URGENT CARE | Facility: CLINIC | Age: 38
End: 2023-03-15
Payer: COMMERCIAL

## 2023-03-15 DIAGNOSIS — N39.0 URINARY TRACT INFECTION WITHOUT HEMATURIA, SITE UNSPECIFIED: Primary | ICD-10-CM

## 2023-03-15 RX ORDER — CIPROFLOXACIN 500 MG/1
TABLET, FILM COATED ORAL
Qty: 14 TABLET | Refills: 0 | Status: SHIPPED | OUTPATIENT
Start: 2023-03-15 | End: 2023-04-05

## 2023-03-21 ENCOUNTER — OFFICE VISIT (OUTPATIENT)
Dept: FAMILY MEDICINE CLINIC | Facility: CLINIC | Age: 38
End: 2023-03-21
Payer: COMMERCIAL

## 2023-03-21 VITALS
WEIGHT: 204 LBS | OXYGEN SATURATION: 96 % | HEIGHT: 60 IN | SYSTOLIC BLOOD PRESSURE: 120 MMHG | DIASTOLIC BLOOD PRESSURE: 70 MMHG | HEART RATE: 86 BPM | BODY MASS INDEX: 40.05 KG/M2

## 2023-03-21 DIAGNOSIS — B37.9 ANTIBIOTIC-INDUCED YEAST INFECTION: ICD-10-CM

## 2023-03-21 DIAGNOSIS — N39.0 E. COLI UTI: ICD-10-CM

## 2023-03-21 DIAGNOSIS — L68.0 HIRSUTISM: ICD-10-CM

## 2023-03-21 DIAGNOSIS — E28.2 PCOS (POLYCYSTIC OVARIAN SYNDROME): Primary | ICD-10-CM

## 2023-03-21 DIAGNOSIS — I10 ESSENTIAL HYPERTENSION: ICD-10-CM

## 2023-03-21 DIAGNOSIS — B96.20 E. COLI UTI: ICD-10-CM

## 2023-03-21 DIAGNOSIS — T36.95XA ANTIBIOTIC-INDUCED YEAST INFECTION: ICD-10-CM

## 2023-03-21 PROCEDURE — 99214 OFFICE O/P EST MOD 30 MIN: CPT | Performed by: PHYSICIAN ASSISTANT

## 2023-03-21 RX ORDER — FLUCONAZOLE 150 MG/1
150 TABLET ORAL ONCE
Qty: 1 TABLET | Refills: 0 | Status: SHIPPED | OUTPATIENT
Start: 2023-03-21 | End: 2023-03-21

## 2023-03-21 NOTE — PROGRESS NOTES
Chief Complaint   Patient presents with   • Polycystic Ovary Syndrome     recheck   • Vaginal Bleeding     Went to urgent care, 3-9-23       Frances Hartman is a pleasant 37 y.o. female who is here for routine follow-up of PCOS.  Patient's spironolactone was increased from 25 mg to 50 mg.  She is tolerating this dose well.  Has had improvement in her PCOS symptoms.  She would like to increase her dose to 100 mg.  She is still taking IV fluid treatments and has one upcoming on Thursday.  These are ordered and managed by her bariatric surgeon.  She does not feel that the spironolactone dehydrates her or causes fluid loss.  She only takes Lasix very infrequently and it has been over a week since her last dose.    She was seen at [facility] for vaginal discharge and urinary symptoms.  Treated for BV.  She has completed metronidazole antibiotic.  Urine culture was positive for E. Coli.  Patient was prescribed ciprofloxacin but has not started this.    Past Medical History:   Diagnosis Date   • Allergic     Birth   • Anxiety    • Arthritis    • Asthma     prn inhalers, does not follow w/ pulmonary   • Cholelithiasis 2000   • Chronic back pain     previously followed w/ pain management, now just prn Tylenol + Gabapentin   • Chronic deep vein thrombosis (DVT) of femoral vein of right lower extremity ([facility]) 09/10/2021   • Clotting disorder ([facility]) 2015   • Cluster headache    • Depression    • Diabetes mellitus ([facility])     Type 2, dx 2014, never on insulin, A1C 7.6   • Diabetes mellitus ([facility])     Type II, dx 2014, never on insulin, A1C 7.6    • Dyspepsia    • Dyspnea on exertion    • Dysuria 03/07/2022    Dysuria and hematuria x 2 weeks. 3/7/22 urine culture sent. Rx Macrobid 100 mg twice daily for 7 days. Patient did not tolerate Macrobid well due to GI upset. Urine culture was negative.   • Fatigue    • Gastroparesis    • GERD (gastroesophageal reflux disease)    • Headache, tension-type    • Heartburn     chronic, prn TUMS,  denies prior eval   • Hirsutism    • History of medical problems     PCOS   • Hx of radiation therapy     for treatments of Keloids   • Hypertension    • Irregular menses     infrequent spotting   • Leiomyoma, subserous     possible peduculated f3-4 cm fibroid on u/s at [facility]   • Low back pain    • Migraines     botox q3 months, following Neurology @ [facility]   • Morbid obesity ([facility])     s/p sleeve gastrectomy   • Peripheral edema    • Polycystic ovary syndrome 2011   • Seasonal allergies    • Slow to wake up after anesthesia    • Urinary tract infection    • Visual impairment     Astigmatism, Nearsightedness       Past Surgical History:   Procedure Laterality Date   • ABDOMINAL SURGERY  06/15/2021    Bariatric Sleeve Surgery   • BARIATRIC SURGERY     • COLONOSCOPY  December 2022   • COSMETIC SURGERY      Multiple Keloid surgeries   • ENDOSCOPY N/A 06/15/2021    Procedure: ESOPHAGOGASTRODUODENOSCOPY;  Surgeon: Ayaka Andre MD;  [facility]:  [facility];  Service: Robotics - DaVinci;  Laterality: N/A;   • ENDOSCOPY N/A 10/28/2021    Procedure: ESOPHAGOGASTRODUODENOSCOPY WITH ACHALASIA BALLOON DILATATION;  Surgeon: Jase Hernandez MD;  [facility]:  [facility];  Service: Gastroenterology;  Laterality: N/A;  58 F DILATOR   • ENDOSCOPY N/A 11/15/2021    Procedure: ESOPHAGOGASTRODUODENOSCOPY WITH DILATATION;  Surgeon: Jase Hernandez MD;  Location:  [facility];  Service: Gastroenterology;  Laterality: N/A;  achalasia balloon    • ENDOSCOPY N/A 11/24/2021    Procedure: ESOPHAGOGASTRODUODENOSCOPY [facility] DUODENAL POLYPECTOPMY AND NASAL JEJUNAL TUBE PLACEMENT;  Surgeon: Jase Hernandez MD;  Location:  [facility];  Service: Gastroenterology;  Laterality: N/A;  placement of feeding tube, checked position with KUB   • GASTRIC RESTRICTION SURGERY  2021    Sleeve   • GASTRIC SLEEVE LAPAROSCOPIC N/A 06/15/2021    Procedure: GASTRIC SLEEVE LAPAROSCOPIC WITH [employer] [place] ROBOT, LAPROSCOPIC HIATAL HERNIA REPAIR WITH DAVINCI  "ROBOT;  Surgeon: Ayaka Andre MD;  [facility];  Service: Robotics - DaVinci;  Laterality: N/A;   • KELOID EXCISION      1990,1993,1999,2015,2016,2019   • LAPAROSCOPIC CHOLECYSTECTOMY  2000    for stones   • RADIOFREQUENCY ABLATION  2019    x 2  for pt back   • UMBILICAL HERNIA REPAIR  1990   • UPPER GASTROINTESTINAL ENDOSCOPY     • WISDOM TOOTH EXTRACTION  1994       Family History   Problem Relation Age of Onset   • Arthritis Mother    • Diabetes Mother    • Obesity Mother    • Arrhythmia Mother    • Hypertension Mother    • Asthma Mother    • Migraines Mother    • Dementia Father    • Heart attack Father    • Arrhythmia Father    • Heart disease Father    • Alcohol abuse Father    • Stroke Other         CVA   • Obesity Other    • Ovarian cancer Maternal Aunt         40's   • Breast cancer Paternal Aunt         30's   • Heart disease Other    • Hypertension Other    • Endometrial cancer Neg Hx    • Colon cancer Neg Hx    • Colon polyps Neg Hx    • Esophageal cancer Neg Hx        Social History     Socioeconomic History   • Marital status: Single   Tobacco Use   • Smoking status: Never   • Smokeless tobacco: Never   Vaping Use   • Vaping Use: Never used   Substance and Sexual Activity   • Alcohol use: Not Currently     Comment: Very rarely drink socially. At most 2 drinks per month.   • Drug use: Never   • Sexual activity: Yes     Partners: Male     Birth control/protection: Condom, I.U.D., Coitus interruptus       Allergies   Allergen Reactions   • Capsicum Annuum Extract & Derivative (Bell Pepper) [Capsicum] Anaphylaxis   • Ibuprofen Other (See Comments)     \"makes me retain fluid and eventually go into CHF\"   • Macrobid [Nitrofurantoin] Nausea Only   • Peanut-Containing Drug Products Anaphylaxis   • Hydrochlorothiazide Swelling     eventually causes CHF    • Monosodium Glutamate Swelling and Headache   • Oatmeal Other (See Comments)     Dx on allergy testing   • Doxycycline Monohydrate Other " "(See Comments)   • Amitriptyline Mental Status Change     memory gaps + neuropathy + hair loss   • Aspartame Nausea Only   • Augmentin [Amoxicillin-Pot Clavulanate] Other (See Comments)     Syncope, not sure if allergic to cephalosporins.   • Codeine Headache      Can tolerate hydrocodone, no other adverse reactions to other narcotics.   • Diclofenac Headache   • Doxycycline Rash and Hallucinations   • Eggs Or Egg-Derived Products Other (See Comments)     dx on allergy testing, but does not completely avoid   • Naproxen Other (See Comments)     \"blackout\"       ROS  Review of Systems   Constitutional: Negative for fever and unexpected weight loss.   Gastrointestinal: Positive for abdominal pain and nausea. Negative for constipation, diarrhea and vomiting.   Genitourinary: Negative for dysuria, frequency, hematuria and vaginal discharge.   Musculoskeletal: Negative for arthralgias and myalgias.       Vitals:    03/21/23 0953   BP: 120/70   Pulse: 86   SpO2: 96%   Weight: 92.5 kg (204 lb)   Height: 152.4 cm (60\")     Body mass index is 39.84 kg/m².    Current Outpatient Medications on File Prior to Visit   Medication Sig Dispense Refill   • acetaminophen (TYLENOL) 500 MG tablet Take 1-2 tablets by mouth Every 6 (Six) Hours As Needed for Mild Pain.     • albuterol sulfate  (90 Base) MCG/ACT inhaler Inhale 2 puffs Every 4 (Four) Hours As Needed for Wheezing or Shortness of Air. 18 g 5   • Blood Glucose Monitoring Suppl (ONE TOUCH ULTRA 2) w/Device kit      • carvedilol (COREG) 6.25 MG tablet Take 1 tablet by mouth Every Night. 90 tablet 1   • ciprofloxacin (CIPRO) 500 MG tablet 1 po bid 14 tablet 0   • cyclobenzaprine (FLEXERIL) 10 MG tablet Take 1 tablet by mouth 3 (Three) Times a Day As Needed for Muscle Spasms.     • diphenhydrAMINE (BENADRYL) 25 mg capsule Take 1 capsule by mouth Every 6 (Six) Hours As Needed for Itching or Sleep.     • furosemide (LASIX) 20 MG tablet TAKE ONE TO TWO TABLETS BY MOUTH EVERY " NIGHTLY (Patient taking differently: Take 1 tablet by mouth As Needed.) 135 tablet 2   • gabapentin (NEURONTIN) 300 MG capsule Take 2 capsules by mouth Daily.     • glucose blood test strip USe BID to test blood sugar [id number] 100 each 3   • HYDROcodone-acetaminophen (NORCO) 5-325 MG per tablet Take 1 tablet by mouth Every 6 (Six) Hours As Needed for Severe Pain . 12 tablet 0   • Lancets 33G misc 1 Each/kg 2 (two) times a day. To test blood sugars DxE11.65 100 each 2   • levonorgestrel (MIRENA) 20 MCG/24HR IUD 1 each by Intrauterine route.     • loratadine (CLARITIN) 10 MG tablet TAKE ONE TABLET BY MOUTH DAILY 30 tablet 2   • Melatonin 5 MG lozenge      • metFORMIN ER (GLUCOPHAGE-XR) 500 MG 24 hr tablet Take 1 tablet by mouth Daily With Dinner. 90 tablet 3   • multivitamin with minerals tablet tablet Take 1 tablet by mouth Daily.     • OnabotulinumtoxinA (Botox) 200 units reconstituted solution FOR . PHYSICIAN TO INJECT UP  UNITS INTRAMUSCULARLY INTO HEAD, NECK AND SHOULDERS EVERY 90 DAYS. 1 each 3   • ondansetron ODT (ZOFRAN-ODT) 8 MG disintegrating tablet Place 1 tablet on the tongue Every 8 (Eight) Hours As Needed for Nausea or Vomiting. 21 tablet 0   • simethicone (Gas-X) 80 MG chewable tablet Chew 1 tablet Every 6 (Six) Hours As Needed for Flatulence. 10 tablet 0   • spironolactone (Aldactone) 50 MG tablet Take 1 tablet by mouth Daily. 90 tablet 1   • traMADol (ULTRAM) 50 MG tablet Take 1 tablet by mouth Every 6 (Six) Hours As Needed for Moderate Pain.     • vitamin C (ASCORBIC ACID) 250 MG tablet Take 1 tablet by mouth Daily.     • vitamin D3 125 MCG (5000 UT) capsule capsule Take 1 capsule by mouth Daily.     • [DISCONTINUED] metroNIDAZOLE (FLAGYL) 500 MG tablet Take 1 tablet by mouth 2 (Two) Times a Day. 14 tablet 0     Current Facility-Administered Medications on File Prior to Visit   Medication Dose Route Frequency Provider Last Rate Last Admin   • OnabotulinumtoxinA 200 Units   200 Units Intramuscular Q3 Months Shiela Coelho APRN   200 Units at 01/23/23 1200       Results for orders placed or performed during the hospital encounter of 03/09/23   BV/YEAST/STI PANEL, LEXAR - Swab, Vagina    Specimen: Vagina; Swab   Result Value Ref Range    Candida Albicans, LORETTA Not Detected 36    Chlamydia Trachomatis DNA, SDA Not Detected 36    Gardnerella vaginosis Detected (C) 36    Neisseria gonorrhoeae PCR Not Detected 36    Trich vag by LORETTA Not Detected 36    Atopobium Vaginae Detected (C) 36    BVAB 2 Detected (C) 36    Iva Glabrata, LORETTA Not Detected 36    Candida parapsilosis, LORETTA Not Detected 36    Megasphaera 1 Detected (C) 36   Urine Culture - Urine, Urine, Clean Catch    Specimen: Urine, Clean Catch   Result Value Ref Range    Urine Culture Final report (A)     Result 1 Escherichia coli (A)     Susceptibility Testing Comment    POC Urinalysis Dipstick, Multipro (Automated Dipstick)    Specimen: Urine   Result Value Ref Range    Color Yellow     Clarity, UA Slightly Cloudy (A)     Glucose, UA Negative mg/dL    Bilirubin Negative     Ketones, UA Negative     Specific Gravity  1.025 1.005 - 1.030    Blood, UA Negative     pH, Urine 5.5 5.0 - 8.0    Protein, POC Negative mg/dL    Urobilinogen, UA 0.2 E.U./dL     Nitrite, UA Negative     Leukocytes Trace (A)    POC Pregnancy, Urine    Specimen: Urine   Result Value Ref Range    HCG, Urine, QL Negative     Lot Number [id number]     Internal Positive Control Passed     Internal Negative Control Passed     Expiration Date 1/31/2024        PE    Physical Exam  Vitals reviewed.   Constitutional:       General: She is not in acute distress.     Appearance: Normal appearance. She is well-developed. She is obese. She is not ill-appearing or diaphoretic.   HENT:      Head: Normocephalic and atraumatic.   Eyes:      Extraocular Movements: Extraocular movements intact.      Conjunctiva/sclera: Conjunctivae normal.   Cardiovascular:      Rate and  Rhythm: Normal rate and regular rhythm.      Heart sounds: Normal heart sounds.   Pulmonary:      Effort: No respiratory distress.   Musculoskeletal:         General: Normal range of motion.      Cervical back: Normal range of motion.      Right lower leg: No edema.      Left lower leg: No edema.   Skin:     General: Skin is warm.      Findings: No erythema or rash.   Neurological:      General: No focal deficit present.      Mental Status: She is alert.   Psychiatric:         Attention and Perception: She is attentive.         Mood and Affect: Mood normal.         Speech: Speech normal.         Behavior: Behavior normal. Behavior is cooperative.         Thought Content: Thought content normal.         Judgment: Judgment normal.         A/P    Diagnoses and all orders for this visit:    1. PCOS (polycystic ovarian syndrome) (Primary)  2. Hirsutism  Continue on spironolactone 50 mg daily and metformin 500 mg daily.  Follow-up with endocrinology, Dr. Aden about further management of PCOS and increasing spironolactone.  Will defer to her.    3. Antibiotic-induced yeast infection  -     fluconazole (Diflucan) 150 MG tablet; Take 1 tablet by mouth 1 (One) Time for 1 dose.  Dispense: 1 tablet; Refill: 0  Will prescribe diflucan to take after completing ciprofloxacin.    4. Essential hypertension  Stable, well-controlled.  Compliant on medication.    5. E. coli UTI  Diagnosed at [facility].  Prescribed ciprofloxacin.  Recommend patient start antibiotic.  Call if she has difficulty tolerating medicine.       Plan of care reviewed with patient at the conclusion of today's visit. Education was provided regarding diagnosis, management and any prescribed or recommended OTC medications.  Patient verbalizes understanding of and agreement with management plan.    Dictated Utilizing Dragon Dictation     Please note that portions of this note were completed with a voice recognition program.     Part of this note may be an electronic  transcription/translation of spoken language to printed text using the Dragon Dictation System.    Return in about 8 months (around 11/6/2023) for Annual physical.     Lesli Ramirez PA-C  Answers for HPI/ROS submitted by the patient on 3/21/2023  What is the primary reason for your visit?: Abdominal Pain  Chronicity: new  Onset: 1 to 4 weeks ago  Onset quality: sudden  Frequency: daily  Episode duration: 12 Hours  Progression since onset: waxing and waning  Pain location: LLQ, RLQ, suprapubic region, left flank, right flank  Pain - numeric: 7/10  Pain quality: cramping, sharp  Radiates to: LLQ, RLQ, back  anorexia: Yes  belching: No  flatus: Yes  headaches: Yes  hematochezia: No  melena: No  Aggravated by: certain positions, movement  Relieved by: being still, certain positions, recumbency

## 2023-03-22 ENCOUNTER — OFFICE VISIT (OUTPATIENT)
Dept: OBSTETRICS AND GYNECOLOGY | Facility: CLINIC | Age: 38
End: 2023-03-22
Payer: COMMERCIAL

## 2023-03-22 VITALS
HEIGHT: 60 IN | BODY MASS INDEX: 40.25 KG/M2 | DIASTOLIC BLOOD PRESSURE: 80 MMHG | WEIGHT: 205 LBS | SYSTOLIC BLOOD PRESSURE: 112 MMHG

## 2023-03-22 DIAGNOSIS — N30.00 ACUTE CYSTITIS WITHOUT HEMATURIA: ICD-10-CM

## 2023-03-22 DIAGNOSIS — N93.9 ABNORMAL UTERINE BLEEDING: ICD-10-CM

## 2023-03-22 DIAGNOSIS — L68.0 HIRSUTISM: ICD-10-CM

## 2023-03-22 DIAGNOSIS — R10.30 LOWER ABDOMINAL PAIN: ICD-10-CM

## 2023-03-22 DIAGNOSIS — D25.2 LEIOMYOMA, SUBSEROUS: ICD-10-CM

## 2023-03-22 DIAGNOSIS — Z30.431 IUD CHECK UP: Primary | ICD-10-CM

## 2023-03-22 DIAGNOSIS — Z98.84 S/P BARIATRIC SURGERY: ICD-10-CM

## 2023-03-22 DIAGNOSIS — Z12.4 SCREENING FOR CERVICAL CANCER: ICD-10-CM

## 2023-03-22 DIAGNOSIS — E66.9 OBESITY (BMI 30-39.9): ICD-10-CM

## 2023-03-22 DIAGNOSIS — E28.2 PCOS (POLYCYSTIC OVARIAN SYNDROME): ICD-10-CM

## 2023-03-22 PROCEDURE — 99214 OFFICE O/P EST MOD 30 MIN: CPT | Performed by: OBSTETRICS & GYNECOLOGY

## 2023-03-22 RX ORDER — NITROFURANTOIN 25; 75 MG/1; MG/1
100 CAPSULE ORAL 2 TIMES DAILY
Qty: 14 CAPSULE | Refills: 0 | Status: SHIPPED | OUTPATIENT
Start: 2023-03-22 | End: 2023-03-29

## 2023-03-22 RX ORDER — CALCIUM CARBONATE 200(500)MG
1 TABLET,CHEWABLE ORAL DAILY
COMMUNITY

## 2023-03-22 NOTE — PROGRESS NOTES
"        Gynecologic Exam (Pelvic pain)        HPI  Frances Hartman is a 37 y.o. female, , who presents for initial evaluation of pelvic pain.      The pain is located in the lower abdominal area, left upper quadrant and left lower quadrant and it does radiate to lower back. She has experienced this problem for 3 weeks. She describes the pain as aching, sharp, stabbing and pressure and it occurs several times daily. She rates her pain score as a 8/10 when at its worst. Patient notes aggravating factors include upright position and type of mobility and alleviating factors are heating pad, warm bath tramadol.  The patient has been previously been evaluated for pelvic pain at [facility] and treated for bacterial vaginosis and medicated with flagyl.  The patient is sexually active. She has not had new partners.  Patient has a Mirena IUD and hiply has no menses.    Patient states she was diagnosed with bacterial vaginosis and E. coli urinary tract infection at urgent care center.  He was given a prescription for Flagyl which she took for bacterial vaginosis but she did not start the ciprofloxacin.  Patient had concerns about taking Cipro and would like to take try Macrodantin instead.  She denies any dysuria or frequency or urgency but she has having diffuse lower back pain.  Did the patient have u/s today? No.    Her last LMP was Patient's last menstrual period was 2023..  Periods are rare, and irregular \"spotting\" and lasting 2 days on average.   Dysmenorrhea:severe, occurring throughout cycle.      Problems with GI: yes - bariatric surgery, dehydration, heart burn.    History of urinary disease: yes - UTC with lane two weeks ago  Concern for Anxiety or Depression: yes anxiety  Exercises Regularly: no  Tobacco Usage?: No     Additional OB/GYN History   Last Pap :   Last Completed Pap Smear          PAP SMEAR (Every 3 Years) Next due on 2021  SCANNED - PAP SMEAR    2019  Done - Dr." Kira                OB History        9    Para        Term   0            AB        Living   0       SAB        IAB        Ectopic        Molar        Multiple        Live Births                      Current Outpatient Medications:   •  acetaminophen (TYLENOL) 500 MG tablet, Take 1-2 tablets by mouth Every 6 (Six) Hours As Needed for Mild Pain., Disp: , Rfl:   •  albuterol sulfate  (90 Base) MCG/ACT inhaler, Inhale 2 puffs Every 4 (Four) Hours As Needed for Wheezing or Shortness of Air., Disp: 18 g, Rfl: 5  •  Blood Glucose Monitoring Suppl (ONE TOUCH ULTRA 2) w/Device kit, , Disp: , Rfl:   •  calcium carbonate (TUMS) 500 MG chewable tablet, Chew 1 tablet Daily., Disp: , Rfl:   •  carvedilol (COREG) 6.25 MG tablet, Take 1 tablet by mouth Every Night., Disp: 90 tablet, Rfl: 1  •  cyclobenzaprine (FLEXERIL) 10 MG tablet, Take 1 tablet by mouth 3 (Three) Times a Day As Needed for Muscle Spasms., Disp: , Rfl:   •  diphenhydrAMINE (BENADRYL) 25 mg capsule, Take 1 capsule by mouth Every 6 (Six) Hours As Needed for Itching or Sleep., Disp: , Rfl:   •  furosemide (LASIX) 20 MG tablet, TAKE ONE TO TWO TABLETS BY MOUTH EVERY NIGHTLY (Patient taking differently: Take 1 tablet by mouth As Needed.), Disp: 135 tablet, Rfl: 2  •  gabapentin (NEURONTIN) 300 MG capsule, Take 2 capsules by mouth Daily., Disp: , Rfl:   •  glucose blood test strip, USe BID to test blood sugar DX.e11.65, Disp: 100 each, Rfl: 3  •  HYDROcodone-acetaminophen (NORCO) 5-325 MG per tablet, Take 1 tablet by mouth Every 6 (Six) Hours As Needed for Severe Pain ., Disp: 12 tablet, Rfl: 0  •  Lancets 33G misc, 1 Each/kg 2 (two) times a day. To test blood sugars DxE11.65, Disp: 100 each, Rfl: 2  •  levonorgestrel (MIRENA) 20 MCG/24HR IUD, 1 each by Intrauterine route., Disp: , Rfl:   •  loratadine (CLARITIN) 10 MG tablet, TAKE ONE TABLET BY MOUTH DAILY, Disp: 30 tablet, Rfl: 2  •  Melatonin 5 MG lozenge, , Disp: , Rfl:   •  metFORMIN ER  (GLUCOPHAGE-XR) 500 MG 24 hr tablet, Take 1 tablet by mouth Daily With Dinner., Disp: 90 tablet, Rfl: 3  •  multivitamin with minerals tablet tablet, Take 1 tablet by mouth Daily., Disp: , Rfl:   •  OnabotulinumtoxinA (Botox) 200 units reconstituted solution, FOR . PHYSICIAN TO INJECT UP  UNITS INTRAMUSCULARLY INTO HEAD, NECK AND SHOULDERS EVERY 90 DAYS., Disp: 1 each, Rfl: 3  •  ondansetron ODT (ZOFRAN-ODT) 8 MG disintegrating tablet, Place 1 tablet on the tongue Every 8 (Eight) Hours As Needed for Nausea or Vomiting., Disp: 21 tablet, Rfl: 0  •  simethicone (Gas-X) 80 MG chewable tablet, Chew 1 tablet Every 6 (Six) Hours As Needed for Flatulence., Disp: 10 tablet, Rfl: 0  •  spironolactone (Aldactone) 50 MG tablet, Take 1 tablet by mouth Daily., Disp: 90 tablet, Rfl: 1  •  traMADol (ULTRAM) 50 MG tablet, Take 1 tablet by mouth Every 6 (Six) Hours As Needed for Moderate Pain., Disp: , Rfl:   •  vitamin C (ASCORBIC ACID) 250 MG tablet, Take 1 tablet by mouth Daily., Disp: , Rfl:   •  vitamin D3 125 MCG (5000 UT) capsule capsule, Take 1 capsule by mouth Daily., Disp: , Rfl:   •  ciprofloxacin (CIPRO) 500 MG tablet, 1 po bid (Patient not taking: Reported on 3/22/2023), Disp: 14 tablet, Rfl: 0  •  nitrofurantoin, macrocrystal-monohydrate, (Macrobid) 100 MG capsule, Take 1 capsule by mouth 2 (Two) Times a Day for 7 days., Disp: 14 capsule, Rfl: 0    Current Facility-Administered Medications:   •  OnabotulinumtoxinA 200 Units, 200 Units, Intramuscular, Q3 Months, Shiela Coelho APRN, 200 Units at 01/23/23 1200     Past Medical History:   Diagnosis Date   • Allergic     Birth   • Anxiety    • Arthritis    • Asthma     prn inhalers, does not follow w/ pulmonary   • Cholelithiasis 2000   • Chronic back pain     previously followed w/ pain management, now just prn Tylenol + Gabapentin   • Chronic deep vein thrombosis (DVT) of femoral vein of right lower extremity (HCC) 09/10/2021   •  Clotting disorder (HCC) 2015   • Cluster headache    • Depression    • Diabetes mellitus (HCC)     Type 2, dx 2014, never on insulin, A1C 7.6   • Diabetes mellitus (HCC)     Type II, dx 2014, never on insulin, A1C 7.6    • Dyspepsia    • Dyspnea on exertion    • Dysuria 03/07/2022    Dysuria and hematuria x 2 weeks. 3/7/22 urine culture sent. Rx Macrobid 100 mg twice daily for 7 days. Patient did not tolerate Macrobid well due to GI upset. Urine culture was negative.   • Fatigue    • Gastroparesis    • GERD (gastroesophageal reflux disease)    • Headache, tension-type    • Heartburn     chronic, prn TUMS, denies prior eval   • Hirsutism    • History of medical problems     PCOS   • Hx of radiation therapy     for treatments of Keloids   • Hypertension    • Irregular menses     infrequent spotting   • Leiomyoma, subserous     possible peduculated f3-4 cm fibroid on u/s at [facility]   • Low back pain    • Migraines     botox q3 months, following Neurology @ [facility]   • Morbid obesity ([facility])     s/p sleeve gastrectomy   • Peripheral edema    • Polycystic ovary syndrome 2011   • Seasonal allergies    • Slow to wake up after anesthesia    • Urinary tract infection    • Visual impairment     Astigmatism, Nearsightedness        Past Surgical History:   Procedure Laterality Date   • ABDOMINAL SURGERY  06/15/2021    Bariatric Sleeve Surgery   • BARIATRIC SURGERY     • COLONOSCOPY  December 2022   • COSMETIC SURGERY      Multiple Keloid surgeries   • ENDOSCOPY N/A 06/15/2021    Procedure: ESOPHAGOGASTRODUODENOSCOPY;  Surgeon: Ayaka Andre MD;  Location: [facility] OR;  Service: Robotics - DaVinci;  Laterality: N/A;   • ENDOSCOPY N/A 10/28/2021    Procedure: ESOPHAGOGASTRODUODENOSCOPY WITH ACHALASIA BALLOON DILATATION;  Surgeon: Jase Hernandez MD;  Location:  [facility];  Service: Gastroenterology;  Laterality: N/A;  58 F DILATOR   • ENDOSCOPY N/A 11/15/2021    Procedure: ESOPHAGOGASTRODUODENOSCOPY WITH DILATATION;  Surgeon:  "Jase Hernandez MD;  Location:  [facility];  Service: Gastroenterology;  Laterality: N/A;  achalasia balloon    • ENDOSCOPY N/A 11/24/2021    Procedure: ESOPHAGOGASTRODUODENOSCOPY WTH DUODENAL POLYPECTOPMY AND NASAL JEJUNAL TUBE PLACEMENT;  Surgeon: Jase Hernandez MD;  Location:  [facility];  Service: Gastroenterology;  Laterality: N/A;  placement of feeding tube, checked position with KUB   • GASTRIC RESTRICTION SURGERY  2021    Sleeve   • GASTRIC SLEEVE LAPAROSCOPIC N/A 06/15/2021    Procedure: GASTRIC SLEEVE LAPAROSCOPIC WITH DAVINCI ROBOT, LAPROSCOPIC HIATAL HERNIA REPAIR WITH DAVINCI ROBOT;  Surgeon: Ayaka Andre MD;  Location:  [facility] OR;  Service: Robotics - DaVinci;  Laterality: N/A;   • KELOID EXCISION      1990,1993,1999,2015,2016,2019   • LAPAROSCOPIC CHOLECYSTECTOMY  2000    for stones   • RADIOFREQUENCY ABLATION  2019    x 2  for pt back   • UMBILICAL HERNIA REPAIR  1990   • UPPER GASTROINTESTINAL ENDOSCOPY     • WISDOM TOOTH EXTRACTION  1994       The additional following portions of the patient's history were reviewed and updated as appropriate: allergies, current medications, past family history, past medical history, past social history, past surgical history and problem list.      Review of Systems   Constitutional: Negative for chills and fever.   Respiratory: Negative.    Cardiovascular: Negative.    Gastrointestinal: Positive for abdominal pain. Negative for abdominal distention.   Genitourinary: Positive for pelvic pain. Negative for difficulty urinating, dysuria, frequency, menstrual problem, pelvic pressure, urgency, vaginal bleeding and vaginal discharge.   Psychiatric/Behavioral: Negative for dysphoric mood and depressed mood.     All other systems reviewed and are negative.     I have reviewed and agree with the HPI, ROS, and historical information as entered above. Albert Malone MD    Objective   /80   Ht 152.4 cm (60\")   Wt 93 kg (205 lb)   LMP " 02/24/2023 Comment: IUD  BMI 40.04 kg/m²     Physical Exam  Vitals and nursing note reviewed. Exam conducted with a chaperone present.   HENT:      Head: Normocephalic and atraumatic.   Pulmonary:      Effort: Pulmonary effort is normal.   Abdominal:      General: Abdomen is flat. There is no distension.      Palpations: Abdomen is soft.      Tenderness: There is abdominal tenderness. There is no guarding.      Comments: Mild tenderness across lower abdomen suprapubically.   Genitourinary:     General: Normal vulva.      Exam position: Lithotomy position.      Comments: No vaginal discharge.  IUD strings present.  No cervical discharge.  No cervical motion tenderness.  No uterine tenderness.  Neurological:      Mental Status: She is alert.   Psychiatric:         Behavior: Behavior normal.         Assessment & Plan     Assessment and Plan    Problem List Items Addressed This Visit     Abnormal uterine bleeding    Overview     H/o; Improved with Mirena IUD.          Obesity (BMI 30-39.9)    Overview     BMI 40          PCOS (polycystic ovarian syndrome)    Overview     severe insulin resistance/hirsuitism    Metformin  mg daily.   Spironolactone 50 mg          Leiomyoma, subserous    Overview     possible peduculated f3-4 cm fibroid on u/s at [facility]         IUD check up - Primary    Overview     Mirena IUD insertion 5/20/2020.  Due for removal 5/20/2025         S/P bariatric surgery    Overview     Gastric Sleeve Deepthi 15/2021; Dr Trujillo         Screening for cervical cancer    Overview     Pap smear 8/4/2021 was negative.  STD screening negative.         Hirsutism    Overview     On spironolactone.         Relevant Medications    spironolactone (Aldactone) 50 MG tablet    Lower abdominal pain    Overview     3/23/2023.  Complains of 3-week history of lower pelvic pain suprapubic and across lower abdomen.  Diagnosed with urinary tract infection at urgent treatment center but has not started treatment.  Will need  ultrasound if pelvic pain persists after treatment of UTI.         Acute cystitis without hematuria    Overview     Diagnosed with UTI at urgent treatment center.  E Coli sensitive to Macrobid and Cipro.  Patient did not start Cipro yet.  Wishes to try Macrobid instead.    Will need test of cure if lower abdominal pain persists after treatment of UTI.            1. Lower abdominal pelvic pain likely due to urinary tract infection.  Wishes to try Macrodantin instead of Cipro.  Rx for Macrobid sent to pharmacy.  2. Will need test of cure for UTI and pelvic ultrasound if lower abdominal pain persists.  3. Would not remove IUD unless pelvic pain persists.  4. History of menorrhagia.  No menses with Mirena IUD.  Return if symptoms worsen or fail to improve, for Annual physical.    Albert Malone MD  03/22/2023

## 2023-03-23 ENCOUNTER — HOSPITAL ENCOUNTER (OUTPATIENT)
Dept: ONCOLOGY | Facility: HOSPITAL | Age: 38
Discharge: HOME | End: 2023-03-23
Admitting: PHYSICIAN ASSISTANT
Payer: COMMERCIAL

## 2023-03-23 DIAGNOSIS — R11.2 INTRACTABLE NAUSEA AND VOMITING: Primary | ICD-10-CM

## 2023-03-23 PROCEDURE — 96361 HYDRATE IV INFUSION ADD-ON: CPT

## 2023-03-23 PROCEDURE — 96360 HYDRATION IV INFUSION INIT: CPT

## 2023-03-23 RX ORDER — SODIUM CHLORIDE 9 MG/ML
1000 INJECTION, SOLUTION INTRAVENOUS ONCE
Status: CANCELLED
Start: 2023-03-23 | End: 2023-03-23

## 2023-03-23 RX ORDER — SODIUM CHLORIDE 9 MG/ML
1000 INJECTION, SOLUTION INTRAVENOUS ONCE
Status: COMPLETED | OUTPATIENT
Start: 2023-03-23 | End: 2023-03-23

## 2023-03-23 RX ADMIN — SODIUM CHLORIDE 1000 ML: 9 INJECTION, SOLUTION INTRAVENOUS at 08:13

## 2023-03-27 ENCOUNTER — OFFICE VISIT (OUTPATIENT)
Dept: BEHAVIORAL HEALTH | Facility: CLINIC | Age: 38
End: 2023-03-27
Payer: COMMERCIAL

## 2023-03-27 DIAGNOSIS — R45.4 ANGER: ICD-10-CM

## 2023-03-27 DIAGNOSIS — R45.4 IRRITABILITY: ICD-10-CM

## 2023-03-27 DIAGNOSIS — F33.0 MDD (MAJOR DEPRESSIVE DISORDER), RECURRENT EPISODE, MILD: Primary | ICD-10-CM

## 2023-03-27 PROCEDURE — 90834 PSYTX W PT 45 MINUTES: CPT | Performed by: PSYCHOLOGIST

## 2023-03-28 ENCOUNTER — SPECIALTY PHARMACY (OUTPATIENT)
Dept: ONCOLOGY | Facility: HOSPITAL | Age: 38
End: 2023-03-28
Payer: COMMERCIAL

## 2023-03-28 NOTE — PROGRESS NOTES
Specialty Pharmacy Patient Management Program  Neurology Reassessment     Frances Hartman is a 37 y.o. female with migraines seen by a Neurology provider and enrolled in the Neurology Patient Management program offered by [facility].  A follow-up outreach was conducted, including assessment of continued therapy appropriateness, medication adherence, and side effect incidence and management for  Botox.     Changes to Insurance Coverage or Financial Support  [place] [employer]     Relevant Past Medical History and Comorbidities  Relevant medical history and concomitant health conditions were discussed with the patient. The patient's chart has been reviewed for relevant past medical history and comorbid health conditions and updated as necessary.   Past Medical History:   Diagnosis Date   • Allergic     Birth   • Anxiety    • Arthritis    • Asthma     prn inhalers, does not follow w/ pulmonary   • Cholelithiasis 2000   • Chronic back pain     previously followed w/ pain management, now just prn Tylenol + Gabapentin   • Chronic deep vein thrombosis (DVT) of femoral vein of right lower extremity (HCC) 09/10/2021   • Clotting disorder (HCC) 2015   • Cluster headache    • Depression    • Diabetes mellitus (HCC)     Type 2, dx 2014, never on insulin, A1C 7.6   • Diabetes mellitus (HCC)     Type II, dx 2014, never on insulin, A1C 7.6    • Dyspepsia    • Dyspnea on exertion    • Dysuria 03/07/2022    Dysuria and hematuria x 2 weeks. 3/7/22 urine culture sent. Rx Macrobid 100 mg twice daily for 7 days. Patient did not tolerate Macrobid well due to GI upset. Urine culture was negative.   • Fatigue    • Gastroparesis    • GERD (gastroesophageal reflux disease)    • Headache, tension-type    • Heartburn     chronic, prn TUMS, denies prior eval   • Hirsutism    • History of medical problems     PCOS   • Hx of radiation therapy     for treatments of Keloids   • Hypertension    • Irregular menses      "infrequent spotting   • Leiomyoma, subserous     possible peduculated f3-4 cm fibroid on u/s at [facility]   • Low back pain    • Migraines     botox q3 months, following Neurology @ [facility]   • Morbid obesity (HCC)     s/p sleeve gastrectomy   • Peripheral edema    • Polycystic ovary syndrome 2011   • Seasonal allergies    • Slow to wake up after anesthesia    • Urinary tract infection    • Visual impairment     Astigmatism, Nearsightedness     Social History     Socioeconomic History   • Marital status: Single   Tobacco Use   • Smoking status: Never   • Smokeless tobacco: Never   Vaping Use   • Vaping Use: Never used   Substance and Sexual Activity   • Alcohol use: Not Currently     Comment: Very rarely drink socially. At most 2 drinks per month.   • Drug use: Never   • Sexual activity: Yes     Partners: Male     Birth control/protection: Condom, I.U.D., Coitus interruptus       Problem list reviewed by Nancy Solano, PharmD on 3/28/2023 at  1:38 PM    Allergies  Known allergies and reactions were discussed with the patient. The patient's chart has been reviewed for allergy information and updated as necessary.   Allergies   Allergen Reactions   • Capsicum Annuum Extract & Derivative (Bell Pepper) [Capsicum] Anaphylaxis   • Ibuprofen Other (See Comments)     \"makes me retain fluid and eventually go into CHF\"   • Macrobid [Nitrofurantoin] Nausea Only   • Peanut-Containing Drug Products Anaphylaxis   • Hydrochlorothiazide Swelling     eventually causes CHF    • Monosodium Glutamate Swelling and Headache   • Oatmeal Other (See Comments)     Dx on allergy testing   • Doxycycline Monohydrate Other (See Comments)   • Amitriptyline Mental Status Change     memory gaps + neuropathy + hair loss   • Aspartame Nausea Only   • Augmentin [Amoxicillin-Pot Clavulanate] Other (See Comments)     Syncope, not sure if allergic to cephalosporins.   • Codeine Headache      Can tolerate hydrocodone, no other adverse reactions to other " "narcotics.   • Diclofenac Headache   • Doxycycline Rash and Hallucinations   • Eggs Or Egg-Derived Products Other (See Comments)     dx on allergy testing, but does not completely avoid   • Naproxen Other (See Comments)     \"blackout\"         Allergies reviewed by Nancy Solano, PharmD on 3/28/2023 at  1:38 PM    Relevant Laboratory Values    Common labs    Common Labs 10/5/22 10/5/22 10/5/22 10/5/22 11/21/22 12/14/22 12/14/22    1054 1054 1054 1054  0223 0223   Glucose   124 (A)    154 (A)   BUN   8    8   Creatinine   0.77    0.72   Sodium   140    140   Potassium   4.2    4.0   Chloride   103    103   Calcium   9.3    9.7   Albumin   4.30    4.10   Total Bilirubin   0.4    0.4   Alkaline Phosphatase   167 (A)    103   AST (SGOT)   53 (A)    40 (A)   ALT (SGPT)   155 (A)    67 (A)   WBC 9.08     11.60 (A)    Hemoglobin 12.7     12.9    Hematocrit 37.7     38.7    Platelets 252     211    Total Cholesterol  140        Triglycerides  72        HDL Cholesterol  54        LDL Cholesterol   72        Hemoglobin A1C    6.50 (A) 6.6     (A) Abnormal value       Comments are available for some flowsheets but are not being displayed.               Current Medication List  This medication list has been reviewed with the patient and evaluated for any interactions or necessary modifications/recommendations, and updated to include all prescription medications, OTC medications, and supplements the patient is currently taking.  This list reflects what is contained in the patient's profile, which has also been marked as reviewed to communicate to other providers it is the most up to date version of the patient's current medication therapy.     Current Outpatient Medications:   •  acetaminophen (TYLENOL) 500 MG tablet, Take 1-2 tablets by mouth Every 6 (Six) Hours As Needed for Mild Pain., Disp: , Rfl:   •  albuterol sulfate  (90 Base) MCG/ACT inhaler, Inhale 2 puffs Every 4 (Four) Hours As Needed for Wheezing or " Shortness of Air., Disp: 18 g, Rfl: 5  •  Blood Glucose Monitoring Suppl (ONE TOUCH ULTRA 2) w/Device kit, , Disp: , Rfl:   •  calcium carbonate (TUMS) 500 MG chewable tablet, Chew 1 tablet Daily., Disp: , Rfl:   •  carvedilol (COREG) 6.25 MG tablet, Take 1 tablet by mouth Every Night., Disp: 90 tablet, Rfl: 1  •  ciprofloxacin (CIPRO) 500 MG tablet, 1 po bid (Patient not taking: Reported on 3/22/2023), Disp: 14 tablet, Rfl: 0  •  cyclobenzaprine (FLEXERIL) 10 MG tablet, Take 1 tablet by mouth 3 (Three) Times a Day As Needed for Muscle Spasms., Disp: , Rfl:   •  diphenhydrAMINE (BENADRYL) 25 mg capsule, Take 1 capsule by mouth Every 6 (Six) Hours As Needed for Itching or Sleep., Disp: , Rfl:   •  furosemide (LASIX) 20 MG tablet, TAKE ONE TO TWO TABLETS BY MOUTH EVERY NIGHTLY (Patient taking differently: Take 1 tablet by mouth As Needed.), Disp: 135 tablet, Rfl: 2  •  gabapentin (NEURONTIN) 300 MG capsule, Take 2 capsules by mouth Daily., Disp: , Rfl:   •  glucose blood test strip, USe BID to test blood sugar DX.e11.65, Disp: 100 each, Rfl: 3  •  HYDROcodone-acetaminophen (NORCO) 5-325 MG per tablet, Take 1 tablet by mouth Every 6 (Six) Hours As Needed for Severe Pain ., Disp: 12 tablet, Rfl: 0  •  Lancets 33G misc, 1 Each/kg 2 (two) times a day. To test blood sugars DxE11.65, Disp: 100 each, Rfl: 2  •  levonorgestrel (MIRENA) 20 MCG/24HR IUD, 1 each by Intrauterine route., Disp: , Rfl:   •  loratadine (CLARITIN) 10 MG tablet, TAKE ONE TABLET BY MOUTH DAILY, Disp: 30 tablet, Rfl: 2  •  Melatonin 5 MG lozenge, , Disp: , Rfl:   •  metFORMIN ER (GLUCOPHAGE-XR) 500 MG 24 hr tablet, Take 1 tablet by mouth Daily With Dinner., Disp: 90 tablet, Rfl: 3  •  multivitamin with minerals tablet tablet, Take 1 tablet by mouth Daily., Disp: , Rfl:   •  nitrofurantoin, macrocrystal-monohydrate, (Macrobid) 100 MG capsule, Take 1 capsule by mouth 2 (Two) Times a Day for 7 days., Disp: 14 capsule, Rfl: 0  •  OnabotulinumtoxinA (Botox)  200 units reconstituted solution, FOR . PHYSICIAN TO INJECT UP  UNITS INTRAMUSCULARLY INTO HEAD, NECK AND SHOULDERS EVERY 90 DAYS., Disp: 1 each, Rfl: 3  •  ondansetron ODT (ZOFRAN-ODT) 8 MG disintegrating tablet, Place 1 tablet on the tongue Every 8 (Eight) Hours As Needed for Nausea or Vomiting., Disp: 21 tablet, Rfl: 0  •  simethicone (Gas-X) 80 MG chewable tablet, Chew 1 tablet Every 6 (Six) Hours As Needed for Flatulence., Disp: 10 tablet, Rfl: 0  •  spironolactone (Aldactone) 50 MG tablet, Take 1 tablet by mouth Daily., Disp: 90 tablet, Rfl: 1  •  traMADol (ULTRAM) 50 MG tablet, Take 1 tablet by mouth Every 6 (Six) Hours As Needed for Moderate Pain., Disp: , Rfl:   •  vitamin C (ASCORBIC ACID) 250 MG tablet, Take 1 tablet by mouth Daily., Disp: , Rfl:   •  vitamin D3 125 MCG (5000 UT) capsule capsule, Take 1 capsule by mouth Daily., Disp: , Rfl:     Current Facility-Administered Medications:   •  OnabotulinumtoxinA 200 Units, 200 Units, Intramuscular, Q3 Months, Shiela Coelho, APRN, 200 Units at 01/23/23 1200    Medicines reviewed by Nancy Solano, PharmD on 3/28/2023 at  1:38 PM    Drug Interactions  none     Adverse Drug Reactions  • Adverse Reactions Experienced: none   • Plan for ADR Management: not required    Hospitalizations and Urgent Care Since Last Assessment  • Hospitalizations or Admissions: none   • ED Visits: none  • Urgent Office Visits: none     Adherence and Self-Administration  Adherence related patient's specialty therapy was discussed with the patient. The Adherence segment of this outreach has been reviewed and updated.     Medication Adherence    Adherence tools used: cell phone  Support network for adherence: healthcare provider        • Ongoing or New Barriers to Patient Adherence and/or Self-Administration: none   • Methods for Supporting Patient Adherence and/or Self-Administration: not required     Goals of Therapy  Goals related to the patient's  specialty therapy was discussed with the patient. The Patient Goals segment of this outreach has been reviewed and updated.    Goals     • Specialty Pharmacy General Goal      Reduction in frequency and severity of migraines  3/29/22 changing from Ajovy (ISR) to Qulipta             Quality of Life Assessment   Quality of Life related to the patient's specialty therapy was discussed with the patient. The QOL segment of this outreach has been reviewed and updated.     Quality of Life Assessment  Quality of Life Improvement Scale: Somewhat better  Comments on Quality of Life: Headaches have improved in severity from a 9/10 to 6/10 at times. She is still having more than 15 migraines a month. Botox has also reduced the frequency of mild daily headaches    Reassessment Plan & Follow-Up  1. Medication Therapy Changes: none  2. Additional Plans, Therapy Recommendations, or Therapy Problems to Be Addressed: Next Botox procedure scheduled on 4/27/23. Will have filled at [facility] before next appointment.  3. Pharmacist to perform regular reassessments no more than (6) months from the previous assessment.  4. Care Coordinator to set up future refill outreaches, coordinate prescription delivery, and escalate clinical questions to pharmacist.     Attestation  I attest that the specialty medication(s) addressed above are appropriate for the patient based on my reassessment.  If the prescribed therapy is at any point deemed not appropriate based on the current or future assessments, a consultation will be initiated with the patient's specialty care provider to determine the best course of action. The revised plan of therapy will be documented along with any additional patient education provided.     Nancy Solano, PharmD  3/28/2023  13:39 EDT

## 2023-04-03 ENCOUNTER — OFFICE VISIT (OUTPATIENT)
Dept: BEHAVIORAL HEALTH | Facility: CLINIC | Age: 38
End: 2023-04-03
Payer: COMMERCIAL

## 2023-04-03 DIAGNOSIS — R45.4 IRRITABILITY: ICD-10-CM

## 2023-04-03 DIAGNOSIS — R45.4 ANGER: ICD-10-CM

## 2023-04-03 DIAGNOSIS — F33.0 MDD (MAJOR DEPRESSIVE DISORDER), RECURRENT EPISODE, MILD: Primary | ICD-10-CM

## 2023-04-03 PROCEDURE — 90834 PSYTX W PT 45 MINUTES: CPT | Performed by: PSYCHOLOGIST

## 2023-04-04 NOTE — PROGRESS NOTES
PROGRESS NOTE    Data:  Frances Hartman is a 37 y.o. female who met with the undersigned for a scheduled individual therapy session from 10:30 - 11:15am.      Clinical Maneuvering/Intervention:      The pt talked about recent stressors including weight gain, worrying about her health in general, and feeling quite irritable/angry lately. Stressors were processed individually and in detail. Venting of frustrations was conducted in order to help the pt feel less tense emotionally and gain insight into issues. Feelings were processed and validated several times in session. Perspective taking was conducted multiple times in order to help the pt feel less stuck, less overwhelmed, and see challenges as much more manageable. Active listening was conducted in order to help the pt make sense of stressors and start moving towards potential solutions. The pt was assisted with finding solutions based on existing skill-set and abilities. Anger was processed in terms of having a difficult time trying to go out and relax at a movie, only to have people being distruptive and ruining her experience. Education on how self-care and seeing progress towards her goals as being 'buffers' to stress was provided. Education about noticing what control she does (and does not have) in life was provided as a means to help her become stronger over time. When asked about what she wants in life, she was able to clearly explain it (weight loss, have her own place to live again, etc). The pt was assigned the task to write down 20 things of which she has control and then pick the top three that will help her meet personal goals. Rationale for this exercise and experience was provided. The pt's strengths were identified in order to help identify abilities to use to better face/overcome challenges. Some humor was used in session as it is one of the pt's coping skills. Humor was used too, to help the pt see things as less challenging and more  manageable than they first seemed. Humor was used as well, to model use of the skill as a way to decrease tension ongoing. Homework was assigned tailored to pt's needs.     Mental Status Exam  Hygiene:  good  Dress: normal  Attitude:  cooperative and proactive  Motor Activity: normal  Speech: normal  Mood:  Irritable and frustrated   Affect:  congruent  Thought Processes: normal  Thought Content:  normal  Suicidal Thoughts:  not endorsed  Homicidal Thoughts:  not endorsed  Crisis Safety Plan: not needed   Hallucinations:  none      Patient's Support Network Includes:  family, friends      Progress toward goal: there is evidence to suggest that she is becoming a stronger person over time.      Functional Status: moderate to high      Prognosis: good    Assessment      The pt presents to be struggling with depression, often manifested as irritability and/or anger. She worries/fears that she will continue not feeling heard despite trying to get medical needs met.  She worries about her mother's health as of late, which leads to emotional distress as well. She is intelligent, thoughtful, and witty person.       Plan      In order to diminish depression, anger, and irritability; she will make a point to strengthen/empower herself (ongoing). She will do the 20-item list of what she has in her control as described above (this week).    Misty Nava, PhD, LP

## 2023-04-05 ENCOUNTER — OFFICE VISIT (OUTPATIENT)
Dept: BARIATRICS/WEIGHT MGMT | Facility: CLINIC | Age: 38
End: 2023-04-05
Payer: COMMERCIAL

## 2023-04-05 VITALS
HEART RATE: 95 BPM | TEMPERATURE: 97.3 F | RESPIRATION RATE: 16 BRPM | HEIGHT: 60 IN | BODY MASS INDEX: 39.46 KG/M2 | OXYGEN SATURATION: 98 % | DIASTOLIC BLOOD PRESSURE: 72 MMHG | SYSTOLIC BLOOD PRESSURE: 118 MMHG | WEIGHT: 201 LBS

## 2023-04-05 DIAGNOSIS — E66.9 OBESITY, CLASS II, BMI 35-39.9: Primary | ICD-10-CM

## 2023-04-05 DIAGNOSIS — Z13.21 MALNUTRITION SCREEN: ICD-10-CM

## 2023-04-05 DIAGNOSIS — E55.9 HYPOVITAMINOSIS D: ICD-10-CM

## 2023-04-05 DIAGNOSIS — R53.83 FATIGUE, UNSPECIFIED TYPE: ICD-10-CM

## 2023-04-05 DIAGNOSIS — Z90.3 POSTGASTRECTOMY MALABSORPTION: ICD-10-CM

## 2023-04-05 DIAGNOSIS — K91.2 POSTGASTRECTOMY MALABSORPTION: ICD-10-CM

## 2023-04-05 DIAGNOSIS — Z13.0 SCREENING, IRON DEFICIENCY ANEMIA: ICD-10-CM

## 2023-04-05 PROCEDURE — 99213 OFFICE O/P EST LOW 20 MIN: CPT | Performed by: SURGERY

## 2023-04-05 NOTE — PROGRESS NOTES
"[facility]  [address]    Formerly [facility] [id number] [phone]        Patient Name:  Frances Hartman.  :  1985      Date of Visit: 2023      Reason for Visit:   Almost 2 years postop      HPI: Frances Hartman is a 37 y.o. female s/p  robotic assisted LSG/HHR by  on 6/15/21.     C/o some reflux, but fairly well controlled by Tums PRN.  I offered omeprazole daily, but she declined.  Since LOV, has struggled with infection, reports 5 different type soft bacterial vaginosis and also E. Coli UTI.    Doing well.  No major issues/concerns.  Getting about 60 g prot/day.  Was not able to get Baritastic b/c didn't have enough memory on her phone, but got a new phone and is going to try it.    Last labs revealed  no vitamin deficiencies. Taking MVI, Vit D and Vit C.   Exercise: \"sleep\".  Very busy with work.  C/o of sciatica that also limits her.  Has missed her back injections due to being busy with work.    Continues to get IV fluid infusions every 2 weeks.  Feels like this really helps her.     Presurgery weight: 231 pounds.  Today's weight is 91.2 kg (201 lb) pounds, today's  Body mass index is 39.26 kg/m²., and@ weight loss since surgery is 30 pounds.    Jose 169.    Past Medical History:   Diagnosis Date   • Allergic     Birth   • Anxiety    • Arthritis    • Asthma     prn inhalers, does not follow w/ pulmonary   • Cholelithiasis    • Chronic back pain     previously followed w/ pain management, now just prn Tylenol + Gabapentin   • Chronic deep vein thrombosis (DVT) of femoral vein of right lower extremity 09/10/2021   • Clotting disorder    • Cluster headache    • Depression    • Diabetes mellitus     Type 2, dx , never on insulin, A1C 7.6   • Diabetes mellitus     Type II, dx , never on insulin, A1C 7.6    • Dyspepsia    • Dyspnea on exertion    • Dysuria 2022    Dysuria and hematuria x 2 weeks. 3/7/22 urine " culture sent. Rx Macrobid 100 mg twice daily for 7 days. Patient did not tolerate Macrobid well due to GI upset. Urine culture was negative.   • Fatigue    • Gastroparesis    • GERD (gastroesophageal reflux disease)    • Headache, tension-type    • Heartburn     chronic, prn TUMS, denies prior eval   • Hirsutism    • History of medical problems     PCOS   • Hx of radiation therapy     for treatments of Keloids   • Hypertension    • Irregular menses     infrequent spotting   • Leiomyoma, subserous     possible peduculated f3-4 cm fibroid on u/s at [facility]   • Low back pain    • Migraines     botox q3 months, following Neurology @ [facility]   • Morbid obesity     s/p sleeve gastrectomy   • Peripheral edema    • Polycystic ovary syndrome 2011   • Seasonal allergies    • Slow to wake up after anesthesia    • Urinary tract infection    • Visual impairment     Astigmatism, Nearsightedness     Past Surgical History:   Procedure Laterality Date   • ABDOMINAL SURGERY  06/15/2021    Bariatric Sleeve Surgery   • BARIATRIC SURGERY     • COLONOSCOPY  December 2022   • COSMETIC SURGERY      Multiple Keloid surgeries   • ENDOSCOPY N/A 06/15/2021    Procedure: ESOPHAGOGASTRODUODENOSCOPY;  Surgeon: Ayaka Andre MD;  Location: [facility] OR;  Service: Robotics - DaVinci;  Laterality: N/A;   • ENDOSCOPY N/A 10/28/2021    Procedure: ESOPHAGOGASTRODUODENOSCOPY WITH ACHALASIA BALLOON DILATATION;  Surgeon: Jase Hernandez MD;  Location:  [facility];  Service: Gastroenterology;  Laterality: N/A;  58 F DILATOR   • ENDOSCOPY N/A 11/15/2021    Procedure: ESOPHAGOGASTRODUODENOSCOPY WITH DILATATION;  Surgeon: Jase Hernandez MD;  Location:  [facility];  Service: Gastroenterology;  Laterality: N/A;  achalasia balloon    • ENDOSCOPY N/A 11/24/2021    Procedure: ESOPHAGOGASTRODUODENOSCOPY [facility] DUODENAL POLYPECTOPMY AND NASAL JEJUNAL TUBE PLACEMENT;  Surgeon: Jase Hernandez MD;  Location:  [facility];  Service: Gastroenterology;   Laterality: N/A;  placement of feeding tube, checked position with KUB   • GASTRIC RESTRICTION SURGERY  2021    Sleeve   • GASTRIC SLEEVE LAPAROSCOPIC N/A 06/15/2021    Procedure: GASTRIC SLEEVE LAPAROSCOPIC WITH DAVINCI ROBOT, LAPROSCOPIC HIATAL HERNIA REPAIR WITH DAVINCI ROBOT;  Surgeon: Ayaka Andre MD;  Location: [facility];  Service: Robotics - DaVinci;  Laterality: N/A;   • KELOID EXCISION      1990,1993,1999,2015,2016,2019   • LAPAROSCOPIC CHOLECYSTECTOMY  2000    for stones   • RADIOFREQUENCY ABLATION  2019    x 2  for pt back   • UMBILICAL HERNIA REPAIR  1990   • UPPER GASTROINTESTINAL ENDOSCOPY     • WISDOM TOOTH EXTRACTION  1994     Outpatient Medications Marked as Taking for the 4/5/23 encounter (Office Visit) with Ayaka Andre MD   Medication Sig Dispense Refill   • acetaminophen (TYLENOL) 500 MG tablet Take 1-2 tablets by mouth Every 6 (Six) Hours As Needed for Mild Pain.     • albuterol sulfate  (90 Base) MCG/ACT inhaler Inhale 2 puffs Every 4 (Four) Hours As Needed for Wheezing or Shortness of Air. 18 g 5   • Blood Glucose Monitoring Suppl (ONE TOUCH ULTRA 2) w/Device kit      • calcium carbonate (TUMS) 500 MG chewable tablet Chew 1 tablet Daily.     • cyclobenzaprine (FLEXERIL) 10 MG tablet Take 1 tablet by mouth 3 (Three) Times a Day As Needed for Muscle Spasms.     • diphenhydrAMINE (BENADRYL) 25 mg capsule Take 1 capsule by mouth Every 6 (Six) Hours As Needed for Itching or Sleep.     • furosemide (LASIX) 20 MG tablet TAKE ONE TO TWO TABLETS BY MOUTH EVERY NIGHTLY (Patient taking differently: Take 1 tablet by mouth As Needed.) 135 tablet 2   • gabapentin (NEURONTIN) 300 MG capsule Take 2 capsules by mouth Daily.     • glucose blood test strip USe BID to test blood sugar DX.e11.65 100 each 3   • HYDROcodone-acetaminophen (NORCO) 5-325 MG per tablet Take 1 tablet by mouth Every 6 (Six) Hours As Needed for Severe Pain . 12 tablet 0   • Lancets 33G misc 1 Each/kg 2 (two)  "times a day. To test blood sugars DxE11.65 100 each 2   • loratadine (CLARITIN) 10 MG tablet TAKE ONE TABLET BY MOUTH DAILY 30 tablet 2   • Melatonin 5 MG lozenge      • metFORMIN ER (GLUCOPHAGE-XR) 500 MG 24 hr tablet Take 1 tablet by mouth Daily With Dinner. 90 tablet 3   • multivitamin with minerals tablet tablet Take 1 tablet by mouth Daily.     • OnabotulinumtoxinA (Botox) 200 units reconstituted solution FOR . PHYSICIAN TO INJECT UP  UNITS INTRAMUSCULARLY INTO HEAD, NECK AND SHOULDERS EVERY 90 DAYS. 1 each 3   • ondansetron ODT (ZOFRAN-ODT) 8 MG disintegrating tablet Place 1 tablet on the tongue Every 8 (Eight) Hours As Needed for Nausea or Vomiting. 21 tablet 0   • simethicone (Gas-X) 80 MG chewable tablet Chew 1 tablet Every 6 (Six) Hours As Needed for Flatulence. 10 tablet 0   • spironolactone (Aldactone) 50 MG tablet Take 1 tablet by mouth Daily. 90 tablet 1   • traMADol (ULTRAM) 50 MG tablet Take 1 tablet by mouth Every 6 (Six) Hours As Needed for Moderate Pain.     • vitamin C (ASCORBIC ACID) 250 MG tablet Take 1 tablet by mouth Daily.     • vitamin D3 125 MCG (5000 UT) capsule capsule Take 1 capsule by mouth Daily.       Current Facility-Administered Medications for the 4/5/23 encounter (Office Visit) with Ayaka Andre MD   Medication Dose Route Frequency Provider Last Rate Last Admin   • OnabotulinumtoxinA 200 Units  200 Units Intramuscular Q3 Months Shiela Coelho APRN   200 Units at 01/23/23 1200       Allergies   Allergen Reactions   • Capsicum Annuum Extract & Derivative (Bell Pepper) [Capsicum] Anaphylaxis   • Ibuprofen Other (See Comments)     \"makes me retain fluid and eventually go into CHF\"   • Macrobid [Nitrofurantoin] Nausea Only   • Peanut-Containing Drug Products Anaphylaxis   • Hydrochlorothiazide Swelling     eventually causes CHF    • Monosodium Glutamate Swelling and Headache   • Oatmeal Other (See Comments)     Dx on allergy testing   • " "Doxycycline Monohydrate Other (See Comments)   • Amitriptyline Mental Status Change     memory gaps + neuropathy + hair loss   • Aspartame Nausea Only   • Augmentin [Amoxicillin-Pot Clavulanate] Other (See Comments)     Syncope, not sure if allergic to cephalosporins.   • Codeine Headache      Can tolerate hydrocodone, no other adverse reactions to other narcotics.   • Diclofenac Headache   • Doxycycline Rash and Hallucinations   • Eggs Or Egg-Derived Products Other (See Comments)     dx on allergy testing, but does not completely avoid   • Naproxen Other (See Comments)     \"blackout\"       Social History     Socioeconomic History   • Marital status: Single   Tobacco Use   • Smoking status: Never   • Smokeless tobacco: Never   Vaping Use   • Vaping Use: Never used   Substance and Sexual Activity   • Alcohol use: Not Currently     Comment: Very rarely drink socially. At most 2 drinks per month.   • Drug use: Never   • Sexual activity: Yes     Partners: Male     Birth control/protection: Condom, I.U.D., Coitus interruptus       /72 (BP Location: Left arm, Patient Position: Sitting)   Pulse 95   Temp 97.3 °F (36.3 °C)   Resp 16   Ht 152.4 cm (60\")   Wt 91.2 kg (201 lb)   SpO2 98%   BMI 39.26 kg/m²     Physical Exam  Constitutional:       General: She is not in acute distress.     Appearance: She is well-developed. She is not diaphoretic.   HENT:      Head: Normocephalic and atraumatic.      Mouth/Throat:      Pharynx: No oropharyngeal exudate.   Eyes:      Conjunctiva/sclera: Conjunctivae normal.      Pupils: Pupils are equal, round, and reactive to light.   Pulmonary:      Effort: Pulmonary effort is normal. No respiratory distress.   Abdominal:      General: There is no distension.      Palpations: Abdomen is soft.   Skin:     General: Skin is warm and dry.      Coloration: Skin is not pale.   Neurological:      Mental Status: She is alert and oriented to person, place, and time.      Cranial Nerves: No " cranial nerve deficit.   Psychiatric:         Behavior: Behavior normal.         Thought Content: Thought content normal.           Assessment:  2 years s/p  robotic assisted LSG/HHR by  on 6/15/21.     ICD-10-CM ICD-9-CM   1. Obesity, Class II, BMI 35-39.9  E66.9 278.00   2. Fatigue, unspecified type  R53.83 780.79   3. Hypovitaminosis D  E55.9 268.9   4. Screening, iron deficiency anemia  Z13.0 V78.0   5. Postgastrectomy malabsorption  K91.2 579.3    Z90.3    6. Malnutrition screen  Z13.21 V77.2       Class 3 Severe Obesity (BMI >=40). Obesity-related health conditions include the following: PCOS, asthma, back pain, DM2, GERD. Obesity is improving with lifestyle modifications. BMI is is above average; BMI management plan is completed. We discussed portion control and increasing exercise.      Plan:  Doing well. Continue w/ good food choices and healthy habits.  Continue protein >70g/day.  Continue routine exercise.  Routine bariatric labs ordered.  Continue vitamins w/ adjustments pending lab results.  Call w/ problems/concerns.     I think will need increase in protein and exercise to reach weight goals.    Refer to exercise physiologist.    The patient was instructed to follow up in 1 year, sooner if needed.    note: approx 15 of the 25 minute visit was spent counseling on nutrition and necessary dietary/lifestyle modifications face to face.    Ayaka Andre MD

## 2023-04-06 ENCOUNTER — TELEPHONE (OUTPATIENT)
Dept: CARDIOLOGY | Facility: CLINIC | Age: 38
End: 2023-04-06
Payer: COMMERCIAL

## 2023-04-06 ENCOUNTER — LAB (OUTPATIENT)
Dept: LAB | Facility: HOSPITAL | Age: 38
End: 2023-04-06
Payer: COMMERCIAL

## 2023-04-06 ENCOUNTER — HOSPITAL ENCOUNTER (OUTPATIENT)
Dept: ONCOLOGY | Facility: HOSPITAL | Age: 38
Discharge: HOME | End: 2023-04-06
Payer: COMMERCIAL

## 2023-04-06 DIAGNOSIS — Z90.3 POSTGASTRECTOMY MALABSORPTION: ICD-10-CM

## 2023-04-06 DIAGNOSIS — Z13.0 SCREENING, IRON DEFICIENCY ANEMIA: ICD-10-CM

## 2023-04-06 DIAGNOSIS — R53.83 FATIGUE, UNSPECIFIED TYPE: ICD-10-CM

## 2023-04-06 DIAGNOSIS — K91.2 POSTGASTRECTOMY MALABSORPTION: ICD-10-CM

## 2023-04-06 DIAGNOSIS — R11.2 INTRACTABLE NAUSEA AND VOMITING: Primary | ICD-10-CM

## 2023-04-06 DIAGNOSIS — E55.9 HYPOVITAMINOSIS D: ICD-10-CM

## 2023-04-06 DIAGNOSIS — Z13.21 MALNUTRITION SCREEN: ICD-10-CM

## 2023-04-06 LAB
25(OH)D3 SERPL-MCNC: 59.3 NG/ML (ref 30–100)
ALBUMIN SERPL-MCNC: 4.5 G/DL (ref 3.5–5.2)
ALBUMIN/GLOB SERPL: 1.4 G/DL
ALP SERPL-CCNC: 96 U/L (ref 39–117)
ALT SERPL W P-5'-P-CCNC: 47 U/L (ref 1–33)
ANION GAP SERPL CALCULATED.3IONS-SCNC: 8.5 MMOL/L (ref 5–15)
AST SERPL-CCNC: 27 U/L (ref 1–32)
BASOPHILS # BLD AUTO: 0.07 10*3/MM3 (ref 0–0.2)
BASOPHILS NFR BLD AUTO: 1.1 % (ref 0–1.5)
BILIRUB SERPL-MCNC: 0.3 MG/DL (ref 0–1.2)
BUN SERPL-MCNC: 9 MG/DL (ref 6–20)
BUN/CREAT SERPL: 9.1 (ref 7–25)
CALCIUM SPEC-SCNC: 10.4 MG/DL (ref 8.6–10.5)
CHLORIDE SERPL-SCNC: 101 MMOL/L (ref 98–107)
CO2 SERPL-SCNC: 27.5 MMOL/L (ref 22–29)
CREAT SERPL-MCNC: 0.99 MG/DL (ref 0.57–1)
DEPRECATED RDW RBC AUTO: 41.2 FL (ref 37–54)
EGFRCR SERPLBLD CKD-EPI 2021: 74.5 ML/MIN/1.73
EOSINOPHIL # BLD AUTO: 0.34 10*3/MM3 (ref 0–0.4)
EOSINOPHIL NFR BLD AUTO: 5.1 % (ref 0.3–6.2)
ERYTHROCYTE [DISTWIDTH] IN BLOOD BY AUTOMATED COUNT: 11.7 % (ref 12.3–15.4)
FERRITIN SERPL-MCNC: 139 NG/ML (ref 13–150)
FOLATE SERPL-MCNC: >20 NG/ML (ref 4.78–24.2)
GLOBULIN UR ELPH-MCNC: 3.2 GM/DL
GLUCOSE SERPL-MCNC: 136 MG/DL (ref 65–99)
HCT VFR BLD AUTO: 38.2 % (ref 34–46.6)
HGB BLD-MCNC: 13 G/DL (ref 12–15.9)
IMM GRANULOCYTES # BLD AUTO: 0.02 10*3/MM3 (ref 0–0.05)
IMM GRANULOCYTES NFR BLD AUTO: 0.3 % (ref 0–0.5)
IRON 24H UR-MRATE: 93 MCG/DL (ref 37–145)
LYMPHOCYTES # BLD AUTO: 2.94 10*3/MM3 (ref 0.7–3.1)
LYMPHOCYTES NFR BLD AUTO: 44.2 % (ref 19.6–45.3)
MCH RBC QN AUTO: 32.8 PG (ref 26.6–33)
MCHC RBC AUTO-ENTMCNC: 34 G/DL (ref 31.5–35.7)
MCV RBC AUTO: 96.5 FL (ref 79–97)
MONOCYTES # BLD AUTO: 0.35 10*3/MM3 (ref 0.1–0.9)
MONOCYTES NFR BLD AUTO: 5.3 % (ref 5–12)
NEUTROPHILS NFR BLD AUTO: 2.93 10*3/MM3 (ref 1.7–7)
NEUTROPHILS NFR BLD AUTO: 44 % (ref 42.7–76)
NRBC BLD AUTO-RTO: 0 /100 WBC (ref 0–0.2)
PLATELET # BLD AUTO: 269 10*3/MM3 (ref 140–450)
PMV BLD AUTO: 11.3 FL (ref 6–12)
POTASSIUM SERPL-SCNC: 4.2 MMOL/L (ref 3.5–5.2)
PREALB SERPL-MCNC: 28.7 MG/DL (ref 20–40)
PROT SERPL-MCNC: 7.7 G/DL (ref 6–8.5)
RBC # BLD AUTO: 3.96 10*6/MM3 (ref 3.77–5.28)
SODIUM SERPL-SCNC: 137 MMOL/L (ref 136–145)
WBC NRBC COR # BLD: 6.65 10*3/MM3 (ref 3.4–10.8)

## 2023-04-06 PROCEDURE — 82728 ASSAY OF FERRITIN: CPT

## 2023-04-06 PROCEDURE — 82746 ASSAY OF FOLIC ACID SERUM: CPT

## 2023-04-06 PROCEDURE — 82306 VITAMIN D 25 HYDROXY: CPT

## 2023-04-06 PROCEDURE — 84134 ASSAY OF PREALBUMIN: CPT

## 2023-04-06 PROCEDURE — 83921 ORGANIC ACID SINGLE QUANT: CPT

## 2023-04-06 PROCEDURE — 83540 ASSAY OF IRON: CPT

## 2023-04-06 PROCEDURE — 84425 ASSAY OF VITAMIN B-1: CPT

## 2023-04-06 PROCEDURE — 80053 COMPREHEN METABOLIC PANEL: CPT

## 2023-04-06 PROCEDURE — 96361 HYDRATE IV INFUSION ADD-ON: CPT

## 2023-04-06 PROCEDURE — 96360 HYDRATION IV INFUSION INIT: CPT

## 2023-04-06 PROCEDURE — 36415 COLL VENOUS BLD VENIPUNCTURE: CPT

## 2023-04-06 PROCEDURE — 85025 COMPLETE CBC W/AUTO DIFF WBC: CPT

## 2023-04-06 RX ORDER — SODIUM CHLORIDE 9 MG/ML
1000 INJECTION, SOLUTION INTRAVENOUS ONCE
Status: COMPLETED | OUTPATIENT
Start: 2023-04-06 | End: 2023-04-06

## 2023-04-06 RX ORDER — SODIUM CHLORIDE 9 MG/ML
1000 INJECTION, SOLUTION INTRAVENOUS ONCE
Status: CANCELLED
Start: 2023-04-06 | End: 2023-04-06

## 2023-04-06 RX ADMIN — SODIUM CHLORIDE 1000 ML: 9 INJECTION, SOLUTION INTRAVENOUS at 08:05

## 2023-04-06 NOTE — TELEPHONE ENCOUNTER
Patient called stating that her heart rate and palpitations have been worse lately.   She has noticed that her heart rate can jump from 70's-120's at random times.   She did stop taking her carvedilol 6.25 about two weeks ago because her blood pressures were dropping too low and she did not feel good.   Blood pressures are staying in the 120's/70's now.   Recommended patient to make sure she is staying hydrated which she has a hard time and does get bi-weekly infusions.   Patient was supposed to have a holter that was never sent, patient to come by office today to have another holter put in today.   Pleease advice. Thank you!

## 2023-04-09 LAB
METHYLMALONATE SERPL-SCNC: 133 NMOL/L (ref 0–378)
VIT B1 BLD-SCNC: 135.8 NMOL/L (ref 66.5–200)

## 2023-04-10 RX ORDER — METOPROLOL TARTRATE 50 MG/1
50 TABLET, FILM COATED ORAL 2 TIMES DAILY
Qty: 180 TABLET | Refills: 3 | Status: SHIPPED | OUTPATIENT
Start: 2023-04-10

## 2023-04-10 NOTE — TELEPHONE ENCOUNTER
I have discontinued Coreg and initiated Lopressor 50 mg twice daily as this should have less effect on her blood pressure and better effect on her heart rate.

## 2023-04-11 NOTE — TELEPHONE ENCOUNTER
Advised patient of recommendations per Meg Palmer PA-C. Patient verbalized understanding and agreeable to plan.

## 2023-04-13 ENCOUNTER — SPECIALTY PHARMACY (OUTPATIENT)
Dept: ONCOLOGY | Facility: HOSPITAL | Age: 38
End: 2023-04-13
Payer: COMMERCIAL

## 2023-04-13 NOTE — PROGRESS NOTES
Specialty Pharmacy Refill Coordination Note     Frances is a 37 y.o. female contacted today regarding refills of  Botox specialty medication(s). Patient is scheduled for an appointment on 4/27.     Reviewed and verified with patient:       Specialty medication(s) and dose(s) confirmed: yes    Refill Questions    Flowsheet Row Most Recent Value   Changes to allergies? No   Changes to medications? No   New conditions since last clinic visit No   Unplanned office visit, urgent care, ED, or hospital admission in the last 4 weeks  No   How does patient/caregiver feel medication is working? Excellent   Financial problems or insurance changes  No   If yes, describe changes in insurance or financial issues. N/A   Since the previous refill, were any specialty medication doses or scheduled injections missed or delayed?  No   If yes, please provide the amount N/A   Why were doses missed? N/A   Does this patient require a clinical escalation to a pharmacist? No          Delivery Questions    Flowsheet Row Most Recent Value   Delivery method  at Pharmacy   Preferred delivery time? Anytime   Number of medications in delivery 1   Medication being filled and delivered Botox   Doses left of specialty medications 0   Is there any medication that is due not being filled? No   Supplies needed? No supplies needed   Cooler needed? No   Do any medications need mixed or dated? No   Copay form of payment Payment plan already set up   Questions or concerns for the pharmacist? No   Are any medications first time fills? No            Medication Adherence    Adherence tools used: cell phone  Support network for adherence: healthcare provider          Follow-up: 3 month(s)     Sandy Madden, Pharmacy Technician  Specialty Pharmacy Technician

## 2023-04-14 ENCOUNTER — OFFICE VISIT (OUTPATIENT)
Dept: GASTROENTEROLOGY | Facility: CLINIC | Age: 38
End: 2023-04-14
Payer: COMMERCIAL

## 2023-04-14 VITALS
WEIGHT: 205.4 LBS | TEMPERATURE: 97.5 F | DIASTOLIC BLOOD PRESSURE: 86 MMHG | BODY MASS INDEX: 40.33 KG/M2 | HEIGHT: 60 IN | HEART RATE: 94 BPM | SYSTOLIC BLOOD PRESSURE: 130 MMHG | OXYGEN SATURATION: 97 %

## 2023-04-14 DIAGNOSIS — K30 DELAYED GASTRIC EMPTYING: ICD-10-CM

## 2023-04-14 DIAGNOSIS — K76.0 NAFLD (NONALCOHOLIC FATTY LIVER DISEASE): ICD-10-CM

## 2023-04-14 DIAGNOSIS — K59.1 FUNCTIONAL DIARRHEA: ICD-10-CM

## 2023-04-14 DIAGNOSIS — K21.9 GASTROESOPHAGEAL REFLUX DISEASE, UNSPECIFIED WHETHER ESOPHAGITIS PRESENT: Primary | ICD-10-CM

## 2023-04-14 PROCEDURE — 99213 OFFICE O/P EST LOW 20 MIN: CPT | Performed by: NURSE PRACTITIONER

## 2023-04-14 NOTE — PROGRESS NOTES
Follow Up      Patient Name: Frances Hartman  : 1985   MRN: [id number]     Chief Complaint:    Chief Complaint   Patient presents with   • Follow-up       History of Present Illness: Frances Hartman is a 37 y.o. female who is here today for follow up on NAFLD, nausea r/t delayed emptying, and reflux    Nausea/vomiting: Doing well over all.  Occasional nausea/vomiting but this was also at the time of having a vaginal infection and was on abx.  Declines PPI at this time. Taking tums.     NAFLD: Adding in fiber to diet- losing weight. She just got a gym membership but has not used it yet due to recent chest pain.  Increasing her steps each day.      Diarrhea has improved- now just episodic with certain foods.  At times using a stool softener for occasional constipation. Symptoms seem to be worse if she is dehydrated.  Tums and gas x help settle her stomach and she takes this at nighttime.            Frances was admitted to [facility] in 2021 with nausea and vomiting.  She had been status post bariatric surgery in 2021.  She was diagnosed with esophageal stricture. She underwent 3 EGDs during her hospital stay.  Submucosal nodule was found in the duodenum and biopsies were significant for neuroendocrine carcinoma.  Repeat EGD shows no further tumor.       Followed with  for her neuroendocrine tumor     ENDOSCOPY, INT (2022)-LA grade a esophagitis, small hiatal hernia, normal healthy appearing sleeve gastrectomy, gastritis, mucosal nodule in the duodenum-biopsied-get if for evidence of neuroendocrine tumor   SCANNED - COLONOSCOPY (2022)-one 6 mm hyperplastic polyp in the rectum, diverticulosis.  Random colon biopsies- 3 year recall      US Liver (2022 16:20)-Diffusely increased echogenicity of liver parenchyma likely related to  steatosis. No focal lesions. No acute process.     NM PET/CT Skull Base to Mid Thigh (2022 11:42)  Negative Ga-68  Dotatate scan  CT Abdomen Pelvis With Contrast (11/22/2021 22:52)   FL Upper GI Single Contrast With KUB (11/09/2021 14:22)1. Status post vertical sleeve gastrectomy x5 months. There was no  evidence of extraluminal contrast. No postoperative strictures were  seen.  2. Moderate gastroesophageal reflux to the level of the thoracic inlet  3. Patulous gastroesophageal junction versus small sized sliding-type  hiatal hernia     NM Gastric Emptying (11/06/2020 13:14)-IMPRESSION:  Abnormal exam with delay in gastric emptying.     UPPER GI ENDOSCOPY (11/24/2021 11:37)-normal esophagus, sleeve gastrectomy with normal healthy-appearing mucosa, submucosal nodule in the duodenum        Her abdominal surgical history includes Chandrika, umbilical hernia repair, and gastric sleeve June 2021.        Subjective      Review of Systems:   Review of Systems   Constitutional: Negative for appetite change and unexpected weight loss.   HENT: Negative for trouble swallowing.    Gastrointestinal: Positive for abdominal pain, diarrhea, nausea and vomiting. Negative for abdominal distention, anal bleeding, blood in stool, constipation, rectal pain, GERD and indigestion.       Medications:     Current Outpatient Medications:   •  acetaminophen (TYLENOL) 500 MG tablet, Take 1-2 tablets by mouth Every 6 (Six) Hours As Needed for Mild Pain., Disp: , Rfl:   •  albuterol sulfate  (90 Base) MCG/ACT inhaler, Inhale 2 puffs Every 4 (Four) Hours As Needed for Wheezing or Shortness of Air., Disp: 18 g, Rfl: 5  •  Blood Glucose Monitoring Suppl (ONE TOUCH ULTRA 2) w/Device kit, , Disp: , Rfl:   •  calcium carbonate (TUMS) 500 MG chewable tablet, Chew 1 tablet Daily., Disp: , Rfl:   •  cyclobenzaprine (FLEXERIL) 10 MG tablet, Take 1 tablet by mouth 3 (Three) Times a Day As Needed for Muscle Spasms., Disp: , Rfl:   •  diphenhydrAMINE (BENADRYL) 25 mg capsule, Take 1 capsule by mouth Every 6 (Six) Hours As Needed for Itching or Sleep., Disp: , Rfl:   •   furosemide (LASIX) 20 MG tablet, TAKE ONE TO TWO TABLETS BY MOUTH EVERY NIGHTLY (Patient taking differently: Take 1 tablet by mouth As Needed.), Disp: 135 tablet, Rfl: 2  •  gabapentin (NEURONTIN) 300 MG capsule, Take 2 capsules by mouth Daily., Disp: , Rfl:   •  glucose blood test strip, USe BID to test blood sugar DX.e11.65, Disp: 100 each, Rfl: 3  •  Lancets 33G misc, 1 Each/kg 2 (two) times a day. To test blood sugars DxE11.65, Disp: 100 each, Rfl: 2  •  loratadine (CLARITIN) 10 MG tablet, TAKE ONE TABLET BY MOUTH DAILY, Disp: 30 tablet, Rfl: 2  •  Melatonin 5 MG lozenge, , Disp: , Rfl:   •  metFORMIN ER (GLUCOPHAGE-XR) 500 MG 24 hr tablet, Take 1 tablet by mouth Daily With Dinner., Disp: 90 tablet, Rfl: 3  •  metoprolol tartrate (LOPRESSOR) 50 MG tablet, Take 1 tablet by mouth 2 (Two) Times a Day., Disp: 180 tablet, Rfl: 3  •  multivitamin with minerals tablet tablet, Take 1 tablet by mouth Daily., Disp: , Rfl:   •  OnabotulinumtoxinA (Botox) 200 units reconstituted solution, FOR . PHYSICIAN TO INJECT UP  UNITS INTRAMUSCULARLY INTO HEAD, NECK AND SHOULDERS EVERY 90 DAYS., Disp: 1 each, Rfl: 3  •  ondansetron ODT (ZOFRAN-ODT) 8 MG disintegrating tablet, Place 1 tablet on the tongue Every 8 (Eight) Hours As Needed for Nausea or Vomiting., Disp: 21 tablet, Rfl: 0  •  simethicone (Gas-X) 80 MG chewable tablet, Chew 1 tablet Every 6 (Six) Hours As Needed for Flatulence., Disp: 10 tablet, Rfl: 0  •  spironolactone (Aldactone) 50 MG tablet, Take 1 tablet by mouth Daily., Disp: 90 tablet, Rfl: 1  •  traMADol (ULTRAM) 50 MG tablet, Take 1 tablet by mouth Every 6 (Six) Hours As Needed for Moderate Pain., Disp: , Rfl:   •  vitamin C (ASCORBIC ACID) 250 MG tablet, Take 1 tablet by mouth Daily., Disp: , Rfl:   •  vitamin D3 125 MCG (5000 UT) capsule capsule, Take 1 capsule by mouth Daily., Disp: , Rfl:     Current Facility-Administered Medications:   •  OnabotulinumtoxinA 200 Units, 200 Units,  "Intramuscular, Q3 Months, Shiela Coelho, APRN, 200 Units at 01/23/23 1200    Allergies:   Allergies   Allergen Reactions   • Capsicum Annuum Extract & Derivative (Bell Pepper) [Capsicum] Anaphylaxis   • Ibuprofen Other (See Comments)     \"makes me retain fluid and eventually go into CHF\"   • Macrobid [Nitrofurantoin] Nausea Only   • Peanut-Containing Drug Products Anaphylaxis   • Hydrochlorothiazide Swelling     eventually causes CHF    • Monosodium Glutamate Swelling and Headache   • Oatmeal Other (See Comments)     Dx on allergy testing   • Doxycycline Monohydrate Other (See Comments)   • Amitriptyline Mental Status Change     memory gaps + neuropathy + hair loss   • Aspartame Nausea Only   • Augmentin [Amoxicillin-Pot Clavulanate] Other (See Comments)     Syncope, not sure if allergic to cephalosporins.   • Codeine Headache      Can tolerate hydrocodone, no other adverse reactions to other narcotics.   • Diclofenac Headache   • Doxycycline Rash and Hallucinations   • Eggs Or Egg-Derived Products Other (See Comments)     dx on allergy testing, but does not completely avoid   • Naproxen Other (See Comments)     \"blackout\"       Social History:   Social History     Socioeconomic History   • Marital status: Single   Tobacco Use   • Smoking status: Never   • Smokeless tobacco: Never   Vaping Use   • Vaping Use: Never used   Substance and Sexual Activity   • Alcohol use: Not Currently     Comment: Very rarely drink socially. At most 2 drinks per month.   • Drug use: Never   • Sexual activity: Yes     Partners: Male     Birth control/protection: Condom, Coitus interruptus        Surgical History:   Past Surgical History:   Procedure Laterality Date   • ABDOMINAL SURGERY  06/15/2021    Bariatric Sleeve Surgery   • BARIATRIC SURGERY     • COLONOSCOPY  December 2022   • COSMETIC SURGERY      Multiple Keloid surgeries   • ENDOSCOPY N/A 06/15/2021    Procedure: ESOPHAGOGASTRODUODENOSCOPY;  Surgeon: Ayaka Andre " MD Toño;  Location:  [facility];  Service: Robotics - DaVinci;  Laterality: N/A;   • ENDOSCOPY N/A 10/28/2021    Procedure: ESOPHAGOGASTRODUODENOSCOPY WITH ACHALASIA BALLOON DILATATION;  Surgeon: Jase Hernandez MD;  Location:  [facility];  Service: Gastroenterology;  Laterality: N/A;  58 F DILATOR   • ENDOSCOPY N/A 11/15/2021    Procedure: ESOPHAGOGASTRODUODENOSCOPY WITH DILATATION;  Surgeon: Jase Hernandez MD;  Location:  [facility];  Service: Gastroenterology;  Laterality: N/A;  achalasia balloon    • ENDOSCOPY N/A 11/24/2021    Procedure: ESOPHAGOGASTRODUODENOSCOPY WTH DUODENAL POLYPECTOPMY AND NASAL JEJUNAL TUBE PLACEMENT;  Surgeon: Jase Hernandez MD;  Location:  [facility];  Service: Gastroenterology;  Laterality: N/A;  placement of feeding tube, checked position with KUB   • GASTRIC RESTRICTION SURGERY  2021    Sleeve   • GASTRIC SLEEVE LAPAROSCOPIC N/A 06/15/2021    Procedure: GASTRIC SLEEVE LAPAROSCOPIC WITH DAVINCI ROBOT, LAPROSCOPIC HIATAL HERNIA REPAIR WITH DAVINCI ROBOT;  Surgeon: Ayaka Andre MD;  Location:  [facility];  Service: Robotics - DaVinci;  Laterality: N/A;   • KELOID EXCISION      1990,1993,1999,2015,2016,2019   • LAPAROSCOPIC CHOLECYSTECTOMY  2000    for stones   • RADIOFREQUENCY ABLATION  2019    x 2  for pt back   • UMBILICAL HERNIA REPAIR  1990   • UPPER GASTROINTESTINAL ENDOSCOPY     • WISDOM TOOTH EXTRACTION  1994        Medical History:   Past Medical History:   Diagnosis Date   • Allergic     Birth   • Anxiety    • Arthritis    • Asthma     prn inhalers, does not follow w/ pulmonary   • Cholelithiasis 2000   • Chronic back pain     previously followed w/ pain management, now just prn Tylenol + Gabapentin   • Chronic deep vein thrombosis (DVT) of femoral vein of right lower extremity 09/10/2021   • Clotting disorder 2015   • Cluster headache    • Depression    • Diabetes mellitus     Type 2, dx 2014, never on insulin, A1C 7.6   • Diabetes mellitus     Type  "II, dx 2014, never on insulin, A1C 7.6    • Dyspepsia    • Dyspnea on exertion    • Dysuria 03/07/2022    Dysuria and hematuria x 2 weeks. 3/7/22 urine culture sent. Rx Macrobid 100 mg twice daily for 7 days. Patient did not tolerate Macrobid well due to GI upset. Urine culture was negative.   • Fatigue    • Gastroparesis    • GERD (gastroesophageal reflux disease)    • Headache, tension-type    • Heartburn     chronic, prn TUMS, denies prior eval   • Hirsutism    • History of medical problems     PCOS   • Hx of radiation therapy     for treatments of Keloids   • Hypertension    • Irregular menses     infrequent spotting   • Leiomyoma, subserous     possible peduculated f3-4 cm fibroid on u/s at [facility]   • Low back pain    • Migraines     botox q3 months, following Neurology @ [facility]   • Morbid obesity     s/p sleeve gastrectomy   • Peripheral edema    • Polycystic ovary syndrome 2011   • Seasonal allergies    • Slow to wake up after anesthesia    • Urinary tract infection    • Visual impairment     Astigmatism, Nearsightedness        Objective     Physical Exam:  Vital Signs:   Vitals:    04/14/23 0816   BP: 130/86   BP Location: Left arm   Patient Position: Sitting   Cuff Size: Adult   Pulse: 94   Temp: 97.5 °F (36.4 °C)   TempSrc: Temporal   SpO2: 97%   Weight: 93.2 kg (205 lb 6.4 oz)   Height: 152.4 cm (60\")     Body mass index is 40.11 kg/m².     Physical Exam  Constitutional:       General: She is not in acute distress.     Appearance: She is well-developed.   Cardiovascular:      Rate and Rhythm: Normal rate.   Pulmonary:      Effort: Pulmonary effort is normal. No accessory muscle usage or respiratory distress.      Breath sounds: Normal breath sounds.   Abdominal:      General: Bowel sounds are normal.      Palpations: There is no hepatomegaly.      Tenderness: There is no abdominal tenderness.   Skin:     Coloration: Skin is not pale.      Findings: No erythema.   Neurological:      Mental Status: She is alert " and oriented to person, place, and time.   Psychiatric:         Speech: Speech normal.         Behavior: Behavior normal.         Thought Content: Thought content normal.         Judgment: Judgment normal.         Assessment / Plan      Assessment/Plan:   Diagnoses and all orders for this visit:    1. Gastroesophageal reflux disease, unspecified whether esophagitis present (Primary)  Declines PPI.  Watching her diet and taking Tums for acid and calcium benefit  2. Delayed gastric emptying  Doing well  3. Functional diarrhea  Doing well  4. NAFLD (nonalcoholic fatty liver disease)  Continue with weight loss       Follow Up:   Return in about 9 months (around 1/14/2024), or if symptoms worsen or fail to improve.    Plan of care reviewed with the patient at the conclusion of today's visit.  Education was provided regarding diagnosis, management, and any prescribed or recommended OTC medications.  Patient verbalized understanding of and agreement with management plan.     Time Statement:   Discussed plan of care in detail with patient today. Patient verbally understands and agrees. I have spent 20 minutes reviewing available diagnostics, obtaining history, examining the patient, developing a treatment plan, and educating the patient on disease process and plan of care.     TANESHA Sexton  [facility]

## 2023-04-20 ENCOUNTER — HOSPITAL ENCOUNTER (OUTPATIENT)
Dept: ONCOLOGY | Facility: HOSPITAL | Age: 38
Discharge: HOME | End: 2023-04-20
Admitting: PHYSICIAN ASSISTANT
Payer: COMMERCIAL

## 2023-04-20 VITALS
RESPIRATION RATE: 16 BRPM | HEIGHT: 60 IN | BODY MASS INDEX: 41.23 KG/M2 | WEIGHT: 210 LBS | TEMPERATURE: 98.4 F | SYSTOLIC BLOOD PRESSURE: 136 MMHG | DIASTOLIC BLOOD PRESSURE: 89 MMHG | HEART RATE: 101 BPM

## 2023-04-20 DIAGNOSIS — R11.2 INTRACTABLE NAUSEA AND VOMITING: Primary | ICD-10-CM

## 2023-04-20 PROCEDURE — 96360 HYDRATION IV INFUSION INIT: CPT

## 2023-04-20 PROCEDURE — 96361 HYDRATE IV INFUSION ADD-ON: CPT

## 2023-04-20 RX ORDER — SODIUM CHLORIDE 9 MG/ML
1000 INJECTION, SOLUTION INTRAVENOUS ONCE
Status: COMPLETED | OUTPATIENT
Start: 2023-04-20 | End: 2023-04-20

## 2023-04-20 RX ORDER — SODIUM CHLORIDE 9 MG/ML
1000 INJECTION, SOLUTION INTRAVENOUS ONCE
Start: 2023-04-20 | End: 2023-04-20

## 2023-04-20 RX ADMIN — SODIUM CHLORIDE 1000 ML: 9 INJECTION, SOLUTION INTRAVENOUS at 13:17

## 2023-04-24 ENCOUNTER — OFFICE VISIT (OUTPATIENT)
Dept: BEHAVIORAL HEALTH | Facility: CLINIC | Age: 38
End: 2023-04-24
Payer: COMMERCIAL

## 2023-04-24 DIAGNOSIS — R45.4 IRRITABILITY: ICD-10-CM

## 2023-04-24 DIAGNOSIS — Z63.8 FAMILY CONFLICT: ICD-10-CM

## 2023-04-24 DIAGNOSIS — F33.0 MDD (MAJOR DEPRESSIVE DISORDER), RECURRENT EPISODE, MILD: Primary | ICD-10-CM

## 2023-04-24 PROCEDURE — 90834 PSYTX W PT 45 MINUTES: CPT | Performed by: PSYCHOLOGIST

## 2023-04-24 RX ORDER — FUROSEMIDE 20 MG/1
TABLET ORAL
Qty: 90 TABLET | Refills: 3 | Status: SHIPPED | OUTPATIENT
Start: 2023-04-24

## 2023-04-24 SDOH — SOCIAL STABILITY - SOCIAL INSECURITY: OTHER SPECIFIED PROBLEMS RELATED TO PRIMARY SUPPORT GROUP: Z63.8

## 2023-04-24 NOTE — TELEPHONE ENCOUNTER
Rx Refill Note  Requested Prescriptions     Pending Prescriptions Disp Refills   • furosemide (LASIX) 20 MG tablet 135 tablet 2      Last office visit with prescribing clinician: 3/21/2023   Last telemedicine visit with prescribing clinician: 10/10/2023   Next office visit with prescribing clinician: 10/10/2023                         Would you like a call back once the refill request has been completed: [] Yes [] No    If the office needs to give you a call back, can they leave a voicemail: [] Yes [] No    Yakov Mendez MA  04/24/23, 10:03 EDT

## 2023-04-24 NOTE — PROGRESS NOTES
PROGRESS NOTE    Data:  Frances Hartman is a 37 y.o. female who met with the undersigned for a scheduled individual therapy session from 9:00 - 9:45am.      Clinical Maneuvering/Intervention:      The pt talked about recent stressors including arguing with her mother. They live together and the pt talked about two major arguments occurring in the past week. Stressors were processed individually and in detail. Venting of frustrations was conducted in order to help the pt feel less tense emotionally and gain insight into issues. Feelings were processed and validated several times in session. Perspective taking was conducted multiple times in order to help the pt feel less stuck, less overwhelmed, and see challenges as much more manageable. Active listening was conducted in order to help the pt make sense of stressors and start moving towards potential solutions. The pt was assisted with finding solutions based on existing skill-set and abilities. Core issues for the pt were identified.  Insight building was conducted. She was assisted by being asked questions that could lead her to her own solutions. She was able to discuss how to accept her mother for who she is, while still being clear/assertive with her mother as the case may be. The pt was assisted in recognizing progress in order to show encouragement and promote motivation to keep making positive changes in life. She is accepting more of what she cannot change. She is improving communication and acceptance of her mother. The pt's strengths were identified in order to help identify abilities to use to better face/overcome challenges. Some humor was used in session as it is one of the pt's coping skills. Humor was used too, to help the pt see things as less challenging and more manageable than they first seemed. Humor was used as well, to model use of the skill as a way to decrease tension ongoing. The pt expressed gratitude for today's session.    Mental  Status Exam  Hygiene:  good  Dress: normal  Attitude:  cooperative and proactive  Motor Activity: normal  Speech: normal  Mood:  hurt  Affect:  congruent  Thought Processes: normal  Thought Content:  normal  Suicidal Thoughts:  not endorsed  Homicidal Thoughts:  not endorsed  Crisis Safety Plan: not needed   Hallucinations:  none      Patient's Support Network Includes:  family, friends      Progress toward goal: there is evidence to suggest that she is becoming a stronger person over time.      Functional Status: moderate to high      Prognosis: good    Assessment      The pt presents to be struggling with depression, often manifested as irritability or hurt. She tends to benefit from empowering therapy, helping her apply skills and abilities to solve her own problems. She is intelligent, thoughtful, and witty person.       Plan      In order to diminish depression and other listed emotional distress; she will make a point to strengthen/empower herself as reviewed today (ongoing). She will give thought to those things identified in terms of what is in her control and what is not, acting accordingly (this week).     Misty Nava, PhD, LP

## 2023-04-27 ENCOUNTER — PROCEDURE VISIT (OUTPATIENT)
Dept: NEUROLOGY | Facility: CLINIC | Age: 38
End: 2023-04-27
Payer: COMMERCIAL

## 2023-04-27 DIAGNOSIS — G43.709 CHRONIC MIGRAINE WITHOUT AURA WITHOUT STATUS MIGRAINOSUS, NOT INTRACTABLE: Primary | Chronic | ICD-10-CM

## 2023-05-04 ENCOUNTER — HOSPITAL ENCOUNTER (OUTPATIENT)
Dept: ONCOLOGY | Facility: HOSPITAL | Age: 38
Discharge: HOME | End: 2023-05-04
Admitting: PHYSICIAN ASSISTANT
Payer: COMMERCIAL

## 2023-05-04 DIAGNOSIS — R11.2 INTRACTABLE NAUSEA AND VOMITING: Primary | ICD-10-CM

## 2023-05-04 PROCEDURE — 96361 HYDRATE IV INFUSION ADD-ON: CPT

## 2023-05-04 PROCEDURE — 96360 HYDRATION IV INFUSION INIT: CPT

## 2023-05-04 RX ORDER — SODIUM CHLORIDE 9 MG/ML
1000 INJECTION, SOLUTION INTRAVENOUS ONCE
Status: COMPLETED | OUTPATIENT
Start: 2023-05-04 | End: 2023-05-04

## 2023-05-04 RX ORDER — SODIUM CHLORIDE 9 MG/ML
1000 INJECTION, SOLUTION INTRAVENOUS ONCE
Start: 2023-05-04 | End: 2023-05-04

## 2023-05-04 RX ADMIN — SODIUM CHLORIDE 1000 ML: 9 INJECTION, SOLUTION INTRAVENOUS at 07:45

## 2023-05-10 ENCOUNTER — HOSPITAL ENCOUNTER (EMERGENCY)
Facility: HOSPITAL | Age: 38
Discharge: HOME | End: 2023-05-10
Attending: EMERGENCY MEDICINE | Admitting: EMERGENCY MEDICINE
Payer: COMMERCIAL

## 2023-05-10 VITALS
WEIGHT: 201 LBS | HEIGHT: 60 IN | HEART RATE: 72 BPM | DIASTOLIC BLOOD PRESSURE: 82 MMHG | SYSTOLIC BLOOD PRESSURE: 128 MMHG | RESPIRATION RATE: 18 BRPM | BODY MASS INDEX: 39.46 KG/M2 | OXYGEN SATURATION: 97 % | TEMPERATURE: 98.4 F

## 2023-05-10 DIAGNOSIS — G43.809 OTHER MIGRAINE WITHOUT STATUS MIGRAINOSUS, NOT INTRACTABLE: Primary | ICD-10-CM

## 2023-05-10 PROCEDURE — 25010000002 KETOROLAC TROMETHAMINE PER 15 MG: Performed by: PHYSICIAN ASSISTANT

## 2023-05-10 PROCEDURE — 25010000002 DEXAMETHASONE SODIUM PHOSPHATE 100 MG/10ML SOLUTION: Performed by: PHYSICIAN ASSISTANT

## 2023-05-10 PROCEDURE — 96365 THER/PROPH/DIAG IV INF INIT: CPT

## 2023-05-10 PROCEDURE — 99283 EMERGENCY DEPT VISIT LOW MDM: CPT

## 2023-05-10 PROCEDURE — 25010000002 METOCLOPRAMIDE PER 10 MG: Performed by: PHYSICIAN ASSISTANT

## 2023-05-10 PROCEDURE — 96375 TX/PRO/DX INJ NEW DRUG ADDON: CPT

## 2023-05-10 PROCEDURE — 25010000002 DIPHENHYDRAMINE PER 50 MG: Performed by: PHYSICIAN ASSISTANT

## 2023-05-10 RX ORDER — DIPHENHYDRAMINE HYDROCHLORIDE 50 MG/ML
25 INJECTION INTRAMUSCULAR; INTRAVENOUS ONCE
Status: COMPLETED | OUTPATIENT
Start: 2023-05-10 | End: 2023-05-10

## 2023-05-10 RX ORDER — KETOROLAC TROMETHAMINE 30 MG/ML
15 INJECTION, SOLUTION INTRAMUSCULAR; INTRAVENOUS ONCE
Status: COMPLETED | OUTPATIENT
Start: 2023-05-10 | End: 2023-05-10

## 2023-05-10 RX ORDER — METOCLOPRAMIDE HYDROCHLORIDE 5 MG/ML
10 INJECTION INTRAMUSCULAR; INTRAVENOUS ONCE
Status: COMPLETED | OUTPATIENT
Start: 2023-05-10 | End: 2023-05-10

## 2023-05-10 RX ADMIN — DIPHENHYDRAMINE HYDROCHLORIDE 25 MG: 50 INJECTION INTRAMUSCULAR; INTRAVENOUS at 15:42

## 2023-05-10 RX ADMIN — KETOROLAC TROMETHAMINE 15 MG: 30 INJECTION, SOLUTION INTRAMUSCULAR; INTRAVENOUS at 15:41

## 2023-05-10 RX ADMIN — DEXAMETHASONE SODIUM PHOSPHATE 10 MG: 10 INJECTION, SOLUTION INTRAMUSCULAR; INTRAVENOUS at 15:41

## 2023-05-10 RX ADMIN — SODIUM CHLORIDE 1000 ML: 9 INJECTION, SOLUTION INTRAVENOUS at 15:40

## 2023-05-10 RX ADMIN — METOCLOPRAMIDE 10 MG: 5 INJECTION, SOLUTION INTRAMUSCULAR; INTRAVENOUS at 15:43

## 2023-05-10 NOTE — ED PROVIDER NOTES
Subjective   History of Present Illness  37-year-old female presents emergency department today with a migraine headache.  She has a longstanding history of migraine headaches and sees Dr. Frnacois and gets Botox injections.  She reports that that this headache is been on for about 3 days just does not seem to be getting better.  She has had headaches like this before and has used the migraine cocktail in the past not so she is open to receive today.  She had photophobia phonophobia she had no fevers no chills no neck pain.  She has no other complaints.    History provided by:  Patient   used: No    Headache  Pain location:  Generalized  Quality:  Dull  Radiates to:  Does not radiate  Severity currently:  8/10  Severity at highest:  8/10  Onset quality:  Gradual  Duration:  3 days  Timing:  Intermittent  Progression:  Waxing and waning  Chronicity:  Recurrent  Similar to prior headaches: yes    Context: activity, bright light and loud noise    Context: not caffeine, not coughing and not exposure to cold air    Relieved by:  Resting in a darkened room  Worsened by:  Light and activity  Ineffective treatments:  None tried  Associated symptoms: photophobia    Associated symptoms: no abdominal pain, no blurred vision, no congestion, no fever, no focal weakness, no hearing loss, no myalgias, no nausea, no neck pain, no neck stiffness, no swollen glands, no URI, no visual change, no vomiting and no weakness        Review of Systems   Constitutional: Negative for chills and fever.   HENT: Negative for congestion and hearing loss.    Eyes: Positive for photophobia. Negative for blurred vision.   Respiratory: Negative for chest tightness, shortness of breath and wheezing.    Cardiovascular: Negative for chest pain and palpitations.   Gastrointestinal: Negative for abdominal pain, nausea and vomiting.   Genitourinary: Negative for dysuria, frequency and urgency.   Musculoskeletal: Negative for myalgias,  "neck pain and neck stiffness.   Neurological: Positive for headaches. Negative for focal weakness and weakness.   All other systems reviewed and are negative.      Past Medical History:   Diagnosis Date   • Allergic     Birth   • Anxiety    • Arthritis    • Asthma     prn inhalers, does not follow w/ pulmonary   • Cholelithiasis 2000   • Chronic back pain     previously followed w/ pain management, now just prn Tylenol + Gabapentin   • Chronic deep vein thrombosis (DVT) of femoral vein of right lower extremity 09/10/2021   • Clotting disorder 2015   • Cluster headache    • Depression    • Diabetes mellitus     Type 2, dx 2014, never on insulin, A1C 7.6   • Diabetes mellitus     Type II, dx 2014, never on insulin, A1C 7.6    • Dyspepsia    • Dyspnea on exertion    • Dysuria 03/07/2022    Dysuria and hematuria x 2 weeks. 3/7/22 urine culture sent. Rx Macrobid 100 mg twice daily for 7 days. Patient did not tolerate Macrobid well due to GI upset. Urine culture was negative.   • Fatigue    • Gastroparesis    • GERD (gastroesophageal reflux disease)    • Headache, tension-type    • Heartburn     chronic, prn TUMS, denies prior eval   • Hirsutism    • History of medical problems     PCOS   • Hx of radiation therapy     for treatments of Keloids   • Hypertension    • Irregular menses     infrequent spotting   • Leiomyoma, subserous     possible peduculated f3-4 cm fibroid on u/s at [facility]   • Low back pain    • Migraines     botox q3 months, following Neurology @ [facility]   • Morbid obesity     s/p sleeve gastrectomy   • Peripheral edema    • Polycystic ovary syndrome 2011   • Seasonal allergies    • Slow to wake up after anesthesia    • Urinary tract infection    • Visual impairment     Astigmatism, Nearsightedness       Allergies   Allergen Reactions   • Capsicum Annuum Extract & Derivative (Bell Pepper) [Capsicum] Anaphylaxis   • Ibuprofen Other (See Comments)     \"makes me retain fluid and eventually go into CHF\"   • Macrobid " "[Nitrofurantoin] Nausea Only   • Peanut-Containing Drug Products Anaphylaxis   • Hydrochlorothiazide Swelling     eventually causes CHF    • Monosodium Glutamate Swelling and Headache   • Oatmeal Other (See Comments)     Dx on allergy testing   • Banana (Diagnostic) Unknown - High Severity   • Coconut Flavor Unknown - High Severity   • Doxycycline Monohydrate Other (See Comments)   • Isoflavones (Soy) Unknown - Low Severity   • Amitriptyline Mental Status Change     memory gaps + neuropathy + hair loss   • Aspartame Nausea Only   • Augmentin [Amoxicillin-Pot Clavulanate] Other (See Comments)     Syncope, not sure if allergic to cephalosporins.   • Codeine Headache      Can tolerate hydrocodone, no other adverse reactions to other narcotics.   • Diclofenac Headache   • Doxycycline Rash and Hallucinations   • Eggs Or Egg-Derived Products Other (See Comments)     dx on allergy testing, but does not completely avoid   • Naproxen Other (See Comments)     \"blackout\"       Past Surgical History:   Procedure Laterality Date   • ABDOMINAL SURGERY  06/15/2021    Bariatric Sleeve Surgery   • BARIATRIC SURGERY     • COLONOSCOPY  December 2022   • COSMETIC SURGERY      Multiple Keloid surgeries   • ENDOSCOPY N/A 06/15/2021    Procedure: ESOPHAGOGASTRODUODENOSCOPY;  Surgeon: Ayaka Andre MD;  Location:  [facility];  Service: Robotics - DaVinci;  Laterality: N/A;   • ENDOSCOPY N/A 10/28/2021    Procedure: ESOPHAGOGASTRODUODENOSCOPY WITH ACHALASIA BALLOON DILATATION;  Surgeon: Jase Hernandez MD;  Location:  [facility];  Service: Gastroenterology;  Laterality: N/A;  58 F DILATOR   • ENDOSCOPY N/A 11/15/2021    Procedure: ESOPHAGOGASTRODUODENOSCOPY WITH DILATATION;  Surgeon: Jase Hernandez MD;  Location:  [facility];  Service: Gastroenterology;  Laterality: N/A;  achalasia balloon    • ENDOSCOPY N/A 11/24/2021    Procedure: ESOPHAGOGASTRODUODENOSCOPY [facility] DUODENAL POLYPECTOPMY AND NASAL JEJUNAL TUBE PLACEMENT;  " Surgeon: Jase Hernandez MD;  Location:  [facility];  Service: Gastroenterology;  Laterality: N/A;  placement of feeding tube, checked position with KUB   • GASTRIC RESTRICTION SURGERY  2021    Sleeve   • GASTRIC SLEEVE LAPAROSCOPIC N/A 06/15/2021    Procedure: GASTRIC SLEEVE LAPAROSCOPIC WITH DAVINCI ROBOT, LAPROSCOPIC HIATAL HERNIA REPAIR WITH DAVINCI ROBOT;  Surgeon: Ayaka Andre MD;  Location:  [facility];  Service: Robotics - DaVinci;  Laterality: N/A;   • KELOID EXCISION      1990,1993,1999,2015,2016,2019   • LAPAROSCOPIC CHOLECYSTECTOMY  2000    for stones   • RADIOFREQUENCY ABLATION  2019    x 2  for pt back   • UMBILICAL HERNIA REPAIR  1990   • UPPER GASTROINTESTINAL ENDOSCOPY     • WISDOM TOOTH EXTRACTION  1994       Family History   Problem Relation Age of Onset   • Arthritis Mother    • Diabetes Mother    • Obesity Mother    • Arrhythmia Mother    • Hypertension Mother    • Asthma Mother    • Migraines Mother    • Dementia Father    • Heart attack Father    • Arrhythmia Father    • Heart disease Father    • Alcohol abuse Father    • Stroke Other         CVA   • Obesity Other    • Ovarian cancer Maternal Aunt         40's   • Breast cancer Paternal Aunt         30's   • Heart disease Other    • Hypertension Other    • Endometrial cancer Neg Hx    • Colon cancer Neg Hx    • Colon polyps Neg Hx    • Esophageal cancer Neg Hx        Social History     Socioeconomic History   • Marital status: Single   Tobacco Use   • Smoking status: Never   • Smokeless tobacco: Never   Vaping Use   • Vaping Use: Never used   Substance and Sexual Activity   • Alcohol use: Not Currently     Comment: Very rarely drink socially. At most 2 drinks per month.   • Drug use: Never   • Sexual activity: Yes     Partners: Male     Birth control/protection: Condom, Coitus interruptus           Objective   Physical Exam  Vitals and nursing note reviewed.   Constitutional:       Appearance: She is well-developed.   HENT:       Head: Normocephalic and atraumatic.      Right Ear: External ear normal.      Left Ear: External ear normal.      Nose: Nose normal.   Eyes:      General: No scleral icterus.     Conjunctiva/sclera: Conjunctivae normal.   Neck:      Thyroid: No thyromegaly.   Cardiovascular:      Rate and Rhythm: Normal rate and regular rhythm.      Heart sounds: Normal heart sounds.   Pulmonary:      Effort: Pulmonary effort is normal. No respiratory distress.      Breath sounds: Normal breath sounds. No wheezing or rales.   Chest:      Chest wall: No tenderness.   Abdominal:      General: Bowel sounds are normal. There is no distension.      Palpations: Abdomen is soft.      Tenderness: There is no abdominal tenderness.   Musculoskeletal:         General: Normal range of motion.      Cervical back: Normal range of motion.   Lymphadenopathy:      Cervical: No cervical adenopathy.   Skin:     General: Skin is warm and dry.   Neurological:      Mental Status: She is alert and oriented to person, place, and time.      Cranial Nerves: No cranial nerve deficit.      Coordination: Coordination normal.      Deep Tendon Reflexes: Reflexes are normal and symmetric. Reflexes normal.   Psychiatric:         Behavior: Behavior normal.         Thought Content: Thought content normal.         Judgment: Judgment normal.         Procedures           ED Course                No results found for this or any previous visit (from the past 24 hour(s)).  Note: In addition to lab results from this visit, the labs listed above may include labs taken at another facility or during a different encounter within the last 24 hours. Please correlate lab times with ED admission and discharge times for further clarification of the services performed during this visit.    No orders to display     Vitals:    05/10/23 1307   BP: 128/82   BP Location: Left arm   Patient Position: Sitting   Pulse: 72   Resp: 18   Temp: 98.4 °F (36.9 °C)   TempSrc: Oral   SpO2: 97%  "  Weight: 91.2 kg (201 lb)   Height: 152.4 cm (60\")     Medications   metoclopramide (REGLAN) injection 10 mg (10 mg Intravenous Given 5/10/23 1543)   diphenhydrAMINE (BENADRYL) injection 25 mg (25 mg Intravenous Given 5/10/23 1542)   dexamethasone (DECADRON) IVPB 10 mg (0 mg Intravenous Stopped 5/10/23 1611)   ketorolac (TORADOL) injection 15 mg (15 mg Intravenous Given 5/10/23 1541)   sodium chloride 0.9 % bolus 1,000 mL (0 mL Intravenous Stopped 5/10/23 1622)     ECG/EMG Results (last 24 hours)     ** No results found for the last 24 hours. **        No orders to display                                  Medical Decision Making  History of migraine headaches this been resistant to her normal medication she would use at home.  She was open to using a migraine cocktail which she is got significant relief from.  She like to be discharged home she could go home get into a dark room.  She was asked to return here for problems.    Other migraine without status migrainosus, not intractable: complicated acute illness or injury  Risk  Prescription drug management.          Final diagnoses:   Other migraine without status migrainosus, not intractable       ED Disposition  ED Disposition     ED Disposition   Discharge    Condition   Stable    Comment   --             Lesli Ramirez PA-C  [address] [facility] [address] [phone]               Medication List      No changes were made to your prescriptions during this visit.          Albert Nathan PA  05/11/23 1015    "

## 2023-05-10 NOTE — Clinical Note
[facility] EMERGENCY DEPARTMENT  [address] [address]  Phone: [phone]    Frances Hartman was seen and treated in our emergency department on 5/10/2023.  She may return to work on 05/12/2023.         Thank you for choosing [facility].    Albert Nathan PA

## 2023-05-15 ENCOUNTER — OFFICE VISIT (OUTPATIENT)
Dept: BEHAVIORAL HEALTH | Facility: CLINIC | Age: 38
End: 2023-05-15
Payer: COMMERCIAL

## 2023-05-15 DIAGNOSIS — F33.0 MDD (MAJOR DEPRESSIVE DISORDER), RECURRENT EPISODE, MILD: Primary | ICD-10-CM

## 2023-05-15 DIAGNOSIS — R45.4 IRRITABILITY: ICD-10-CM

## 2023-05-15 PROCEDURE — 90834 PSYTX W PT 45 MINUTES: CPT | Performed by: PSYCHOLOGIST

## 2023-05-15 NOTE — PROGRESS NOTES
PROGRESS NOTE    Data:  Frances Hartman is a 37 y.o. female who met with the undersigned for a scheduled individual therapy session from 9:00 - 9:45am.      Clinical Maneuvering/Intervention:      The pt talked about recent stressors including conflict with co-workers/job stress, weight gain, and living with her mother (though the relationship seems to be improving). Stressors were processed individually and in detail. Venting of frustrations was conducted in order to help the pt feel less tense emotionally and gain insight into issues. Feelings were processed and validated several times in session. Perspective taking was conducted multiple times in order to help the pt feel less stuck, less overwhelmed, and see challenges as much more manageable. Active listening was conducted in order to help the pt make sense of stressors and start moving towards potential solutions. The pt was assisted with finding solutions based on existing skill-set and abilities. Maladaptive thought patterns were identified, challenged, and evaluated for validity in order to allow for the pt to chose different and more adaptive ways of thinking. She was assisted with being solution and positive focused. The pt was assisted with putting feelings into words several times in session in order to both help diminish emotional tension and to highlight direction for change. Direction for counseling was revisited. The pt talked about wanting her own place, to have a baby, and to lose weight. As far as counseling, the pt expressed that she likes having someone listen/support her. Education about going forward in sessions was provided, including what she can expect and/or what it may look like. The pt's strengths were identified in order to help identify abilities to use to better face/overcome challenges. Some humor was used in session as it is one of the pt's coping skills. Humor was used too, to help the pt see things as less challenging and  more manageable than they first seemed. Humor was used as well, to model use of the skill as a way to decrease tension ongoing. Homework was assigned tailored to pt's needs. The pt expressed gratitude for today's session.    Mental Status Exam  Hygiene:  good  Dress: normal  Attitude:  cooperative and proactive  Motor Activity: normal  Speech: normal  Mood:  level  Affect:  congruent  Thought Processes: normal  Thought Content:  normal  Suicidal Thoughts:  not endorsed  Homicidal Thoughts:  not endorsed  Crisis Safety Plan: not needed   Hallucinations:  none      Patient's Support Network Includes:  family, friends      Progress toward goal: there is evidence to suggest that she is becoming a stronger person over time.      Functional Status: moderate to high      Prognosis: good    Assessment      The pt presents to be struggling with depression, often manifested as irritability or hurt. She tends to benefit from empowering therapy, helping her apply skills and abilities to solve her own problems. She is intelligent, thoughtful, and witty person.       Plan      In order to diminish depression and other listed emotional distress; she will give thought to what she would like to get out of counseling going forward and continue to make small steps of progress on personal goals (next few weeks).     Misty Nava, PhD, LP

## 2023-05-24 ENCOUNTER — OFFICE VISIT (OUTPATIENT)
Dept: ENDOCRINOLOGY | Facility: CLINIC | Age: 38
End: 2023-05-24
Payer: COMMERCIAL

## 2023-05-24 VITALS
BODY MASS INDEX: 40.44 KG/M2 | DIASTOLIC BLOOD PRESSURE: 80 MMHG | WEIGHT: 206 LBS | SYSTOLIC BLOOD PRESSURE: 134 MMHG | HEART RATE: 82 BPM | HEIGHT: 60 IN | OXYGEN SATURATION: 98 %

## 2023-05-24 DIAGNOSIS — E11.65 TYPE 2 DIABETES MELLITUS WITH HYPERGLYCEMIA, WITHOUT LONG-TERM CURRENT USE OF INSULIN: Primary | ICD-10-CM

## 2023-05-24 LAB
EXPIRATION DATE: NORMAL
GLUCOSE BLDC GLUCOMTR-MCNC: 192 MG/DL (ref 70–130)
HBA1C MFR BLD: 7.1 %
Lab: NORMAL

## 2023-05-24 NOTE — PROGRESS NOTES
Chief Complaint   Patient presents with   • Diabetes     Follow up        HPI:   Frances Hartman is a 37 y.o.female who returns to endocrine clinic for follow-up evaluation of her Type 2 Diabetes. Last visit 11/21/2022.  Her history is as follows:    Interim Events:  - She is working as a  (3rd shift)  - Is taking Lasix PRN nightly (takes when she notes rapid weight gain from retained fluid, has h/o CHF in past)   - Continues to receive IVF every 2-3 weeks from Bariatric service.      1) Type 2 DM with no known associated complications at this time:   - Diagnosed with diabetes in 2014. (A1C% was 7.6). Pt was already on metformin ER for PCOS prior to diagnosis. Tolerated metformin ER and stopped in 06/2021 after bariatric surgery.  - Had Sleeve gastrectomy on 06/15/2021. Had post-op complication of pouch stricture and underwent dilation of pouch in 07/2021, 10/2021, and 11/2021 by gastroenterology. Highest wt 290 lbs, Lost 50 lbs on her own. Weight 238 pre-op. Currently 190 lbs.   - Was hospitalized 11/26/2021 for N/V/dehydration.       Current DM Medications:  Metformin  mg daily with food. Pt reported she did not tolerate higher doses (caused nausea/ abd discomfort)    Glucometer Review: no meter for review today    DM Health Maintenance:  Ophtho: DUE  Podiatry: no recent visit  Monofilament / Foot exam: DUE  Lipids: (10/2022) Tchol 140, TG 72, HDL 54, LDL 72 - not on statin at this time  Urine Microalb/Cr ratio: (06/2020) 10.2  TSH: (10/2022) 2.270  CBC: (04/2023) Hgb 13.0  CMP: (04/2023) Cr 0.99, GFR 74.5, ALT 47, AST 27  Vit B12: (11/2021) 869    2) PCOS:  - diagnosed in 2011.   - Was prescribed metformin ER and spironolactone at time of diagnosis. Had stopped both medications prior to bariatric surgery.  - Restarted metformin  mg daily in 02/2022    3) Hirsutism:  - Was on spironolactone 100 mg daily in the past. Medication stopped prior to bariatric surgery in 06/2021  - PCP  "restarted spironolactone 25 mg daily in 10/2022.   - Now on spironolactone 50 mg daily    Review of Systems   Constitutional: Negative for activity change and fatigue.   Eyes: Negative.  Negative for discharge.   Respiratory: Negative.    Cardiovascular: Negative.    Gastrointestinal: Positive for nausea (occasional). Negative for diarrhea.   Endocrine: Negative.    Genitourinary: Negative.    Musculoskeletal: Positive for myalgias (Occasional). Negative for arthralgias and back pain.   Skin: Negative.         Hair loss   Allergic/Immunologic: Positive for environmental allergies and food allergies.   Neurological: Positive for headaches. Negative for dizziness, weakness, light-headedness and numbness.   Hematological: Negative.  Does not bruise/bleed easily.   Psychiatric/Behavioral: Positive for sleep disturbance.     The following portions of the patient's history were reviewed and updated as appropriate: allergies, current medications, past family history, past medical history, past social history, past surgical history and problem list.    /80   Pulse 82   Ht 152.4 cm (60\")   Wt 93.4 kg (206 lb)   SpO2 98%   BMI 40.23 kg/m²   Physical Exam  Vitals reviewed.   Constitutional:       General: She is not in acute distress.     Appearance: Normal appearance. She is well-developed. She is not diaphoretic.      Comments: BMI 40.23   HENT:      Head: Normocephalic.   Eyes:      Conjunctiva/sclera: Conjunctivae normal.      Pupils: Pupils are equal, round, and reactive to light.   Neck:      Thyroid: No thyromegaly.      Trachea: No tracheal deviation.      Comments: No palpable thyroid nodules    Cardiovascular:      Rate and Rhythm: Normal rate and regular rhythm.      Heart sounds: Normal heart sounds. No murmur heard.  Pulmonary:      Effort: Pulmonary effort is normal. No respiratory distress.      Breath sounds: Normal breath sounds.   Lymphadenopathy:      Cervical: No cervical adenopathy.   Skin:     " General: Skin is warm and dry.      Findings: No erythema.      Comments: + acanthosis nigricans, + facial hirsutism   Neurological:      Mental Status: She is alert and oriented to person, place, and time.      Cranial Nerves: No cranial nerve deficit.   Psychiatric:         Behavior: Behavior normal.       LABS/IMAGING: prior labs / outside records reviewed and summarized in HPI  Results for orders placed or performed in visit on 05/24/23   POC Glycosylated Hemoglobin (Hb A1C)    Specimen: Blood   Result Value Ref Range    Hemoglobin A1C 7.1 %    Lot Number [id number]     Expiration Date 1/24/25    POC Glucose, Blood    Specimen: Blood   Result Value Ref Range    Glucose 192 (A) 70 - 130 mg/dL       ASSESSMENT/PLAN:    1) Type 2 DM with hyperglycemia: fair control, A1C% 7.1 today  - discussed treatment options:  Pt did not tolerate higher doses of metformin ER in the past. Declines trying again at this time  Pt with h/o frequent UTI, BV: Pt would like to post-pone trying a SGLT-2 inhibitors at this time.   Discussed starting Januvia vs glipizide. Discussed my recommendation for Januvia at this time due to the weight gain and risks for hypoglycemia associated with glipizide. Pt was agreeable to this plan.  Discussed with patient about possible addition of a GLP-1 agonist at a much later date once she has fully recovered from her bariatric surgery (ie no longer receiving IVF, no longer with nausea)     - Continue metformin  mg daily with food.   - Start Januvia 100 mg daily    RTC 3 months    Signed: Carine Aden MD

## 2023-05-31 RX ORDER — SODIUM CHLORIDE 9 MG/ML
1000 INJECTION, SOLUTION INTRAVENOUS ONCE
Start: 2023-05-31 | End: 2023-05-31

## 2023-07-26 ENCOUNTER — PROCEDURE VISIT (OUTPATIENT)
Dept: NEUROLOGY | Facility: CLINIC | Age: 38
End: 2023-07-26
Payer: COMMERCIAL

## 2023-07-26 DIAGNOSIS — G43.709 CHRONIC MIGRAINE WITHOUT AURA WITHOUT STATUS MIGRAINOSUS, NOT INTRACTABLE: Primary | Chronic | ICD-10-CM

## 2023-07-26 NOTE — PROGRESS NOTES
Botulinum Toxin Injection Procedure    Chief Complaint   Patient presents with    Botulinum Toxin Injection     BOTOX - PT SUPPLIED - DO NOT BILL        History:  Response to treatment has reduced HA's at least 7 fewer days a month and/or 100 hours fewer each month.     Pre-operative diagnosis: headache    Post-operative diagnosis: Same    Procedure: Chemical neurolysis    After risks and benefits were explained including bleeding, infection, worsening of pain, damage to the areas being injected, weakness of muscles, loss of muscle control, dysphagia if injecting the head or neck, facial droop if injecting the facial area, painful injection, allergic or other reaction to the medications being injected, and the failure of the procedure to help the problem, a signed consent was obtained.      The patient was placed in a comfortable area and the sites to be treated were identified. A time out was called and performed. The area to be treated was prepped three times with alcohol and the alcohol allowed to dry. Next, a 27 gauge needle was used to inject the medication in the area to be treated.    Area(s) injected: frontalis muscle, semispinalis capitis muscle and temporallis muscle    Medications used: botulinum toxin 200 mu, 2 mL of saline used to dilute the botulinum toxin.      The patient did tolerate the procedure well. There were no complications.       Physical Examination    Current Treatment (Units)   Splenius Capitis 25 units on the right and 25 units on the left.   Temporalis 25 units on the right and 25 units on the left.   Frontalis 27 units on the right and 28 units on the left.   EMG Guidance none .   Complications: none .   The total amount injected in units is 155 .   The total amount wasted in units is 45 .   The total amount submitted in units is 200 .

## 2023-07-28 ENCOUNTER — LAB (OUTPATIENT)
Dept: LAB | Facility: HOSPITAL | Age: 38
End: 2023-07-28
Payer: COMMERCIAL

## 2023-07-28 ENCOUNTER — TRANSCRIBE ORDERS (OUTPATIENT)
Dept: LAB | Facility: HOSPITAL | Age: 38
End: 2023-07-28
Payer: COMMERCIAL

## 2023-07-28 DIAGNOSIS — Z31.69 GENERAL COUNSELING AND ADVICE ON PROCREATIVE MANAGEMENT: ICD-10-CM

## 2023-07-28 DIAGNOSIS — Z30.09 GENERAL COUNSELING FOR PRESCRIPTION OF ORAL CONTRACEPTIVES: Primary | ICD-10-CM

## 2023-07-28 PROCEDURE — 83520 IMMUNOASSAY QUANT NOS NONAB: CPT | Performed by: OBSTETRICS & GYNECOLOGY

## 2023-07-28 PROCEDURE — 36415 COLL VENOUS BLD VENIPUNCTURE: CPT | Performed by: OBSTETRICS & GYNECOLOGY

## 2023-07-28 PROCEDURE — 86148 ANTI-PHOSPHOLIPID ANTIBODY: CPT | Performed by: OBSTETRICS & GYNECOLOGY

## 2023-07-28 PROCEDURE — 86147 CARDIOLIPIN ANTIBODY EA IG: CPT | Performed by: OBSTETRICS & GYNECOLOGY

## 2023-07-28 PROCEDURE — 82397 CHEMILUMINESCENT ASSAY: CPT | Performed by: OBSTETRICS & GYNECOLOGY

## 2023-07-31 LAB — MIS SERPL-MCNC: 2.46 NG/ML

## 2023-08-07 LAB
INTERPRETATION: NORMAL
SPECIMEN STATUS: NORMAL

## 2023-08-10 DIAGNOSIS — E11.65 TYPE 2 DIABETES MELLITUS WITH HYPERGLYCEMIA, WITHOUT LONG-TERM CURRENT USE OF INSULIN: ICD-10-CM

## 2023-08-10 RX ORDER — LANCETS 33 GAUGE
1 EACH MISCELLANEOUS DAILY PRN
Qty: 100 EACH | Refills: 2 | Status: SHIPPED | OUTPATIENT
Start: 2023-08-10

## 2023-08-10 NOTE — TELEPHONE ENCOUNTER
Rx Refill Note  Requested Prescriptions     Pending Prescriptions Disp Refills    glucose blood test strip 100 each 3     Sig: USe BID to test blood sugar [id number]      Last office visit with prescribing clinician: 3/21/2023   Last telemedicine visit with prescribing clinician: Visit date not found   Next office visit with prescribing clinician: Visit date not found                         Would you like a call back once the refill request has been completed: [] Yes [] No    If the office needs to give you a call back, can they leave a voicemail: [] Yes [] No    Saadia Bangura MA  08/10/23, 09:16 EDT

## 2023-08-27 PROCEDURE — 87635 SARS-COV-2 COVID-19 AMP PRB: CPT | Performed by: NURSE PRACTITIONER

## 2023-08-28 ENCOUNTER — TELEPHONE (OUTPATIENT)
Dept: ENDOCRINOLOGY | Facility: CLINIC | Age: 38
End: 2023-08-28
Payer: COMMERCIAL

## 2023-08-28 DIAGNOSIS — E11.65 TYPE 2 DIABETES MELLITUS WITH HYPERGLYCEMIA, WITHOUT LONG-TERM CURRENT USE OF INSULIN: Primary | ICD-10-CM

## 2023-08-28 RX ORDER — GLIPIZIDE 5 MG/1
5 TABLET, FILM COATED, EXTENDED RELEASE ORAL DAILY
Qty: 90 TABLET | Refills: 3 | Status: SHIPPED | OUTPATIENT
Start: 2023-08-28 | End: 2024-08-27

## 2023-08-28 NOTE — TELEPHONE ENCOUNTER
Caller: Frances Hartman    Relationship: Self    Best call back number: [phone] (home)       What is the best time to reach you: MORNING    Who are you requesting to speak with (clinical staff, provider,  specific staff member): PROVIDER OR CLINICAL      What was the call regarding: DAMION, IS NOT WORKING AS EXPECTED. PT CAN NOT GET IN UNTIL NOV AND WOULD LIKE TO SPEAK WITH DR BEFORE THEN ABOUT MEDICINE     Is it okay if the provider responds through MyChart: NO

## 2023-08-28 NOTE — TELEPHONE ENCOUNTER
Spoke with patient. Discussed options. Will stop Januvia, starting glipizide XL 5 mg daily. Reviewed potential side effects. Pt had previously taken 10 mg tabs prior to bariatric surgery and is familiar with medication.   Signed: Carine Aden MD

## 2023-09-13 ENCOUNTER — OFFICE VISIT (OUTPATIENT)
Dept: OBSTETRICS AND GYNECOLOGY | Facility: HOSPITAL | Age: 38
End: 2023-09-13
Payer: COMMERCIAL

## 2023-09-13 ENCOUNTER — LAB (OUTPATIENT)
Dept: LAB | Facility: HOSPITAL | Age: 38
End: 2023-09-13
Payer: COMMERCIAL

## 2023-09-13 VITALS
DIASTOLIC BLOOD PRESSURE: 93 MMHG | BODY MASS INDEX: 41.82 KG/M2 | SYSTOLIC BLOOD PRESSURE: 143 MMHG | WEIGHT: 213 LBS | HEIGHT: 60 IN

## 2023-09-13 DIAGNOSIS — Z98.84 S/P BARIATRIC SURGERY: ICD-10-CM

## 2023-09-13 DIAGNOSIS — I82.511 CHRONIC DEEP VEIN THROMBOSIS (DVT) OF FEMORAL VEIN OF RIGHT LOWER EXTREMITY: ICD-10-CM

## 2023-09-13 DIAGNOSIS — N96 RECURRENT PREGNANCY LOSS WITHOUT CURRENT PREGNANCY: Primary | ICD-10-CM

## 2023-09-13 DIAGNOSIS — J45.30 MILD PERSISTENT ASTHMA WITHOUT COMPLICATION: ICD-10-CM

## 2023-09-13 DIAGNOSIS — G89.29 CHRONIC MIDLINE LOW BACK PAIN WITHOUT SCIATICA: ICD-10-CM

## 2023-09-13 DIAGNOSIS — E11.65 TYPE 2 DIABETES MELLITUS WITH HYPERGLYCEMIA, UNSPECIFIED WHETHER LONG TERM INSULIN USE: ICD-10-CM

## 2023-09-13 DIAGNOSIS — E28.2 PCOS (POLYCYSTIC OVARIAN SYNDROME): ICD-10-CM

## 2023-09-13 DIAGNOSIS — M54.50 CHRONIC MIDLINE LOW BACK PAIN WITHOUT SCIATICA: ICD-10-CM

## 2023-09-13 DIAGNOSIS — N96 RECURRENT PREGNANCY LOSS WITHOUT CURRENT PREGNANCY: ICD-10-CM

## 2023-09-13 DIAGNOSIS — I10 ESSENTIAL HYPERTENSION: ICD-10-CM

## 2023-09-13 PROCEDURE — 85303 CLOT INHIBIT PROT C ACTIVITY: CPT

## 2023-09-13 PROCEDURE — 85305 CLOT INHIBIT PROT S TOTAL: CPT

## 2023-09-13 PROCEDURE — 85300 ANTITHROMBIN III ACTIVITY: CPT

## 2023-09-13 PROCEDURE — 85301 ANTITHROMBIN III ANTIGEN: CPT

## 2023-09-13 PROCEDURE — 36415 COLL VENOUS BLD VENIPUNCTURE: CPT

## 2023-09-13 PROCEDURE — 85306 CLOT INHIBIT PROT S FREE: CPT

## 2023-09-13 PROCEDURE — 85302 CLOT INHIBIT PROT C ANTIGEN: CPT

## 2023-09-13 NOTE — PROGRESS NOTES
Maternal/Fetal Medicine Consult Note   Date: 2023  Name: Frances Hartman    : 1985     MRN: [id number]     Referring Provider: Allison Canavan, MD    Chief Complaint  pre conceptual couseling    Subjective     History of Present Illness:  Frances Hartman is a 37 y.o.  Unknown who presents today for preconception counseling for recurrent pregnancy loss, PCOS, type 2 diabetes, chronic hypertension, asthma, chronic pain, tachycardia, nonacute fatty liver disease, history of gastric bypass.    Patient states that she is probably had 9-10 miscarriages throughout the past 10 years.  Some of had positive pregnancy test others have not.  She has never had a confirmatory ultrasound of these pregnancies.  Some of them have fetal tissue that she is passed spontaneously per her report.  She is an only child and is considering attempting pregnancy.  She wants to make sure it is safe for her to become pregnant and potential help to stay pregnant if this occurs.    SALVATORE: Estimated Date of Delivery: None noted.     ROS:   Otherwise noted in HPI    Past Medical History:   Diagnosis Date    Allergic     Birth    Anxiety     Arthritis     Asthma     prn inhalers, does not follow w/ pulmonary    Chronic back pain     previously followed w/ pain management, now just prn Tylenol + Gabapentin    Chronic deep vein thrombosis (DVT) of femoral vein of right lower extremity 09/10/2021    Clotting disorder 2015    Cluster headache     Depression     Diabetes mellitus     Type II, dx , never on insulin, A1C 7.6     Dyspepsia     Dyspnea on exertion     Dysuria 2022    Dysuria and hematuria x 2 weeks. 3/7/22 urine culture sent. Rx Macrobid 100 mg twice daily for 7 days. Patient did not tolerate Macrobid well due to GI upset. Urine culture was negative.    Fatigue     Gastroparesis     GERD (gastroesophageal reflux disease)     Headache, tension-type     Heartburn     chronic, prn TUMS, denies prior eval     Hirsutism     History of medical problems     PCOS    Hx of radiation therapy     for treatments of Keloids    Hypertension     Irregular menses     infrequent spotting    Leiomyoma, subserous     possible peduculated f3-4 cm fibroid on u/s at [facility]    Low back pain     Migraines     botox q3 months, following Neurology @ [facility]    Morbid obesity     s/p sleeve gastrectomy    Peripheral edema     Polycystic ovary syndrome 2011    Seasonal allergies     Slow to wake up after anesthesia     Urinary tract infection     Visual impairment     Astigmatism, Nearsightedness      Past Surgical History:   Procedure Laterality Date    ABDOMINAL SURGERY  06/15/2021    Bariatric Sleeve Surgery    BARIATRIC SURGERY      COLONOSCOPY  December 2022    COSMETIC SURGERY      Multiple Keloid surgeries    ENDOSCOPY N/A 06/15/2021    Procedure: ESOPHAGOGASTRODUODENOSCOPY;  Surgeon: Ayaka Andre MD;  Location:  [facility];  Service: Robotics - DaVinci;  Laterality: N/A;    ENDOSCOPY N/A 10/28/2021    Procedure: ESOPHAGOGASTRODUODENOSCOPY WITH ACHALASIA BALLOON DILATATION;  Surgeon: Jase Hernandez MD;  Location:  [facility];  Service: Gastroenterology;  Laterality: N/A;  58 F DILATOR    ENDOSCOPY N/A 11/15/2021    Procedure: ESOPHAGOGASTRODUODENOSCOPY WITH DILATATION;  Surgeon: Jase Hernandez MD;  Location:  [facility];  Service: Gastroenterology;  Laterality: N/A;  achalasia balloon     ENDOSCOPY N/A 11/24/2021    Procedure: ESOPHAGOGASTRODUODENOSCOPY [facility] DUODENAL POLYPECTOPMY AND NASAL JEJUNAL TUBE PLACEMENT;  Surgeon: Jase Hernandez MD;  Location:  [facility];  Service: Gastroenterology;  Laterality: N/A;  placement of feeding tube, checked position with KUB    GASTRIC RESTRICTION SURGERY  2021    Sleeve    GASTRIC SLEEVE LAPAROSCOPIC N/A 06/15/2021    Procedure: GASTRIC SLEEVE LAPAROSCOPIC WITH DAVINCI ROBOT, LAPROSCOPIC HIATAL HERNIA REPAIR WITH DAVINCI ROBOT;  Surgeon: Ayaka Andre MD;  Location:  [facility];  Service: Robotics - DaVinci;  Laterality: N/A;    KELOID EXCISION      ,,,,2016,2019    LAPAROSCOPIC CHOLECYSTECTOMY      for stones    RADIOFREQUENCY ABLATION  2019    x 2  for pt back    UMBILICAL HERNIA REPAIR      UPPER GASTROINTESTINAL ENDOSCOPY      WISDOM TOOTH EXTRACTION        OB History          9    Para        Term   0            AB        Living   0         SAB        IAB        Ectopic        Molar        Multiple        Live Births                    Current Outpatient Medications:     acetaminophen (TYLENOL) 500 MG tablet, Take 1-2 tablets by mouth Every 6 (Six) Hours As Needed for Mild Pain., Disp: , Rfl:     albuterol sulfate  (90 Base) MCG/ACT inhaler, Inhale 2 puffs Every 4 (Four) Hours As Needed for Wheezing or Shortness of Air., Disp: 18 g, Rfl: 5    Biotin 67498 MCG tablet dispersible, , Disp: , Rfl:     Blood Glucose Monitoring Suppl (ONE TOUCH ULTRA 2) w/Device kit, , Disp: , Rfl:     calcium carbonate (TUMS) 500 MG chewable tablet, Chew 1 tablet Daily., Disp: , Rfl:     cyclobenzaprine (FLEXERIL) 10 MG tablet, 2 (Two) Times a Day As Needed., Disp: , Rfl:     diphenhydrAMINE (BENADRYL) 25 mg capsule, Take 1 capsule by mouth Every 6 (Six) Hours As Needed for Itching or Sleep., Disp: , Rfl:     furosemide (LASIX) 20 MG tablet, Take 1 tablet nightly., Disp: 90 tablet, Rfl: 3    gabapentin (NEURONTIN) 300 MG capsule, Take 2 capsules by mouth Daily., Disp: , Rfl:     glipizide (GLUCOTROL XL) 5 MG ER tablet, Take 1 tablet by mouth Daily., Disp: 90 tablet, Rfl: 3    glucose blood test strip, USe BID to test blood sugar [id number], Disp: 100 each, Rfl: 3    HYDROcodone-Acetaminophen (XODOL )  MG per tablet, Take 1 tablet every 6 hours by oral route., Disp: , Rfl:     Lancets 33G misc, 1 each Daily As Needed (hypoglycemia/hyperglycemia). To test blood sugars DxE11.65, Disp: 100 each, Rfl: 2    loratadine (CLARITIN) 10 MG tablet,  "TAKE ONE TABLET BY MOUTH DAILY, Disp: 30 tablet, Rfl: 2    MAGNESIUM GLYCINATE PO, , Disp: , Rfl:     Melatonin 5 MG lozenge, , Disp: , Rfl:     metFORMIN ER (GLUCOPHAGE-XR) 500 MG 24 hr tablet, Take 1 tablet by mouth Daily With Dinner., Disp: 90 tablet, Rfl: 3    metoprolol tartrate (LOPRESSOR) 50 MG tablet, Take 1 tablet by mouth 2 (Two) Times a Day., Disp: 180 tablet, Rfl: 3    multivitamin with minerals tablet tablet, Take 1 tablet by mouth Daily., Disp: , Rfl:     OnabotulinumtoxinA (Botox) 200 units reconstituted solution, FOR . PHYSICIAN TO INJECT UP  UNITS INTRAMUSCULARLY INTO HEAD, NECK AND SHOULDERS EVERY 90 DAYS., Disp: 1 each, Rfl: 3    ondansetron (ZOFRAN) 4 MG tablet, , Disp: , Rfl:     ondansetron ODT (ZOFRAN-ODT) 8 MG disintegrating tablet, Place 1 tablet on the tongue Every 8 (Eight) Hours As Needed for Nausea or Vomiting., Disp: 21 tablet, Rfl: 0    simethicone (Gas-X) 80 MG chewable tablet, Chew 1 tablet Every 6 (Six) Hours As Needed for Flatulence., Disp: 10 tablet, Rfl: 0    spironolactone (Aldactone) 50 MG tablet, Take 1 tablet by mouth Daily., Disp: 90 tablet, Rfl: 1    tiZANidine (ZANAFLEX) 4 MG tablet, , Disp: , Rfl:     traMADol (ULTRAM) 50 MG tablet, Take 1 tablet by mouth Every 6 (Six) Hours As Needed for Moderate Pain., Disp: , Rfl:     vitamin C (ASCORBIC ACID) 250 MG tablet, Take 1 tablet by mouth Daily., Disp: , Rfl:     vitamin D3 125 MCG (5000 UT) capsule capsule, Take 1 capsule by mouth Daily., Disp: , Rfl:     Alendronate-Cholecalciferol (FOSAMAX PLUS D PO), alendronate-vitamin D3, Disp: , Rfl:     metFORMIN (GLUCOPHAGE) 500 MG tablet, , Disp: , Rfl:     spironolactone (ALDACTONE) 25 MG tablet, , Disp: , Rfl:     Current Facility-Administered Medications:     OnabotulinumtoxinA 200 Units, 200 Units, Intramuscular, Q3 Months, Shiela Coelho, APRN, 200 Units at 07/26/23 0845    Objective     Vital Signs  /93   Ht 152.4 cm (60\")   Wt 96.6 kg " "(213 lb)   LMP 2023 (Exact Date)   Estimated body mass index is 41.6 kg/m² as calculated from the following:    Height as of this encounter: 152.4 cm (60\").    Weight as of this encounter: 96.6 kg (213 lb).        Assessment and Plan     Frances Hartamn is a 37 y.o.  Unknown who presents today for preconception counseling due to multiple medical comorbidities and recurrent pregnancy loss.    Diagnoses and all orders for this visit:    1. Recurrent pregnancy loss without current pregnancy (Primary)  Assessment & Plan:  Patient reports a history of 9-10 prior miscarriages.  Antiphospholipid syndrome antibodies have been drawn and seem to be negative though beta-2 glycoprotein was missing from evaluation.  Given her recurrent pregnancy loss and her history of DVT we did order a full thrombophilia panel.  Chromosome microanalysis was ordered today for full evaluation.  The uterine cavity should be evaluated for malformations with potential HSG or hysterosalpingogram.  We discussed that when she starts attempting pregnancy she should start a prenatal vitamin.  We also discussed potential referral to [facility] to optimize becoming pregnant.    Additionally we discussed that patient would meet criteria for advanced maternal age if she did become pregnant.  We discussed increased risk of chromosomal abnormalities like Down syndrome or trisomy 21.    Ultimately there are no absolute contraindications to pregnancy for this patient.  We did discuss that she has multiple comorbidities that would make this patient/pregnancy high risk.  We discussed other options to having a child including surrogacy and adoption.    Orders:  -     Protein C Deficiency Profile; Future  -     Protein S Panel; Future  -     Antithrombin III; Future  -     Antithrombin Antigen; Future  -     Homocysteine; Future  -     Factor 5 Leiden; Future  -     Factor II, DNA Analysis; Future  -     Beta-2 Glycoprotein I Antibody,G / M  -     " Chromosome Analysis, Peripheral Blood; Future    2. Chronic deep vein thrombosis (DVT) of femoral vein of right lower extremity  Assessment & Plan:  Patient with a history of a DVT after gastric sleeve.  She was previously treated with Eliquis.  Given her recurrent pregnancy loss and this history of DVT full thrombophilia panel was ordered.  If evaluation is positive she may need anticoagulation during pregnancy.      3. Mild persistent asthma without complication  Assessment & Plan:  Patient currently uses albuterol inhaler as needed.  We discussed that asthma can potentially worsen during pregnancy though albuterol is safe to use during pregnancy.      4. Essential hypertension  Assessment & Plan:  Pt has a diagnosis of chronic hypertension.  Patient has not currently on treatment for her chronic hypertension though patient is on metoprolol for tachycardia.  Initial evaluation in these women should include renal function by 24 hr urine for protein and creatinine clearance.  HTN is associated with Fetal Growth Restriction (FGR) in about 15-25% of cases. HTN progresses to superimposed preeclampsia in about 25-35% of cases, usually recognized by the appearance of proteinuria & worsening hypertension.  These interventions do not decrease the risk of superimposed preeclampsia: prophylactic bedrest, work & activity restrictions, dietary changes, nutrient supplements, or prophylactic antihypertensive therapy.      The only proven prevention strategy for preeclampsia is low dose aspirin during pregnancy.   In fact, the USPTF recommends low dose aspirin initiation after 12 wks gestation for preeclampsia prevention in these women.  We have instructed patient to check a blood pressure log and to report BPs 160/110s. With recent publishing of the CHAP trial, the goal for mild chronic hypertension is blood pressures <140/80. This trial showed decreased maternal morbidity with no change in  morbidity.      5. Chronic  midline low back pain without sciatica  Assessment & Plan:  Patient currently sees a pain medicine physician who knows that she is contemplating pregnancy.  She is treated with multiple medications including narcotics and gabapentin.  We discussed potential increase of withdrawal and neonates when mothers have been treated with these medications.    Patient states she also gets intermittent back injections for this back pain.  Per patient reports it sometimes requires IV contrast and fluoroscopy.  These are not recommended in pregnancy and should be avoided.      6. PCOS (polycystic ovarian syndrome)  Assessment & Plan:  Patient currently on metformin and spironolactone for her PCOS and PCOS symptoms including hirsutism.  We discussed that metformin is safe in pregnancy and can be continued and would help manage her diabetes.  We discussed that spironolactone will need to be discontinued with pregnancy      7. S/P bariatric surgery  Assessment & Plan:  Encourage patient to discuss potential pregnancy with her bariatric surgeon.  We discussed potential vitamin deficiencies and nutritional deficiencies secondary to bariatric surgery during pregnancy.  We discussed potential for increased vitamin intake.      8. Type 2 diabetes mellitus with hyperglycemia, unspecified whether long term insulin use  Assessment & Plan:  Pregestational diabetics have an 8-10% risk for having a child with a congenital malformation.  The risk is lower if the patient has good glycemic control prior to conception. Women who have a normal hemoglobin A1C, have an incidence of birth defects comparable to the general population risk of 3-5%.  Women whose hemoglobin A1C is elevated have the highest risk for fetal malformations and the risk is proportional to the degree of elevation. The most frequent malformations found in infants of diabetic mothers are heart defects. The infants may also have neural tube defects, caudal regression, and limb  abnormalities, including femoral hypoplasia.  A periconceptual A1C of >8.5% has been associated with a >20% risk for congenital anomalies.  When the HgbA1C is severely elevated there is also an increased risk of miscarriage.      Patient is currently on glipizide for controlling core type 2 diabetes.  Recent hemoglobin A1c was 7.1.  We discussed optimal control during pregnancy with insulin and metformin.           Follow Up  No follow-ups on file.  Patient can return to the office if she becomes pregnant.    I spent 60 minutes caring for the patient on the day of service. This included: obtaining or reviewing a separately obtained medical history, reviewing patient records, performing a medically appropriate exam and/or evaluation, counseling or educating the patient/family/caregiver, ordering medications, labs, and/or procedures and documenting such in the medical record. This does not include time spent on review and interpretation of other tests such as fetal ultrasound or the performance of other procedures such as amniocentesis or CVS.      Luis Enrique Ortega MD, FACOG  Maternal Fetal Medicine, [facility] [place]

## 2023-09-13 NOTE — ASSESSMENT & PLAN NOTE
Patient currently sees a pain medicine physician who knows that she is contemplating pregnancy.  She is treated with multiple medications including narcotics and gabapentin.  We discussed potential increase of withdrawal and neonates when mothers have been treated with these medications.    Patient states she also gets intermittent back injections for this back pain.  Per patient reports it sometimes requires IV contrast and fluoroscopy.  These are not recommended in pregnancy and should be avoided.

## 2023-09-13 NOTE — ASSESSMENT & PLAN NOTE
Patient reports a history of 9-10 prior miscarriages.  Antiphospholipid syndrome antibodies have been drawn and seem to be negative though beta-2 glycoprotein was missing from evaluation.  Given her recurrent pregnancy loss and her history of DVT we did order a full thrombophilia panel.  Chromosome microanalysis was ordered today for full evaluation.  The uterine cavity should be evaluated for malformations with potential HSG or hysterosalpingogram.  We discussed that when she starts attempting pregnancy she should start a prenatal vitamin.  We also discussed potential referral to [facility] to optimize becoming pregnant.    Additionally we discussed that patient would meet criteria for advanced maternal age if she did become pregnant.  We discussed increased risk of chromosomal abnormalities like Down syndrome or trisomy 21.    Ultimately there are no absolute contraindications to pregnancy for this patient.  We did discuss that she has multiple comorbidities that would make this patient/pregnancy high risk.  We discussed other options to having a child including surrogacy and adoption.

## 2023-09-13 NOTE — ASSESSMENT & PLAN NOTE
Encourage patient to discuss potential pregnancy with her bariatric surgeon.  We discussed potential vitamin deficiencies and nutritional deficiencies secondary to bariatric surgery during pregnancy.  We discussed potential for increased vitamin intake.

## 2023-09-13 NOTE — ASSESSMENT & PLAN NOTE
Pregestational diabetics have an 8-10% risk for having a child with a congenital malformation.  The risk is lower if the patient has good glycemic control prior to conception. Women who have a normal hemoglobin A1C, have an incidence of birth defects comparable to the general population risk of 3-5%.  Women whose hemoglobin A1C is elevated have the highest risk for fetal malformations and the risk is proportional to the degree of elevation. The most frequent malformations found in infants of diabetic mothers are heart defects. The infants may also have neural tube defects, caudal regression, and limb abnormalities, including femoral hypoplasia.  A periconceptual A1C of >8.5% has been associated with a >20% risk for congenital anomalies.  When the HgbA1C is severely elevated there is also an increased risk of miscarriage.      Patient is currently on glipizide for controlling core type 2 diabetes.  Recent hemoglobin A1c was 7.1.  We discussed optimal control during pregnancy with insulin and metformin.

## 2023-09-13 NOTE — ASSESSMENT & PLAN NOTE
Patient currently uses albuterol inhaler as needed.  We discussed that asthma can potentially worsen during pregnancy though albuterol is safe to use during pregnancy.

## 2023-09-13 NOTE — ASSESSMENT & PLAN NOTE
Patient currently on metformin and spironolactone for her PCOS and PCOS symptoms including hirsutism.  We discussed that metformin is safe in pregnancy and can be continued and would help manage her diabetes.  We discussed that spironolactone will need to be discontinued with pregnancy

## 2023-09-13 NOTE — ASSESSMENT & PLAN NOTE
Pt has a diagnosis of chronic hypertension.  Patient has not currently on treatment for her chronic hypertension though patient is on metoprolol for tachycardia.  Initial evaluation in these women should include renal function by 24 hr urine for protein and creatinine clearance.  HTN is associated with Fetal Growth Restriction (FGR) in about 15-25% of cases. HTN progresses to superimposed preeclampsia in about 25-35% of cases, usually recognized by the appearance of proteinuria & worsening hypertension.  These interventions do not decrease the risk of superimposed preeclampsia: prophylactic bedrest, work & activity restrictions, dietary changes, nutrient supplements, or prophylactic antihypertensive therapy.      The only proven prevention strategy for preeclampsia is low dose aspirin during pregnancy.   In fact, the USPTF recommends low dose aspirin initiation after 12 wks gestation for preeclampsia prevention in these women.  We have instructed patient to check a blood pressure log and to report BPs 160/110s. With recent publishing of the CHAP trial, the goal for mild chronic hypertension is blood pressures <140/80. This trial showed decreased maternal morbidity with no change in  morbidity.

## 2023-09-13 NOTE — LETTER
2023       No Recipients    Patient: Frances Hartman   YOB: 1985   Date of Visit: 2023       Dear Allison Canavan, MD,    Thank you for referring Frances Hartman to me for evaluation. Below is a copy of my consult note.    If you have questions, please do not hesitate to call me. I look forward to following Frances along with you.         Sincerely,        Luis Enrique Ortega MD        CC:   No Recipients        Maternal/Fetal Medicine Consult Note   Date: 2023  Name: Frances Hartman    : 1985     MRN: [id number]     Referring Provider: Allison Canavan, MD    Chief Complaint  pre conceptual couseling    Subjective     History of Present Illness:  Frances Hartman is a 37 y.o.  Unknown who presents today for preconception counseling for recurrent pregnancy loss, PCOS, type 2 diabetes, chronic hypertension, asthma, chronic pain, tachycardia, nonacute fatty liver disease, history of gastric bypass.    Patient states that she is probably had 9-10 miscarriages throughout the past 10 years.  Some of had positive pregnancy test others have not.  She has never had a confirmatory ultrasound of these pregnancies.  Some of them have fetal tissue that she is passed spontaneously per her report.  She is an only child and is considering attempting pregnancy.  She wants to make sure it is safe for her to become pregnant and potential help to stay pregnant if this occurs.    SALVATORE: Estimated Date of Delivery: None noted.     ROS:   Otherwise noted in HPI    Past Medical History:   Diagnosis Date   • Allergic     Birth   • Anxiety    • Arthritis    • Asthma     prn inhalers, does not follow w/ pulmonary   • Chronic back pain     previously followed w/ pain management, now just prn Tylenol + Gabapentin   • Chronic deep vein thrombosis (DVT) of femoral vein of right lower extremity 09/10/2021   • Clotting disorder    • Cluster headache    • Depression    • Diabetes  mellitus     Type II, dx 2014, never on insulin, A1C 7.6    • Dyspepsia    • Dyspnea on exertion    • Dysuria 03/07/2022    Dysuria and hematuria x 2 weeks. 3/7/22 urine culture sent. Rx Macrobid 100 mg twice daily for 7 days. Patient did not tolerate Macrobid well due to GI upset. Urine culture was negative.   • Fatigue    • Gastroparesis    • GERD (gastroesophageal reflux disease)    • Headache, tension-type    • Heartburn     chronic, prn TUMS, denies prior eval   • Hirsutism    • History of medical problems     PCOS   • Hx of radiation therapy     for treatments of Keloids   • Hypertension    • Irregular menses     infrequent spotting   • Leiomyoma, subserous     possible peduculated f3-4 cm fibroid on u/s at [facility]   • Low back pain    • Migraines     botox q3 months, following Neurology @ [facility]   • Morbid obesity     s/p sleeve gastrectomy   • Peripheral edema    • Polycystic ovary syndrome 2011   • Seasonal allergies    • Slow to wake up after anesthesia    • Urinary tract infection    • Visual impairment     Astigmatism, Nearsightedness      Past Surgical History:   Procedure Laterality Date   • ABDOMINAL SURGERY  06/15/2021    Bariatric Sleeve Surgery   • BARIATRIC SURGERY     • COLONOSCOPY  December 2022   • COSMETIC SURGERY      Multiple Keloid surgeries   • ENDOSCOPY N/A 06/15/2021    Procedure: ESOPHAGOGASTRODUODENOSCOPY;  Surgeon: Ayaka Andre MD;  Location:  [facility];  Service: Robotics - [facility];  Laterality: N/A;   • ENDOSCOPY N/A 10/28/2021    Procedure: ESOPHAGOGASTRODUODENOSCOPY WITH ACHALASIA BALLOON DILATATION;  Surgeon: Jase Hernandez MD;  Location:  [facility];  Service: Gastroenterology;  Laterality: N/A;  58 F DILATOR   • ENDOSCOPY N/A 11/15/2021    Procedure: ESOPHAGOGASTRODUODENOSCOPY WITH DILATATION;  Surgeon: Jase Hernandez MD;  Location:  [facility];  Service: Gastroenterology;  Laterality: N/A;  achalasia balloon    • ENDOSCOPY N/A 11/24/2021    Procedure:  ESOPHAGOGASTRODUODENOSCOPY WTH DUODENAL POLYPECTOPMY AND NASAL JEJUNAL TUBE PLACEMENT;  Surgeon: Jase Hernandez MD;  Location:  [facility];  Service: Gastroenterology;  Laterality: N/A;  placement of feeding tube, checked position with KUB   • GASTRIC RESTRICTION SURGERY      Sleeve   • GASTRIC SLEEVE LAPAROSCOPIC N/A 06/15/2021    Procedure: GASTRIC SLEEVE LAPAROSCOPIC WITH DAVINCI ROBOT, LAPROSCOPIC HIATAL HERNIA REPAIR WITH DAVINCI ROBOT;  Surgeon: Ayaka Andre MD;  Location:  [facility];  Service: Robotics - [facility];  Laterality: N/A;   • KELOID EXCISION      ,,,,,   • LAPAROSCOPIC CHOLECYSTECTOMY      for stones   • RADIOFREQUENCY ABLATION  2019    x 2  for pt back   • UMBILICAL HERNIA REPAIR     • UPPER GASTROINTESTINAL ENDOSCOPY     • WISDOM TOOTH EXTRACTION        OB History          9    Para        Term   0            AB        Living   0         SAB        IAB        Ectopic        Molar        Multiple        Live Births                    Current Outpatient Medications:   •  acetaminophen (TYLENOL) 500 MG tablet, Take 1-2 tablets by mouth Every 6 (Six) Hours As Needed for Mild Pain., Disp: , Rfl:   •  albuterol sulfate  (90 Base) MCG/ACT inhaler, Inhale 2 puffs Every 4 (Four) Hours As Needed for Wheezing or Shortness of Air., Disp: 18 g, Rfl: 5  •  Biotin 57686 MCG tablet dispersible, , Disp: , Rfl:   •  Blood Glucose Monitoring Suppl (ONE TOUCH ULTRA 2) w/Device kit, , Disp: , Rfl:   •  calcium carbonate (TUMS) 500 MG chewable tablet, Chew 1 tablet Daily., Disp: , Rfl:   •  cyclobenzaprine (FLEXERIL) 10 MG tablet, 2 (Two) Times a Day As Needed., Disp: , Rfl:   •  diphenhydrAMINE (BENADRYL) 25 mg capsule, Take 1 capsule by mouth Every 6 (Six) Hours As Needed for Itching or Sleep., Disp: , Rfl:   •  furosemide (LASIX) 20 MG tablet, Take 1 tablet nightly., Disp: 90 tablet, Rfl: 3  •  gabapentin (NEURONTIN) 300 MG capsule, Take 2  capsules by mouth Daily., Disp: , Rfl:   •  glipizide (GLUCOTROL XL) 5 MG ER tablet, Take 1 tablet by mouth Daily., Disp: 90 tablet, Rfl: 3  •  glucose blood test strip, USe BID to test blood sugar DX.e11.65, Disp: 100 each, Rfl: 3  •  HYDROcodone-Acetaminophen (XODOL )  MG per tablet, Take 1 tablet every 6 hours by oral route., Disp: , Rfl:   •  Lancets 33G misc, 1 each Daily As Needed (hypoglycemia/hyperglycemia). To test blood sugars DxE11.65, Disp: 100 each, Rfl: 2  •  loratadine (CLARITIN) 10 MG tablet, TAKE ONE TABLET BY MOUTH DAILY, Disp: 30 tablet, Rfl: 2  •  MAGNESIUM GLYCINATE PO, , Disp: , Rfl:   •  Melatonin 5 MG lozenge, , Disp: , Rfl:   •  metFORMIN ER (GLUCOPHAGE-XR) 500 MG 24 hr tablet, Take 1 tablet by mouth Daily With Dinner., Disp: 90 tablet, Rfl: 3  •  metoprolol tartrate (LOPRESSOR) 50 MG tablet, Take 1 tablet by mouth 2 (Two) Times a Day., Disp: 180 tablet, Rfl: 3  •  multivitamin with minerals tablet tablet, Take 1 tablet by mouth Daily., Disp: , Rfl:   •  OnabotulinumtoxinA (Botox) 200 units reconstituted solution, FOR . PHYSICIAN TO INJECT UP  UNITS INTRAMUSCULARLY INTO HEAD, NECK AND SHOULDERS EVERY 90 DAYS., Disp: 1 each, Rfl: 3  •  ondansetron (ZOFRAN) 4 MG tablet, , Disp: , Rfl:   •  ondansetron ODT (ZOFRAN-ODT) 8 MG disintegrating tablet, Place 1 tablet on the tongue Every 8 (Eight) Hours As Needed for Nausea or Vomiting., Disp: 21 tablet, Rfl: 0  •  simethicone (Gas-X) 80 MG chewable tablet, Chew 1 tablet Every 6 (Six) Hours As Needed for Flatulence., Disp: 10 tablet, Rfl: 0  •  spironolactone (Aldactone) 50 MG tablet, Take 1 tablet by mouth Daily., Disp: 90 tablet, Rfl: 1  •  tiZANidine (ZANAFLEX) 4 MG tablet, , Disp: , Rfl:   •  traMADol (ULTRAM) 50 MG tablet, Take 1 tablet by mouth Every 6 (Six) Hours As Needed for Moderate Pain., Disp: , Rfl:   •  vitamin C (ASCORBIC ACID) 250 MG tablet, Take 1 tablet by mouth Daily., Disp: , Rfl:   •  vitamin  "D3 125 MCG (5000 UT) capsule capsule, Take 1 capsule by mouth Daily., Disp: , Rfl:   •  Alendronate-Cholecalciferol (FOSAMAX PLUS D PO), alendronate-vitamin D3, Disp: , Rfl:   •  metFORMIN (GLUCOPHAGE) 500 MG tablet, , Disp: , Rfl:   •  spironolactone (ALDACTONE) 25 MG tablet, , Disp: , Rfl:     Current Facility-Administered Medications:   •  OnabotulinumtoxinA 200 Units, 200 Units, Intramuscular, Q3 Months, Shiela Coelho APRN, 200 Units at 23 0845    Objective     Vital Signs  /93   Ht 152.4 cm (60\")   Wt 96.6 kg (213 lb)   LMP 2023 (Exact Date)   Estimated body mass index is 41.6 kg/m² as calculated from the following:    Height as of this encounter: 152.4 cm (60\").    Weight as of this encounter: 96.6 kg (213 lb).        Assessment and Plan     Frances Hartman is a 37 y.o.  Unknown who presents today for preconception counseling due to multiple medical comorbidities and recurrent pregnancy loss.    Diagnoses and all orders for this visit:    1. Recurrent pregnancy loss without current pregnancy (Primary)  Assessment & Plan:  Patient reports a history of 9-10 prior miscarriages.  Antiphospholipid syndrome antibodies have been drawn and seem to be negative though beta-2 glycoprotein was missing from evaluation.  Given her recurrent pregnancy loss and her history of DVT we did order a full thrombophilia panel.  Chromosome microanalysis was ordered today for full evaluation.  The uterine cavity should be evaluated for malformations with potential HSG or hysterosalpingogram.  We discussed that when she starts attempting pregnancy she should start a prenatal vitamin.  We also discussed potential referral to [facility] to optimize becoming pregnant.    Ultimately there are no absolute contraindications to pregnancy for this patient.  We did discuss that she has multiple comorbidities that would make this patient/pregnancy high risk.  We discussed other options to having a child " including surrogacy and adoption.    Orders:  -     Protein C Deficiency Profile; Future  -     Protein S Panel; Future  -     Antithrombin III; Future  -     Antithrombin Antigen; Future  -     Homocysteine; Future  -     Factor 5 Leiden; Future  -     Factor II, DNA Analysis; Future  -     Beta-2 Glycoprotein I Antibody,G / M  -     Chromosome Analysis, Peripheral Blood; Future    2. Chronic deep vein thrombosis (DVT) of femoral vein of right lower extremity  Assessment & Plan:  Patient with a history of a DVT after gastric sleeve.  She was previously treated with Eliquis.  Given her recurrent pregnancy loss and this history of DVT full thrombophilia panel was ordered.  If evaluation is positive she may need anticoagulation during pregnancy.      3. Mild persistent asthma without complication  Assessment & Plan:  Patient currently uses albuterol inhaler as needed.  We discussed that asthma can potentially worsen during pregnancy though albuterol is safe to use during pregnancy.      4. Essential hypertension  Assessment & Plan:  Pt has a diagnosis of chronic hypertension.  Patient has not currently on treatment for her chronic hypertension though patient is on metoprolol for tachycardia.  Initial evaluation in these women should include renal function by 24 hr urine for protein and creatinine clearance.  HTN is associated with Fetal Growth Restriction (FGR) in about 15-25% of cases. HTN progresses to superimposed preeclampsia in about 25-35% of cases, usually recognized by the appearance of proteinuria & worsening hypertension.  These interventions do not decrease the risk of superimposed preeclampsia: prophylactic bedrest, work & activity restrictions, dietary changes, nutrient supplements, or prophylactic antihypertensive therapy.      The only proven prevention strategy for preeclampsia is low dose aspirin during pregnancy.   In fact, the USPTF recommends low dose aspirin initiation after 12 wks gestation for  preeclampsia prevention in these women.  We have instructed patient to check a blood pressure log and to report BPs 160/110s. With recent publishing of the CHAP trial, the goal for mild chronic hypertension is blood pressures <140/80. This trial showed decreased maternal morbidity with no change in  morbidity.      5. Chronic midline low back pain without sciatica  Assessment & Plan:  Patient currently sees a pain medicine physician who knows that she is contemplating pregnancy.  She is treated with multiple medications including narcotics and gabapentin.  We discussed potential increase of withdrawal and neonates when mothers have been treated with these medications.    Patient states she also gets intermittent back injections for this back pain.  Per patient reports it sometimes requires IV contrast and fluoroscopy.  These are not recommended in pregnancy and should be avoided.      6. PCOS (polycystic ovarian syndrome)  Assessment & Plan:  Patient currently on metformin and spironolactone for her PCOS and PCOS symptoms including hirsutism.  We discussed that metformin is safe in pregnancy and can be continued and would help manage her diabetes.  We discussed that spironolactone will need to be discontinued with pregnancy      7. S/P bariatric surgery  Assessment & Plan:  Encourage patient to discuss potential pregnancy with her bariatric surgeon.  We discussed potential vitamin deficiencies and nutritional deficiencies secondary to bariatric surgery during pregnancy.  We discussed potential for increased vitamin intake.      8. Type 2 diabetes mellitus with hyperglycemia, unspecified whether long term insulin use  Assessment & Plan:  Pregestational diabetics have an 8-10% risk for having a child with a congenital malformation.  The risk is lower if the patient has good glycemic control prior to conception. Women who have a normal hemoglobin A1C, have an incidence of birth defects comparable to the general  population risk of 3-5%.  Women whose hemoglobin A1C is elevated have the highest risk for fetal malformations and the risk is proportional to the degree of elevation. The most frequent malformations found in infants of diabetic mothers are heart defects. The infants may also have neural tube defects, caudal regression, and limb abnormalities, including femoral hypoplasia.  A periconceptual A1C of >8.5% has been associated with a >20% risk for congenital anomalies.  When the HgbA1C is severely elevated there is also an increased risk of miscarriage.      Patient is currently on glipizide for controlling core type 2 diabetes.  Recent hemoglobin A1c was 7.1.  We discussed optimal control during pregnancy with insulin and metformin.           Follow Up  No follow-ups on file.  Patient can return to the office if she becomes pregnant.    I spent 60 minutes caring for the patient on the day of service. This included: obtaining or reviewing a separately obtained medical history, reviewing patient records, performing a medically appropriate exam and/or evaluation, counseling or educating the patient/family/caregiver, ordering medications, labs, and/or procedures and documenting such in the medical record. This does not include time spent on review and interpretation of other tests such as fetal ultrasound or the performance of other procedures such as amniocentesis or CVS.      Luis Enrique Ortega MD, FACOG  Maternal Fetal Medicine, [facility] [place]

## 2023-09-13 NOTE — ASSESSMENT & PLAN NOTE
Patient with a history of a DVT after gastric sleeve.  She was previously treated with Eliquis.  Given her recurrent pregnancy loss and this history of DVT full thrombophilia panel was ordered.  If evaluation is positive she may need anticoagulation during pregnancy.

## 2023-09-14 ENCOUNTER — LAB (OUTPATIENT)
Dept: LAB | Facility: HOSPITAL | Age: 38
End: 2023-09-14
Payer: COMMERCIAL

## 2023-09-14 DIAGNOSIS — N96 RECURRENT PREGNANCY LOSS WITHOUT CURRENT PREGNANCY: ICD-10-CM

## 2023-09-14 LAB
AT III ACT/NOR PPP CHRO: 131 % (ref 75–135)
HCYS SERPL-MCNC: 9.1 UMOL/L (ref 0–15)

## 2023-09-14 PROCEDURE — 86146 BETA-2 GLYCOPROTEIN ANTIBODY: CPT | Performed by: OBSTETRICS & GYNECOLOGY

## 2023-09-14 PROCEDURE — 81240 F2 GENE: CPT

## 2023-09-14 PROCEDURE — 81241 F5 GENE: CPT

## 2023-09-14 PROCEDURE — 83090 ASSAY OF HOMOCYSTEINE: CPT

## 2023-09-15 ENCOUNTER — SPECIALTY PHARMACY (OUTPATIENT)
Dept: ONCOLOGY | Facility: HOSPITAL | Age: 38
End: 2023-09-15
Payer: COMMERCIAL

## 2023-09-15 LAB
AT III AG ACT/NOR PPP IA: 131 % (ref 72–124)
F5 GENE MUT ANL BLD/T: NORMAL
FACTOR II, DNA ANALYSIS: NORMAL
PROT C ACT/NOR PPP: >199 % (ref 73–180)
PROT C AG ACT/NOR PPP IA: 170 % (ref 60–150)
PROT S ACT/NOR PPP: 112 % (ref 63–140)
PROT S AG ACT/NOR PPP IA: 138 % (ref 60–150)
PROT S FREE AG ACT/NOR PPP IA: 143 % (ref 61–136)

## 2023-09-15 NOTE — PROGRESS NOTES
Specialty Pharmacy Patient Management Program  Neurology Reassessment     Frances Hartman is a 37 y.o. female with migraines seen by a Neurology provider and enrolled in the Neurology Patient Management program offered by [facility].  A follow-up outreach was conducted, including assessment of continued therapy appropriateness, medication adherence, and side effect incidence and management for Botox.     Changes to Insurance Coverage or Financial Support  Rx [employer], Botox co-pay card    Relevant Past Medical History and Comorbidities  Relevant medical history and concomitant health conditions were discussed with the patient. The patient's chart has been reviewed for relevant past medical history and comorbid health conditions and updated as necessary.   Past Medical History:   Diagnosis Date    Allergic     Birth    Anxiety     Arthritis     Asthma     prn inhalers, does not follow w/ pulmonary    Chronic back pain     previously followed w/ pain management, now just prn Tylenol + Gabapentin    Chronic deep vein thrombosis (DVT) of femoral vein of right lower extremity 09/10/2021    Clotting disorder 2015    Cluster headache     Depression     Diabetes mellitus     Type II, dx 2014, never on insulin, A1C 7.6     Dyspepsia     Dyspnea on exertion     Dysuria 03/07/2022    Dysuria and hematuria x 2 weeks. 3/7/22 urine culture sent. Rx Macrobid 100 mg twice daily for 7 days. Patient did not tolerate Macrobid well due to GI upset. Urine culture was negative.    Fatigue     Gastroparesis     GERD (gastroesophageal reflux disease)     Headache, tension-type     Heartburn     chronic, prn TUMS, denies prior eval    Hirsutism     History of medical problems     PCOS    Hx of radiation therapy     for treatments of Keloids    Hypertension     Irregular menses     infrequent spotting    Leiomyoma, subserous     possible peduculated f3-4 cm fibroid on u/s at [facility]    Low back pain      "Migraines     botox q3 months, following Neurology @ [facility]    Morbid obesity     s/p sleeve gastrectomy    Peripheral edema     Polycystic ovary syndrome 2011    Seasonal allergies     Slow to wake up after anesthesia     Urinary tract infection     Visual impairment     Astigmatism, Nearsightedness     Social History     Socioeconomic History    Marital status: Single   Tobacco Use    Smoking status: Never     Passive exposure: Never    Smokeless tobacco: Never   Vaping Use    Vaping Use: Never used   Substance and Sexual Activity    Alcohol use: Not Currently     Comment: Very rarely drink socially. At most 2 drinks per month.    Drug use: Never    Sexual activity: Yes     Partners: Male     Birth control/protection: Condom, Coitus interruptus     Problem list reviewed by Nancy Solano, PharmD on 9/15/2023 at  1:46 PM    Hospitalizations and Urgent Care Since Last Assessment  ED Visits, Admissions, or Hospitalizations: none   Urgent Office Visits: none     Allergies  Known allergies and reactions were discussed with the patient. The patient's chart has been reviewed for allergy information and updated as necessary.   Allergies   Allergen Reactions    Capsicum Annuum Extract & Derivative (Bell Pepper) [Capsicum] Anaphylaxis    Codeine Headache, Unknown - Low Severity and Other (See Comments)     Can tolerate hydrocodone, no other adverse reactions to other narcotics.    Doxycycline Hallucinations, Rash and Unknown - Low Severity    Hydrochlorothiazide Swelling and Unknown - Low Severity     eventually causes CHF    Ibuprofen Other (See Comments)     \"makes me retain fluid and eventually go into CHF\"    Nitrofurantoin Nausea Only and Unknown - Low Severity    Peanut-Containing Drug Products Anaphylaxis    Monosodium Glutamate Swelling and Headache    Oatmeal Other (See Comments)     Dx on allergy testing    Banana (Diagnostic) Unknown - High Severity    Coconut Flavor Unknown - High Severity    Doxycycline " "Monohydrate Other (See Comments)    Isoflavones (Soy) Unknown - Low Severity    Amitriptyline Mental Status Change and Unknown - Low Severity     memory gaps + neuropathy + hair loss    Amoxicillin-Pot Clavulanate Other (See Comments) and Unknown - Low Severity     Syncope, not sure if allergic to cephalosporins.    Aspartame Nausea Only    Diclofenac Headache and Unknown - Low Severity    Eggs Or Egg-Derived Products Other (See Comments)     dx on allergy testing, but does not completely avoid    Naproxen Other (See Comments)     \"blackout\"     Allergies reviewed by Nancy Solano, PharmD on 9/15/2023 at  1:45 PM    Relevant Laboratory Values  Common labs          4/6/2023    13:04 5/24/2023    08:24 6/29/2023    10:21   Common Labs   Glucose 136      BUN 9      Creatinine 0.99      Sodium 137      Potassium 4.2      Chloride 101      Calcium 10.4      Albumin 4.5      Total Bilirubin 0.3      Alkaline Phosphatase 96      AST (SGOT) 27      ALT (SGPT) 47      WBC 6.65   8.92    Hemoglobin 13.0   12.7    Hematocrit 38.2   37.0    Platelets 269   246    Hemoglobin A1C  7.1         Current Medication List  This medication list has been reviewed with the patient and evaluated for any interactions or necessary modifications/recommendations, and updated to include all prescription medications, OTC medications, and supplements the patient is currently taking.  This list reflects what is contained in the patient's profile, which has also been marked as reviewed to communicate to other providers it is the most up to date version of the patient's current medication therapy.     Current Outpatient Medications:     acetaminophen (TYLENOL) 500 MG tablet, Take 1-2 tablets by mouth Every 6 (Six) Hours As Needed for Mild Pain., Disp: , Rfl:     albuterol sulfate  (90 Base) MCG/ACT inhaler, Inhale 2 puffs Every 4 (Four) Hours As Needed for Wheezing or Shortness of Air., Disp: 18 g, Rfl: 5    Biotin 50051 MCG tablet " dispersible, , Disp: , Rfl:     Blood Glucose Monitoring Suppl (ONE TOUCH ULTRA 2) w/Device kit, , Disp: , Rfl:     calcium carbonate (TUMS) 500 MG chewable tablet, Chew 1 tablet Daily., Disp: , Rfl:     cyclobenzaprine (FLEXERIL) 10 MG tablet, 2 (Two) Times a Day As Needed., Disp: , Rfl:     diphenhydrAMINE (BENADRYL) 25 mg capsule, Take 1 capsule by mouth Every 6 (Six) Hours As Needed for Itching or Sleep., Disp: , Rfl:     furosemide (LASIX) 20 MG tablet, Take 1 tablet nightly., Disp: 90 tablet, Rfl: 3    gabapentin (NEURONTIN) 300 MG capsule, Take 2 capsules by mouth Daily., Disp: , Rfl:     glipizide (GLUCOTROL XL) 5 MG ER tablet, Take 1 tablet by mouth Daily., Disp: 90 tablet, Rfl: 3    glucose blood test strip, USe BID to test blood sugar [id number], Disp: 100 each, Rfl: 3    HYDROcodone-Acetaminophen (XODOL )  MG per tablet, Take 1 tablet every 6 hours by oral route., Disp: , Rfl:     Lancets 33G misc, 1 each Daily As Needed (hypoglycemia/hyperglycemia). To test blood sugars DxE11.65, Disp: 100 each, Rfl: 2    loratadine (CLARITIN) 10 MG tablet, TAKE ONE TABLET BY MOUTH DAILY, Disp: 30 tablet, Rfl: 2    MAGNESIUM GLYCINATE PO, , Disp: , Rfl:     Melatonin 5 MG lozenge, , Disp: , Rfl:     metFORMIN ER (GLUCOPHAGE-XR) 500 MG 24 hr tablet, Take 1 tablet by mouth Daily With Dinner., Disp: 90 tablet, Rfl: 3    metoprolol tartrate (LOPRESSOR) 50 MG tablet, Take 1 tablet by mouth 2 (Two) Times a Day., Disp: 180 tablet, Rfl: 3    multivitamin with minerals tablet tablet, Take 1 tablet by mouth Daily., Disp: , Rfl:     OnabotulinumtoxinA (Botox) 200 units reconstituted solution, FOR . PHYSICIAN TO INJECT UP  UNITS INTRAMUSCULARLY INTO HEAD, NECK AND SHOULDERS EVERY 90 DAYS., Disp: 1 each, Rfl: 3    ondansetron (ZOFRAN) 4 MG tablet, , Disp: , Rfl:     ondansetron ODT (ZOFRAN-ODT) 8 MG disintegrating tablet, Place 1 tablet on the tongue Every 8 (Eight) Hours As Needed for Nausea or  Vomiting., Disp: 21 tablet, Rfl: 0    simethicone (Gas-X) 80 MG chewable tablet, Chew 1 tablet Every 6 (Six) Hours As Needed for Flatulence., Disp: 10 tablet, Rfl: 0    spironolactone (Aldactone) 50 MG tablet, Take 1 tablet by mouth Daily., Disp: 90 tablet, Rfl: 1    tiZANidine (ZANAFLEX) 4 MG tablet, , Disp: , Rfl:     traMADol (ULTRAM) 50 MG tablet, Take 1 tablet by mouth Every 6 (Six) Hours As Needed for Moderate Pain., Disp: , Rfl:     vitamin C (ASCORBIC ACID) 250 MG tablet, Take 1 tablet by mouth Daily., Disp: , Rfl:     vitamin D3 125 MCG (5000 UT) capsule capsule, Take 1 capsule by mouth Daily., Disp: , Rfl:     Current Facility-Administered Medications:     OnabotulinumtoxinA 200 Units, 200 Units, Intramuscular, Q3 Months, Shiela Coelho, APRN, 200 Units at 07/26/23 0845    Medicines reviewed by Nancy Solano, PharmD on 9/15/2023 at  1:46 PM    Drug Interactions  none     Adverse Drug Reactions  Medication tolerability: Tolerating with no to minimal ADRs          Medication plan: Continue therapy with normal follow-up  Plan for ADR Management: not required      Adherence, Self-Administration, and Current Therapy Problems  Adherence related patient's specialty therapy was discussed with the patient. The Adherence segment of this outreach has been reviewed and updated.   Adherence Questions  Medication(s) assessed: Botox  On average, how many doses/injections does the patient miss per month?: 0  What are the identified reasons for non-adherence or missed doses? : no problems identfied  What is the estimated medication adherence level?: %  Based on the patient/caregiver response and refill history, does this patient require an MTP to track adherence improvements?: no    Additional Barriers to Patient Self-Administration: nond  Methods for Supporting Patient Self-Administration: not required    Recently Close Medication Therapy Problems  No medication therapy recommendations to display  Open  Medication Therapy Problems  No medication therapy recommendations to display     Goals of Therapy  Goals related to the patient's specialty therapy was discussed with the patient. The Patient Goals segment of this outreach has been reviewed and updated.    Goals Addressed Today        Specialty Pharmacy General Goal      Reduction in frequency and severity of migraines  Response to treatment has reduced HA's at least 7 fewer days a month and/or 100 hours fewer each month               Quality of Life Assessment   Quality of Life related to the patient's enrollment in the patient management program and services provided was discussed with the patient. The QOL segment of this outreach has been reviewed and updated.   Quality of Life Improvement Scale: Moderately better    Reassessment Plan & Follow-Up  Medication Therapy Changes: none, continue Botox 155 units per provider every 3 months  Related Plans, Therapy Recommendations, or Issues to Be Addressed:  none  Pharmacist to perform regular reassessments no more than (6) months from the previous assessment.  Care Coordinator to set up future refill outreaches, coordinate prescription delivery, and escalate clinical questions to pharmacist.     Attestation  Therapeutic appropriateness: Appropriate  I attest the patient was actively involved in and has agreed to the above plan of care. If the prescribed therapy is at any point deemed not appropriate based on the current or future assessments, a consultation will be initiated with the patient's specialty care provider to determine the best course of action. The revised plan of therapy will be documented along with any additional patient education provided. Discussed aforementioned material with patient by phone.    Nancy Solano PharmD, [facility] Specialty Pharmacist, Neurology  9/15/2023  13:50 EDT

## 2023-09-16 LAB
B2 GLYCOPROT1 IGG SER-ACNC: <9 GPI IGG UNITS (ref 0–20)
B2 GLYCOPROT1 IGM SER-ACNC: <9 GPI IGM UNITS (ref 0–32)

## 2023-09-26 ENCOUNTER — TELEPHONE (OUTPATIENT)
Dept: OBSTETRICS AND GYNECOLOGY | Facility: HOSPITAL | Age: 38
End: 2023-09-26
Payer: COMMERCIAL

## 2023-09-26 LAB — REF LAB TEST METHOD: NORMAL

## 2023-09-26 NOTE — TELEPHONE ENCOUNTER
Spoke to patient regarding test results for recurrent pregnancy loss and history of dvt. Chromosome analysis is normal. Thrombophilia panel is also normal.     Luis Enrique Ortega MD, FACOG  Maternal Fetal Medicine, [facility]

## 2023-10-09 ENCOUNTER — SPECIALTY PHARMACY (OUTPATIENT)
Dept: ONCOLOGY | Facility: HOSPITAL | Age: 38
End: 2023-10-09
Payer: COMMERCIAL

## 2023-10-09 ENCOUNTER — DOCUMENTATION (OUTPATIENT)
Dept: ONCOLOGY | Facility: HOSPITAL | Age: 38
End: 2023-10-09
Payer: COMMERCIAL

## 2023-10-09 NOTE — PROGRESS NOTES
Specialty Pharmacy Refill Coordination Note     Frances is a 37 y.o. female contacted today regarding refills of  Botox specialty medication(s).. patient Injection is due 10/19    Reviewed and verified with patient:       Specialty medication(s) and dose(s) confirmed: yes    Refill Questions      Flowsheet Row Most Recent Value   Changes to allergies? No   Changes to medications? No   New conditions since last clinic visit No   Unplanned office visit, urgent care, ED, or hospital admission in the last 4 weeks  No   How does patient/caregiver feel medication is working? Very good   Financial problems or insurance changes  No   If yes, describe changes in insurance or financial issues. N/A   Since the previous refill, were any specialty medication doses or scheduled injections missed or delayed?  No   If yes, please provide the amount N/A   Why were doses missed? N/A   Does this patient require a clinical escalation to a pharmacist? No            Delivery Questions      Flowsheet Row Most Recent Value   Delivery method [employer]   Delivery address correct? Yes   Delivery phone number mobile phone   Preferred delivery time? Anytime   Number of medications in delivery 1   Doses left of specialty medications N/A   Is there any medication that is due not being filled? No   Supplies needed? No supplies needed   Cooler needed? Yes   Do any medications need mixed or dated? No   Copay form of payment Payment plan already set up   Additional comments N/A   Questions or concerns for the pharmacist? No   Explain any questions or concerns for the pharmacist N/A   Are any medications first time fills? No   Tracking number for delivery N/A              Medication Adherence          Adherence tools used: cell phone      Support network for adherence: healthcare provider                Follow-up: 30 day(s)     Roman David  Specialty Pharmacy Technician

## 2023-10-26 ENCOUNTER — PROCEDURE VISIT (OUTPATIENT)
Dept: NEUROLOGY | Facility: CLINIC | Age: 38
End: 2023-10-26
Payer: COMMERCIAL

## 2023-10-26 DIAGNOSIS — G43.709 CHRONIC MIGRAINE WITHOUT AURA WITHOUT STATUS MIGRAINOSUS, NOT INTRACTABLE: Primary | Chronic | ICD-10-CM

## 2023-10-26 NOTE — PROGRESS NOTES
Botulinum Toxin Injection Procedure    Chief Complaint   Patient presents with    Botulinum Toxin Injection     Patient supplied-Do Not Bill (swap out from pharm) 200 units        History:  Response to treatment has reduced HA's at least 7 fewer days a month and/or 100 hours fewer each month.     Pre-operative diagnosis: headache    Post-operative diagnosis: Same    Procedure: Chemical neurolysis    After risks and benefits were explained including bleeding, infection, worsening of pain, damage to the areas being injected, weakness of muscles, loss of muscle control, dysphagia if injecting the head or neck, facial droop if injecting the facial area, painful injection, allergic or other reaction to the medications being injected, and the failure of the procedure to help the problem, a signed consent was obtained.      The patient was placed in a comfortable area and the sites to be treated were identified. A time out was called and performed. The area to be treated was prepped three times with alcohol and the alcohol allowed to dry. Next, a 27 gauge needle was used to inject the medication in the area to be treated.    Area(s) injected: frontalis muscle, semispinalis capitis muscle and temporallis muscle    Medications used: botulinum toxin 200 mu, 2 mL of saline used to dilute the botulinum toxin.      The patient did tolerate the procedure well. There were no complications.       Physical Examination    Current Treatment (Units)   Splenius Capitis 25 units on the right and 25 units on the left.   Temporalis 25 units on the right and 25 units on the left.   Frontalis 27 units on the right and 28 units on the left.   EMG Guidance none .   Complications: none .   The total amount injected in units is 155 .   The total amount wasted in units is 45 .   The total amount submitted in units is 200 .

## 2023-11-28 ENCOUNTER — OFFICE VISIT (OUTPATIENT)
Dept: ENDOCRINOLOGY | Facility: CLINIC | Age: 38
End: 2023-11-28
Payer: COMMERCIAL

## 2023-11-28 VITALS
OXYGEN SATURATION: 98 % | BODY MASS INDEX: 43.39 KG/M2 | SYSTOLIC BLOOD PRESSURE: 134 MMHG | WEIGHT: 221 LBS | HEART RATE: 87 BPM | HEIGHT: 60 IN | DIASTOLIC BLOOD PRESSURE: 80 MMHG

## 2023-11-28 DIAGNOSIS — E66.01 CLASS 3 SEVERE OBESITY WITHOUT SERIOUS COMORBIDITY WITH BODY MASS INDEX (BMI) OF 40.0 TO 44.9 IN ADULT, UNSPECIFIED OBESITY TYPE: ICD-10-CM

## 2023-11-28 DIAGNOSIS — E11.65 TYPE 2 DIABETES MELLITUS WITH HYPERGLYCEMIA, WITHOUT LONG-TERM CURRENT USE OF INSULIN: Primary | ICD-10-CM

## 2023-11-28 LAB
EXPIRATION DATE: ABNORMAL
GLUCOSE BLDC GLUCOMTR-MCNC: 80 MG/DL (ref 70–130)
HBA1C MFR BLD: 6.3 % (ref 4.5–5.7)
Lab: ABNORMAL

## 2023-11-28 NOTE — PROGRESS NOTES
Chief Complaint   Patient presents with    Diabetes     Follow up        HPI:   Frances Hartman is a 38 y.o.female who returns to endocrine clinic for follow-up evaluation of her Type 2 Diabetes. Last visit 05/24/2023.  Her history is as follows:    Interim Events:  - She is working as a  (3rd shift)  - Is taking Lasix PRN nightly (takes when she notes rapid weight gain from retained fluid, has h/o CHF in past)   - No IVF since 05/2023  - Attributes recent weight gain to dietary excursions      1) Type 2 DM with no known associated complications at this time:   - Diagnosed with diabetes in 2014. (A1C% was 7.6). Pt was already on metformin ER for PCOS prior to diagnosis. Tolerated metformin ER and stopped in 06/2021 after bariatric surgery.  - Had Sleeve gastrectomy on 06/15/2021. Had post-op complication of pouch stricture and underwent dilation of pouch in 07/2021, 10/2021, and 11/2021 by gastroenterology. Highest wt 290 lbs, Lost 50 lbs on her own. Weight 238 pre-op. Currently 190 lbs.   - Was hospitalized 11/26/2021 for N/V/dehydration.   - pt had tried Januvia in 05/2023, but was not efficacious. She had taking glipizide before her surgery and requested to restart the medication.    Current DM Medications:  Metformin  mg daily with food. Pt reported she did not tolerate higher doses (caused nausea/ abd discomfort)  Glipizide ER 5 mg daily    Glucometer Review: no meter for review today    DM Health Maintenance:  Ophtho: DUE  Podiatry: no recent visit  Monofilament / Foot exam: DUE  Lipids: (10/2022) Tchol 140, TG 72, HDL 54, LDL 72 - not on statin at this time  Urine Microalb/Cr ratio: (06/2020) 10.2  TSH: (06/2023) 3.520  CBC: (06/2023) Hgb 12.7  CMP: (04/2023) Cr 0.99, GFR 74.5, ALT 47, AST 27  Vit B12: (06/2023) 560    2) PCOS:  - diagnosed in 2011.   - Was prescribed metformin ER and spironolactone at time of diagnosis. Had stopped both medications prior to bariatric surgery.  -  "Restarted metformin  mg daily in 02/2022    3) Hirsutism:  - Was on spironolactone 100 mg daily in the past. Medication stopped prior to bariatric surgery in 06/2021  - PCP restarted spironolactone 25 mg daily in 10/2022.   - Currently on spironolactone 50 mg daily    Review of Systems   Constitutional:  Negative for activity change and fatigue.        Wt gain since last visit.    Eyes: Negative.  Negative for discharge.   Respiratory: Negative.     Cardiovascular: Negative.    Gastrointestinal:  Negative for diarrhea and nausea.   Endocrine: Negative.    Genitourinary: Negative.    Musculoskeletal:  Positive for myalgias (Occasional). Negative for arthralgias and back pain.   Skin: Negative.    Allergic/Immunologic: Positive for environmental allergies and food allergies.   Neurological:  Positive for headaches. Negative for dizziness, weakness, light-headedness and numbness.   Hematological: Negative.  Does not bruise/bleed easily.   Psychiatric/Behavioral:  Positive for sleep disturbance.        The following portions of the patient's history were reviewed and updated as appropriate: allergies, current medications, past family history, past medical history, past social history, past surgical history and problem list.      /80   Pulse 87   Ht 152.4 cm (60\")   Wt 100 kg (221 lb)   SpO2 98%   BMI 43.16 kg/m²   Physical Exam  Vitals reviewed.   Constitutional:       General: She is not in acute distress.     Appearance: Normal appearance. She is well-developed. She is not diaphoretic.      Comments: BMI 40.23   HENT:      Head: Normocephalic.   Eyes:      Conjunctiva/sclera: Conjunctivae normal.      Pupils: Pupils are equal, round, and reactive to light.   Neck:      Thyroid: No thyromegaly.      Trachea: No tracheal deviation.      Comments: No palpable thyroid nodules    Cardiovascular:      Rate and Rhythm: Normal rate and regular rhythm.      Heart sounds: Normal heart sounds. No murmur " heard.  Pulmonary:      Effort: Pulmonary effort is normal. No respiratory distress.      Breath sounds: Normal breath sounds.   Lymphadenopathy:      Cervical: No cervical adenopathy.   Skin:     General: Skin is warm and dry.      Findings: No erythema.      Comments: + acanthosis nigricans, + facial hirsutism   Neurological:      Mental Status: She is alert and oriented to person, place, and time.      Cranial Nerves: No cranial nerve deficit.   Psychiatric:         Behavior: Behavior normal.       LABS/IMAGING: prior labs / outside records reviewed and summarized in HPI  Results for orders placed or performed in visit on 11/28/23   POC Glycosylated Hemoglobin (Hb A1C)    Specimen: Blood   Result Value Ref Range    Hemoglobin A1C 6.3 (A) 4.5 - 5.7 %    Lot Number [id number]     Expiration Date 7/2/25    POC Glucose, Blood    Specimen: Blood   Result Value Ref Range    Glucose 80 70 - 130 mg/dL       ASSESSMENT/PLAN:    1) Type 2 DM with no associated complications at this time: controlled, A1C% 6.3 today  Pt did not tolerate higher doses of metformin ER in the past. Declines trying again at this time  Pt with h/o frequent UTI, BV: Pt would like to post-pone trying a SGLT-2 inhibitors at this time.   Januvia was not efficacious.  May consider the possible addition of a GLP-1 agonist at a much later date once she has fully recovered from her bariatric surgery and having no GI symptoms.     - Continue metformin  mg daily with food.   - Continue glipizide ER 5 mg daily.    2) class III severe obesity without serious comorbidity with BMI of 43.16:  -Referrals made to nutrition services and exercise education    Recent outside labs reviewed and summarized in HPI    RTC 6 months    Electronically Signed: Carine Aden MD

## 2023-12-06 ENCOUNTER — OFFICE VISIT (OUTPATIENT)
Dept: OTHER | Facility: HOSPITAL | Age: 38
End: 2023-12-06

## 2023-12-06 ENCOUNTER — HOSPITAL ENCOUNTER (OUTPATIENT)
Dept: NUTRITION | Facility: HOSPITAL | Age: 38
Setting detail: RECURRING SERIES
Discharge: HOME | End: 2023-12-06

## 2023-12-06 PROCEDURE — 97802 MEDICAL NUTRITION INDIV IN: CPT

## 2023-12-06 NOTE — CONSULTS
[facility] Nutrition Services          Initial 60 Minute Nutrition Visit    Date: 2023   Patient Name: Frances Hartman  : 1985   MRN: [id number]   Referring Provider: Carine Aden MD    Reason for Visit: wt loss; pt is also being treated for Diabetes, and PCOS  Visit Format: in-person    Nutrition Assessment       Social History:   Social History     Socioeconomic History    Marital status: Single   Tobacco Use    Smoking status: Never     Passive exposure: Never    Smokeless tobacco: Never   Vaping Use    Vaping Use: Never used   Substance and Sexual Activity    Alcohol use: Not Currently     Comment: Very rarely drink socially. At most 2 drinks per month.    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]     Screening for cervical cancer [Z12.4]     Intractable vomiting [R11.10]     Adnexal mass [N94.89]     Chest pain, atypical [R07.89]     GERD (gastroesophageal reflux disease) [K21.9]     Elevated LFTs [R79.89]     Ketosis [E88.89]     Intractable nausea and vomiting [R11.2]     Esophageal stricture [K22.2]     Chronic deep vein thrombosis (DVT) of femoral vein of right lower extremity [I82.511]     S/P bariatric surgery [Z98.84]     PCOS (polycystic ovarian syndrome) [E28.2]     Leiomyoma, subserous [D25.2]     IUD check up [Z30.431]     Obesity (BMI 30-39.9) [E66.9]     Dyspepsia [R10.13]     Dyspnea on exertion [R06.09]     Anxiety [F41.9]     Heartburn [R12]     Chronic back pain [M54.9, G89.29]     Chronic migraine without aura without status migrainosus, not intractable [G43.709]     Asthma [J45.909]     Fatigue [R53.83]      Type II diabetes mellitus [E11.9]     Hyperlipidemia [E78.5]     Essential hypertension [I10]     Keloid skin disorder [L91.0]     Seasonal allergies [J30.2]     Abnormal uterine bleeding [N93.9]     Depression [F32.A]       Current Medications:   Current Outpatient Medications:     acetaminophen (TYLENOL) 500 MG tablet, Take 1-2 tablets by mouth Every 6 (Six) Hours As Needed for Mild Pain., Disp: , Rfl:     albuterol sulfate  (90 Base) MCG/ACT inhaler, Inhale 2 puffs Every 4 (Four) Hours As Needed for Wheezing or Shortness of Air., Disp: 18 g, Rfl: 5    Biotin 99603 MCG tablet dispersible, , Disp: , Rfl:     Blood Glucose Monitoring Suppl (ONE TOUCH ULTRA 2) w/Device kit, , Disp: , Rfl:     calcium carbonate (TUMS) 500 MG chewable tablet, Chew 1 tablet Daily., Disp: , Rfl:     cyclobenzaprine (FLEXERIL) 10 MG tablet, 2 (Two) Times a Day As Needed., Disp: , Rfl:     diphenhydrAMINE (BENADRYL) 25 mg capsule, Take 1 capsule by mouth Every 6 (Six) Hours As Needed for Itching or Sleep., Disp: , Rfl:     furosemide (LASIX) 20 MG tablet, Take 1 tablet nightly., Disp: 90 tablet, Rfl: 3    gabapentin (NEURONTIN) 300 MG capsule, Take 2 capsules by mouth Daily., Disp: , Rfl:     glipizide (GLUCOTROL XL) 5 MG ER tablet, Take 1 tablet by mouth Daily., Disp: 90 tablet, Rfl: 3    glucose blood test strip, USe BID to test blood sugar DX.e11.65, Disp: 100 each, Rfl: 3    HYDROcodone-Acetaminophen (XODOL )  MG per tablet, Take 1 tablet every 6 hours by oral route., Disp: , Rfl:     Lancets 33G misc, 1 each Daily As Needed (hypoglycemia/hyperglycemia). To test blood sugars DxE11.65, Disp: 100 each, Rfl: 2    loratadine (CLARITIN) 10 MG tablet, TAKE ONE TABLET BY MOUTH DAILY, Disp: 30 tablet, Rfl: 2    MAGNESIUM GLYCINATE PO, , Disp: , Rfl:     Melatonin 5 MG lozenge, , Disp: , Rfl:     metFORMIN ER (GLUCOPHAGE-XR) 500 MG 24 hr tablet, Take 1 tablet by mouth Daily With Dinner., Disp: 90 tablet, Rfl: 3    metoprolol  tartrate (LOPRESSOR) 50 MG tablet, Take 1 tablet by mouth 2 (Two) Times a Day., Disp: 180 tablet, Rfl: 3    multivitamin with minerals tablet tablet, Take 1 tablet by mouth Daily., Disp: , Rfl:     OnabotulinumtoxinA (Botox) 200 units reconstituted solution, FOR . PHYSICIAN TO INJECT UP  UNITS INTRAMUSCULARLY INTO HEAD, NECK AND SHOULDERS EVERY 90 DAYS., Disp: 1 each, Rfl: 3    ondansetron ODT (ZOFRAN-ODT) 8 MG disintegrating tablet, Place 1 tablet on the tongue Every 8 (Eight) Hours As Needed for Nausea or Vomiting., Disp: 21 tablet, Rfl: 0    simethicone (Gas-X) 80 MG chewable tablet, Chew 1 tablet Every 6 (Six) Hours As Needed for Flatulence., Disp: 10 tablet, Rfl: 0    spironolactone (Aldactone) 50 MG tablet, Take 1 tablet by mouth Daily., Disp: 90 tablet, Rfl: 1    tiZANidine (ZANAFLEX) 4 MG tablet, , Disp: , Rfl:     traMADol (ULTRAM) 50 MG tablet, Take 1 tablet by mouth Every 6 (Six) Hours As Needed for Moderate Pain., Disp: , Rfl:     vitamin C (ASCORBIC ACID) 250 MG tablet, Take 1 tablet by mouth Daily., Disp: , Rfl:     vitamin D3 125 MCG (5000 UT) capsule capsule, Take 1 capsule by mouth Daily., Disp: , Rfl:     Current Facility-Administered Medications:     OnabotulinumtoxinA 200 Units, 200 Units, Intramuscular, Q3 Months, Shiela Coelho, TANESHA, 200 Units at 10/26/23 1556    Labs: n/a    Hunger Vital Sign Food Insecurity Assessment:  Within the past 12 months I/we worried whether our food would run out before I/we got money to buy more: no   Within the past 12 months the food I/we bought just didn't last and I/we didn't have money to get more: no   Use of food assistance programs (WIC, food stamps, food dickson) no       Food & Nutrition Related History       Food Allergies: all nuts, MSG, oatmeal, banana, coconut, egg, and pineapple   Food Intolerances: lactose  Food Behavior: none identified   Nutrition Impact Symptoms:  vomiting, diarrhea, gas, reflux, nausea and  "bloating  Gastrointestinal conditions that impact intake or food choices: none  Details at home: lives and takes care of her mother  Who prepares most meals: patient   Who does grocery shopping: patient   How many meals are purchased from fast food/sit down restaurants per week:  1-5 meals per week  Difficulty chewin - Normal  Difficulty swallowin - Normal  Diet requirement related to personal preference or cultural belief:  none indicated  History of eating disorder/disordered eating habits: None  Language/communication details: english  Barriers to learning: No barriers identified at this time    24 Hour Recall: please refer to questionnaire submission under misc reports for more information     Additional comments: Pt reports she typically eats 2-3 meals. Pt works 3rd shift as a . Pt reports she may have 2 snacks a day and it will be leftovers, ramen, popcorn, grits or chips. Pt drinks juice, 32 ounces of water daily, coke, apple cider, and hot chocolate. Pt reports her biggest challenge is still not being able to eat some of the healthier foods after her surgery. Pt also discloses that she wants to get pregnant.     Anthropometrics      Height:   Ht Readings from Last 1 Encounters:   23 152.4 cm (60\")     Weight:   Wt Readings from Last 3 Encounters:   23 100 kg (221 lb)   23 96.6 kg (213 lb)   23 97.1 kg (214 lb)     BMI: There is no height or weight on file to calculate BMI.   Weight Change: pt reports she has gained 10 lbs recently. Pt lost 60 lbs before bariatric surgery and hasn't been able to lose wt since the surgery     Physical Activity     Barriers to physical activity: her busy schedule     Physical activity comments: none currently      Estimated Needs     Estimated Energy Needs: 1,600 (1.2, 300)    Estimated Protein Needs: specific needs not assessed at this time but RD encouraged pt to consume protein at each small meal     Estimated Fluid Needs: minimum " of 64 ounces and between her small meals     Discussion / Education      Consistency of meals: We discussed the importance of eating consistent, balanced meals to meet nutrient needs to promote satiety and satisfaction when eating. RD recommended patient to try and incorporate a small meal or snack in the morning and to avoid overeating and snacking throughout the day. RD explained meal patterns should be individualized from person to person. We discussed how sometimes cravings are trigger due to skipping meals and not incorporating all 3 categories of nutrients.      The components of a healthy meal and snack: We discussed how the three main nutrients our bodies need are protein, carbohydrates, and fat. RD recommended the patient aim to have a source of lean protein, fiber rich carbohydrates, and heart healthy fats with each of her meals and snacks. RD provided examples, such as having some yogurt with her sandwich for additional carbohydrates and protein rather than just having a sandwich by itself. We discussed the benefits this provides, such as meeting nutrient needs, and promoting satiety and satisfaction when eating.     Assessment of patient engagement: Asked appropriate questions    Measurement of understanding: Patient able to demonstrate understanding with teach back     Resources Provided: RD provided resources after session:  Eating to feel your best  The 3 macronutrients  Macronutrient foundations  Quick meal ideas  Quick breakfast ideas    Goal (s)      Goal 1: eating out less and cooking more at home     Goal 2: 3-5 small meals a day     Plan of Care     PES Statement:   Overweight / Obesity related to diet and lifestye as evidenced by BMI.     Follow Up Visit      Follow Up:   1/16 at 8:45 AM    Total of 60 minutes spent with patient on nutrition counseling. Education based on Academy of Nutrition and Dietetics guidelines. Patient was provided with RD's contact information. Thank you for this  referral.

## 2023-12-06 NOTE — PROGRESS NOTES
Exercise Education Note - Initial Visit    Patient: Frances Hartman       Date: 12/6/2023    Patient was seen in office for initial exercise education consult. Approximately 90 min was spent caring for the patient, discussing concerns, goals, barriers and next steps.     For/Concerns: Patient shares extensive health history and challenges exercising. She has goals to lose weight, improve her health, physical wellbeing and hopefully become pregnant. Considerations of also managing BG, PCOS, and post bariatric surgery.    Current Exercise Abilities/Experience: She is currently not active has a relatively sedentary lifestyle but is aware of opportunities to incorporate small increments of movement.    Equipment / Facilities Available: at home / work using body weight / bands    Barriers to Exercise: sciatica, fracture in lower spine, works 3rd shift    Summary of Education / Achievement Strategies: Patient identified after work would be ideal time for her to exercise for 15 mins at home, as well as short bouts of movements minutes at work. She was provided a variety of options to incorporate activity.  We discussed the soleus push up while seated or calf raises while standing, she was given a figure 8 band and handout of exercise that can be done seated. Additionally we discussed how pair activity with other task like counter top push ups and side leg raises while in the kitchen heating up a meal,etc.  Patient was also shown some mobility and stretching exercise that can be beneficial to reducing back pain.  Patient share a lot of information and had great questions.  Most information was provided through discussion and handouts of exercise to help her at home.  She will aim for 5-15 min of exercise after work and movement minutes throughout her workday. Follow up scheduled for 1 month.     Thony Adkins  12/6/2023  11:51 EST

## 2023-12-08 ENCOUNTER — OFFICE VISIT (OUTPATIENT)
Dept: CARDIOLOGY | Facility: CLINIC | Age: 38
End: 2023-12-08
Payer: COMMERCIAL

## 2023-12-08 VITALS
SYSTOLIC BLOOD PRESSURE: 126 MMHG | HEART RATE: 84 BPM | OXYGEN SATURATION: 98 % | HEIGHT: 60 IN | DIASTOLIC BLOOD PRESSURE: 86 MMHG | WEIGHT: 223.8 LBS | BODY MASS INDEX: 43.94 KG/M2

## 2023-12-08 DIAGNOSIS — R00.2 PALPITATIONS: Primary | ICD-10-CM

## 2023-12-08 DIAGNOSIS — E78.2 MIXED HYPERLIPIDEMIA: ICD-10-CM

## 2023-12-08 DIAGNOSIS — I10 ESSENTIAL HYPERTENSION: ICD-10-CM

## 2023-12-08 PROCEDURE — 99214 OFFICE O/P EST MOD 30 MIN: CPT

## 2023-12-08 RX ORDER — VITAMIN E 268 MG
400 CAPSULE ORAL DAILY
COMMUNITY

## 2023-12-08 NOTE — PROGRESS NOTES
[facility]    Encounter Date: 2023    Patient ID: Frances Hartman is a 38 y.o. female.  : 1985     PCP: Gay Prado APRN       Chief Complaint: Palpitations, Shortness of Breath, and Hypertension      PROBLEM LIST:  Hypertension:  Echo, 2014: EF 55-60%. All valves are structurally and functionally normal with no hemodynamically significant valve disease.  Echo, 2017: EF 65%. Mild TR with normal RVSP.  Echo, 2021: EF 60%. No significant valvular abnormalities.  Hyperlipidemia.  Chest pain:  Pet MPS, 2019: EF > 70%. No evidence of reversible ischemia.  Normal bilateral lower extremity venous duplex, 2021  Palpitations:  2-week Zio, 2020: SR. <1% PACs/PVCs. One short SVT (4 beats). Average HR 74 bpm.  DM2.  Morbid obesity   Peripheral chronic venous insufficiency.  Polycystic ovaries.  GERD.  Subclinical hypothyroidism.  Keloid skin disorder.  Chronic fatigue.  Chronic tonsillar hypertrophy.  Depression.  Migraine with aura, onset age 8.    History of Present Illness  Patient presents today for a follow-up with a history of palpitations and cardiac risk factors. Since last visit, patient has been doing well from a cardiac standpoint. She does state that she has been working with a specialist because she would like to try and get pregnant. She has been having occasional palpitations that mostly happen when she has not taken her medication. She monitors her BP at home and her 7 day avg has been 123/79. She does not have any chest pain or progressive shortness of breath. Edema has been stable.     Allergies   Allergen Reactions    Capsicum Annuum Extract & Derivative (Bell Pepper) [Capsicum] Anaphylaxis    Codeine Headache, Unknown - Low Severity and Other (See Comments)     Can tolerate hydrocodone, no other adverse reactions to other narcotics.    Doxycycline Hallucinations, Rash and Unknown - Low Severity     "Hydrochlorothiazide Swelling and Unknown - Low Severity     eventually causes CHF    Ibuprofen Other (See Comments)     \"makes me retain fluid and eventually go into CHF\"    Nitrofurantoin Nausea Only and Unknown - Low Severity    Peanut-Containing Drug Products Anaphylaxis    Monosodium Glutamate Swelling and Headache    Oatmeal Other (See Comments)     Dx on allergy testing    Banana (Diagnostic) Unknown - High Severity    Coconut Flavor Unknown - High Severity    Doxycycline Monohydrate Other (See Comments)    Isoflavones (Soy) Unknown - Low Severity    Amitriptyline Mental Status Change and Unknown - Low Severity     memory gaps + neuropathy + hair loss    Amoxicillin-Pot Clavulanate Other (See Comments) and Unknown - Low Severity     Syncope, not sure if allergic to cephalosporins.    Aspartame Nausea Only    Diclofenac Headache and Unknown - Low Severity    Eggs Or Egg-Derived Products Other (See Comments)     dx on allergy testing, but does not completely avoid    Naproxen Other (See Comments)     \"blackout\"         Current Outpatient Medications:     acetaminophen (TYLENOL) 500 MG tablet, Take 1-2 tablets by mouth Every 6 (Six) Hours As Needed for Mild Pain., Disp: , Rfl:     albuterol sulfate  (90 Base) MCG/ACT inhaler, Inhale 2 puffs Every 4 (Four) Hours As Needed for Wheezing or Shortness of Air., Disp: 18 g, Rfl: 5    Biotin 14355 MCG tablet dispersible, Place 1 tablet on the tongue Daily., Disp: , Rfl:     Blood Glucose Monitoring Suppl (ONE TOUCH ULTRA 2) w/Device kit, , Disp: , Rfl:     calcium carbonate (TUMS) 500 MG chewable tablet, Chew 1 tablet Daily., Disp: , Rfl:     cyclobenzaprine (FLEXERIL) 10 MG tablet, Take 1 tablet by mouth 2 (Two) Times a Day As Needed., Disp: , Rfl:     diphenhydrAMINE (BENADRYL) 25 mg capsule, Take 1 capsule by mouth Every 6 (Six) Hours As Needed for Itching or Sleep., Disp: , Rfl:     furosemide (LASIX) 20 MG tablet, Take 1 tablet nightly., Disp: 90 tablet, Rfl: " 3    gabapentin (NEURONTIN) 300 MG capsule, Take 2 capsules by mouth Daily., Disp: , Rfl:     glipizide (GLUCOTROL XL) 5 MG ER tablet, Take 1 tablet by mouth Daily., Disp: 90 tablet, Rfl: 3    glucose blood test strip, USe BID to test blood sugar DX.e11.65, Disp: 100 each, Rfl: 3    HYDROcodone-Acetaminophen (XODOL )  MG per tablet, Take 1 tablet every 6 hours by oral route., Disp: , Rfl:     Lancets 33G misc, 1 each Daily As Needed (hypoglycemia/hyperglycemia). To test blood sugars DxE11.65, Disp: 100 each, Rfl: 2    loratadine (CLARITIN) 10 MG tablet, TAKE ONE TABLET BY MOUTH DAILY, Disp: 30 tablet, Rfl: 2    Melatonin 5 MG lozenge, Take 1 lozenge by mouth Daily., Disp: , Rfl:     metFORMIN ER (GLUCOPHAGE-XR) 500 MG 24 hr tablet, Take 1 tablet by mouth Daily With Dinner., Disp: 90 tablet, Rfl: 3    metoprolol tartrate (LOPRESSOR) 50 MG tablet, Take 1 tablet by mouth 2 (Two) Times a Day., Disp: 180 tablet, Rfl: 3    multivitamin with minerals tablet tablet, Take 1 tablet by mouth Daily., Disp: , Rfl:     OnabotulinumtoxinA (Botox) 200 units reconstituted solution, FOR . PHYSICIAN TO INJECT UP  UNITS INTRAMUSCULARLY INTO HEAD, NECK AND SHOULDERS EVERY 90 DAYS., Disp: 1 each, Rfl: 3    ondansetron ODT (ZOFRAN-ODT) 8 MG disintegrating tablet, Place 1 tablet on the tongue Every 8 (Eight) Hours As Needed for Nausea or Vomiting., Disp: 21 tablet, Rfl: 0    simethicone (Gas-X) 80 MG chewable tablet, Chew 1 tablet Every 6 (Six) Hours As Needed for Flatulence., Disp: 10 tablet, Rfl: 0    tiZANidine (ZANAFLEX) 4 MG tablet, Take 1 tablet by mouth Every 8 (Eight) Hours As Needed., Disp: , Rfl:     traMADol (ULTRAM) 50 MG tablet, Take 1 tablet by mouth Every 6 (Six) Hours As Needed for Moderate Pain., Disp: , Rfl:     vitamin D3 125 MCG (5000 UT) capsule capsule, Take 1 capsule by mouth Daily., Disp: , Rfl:     VITAMIN E 400 UNIT capsule, Take 1 capsule by mouth Daily., Disp: , Rfl:  "    Current Facility-Administered Medications:     OnabotulinumtoxinA 200 Units, 200 Units, Intramuscular, Q3 Months, Shiela Coelho APRN, 200 Units at 10/26/23 1556    The following portions of the patient's history were reviewed and updated as appropriate: allergies, current medications, past family history, past medical history, past social history, past surgical history and problem list.    ROS  Review of Systems   14 point ROS negative except for that listed in the HPI.         Objective:     /86 (BP Location: Left arm, Patient Position: Sitting)   Pulse 84   Ht 152.4 cm (60\")   Wt 102 kg (223 lb 12.8 oz)   SpO2 98%   BMI 43.71 kg/m²      Physical Exam  Constitutional: Patient appears well-developed and well-nourished.   HENT: HEENT exam unremarkable.   Neck: Neck supple. No JVD present.  Cardiovascular: Normal rate, regular rhythm and normal heart sounds. No murmur heard.   2+ symmetric pulses.   Pulmonary/Chest: Breath sounds normal. Does not exhibit tenderness.   Abdominal: Abdomen benign.   Musculoskeletal: Does not exhibit edema.   Neurological: Neurological exam unremarkable.   Vitals reviewed.    Data Review:   Lab Results   Component Value Date    GLUCOSE 136 (H) 04/06/2023    BUN 9 04/06/2023    CREATININE 0.99 04/06/2023    EGFR 74.5 04/06/2023    BCR 9.1 04/06/2023     04/06/2023    K 4.2 04/06/2023    CO2 27.5 04/06/2023    CALCIUM 10.4 04/06/2023    ALBUMIN 4.5 04/06/2023    AST 27 04/06/2023    ALT 47 (H) 04/06/2023     Lab Results   Component Value Date    WBC 8.92 06/29/2023    RBC 3.88 06/29/2023    HGB 12.7 06/29/2023    HCT 37.0 06/29/2023    MCV 95.4 06/29/2023     06/29/2023     Lab Results   Component Value Date    TSH 3.520 06/29/2023     Lab Results   Component Value Date    HGBA1C 6.3 (A) 11/28/2023        Lab Results   Component Value Date    CHOL 140 10/05/2022    CHLPL 142 10/21/2015    TRIG 72 10/05/2022    HDL 54 10/05/2022    LDL 72 10/05/2022 "       Procedures              Assessment:      Diagnosis Plan   1. Palpitations  Mostly controlled with beta blockade. Continue beta blocker. Will need to wean if patient becomes pregnant.      2. Essential hypertension  BP controlled. Continue current medications.       3. Mixed hyperlipidemia  LDL 72 on labs from 10/2022.        Plan:   Stable cardiac status. No angina or CHF symptoms.   Advised patient that there are several medications that she cannot take if she were to become pregnant. Discussed that beta blockade will need to be discontinued and palpitations will likely worsen. She understands.   Continue current medications.   FU in 12 MO, sooner as needed.  Thank you for allowing us to participate in the care of your patient.       Meg Palmer PA-C      Please note that portions of this note may have been completed with a voice recognition program. Efforts were made to edit the dictations, but occasionally words are mistranscribed.

## 2024-01-02 ENCOUNTER — TRANSCRIBE ORDERS (OUTPATIENT)
Dept: ADMINISTRATIVE | Facility: HOSPITAL | Age: 39
End: 2024-01-02
Payer: COMMERCIAL

## 2024-01-02 DIAGNOSIS — Z12.31 VISIT FOR SCREENING MAMMOGRAM: Primary | ICD-10-CM

## 2024-01-03 ENCOUNTER — LAB (OUTPATIENT)
Dept: LAB | Facility: HOSPITAL | Age: 39
End: 2024-01-03
Payer: COMMERCIAL

## 2024-01-03 ENCOUNTER — OFFICE VISIT (OUTPATIENT)
Dept: NEUROLOGY | Facility: CLINIC | Age: 39
End: 2024-01-03
Payer: COMMERCIAL

## 2024-01-03 ENCOUNTER — TRANSCRIBE ORDERS (OUTPATIENT)
Dept: LAB | Facility: HOSPITAL | Age: 39
End: 2024-01-03
Payer: COMMERCIAL

## 2024-01-03 VITALS
HEIGHT: 60 IN | BODY MASS INDEX: 44.14 KG/M2 | OXYGEN SATURATION: 98 % | HEART RATE: 60 BPM | DIASTOLIC BLOOD PRESSURE: 74 MMHG | SYSTOLIC BLOOD PRESSURE: 110 MMHG | WEIGHT: 224.8 LBS

## 2024-01-03 DIAGNOSIS — Z11.3 SCREENING EXAMINATION FOR VENEREAL DISEASE: ICD-10-CM

## 2024-01-03 DIAGNOSIS — Z11.3 SCREENING EXAMINATION FOR VENEREAL DISEASE: Primary | ICD-10-CM

## 2024-01-03 DIAGNOSIS — G43.709 CHRONIC MIGRAINE WITHOUT AURA WITHOUT STATUS MIGRAINOSUS, NOT INTRACTABLE: Primary | ICD-10-CM

## 2024-01-03 DIAGNOSIS — Z31.9 PATIENT DESIRES PREGNANCY: ICD-10-CM

## 2024-01-03 LAB
HBV SURFACE AG SERPL QL IA: NORMAL
HCV AB SER DONR QL: NORMAL
HIV 1+2 AB+HIV1 P24 AG SERPL QL IA: NORMAL

## 2024-01-03 PROCEDURE — 86803 HEPATITIS C AB TEST: CPT

## 2024-01-03 PROCEDURE — 86780 TREPONEMA PALLIDUM: CPT

## 2024-01-03 PROCEDURE — 36415 COLL VENOUS BLD VENIPUNCTURE: CPT

## 2024-01-03 PROCEDURE — G0432 EIA HIV-1/HIV-2 SCREEN: HCPCS

## 2024-01-03 PROCEDURE — 99213 OFFICE O/P EST LOW 20 MIN: CPT | Performed by: NURSE PRACTITIONER

## 2024-01-03 PROCEDURE — 87340 HEPATITIS B SURFACE AG IA: CPT

## 2024-01-03 NOTE — LETTER
January 3, 2024     TANESHA Araiza  [address] [place], [place] [id number]    Patient: Frances Hartman   YOB: 1985   Date of Visit: 1/3/2024     Dear TANESHA Araiza:       Thank you for referring Frances Hartman to me for evaluation. Below are the relevant portions of my assessment and plan of care.    If you have questions, please do not hesitate to call me. I look forward to following Frances along with you.         Sincerely,        Nancy Kaba DNP, APRN        CC: No Recipients    Nancy Kaba DNP, APRN  [phone]  Signed     Neuro Office Visit      Encounter Date: 2024   Patient Name: Frances Hartman  : 1985   MRN: [id number]   PCP: TANESHA Araiza  Chief Complaint:    Chief Complaint   Patient presents with   • Migraine       History of Present Illness: Frances Hartman is a 38 y.o. female who is here today in Neurology for  migraine.    Last visit 2023 w me-cont GBP per pain management, coreg, tylenol and prn ultram.    Interval history-has been cleared for pregnancy. Desires to conceive naturally without the use of a surrogate.      Headaches  Days per month: have mild daily headache and severe headache is down to 10 days a month from 15 days a month. She can tell Botox helps because she was late on her injections  Location: Right temple, Left temple, Right Eye and Left Eye      Quality: Aching and Throbbing        Duration: hours  to days  Severity: 9/10 w avg severity 7-8/10  Triggers: stress, computer use, flourescent lighting and loud sounds or repetitive sounds  Associated Symptoms: Photophobia, Phonophobia, Scent sensitivity , Dizziness and  Vision changes blurred OU Denies pulsatile tinnitus  Aura: no visual aura, feels pain accelerating     Rx:   Abortives: Tylenol one dose a day. Tramadol one daily. Not using hydrocodone for headaches. Triptans contraindicated with htn  Preventative:  bid,  Spironlactone, coreg, lasix    Occipital neuralgia. Seen in ED 12/14/22ED Provider Notes by Calvin Gatica MD (12/14/2022 01:48)     MRI Angiogram Neck With Contrast (12/14/2022 05:33)-No significant stenosis identified.   MRI Brain Without Contrast (12/14/2022 05:11)-Incidental developmental venous anomaly in the right cerebellar hemisphere.   MRI Angiogram Venogram Head (12/14/2022 05:11)-IMPRESSION:  No evidence of thrombosis.  MRI Angiogram Head Without Contrast (12/14/2022 05:06)  CT Head Without Contrast (12/14/2022 02:57)-neg    IMPRESSION:  HEAD MRI:  No intracranial abnormality identified.  HEAD MRA:  No intracranial arterial stenosis, occlusion, or aneurysm identified.   NECK MRA:  Unremarkable neck MRA.   Subjective      Past Medical History:   Past Medical History:   Diagnosis Date   • Allergic     Birth   • Anxiety    • Arthritis    • Asthma     prn inhalers, does not follow w/ pulmonary   • Chronic back pain     previously followed w/ pain management, now just prn Tylenol + Gabapentin   • Chronic deep vein thrombosis (DVT) of femoral vein of right lower extremity 09/10/2021   • Clotting disorder 2015   • Cluster headache    • Depression    • Diabetes mellitus     Type II, dx 2014, never on insulin, A1C 7.6    • Dyspepsia    • Dyspnea on exertion    • Dysuria 03/07/2022    Dysuria and hematuria x 2 weeks. 3/7/22 urine culture sent. Rx Macrobid 100 mg twice daily for 7 days. Patient did not tolerate Macrobid well due to GI upset. Urine culture was negative.   • Fatigue    • Gastroparesis    • GERD (gastroesophageal reflux disease)    • Headache, tension-type    • Heartburn     chronic, prn TUMS, denies prior eval   • Hirsutism    • History of medical problems     PCOS   • Hx of radiation therapy     for treatments of Keloids   • Hypertension    • Irregular menses     infrequent spotting   • Leiomyoma, subserous     possible peduculated f3-4 cm fibroid on u/s at [facility]   • Low back pain    • Migraines      botox q3 months, following Neurology @ [facility]   • Morbid obesity     s/p sleeve gastrectomy   • Peripheral edema    • Polycystic ovary syndrome 2011   • Seasonal allergies    • Slow to wake up after anesthesia    • Urinary tract infection    • Visual impairment     Astigmatism, Nearsightedness       Past Surgical History:   Past Surgical History:   Procedure Laterality Date   • ABDOMINAL SURGERY  06/15/2021    Bariatric Sleeve Surgery   • BARIATRIC SURGERY     • COLONOSCOPY  December 2022   • COSMETIC SURGERY      Multiple Keloid surgeries   • ENDOSCOPY N/A 06/15/2021    Procedure: ESOPHAGOGASTRODUODENOSCOPY;  Surgeon: Ayaka Andre MD;  Location:  [facility];  Service: Robotics - DaVinci;  Laterality: N/A;   • ENDOSCOPY N/A 10/28/2021    Procedure: ESOPHAGOGASTRODUODENOSCOPY WITH ACHALASIA BALLOON DILATATION;  Surgeon: Jase Hernandez MD;  Location:  [facility];  Service: Gastroenterology;  Laterality: N/A;  58 F DILATOR   • ENDOSCOPY N/A 11/15/2021    Procedure: ESOPHAGOGASTRODUODENOSCOPY WITH DILATATION;  Surgeon: Jase Hernandez MD;  Location:  [facility];  Service: Gastroenterology;  Laterality: N/A;  achalasia balloon    • ENDOSCOPY N/A 11/24/2021    Procedure: ESOPHAGOGASTRODUODENOSCOPY WTH DUODENAL POLYPECTOPMY AND NASAL JEJUNAL TUBE PLACEMENT;  Surgeon: Jase Hernandez MD;  Location:  [facility];  Service: Gastroenterology;  Laterality: N/A;  placement of feeding tube, checked position with KUB   • GASTRIC RESTRICTION SURGERY  2021    Sleeve   • GASTRIC SLEEVE LAPAROSCOPIC N/A 06/15/2021    Procedure: GASTRIC SLEEVE LAPAROSCOPIC WITH DAVINCI ROBOT, LAPROSCOPIC HIATAL HERNIA REPAIR WITH DAVINCI ROBOT;  Surgeon: Ayaka Andre MD;  Location:  [facility];  Service: Robotics - DaVinci;  Laterality: N/A;   • KELOID EXCISION      1990,1993,1999,2015,2016,2019   • LAPAROSCOPIC CHOLECYSTECTOMY  2000    for stones   • RADIOFREQUENCY ABLATION  2019    x 2  for pt back   • UMBILICAL  HERNIA REPAIR  1990   • UPPER GASTROINTESTINAL ENDOSCOPY     • WISDOM TOOTH EXTRACTION  1994       Family History:   Family History   Problem Relation Age of Onset   • Arthritis Mother    • Diabetes Mother    • Obesity Mother    • Arrhythmia Mother    • Hypertension Mother    • Asthma Mother    • Migraines Mother    • Dementia Father    • Heart attack Father    • Arrhythmia Father    • Heart disease Father    • Alcohol abuse Father    • Ovarian cancer Maternal Aunt         40's   • Breast cancer Paternal Aunt         30's   • Stroke Other         CVA   • Obesity Other    • Heart disease Other    • Hypertension Other    • Endometrial cancer Neg Hx    • Colon cancer Neg Hx    • Colon polyps Neg Hx    • Esophageal cancer Neg Hx        Social History:   Social History     Socioeconomic History   • Marital status: Single   Tobacco Use   • Smoking status: Never     Passive exposure: Never   • Smokeless tobacco: Never   Vaping Use   • Vaping Use: Never used   Substance and Sexual Activity   • Alcohol use: Not Currently     Comment: Very rarely drink socially. At most 2 drinks per month.   • Drug use: Never   • Sexual activity: Yes     Partners: Male     Birth control/protection: Condom, Coitus interruptus       Medications:     Current Outpatient Medications:   •  acetaminophen (TYLENOL) 500 MG tablet, Take 1-2 tablets by mouth Every 6 (Six) Hours As Needed for Mild Pain., Disp: , Rfl:   •  albuterol sulfate  (90 Base) MCG/ACT inhaler, Inhale 2 puffs Every 4 (Four) Hours As Needed for Wheezing or Shortness of Air., Disp: 18 g, Rfl: 5  •  Biotin 58373 MCG tablet dispersible, Place 1 tablet on the tongue Daily., Disp: , Rfl:   •  Blood Glucose Monitoring Suppl (ONE TOUCH ULTRA 2) w/Device kit, , Disp: , Rfl:   •  calcium carbonate (TUMS) 500 MG chewable tablet, Chew 1 tablet Daily., Disp: , Rfl:   •  diphenhydrAMINE (BENADRYL) 25 mg capsule, Take 1 capsule by mouth Every 6 (Six) Hours As Needed for Itching or Sleep.,  Disp: , Rfl:   •  furosemide (LASIX) 20 MG tablet, Take 1 tablet nightly., Disp: 90 tablet, Rfl: 3  •  gabapentin (NEURONTIN) 300 MG capsule, Take 2 capsules by mouth Daily., Disp: , Rfl:   •  glipizide (GLUCOTROL XL) 5 MG ER tablet, Take 1 tablet by mouth Daily., Disp: 90 tablet, Rfl: 3  •  glucose blood test strip, USe BID to test blood sugar DX.e11.65, Disp: 100 each, Rfl: 3  •  Lancets 33G misc, 1 each Daily As Needed (hypoglycemia/hyperglycemia). To test blood sugars DxE11.65, Disp: 100 each, Rfl: 2  •  loratadine (CLARITIN) 10 MG tablet, TAKE ONE TABLET BY MOUTH DAILY, Disp: 30 tablet, Rfl: 2  •  Melatonin 5 MG lozenge, Take 1 lozenge by mouth Daily., Disp: , Rfl:   •  metFORMIN ER (GLUCOPHAGE-XR) 500 MG 24 hr tablet, Take 1 tablet by mouth Daily With Dinner., Disp: 90 tablet, Rfl: 3  •  metoprolol tartrate (LOPRESSOR) 50 MG tablet, Take 1 tablet by mouth 2 (Two) Times a Day., Disp: 180 tablet, Rfl: 3  •  multivitamin with minerals tablet tablet, Take 1 tablet by mouth Daily., Disp: , Rfl:   •  OnabotulinumtoxinA (Botox) 200 units reconstituted solution, FOR . PHYSICIAN TO INJECT UP  UNITS INTRAMUSCULARLY INTO HEAD, NECK AND SHOULDERS EVERY 90 DAYS., Disp: 1 each, Rfl: 3  •  ondansetron ODT (ZOFRAN-ODT) 8 MG disintegrating tablet, Place 1 tablet on the tongue Every 8 (Eight) Hours As Needed for Nausea or Vomiting., Disp: 21 tablet, Rfl: 0  •  simethicone (Gas-X) 80 MG chewable tablet, Chew 1 tablet Every 6 (Six) Hours As Needed for Flatulence., Disp: 10 tablet, Rfl: 0  •  tiZANidine (ZANAFLEX) 4 MG tablet, Take 1 tablet by mouth Every 8 (Eight) Hours As Needed., Disp: , Rfl:   •  traMADol (ULTRAM) 50 MG tablet, Take 1 tablet by mouth Every 6 (Six) Hours As Needed for Moderate Pain., Disp: , Rfl:   •  vitamin D3 125 MCG (5000 UT) capsule capsule, Take 1 capsule by mouth Daily., Disp: , Rfl:   •  VITAMIN E 400 UNIT capsule, Take 1 capsule by mouth Daily., Disp: , Rfl:   •   "cyclobenzaprine (FLEXERIL) 10 MG tablet, Take 1 tablet by mouth 2 (Two) Times a Day As Needed. (Patient not taking: Reported on 1/3/2024), Disp: , Rfl:   •  HYDROcodone-Acetaminophen (XODOL )  MG per tablet, Take 1 tablet every 6 hours by oral route. (Patient not taking: Reported on 1/3/2024), Disp: , Rfl:     Current Facility-Administered Medications:   •  OnabotulinumtoxinA 200 Units, 200 Units, Intramuscular, Q3 Months, Shiela Coelho, APRN, 200 Units at 10/26/23 1556    Allergies:   Allergies   Allergen Reactions   • Capsicum Annuum Extract & Derivative (Bell Pepper) [Capsicum] Anaphylaxis   • Codeine Headache, Unknown - Low Severity and Other (See Comments)     Can tolerate hydrocodone, no other adverse reactions to other narcotics.   • Doxycycline Hallucinations, Rash and Unknown - Low Severity   • Hydrochlorothiazide Swelling and Unknown - Low Severity     eventually causes CHF   • Ibuprofen Other (See Comments)     \"makes me retain fluid and eventually go into CHF\"   • Nitrofurantoin Nausea Only and Unknown - Low Severity   • Peanut-Containing Drug Products Anaphylaxis   • Monosodium Glutamate Swelling and Headache   • Oatmeal Other (See Comments)     Dx on allergy testing   • Banana (Diagnostic) Unknown - High Severity   • Coconut Flavor Unknown - High Severity   • Doxycycline Monohydrate Other (See Comments)   • Isoflavones (Soy) Unknown - Low Severity   • Amitriptyline Mental Status Change and Unknown - Low Severity     memory gaps + neuropathy + hair loss   • Amoxicillin-Pot Clavulanate Other (See Comments) and Unknown - Low Severity     Syncope, not sure if allergic to cephalosporins.   • Aspartame Nausea Only   • Diclofenac Headache and Unknown - Low Severity   • Eggs Or Egg-Derived Products Other (See Comments)     dx on allergy testing, but does not completely avoid   • Naproxen Other (See Comments)     \"blackout\"       PHQ-9 Total Score:     SUNSHINE Fall Risk Assessment has not " been completed.    Objective     Physical Exam:   Physical Exam  Eyes:      Pupils: Pupils are equal, round, and reactive to light.   Neurological:      Mental Status: She is oriented to person, place, and time.      Coordination: Finger-Nose-Finger Test, Heel to Shin Test and Romberg Test normal.      Gait: Gait is intact.      Deep Tendon Reflexes:      Reflex Scores:       Tricep reflexes are 2+ on the right side and 2+ on the left side.       Bicep reflexes are 2+ on the right side and 2+ on the left side.       Brachioradialis reflexes are 2+ on the right side and 2+ on the left side.       Patellar reflexes are 2+ on the right side and 2+ on the left side.       Achilles reflexes are 2+ on the right side and 2+ on the left side.  Psychiatric:         Speech: Speech normal.         Neurologic Exam     Mental Status   Oriented to person, place, and time.   Follows 3 step commands.   Attention: normal. Concentration: normal.   Speech: speech is normal   Level of consciousness: alert  Knowledge: consistent with education.   Normal comprehension.     Cranial Nerves     CN III, IV, VI   Pupils are equal, round, and reactive to light.  Right pupil: Accommodation: intact.   Left pupil: Accommodation: intact.   CN III: no CN III palsy  CN VI: no CN VI palsy  Nystagmus: none   Diplopia: none  Upgaze: normal  Downgaze: normal  Conjugate gaze: present    CN VII   Facial expression full, symmetric.     CN VIII   Hearing: intact    CN XII   CN XII normal.     Motor Exam   Muscle bulk: normal  Overall muscle tone: normal    Strength   Right biceps: 5/5  Left biceps: 5/5  Right triceps: 5/5  Left triceps: 5/5  Right interossei: 5/5  Left interossei: 5/5  Right quadriceps: 5/5  Left quadriceps: 5/5  Right anterior tibial: 5/5  Left anterior tibial: 5/5  Right posterior tibial: 5/5  Left posterior tibial: 5/5    Sensory Exam   Light touch normal.     Gait, Coordination, and Reflexes     Gait  Gait: normal    Coordination  "  Romberg: negative  Finger to nose coordination: normal  Heel to shin coordination: normal    Tremor   Resting tremor: absent  Action tremor: absent    Reflexes   Right brachioradialis: 2+  Left brachioradialis: 2+  Right biceps: 2+  Left biceps: 2+  Right triceps: 2+  Left triceps: 2+  Right patellar: 2+  Left patellar: 2+  Right achilles: 2+  Left achilles: 2+  Right : 2+  Left : 2+       Vital Signs:   Vitals:    01/03/24 0838   BP: 110/74   Pulse: 60   SpO2: 98%   Weight: 102 kg (224 lb 12.8 oz)   Height: 152.4 cm (60\")     Body mass index is 43.9 kg/m².         Assessment / Plan      Assessment/Plan:   Diagnoses and all orders for this visit:    1. Chronic migraine without aura without status migrainosus, not intractable (Primary)  Comments:  Cont botox and tylenol    2. Patient desires pregnancy  Comments:  She undertands ultram, GBP, hydrocodone and flexeril contraindicated during pregnancy. Will need note from OB to proceed w botox.             Patient Education:       Reviewed medications, potential side effects and signs and symptoms to report. Discussed risk versus benefits of treatment plan with patient and/or family-including medications, labs and radiology that may be ordered. Addressed questions and concerns during visit. Patient and/or family verbalized understanding and agree with plan. Instructed to call the office with any questions and report to ER with any life-threatening symptoms.     Follow Up:   Return in about 1 year (around 1/3/2025) for Recheck w Dr Francois.    During this visit the following were done:  Labs Reviewed []    Labs Ordered []    Radiology Reports Reviewed []    Radiology Ordered []    PCP Records Reviewed []    Referring Provider Records Reviewed []    ER Records Reviewed []    Hospital Records Reviewed []    History Obtained From Family []    Radiology Images Reviewed []    Other Reviewed []    Records Requested []      Nancy Kaba, KECIA, APRN  "

## 2024-01-03 NOTE — PROGRESS NOTES
Neuro Office Visit      Encounter Date: 2024   Patient Name: Frances Hartman  : 1985   MRN: [id number]   PCP: TANESHA Araiza  Chief Complaint:    Chief Complaint   Patient presents with    Migraine       History of Present Illness: Frances Hartman is a 38 y.o. female who is here today in Neurology for  migraine.    Last visit 2023 w me-cont GBP per pain management, coreg, tylenol and prn ultram.    Interval history-has been cleared for pregnancy. Desires to conceive naturally without the use of a surrogate.      Headaches  Days per month: have mild daily headache and severe headache is down to 10 days a month from 15 days a month. She can tell Botox helps because she was late on her injections  Location: Right temple, Left temple, Right Eye and Left Eye      Quality: Aching and Throbbing        Duration: hours  to days  Severity: 9/10 w avg severity 7-8/10  Triggers: stress, computer use, flourescent lighting and loud sounds or repetitive sounds  Associated Symptoms: Photophobia, Phonophobia, Scent sensitivity , Dizziness and  Vision changes blurred OU Denies pulsatile tinnitus  Aura: no visual aura, feels pain accelerating     Rx:   Abortives: Tylenol one dose a day. Tramadol one daily. Not using hydrocodone for headaches. Triptans contraindicated with htn  Preventative:  bid, Spironlactone, coreg, lasix    Occipital neuralgia. Seen in ED [facility] Provider Notes by Calvin Gatica MD (2022 01:48)     MRI Angiogram Neck With Contrast (2022 05:33)-No significant stenosis identified.   MRI Brain Without Contrast (2022 05:11)-Incidental developmental venous anomaly in the right cerebellar hemisphere.   MRI Angiogram Venogram Head (2022 05:11)-IMPRESSION:  No evidence of thrombosis.  MRI Angiogram Head Without Contrast (2022 05:06)  CT Head Without Contrast (2022 02:57)-neg    IMPRESSION:  HEAD MRI:  No intracranial abnormality  identified.  HEAD MRA:  No intracranial arterial stenosis, occlusion, or aneurysm identified.   NECK MRA:  Unremarkable neck MRA.   Subjective      Past Medical History:   Past Medical History:   Diagnosis Date    Allergic     Birth    Anxiety     Arthritis     Asthma     prn inhalers, does not follow w/ pulmonary    Chronic back pain     previously followed w/ pain management, now just prn Tylenol + Gabapentin    Chronic deep vein thrombosis (DVT) of femoral vein of right lower extremity 09/10/2021    Clotting disorder 2015    Cluster headache     Depression     Diabetes mellitus     Type II, dx 2014, never on insulin, A1C 7.6     Dyspepsia     Dyspnea on exertion     Dysuria 03/07/2022    Dysuria and hematuria x 2 weeks. 3/7/22 urine culture sent. Rx Macrobid 100 mg twice daily for 7 days. Patient did not tolerate Macrobid well due to GI upset. Urine culture was negative.    Fatigue     Gastroparesis     GERD (gastroesophageal reflux disease)     Headache, tension-type     Heartburn     chronic, prn TUMS, denies prior eval    Hirsutism     History of medical problems     PCOS    Hx of radiation therapy     for treatments of Keloids    Hypertension     Irregular menses     infrequent spotting    Leiomyoma, subserous     possible peduculated f3-4 cm fibroid on u/s at [facility]    Low back pain     Migraines     botox q3 months, following Neurology @ [facility]    Morbid obesity     s/p sleeve gastrectomy    Peripheral edema     Polycystic ovary syndrome 2011    Seasonal allergies     Slow to wake up after anesthesia     Urinary tract infection     Visual impairment     Astigmatism, Nearsightedness       Past Surgical History:   Past Surgical History:   Procedure Laterality Date    ABDOMINAL SURGERY  06/15/2021    Bariatric Sleeve Surgery    BARIATRIC SURGERY      COLONOSCOPY  December 2022    COSMETIC SURGERY      Multiple Keloid surgeries    ENDOSCOPY N/A 06/15/2021    Procedure: ESOPHAGOGASTRODUODENOSCOPY;  Surgeon: Thai  Ayaka Lundberg MD;  Location:  [facility];  Service: Robotics - DaVinci;  Laterality: N/A;    ENDOSCOPY N/A 10/28/2021    Procedure: ESOPHAGOGASTRODUODENOSCOPY WITH ACHALASIA BALLOON DILATATION;  Surgeon: Jase Hernandez MD;  Location:  [facility];  Service: Gastroenterology;  Laterality: N/A;  58 F DILATOR    ENDOSCOPY N/A 11/15/2021    Procedure: ESOPHAGOGASTRODUODENOSCOPY WITH DILATATION;  Surgeon: Jase Hernandez MD;  Location:  [facility];  Service: Gastroenterology;  Laterality: N/A;  achalasia balloon     ENDOSCOPY N/A 11/24/2021    Procedure: ESOPHAGOGASTRODUODENOSCOPY WTH DUODENAL POLYPECTOPMY AND NASAL JEJUNAL TUBE PLACEMENT;  Surgeon: Jase Hernandez MD;  Location:  [facility];  Service: Gastroenterology;  Laterality: N/A;  placement of feeding tube, checked position with KUB    GASTRIC RESTRICTION SURGERY  2021    Sleeve    GASTRIC SLEEVE LAPAROSCOPIC N/A 06/15/2021    Procedure: GASTRIC SLEEVE LAPAROSCOPIC WITH DAVINCI ROBOT, LAPROSCOPIC HIATAL HERNIA REPAIR WITH DAVINCI ROBOT;  Surgeon: Ayaka Andre MD;  Location:  [facility];  Service: Robotics - DaVinci;  Laterality: N/A;    KELOID EXCISION      1990,1993,1999,2015,2016,2019    LAPAROSCOPIC CHOLECYSTECTOMY  2000    for stones    RADIOFREQUENCY ABLATION  2019    x 2  for pt back    UMBILICAL HERNIA REPAIR  1990    UPPER GASTROINTESTINAL ENDOSCOPY      WISDOM TOOTH EXTRACTION  1994       Family History:   Family History   Problem Relation Age of Onset    Arthritis Mother     Diabetes Mother     Obesity Mother     Arrhythmia Mother     Hypertension Mother     Asthma Mother     Migraines Mother     Dementia Father     Heart attack Father     Arrhythmia Father     Heart disease Father     Alcohol abuse Father     Ovarian cancer Maternal Aunt         40's    Breast cancer Paternal Aunt         30's    Stroke Other         CVA    Obesity Other     Heart disease Other     Hypertension Other     Endometrial cancer Neg Hx     Colon cancer  Neg Hx     Colon polyps Neg Hx     Esophageal cancer Neg Hx        Social History:   Social History     Socioeconomic History    Marital status: Single   Tobacco Use    Smoking status: Never     Passive exposure: Never    Smokeless tobacco: Never   Vaping Use    Vaping Use: Never used   Substance and Sexual Activity    Alcohol use: Not Currently     Comment: Very rarely drink socially. At most 2 drinks per month.    Drug use: Never    Sexual activity: Yes     Partners: Male     Birth control/protection: Condom, Coitus interruptus       Medications:     Current Outpatient Medications:     acetaminophen (TYLENOL) 500 MG tablet, Take 1-2 tablets by mouth Every 6 (Six) Hours As Needed for Mild Pain., Disp: , Rfl:     albuterol sulfate  (90 Base) MCG/ACT inhaler, Inhale 2 puffs Every 4 (Four) Hours As Needed for Wheezing or Shortness of Air., Disp: 18 g, Rfl: 5    Biotin 32054 MCG tablet dispersible, Place 1 tablet on the tongue Daily., Disp: , Rfl:     Blood Glucose Monitoring Suppl (ONE TOUCH ULTRA 2) w/Device kit, , Disp: , Rfl:     calcium carbonate (TUMS) 500 MG chewable tablet, Chew 1 tablet Daily., Disp: , Rfl:     diphenhydrAMINE (BENADRYL) 25 mg capsule, Take 1 capsule by mouth Every 6 (Six) Hours As Needed for Itching or Sleep., Disp: , Rfl:     furosemide (LASIX) 20 MG tablet, Take 1 tablet nightly., Disp: 90 tablet, Rfl: 3    gabapentin (NEURONTIN) 300 MG capsule, Take 2 capsules by mouth Daily., Disp: , Rfl:     glipizide (GLUCOTROL XL) 5 MG ER tablet, Take 1 tablet by mouth Daily., Disp: 90 tablet, Rfl: 3    glucose blood test strip, USe BID to test blood sugar DX.e11.65, Disp: 100 each, Rfl: 3    Lancets 33G misc, 1 each Daily As Needed (hypoglycemia/hyperglycemia). To test blood sugars DxE11.65, Disp: 100 each, Rfl: 2    loratadine (CLARITIN) 10 MG tablet, TAKE ONE TABLET BY MOUTH DAILY, Disp: 30 tablet, Rfl: 2    Melatonin 5 MG lozenge, Take 1 lozenge by mouth Daily., Disp: , Rfl:     metFORMIN ER  (GLUCOPHAGE-XR) 500 MG 24 hr tablet, Take 1 tablet by mouth Daily With Dinner., Disp: 90 tablet, Rfl: 3    metoprolol tartrate (LOPRESSOR) 50 MG tablet, Take 1 tablet by mouth 2 (Two) Times a Day., Disp: 180 tablet, Rfl: 3    multivitamin with minerals tablet tablet, Take 1 tablet by mouth Daily., Disp: , Rfl:     OnabotulinumtoxinA (Botox) 200 units reconstituted solution, FOR . PHYSICIAN TO INJECT UP  UNITS INTRAMUSCULARLY INTO HEAD, NECK AND SHOULDERS EVERY 90 DAYS., Disp: 1 each, Rfl: 3    ondansetron ODT (ZOFRAN-ODT) 8 MG disintegrating tablet, Place 1 tablet on the tongue Every 8 (Eight) Hours As Needed for Nausea or Vomiting., Disp: 21 tablet, Rfl: 0    simethicone (Gas-X) 80 MG chewable tablet, Chew 1 tablet Every 6 (Six) Hours As Needed for Flatulence., Disp: 10 tablet, Rfl: 0    tiZANidine (ZANAFLEX) 4 MG tablet, Take 1 tablet by mouth Every 8 (Eight) Hours As Needed., Disp: , Rfl:     traMADol (ULTRAM) 50 MG tablet, Take 1 tablet by mouth Every 6 (Six) Hours As Needed for Moderate Pain., Disp: , Rfl:     vitamin D3 125 MCG (5000 UT) capsule capsule, Take 1 capsule by mouth Daily., Disp: , Rfl:     VITAMIN E 400 UNIT capsule, Take 1 capsule by mouth Daily., Disp: , Rfl:     cyclobenzaprine (FLEXERIL) 10 MG tablet, Take 1 tablet by mouth 2 (Two) Times a Day As Needed. (Patient not taking: Reported on 1/3/2024), Disp: , Rfl:     HYDROcodone-Acetaminophen (XODOL )  MG per tablet, Take 1 tablet every 6 hours by oral route. (Patient not taking: Reported on 1/3/2024), Disp: , Rfl:     Current Facility-Administered Medications:     OnabotulinumtoxinA 200 Units, 200 Units, Intramuscular, Q3 Months, Shiela Coelho APRN, 200 Units at 10/26/23 1556    Allergies:   Allergies   Allergen Reactions    Capsicum Annuum Extract & Derivative (Bell Pepper) [Capsicum] Anaphylaxis    Codeine Headache, Unknown - Low Severity and Other (See Comments)     Can tolerate hydrocodone, no  "other adverse reactions to other narcotics.    Doxycycline Hallucinations, Rash and Unknown - Low Severity    Hydrochlorothiazide Swelling and Unknown - Low Severity     eventually causes CHF    Ibuprofen Other (See Comments)     \"makes me retain fluid and eventually go into CHF\"    Nitrofurantoin Nausea Only and Unknown - Low Severity    Peanut-Containing Drug Products Anaphylaxis    Monosodium Glutamate Swelling and Headache    Oatmeal Other (See Comments)     Dx on allergy testing    Banana (Diagnostic) Unknown - High Severity    Coconut Flavor Unknown - High Severity    Doxycycline Monohydrate Other (See Comments)    Isoflavones (Soy) Unknown - Low Severity    Amitriptyline Mental Status Change and Unknown - Low Severity     memory gaps + neuropathy + hair loss    Amoxicillin-Pot Clavulanate Other (See Comments) and Unknown - Low Severity     Syncope, not sure if allergic to cephalosporins.    Aspartame Nausea Only    Diclofenac Headache and Unknown - Low Severity    Eggs Or Egg-Derived Products Other (See Comments)     dx on allergy testing, but does not completely avoid    Naproxen Other (See Comments)     \"blackout\"       PHQ-9 Total Score:     [facility] not [facility].    Objective     Physical Exam:   Physical Exam  Eyes:      Pupils: Pupils are equal, round, and reactive to light.   Neurological:      Mental Status: She is oriented to person, place, and time.      Coordination: Finger-Nose-Finger Test, Heel to Shin Test and Romberg Test normal.      Gait: Gait is intact.      Deep Tendon Reflexes:      Reflex Scores:       Tricep reflexes are 2+ on the right side and 2+ on the left side.       Bicep reflexes are 2+ on the right side and 2+ on the left side.       Brachioradialis reflexes are 2+ on the right side and 2+ on the left side.       Patellar reflexes are 2+ on the right side and 2+ on the left side.       Achilles reflexes are 2+ on the right side and 2+ on the left " "side.  Psychiatric:         Speech: Speech normal.         Neurologic Exam     Mental Status   Oriented to person, place, and time.   Follows 3 step commands.   Attention: normal. Concentration: normal.   Speech: speech is normal   Level of consciousness: alert  Knowledge: consistent with education.   Normal comprehension.     Cranial Nerves     CN III, IV, VI   Pupils are equal, round, and reactive to light.  Right pupil: Accommodation: intact.   Left pupil: Accommodation: intact.   CN III: no CN III palsy  CN VI: no CN VI palsy  Nystagmus: none   Diplopia: none  Upgaze: normal  Downgaze: normal  Conjugate gaze: present    CN VII   Facial expression full, symmetric.     CN VIII   Hearing: intact    CN XII   CN XII normal.     Motor Exam   Muscle bulk: normal  Overall muscle tone: normal    Strength   Right biceps: 5/5  Left biceps: 5/5  Right triceps: 5/5  Left triceps: 5/5  Right interossei: 5/5  Left interossei: 5/5  Right quadriceps: 5/5  Left quadriceps: 5/5  Right anterior tibial: 5/5  Left anterior tibial: 5/5  Right posterior tibial: 5/5  Left posterior tibial: 5/5    Sensory Exam   Light touch normal.     Gait, Coordination, and Reflexes     Gait  Gait: normal    Coordination   Romberg: negative  Finger to nose coordination: normal  Heel to shin coordination: normal    Tremor   Resting tremor: absent  Action tremor: absent    Reflexes   Right brachioradialis: 2+  Left brachioradialis: 2+  Right biceps: 2+  Left biceps: 2+  Right triceps: 2+  Left triceps: 2+  Right patellar: 2+  Left patellar: 2+  Right achilles: 2+  Left achilles: 2+  Right : 2+  Left : 2+       Vital Signs:   Vitals:    01/03/24 0838   BP: 110/74   Pulse: 60   SpO2: 98%   Weight: 102 kg (224 lb 12.8 oz)   Height: 152.4 cm (60\")     Body mass index is 43.9 kg/m².         Assessment / Plan      Assessment/Plan:   Diagnoses and all orders for this visit:    1. Chronic migraine without aura without status migrainosus, not intractable " (Primary)  Comments:  Cont botox and tylenol    2. Patient desires pregnancy  Comments:  She undertands ultram, GBP, hydrocodone and flexeril contraindicated during pregnancy. Will need note from OB to proceed w botox.             Patient Education:       Reviewed medications, potential side effects and signs and symptoms to report. Discussed risk versus benefits of treatment plan with patient and/or family-including medications, labs and radiology that may be ordered. Addressed questions and concerns during visit. Patient and/or family verbalized understanding and agree with plan. Instructed to call the office with any questions and report to ER with any life-threatening symptoms.     Follow Up:   Return in about 1 year (around 1/3/2025) for Recheck w Dr Francois.    During this visit the following were done:  Labs Reviewed []    Labs Ordered []    Radiology Reports Reviewed []    Radiology Ordered []    PCP Records Reviewed []    Referring Provider Records Reviewed []    ER Records Reviewed []    Hospital Records Reviewed []    History Obtained From Family []    Radiology Images Reviewed []    Other Reviewed []    Records Requested []      Nancy Kaba, DNP, APRN

## 2024-01-04 LAB — TREPONEMA PALLIDUM IGG+IGM AB [PRESENCE] IN SERUM OR PLASMA BY IMMUNOASSAY: NON REACTIVE

## 2024-01-15 ENCOUNTER — SPECIALTY PHARMACY (OUTPATIENT)
Dept: ONCOLOGY | Facility: HOSPITAL | Age: 39
End: 2024-01-15
Payer: COMMERCIAL

## 2024-01-15 NOTE — PROGRESS NOTES
Specialty Pharmacy Refill Coordination Note     Frances is a 38 y.o. female contacted today regarding refills of Botox specialty medication(s).. patient Injection is due on 01/25    Reviewed and verified with patient:       Specialty medication(s) and dose(s) confirmed: yes    Refill Questions      Flowsheet Row Most Recent Value   Changes to allergies? No   Changes to medications? No   New conditions or infections since last clinic visit No   Unplanned office visit, urgent care, ED, or hospital admission in the last 4 weeks  No   How does patient/caregiver feel medication is working? Very good   Financial problems or insurance changes  No   If yes, describe changes in insurance or financial issues. N/A   Since the previous refill, were any specialty medication doses or scheduled injections missed or delayed?  No   If yes, please provide the amount N/A   Why were doses missed? N/A   Does this patient require a clinical escalation to a pharmacist? No            Delivery Questions      Flowsheet Row Most Recent Value   Delivery method [employer]   Delivery address verified with patient/caregiver? Yes   Delivery address Other (Comment)   Number of medications in delivery 1   Medication(s) being filled and delivered Onabotulinumtoxina   Doses left of specialty medications N/A   Copay verified? Yes   Copay amount N/A   Copay form of payment No copayment ($0)              Medication Adherence          Adherence tools used: cell phone      Support network for adherence: healthcare provider                Follow-up: 90 day(s)     Roman David  Specialty Pharmacy Technician

## 2024-01-17 ENCOUNTER — OFFICE VISIT (OUTPATIENT)
Dept: GASTROENTEROLOGY | Facility: CLINIC | Age: 39
End: 2024-01-17
Payer: COMMERCIAL

## 2024-01-17 VITALS
HEART RATE: 61 BPM | WEIGHT: 222.2 LBS | SYSTOLIC BLOOD PRESSURE: 144 MMHG | BODY MASS INDEX: 43.62 KG/M2 | HEIGHT: 60 IN | DIASTOLIC BLOOD PRESSURE: 82 MMHG | OXYGEN SATURATION: 98 % | TEMPERATURE: 98.7 F

## 2024-01-17 DIAGNOSIS — K30 DELAYED GASTRIC EMPTYING: Primary | ICD-10-CM

## 2024-01-17 DIAGNOSIS — R19.4 CHANGE IN BOWEL HABITS: ICD-10-CM

## 2024-01-17 DIAGNOSIS — K21.9 GASTROESOPHAGEAL REFLUX DISEASE, UNSPECIFIED WHETHER ESOPHAGITIS PRESENT: ICD-10-CM

## 2024-01-17 DIAGNOSIS — K76.0 NAFLD (NONALCOHOLIC FATTY LIVER DISEASE): ICD-10-CM

## 2024-01-17 PROCEDURE — 99214 OFFICE O/P EST MOD 30 MIN: CPT | Performed by: NURSE PRACTITIONER

## 2024-01-17 RX ORDER — ONDANSETRON 8 MG/1
8 TABLET, ORALLY DISINTEGRATING ORAL EVERY 8 HOURS PRN
Qty: 21 TABLET | Refills: 3 | Status: SHIPPED | OUTPATIENT
Start: 2024-01-17

## 2024-01-17 RX ORDER — LANSOPRAZOLE 30 MG/1
30 CAPSULE, DELAYED RELEASE ORAL DAILY
Qty: 14 CAPSULE | Refills: 0 | Status: SHIPPED | OUTPATIENT
Start: 2024-01-17

## 2024-01-17 NOTE — PROGRESS NOTES
Follow Up      Patient Name: Frances Hartman  : 1985   MRN: [id number]     Chief Complaint:    Chief Complaint   Patient presents with    Follow-up     GERD, intermittent constipation/diarrhea        History of Present Illness: Frances Hartman is a 38 y.o. female who is here today for follow up on NAFLD, GERD, delayed gastric emptying     Nausea/vomiting: Nausea and vomiting has returned the past 1 week. Requesting a refill of ondansetron. There is some heartburn and indigestion about once a week- using tums as needed.  Using gas-x as needed for bloating which works well. She is following reflux precautions. She is trying to conceive at this time.          IBS-.  Having pasty stools- no dark stools.  No recent diarrhea- it happens episodically. There was several weeks around the time of  and berta of having fecal incontinence with diarrhea at night a few months ago but this has stopped. She has been having this episodically for a few months before but this was more consistent. The stool was yellow. But it did happen for several weeks.  It had a very foul odor.      HPI:  Frances was admitted to [facility] in 2021 with nausea and vomiting.  She had been status post bariatric surgery in 2021.  She was diagnosed with esophageal stricture. She underwent 3 EGDs during her hospital stay.  Submucosal nodule was found in the duodenum and biopsies were significant for neuroendocrine carcinoma.  Repeat EGD shows no further tumor.       Followed with  for her neuroendocrine tumor- he signed off     ENDOSCOPY, INT (2022)-LA grade a esophagitis, small hiatal hernia, normal healthy appearing sleeve gastrectomy, gastritis, mucosal nodule in the duodenum-biopsied-negative for evidence of neuroendocrine tumor   SCANNED - COLONOSCOPY (2022)-one 6 mm hyperplastic polyp in the rectum, diverticulosis.  Random colon biopsies- 3 year recall       US Liver  (07/13/2022 16:20)-Diffusely increased echogenicity of liver parenchyma likely related to  steatosis. No focal lesions. No acute process.     NM PET/CT Skull Base to Mid Thigh (03/16/2022 11:42)  Negative Ga-68 Dotatate scan  CT Abdomen Pelvis With Contrast (11/22/2021 22:52)   FL Upper GI Single Contrast With KUB (11/09/2021 14:22)1. Status post vertical sleeve gastrectomy x5 months. There was no  evidence of extraluminal contrast. No postoperative strictures were  seen.  2. Moderate gastroesophageal reflux to the level of the thoracic inlet  3. Patulous gastroesophageal junction versus small sized sliding-type  hiatal hernia     NM Gastric Emptying (11/06/2020 13:14)-IMPRESSION:  Abnormal exam with delay in gastric emptying.     UPPER GI ENDOSCOPY (11/24/2021 11:37)-normal esophagus, sleeve gastrectomy with normal healthy-appearing mucosa, submucosal nodule in the duodenum        Her abdominal surgical history includes Chandrika, umbilical hernia repair, and gastric sleeve June 2021.    Subjective      Review of Systems:   Review of Systems   Constitutional:  Negative for appetite change and unexpected weight loss.   HENT:  Negative for trouble swallowing.    Gastrointestinal:  Positive for abdominal pain, constipation, diarrhea, nausea and vomiting. Negative for abdominal distention, anal bleeding, blood in stool, rectal pain, GERD and indigestion.       Medications:     Current Outpatient Medications:     ondansetron ODT (ZOFRAN-ODT) 8 MG disintegrating tablet, Place 1 tablet on the tongue Every 8 (Eight) Hours As Needed for Nausea or Vomiting., Disp: 21 tablet, Rfl: 3    acetaminophen (TYLENOL) 500 MG tablet, Take 1-2 tablets by mouth Every 6 (Six) Hours As Needed for Mild Pain., Disp: , Rfl:     albuterol sulfate  (90 Base) MCG/ACT inhaler, Inhale 2 puffs Every 4 (Four) Hours As Needed for Wheezing or Shortness of Air., Disp: 18 g, Rfl: 5    Biotin 10573 MCG tablet dispersible, Place 1 tablet on the tongue  Daily., Disp: , Rfl:     Blood Glucose Monitoring Suppl (ONE TOUCH ULTRA 2) w/Device kit, , Disp: , Rfl:     calcium carbonate (TUMS) 500 MG chewable tablet, Chew 1 tablet Daily., Disp: , Rfl:     diphenhydrAMINE (BENADRYL) 25 mg capsule, Take 1 capsule by mouth Every 6 (Six) Hours As Needed for Itching or Sleep., Disp: , Rfl:     furosemide (LASIX) 20 MG tablet, Take 1 tablet nightly., Disp: 90 tablet, Rfl: 3    gabapentin (NEURONTIN) 300 MG capsule, Take 2 capsules by mouth Daily., Disp: , Rfl:     glipizide (GLUCOTROL XL) 5 MG ER tablet, Take 1 tablet by mouth Daily., Disp: 90 tablet, Rfl: 3    glucose blood test strip, USe BID to test blood sugar [id number], Disp: 100 each, Rfl: 3    Lancets 33G misc, 1 each Daily As Needed (hypoglycemia/hyperglycemia). To test blood sugars DxE11.65, Disp: 100 each, Rfl: 2    lansoprazole (PREVACID) 30 MG capsule, Take 1 capsule by mouth Daily., Disp: 14 capsule, Rfl: 0    loratadine (CLARITIN) 10 MG tablet, TAKE ONE TABLET BY MOUTH DAILY, Disp: 30 tablet, Rfl: 2    Melatonin 5 MG lozenge, Take 1 lozenge by mouth Daily., Disp: , Rfl:     metFORMIN ER (GLUCOPHAGE-XR) 500 MG 24 hr tablet, Take 1 tablet by mouth Daily With Dinner., Disp: 90 tablet, Rfl: 3    metoprolol tartrate (LOPRESSOR) 50 MG tablet, Take 1 tablet by mouth 2 (Two) Times a Day., Disp: 180 tablet, Rfl: 3    multivitamin with minerals tablet tablet, Take 1 tablet by mouth Daily., Disp: , Rfl:     OnabotulinumtoxinA (Botox) 200 units reconstituted solution, FOR . PHYSICIAN TO INJECT UP  UNITS INTRAMUSCULARLY INTO HEAD, NECK AND SHOULDERS EVERY 90 DAYS., Disp: 1 each, Rfl: 3    simethicone (Gas-X) 80 MG chewable tablet, Chew 1 tablet Every 6 (Six) Hours As Needed for Flatulence., Disp: 10 tablet, Rfl: 0    tiZANidine (ZANAFLEX) 4 MG tablet, Take 1 tablet by mouth Every 8 (Eight) Hours As Needed., Disp: , Rfl:     traMADol (ULTRAM) 50 MG tablet, Take 1 tablet by mouth Every 6 (Six) Hours As  "Needed for Moderate Pain., Disp: , Rfl:     vitamin D3 125 MCG (5000 UT) capsule capsule, Take 1 capsule by mouth Daily., Disp: , Rfl:     VITAMIN E 400 UNIT capsule, Take 1 capsule by mouth Daily., Disp: , Rfl:     Current Facility-Administered Medications:     OnabotulinumtoxinA 200 Units, 200 Units, Intramuscular, Q3 Months, Shiela Coelho APRN, 200 Units at 10/26/23 1556    Allergies:   Allergies   Allergen Reactions    Capsicum Annuum Extract & Derivative (Bell Pepper) [Capsicum] Anaphylaxis    Codeine Headache, Unknown - Low Severity and Other (See Comments)     Can tolerate hydrocodone, no other adverse reactions to other narcotics.    Doxycycline Hallucinations, Rash and Unknown - Low Severity    Hydrochlorothiazide Swelling and Unknown - Low Severity     eventually causes CHF    Ibuprofen Other (See Comments)     \"makes me retain fluid and eventually go into CHF\"    Nitrofurantoin Nausea Only and Unknown - Low Severity    Peanut-Containing Drug Products Anaphylaxis    Monosodium Glutamate Swelling and Headache    Oatmeal Other (See Comments)     Dx on allergy testing    Banana (Diagnostic) Unknown - High Severity    Coconut Flavor Unknown - High Severity    Doxycycline Monohydrate Other (See Comments)    Isoflavones (Soy) Unknown - Low Severity    Amitriptyline Mental Status Change and Unknown - Low Severity     memory gaps + neuropathy + hair loss    Amoxicillin-Pot Clavulanate Other (See Comments) and Unknown - Low Severity     Syncope, not sure if allergic to cephalosporins.    Aspartame Nausea Only    Diclofenac Headache and Unknown - Low Severity    Eggs Or Egg-Derived Products Other (See Comments)     dx on allergy testing, but does not completely avoid    Naproxen Other (See Comments)     \"blackout\"       Social History:   Social History     Socioeconomic History    Marital status: Single   Tobacco Use    Smoking status: Never     Passive exposure: Never    Smokeless tobacco: Never   Vaping " Use    Vaping Use: Never used   Substance and Sexual Activity    Alcohol use: Not Currently     Comment: Very rarely drink socially. At most 2 drinks per month.    Drug use: Never    Sexual activity: Yes     Partners: Male     Birth control/protection: Condom, Coitus interruptus        Surgical History:   Past Surgical History:   Procedure Laterality Date    ABDOMINAL SURGERY  06/15/2021    Bariatric Sleeve Surgery    BARIATRIC SURGERY      COLONOSCOPY  December 2022    COSMETIC SURGERY      Multiple Keloid surgeries    ENDOSCOPY N/A 06/15/2021    Procedure: ESOPHAGOGASTRODUODENOSCOPY;  Surgeon: Ayaka Andre MD;  Location:  [facility];  Service: Robotics - DaVinci;  Laterality: N/A;    ENDOSCOPY N/A 10/28/2021    Procedure: ESOPHAGOGASTRODUODENOSCOPY WITH ACHALASIA BALLOON DILATATION;  Surgeon: Jase Hernandez MD;  Location:  [facility];  Service: Gastroenterology;  Laterality: N/A;  58 F DILATOR    ENDOSCOPY N/A 11/15/2021    Procedure: ESOPHAGOGASTRODUODENOSCOPY WITH DILATATION;  Surgeon: Jase Hernandez MD;  Location:  [facility];  Service: Gastroenterology;  Laterality: N/A;  achalasia balloon     ENDOSCOPY N/A 11/24/2021    Procedure: ESOPHAGOGASTRODUODENOSCOPY WTH DUODENAL POLYPECTOPMY AND NASAL JEJUNAL TUBE PLACEMENT;  Surgeon: Jase Hernandez MD;  Location:  [facility];  Service: Gastroenterology;  Laterality: N/A;  placement of feeding tube, checked position with KUB    GASTRIC RESTRICTION SURGERY  2021    Sleeve    GASTRIC SLEEVE LAPAROSCOPIC N/A 06/15/2021    Procedure: GASTRIC SLEEVE LAPAROSCOPIC WITH DAVINCI ROBOT, LAPROSCOPIC HIATAL HERNIA REPAIR WITH DAVINCI ROBOT;  Surgeon: Ayaka Andre MD;  Location:  [facility];  Service: Robotics - [employer]ethean[employer]i;  Laterality: N/A;    KELOID EXCISION      1990,1993,1999,2015,2016,2019    LAPAROSCOPIC CHOLECYSTECTOMY  2000    for stones    RADIOFREQUENCY ABLATION  2019    x 2  for pt back    UMBILICAL HERNIA REPAIR  1990    UPPER  "GASTROINTESTINAL ENDOSCOPY      WISDOM TOOTH EXTRACTION  1994        Medical History:   Past Medical History:   Diagnosis Date    Allergic     Birth    Anxiety     Arthritis     Asthma     prn inhalers, does not follow w/ pulmonary    Chronic back pain     previously followed w/ pain management, now just prn Tylenol + Gabapentin    Chronic deep vein thrombosis (DVT) of femoral vein of right lower extremity 09/10/2021    Clotting disorder 2015    Cluster headache     Depression     Diabetes mellitus     Type II, dx 2014, never on insulin, A1C 7.6     Dyspepsia     Dyspnea on exertion     Dysuria 03/07/2022    Dysuria and hematuria x 2 weeks. 3/7/22 urine culture sent. Rx Macrobid 100 mg twice daily for 7 days. Patient did not tolerate Macrobid well due to GI upset. Urine culture was negative.    Fatigue     Gastroparesis     GERD (gastroesophageal reflux disease)     Headache, tension-type     Heartburn     chronic, prn TUMS, denies prior eval    Hirsutism     History of medical problems     PCOS    Hx of radiation therapy     for treatments of Keloids    Hypertension     Irregular menses     infrequent spotting    Leiomyoma, subserous     possible peduculated f3-4 cm fibroid on u/s at [facility]    Low back pain     Migraines     botox q3 months, following Neurology @ [facility]    Morbid obesity     s/p sleeve gastrectomy    Peripheral edema     Polycystic ovary syndrome 2011    Seasonal allergies     Slow to wake up after anesthesia     Urinary tract infection     Visual impairment     Astigmatism, Nearsightedness        Objective     Physical Exam:  Vital Signs:   Vitals:    01/17/24 0752   BP: 144/82   BP Location: Right arm   Patient Position: Sitting   Cuff Size: Adult   Pulse: 61   Temp: 98.7 °F (37.1 °C)   TempSrc: Temporal   SpO2: 98%   Weight: 101 kg (222 lb 3.2 oz)   Height: 152.4 cm (60\")     Body mass index is 43.4 kg/m².     Physical Exam  Constitutional:       General: She is not in acute distress.     Appearance: " She is well-developed.   Pulmonary:      Effort: Pulmonary effort is normal. No accessory muscle usage or respiratory distress.   Abdominal:      General: Bowel sounds are normal. There is no distension.      Palpations: Abdomen is soft. There is no mass.      Tenderness: There is no abdominal tenderness.   Skin:     Coloration: Skin is not pale.      Findings: No erythema.   Neurological:      Mental Status: She is alert and oriented to person, place, and time.   Psychiatric:         Speech: Speech normal.         Behavior: Behavior normal.         Thought Content: Thought content normal.         Judgment: Judgment normal.         Assessment / Plan      Assessment/Plan:   Diagnoses and all orders for this visit:    1. Delayed gastric emptying (Primary)  -     ondansetron ODT (ZOFRAN-ODT) 8 MG disintegrating tablet; Place 1 tablet on the tongue Every 8 (Eight) Hours As Needed for Nausea or Vomiting.  Dispense: 21 tablet; Refill: 3  She is experiencing some mild constipation.  Suggest taking a laxative  2. NAFLD (nonalcoholic fatty liver disease)  -     Protime-INR; Future  -     Comprehensive Metabolic Panel; Future  -     CBC & Differential; Future    3. Gastroesophageal reflux disease, unspecified whether esophagitis present  -     lansoprazole (PREVACID) 30 MG capsule; Take 1 capsule by mouth Daily.  Dispense: 14 capsule; Refill: 0  Could consider 2-week trial of PPI  4. Change in bowel habits  -     Ambulatory Referral For Screening Colonoscopy  -     Gastrointestinal Panel, PCR - Stool, Per Rectum; Future  -     Ova & Parasite Examination - Stool, Per Rectum; Future    Her incontinence stools have stopped for now.  Recommend performing the above stool studies, if negative would recommend proceeding with colonoscopy due to change in bowel habits.      Follow Up:   Return in about 6 months (around 7/17/2024), or if symptoms worsen or fail to improve.    Plan of care reviewed with the patient at the conclusion of  today's visit.  Education was provided regarding diagnosis, management, and any prescribed or recommended OTC medications.  Patient verbalized understanding of and agreement with management plan.         TANESHA Sexton  [facility]

## 2024-01-24 PROCEDURE — 87507 IADNA-DNA/RNA PROBE TQ 12-25: CPT

## 2024-01-25 ENCOUNTER — TRANSCRIBE ORDERS (OUTPATIENT)
Dept: LAB | Facility: HOSPITAL | Age: 39
End: 2024-01-25
Payer: COMMERCIAL

## 2024-01-25 ENCOUNTER — LAB (OUTPATIENT)
Dept: LAB | Facility: HOSPITAL | Age: 39
End: 2024-01-25
Payer: COMMERCIAL

## 2024-01-25 ENCOUNTER — SPECIALTY PHARMACY (OUTPATIENT)
Dept: ONCOLOGY | Facility: HOSPITAL | Age: 39
End: 2024-01-25
Payer: COMMERCIAL

## 2024-01-25 ENCOUNTER — PROCEDURE VISIT (OUTPATIENT)
Dept: NEUROLOGY | Facility: CLINIC | Age: 39
End: 2024-01-25
Payer: COMMERCIAL

## 2024-01-25 DIAGNOSIS — N92.6 IRREGULAR MENSTRUAL CYCLE: Primary | ICD-10-CM

## 2024-01-25 DIAGNOSIS — R19.4 CHANGE IN BOWEL HABITS: ICD-10-CM

## 2024-01-25 DIAGNOSIS — K76.0 FATTY METAMORPHOSIS OF LIVER: Primary | ICD-10-CM

## 2024-01-25 DIAGNOSIS — G43.709 CHRONIC MIGRAINE WITHOUT AURA WITHOUT STATUS MIGRAINOSUS, NOT INTRACTABLE: Primary | Chronic | ICD-10-CM

## 2024-01-25 DIAGNOSIS — K76.0 NAFLD (NONALCOHOLIC FATTY LIVER DISEASE): ICD-10-CM

## 2024-01-25 DIAGNOSIS — N92.6 IRREGULAR MENSTRUAL CYCLE: ICD-10-CM

## 2024-01-25 LAB
ADV 40+41 DNA STL QL NAA+NON-PROBE: NOT DETECTED
ASTRO TYP 1-8 RNA STL QL NAA+NON-PROBE: NOT DETECTED
C CAYETANENSIS DNA STL QL NAA+NON-PROBE: NOT DETECTED
C COLI+JEJ+UPSA DNA STL QL NAA+NON-PROBE: NOT DETECTED
CRYPTOSP DNA STL QL NAA+NON-PROBE: NOT DETECTED
E HISTOLYT DNA STL QL NAA+NON-PROBE: NOT DETECTED
EAEC PAA PLAS AGGR+AATA ST NAA+NON-PRB: NOT DETECTED
EC STX1+STX2 GENES STL QL NAA+NON-PROBE: NOT DETECTED
EPEC EAE GENE STL QL NAA+NON-PROBE: NOT DETECTED
ETEC LTA+ST1A+ST1B TOX ST NAA+NON-PROBE: NOT DETECTED
G LAMBLIA DNA STL QL NAA+NON-PROBE: NOT DETECTED
HCG INTACT+B SERPL-ACNC: 461 MIU/ML
INR PPP: 0.99 (ref 0.89–1.12)
NOROVIRUS GI+II RNA STL QL NAA+NON-PROBE: NOT DETECTED
P SHIGELLOIDES DNA STL QL NAA+NON-PROBE: NOT DETECTED
PROTHROMBIN TIME: 13.2 SECONDS (ref 12.2–14.5)
RVA RNA STL QL NAA+NON-PROBE: NOT DETECTED
S ENT+BONG DNA STL QL NAA+NON-PROBE: NOT DETECTED
SAPO I+II+IV+V RNA STL QL NAA+NON-PROBE: NOT DETECTED
SHIGELLA SP+EIEC IPAH ST NAA+NON-PROBE: NOT DETECTED
V CHOL+PARA+VUL DNA STL QL NAA+NON-PROBE: NOT DETECTED
V CHOLERAE DNA STL QL NAA+NON-PROBE: NOT DETECTED
Y ENTEROCOL DNA STL QL NAA+NON-PROBE: NOT DETECTED

## 2024-01-25 PROCEDURE — 84702 CHORIONIC GONADOTROPIN TEST: CPT

## 2024-01-25 PROCEDURE — 87209 SMEAR COMPLEX STAIN: CPT

## 2024-01-25 PROCEDURE — 87177 OVA AND PARASITES SMEARS: CPT

## 2024-01-25 PROCEDURE — 85610 PROTHROMBIN TIME: CPT

## 2024-01-25 PROCEDURE — 84144 ASSAY OF PROGESTERONE: CPT

## 2024-01-25 PROCEDURE — 36415 COLL VENOUS BLD VENIPUNCTURE: CPT

## 2024-01-25 PROCEDURE — 64615 CHEMODENERV MUSC MIGRAINE: CPT | Performed by: PSYCHIATRY & NEUROLOGY

## 2024-01-25 NOTE — PROGRESS NOTES
Specialty Pharmacy Patient Management Program  Neurology Reassessment     Frances Hartman is a 38 y.o. female with migraines seen by a Neurology provider and enrolled in the Neurology Patient Management program offered by [facility].  A follow-up outreach was conducted, including assessment of continued therapy appropriateness, medication adherence, and side effect incidence and management for Botox.     Changes to Insurance Coverage or Financial Support  RX [employer]    Relevant Past Medical History and Comorbidities  Relevant medical history and concomitant health conditions were discussed with the patient. The patient's chart has been reviewed for relevant past medical history and comorbid health conditions and updated as necessary.   Past Medical History:   Diagnosis Date    Allergic     Birth    Anxiety     Arthritis     Asthma     prn inhalers, does not follow w/ pulmonary    Chronic back pain     previously followed w/ pain management, now just prn Tylenol + Gabapentin    Chronic deep vein thrombosis (DVT) of femoral vein of right lower extremity 09/10/2021    Clotting disorder 2015    Cluster headache     Depression     Diabetes mellitus     Type II, dx 2014, never on insulin, A1C 7.6     Dyspepsia     Dyspnea on exertion     Dysuria 03/07/2022    Dysuria and hematuria x 2 weeks. 3/7/22 urine culture sent. Rx Macrobid 100 mg twice daily for 7 days. Patient did not tolerate Macrobid well due to GI upset. Urine culture was negative.    Fatigue     Gastroparesis     GERD (gastroesophageal reflux disease)     Headache, tension-type     Heartburn     chronic, prn TUMS, denies prior eval    Hirsutism     History of medical problems     PCOS    Hx of radiation therapy     for treatments of Keloids    Hypertension     Irregular menses     infrequent spotting    Leiomyoma, subserous     possible peduculated f3-4 cm fibroid on u/s at [facility]    Low back pain     Migraines     botox q3  "months, following Neurology @ [facility]    Morbid obesity     s/p sleeve gastrectomy    Peripheral edema     Polycystic ovary syndrome 2011    Seasonal allergies     Slow to wake up after anesthesia     Urinary tract infection     Visual impairment     Astigmatism, Nearsightedness     Social History     Socioeconomic History    Marital status: Single   Tobacco Use    Smoking status: Never     Passive exposure: Never    Smokeless tobacco: Never   Vaping Use    Vaping Use: Never used   Substance and Sexual Activity    Alcohol use: Not Currently     Comment: Very rarely drink socially. At most 2 drinks per month.    Drug use: Never    Sexual activity: Yes     Partners: Male     Birth control/protection: Condom, Coitus interruptus     Problem list reviewed by Nancy Solano, PharmD on 1/25/2024 at  2:15 PM    Hospitalizations and Urgent Care Since Last Assessment  ED Visits, Admissions, or Hospitalizations: none   Urgent Office Visits: none     Allergies  Known allergies and reactions were discussed with the patient. The patient's chart has been reviewed for allergy information and updated as necessary.   Allergies   Allergen Reactions    Capsicum Annuum Extract & Derivative (Bell Pepper) [Capsicum] Anaphylaxis    Codeine Headache, Unknown - Low Severity and Other (See Comments)     Can tolerate hydrocodone, no other adverse reactions to other narcotics.    Doxycycline Hallucinations, Rash and Unknown - Low Severity    Hydrochlorothiazide Swelling and Unknown - Low Severity     eventually causes CHF    Ibuprofen Other (See Comments)     \"makes me retain fluid and eventually go into CHF\"    Nitrofurantoin Nausea Only and Unknown - Low Severity    Peanut-Containing Drug Products Anaphylaxis    Monosodium Glutamate Swelling and Headache    Oatmeal Other (See Comments)     Dx on allergy testing    Banana (Diagnostic) Unknown - High Severity    Coconut Flavor Unknown - High Severity    Doxycycline Monohydrate Other (See Comments) " "   Isoflavones (Soy) Unknown - Low Severity    Amitriptyline Mental Status Change and Unknown - Low Severity     memory gaps + neuropathy + hair loss    Amoxicillin-Pot Clavulanate Other (See Comments) and Unknown - Low Severity     Syncope, not sure if allergic to cephalosporins.    Aspartame Nausea Only    Diclofenac Headache and Unknown - Low Severity    Eggs Or Egg-Derived Products Other (See Comments)     dx on allergy testing, but does not completely avoid    Naproxen Other (See Comments)     \"blackout\"     Allergies reviewed by Nancy Solano, PharmD on 1/25/2024 at  2:15 PM    Relevant Laboratory Values  Common labs          5/24/2023    08:24 6/29/2023    10:21 11/28/2023    13:15   Common Labs   WBC  8.92     Hemoglobin  12.7     Hematocrit  37.0     Platelets  246     Hemoglobin A1C 7.1   6.3        Lab Assessment  N/A  Current Medication List  This medication list has been reviewed with the patient and evaluated for any interactions or necessary modifications/recommendations, and updated to include all prescription medications, OTC medications, and supplements the patient is currently taking.  This list reflects what is contained in the patient's profile, which has also been marked as reviewed to communicate to other providers it is the most up to date version of the patient's current medication therapy.     Current Outpatient Medications:     acetaminophen (TYLENOL) 500 MG tablet, Take 1-2 tablets by mouth Every 6 (Six) Hours As Needed for Mild Pain., Disp: , Rfl:     albuterol sulfate  (90 Base) MCG/ACT inhaler, Inhale 2 puffs Every 4 (Four) Hours As Needed for Wheezing or Shortness of Air., Disp: 18 g, Rfl: 5    Biotin 21260 MCG tablet dispersible, Place 1 tablet on the tongue Daily., Disp: , Rfl:     Blood Glucose Monitoring Suppl (ONE TOUCH ULTRA 2) w/Device kit, , Disp: , Rfl:     calcium carbonate (TUMS) 500 MG chewable tablet, Chew 1 tablet Daily., Disp: , Rfl:     diphenhydrAMINE " (BENADRYL) 25 mg capsule, Take 1 capsule by mouth Every 6 (Six) Hours As Needed for Itching or Sleep., Disp: , Rfl:     furosemide (LASIX) 20 MG tablet, Take 1 tablet nightly., Disp: 90 tablet, Rfl: 3    gabapentin (NEURONTIN) 300 MG capsule, Take 2 capsules by mouth Daily., Disp: , Rfl:     glipizide (GLUCOTROL XL) 5 MG ER tablet, Take 1 tablet by mouth Daily., Disp: 90 tablet, Rfl: 3    glucose blood test strip, USe BID to test blood sugar DX.e11.65, Disp: 100 each, Rfl: 3    Lancets 33G misc, 1 each Daily As Needed (hypoglycemia/hyperglycemia). To test blood sugars DxE11.65, Disp: 100 each, Rfl: 2    lansoprazole (PREVACID) 30 MG capsule, Take 1 capsule by mouth Daily., Disp: 14 capsule, Rfl: 0    loratadine (CLARITIN) 10 MG tablet, TAKE ONE TABLET BY MOUTH DAILY, Disp: 30 tablet, Rfl: 2    Melatonin 5 MG lozenge, Take 1 lozenge by mouth Daily., Disp: , Rfl:     metFORMIN ER (GLUCOPHAGE-XR) 500 MG 24 hr tablet, Take 1 tablet by mouth Daily With Dinner., Disp: 90 tablet, Rfl: 3    metoprolol tartrate (LOPRESSOR) 50 MG tablet, Take 1 tablet by mouth 2 (Two) Times a Day., Disp: 180 tablet, Rfl: 3    multivitamin with minerals tablet tablet, Take 1 tablet by mouth Daily., Disp: , Rfl:     OnabotulinumtoxinA (Botox) 200 units reconstituted solution, FOR . PHYSICIAN TO INJECT UP  UNITS INTRAMUSCULARLY INTO HEAD, NECK AND SHOULDERS EVERY 90 DAYS., Disp: 1 each, Rfl: 3    ondansetron ODT (ZOFRAN-ODT) 8 MG disintegrating tablet, Place 1 tablet on the tongue Every 8 (Eight) Hours As Needed for Nausea or Vomiting., Disp: 21 tablet, Rfl: 3    simethicone (Gas-X) 80 MG chewable tablet, Chew 1 tablet Every 6 (Six) Hours As Needed for Flatulence., Disp: 10 tablet, Rfl: 0    tiZANidine (ZANAFLEX) 4 MG tablet, Take 1 tablet by mouth Every 8 (Eight) Hours As Needed., Disp: , Rfl:     traMADol (ULTRAM) 50 MG tablet, Take 1 tablet by mouth Every 6 (Six) Hours As Needed for Moderate Pain., Disp: , Rfl:      vitamin D3 125 MCG (5000 UT) capsule capsule, Take 1 capsule by mouth Daily., Disp: , Rfl:     VITAMIN E 400 UNIT capsule, Take 1 capsule by mouth Daily., Disp: , Rfl:     Current Facility-Administered Medications:     OnabotulinumtoxinA 200 Units, 200 Units, Intramuscular, Q3 Months, Shiela Coelho, APRN, 200 Units at 01/25/24 0845    Medicines reviewed by Nancy Solano, PharmD on 1/25/2024 at  2:15 PM    Drug Interactions  No relevant drug/drug interactions noted with Botox.     Adverse Drug Reactions  Medication tolerability: Tolerating with no to minimal ADRs          Medication plan: Continue therapy with normal follow-up  Plan for ADR Management: not required      Adherence, Self-Administration, and Current Therapy Problems  Adherence related patient's specialty therapy was discussed with the patient. The Adherence segment of this outreach has been reviewed and updated.   Adherence Questions  Linked Medication(s) Assessed: Onabotulinumtoxina  On average, how many doses/injections does the patient miss per month?: 0  What are the identified reasons for non-adherence or missed doses? : no problems identified  What is the estimated medication adherence level?: %  Based on the patient/caregiver response and refill history, does this patient require an MTP to track adherence improvements?: no    Additional Barriers to Patient Self-Administration: none  Methods for Supporting Patient Self-Administration: not required    Recently Close Medication Therapy Problems  No medication therapy recommendations to display  Open Medication Therapy Problems  No medication therapy recommendations to display     Goals of Therapy  Goals related to the patient's specialty therapy was discussed with the patient. The Patient Goals segment of this outreach has been reviewed and updated.    Goals Addressed Today        Specialty Pharmacy General Goal      Reduction in frequency and severity of migraines  Response to  treatment has reduced HA's at least 7 fewer days a month and/or 100 hours fewer each month               Quality of Life Assessment   Quality of Life related to the patient's enrollment in the patient management program and services provided was discussed with the patient. The QOL segment of this outreach has been reviewed and updated.   Quality of Life Improvement Scale: 7-Somewhat better    Reassessment Plan & Follow-Up  Medication Therapy Changes: Continue Botox 155 units to be administered per provider every 3 months.  Related Plans, Therapy Recommendations, or Issues to Be Addressed: Next procedure scheduled 4/19/24 - speciality pharmacy will have delivered to office before next appt.  Pharmacist to perform regular reassessments no more than (6) months from the previous assessment.  Care Coordinator to set up future refill outreaches, coordinate prescription delivery, and escalate clinical questions to pharmacist.     Attestation  Therapeutic appropriateness: Appropriate  I attest the patient was actively involved in and has agreed to the above plan of care. If the prescribed therapy is at any point deemed not appropriate based on the current or future assessments, a consultation will be initiated with the patient's specialty care provider to determine the best course of action. The revised plan of therapy will be documented along with any additional patient education provided. Discussed aforementioned material with patient in person.    Nancy Solano, Aishwarya, [facility]  Clinic Specialty Pharmacist, Neurology  1/25/2024  14:16 EST

## 2024-01-25 NOTE — PROGRESS NOTES
Botulinum Toxin Injection Procedure    Chief Complaint   Patient presents with    Botulinum Toxin Injection     Patient supplied - do not bill        History:  Response to treatment has reduced HA's at least 7 fewer days a month and/or 100 hours fewer each month.     Pre-operative diagnosis: headache    Post-operative diagnosis: Same    Procedure: Chemical neurolysis    After risks and benefits were explained including bleeding, infection, worsening of pain, damage to the areas being injected, weakness of muscles, loss of muscle control, dysphagia if injecting the head or neck, facial droop if injecting the facial area, painful injection, allergic or other reaction to the medications being injected, and the failure of the procedure to help the problem, a signed consent was obtained.      The patient was placed in a comfortable area and the sites to be treated were identified. A time out was called and performed. The area to be treated was prepped three times with alcohol and the alcohol allowed to dry. Next, a 27 gauge needle was used to inject the medication in the area to be treated.    Area(s) injected: frontalis muscle, semispinalis capitis muscle and temporallis muscle    Medications used: botulinum toxin 200 mu, 2 mL of saline used to dilute the botulinum toxin.      The patient did tolerate the procedure well. There were no complications.       Physical Examination    Current Treatment (Units)   Splenius Capitis 25 units on the right and 25 units on the left.   Temporalis 25 units on the right and 25 units on the left.   Frontalis 27 units on the right and 28 units on the left.   EMG Guidance none .   Complications: none .   The total amount injected in units is 155 .   The total amount wasted in units is 45 .   The total amount submitted in units is 200 .

## 2024-01-26 ENCOUNTER — TELEPHONE (OUTPATIENT)
Dept: ENDOCRINOLOGY | Facility: CLINIC | Age: 39
End: 2024-01-26
Payer: COMMERCIAL

## 2024-01-26 ENCOUNTER — PRIOR AUTHORIZATION (OUTPATIENT)
Dept: ENDOCRINOLOGY | Facility: CLINIC | Age: 39
End: 2024-01-26
Payer: COMMERCIAL

## 2024-01-26 ENCOUNTER — TELEPHONE (OUTPATIENT)
Dept: GASTROENTEROLOGY | Facility: CLINIC | Age: 39
End: 2024-01-26
Payer: COMMERCIAL

## 2024-01-26 ENCOUNTER — TELEPHONE (OUTPATIENT)
Dept: CARDIOLOGY | Facility: CLINIC | Age: 39
End: 2024-01-26
Payer: COMMERCIAL

## 2024-01-26 DIAGNOSIS — E11.9 TYPE 2 DIABETES MELLITUS WITHOUT COMPLICATION, WITHOUT LONG-TERM CURRENT USE OF INSULIN: ICD-10-CM

## 2024-01-26 DIAGNOSIS — O24.111 PRE-EXISTING TYPE 2 DIABETES MELLITUS DURING PREGNANCY IN FIRST TRIMESTER: Primary | ICD-10-CM

## 2024-01-26 LAB — PROGEST SERPL-MCNC: 13.4 NG/ML

## 2024-01-26 RX ORDER — METFORMIN HYDROCHLORIDE 500 MG/1
1000 TABLET, EXTENDED RELEASE ORAL
Qty: 180 TABLET | Refills: 1 | Status: SHIPPED | OUTPATIENT
Start: 2024-01-26

## 2024-01-26 RX ORDER — LANCETS 33 GAUGE
1 EACH MISCELLANEOUS
Qty: 200 EACH | Refills: 5 | Status: SHIPPED | OUTPATIENT
Start: 2024-01-26

## 2024-01-26 RX ORDER — BLOOD SUGAR DIAGNOSTIC
1 STRIP MISCELLANEOUS
Qty: 200 EACH | Refills: 5 | Status: SHIPPED | OUTPATIENT
Start: 2024-01-26

## 2024-01-26 RX ORDER — ACYCLOVIR 400 MG/1
1 TABLET ORAL
Qty: 9 EACH | Refills: 3 | Status: SHIPPED | OUTPATIENT
Start: 2024-01-26

## 2024-01-26 RX ORDER — ACYCLOVIR 400 MG/1
1 TABLET ORAL TAKE AS DIRECTED
Qty: 1 EACH | Refills: 0 | Status: SHIPPED | OUTPATIENT
Start: 2024-01-26

## 2024-01-26 NOTE — TELEPHONE ENCOUNTER
PT CALLED STATING SHE IS PREGNANT AND NEEDS TO STOP GLIPIZIDE. SHE REQUESTED TO KNOW IF SHE NEEDS TO TAKE SOMETHING ELSE. SHE REQUESTED A CALL BACK TO CONSULT.

## 2024-01-26 NOTE — TELEPHONE ENCOUNTER
Spoke with patient. Sent Rx's to her pharmacy. Advised her to D/C glipizide ER. Continue Metformin  mg BID. Advised her to start checking BG readings fasting and 2 hour post prandially. Advised on carb allowance for each meals. Pt will keep log and send me weekly numbers. Made appointment for 03/13/2024. Will try to see pt earlier if possible.  Electronically Signed: Carine Aden MD

## 2024-01-26 NOTE — TELEPHONE ENCOUNTER
Caller: Frances Hartman     Relationship: SELF    Best call back number: [phone]    What is your medical concern? PT JUST FOUND OUT THAT SHE IS PREGNANT. SHE TAKES METOPROLOL 50 MG 2 TIMES A DAY. HER OB DR TOLD HER TO REACH OUT AND MAKE SURE IT WAS SAFE FOR HER TO TAKE THIS.        no edema, no murmurs, regular rate and rhythm , no edema, no murmurs, regular rate and rhythm

## 2024-01-26 NOTE — TELEPHONE ENCOUNTER
Deniedtoday  PA Case: [id number], Status: Denied. Notification: Completed.  Drug  Dex[employer] G7 Sensor  Form  [employer]) Electronic PA Form (2017 [employer])

## 2024-01-26 NOTE — TELEPHONE ENCOUNTER
----- Message from Ana Hicks sent at 1/26/2024  2:24 PM EST -----  Regarding: PATIENT UPDATE  This patient found out she is pregnant. I removed Patient's CSY that was scheduled on 2/22, per patient request. Patient also stated Ember had her complete a stool test as well and would like someone to call her about these results, and to also ask Ember what is the next step due to her pregnancy. Does she need to come back in for a follow up? Colonoscopy was for changes in bowel habits. Please advise and review with Ember.

## 2024-01-27 ENCOUNTER — LAB (OUTPATIENT)
Dept: LAB | Facility: HOSPITAL | Age: 39
End: 2024-01-27
Payer: COMMERCIAL

## 2024-01-27 ENCOUNTER — TRANSCRIBE ORDERS (OUTPATIENT)
Dept: LAB | Facility: HOSPITAL | Age: 39
End: 2024-01-27
Payer: COMMERCIAL

## 2024-01-27 DIAGNOSIS — N92.6 IRREGULAR MENSTRUAL CYCLE: Primary | ICD-10-CM

## 2024-01-27 DIAGNOSIS — N92.6 IRREGULAR MENSTRUAL CYCLE: ICD-10-CM

## 2024-01-27 LAB — HCG INTACT+B SERPL-ACNC: 871 MIU/ML

## 2024-01-27 PROCEDURE — 84702 CHORIONIC GONADOTROPIN TEST: CPT

## 2024-01-27 PROCEDURE — 36415 COLL VENOUS BLD VENIPUNCTURE: CPT

## 2024-02-03 LAB
O+P SPEC MICRO: NORMAL
O+P STL TRI STN: NORMAL

## 2024-02-11 DIAGNOSIS — O24.111 PRE-EXISTING TYPE 2 DIABETES MELLITUS DURING PREGNANCY IN FIRST TRIMESTER: ICD-10-CM

## 2024-02-11 DIAGNOSIS — O24.414 INSULIN CONTROLLED GESTATIONAL DIABETES MELLITUS (GDM) IN FIRST TRIMESTER: Primary | ICD-10-CM

## 2024-02-11 RX ORDER — ACYCLOVIR 400 MG/1
1 TABLET ORAL TAKE AS DIRECTED
Qty: 1 EACH | Refills: 0 | Status: SHIPPED | OUTPATIENT
Start: 2024-02-11

## 2024-02-11 RX ORDER — ACYCLOVIR 400 MG/1
1 TABLET ORAL
Qty: 9 EACH | Refills: 3 | Status: SHIPPED | OUTPATIENT
Start: 2024-02-11

## 2024-02-11 RX ORDER — INSULIN DETEMIR 100 [IU]/ML
25 INJECTION, SOLUTION SUBCUTANEOUS DAILY
Qty: 15 ML | Refills: 5 | Status: SHIPPED | OUTPATIENT
Start: 2024-02-11

## 2024-02-11 RX ORDER — PEN NEEDLE, DIABETIC 33 GX5/32"
1 NEEDLE, DISPOSABLE MISCELLANEOUS 3 TIMES DAILY
Qty: 100 EACH | Refills: 3 | Status: SHIPPED | OUTPATIENT
Start: 2024-02-11

## 2024-02-12 ENCOUNTER — PRIOR AUTHORIZATION (OUTPATIENT)
Dept: ENDOCRINOLOGY | Facility: CLINIC | Age: 39
End: 2024-02-12
Payer: COMMERCIAL

## 2024-02-18 ENCOUNTER — HOSPITAL ENCOUNTER (EMERGENCY)
Facility: HOSPITAL | Age: 39
Discharge: HOME | End: 2024-02-18
Attending: EMERGENCY MEDICINE | Admitting: EMERGENCY MEDICINE
Payer: COMMERCIAL

## 2024-02-18 ENCOUNTER — APPOINTMENT (OUTPATIENT)
Dept: ULTRASOUND IMAGING | Facility: HOSPITAL | Age: 39
End: 2024-02-18
Payer: COMMERCIAL

## 2024-02-18 VITALS
DIASTOLIC BLOOD PRESSURE: 98 MMHG | HEART RATE: 60 BPM | WEIGHT: 230 LBS | RESPIRATION RATE: 18 BRPM | TEMPERATURE: 98.2 F | SYSTOLIC BLOOD PRESSURE: 142 MMHG | BODY MASS INDEX: 45.16 KG/M2 | OXYGEN SATURATION: 98 % | HEIGHT: 60 IN

## 2024-02-18 DIAGNOSIS — O20.0 THREATENED MISCARRIAGE: Primary | ICD-10-CM

## 2024-02-18 DIAGNOSIS — Z34.91 FIRST TRIMESTER PREGNANCY: ICD-10-CM

## 2024-02-18 LAB
ABO GROUP BLD: NORMAL
ABO GROUP BLD: NORMAL
BASOPHILS # BLD AUTO: 0.05 10*3/MM3 (ref 0–0.2)
BASOPHILS NFR BLD AUTO: 0.6 % (ref 0–1.5)
BLD GP AB SCN SERPL QL: NEGATIVE
DEPRECATED RDW RBC AUTO: 46.3 FL (ref 37–54)
EOSINOPHIL # BLD AUTO: 0.23 10*3/MM3 (ref 0–0.4)
EOSINOPHIL NFR BLD AUTO: 2.7 % (ref 0.3–6.2)
ERYTHROCYTE [DISTWIDTH] IN BLOOD BY AUTOMATED COUNT: 12.9 % (ref 12.3–15.4)
HCG INTACT+B SERPL-ACNC: 5802 MIU/ML
HCT VFR BLD AUTO: 37.6 % (ref 34–46.6)
HGB BLD-MCNC: 12.3 G/DL (ref 12–15.9)
IMM GRANULOCYTES # BLD AUTO: 0.03 10*3/MM3 (ref 0–0.05)
IMM GRANULOCYTES NFR BLD AUTO: 0.4 % (ref 0–0.5)
LYMPHOCYTES # BLD AUTO: 2.59 10*3/MM3 (ref 0.7–3.1)
LYMPHOCYTES NFR BLD AUTO: 30.7 % (ref 19.6–45.3)
MCH RBC QN AUTO: 32.3 PG (ref 26.6–33)
MCHC RBC AUTO-ENTMCNC: 32.7 G/DL (ref 31.5–35.7)
MCV RBC AUTO: 98.7 FL (ref 79–97)
MONOCYTES # BLD AUTO: 0.54 10*3/MM3 (ref 0.1–0.9)
MONOCYTES NFR BLD AUTO: 6.4 % (ref 5–12)
NEUTROPHILS NFR BLD AUTO: 4.99 10*3/MM3 (ref 1.7–7)
NEUTROPHILS NFR BLD AUTO: 59.2 % (ref 42.7–76)
NRBC BLD AUTO-RTO: 0 /100 WBC (ref 0–0.2)
NUMBER OF DOSES: NORMAL
PLATELET # BLD AUTO: 233 10*3/MM3 (ref 140–450)
PMV BLD AUTO: 11.1 FL (ref 6–12)
RBC # BLD AUTO: 3.81 10*6/MM3 (ref 3.77–5.28)
RH BLD: POSITIVE
RH BLD: POSITIVE
T&S EXPIRATION DATE: NORMAL
WBC NRBC COR # BLD AUTO: 8.43 10*3/MM3 (ref 3.4–10.8)

## 2024-02-18 PROCEDURE — 86900 BLOOD TYPING SEROLOGIC ABO: CPT | Performed by: EMERGENCY MEDICINE

## 2024-02-18 PROCEDURE — 86850 RBC ANTIBODY SCREEN: CPT | Performed by: EMERGENCY MEDICINE

## 2024-02-18 PROCEDURE — 76817 TRANSVAGINAL US OBSTETRIC: CPT

## 2024-02-18 PROCEDURE — 85025 COMPLETE CBC W/AUTO DIFF WBC: CPT | Performed by: EMERGENCY MEDICINE

## 2024-02-18 PROCEDURE — 84702 CHORIONIC GONADOTROPIN TEST: CPT | Performed by: EMERGENCY MEDICINE

## 2024-02-18 PROCEDURE — 99284 EMERGENCY DEPT VISIT MOD MDM: CPT

## 2024-02-18 PROCEDURE — 86901 BLOOD TYPING SEROLOGIC RH(D): CPT | Performed by: EMERGENCY MEDICINE

## 2024-02-18 PROCEDURE — 36415 COLL VENOUS BLD VENIPUNCTURE: CPT

## 2024-02-18 NOTE — ED PROVIDER NOTES
Subjective   History of Present Illness  Mrs. Hartman presents with dark vaginal bleeding and abdominal cramping.  She tells me it started about 24 hours ago and improved but this morning has worsened.  She feels okay otherwise.  She is 7 weeks pregnant based roughly on dates.  She tells me her last period was actually in December however.  She reports that this is her 11th pregnancy and the prior 10 pregnancies all ended in miscarriage.      Review of Systems    Past Medical History:   Diagnosis Date    Allergic     Birth    Anxiety     Arthritis     Asthma     prn inhalers, does not follow w/ pulmonary    Chronic back pain     previously followed w/ pain management, now just prn Tylenol + Gabapentin    Chronic deep vein thrombosis (DVT) of femoral vein of right lower extremity 09/10/2021    Clotting disorder 2015    Cluster headache     Depression     Diabetes mellitus     Type II, dx 2014, never on insulin, A1C 7.6     Dyspepsia     Dyspnea on exertion     Dysuria 03/07/2022    Dysuria and hematuria x 2 weeks. 3/7/22 urine culture sent. Rx Macrobid 100 mg twice daily for 7 days. Patient did not tolerate Macrobid well due to GI upset. Urine culture was negative.    Fatigue     Gastroparesis     GERD (gastroesophageal reflux disease)     Headache, tension-type     Heartburn     chronic, prn TUMS, denies prior eval    Hirsutism     History of medical problems     PCOS    Hx of radiation therapy     for treatments of Keloids    Hypertension     Irregular menses     infrequent spotting    Leiomyoma, subserous     possible peduculated f3-4 cm fibroid on u/s at [facility]    Low back pain     Migraines     botox q3 months, following Neurology @ [facility]    Morbid obesity     s/p sleeve gastrectomy    Peripheral edema     Polycystic ovary syndrome 2011    Seasonal allergies     Slow to wake up after anesthesia     Urinary tract infection     Visual impairment     Astigmatism, Nearsightedness       Allergies   Allergen Reactions     "Capsicum Annuum Extract & Derivative (Bell Pepper) [Capsicum] Anaphylaxis    Codeine Headache, Unknown - Low Severity and Other (See Comments)     Can tolerate hydrocodone, no other adverse reactions to other narcotics.    Doxycycline Hallucinations, Rash and Unknown - Low Severity    Hydrochlorothiazide Swelling and Unknown - Low Severity     eventually causes CHF    Ibuprofen Other (See Comments)     \"makes me retain fluid and eventually go into CHF\"    Nitrofurantoin Nausea Only and Unknown - Low Severity    Peanut-Containing Drug Products Anaphylaxis    Monosodium Glutamate Swelling and Headache    Oatmeal Other (See Comments)     Dx on allergy testing    Banana (Diagnostic) Unknown - High Severity    Coconut Flavor Unknown - High Severity    Doxycycline Monohydrate Other (See Comments)    Isoflavones (Soy) Unknown - Low Severity    Amitriptyline Mental Status Change and Unknown - Low Severity     memory gaps + neuropathy + hair loss    Amoxicillin-Pot Clavulanate Other (See Comments) and Unknown - Low Severity     Syncope, not sure if allergic to cephalosporins.    Aspartame Nausea Only    Diclofenac Headache and Unknown - Low Severity    Eggs Or Egg-Derived Products Other (See Comments)     dx on allergy testing, but does not completely avoid    Naproxen Other (See Comments)     \"blackout\"       Past Surgical History:   Procedure Laterality Date    ABDOMINAL SURGERY  06/15/2021    Bariatric Sleeve Surgery    BARIATRIC SURGERY      COLONOSCOPY  December 2022    COSMETIC SURGERY      Multiple Keloid surgeries    ENDOSCOPY N/A 06/15/2021    Procedure: ESOPHAGOGASTRODUODENOSCOPY;  Surgeon: Ayaka Andre MD;  Location: [facility] OR;  Service: Robotics - DaVinci;  Laterality: N/A;    ENDOSCOPY N/A 10/28/2021    Procedure: ESOPHAGOGASTRODUODENOSCOPY WITH ACHALASIA BALLOON DILATATION;  Surgeon: Jase Hernandez MD;  Location: [facility] ENDOSCOPY;  Service: Gastroenterology;  Laterality: N/A;  58 F DILATOR    " ENDOSCOPY N/A 11/15/2021    Procedure: ESOPHAGOGASTRODUODENOSCOPY WITH DILATATION;  Surgeon: Jase Hernandez MD;  Location:  [facility] ENDOSCOPY;  Service: Gastroenterology;  Laterality: N/A;  achalasia balloon     ENDOSCOPY N/A 11/24/2021    Procedure: ESOPHAGOGASTRODUODENOSCOPY WTH DUODENAL POLYPECTOPMY AND NASAL JEJUNAL TUBE PLACEMENT;  Surgeon: Jase Hernandez MD;  Location:  [facility] ENDOSCOPY;  Service: Gastroenterology;  Laterality: N/A;  placement of feeding tube, checked position with KUB    GASTRIC RESTRICTION SURGERY  2021    Sleeve    GASTRIC SLEEVE LAPAROSCOPIC N/A 06/15/2021    Procedure: GASTRIC SLEEVE LAPAROSCOPIC WITH DAVINCI ROBOT, LAPROSCOPIC HIATAL HERNIA REPAIR WITH DAVINCI ROBOT;  Surgeon: Ayaka Andre MD;  Location:  [facility] OR;  Service: Robotics - DaVinci;  Laterality: N/A;    KELOID EXCISION      1990,1993,1999,2015,2016,2019    LAPAROSCOPIC CHOLECYSTECTOMY  2000    for stones    RADIOFREQUENCY ABLATION  2019    x 2  for pt back    UMBILICAL HERNIA REPAIR  1990    UPPER GASTROINTESTINAL ENDOSCOPY      WISDOM TOOTH EXTRACTION  1994       Family History   Problem Relation Age of Onset    Arthritis Mother     Diabetes Mother     Obesity Mother     Arrhythmia Mother     Hypertension Mother     Asthma Mother     Migraines Mother     Dementia Father     Heart attack Father     Arrhythmia Father     Heart disease Father     Alcohol abuse Father     Ovarian cancer Maternal Aunt         40's    Breast cancer Paternal Aunt         30's    Stroke Other         CVA    Obesity Other     Heart disease Other     Hypertension Other     Endometrial cancer Neg Hx     Colon cancer Neg Hx     Colon polyps Neg Hx     Esophageal cancer Neg Hx        Social History     Socioeconomic History    Marital status: Single   Tobacco Use    Smoking status: Never     Passive exposure: Never    Smokeless tobacco: Never   Vaping Use    Vaping Use: Never used   Substance and Sexual Activity    Alcohol use: Not Currently      Comment: Very rarely drink socially. At most 2 drinks per month.    Drug use: Never    Sexual activity: Yes     Partners: Male     Birth control/protection: Condom, Coitus interruptus           Objective   Physical Exam  Vitals and nursing note reviewed.   Constitutional:       General: She is not in acute distress.     Appearance: Normal appearance.      Comments: Pleasant lady in no distress   HENT:      Head: Normocephalic and atraumatic.      Nose: Nose normal. No congestion or rhinorrhea.   Eyes:      General: No scleral icterus.     Conjunctiva/sclera: Conjunctivae normal.   Neck:      Comments: No JVD   Cardiovascular:      Rate and Rhythm: Normal rate and regular rhythm.      Heart sounds: No murmur heard.     No friction rub.   Pulmonary:      Effort: Pulmonary effort is normal.      Breath sounds: Normal breath sounds. No wheezing or rales.   Abdominal:      General: Bowel sounds are normal.      Palpations: Abdomen is soft.      Tenderness: There is no abdominal tenderness. There is no guarding or rebound.   Musculoskeletal:         General: No tenderness.      Cervical back: Normal range of motion and neck supple.      Right lower leg: No edema.      Left lower leg: No edema.   Skin:     General: Skin is warm and dry.      Coloration: Skin is not pale.      Findings: No erythema.   Neurological:      General: No focal deficit present.      Mental Status: She is alert and oriented to person, place, and time.      Motor: No weakness.      Coordination: Coordination normal.   Psychiatric:         Mood and Affect: Mood normal.         Behavior: Behavior normal.         Thought Content: Thought content normal.         Procedures           ED Course  ED Course as of 02/18/24 1246   Sun Feb 18, 2024   0658 Reviewed her records and have ordered ultrasound.  Serum hCG was 871 on January 27 of this year. [DT]   0718 154/117. [DT]   0802 Called lab and they do not have a specimen to run the hCG quant or type  and screen.  Will redraw that [DT]   0925 Ultrasound shows yolk sac and gestational sac measuring about 6 weeks 3 days.  No cardiac activity is seen.  Awaiting hCG but concerning for miscarriage although not completely diagnostic at this point [DT]   0941 hCG level is appropriate for 6 or 7 weeks.  Diagnosis is threatened miscarriage. [DT]   0948 Spoke with her and her mother about findings.  She has an appointment on the day after tomorrow with her OB/GYN [DT]      ED Course User Index  [DT] Artur Perez MD                                             Medical Decision Making  Please see course notes.  I ordered and interpreted labs.  I reviewed and interpreted prior records regarding hCG levels.  I ordered and interpreted results of ultrasound and had lengthy discussion with her about implications of that.    Problems Addressed:  First trimester pregnancy: chronic illness or injury  Threatened miscarriage: complicated acute illness or injury that poses a threat to life or bodily functions    Amount and/or Complexity of Data Reviewed  Labs: ordered. Decision-making details documented in ED Course.  Radiology: ordered. Decision-making details documented in ED Course.        Final diagnoses:   Threatened miscarriage   First trimester pregnancy       ED Disposition  ED Disposition       ED Disposition   Discharge    Condition   Stable    Comment   --               Theresa Tim, [facility]  [address] [address]  [phone]               Medication List      No changes were made to your prescriptions during this visit.            Artur Perez MD  02/18/24 5120

## 2024-02-18 NOTE — Clinical Note
[facility] EMERGENCY DEPARTMENT  [address]  [facility] [address]  Phone: [phone]    Frances Hartman was seen and treated in our emergency department on 2/18/2024.  She may return to work on 02/21/2024.         Thank you for choosing [facility].    Artur Perez MD

## 2024-02-18 NOTE — DISCHARGE INSTRUCTIONS
Observe bed rest until your appointment.  Return if significantly worsening pain or bleeding or any other concerns.

## 2024-02-20 ENCOUNTER — TRANSCRIBE ORDERS (OUTPATIENT)
Dept: LAB | Facility: HOSPITAL | Age: 39
End: 2024-02-20
Payer: COMMERCIAL

## 2024-02-20 ENCOUNTER — LAB (OUTPATIENT)
Dept: LAB | Facility: HOSPITAL | Age: 39
End: 2024-02-20
Payer: COMMERCIAL

## 2024-02-20 ENCOUNTER — OFFICE VISIT (OUTPATIENT)
Dept: CARDIOLOGY | Facility: CLINIC | Age: 39
End: 2024-02-20
Payer: COMMERCIAL

## 2024-02-20 VITALS
HEART RATE: 88 BPM | OXYGEN SATURATION: 99 % | WEIGHT: 232.6 LBS | BODY MASS INDEX: 45.67 KG/M2 | SYSTOLIC BLOOD PRESSURE: 148 MMHG | DIASTOLIC BLOOD PRESSURE: 90 MMHG | HEIGHT: 60 IN

## 2024-02-20 DIAGNOSIS — O03.4 INCOMPLETE ABORTION: Primary | ICD-10-CM

## 2024-02-20 DIAGNOSIS — K76.0 FATTY METAMORPHOSIS OF LIVER: ICD-10-CM

## 2024-02-20 DIAGNOSIS — E78.2 MIXED HYPERLIPIDEMIA: ICD-10-CM

## 2024-02-20 DIAGNOSIS — I10 ESSENTIAL HYPERTENSION: ICD-10-CM

## 2024-02-20 DIAGNOSIS — R00.2 PALPITATIONS: Primary | ICD-10-CM

## 2024-02-20 LAB
ALBUMIN SERPL-MCNC: 4.2 G/DL (ref 3.5–5.2)
ALBUMIN/GLOB SERPL: 1.4 G/DL
ALP SERPL-CCNC: 74 U/L (ref 39–117)
ALT SERPL W P-5'-P-CCNC: 22 U/L (ref 1–33)
ANION GAP SERPL CALCULATED.3IONS-SCNC: 10.9 MMOL/L (ref 5–15)
AST SERPL-CCNC: 19 U/L (ref 1–32)
BASOPHILS # BLD AUTO: 0.04 10*3/MM3 (ref 0–0.2)
BASOPHILS NFR BLD AUTO: 0.4 % (ref 0–1.5)
BILIRUB SERPL-MCNC: 0.3 MG/DL (ref 0–1.2)
BUN SERPL-MCNC: 7 MG/DL (ref 6–20)
BUN/CREAT SERPL: 8.9 (ref 7–25)
CALCIUM SPEC-SCNC: 10 MG/DL (ref 8.6–10.5)
CHLORIDE SERPL-SCNC: 106 MMOL/L (ref 98–107)
CO2 SERPL-SCNC: 24.1 MMOL/L (ref 22–29)
CREAT SERPL-MCNC: 0.79 MG/DL (ref 0.57–1)
DEPRECATED RDW RBC AUTO: 42.6 FL (ref 37–54)
EGFRCR SERPLBLD CKD-EPI 2021: 97.1 ML/MIN/1.73
EOSINOPHIL # BLD AUTO: 0.23 10*3/MM3 (ref 0–0.4)
EOSINOPHIL NFR BLD AUTO: 2.4 % (ref 0.3–6.2)
ERYTHROCYTE [DISTWIDTH] IN BLOOD BY AUTOMATED COUNT: 12.5 % (ref 12.3–15.4)
GLOBULIN UR ELPH-MCNC: 3.1 GM/DL
GLUCOSE SERPL-MCNC: 124 MG/DL (ref 65–99)
HCG INTACT+B SERPL-ACNC: 6514 MIU/ML
HCT VFR BLD AUTO: 36.3 % (ref 34–46.6)
HGB BLD-MCNC: 12.2 G/DL (ref 12–15.9)
IMM GRANULOCYTES # BLD AUTO: 0.02 10*3/MM3 (ref 0–0.05)
IMM GRANULOCYTES NFR BLD AUTO: 0.2 % (ref 0–0.5)
LYMPHOCYTES # BLD AUTO: 2.81 10*3/MM3 (ref 0.7–3.1)
LYMPHOCYTES NFR BLD AUTO: 29.5 % (ref 19.6–45.3)
MCH RBC QN AUTO: 32 PG (ref 26.6–33)
MCHC RBC AUTO-ENTMCNC: 33.6 G/DL (ref 31.5–35.7)
MCV RBC AUTO: 95.3 FL (ref 79–97)
MONOCYTES # BLD AUTO: 0.5 10*3/MM3 (ref 0.1–0.9)
MONOCYTES NFR BLD AUTO: 5.2 % (ref 5–12)
NEUTROPHILS NFR BLD AUTO: 5.93 10*3/MM3 (ref 1.7–7)
NEUTROPHILS NFR BLD AUTO: 62.3 % (ref 42.7–76)
NRBC BLD AUTO-RTO: 0 /100 WBC (ref 0–0.2)
PLATELET # BLD AUTO: 259 10*3/MM3 (ref 140–450)
PMV BLD AUTO: 10.9 FL (ref 6–12)
POTASSIUM SERPL-SCNC: 4.1 MMOL/L (ref 3.5–5.2)
PROT SERPL-MCNC: 7.3 G/DL (ref 6–8.5)
RBC # BLD AUTO: 3.81 10*6/MM3 (ref 3.77–5.28)
SODIUM SERPL-SCNC: 141 MMOL/L (ref 136–145)
WBC NRBC COR # BLD AUTO: 9.53 10*3/MM3 (ref 3.4–10.8)

## 2024-02-20 PROCEDURE — 99214 OFFICE O/P EST MOD 30 MIN: CPT | Performed by: INTERNAL MEDICINE

## 2024-02-20 PROCEDURE — 93000 ELECTROCARDIOGRAM COMPLETE: CPT | Performed by: INTERNAL MEDICINE

## 2024-02-20 PROCEDURE — 80053 COMPREHEN METABOLIC PANEL: CPT | Performed by: STUDENT IN AN ORGANIZED HEALTH CARE EDUCATION/TRAINING PROGRAM

## 2024-02-20 PROCEDURE — 36415 COLL VENOUS BLD VENIPUNCTURE: CPT | Performed by: STUDENT IN AN ORGANIZED HEALTH CARE EDUCATION/TRAINING PROGRAM

## 2024-02-20 PROCEDURE — 85025 COMPLETE CBC W/AUTO DIFF WBC: CPT | Performed by: STUDENT IN AN ORGANIZED HEALTH CARE EDUCATION/TRAINING PROGRAM

## 2024-02-20 PROCEDURE — 84702 CHORIONIC GONADOTROPIN TEST: CPT

## 2024-02-20 RX ORDER — LANOLIN ALCOHOL/MO/W.PET/CERES
50 CREAM (GRAM) TOPICAL 2 TIMES DAILY
COMMUNITY

## 2024-02-20 NOTE — PROGRESS NOTES
[facility] Cardiology    Encounter Date: 2024    Patient ID: Frances Hartman is a 38 y.o. female.  : 1985     PCP: Gay Prado APRN       Chief Complaint: Palpitations and Dizziness      PROBLEM LIST:  Hypertension:  Echo, 2014: EF 55-60%. All valves are structurally and functionally normal with no hemodynamically significant valve disease.  Echo, 2017: EF 65%. Mild TR with normal RVSP.  Echo, 2021: EF 60%. No significant valvular abnormalities.  Hyperlipidemia.  Chest pain:  Pet MPS, 2019: EF > 70%. No evidence of reversible ischemia.  Normal bilateral lower extremity venous duplex, 2021  Palpitations:  2-week Zio, 2020: SR. <1% PACs/PVCs. One short SVT (4 beats). Average HR 74 bpm.  DM2.  Morbid obesity   Peripheral chronic venous insufficiency.  Polycystic ovaries.  GERD.  Subclinical hypothyroidism.  Keloid skin disorder.  Chronic fatigue.  Chronic tonsillar hypertrophy.  Depression.  Migraine with aura, onset age 8.    History of Present Illness  Patient presents today for a follow-up with a history of palpitations and cardiac risk factors. Since last visit, the patient has been pregnant for 6 weeks.  She had contacted our office and has discontinued her metoprolol due to concerns about side effects.  States that shortly afterwards she started experiencing significant increase in her heart rate with resting heart rate as high as 120.  Sequently it has improved.  She has also noted an increase in her blood pressure and came to discuss her antihypertensive therapy.  The patient has had 10 other previous pregnancies which terminated in miscarriages due to multiple factors one of them was suspected side effect of medications.  She has been reluctant to take certain medications.  She gets short of breath with exertion but denies current chest pain.  No palpitations at rest.    Allergies   Allergen Reactions   • Capsicum Annuum  "Extract & Derivative (Bell Pepper) [Capsicum] Anaphylaxis   • Codeine Headache, Unknown - Low Severity and Other (See Comments)     Can tolerate hydrocodone, no other adverse reactions to other narcotics.   • Doxycycline Hallucinations, Rash and Unknown - Low Severity   • Hydrochlorothiazide Swelling and Unknown - Low Severity     eventually causes CHF   • Ibuprofen Other (See Comments)     \"makes me retain fluid and eventually go into CHF\"   • Nitrofurantoin Nausea Only and Unknown - Low Severity   • Peanut-Containing Drug Products Anaphylaxis   • Monosodium Glutamate Swelling and Headache   • Oatmeal Other (See Comments)     Dx on allergy testing   • Banana (Diagnostic) Unknown - High Severity   • Coconut Flavor Unknown - High Severity   • Doxycycline Monohydrate Other (See Comments)   • Isoflavones (Soy) Unknown - Low Severity   • Amitriptyline Mental Status Change and Unknown - Low Severity     memory gaps + neuropathy + hair loss   • Amoxicillin-Pot Clavulanate Other (See Comments) and Unknown - Low Severity     Syncope, not sure if allergic to cephalosporins.   • Aspartame Nausea Only   • Diclofenac Headache and Unknown - Low Severity   • Eggs Or Egg-Derived Products Other (See Comments)     dx on allergy testing, but does not completely avoid   • Naproxen Other (See Comments)     \"blackout\"         Current Outpatient Medications:   •  acetaminophen (TYLENOL) 500 MG tablet, Take 1-2 tablets by mouth Every 6 (Six) Hours As Needed for Mild Pain., Disp: , Rfl:   •  albuterol sulfate  (90 Base) MCG/ACT inhaler, Inhale 2 puffs Every 4 (Four) Hours As Needed for Wheezing or Shortness of Air., Disp: 18 g, Rfl: 5  •  Blood Glucose Monitoring Suppl (ONE TOUCH ULTRA 2) w/Device kit, , Disp: , Rfl:   •  calcium carbonate (TUMS) 500 MG chewable tablet, Chew 1 tablet Daily., Disp: , Rfl:   •  Continuous Blood Gluc  (Dexcom G7 ) device, Use 1 Device Take As Directed., Disp: 1 each, Rfl: 0  •  " Continuous Blood Gluc Sensor (Dexcom G7 Sensor) misc, Use 1 each Every 10 (Ten) Days., Disp: 9 each, Rfl: 3  •  diphenhydrAMINE (BENADRYL) 25 mg capsule, Take 1 capsule by mouth Every 6 (Six) Hours As Needed for Itching or Sleep., Disp: , Rfl:   •  insulin detemir (Levemir FlexPen) 100 UNIT/ML injection, Inject 25 Units under the skin into the appropriate area as directed Daily., Disp: 15 mL, Rfl: 5  •  Insulin Pen Needle (Pen Needles) 33G X 4 MM misc, Use 1 each 3 times a day., Disp: 100 each, Rfl: 3  •  Lancets (OneTouch Delica Plus [id number]) misc, Use 1 each 5 (Five) Times a Day., Disp: 200 each, Rfl: 5  •  loratadine (CLARITIN) 10 MG tablet, TAKE ONE TABLET BY MOUTH DAILY, Disp: 30 tablet, Rfl: 2  •  metFORMIN ER (GLUCOPHAGE-XR) 500 MG 24 hr tablet, Take 2 tablets by mouth Daily With Dinner., Disp: 180 tablet, Rfl: 1  •  OnabotulinumtoxinA (Botox) 200 units reconstituted solution, FOR . PHYSICIAN TO INJECT UP  UNITS INTRAMUSCULARLY INTO HEAD, NECK AND SHOULDERS EVERY 90 DAYS., Disp: 1 each, Rfl: 3  •  OneTouch Ultra test strip, 1 each by Other route 5 (Five) Times a Day., Disp: 200 each, Rfl: 5  •  Prenatal MV-Min-Fe Fum-FA-DHA (PRENATAL 1 PO), Take  by mouth Daily., Disp: , Rfl:   •  simethicone (Gas-X) 80 MG chewable tablet, Chew 1 tablet Every 6 (Six) Hours As Needed for Flatulence., Disp: 10 tablet, Rfl: 0  •  vitamin B-6 (PYRIDOXINE) 50 MG tablet, Take 1 tablet by mouth 2 (Two) Times a Day., Disp: , Rfl:   •  vitamin D3 125 MCG (5000 UT) capsule capsule, Take 1 capsule by mouth Daily., Disp: , Rfl:   •  Biotin 81882 MCG tablet dispersible, Place 1 tablet on the tongue Daily. (Patient not taking: Reported on 2/20/2024), Disp: , Rfl:   •  gabapentin (NEURONTIN) 300 MG capsule, Take 2 capsules by mouth Daily. (Patient not taking: Reported on 2/20/2024), Disp: , Rfl:   •  lansoprazole (PREVACID) 30 MG capsule, Take 1 capsule by mouth Daily. (Patient not taking: Reported on  "2/20/2024), Disp: 14 capsule, Rfl: 0  •  metoprolol tartrate (LOPRESSOR) 50 MG tablet, Take 1 tablet by mouth 2 (Two) Times a Day. (Patient not taking: Reported on 2/20/2024), Disp: 180 tablet, Rfl: 3    Current Facility-Administered Medications:   •  OnabotulinumtoxinA 200 Units, 200 Units, Intramuscular, Q3 Months, Shiela Coelho, APRN, 200 Units at 01/25/24 0845    The following portions of the patient's history were reviewed and updated as appropriate: allergies, current medications, past family history, past medical history, past social history, past surgical history and problem list.    ROS  Review of Systems   14 point ROS negative except for that listed in the HPI.         Objective:     /90 (BP Location: Left arm, Patient Position: Sitting)   Pulse 88   Ht 152.4 cm (60\")   Wt 106 kg (232 lb 9.6 oz)   LMP 12/15/2023 (Exact Date)   SpO2 99%   BMI 45.43 kg/m²    Repeat blood pressure 140/88  Physical Exam  Constitutional: Patient appears well-developed and well-nourished.   HENT: HEENT exam unremarkable.   Neck: Neck supple. No JVD present. No carotid bruits.   Cardiovascular: Normal rate, regular rhythm and normal heart sounds. No murmur heard.   2+ symmetric pulses.   Pulmonary/Chest: Breath sounds normal. Does not exhibit tenderness.   Abdominal: Abdomen benign.   Musculoskeletal: Does not exhibit edema.   Neurological: Neurological exam unremarkable.   Vitals reviewed.    Data Review:   Lab Results   Component Value Date    GLUCOSE 136 (H) 04/06/2023    BUN 9 04/06/2023    CREATININE 0.99 04/06/2023    EGFR 74.5 04/06/2023    BCR 9.1 04/06/2023     04/06/2023    K 4.2 04/06/2023    CO2 27.5 04/06/2023    CALCIUM 10.4 04/06/2023    ALBUMIN 4.5 04/06/2023    AST 27 04/06/2023    ALT 47 (H) 04/06/2023     Lab Results   Component Value Date    WBC 8.43 02/18/2024    RBC 3.81 02/18/2024    HGB 12.3 02/18/2024    HCT 37.6 02/18/2024    MCV 98.7 (H) 02/18/2024     02/18/2024 "     Lab Results   Component Value Date    TSH 3.520 06/29/2023     Lab Results   Component Value Date    HGBA1C 6.3 (A) 11/28/2023          ECG 12 Lead    Date/Time: 2/20/2024 2:40 PM  Performed by: Julianna Astudillo MD    Authorized by: Julianna Astudillo MD  Comparison: compared with previous ECG from 12/14/2022  Similar to previous ECG  Rhythm: sinus rhythm    Clinical impression: normal ECG           Advance Care Planning   ACP discussion was declined by the patient. Patient does not have an advance directive, declines further assistance.           Assessment:      Diagnosis Plan   1. Palpitations  Reviewed notes of sinus tachycardia in the setting of beta-blocker withdrawal versus other arrhythmias.  Will obtain a 48-hour Holter for further assessment.      2. Essential hypertension  Blood pressure is acceptable, due to her problems with medications and first trimester of pregnancy I have recommended conservative measures by restricting salt/sodium, DASH diet and regular exercise.  We will hold off initiating antihypertensive therapy for now.after the first trimester is completed and if she remains hypertensive we will consider adding antihypertensive therapy at that time.        3. Mixed hyperlipidemia  Managed with diet and exercise.        Plan:   Mildly elevated blood pressure and increased heart rate in the setting of beta-blocker withdrawal.  Conservative measures including maintenance good hydration, regular exercise, avoidance of salt/sodium and DASH diet recommended.  48-hour Holter to make sure she is not experiencing any other arrhythmias.  Discussed that we will hold off antihypertensive therapy during first trimester, after completion of first trimester if blood pressure remains significantly elevated we will consider adjusting her medications at that time.  Continue current medications.   FU in 3 MO, sooner as needed.  Thank you for allowing us to participate in the care of your patient.     Julianna Astudillo  MD, FACC, [facility]        Please note that portions of this note may have been completed with a voice recognition program. Efforts were made to edit the dictations, but occasionally words are mistranscribed.

## 2024-02-22 ENCOUNTER — HOSPITAL ENCOUNTER (EMERGENCY)
Facility: HOSPITAL | Age: 39
Discharge: HOME | End: 2024-02-22
Attending: EMERGENCY MEDICINE
Payer: COMMERCIAL

## 2024-02-22 ENCOUNTER — APPOINTMENT (OUTPATIENT)
Dept: ULTRASOUND IMAGING | Facility: HOSPITAL | Age: 39
End: 2024-02-22
Payer: COMMERCIAL

## 2024-02-22 VITALS
OXYGEN SATURATION: 94 % | DIASTOLIC BLOOD PRESSURE: 95 MMHG | RESPIRATION RATE: 16 BRPM | BODY MASS INDEX: 44.76 KG/M2 | HEIGHT: 60 IN | TEMPERATURE: 98.3 F | WEIGHT: 228 LBS | SYSTOLIC BLOOD PRESSURE: 146 MMHG | HEART RATE: 72 BPM

## 2024-02-22 DIAGNOSIS — N96 HISTORY OF MULTIPLE MISCARRIAGES: ICD-10-CM

## 2024-02-22 DIAGNOSIS — O20.9 VAGINAL BLEEDING AFFECTING EARLY PREGNANCY: ICD-10-CM

## 2024-02-22 DIAGNOSIS — O20.0 THREATENED MISCARRIAGE IN EARLY PREGNANCY: Primary | ICD-10-CM

## 2024-02-22 LAB
ALBUMIN SERPL-MCNC: 4.1 G/DL (ref 3.5–5.2)
ALBUMIN/GLOB SERPL: 1.1 G/DL
ALP SERPL-CCNC: 83 U/L (ref 39–117)
ALT SERPL W P-5'-P-CCNC: 24 U/L (ref 1–33)
ANION GAP SERPL CALCULATED.3IONS-SCNC: 17 MMOL/L (ref 5–15)
AST SERPL-CCNC: 23 U/L (ref 1–32)
BACTERIA UR QL AUTO: ABNORMAL /HPF
BASOPHILS # BLD AUTO: 0.05 10*3/MM3 (ref 0–0.2)
BASOPHILS NFR BLD AUTO: 0.7 % (ref 0–1.5)
BILIRUB SERPL-MCNC: 0.3 MG/DL (ref 0–1.2)
BILIRUB UR QL STRIP: NEGATIVE
BUN SERPL-MCNC: 7 MG/DL (ref 6–20)
BUN/CREAT SERPL: 11.7 (ref 7–25)
CALCIUM SPEC-SCNC: 10 MG/DL (ref 8.6–10.5)
CHLORIDE SERPL-SCNC: 104 MMOL/L (ref 98–107)
CLARITY UR: CLEAR
CO2 SERPL-SCNC: 18 MMOL/L (ref 22–29)
COLOR UR: YELLOW
CREAT SERPL-MCNC: 0.6 MG/DL (ref 0.57–1)
DEPRECATED RDW RBC AUTO: 46.7 FL (ref 37–54)
EGFRCR SERPLBLD CKD-EPI 2021: 116.5 ML/MIN/1.73
EOSINOPHIL # BLD AUTO: 0.23 10*3/MM3 (ref 0–0.4)
EOSINOPHIL NFR BLD AUTO: 3 % (ref 0.3–6.2)
ERYTHROCYTE [DISTWIDTH] IN BLOOD BY AUTOMATED COUNT: 12.9 % (ref 12.3–15.4)
GLOBULIN UR ELPH-MCNC: 3.6 GM/DL
GLUCOSE SERPL-MCNC: 111 MG/DL (ref 65–99)
GLUCOSE UR STRIP-MCNC: NEGATIVE MG/DL
HCG INTACT+B SERPL-ACNC: 5998 MIU/ML
HCT VFR BLD AUTO: 38.9 % (ref 34–46.6)
HGB BLD-MCNC: 12.6 G/DL (ref 12–15.9)
HGB UR QL STRIP.AUTO: ABNORMAL
HYALINE CASTS UR QL AUTO: ABNORMAL /LPF
IMM GRANULOCYTES # BLD AUTO: 0.02 10*3/MM3 (ref 0–0.05)
IMM GRANULOCYTES NFR BLD AUTO: 0.3 % (ref 0–0.5)
KETONES UR QL STRIP: NEGATIVE
LEUKOCYTE ESTERASE UR QL STRIP.AUTO: NEGATIVE
LIPASE SERPL-CCNC: 20 U/L (ref 13–60)
LYMPHOCYTES # BLD AUTO: 2.34 10*3/MM3 (ref 0.7–3.1)
LYMPHOCYTES NFR BLD AUTO: 30.8 % (ref 19.6–45.3)
MCH RBC QN AUTO: 32.1 PG (ref 26.6–33)
MCHC RBC AUTO-ENTMCNC: 32.4 G/DL (ref 31.5–35.7)
MCV RBC AUTO: 99 FL (ref 79–97)
MONOCYTES # BLD AUTO: 0.32 10*3/MM3 (ref 0.1–0.9)
MONOCYTES NFR BLD AUTO: 4.2 % (ref 5–12)
NEUTROPHILS NFR BLD AUTO: 4.64 10*3/MM3 (ref 1.7–7)
NEUTROPHILS NFR BLD AUTO: 61 % (ref 42.7–76)
NITRITE UR QL STRIP: NEGATIVE
NRBC BLD AUTO-RTO: 0 /100 WBC (ref 0–0.2)
PH UR STRIP.AUTO: 5.5 [PH] (ref 5–8)
PLATELET # BLD AUTO: 273 10*3/MM3 (ref 140–450)
PMV BLD AUTO: 10.3 FL (ref 6–12)
POTASSIUM SERPL-SCNC: 4.1 MMOL/L (ref 3.5–5.2)
PROT SERPL-MCNC: 7.7 G/DL (ref 6–8.5)
PROT UR QL STRIP: NEGATIVE
RBC # BLD AUTO: 3.93 10*6/MM3 (ref 3.77–5.28)
RBC # UR STRIP: ABNORMAL /HPF
REF LAB TEST METHOD: ABNORMAL
SODIUM SERPL-SCNC: 139 MMOL/L (ref 136–145)
SP GR UR STRIP: 1.02 (ref 1–1.03)
SQUAMOUS #/AREA URNS HPF: ABNORMAL /HPF
UROBILINOGEN UR QL STRIP: ABNORMAL
WBC # UR STRIP: ABNORMAL /HPF
WBC NRBC COR # BLD AUTO: 7.6 10*3/MM3 (ref 3.4–10.8)

## 2024-02-22 PROCEDURE — 80053 COMPREHEN METABOLIC PANEL: CPT | Performed by: EMERGENCY MEDICINE

## 2024-02-22 PROCEDURE — 81001 URINALYSIS AUTO W/SCOPE: CPT | Performed by: EMERGENCY MEDICINE

## 2024-02-22 PROCEDURE — 84702 CHORIONIC GONADOTROPIN TEST: CPT | Performed by: EMERGENCY MEDICINE

## 2024-02-22 PROCEDURE — 99284 EMERGENCY DEPT VISIT MOD MDM: CPT

## 2024-02-22 PROCEDURE — 36415 COLL VENOUS BLD VENIPUNCTURE: CPT

## 2024-02-22 PROCEDURE — 85025 COMPLETE CBC W/AUTO DIFF WBC: CPT | Performed by: EMERGENCY MEDICINE

## 2024-02-22 PROCEDURE — 76817 TRANSVAGINAL US OBSTETRIC: CPT

## 2024-02-22 PROCEDURE — 83690 ASSAY OF LIPASE: CPT | Performed by: EMERGENCY MEDICINE

## 2024-02-22 RX ORDER — SODIUM CHLORIDE 0.9 % (FLUSH) 0.9 %
10 SYRINGE (ML) INJECTION AS NEEDED
Status: DISCONTINUED | OUTPATIENT
Start: 2024-02-22 | End: 2024-02-22 | Stop reason: HOSPADM

## 2024-02-22 NOTE — ED PROVIDER NOTES
Subjective   History of Present Illness  Patient is a 38-year-old female who is  AB 10.  She presents with vaginal bleeding associated with threatened miscarriage.  Patient was seen here recently for similar and diagnosed with threatened miscarriage at that time. Patient reports 2 prior ultrasounds this week. Patient has had bleeding off and on for 9 days. Dr. Bernal is her primary OB. She denies any cramping or pain, just the progressively worsening bleeding.     History provided by:  Patient      Review of Systems    Past Medical History:   Diagnosis Date    Allergic     Birth    Anxiety     Arthritis     Asthma     prn inhalers, does not follow w/ pulmonary    Chronic back pain     previously followed w/ pain management, now just prn Tylenol + Gabapentin    Chronic deep vein thrombosis (DVT) of femoral vein of right lower extremity 09/10/2021    Clotting disorder 2015    Cluster headache     Depression     Diabetes mellitus     Type II, dx , never on insulin, A1C 7.6     Dyspepsia     Dyspnea on exertion     Dysuria 2022    Dysuria and hematuria x 2 weeks. 3/7/22 urine culture sent. Rx Macrobid 100 mg twice daily for 7 days. Patient did not tolerate Macrobid well due to GI upset. Urine culture was negative.    Fatigue     Gastroparesis     GERD (gastroesophageal reflux disease)     Headache, tension-type     Heartburn     chronic, prn TUMS, denies prior eval    Hirsutism     History of medical problems     PCOS    Hx of radiation therapy     for treatments of Keloids    Hypertension     Irregular menses     infrequent spotting    Leiomyoma, subserous     possible peduculated f3-4 cm fibroid on u/s at [facility]    Low back pain     Migraines     botox q3 months, following Neurology @ [facility]    Morbid obesity     s/p sleeve gastrectomy    Peripheral edema     Polycystic ovary syndrome     Seasonal allergies     Slow to wake up after anesthesia     Urinary tract infection     Visual impairment      "Astigmatism, Nearsightedness       Allergies   Allergen Reactions    Capsicum Annuum Extract & Derivative (Bell Pepper) [Capsicum] Anaphylaxis    Codeine Headache, Unknown - Low Severity and Other (See Comments)     Can tolerate hydrocodone, no other adverse reactions to other narcotics.    Doxycycline Hallucinations, Rash and Unknown - Low Severity    Hydrochlorothiazide Swelling and Unknown - Low Severity     eventually causes CHF    Ibuprofen Other (See Comments)     \"makes me retain fluid and eventually go into CHF\"    Nitrofurantoin Nausea Only and Unknown - Low Severity    Peanut-Containing Drug Products Anaphylaxis    Monosodium Glutamate Swelling and Headache    Oatmeal Other (See Comments)     Dx on allergy testing    Banana (Diagnostic) Unknown - High Severity    Coconut Flavor Unknown - High Severity    Doxycycline Monohydrate Other (See Comments)    Isoflavones (Soy) Unknown - Low Severity    Amitriptyline Mental Status Change and Unknown - Low Severity     memory gaps + neuropathy + hair loss    Amoxicillin-Pot Clavulanate Other (See Comments) and Unknown - Low Severity     Syncope, not sure if allergic to cephalosporins.    Aspartame Nausea Only    Diclofenac Headache and Unknown - Low Severity    Eggs Or Egg-Derived Products Other (See Comments)     dx on allergy testing, but does not completely avoid    Naproxen Other (See Comments)     \"blackout\"       Past Surgical History:   Procedure Laterality Date    ABDOMINAL SURGERY  06/15/2021    Bariatric Sleeve Surgery    BARIATRIC SURGERY      COLONOSCOPY  December 2022    COSMETIC SURGERY      Multiple Keloid surgeries    ENDOSCOPY N/A 06/15/2021    Procedure: ESOPHAGOGASTRODUODENOSCOPY;  Surgeon: Ayaka Andre MD;  Location: [facility] OR;  Service: Robotics - DaVinci;  Laterality: N/A;    ENDOSCOPY N/A 10/28/2021    Procedure: ESOPHAGOGASTRODUODENOSCOPY WITH ACHALASIA BALLOON DILATATION;  Surgeon: Jase Hernandez MD;  Location:  [facility]COPY;  " Service: Gastroenterology;  Laterality: N/A;  58 F DILATOR    ENDOSCOPY N/A 11/15/2021    Procedure: ESOPHAGOGASTRODUODENOSCOPY WITH DILATATION;  Surgeon: Jase Hernandez MD;  Location:  [facility];  Service: Gastroenterology;  Laterality: N/A;  achalasia balloon     ENDOSCOPY N/A 11/24/2021    Procedure: ESOPHAGOGASTRODUODENOSCOPY WTH DUODENAL POLYPECTOPMY AND NASAL JEJUNAL TUBE PLACEMENT;  Surgeon: Jase Hernandez MD;  Location:  [facility];  Service: Gastroenterology;  Laterality: N/A;  placement of feeding tube, checked position with KUB    GASTRIC RESTRICTION SURGERY  2021    Sleeve    GASTRIC SLEEVE LAPAROSCOPIC N/A 06/15/2021    Procedure: GASTRIC SLEEVE LAPAROSCOPIC WITH DAVINCI ROBOT, LAPROSCOPIC HIATAL HERNIA REPAIR WITH DAVINCI ROBOT;  Surgeon: Ayaka Andre MD;  Location:  [facility] OR;  Service: Robotics - DaVinci;  Laterality: N/A;    KELOID EXCISION      1990,1993,1999,2015,2016,2019    LAPAROSCOPIC CHOLECYSTECTOMY  2000    for stones    RADIOFREQUENCY ABLATION  2019    x 2  for pt back    UMBILICAL HERNIA REPAIR  1990    UPPER GASTROINTESTINAL ENDOSCOPY      WISDOM TOOTH EXTRACTION  1994       Family History   Problem Relation Age of Onset    Arthritis Mother     Diabetes Mother     Obesity Mother     Arrhythmia Mother     Hypertension Mother     Asthma Mother     Migraines Mother     Dementia Father     Heart attack Father     Arrhythmia Father     Heart disease Father     Alcohol abuse Father     Ovarian cancer Maternal Aunt         40's    Breast cancer Paternal Aunt         30's    Stroke Other         CVA    Obesity Other     Heart disease Other     Hypertension Other     Endometrial cancer Neg Hx     Colon cancer Neg Hx     Colon polyps Neg Hx     Esophageal cancer Neg Hx        Social History     Socioeconomic History    Marital status: Single   Tobacco Use    Smoking status: Never     Passive exposure: Never    Smokeless tobacco: Never   Vaping Use    Vaping Use: Never used    Substance and Sexual Activity    Alcohol use: Not Currently     Comment: Very rarely drink socially. At most 2 drinks per month.    Drug use: Never    Sexual activity: Yes     Partners: Male     Birth control/protection: Condom, Coitus interruptus           Objective   Physical Exam  Vitals and nursing note reviewed.   Constitutional:       General: She is not in acute distress.     Appearance: Normal appearance. She is obese. She is not toxic-appearing.   Cardiovascular:      Rate and Rhythm: Normal rate and regular rhythm.      Pulses: Normal pulses.   Pulmonary:      Effort: Pulmonary effort is normal. No respiratory distress.      Breath sounds: Normal breath sounds.   Abdominal:      Palpations: Abdomen is soft.      Tenderness: There is abdominal tenderness in the suprapubic area. There is no guarding.   Neurological:      Mental Status: She is alert and oriented to person, place, and time.   Psychiatric:         Mood and Affect: Mood normal.         Behavior: Behavior normal.         Procedures           ED Course  ED Course as of 02/22/24 0756   Thu Feb 22, 2024   0654 Urinalysis, Microscopic Only - Urine, Clean Catch(!)  Urinalysis reviewed and negative for evidence of infection. [RS]   0654 US Ob Transvaginal  Personally reviewed the transvaginal ultrasound.  On my interpretation there is an intrauterine gestational sac visualized. [RS]   0706 hCG, Quantitative, Pregnancy  Patient's beta hCG is dropped slightly from the recent comparison 2 days ago. [RS]   0707 OB/GYN paged. [RS]   0716 Discussed the case with Dr. Buckner with OB/GYN who recommends the patient follow-up with her primary OB for further evaluation and management. [RS]   0731 Patient is resting comfortably.  Abdomen is nonsurgical.  Patient with findings most consistent with threatened and likely inevitable miscarriage.  I talked to the patient about close follow-up with women's health.  She is to return to the ER for any concerns.  We  discussed pelvic rest prior to discharge.  She voiced understanding and agreed. I have reviewed results, considerations, and diagnosis with the patient and/or their representative. Anticipatory guidance provided. Follow-up plan reviewed. Precautions for acute return for re-evaluations also reviewed. This including potential for worsening of the presenting condition and need for further evaluation, admission, and/or intervention as indicated. Opportunity to as questions provided. I advised them to return for any concerns and stressed the importance of timely follow-up and outpatient services. They verbalized understanding.   [RS]   7801 Note to patient: The 21st Century Cures Act makes medical notes like these available to patients in the interest of transparency. However, be advised this is a medical document. It is intended as peer to peer communication. It is written in medical language and may contain abbreviations or verbiage that are unfamiliar. It may appear blunt or direct. Medical documents are intended to carry relevant information, facts as evident, and the clinical opinion of the physician/NPP.   [RS]      ED Course User Index  [RS] Brian Mike MD                                             Medical Decision Making  Problems Addressed:  History of multiple miscarriages: complicated acute illness or injury  Threatened miscarriage in early pregnancy: complicated acute illness or injury  Vaginal bleeding affecting early pregnancy: complicated acute illness or injury    Amount and/or Complexity of Data Reviewed  External Data Reviewed: labs and notes.  Labs:  Decision-making details documented in ED Course.  Radiology: ordered. Decision-making details documented in ED Course.  Discussion of management or test interpretation with external provider(s): OB/GYN        Final diagnoses:   Threatened miscarriage in early pregnancy   Vaginal bleeding affecting early pregnancy   History of multiple  miscarriages       ED Disposition  ED Disposition       ED Disposition   Discharge    Condition   Stable    Comment   --               [facility] EMERGENCY DEPARTMENT  [address] [facility] [address] [phone]    As needed, If symptoms worsen or ANY concerns.    Sherrie Lo, DO  [address] [facility] [address]  [place] [id number]  [phone]    Call today  As soon as possible.         Medication List      No changes were made to your prescriptions during this visit.            Brian Mike MD  02/22/24 0752

## 2024-02-22 NOTE — Clinical Note
[facility] EMERGENCY DEPARTMENT  [address]  [facility] [address]  Phone: [phone]    Frances Hartman was seen and treated in our emergency department on 2/22/2024.  She may return to work on 02/23/2024.         Thank you for choosing [facility].    Brian Mike MD

## 2024-02-29 ENCOUNTER — TRANSCRIBE ORDERS (OUTPATIENT)
Dept: LAB | Facility: HOSPITAL | Age: 39
End: 2024-02-29
Payer: COMMERCIAL

## 2024-02-29 ENCOUNTER — LAB (OUTPATIENT)
Dept: LAB | Facility: HOSPITAL | Age: 39
End: 2024-02-29
Payer: COMMERCIAL

## 2024-02-29 DIAGNOSIS — O03.9 THREATENED ABORTION, DELIVERED: ICD-10-CM

## 2024-02-29 DIAGNOSIS — O03.9 THREATENED ABORTION, DELIVERED: Primary | ICD-10-CM

## 2024-02-29 LAB
ABO GROUP BLD: NORMAL
BLD GP AB SCN SERPL QL: NEGATIVE
DEPRECATED RDW RBC AUTO: 43.9 FL (ref 37–54)
ERYTHROCYTE [DISTWIDTH] IN BLOOD BY AUTOMATED COUNT: 12.4 % (ref 12.3–15.4)
HCT VFR BLD AUTO: 37.5 % (ref 34–46.6)
HGB BLD-MCNC: 12.6 G/DL (ref 12–15.9)
MCH RBC QN AUTO: 32.1 PG (ref 26.6–33)
MCHC RBC AUTO-ENTMCNC: 33.6 G/DL (ref 31.5–35.7)
MCV RBC AUTO: 95.4 FL (ref 79–97)
PLATELET # BLD AUTO: 310 10*3/MM3 (ref 140–450)
PMV BLD AUTO: 10.8 FL (ref 6–12)
RBC # BLD AUTO: 3.93 10*6/MM3 (ref 3.77–5.28)
RH BLD: POSITIVE
WBC NRBC COR # BLD AUTO: 12.16 10*3/MM3 (ref 3.4–10.8)

## 2024-02-29 PROCEDURE — 86900 BLOOD TYPING SEROLOGIC ABO: CPT

## 2024-02-29 PROCEDURE — 36415 COLL VENOUS BLD VENIPUNCTURE: CPT

## 2024-02-29 PROCEDURE — 86850 RBC ANTIBODY SCREEN: CPT

## 2024-02-29 PROCEDURE — 85027 COMPLETE CBC AUTOMATED: CPT

## 2024-02-29 PROCEDURE — 86901 BLOOD TYPING SEROLOGIC RH(D): CPT

## 2024-03-05 ENCOUNTER — LAB REQUISITION (OUTPATIENT)
Dept: LAB | Facility: HOSPITAL | Age: 39
End: 2024-03-05
Payer: COMMERCIAL

## 2024-03-05 DIAGNOSIS — Z98.890 S/P DILATION AND CURETTAGE: Primary | ICD-10-CM

## 2024-03-05 DIAGNOSIS — O03.9 COMPLETE OR UNSPECIFIED SPONTANEOUS ABORTION WITHOUT COMPLICATION: ICD-10-CM

## 2024-03-05 PROCEDURE — 88305 TISSUE EXAM BY PATHOLOGIST: CPT | Performed by: STUDENT IN AN ORGANIZED HEALTH CARE EDUCATION/TRAINING PROGRAM

## 2024-03-05 RX ORDER — METRONIDAZOLE 500 MG/1
500 TABLET ORAL ONCE
Status: SHIPPED | OUTPATIENT
Start: 2024-03-05

## 2024-03-05 RX ORDER — METRONIDAZOLE 500 MG/1
500 TABLET ORAL ONCE
Qty: 1 TABLET | Refills: 0 | Status: SHIPPED | OUTPATIENT
Start: 2024-03-05 | End: 2024-03-06

## 2024-03-13 ENCOUNTER — OFFICE VISIT (OUTPATIENT)
Dept: ENDOCRINOLOGY | Facility: CLINIC | Age: 39
End: 2024-03-13
Payer: COMMERCIAL

## 2024-03-13 VITALS
BODY MASS INDEX: 44.37 KG/M2 | HEART RATE: 80 BPM | SYSTOLIC BLOOD PRESSURE: 120 MMHG | WEIGHT: 226 LBS | HEIGHT: 60 IN | OXYGEN SATURATION: 96 % | DIASTOLIC BLOOD PRESSURE: 80 MMHG

## 2024-03-13 DIAGNOSIS — E11.9 TYPE 2 DIABETES MELLITUS WITHOUT COMPLICATION, WITHOUT LONG-TERM CURRENT USE OF INSULIN: Primary | ICD-10-CM

## 2024-03-13 LAB
EXPIRATION DATE: ABNORMAL
GLUCOSE BLDC GLUCOMTR-MCNC: 156 MG/DL (ref 70–130)
HBA1C MFR BLD: 6.3 % (ref 4.5–5.7)
Lab: ABNORMAL

## 2024-03-13 RX ORDER — GLIPIZIDE 5 MG/1
5 TABLET, FILM COATED, EXTENDED RELEASE ORAL DAILY
Qty: 90 TABLET | Refills: 3 | Status: SHIPPED | OUTPATIENT
Start: 2024-03-13 | End: 2025-03-13

## 2024-03-13 NOTE — PROGRESS NOTES
Chief Complaint   Patient presents with    Diabetes     Follow up       HPI:   Frances Hartman is a 38 y.o.female who returns to endocrine clinic for follow-up evaluation of her Type 2 Diabetes. Last visit 11/28/2023.  Her history is as follows:    Interim Events:  - pt contacted me on 01/26/2024 to notify me of her pregnancy. Prescribed insulin on 02/11/2024. Pt later miscarried on 02/29/2024. This was pt's 11th miscarriage. She continued her Levemir and Dexcom CGM until today's appointment.       1) Type 2 DM with no known associated complications at this time:   - Diagnosed with diabetes in 2014. (A1C% was 7.6). Pt was already on metformin ER for PCOS prior to diagnosis. Tolerated metformin ER and stopped in 06/2021 after bariatric surgery.  - Had Sleeve gastrectomy on 06/15/2021. Had post-op complication of pouch stricture and underwent dilation of pouch in 07/2021, 10/2021, and 11/2021 by gastroenterology. Highest wt 290 lbs, Lost 50 lbs on her own. Weight 238 pre-op. Currently 190 lbs.   - Was hospitalized 11/26/2021 for N/V/dehydration.   - pt had tried Januvia in 05/2023, but was not efficacious. She had taking glipizide before her surgery and requested to restart the medication.    - pt contacted me on 01/26/2024 to notify me of her pregnancy. Prescribed insulin on 02/11/2024. Pt later miscarried on 02/29/2024. She continued her Levemir and Dexcom CGM until today's appointment.     Current DM Medications:  Metformin  mg BID with food  Levemir 8 units BID. Last injection yesterday night    CGM Glucometer Review:   SG > 250: 0% (goal < 5%)  SG (181 - 250): 14%% (goal < 25%)  SG (70 - 180): 86%% (Goal > 70%)   SG (54 - 69): 0% (goal < 4%)  SG < 54: 0% (goal < 1%)  Avg Glucose: 138  Glucose Variability: 25.5%  (goal </= 35%)  Glucose Management Indicator: 6.6%     Although glucose within range for the majority of the day and overnight for type 2 diabetes.  Her glucoses were not at goal for  "pregnancy.    DM Health Maintenance:  Ophtho: DUE  Podiatry: no recent visit  Monofilament / Foot exam: DUE  Lipids: (10/2022) Tchol 140, TG 72, HDL 54, LDL 72 - not on statin at this time  Urine Microalb/Cr ratio: (06/2020) 10.2  TSH: (06/2023) 3.520  CBC: (06/2023) Hgb 12.7  CMP: (04/2023) Cr 0.99, GFR 74.5, ALT 47, AST 27  Vit B12: (06/2023) 560    2) PCOS:  - diagnosed in 2011.   - Was prescribed metformin ER and spironolactone at time of diagnosis. Had stopped both medications prior to bariatric surgery.  - Restarted metformin  mg in 02/2022    3) Hirsutism:  - Was on spironolactone 100 mg daily in the past. Medication stopped prior to bariatric surgery in 06/2021  - PCP restarted spironolactone 25 mg daily in 10/2022.   - Was on spironolactone 50 mg daily and stopped in 01/2024 when pregnant. Has not restarted.     Review of Systems   Constitutional:  Negative for activity change and fatigue.        Wt stable since last visit.    Eyes: Negative.  Negative for discharge.   Respiratory: Negative.     Cardiovascular: Negative.    Gastrointestinal:  Negative for diarrhea and nausea.   Endocrine: Negative.    Genitourinary: Negative.    Musculoskeletal:  Positive for myalgias (Occasional). Negative for arthralgias and back pain.   Skin: Negative.    Allergic/Immunologic: Positive for environmental allergies and food allergies.   Neurological:  Positive for headaches. Negative for dizziness, weakness, light-headedness and numbness.   Hematological: Negative.  Does not bruise/bleed easily.   Psychiatric/Behavioral:  Positive for sleep disturbance.         Appropriately grieving recent miscarriage       The following portions of the patient's history were reviewed and updated as appropriate: allergies, current medications, past family history, past medical history, past social history, past surgical history and problem list.      /80   Pulse 80   Ht 152.4 cm (60\")   Wt 103 kg (226 lb)   LMP 12/15/2023 " (Exact Date)   SpO2 96%   BMI 44.14 kg/m²   Physical Exam  Vitals reviewed.   Constitutional:       General: She is not in acute distress.     Appearance: Normal appearance. She is well-developed. She is not diaphoretic.   HENT:      Head: Normocephalic.   Eyes:      Conjunctiva/sclera: Conjunctivae normal.      Pupils: Pupils are equal, round, and reactive to light.   Neck:      Thyroid: No thyromegaly.      Trachea: No tracheal deviation.      Comments: No palpable thyroid nodules    Cardiovascular:      Rate and Rhythm: Normal rate and regular rhythm.      Heart sounds: Normal heart sounds. No murmur heard.  Pulmonary:      Effort: Pulmonary effort is normal. No respiratory distress.      Breath sounds: Normal breath sounds.   Lymphadenopathy:      Cervical: No cervical adenopathy.   Skin:     General: Skin is warm and dry.      Findings: No erythema.      Comments: + acanthosis nigricans, + facial hirsutism   Neurological:      Mental Status: She is alert and oriented to person, place, and time.      Cranial Nerves: No cranial nerve deficit.   Psychiatric:         Behavior: Behavior normal.       LABS/IMAGING: prior labs / outside records reviewed and summarized in HPI  Results for orders placed or performed in visit on 03/13/24   POC Glycosylated Hemoglobin (Hb A1C)    Specimen: Blood   Result Value Ref Range    Hemoglobin A1C 6.3 (A) 4.5 - 5.7 %    Lot Number [id number]     Expiration Date 10/22/25    POC Glucose, Blood    Specimen: Blood   Result Value Ref Range    Glucose 156 (A) 70 - 130 mg/dL       ASSESSMENT/PLAN:    1) Type 2 DM with no associated complications at this time: controlled, A1C% 6.3 today. CGM reviewed with pt, Although glucose within range for the majority of the day and overnight for type 2 diabetes.  Her glucoses were not at goal for pregnancy.  - pt recently on insulin in 02/2024 while pregnant. Pt s/p miscarriage on 02/29/2024.   - Pt with h/o frequent UTI, BV: Pt would like to  post-pone trying a SGLT-2 inhibitors at this time.   - Januvia was not efficacious.  - May consider the possible addition of a GLP-1 agonist at a much later date once she has fully recovered from her bariatric surgery and having no GI symptoms.     Pt may be pursuing another pregnancy so will hold off on trying GLP -1 medications at this time    - Continue metformin  mg BID with food.   - Restart glipizide ER 5 mg daily.    RTC 6 months    Electronically Signed: Carine Aden MD

## 2024-04-10 ENCOUNTER — SPECIALTY PHARMACY (OUTPATIENT)
Dept: ONCOLOGY | Facility: HOSPITAL | Age: 39
End: 2024-04-10
Payer: COMMERCIAL

## 2024-04-10 NOTE — PROGRESS NOTES
Specialty Pharmacy Refill Coordination Note     Frances is a 38 y.o. female contacted today regarding refills of Botox specialty medication(s).Patient is due for injection on 04/19.    Reviewed and verified with patient:       Specialty medication(s) and dose(s) confirmed: yes    Refill Questions      Flowsheet Row Most Recent Value   Changes to allergies? No   Changes to medications? No   New conditions or infections since last clinic visit No   Unplanned office visit, urgent care, ED, or hospital admission in the last 4 weeks  No   How does patient/caregiver feel medication is working? Good   Financial problems or insurance changes  No   If yes, describe changes in insurance or financial issues. N/A   Since the previous refill, were any specialty medication doses or scheduled injections missed or delayed?  No   If yes, please provide the amount N/A   Why were doses missed? N/A   Does this patient require a clinical escalation to a pharmacist? No            Delivery Questions      Flowsheet Row Most Recent Value   Delivery method [employer]   Delivery address verified with patient/caregiver? Yes   Delivery address Home   Number of medications in delivery 1   Medication(s) being filled and delivered [facility]   Doses left of specialty medications N/A   Copay verified? Yes   Copay amount N/A   Copay form of payment No copayment ($0)              Medication Adherence    Adherence tools used: cell phone  Support network for adherence: healthcare provider          Follow-up: 90 day(s)     Roman David  Specialty Pharmacy Technician

## 2024-04-11 ENCOUNTER — OFFICE VISIT (OUTPATIENT)
Dept: BARIATRICS/WEIGHT MGMT | Facility: CLINIC | Age: 39
End: 2024-04-11
Payer: COMMERCIAL

## 2024-04-11 VITALS
WEIGHT: 222.5 LBS | DIASTOLIC BLOOD PRESSURE: 92 MMHG | OXYGEN SATURATION: 99 % | HEART RATE: 58 BPM | BODY MASS INDEX: 42.01 KG/M2 | SYSTOLIC BLOOD PRESSURE: 140 MMHG | HEIGHT: 61 IN | TEMPERATURE: 97.7 F

## 2024-04-11 DIAGNOSIS — Z13.0 SCREENING, IRON DEFICIENCY ANEMIA: ICD-10-CM

## 2024-04-11 DIAGNOSIS — E66.01 OBESITY, CLASS III, BMI 40-49.9 (MORBID OBESITY): Primary | ICD-10-CM

## 2024-04-11 DIAGNOSIS — R53.83 FATIGUE, UNSPECIFIED TYPE: ICD-10-CM

## 2024-04-11 DIAGNOSIS — K91.2 POSTGASTRECTOMY MALABSORPTION: ICD-10-CM

## 2024-04-11 DIAGNOSIS — E28.2 PCOS (POLYCYSTIC OVARIAN SYNDROME): ICD-10-CM

## 2024-04-11 DIAGNOSIS — E11.65 TYPE 2 DIABETES MELLITUS WITH HYPERGLYCEMIA, UNSPECIFIED WHETHER LONG TERM INSULIN USE: ICD-10-CM

## 2024-04-11 DIAGNOSIS — Z90.3 POSTGASTRECTOMY MALABSORPTION: ICD-10-CM

## 2024-04-11 DIAGNOSIS — E55.9 HYPOVITAMINOSIS D: ICD-10-CM

## 2024-04-11 DIAGNOSIS — Z51.81 THERAPEUTIC DRUG MONITORING: ICD-10-CM

## 2024-04-11 DIAGNOSIS — Z13.21 MALNUTRITION SCREEN: ICD-10-CM

## 2024-04-11 PROCEDURE — 99214 OFFICE O/P EST MOD 30 MIN: CPT | Performed by: SURGERY

## 2024-04-11 RX ORDER — MULTIPLE VITAMINS W/ MINERALS TAB 9MG-400MCG
1 TAB ORAL DAILY
COMMUNITY

## 2024-04-11 NOTE — PROGRESS NOTES
[facility] Bariatric Surgery  [address]    [facility] [address] [phone]        Patient Name:  Frances Hartman.  :  1985      Date of Visit: 2024      Reason for Visit:   3 years postop      HPI: Frances Hartman is a 38 y.o. female s/p robotic assisted LSG/HHR by  on 6/15/21     Had a miscarriage recently, required D&C.  Reports this is her 11th miscarriage.  She was not using any fertility drugs.    Had upcoming colonoscopy and mammogram set up, put on hold d/t miscarriage.  She is working as a .  Has also taken time off for her mother to have surgery.      Doing well.  No new issues/concerns. Denies dysphagia, reflux, nausea, vomiting, and abdominal pain.  Getting estimated 40 g prot/day.  We discussed some strategies for tracking on Baritastic, how to estimate the amount of soup or meat etc she was Drinking 64+ fluid oz/day. Last labs revealed  no vitamin deficiencies. Taking MVI, Vit D, and vit E, Vit B6 .  Takes Tums and gasEx control mild intermittent reflux.  Exercise: none recently.  Was on bedrest x 3 weeks to try and prevent miscarriage.  On bedrest after also d/t ongoing vaginal bleeding.     Has not had to use IV hydration in past few months.      Presurgery weight: 231 pounds.  Today's weight is 101 kg (222 lb 8 oz) pounds, today's  Body mass index is 42.74 kg/m²., andHIS@ weight loss since surgery is 9 pounds.      Past Medical History:   Diagnosis Date    Allergic     Birth    Anxiety     Arthritis     Asthma     prn inhalers, does not follow w/ pulmonary    Chronic back pain     previously followed w/ pain management, now just prn Tylenol + Gabapentin    Chronic deep vein thrombosis (DVT) of femoral vein of right lower extremity 09/10/2021    Clotting disorder 2015    Cluster headache     Depression     Diabetes mellitus     Type II, dx , never on insulin, A1C 7.6     Dyspepsia     Dyspnea on exertion      Dysuria 03/07/2022    Dysuria and hematuria x 2 weeks. 3/7/22 urine culture sent. Rx Macrobid 100 mg twice daily for 7 days. Patient did not tolerate Macrobid well due to GI upset. Urine culture was negative.    Fatigue     Gastroparesis     GERD (gastroesophageal reflux disease)     Headache, tension-type     Heartburn     chronic, prn TUMS, denies prior eval    Hirsutism     History of medical problems     PCOS    Hx of radiation therapy     for treatments of Keloids    Hypertension     Irregular menses     infrequent spotting    Leiomyoma, subserous     possible peduculated f3-4 cm fibroid on u/s at [facility]    Low back pain     Migraines     botox q3 months, following Neurology @ [facility]    Morbid obesity     s/p sleeve gastrectomy    Peripheral edema     Polycystic ovary syndrome 2011    Seasonal allergies     Slow to wake up after anesthesia     Urinary tract infection     Visual impairment     Astigmatism, Nearsightedness     Past Surgical History:   Procedure Laterality Date    ABDOMINAL SURGERY  06/15/2021    Bariatric Sleeve Surgery    BARIATRIC SURGERY      COLONOSCOPY  December 2022    COSMETIC SURGERY      Multiple Keloid surgeries    ENDOSCOPY N/A 06/15/2021    Procedure: ESOPHAGOGASTRODUODENOSCOPY;  Surgeon: Ayaka Andre MD;  Location: [facility] OR;  Service: Robotics - DaVinci;  Laterality: N/A;    ENDOSCOPY N/A 10/28/2021    Procedure: ESOPHAGOGASTRODUODENOSCOPY WITH ACHALASIA BALLOON DILATATION;  Surgeon: Jase Hernandez MD;  Location:  [facility];  Service: Gastroenterology;  Laterality: N/A;  58 F DILATOR    ENDOSCOPY N/A 11/15/2021    Procedure: ESOPHAGOGASTRODUODENOSCOPY WITH DILATATION;  Surgeon: Jase Hernandez MD;  Location:  [facility];  Service: Gastroenterology;  Laterality: N/A;  achalasia balloon     ENDOSCOPY N/A 11/24/2021    Procedure: ESOPHAGOGASTRODUODENOSCOPY [facility] DUODENAL POLYPECTOPMY AND NASAL JEJUNAL TUBE PLACEMENT;  Surgeon: Jase Hernandez MD;  Location:  [facility]  ENDOS[facility];  Service: Gastroenterology;  Laterality: N/A;  placement of feeding tube, checked position with KUB    GASTRIC RESTRICTION SURGERY  2021    Sleeve    GASTRIC SLEEVE LAPAROSCOPIC N/A 06/15/2021    Procedure: GASTRIC SLEEVE LAPAROSCOPIC WITH DAVINCI ROBOT, LAPROSCOPIC HIATAL HERNIA REPAIR WITH DAVINCI ROBOT;  Surgeon: Ayaka Andre MD;  Location: [facility];  Service: Robotics - DaVinci;  Laterality: N/A;    KELOID EXCISION      1990,1993,1999,2015,2016,2019    LAPAROSCOPIC CHOLECYSTECTOMY  2000    for stones    RADIOFREQUENCY ABLATION  2019    x 2  for pt back    UMBILICAL HERNIA REPAIR  1990    UPPER GASTROINTESTINAL ENDOSCOPY      WISDOM TOOTH EXTRACTION  1994     Outpatient Medications Marked as Taking for the 4/11/24 encounter (Office Visit) with Ayaka Andre MD   Medication Sig Dispense Refill    acetaminophen (TYLENOL) 500 MG tablet Take 1-2 tablets by mouth Every 6 (Six) Hours As Needed for Mild Pain.      albuterol sulfate  (90 Base) MCG/ACT inhaler Inhale 2 puffs Every 4 (Four) Hours As Needed for Wheezing or Shortness of Air. 18 g 5    Blood Glucose Monitoring Suppl (ONE TOUCH ULTRA 2) w/Device kit       calcium carbonate (TUMS) 500 MG chewable tablet Chew 1 tablet Daily.      diphenhydrAMINE (BENADRYL) 25 mg capsule Take 1 capsule by mouth Every 6 (Six) Hours As Needed for Itching or Sleep.      glipizide (GLUCOTROL XL) 5 MG ER tablet Take 1 tablet by mouth Daily. 90 tablet 3    Lancets (OneTouch Delica Plus [id number]) misc Use 1 each 5 (Five) Times a Day. 200 each 5    loratadine (CLARITIN) 10 MG tablet TAKE ONE TABLET BY MOUTH DAILY 30 tablet 2    metFORMIN ER (GLUCOPHAGE-XR) 500 MG 24 hr tablet Take 2 tablets by mouth Daily With Dinner. 180 tablet 1    multivitamin with minerals tablet tablet Take 1 tablet by mouth Daily.      OnabotulinumtoxinA (Botox) 200 units reconstituted solution FOR . PHYSICIAN TO INJECT UP  UNITS INTRAMUSCULARLY INTO  "HEAD, NECK AND SHOULDERS EVERY 90 DAYS. 1 each 3    OneTouch Ultra test strip 1 each by Other route 5 (Five) Times a Day. 200 each 5    simethicone (Gas-X) 80 MG chewable tablet Chew 1 tablet Every 6 (Six) Hours As Needed for Flatulence. 10 tablet 0    vitamin B-6 (PYRIDOXINE) 50 MG tablet Take 1 tablet by mouth 2 (Two) Times a Day.      vitamin D3 125 MCG (5000 UT) capsule capsule Take 1 capsule by mouth Daily.       Current Facility-Administered Medications for the 4/11/24 encounter (Office Visit) with Ayaka Andre MD   Medication Dose Route Frequency Provider Last Rate Last Admin    OnabotulinumtoxinA 200 Units  200 Units Intramuscular Q3 Months Shiela Coelho APRN   200 Units at 01/25/24 0845    [DISCONTINUED] metroNIDAZOLE (FLAGYL) tablet 500 mg  500 mg Oral Once Sherrie Lo, DO           Allergies   Allergen Reactions    Capsicum Annuum Extract & Derivative (Bell Pepper) [Capsicum] Anaphylaxis    Codeine Headache, Unknown - Low Severity and Other (See Comments)     Can tolerate hydrocodone, no other adverse reactions to other narcotics.    Doxycycline Hallucinations, Rash and Unknown - Low Severity    Hydrochlorothiazide Swelling and Unknown - Low Severity     eventually causes CHF    Ibuprofen Other (See Comments)     \"makes me retain fluid and eventually go into CHF\"    Nitrofurantoin Nausea Only and Unknown - Low Severity    Peanut-Containing Drug Products Anaphylaxis    Monosodium Glutamate Swelling and Headache    Oatmeal Other (See Comments)     Dx on allergy testing    Banana (Diagnostic) Unknown - High Severity    Coconut Flavor Unknown - High Severity    Doxycycline Monohydrate Other (See Comments)    Isoflavones (Soy) Unknown - Low Severity    Amitriptyline Mental Status Change and Unknown - Low Severity     memory gaps + neuropathy + hair loss    Amoxicillin-Pot Clavulanate Other (See Comments) and Unknown - Low Severity     Syncope, not sure if allergic to cephalosporins.    " "Aspartame Nausea Only    Diclofenac Headache and Unknown - Low Severity    Egg-Derived Products Other (See Comments)     dx on allergy testing, but does not completely avoid    Naproxen Other (See Comments)     \"blackout\"       Social History     Socioeconomic History    Marital status: Single   Tobacco Use    Smoking status: Never     Passive exposure: Never    Smokeless tobacco: Never   Vaping Use    Vaping status: Never Used   Substance and Sexual Activity    Alcohol use: Not Currently     Comment: Very rarely drink socially. At most 2 drinks per month.    Drug use: Never    Sexual activity: Yes     Partners: Male     Birth control/protection: Condom, Coitus interruptus       /92 (BP Location: Left arm, Patient Position: Sitting)   Pulse 58   Temp 97.7 °F (36.5 °C)   Ht 153.7 cm (60.5\")   Wt 101 kg (222 lb 8 oz)   SpO2 99%   BMI 42.74 kg/m²     Physical Exam  Constitutional:       General: She is not in acute distress.     Appearance: She is well-developed. She is not diaphoretic.   HENT:      Head: Normocephalic and atraumatic.      Mouth/Throat:      Pharynx: No oropharyngeal exudate.   Eyes:      Conjunctiva/sclera: Conjunctivae normal.      Pupils: Pupils are equal, round, and reactive to light.   Pulmonary:      Effort: Pulmonary effort is normal. No respiratory distress.   Abdominal:      General: There is no distension.      Palpations: Abdomen is soft.   Skin:     General: Skin is warm and dry.      Coloration: Skin is not pale.   Neurological:      Mental Status: She is alert and oriented to person, place, and time.      Cranial Nerves: No cranial nerve deficit.   Psychiatric:         Behavior: Behavior normal.         Thought Content: Thought content normal.           Assessment:  3 years s/p robotic assisted LSG/HHR by  on 6/15/21     ICD-10-CM ICD-9-CM   1. Obesity, Class III, BMI 40-49.9 (morbid obesity)  E66.01 278.01   2. Type 2 diabetes mellitus with hyperglycemia, " unspecified whether long term insulin use  E11.65 250.00   3. PCOS (polycystic ovarian syndrome)  E28.2 256.4   4. Therapeutic drug monitoring  Z51.81 V58.83   5. Screening, iron deficiency anemia  Z13.0 V78.0   6. Postgastrectomy malabsorption  K91.2 579.3    Z90.3    7. Malnutrition screen  Z13.21 V77.2   8. Hypovitaminosis D  E55.9 268.9   9. Fatigue, unspecified type  R53.83 780.79              Plan:  Doing well. Continue w/ good food choices and healthy habits.  Continue protein >70g/day.  Continue routine exercise.  Routine bariatric labs ordered.  Continue vitamins w/ adjustments pending lab results.  Call w/ problems/concerns.     Recommend [place] back to [place] when possible.  Start exercising, encouraged to add strength exercises.  Encouraged to track on Baritastic.    Per request, will add infusion standing orders.    The patient was instructed to follow up in 12 months, sooner if needed.  Pt given option of shorter followup to help with ongoing weight loss efforts, but respectfully declines at this time.    I spent 30 minutes caring for Frances on this date of service. This time includes time spent by me in the following activities: preparing for the visit, reviewing tests, obtaining and/or reviewing a separately obtained history, performing a medically appropriate examination and/or evaluation, counseling and educating the patient/family/caregiver, ordering medications, tests, or procedures, documenting information in the medical record, and independently interpreting results and communicating that information with the patient/family/caregiver      Ayaka Andre MD

## 2024-04-19 ENCOUNTER — PROCEDURE VISIT (OUTPATIENT)
Dept: NEUROLOGY | Facility: CLINIC | Age: 39
End: 2024-04-19
Payer: COMMERCIAL

## 2024-04-19 ENCOUNTER — LAB (OUTPATIENT)
Dept: LAB | Facility: HOSPITAL | Age: 39
End: 2024-04-19
Payer: COMMERCIAL

## 2024-04-19 DIAGNOSIS — E55.9 HYPOVITAMINOSIS D: ICD-10-CM

## 2024-04-19 DIAGNOSIS — Z51.81 THERAPEUTIC DRUG MONITORING: ICD-10-CM

## 2024-04-19 DIAGNOSIS — Z13.21 MALNUTRITION SCREEN: ICD-10-CM

## 2024-04-19 DIAGNOSIS — G43.709 CHRONIC MIGRAINE WITHOUT AURA WITHOUT STATUS MIGRAINOSUS, NOT INTRACTABLE: Primary | Chronic | ICD-10-CM

## 2024-04-19 DIAGNOSIS — K91.2 POSTGASTRECTOMY MALABSORPTION: ICD-10-CM

## 2024-04-19 DIAGNOSIS — R53.83 FATIGUE, UNSPECIFIED TYPE: ICD-10-CM

## 2024-04-19 DIAGNOSIS — Z90.3 POSTGASTRECTOMY MALABSORPTION: ICD-10-CM

## 2024-04-19 DIAGNOSIS — Z13.0 SCREENING, IRON DEFICIENCY ANEMIA: ICD-10-CM

## 2024-04-19 LAB
BASOPHILS # BLD AUTO: 0.07 10*3/MM3 (ref 0–0.2)
BASOPHILS NFR BLD AUTO: 0.8 % (ref 0–1.5)
DEPRECATED RDW RBC AUTO: 39.5 FL (ref 37–54)
EOSINOPHIL # BLD AUTO: 0.4 10*3/MM3 (ref 0–0.4)
EOSINOPHIL NFR BLD AUTO: 4.4 % (ref 0.3–6.2)
ERYTHROCYTE [DISTWIDTH] IN BLOOD BY AUTOMATED COUNT: 11.7 % (ref 12.3–15.4)
FOLATE SERPL-MCNC: 15.8 NG/ML (ref 4.78–24.2)
HCT VFR BLD AUTO: 38 % (ref 34–46.6)
HGB BLD-MCNC: 12.7 G/DL (ref 12–15.9)
IMM GRANULOCYTES # BLD AUTO: 0.02 10*3/MM3 (ref 0–0.05)
IMM GRANULOCYTES NFR BLD AUTO: 0.2 % (ref 0–0.5)
LYMPHOCYTES # BLD AUTO: 3.07 10*3/MM3 (ref 0.7–3.1)
LYMPHOCYTES NFR BLD AUTO: 34.1 % (ref 19.6–45.3)
MCH RBC QN AUTO: 31.1 PG (ref 26.6–33)
MCHC RBC AUTO-ENTMCNC: 33.4 G/DL (ref 31.5–35.7)
MCV RBC AUTO: 93.1 FL (ref 79–97)
MONOCYTES # BLD AUTO: 0.58 10*3/MM3 (ref 0.1–0.9)
MONOCYTES NFR BLD AUTO: 6.4 % (ref 5–12)
NEUTROPHILS NFR BLD AUTO: 4.87 10*3/MM3 (ref 1.7–7)
NEUTROPHILS NFR BLD AUTO: 54.1 % (ref 42.7–76)
NRBC BLD AUTO-RTO: 0.2 /100 WBC (ref 0–0.2)
PLATELET # BLD AUTO: 245 10*3/MM3 (ref 140–450)
PMV BLD AUTO: 11.8 FL (ref 6–12)
PREALB SERPL-MCNC: 27.3 MG/DL (ref 20–40)
RBC # BLD AUTO: 4.08 10*6/MM3 (ref 3.77–5.28)
WBC NRBC COR # BLD AUTO: 9.01 10*3/MM3 (ref 3.4–10.8)

## 2024-04-19 PROCEDURE — 82746 ASSAY OF FOLIC ACID SERUM: CPT

## 2024-04-19 PROCEDURE — 84134 ASSAY OF PREALBUMIN: CPT

## 2024-04-19 PROCEDURE — 82306 VITAMIN D 25 HYDROXY: CPT | Performed by: SURGERY

## 2024-04-19 PROCEDURE — 84425 ASSAY OF VITAMIN B-1: CPT

## 2024-04-19 PROCEDURE — 85025 COMPLETE CBC W/AUTO DIFF WBC: CPT

## 2024-04-19 PROCEDURE — 83921 ORGANIC ACID SINGLE QUANT: CPT

## 2024-04-20 LAB — 25(OH)D3+25(OH)D2 SERPL-MCNC: 57.5 NG/ML (ref 30–100)

## 2024-04-24 LAB
METHYLMALONATE SERPL-SCNC: 326 NMOL/L (ref 0–378)
VIT B1 BLD-SCNC: 118.8 NMOL/L (ref 66.5–200)

## 2024-05-14 RX ORDER — METOPROLOL TARTRATE 50 MG/1
50 TABLET, FILM COATED ORAL 2 TIMES DAILY
Qty: 180 TABLET | Refills: 3 | Status: SHIPPED | OUTPATIENT
Start: 2024-05-14

## 2024-05-24 PROBLEM — R00.2 PALPITATIONS: Status: ACTIVE | Noted: 2024-05-24

## 2024-05-28 ENCOUNTER — TELEPHONE (OUTPATIENT)
Dept: CARDIOLOGY | Facility: CLINIC | Age: 39
End: 2024-05-28
Payer: COMMERCIAL

## 2024-05-28 NOTE — TELEPHONE ENCOUNTER
Caller: Frances Hartman A    Relationship to patient: Self    Best call back number: [phone]    Chief complaint: PATIENT MISSED HER APPOINTMENT ON 05.24.24. SHE SAID SHE FELT OK AND HAD NO NEW CONCERNS. SHE SAID UNLESS YOU NEED HER TO COME IN FOR REFILLS OR SOMETHING SHE THINKS SHE IS OK TO WAIT UNTIL HER APPOINTMENT ON 12.13.24. PLEASE REACH OUT TO HER TO LET HER KNOW IF ITS OK TO SKIP THIS APPOINTMENT OR SCHEDULE HER IF SHE NEEDS AN APPOINTMENT.     Type of visit: FOLLOW UP      If rescheduling, when is the original appointment: 05.24.24    Additional notes:PATIENT WORKS 3RD SHIFT. SHE SAID SHE TYPICALLY GOES TO BED AROUND 2 PM. PLEASE CONTACT HER BEFORE THEN.

## 2024-05-29 NOTE — TELEPHONE ENCOUNTER
I spoke with pt, after her miscarriage she went back on her normal medications and is doing well. Pt is okay to keep her follow up appointment will call back if she is any issues or questions before then.

## 2024-05-31 ENCOUNTER — TELEPHONE (OUTPATIENT)
Dept: GASTROENTEROLOGY | Facility: CLINIC | Age: 39
End: 2024-05-31
Payer: COMMERCIAL

## 2024-05-31 NOTE — TELEPHONE ENCOUNTER
Hub staff attempted to follow warm transfer process and was unsuccessful     Caller: Frances Hartman    Relationship to patient: Self    Best call back number:  [phone] ANYTIME TODAY AS PT IS OFF WORK BUT NEXT WEEK M-F BEFORE 2PM.    Patient is needing: PT HAS HAD A CHANGE IN BM COLOR AND CONSISTENCY AND UNCONTROLLABLE BMS AT NIGHT, PLEASE CALL AND ADVISE.

## 2024-05-31 NOTE — TELEPHONE ENCOUNTER
I had ordered a colonoscopy in January-I am not sure why this is not scheduled yet.  Please schedule patient for colonoscopy

## 2024-07-01 RX ORDER — SODIUM, POTASSIUM,MAG SULFATES 17.5-3.13G
1 SOLUTION, RECONSTITUTED, ORAL ORAL TAKE AS DIRECTED
Qty: 354 ML | Refills: 0 | Status: SHIPPED | OUTPATIENT
Start: 2024-07-01

## 2024-07-05 ENCOUNTER — HOSPITAL ENCOUNTER (OUTPATIENT)
Facility: HOSPITAL | Age: 39
Discharge: HOME | End: 2024-07-05
Admitting: CLINICAL NURSE SPECIALIST
Payer: COMMERCIAL

## 2024-07-05 ENCOUNTER — SPECIALTY PHARMACY (OUTPATIENT)
Dept: ONCOLOGY | Facility: HOSPITAL | Age: 39
End: 2024-07-05
Payer: COMMERCIAL

## 2024-07-05 DIAGNOSIS — Z12.31 VISIT FOR SCREENING MAMMOGRAM: ICD-10-CM

## 2024-07-05 PROCEDURE — 77067 SCR MAMMO BI INCL CAD: CPT

## 2024-07-05 PROCEDURE — 77063 BREAST TOMOSYNTHESIS BI: CPT

## 2024-07-05 NOTE — PROGRESS NOTES
Specialty Pharmacy Patient Management Program  Neurology Reassessment     Frances Hartman is a 38 y.o. female with migraines seen by a Neurology provider and enrolled in the Neurology Patient Management program offered by [facility].  A follow-up outreach was conducted, including assessment of continued therapy appropriateness, medication adherence, and side effect incidence and management for Botox.     Changes to Insurance Coverage or Financial Support  Rx [employer]     Relevant Past Medical History and Comorbidities  Relevant medical history and concomitant health conditions were discussed with the patient. The patient's chart has been reviewed for relevant past medical history and comorbid health conditions and updated as necessary.   Past Medical History:   Diagnosis Date    Allergic     Birth    Anxiety     Arthritis     Asthma     prn inhalers, does not follow w/ pulmonary    Chronic back pain     previously followed w/ pain management, now just prn Tylenol + Gabapentin    Chronic deep vein thrombosis (DVT) of femoral vein of right lower extremity 09/10/2021    Clotting disorder 2015    Cluster headache     Depression     Diabetes mellitus     Type II, dx 2014, never on insulin, A1C 7.6     Dyspepsia     Dyspnea on exertion     Dysuria 03/07/2022    Dysuria and hematuria x 2 weeks. 3/7/22 urine culture sent. Rx Macrobid 100 mg twice daily for 7 days. Patient did not tolerate Macrobid well due to GI upset. Urine culture was negative.    Fatigue     Gastroparesis     GERD (gastroesophageal reflux disease)     Headache, tension-type     Heartburn     chronic, prn TUMS, denies prior eval    Hirsutism     History of medical problems     PCOS    Hx of radiation therapy     for treatments of Keloids    Hypertension     Irregular menses     infrequent spotting    Leiomyoma, subserous     possible peduculated f3-4 cm fibroid on u/s at [facility]    Low back pain     Migraines     botox q3  "months, following Neurology @ [facility]    Morbid obesity     s/p sleeve gastrectomy    Peripheral edema     Polycystic ovary syndrome 2011    Seasonal allergies     Slow to wake up after anesthesia     Urinary tract infection     Visual impairment     Astigmatism, Nearsightedness     Social History     Socioeconomic History    Marital status: Single   Tobacco Use    Smoking status: Never     Passive exposure: Never    Smokeless tobacco: Never   Vaping Use    Vaping status: Never Used   Substance and Sexual Activity    Alcohol use: Not Currently     Comment: Very rarely drink socially. At most 2 drinks per month.    Drug use: Never    Sexual activity: Yes     Partners: Male     Birth control/protection: Condom, Coitus interruptus     Problem list reviewed by Nancy Solano, PharmD on 7/5/2024 at 11:00 AM    Hospitalizations and Urgent Care Since Last Assessment  ED Visits, Admissions, or Hospitalizations: none   Urgent Office Visits: none     Allergies  Known allergies and reactions were discussed with the patient. The patient's chart has been reviewed for allergy information and updated as necessary.   Allergies   Allergen Reactions    Capsicum Annuum Extract & Derivative (Bell Pepper) [Capsicum] Anaphylaxis    Codeine Headache, Unknown - Low Severity and Other (See Comments)     Can tolerate hydrocodone, no other adverse reactions to other narcotics.    Doxycycline Hallucinations, Rash and Unknown - Low Severity    Hydrochlorothiazide Swelling and Unknown - Low Severity     eventually causes CHF    Ibuprofen Other (See Comments)     \"makes me retain fluid and eventually go into CHF\"    Nitrofurantoin Nausea Only and Unknown - Low Severity    Peanut-Containing Drug Products Anaphylaxis    Monosodium Glutamate Swelling and Headache    Oatmeal Other (See Comments)     Dx on allergy testing    Banana (Diagnostic) Unknown - High Severity    Coconut Flavor Unknown - High Severity    Doxycycline Monohydrate Other (See " "Comments)    Isoflavones (Soy) Unknown - Low Severity    Amitriptyline Mental Status Change and Unknown - Low Severity     memory gaps + neuropathy + hair loss    Amoxicillin-Pot Clavulanate Other (See Comments) and Unknown - Low Severity     Syncope, not sure if allergic to cephalosporins.    Aspartame Nausea Only    Diclofenac Headache and Unknown - Low Severity    Egg-Derived Products Other (See Comments)     dx on allergy testing, but does not completely avoid    Naproxen Other (See Comments)     \"blackout\"     Allergies reviewed by Nancy Solano, PharmD on 7/5/2024 at 11:00 AM    Relevant Laboratory Values  Common labs          2/29/2024    12:42 3/13/2024    11:52 4/19/2024    08:52   Common Labs   WBC 12.16   9.01    Hemoglobin 12.6   12.7    Hematocrit 37.5   38.0    Platelets 310   245    Hemoglobin A1C  6.3         Lab Assessment  N/A    Current Medication List  This medication list has been reviewed with the patient and evaluated for any interactions or necessary modifications/recommendations, and updated to include all prescription medications, OTC medications, and supplements the patient is currently taking.  This list reflects what is contained in the patient's profile, which has also been marked as reviewed to communicate to other providers it is the most up to date version of the patient's current medication therapy.     Current Outpatient Medications:     acetaminophen (TYLENOL) 500 MG tablet, Take 1-2 tablets by mouth Every 6 (Six) Hours As Needed for Mild Pain., Disp: , Rfl:     albuterol sulfate  (90 Base) MCG/ACT inhaler, Inhale 2 puffs Every 4 (Four) Hours As Needed for Wheezing or Shortness of Air., Disp: 18 g, Rfl: 5    Blood Glucose Monitoring Suppl (ONE TOUCH ULTRA 2) w/Device kit, , Disp: , Rfl:     calcium carbonate (TUMS) 500 MG chewable tablet, Chew 1 tablet Daily., Disp: , Rfl:     diphenhydrAMINE (BENADRYL) 25 mg capsule, Take 1 capsule by mouth Every 6 (Six) Hours As Needed " for Itching or Sleep., Disp: , Rfl:     glipizide (GLUCOTROL XL) 5 MG ER tablet, Take 1 tablet by mouth Daily., Disp: 90 tablet, Rfl: 3    Lancets (OneTouch Delica Plus [id number]) misc, Use 1 each 5 (Five) Times a Day., Disp: 200 each, Rfl: 5    loratadine (CLARITIN) 10 MG tablet, TAKE ONE TABLET BY MOUTH DAILY, Disp: 30 tablet, Rfl: 2    metFORMIN ER (GLUCOPHAGE-XR) 500 MG 24 hr tablet, Take 2 tablets by mouth Daily With Dinner., Disp: 180 tablet, Rfl: 1    metoprolol tartrate (LOPRESSOR) 50 MG tablet, TAKE ONE TABLET BY MOUTH TWICE A DAY, Disp: 180 tablet, Rfl: 3    multivitamin with minerals tablet tablet, Take 1 tablet by mouth Daily., Disp: , Rfl:     OnabotulinumtoxinA (Botox) 200 units reconstituted solution, FOR . PHYSICIAN TO INJECT UP  UNITS INTRAMUSCULARLY INTO HEAD, NECK AND SHOULDERS EVERY 90 DAYS., Disp: 1 each, Rfl: 3    OneTouch Ultra test strip, 1 each by Other route 5 (Five) Times a Day., Disp: 200 each, Rfl: 5    simethicone (Gas-X) 80 MG chewable tablet, Chew 1 tablet Every 6 (Six) Hours As Needed for Flatulence., Disp: 10 tablet, Rfl: 0    sodium-potassium-magnesium sulfates (Suprep Bowel Prep Kit) 17.5-3.13-1.6 GM/177ML solution oral solution, Take 1 bottle by mouth Take As Directed. Follow instructions that were mailed to your home. If you didn't receive these call [phone]., Disp: 354 mL, Rfl: 0    vitamin B-6 (PYRIDOXINE) 50 MG tablet, Take 1 tablet by mouth 2 (Two) Times a Day., Disp: , Rfl:     vitamin D3 125 MCG (5000 UT) capsule capsule, Take 1 capsule by mouth Daily., Disp: , Rfl:     Current Facility-Administered Medications:     OnabotulinumtoxinA 200 Units, 200 Units, Intramuscular, Q3 Months, Shiela Coelho, APRN, 200 Units at 04/19/24 1623    Medicines reviewed by Nancy Solano, PharmD on 7/5/2024 at 11:00 AM    Drug Interactions  No relevant drug-drug interactions with specialty medication(s):  Botox.        Adverse Drug  Reactions  Medication tolerability: Tolerating with no to minimal ADRs          Medication plan: Continue therapy with normal follow-up  Plan for ADR Management: not required.      Adherence, Self-Administration, and Current Therapy Problems  Adherence related patient's specialty therapy was discussed with the patient. The Adherence segment of this outreach has been reviewed and updated.   Adherence Questions  Linked Medication(s) Assessed: Onabotulinumtoxina  On average, how many doses/injections does the patient miss per month?: 0  What are the identified reasons for non-adherence or missed doses? : no problems identified  What is the estimated medication adherence level?: %  Based on the patient/caregiver response and refill history, does this patient require an MTP to track adherence improvements?: no    Additional Barriers to Patient Self-Administration: none  Methods for Supporting Patient Self-Administration: n/a    Recently Close Medication Therapy Problems  No medication therapy recommendations to display  Open Medication Therapy Problems  No medication therapy recommendations to display     Goals of Therapy  Goals related to the patient's specialty therapy was discussed with the patient. The Patient Goals segment of this outreach has been reviewed and updated.    Goals Addressed Today        Specialty Pharmacy General Goal      Reduction in frequency and severity of migraines  Response to treatment has reduced HA's at least 7 fewer days a month and/or 100 hours fewer each month               Quality of Life Assessment   Quality of Life related to the patient's enrollment in the patient management program and services provided was discussed with the patient. The QOL segment of this outreach has been reviewed and updated.   Quality of Life Improvement Scale: 8-Moderately better    Reassessment Plan & Follow-Up  Medication Therapy Changes: Continue Botox 155 units administered per provider every 3  months.  Related Plans, Therapy Recommendations, or Issues to Be Addressed: none  Pharmacist to perform regular reassessments no more than (6) months from the previous assessment.  Care Coordinator to set up future refill outreaches, coordinate prescription delivery, and escalate clinical questions to pharmacist.     Attestation  Therapeutic appropriateness: Appropriate  I attest the patient was actively involved in and has agreed to the above plan of care. If the prescribed therapy is at any point deemed not appropriate based on the current or future assessments, a consultation will be initiated with the patient's specialty care provider to determine the best course of action. The revised plan of therapy will be documented along with any additional patient education provided. Discussed aforementioned material with patient by phone.    Nancy Solano, PharmD, [facility]  Clinic Specialty Pharmacist, Neurology  7/5/2024  11:04 EDT

## 2024-07-05 NOTE — PROGRESS NOTES
Specialty Pharmacy Patient Management Program  [facility]      Frances is a 38 y.o. female contacted today regarding refills of her medication(s).    Specialty medication(s) and dose(s) confirmed: Botox  Other medications being refilled: none    Refill Questions      Flowsheet Row Most Recent Value   Changes to allergies? No   Changes to medications? No   New conditions or infections since last clinic visit No   Unplanned office visit, urgent care, ED, or hospital admission in the last 4 weeks  No   How does patient/caregiver feel medication is working? Good   Financial problems or insurance changes  No   If yes, describe changes in insurance or financial issues. N/A   Since the previous refill, were any specialty medication doses or scheduled injections missed or delayed?  No   If yes, please provide the amount N/A   Why were doses missed? N/A   Does this patient require a clinical escalation to a pharmacist? No            Delivery Questions      Flowsheet Row Most Recent Value   Delivery method [employer]   Delivery address verified with patient/caregiver? Yes   Delivery address Other (Comment)  [Will be delivered to [facility] for procedure scheduled on 7/16/24]   Number of medications in delivery 1   Medication(s) being filled and delivered Onabotulinumtoxina   Doses left of specialty medications N/A   Copay verified? Yes   Copay amount Botox co-pay $0   Copay form of payment No copayment ($0)   Ship Date 7/11/24   Delivery Date 7/12/24   Signature Required No            Medication Adherence    Adherence tools used: cell phone  Support network for adherence: healthcare provider            Follow-up: 3 months     Nancy Solano, PharmD  Specialty Pharmacist  7/5/2024  10:58 EDT

## 2024-07-11 ENCOUNTER — OUTSIDE FACILITY SERVICE (OUTPATIENT)
Dept: GASTROENTEROLOGY | Facility: CLINIC | Age: 39
End: 2024-07-11
Payer: COMMERCIAL

## 2024-07-11 PROCEDURE — 88305 TISSUE EXAM BY PATHOLOGIST: CPT | Performed by: INTERNAL MEDICINE

## 2024-07-11 PROCEDURE — 45380 COLONOSCOPY AND BIOPSY: CPT | Performed by: INTERNAL MEDICINE

## 2024-07-12 ENCOUNTER — LAB REQUISITION (OUTPATIENT)
Dept: LAB | Facility: HOSPITAL | Age: 39
End: 2024-07-12
Payer: COMMERCIAL

## 2024-07-12 DIAGNOSIS — R19.4 CHANGE IN BOWEL HABIT: ICD-10-CM

## 2024-07-12 DIAGNOSIS — K64.8 OTHER HEMORRHOIDS: ICD-10-CM

## 2024-07-12 DIAGNOSIS — R19.7 DIARRHEA, UNSPECIFIED: ICD-10-CM

## 2024-07-12 DIAGNOSIS — K57.30 DIVERTICULOSIS OF LARGE INTESTINE WITHOUT PERFORATION OR ABSCESS WITHOUT BLEEDING: ICD-10-CM

## 2024-07-15 LAB — REF LAB TEST METHOD: NORMAL

## 2024-07-16 ENCOUNTER — PROCEDURE VISIT (OUTPATIENT)
Dept: NEUROLOGY | Facility: CLINIC | Age: 39
End: 2024-07-16
Payer: COMMERCIAL

## 2024-07-16 VITALS — SYSTOLIC BLOOD PRESSURE: 128 MMHG | DIASTOLIC BLOOD PRESSURE: 74 MMHG

## 2024-07-16 DIAGNOSIS — G43.709 CHRONIC MIGRAINE WITHOUT AURA WITHOUT STATUS MIGRAINOSUS, NOT INTRACTABLE: Primary | Chronic | ICD-10-CM

## 2024-07-16 PROCEDURE — 64615 CHEMODENERV MUSC MIGRAINE: CPT | Performed by: PSYCHIATRY & NEUROLOGY

## 2024-07-18 ENCOUNTER — OFFICE VISIT (OUTPATIENT)
Dept: GASTROENTEROLOGY | Facility: CLINIC | Age: 39
End: 2024-07-18
Payer: COMMERCIAL

## 2024-07-18 VITALS
TEMPERATURE: 98.3 F | OXYGEN SATURATION: 97 % | HEIGHT: 60 IN | HEART RATE: 76 BPM | WEIGHT: 231 LBS | DIASTOLIC BLOOD PRESSURE: 74 MMHG | BODY MASS INDEX: 45.35 KG/M2 | SYSTOLIC BLOOD PRESSURE: 122 MMHG

## 2024-07-18 DIAGNOSIS — K30 DELAYED GASTRIC EMPTYING: Primary | ICD-10-CM

## 2024-07-18 DIAGNOSIS — K76.0 NAFLD (NONALCOHOLIC FATTY LIVER DISEASE): ICD-10-CM

## 2024-07-18 DIAGNOSIS — K58.0 IRRITABLE BOWEL SYNDROME WITH DIARRHEA: ICD-10-CM

## 2024-07-18 DIAGNOSIS — K21.9 GASTROESOPHAGEAL REFLUX DISEASE, UNSPECIFIED WHETHER ESOPHAGITIS PRESENT: ICD-10-CM

## 2024-07-18 PROCEDURE — 99214 OFFICE O/P EST MOD 30 MIN: CPT | Performed by: NURSE PRACTITIONER

## 2024-07-18 RX ORDER — CHOLECALCIFEROL (VITAMIN D3) 125 MCG
CAPSULE ORAL
COMMUNITY

## 2024-07-18 RX ORDER — MELATONIN 5 MG
LOZENGE ORAL
COMMUNITY

## 2024-07-18 RX ORDER — ACETAMINOPHEN/CAFFEINE 500MG-65MG
TABLET ORAL
COMMUNITY

## 2024-07-18 RX ORDER — DOCUSATE SODIUM 100 MG/1
CAPSULE, LIQUID FILLED ORAL
COMMUNITY

## 2024-07-18 RX ORDER — FUROSEMIDE 20 MG/1
TABLET ORAL
COMMUNITY

## 2024-07-18 RX ORDER — ACETAMINOPHEN/DIPHENHYDRAMINE 500MG-25MG
TABLET ORAL
COMMUNITY

## 2024-07-18 RX ORDER — OMEGA-3 FATTY ACIDS/FISH OIL 300-1000MG
CAPSULE ORAL
COMMUNITY
End: 2024-07-18

## 2024-07-18 RX ORDER — MULTIPLE VITAMINS W/ MINERALS TAB 9MG-400MCG
TAB ORAL
COMMUNITY

## 2024-07-18 RX ORDER — OMEPRAZOLE 20 MG/1
20 CAPSULE, DELAYED RELEASE ORAL DAILY
Qty: 30 CAPSULE | Refills: 11 | Status: SHIPPED | OUTPATIENT
Start: 2024-07-18

## 2024-07-18 RX ORDER — VITAMIN E 268 MG
CAPSULE ORAL
COMMUNITY

## 2024-07-18 RX ORDER — GABAPENTIN 300 MG/1
CAPSULE ORAL
COMMUNITY

## 2024-07-18 RX ORDER — ONDANSETRON 8 MG/1
TABLET, ORALLY DISINTEGRATING ORAL
COMMUNITY

## 2024-07-18 RX ORDER — TIZANIDINE 4 MG/1
TABLET ORAL
COMMUNITY

## 2024-07-18 RX ORDER — TRAMADOL HYDROCHLORIDE 50 MG/1
TABLET ORAL
COMMUNITY

## 2024-07-18 RX ORDER — CYANOCOBALAMIN (VITAMIN B-12) 5000 MCG
TABLET,DISINTEGRATING ORAL
COMMUNITY

## 2024-07-18 NOTE — PROGRESS NOTES
Follow Up      Patient Name: Frances Hartmna  : 1985   MRN: [id number]     Chief Complaint:    Chief Complaint   Patient presents with    colon f/u    Heartburn    Diarrhea     Not as loose, but looks like lining is coming out    Food Intolerance    Nausea    Abdominal Pain       History of Present Illness: Frances Hartman is a 38 y.o. female who is here today for follow up on NAFLD, GERD, delayed gastric emptying      Nausea/vomiting: Nausea and vomiting has returned  Everything upsets her stomach.There is some heartburn and indigestion about once a week- using tums as needed.  Using gas-x as needed for bloating which works well. She is following reflux precautions. She is trying to conceive at this time.   She is no longer taking ppi.         IBS-. Has not had any further fecal seepage since her colonoscopy. Having a bm about once a day- loose. Stool has abnormal appearance.         HPI:  Frances was admitted to [facility] in 2021 with nausea and vomiting.  She had been status post bariatric surgery in 2021.  She was diagnosed with esophageal stricture. She underwent 3 EGDs during her hospital stay.  Submucosal nodule was found in the duodenum and biopsies were significant for neuroendocrine carcinoma.  Repeat EGD shows no further tumor.       Followed with  for her neuroendocrine tumor- he signed off     Colonoscopy, Scan (2024) (Dr Singh)-mild diverticulosis, nonbleeding internal hemorrhoids-5-year recall, normal colon biopsies    ENDOSCOPY, INT (2022)-LA grade a esophagitis, small hiatal hernia, normal healthy appearing sleeve gastrectomy, gastritis, mucosal nodule in the duodenum-biopsied-negative for evidence of neuroendocrine tumor   SCANNED - COLONOSCOPY (2022)-one 6 mm hyperplastic polyp in the rectum, diverticulosis.  Random colon biopsies- 3 year recall       US Liver (2022 16:20)-Diffusely increased echogenicity of liver  parenchyma likely related to  steatosis. No focal lesions. No acute process.     NM PET/CT Skull Base to Mid Thigh (03/16/2022 11:42)  Negative Ga-68 Dotatate scan  CT Abdomen Pelvis With Contrast (11/22/2021 22:52)   FL Upper GI Single Contrast With KUB (11/09/2021 14:22)1. Status post vertical sleeve gastrectomy x5 months. There was no  evidence of extraluminal contrast. No postoperative strictures were  seen.  2. Moderate gastroesophageal reflux to the level of the thoracic inlet  3. Patulous gastroesophageal junction versus small sized sliding-type  hiatal hernia     NM Gastric Emptying (11/06/2020 13:14)-IMPRESSION:  Abnormal exam with delay in gastric emptying.     UPPER GI ENDOSCOPY (11/24/2021 11:37)-normal esophagus, sleeve gastrectomy with normal healthy-appearing mucosa, submucosal nodule in the duodenum        Her abdominal surgical history includes Chandrika, umbilical hernia repair, and gastric sleeve June 2021.     Subjective      Review of Systems:   Review of Systems   Constitutional:  Negative for appetite change and unexpected weight loss.   HENT:  Negative for trouble swallowing.    Gastrointestinal:  Positive for abdominal pain, constipation, diarrhea, nausea and vomiting. Negative for abdominal distention, anal bleeding, blood in stool, rectal pain, GERD and indigestion.       Medications:     Current Outpatient Medications:     acetaminophen (TYLENOL) 500 MG tablet, Take 1-2 tablets by mouth Every 6 (Six) Hours As Needed for Mild Pain., Disp: , Rfl:     Acetaminophen-Caffeine (Excedrin Tension Headache) 500-65 MG tablet, , Disp: , Rfl:     albuterol sulfate  (90 Base) MCG/ACT inhaler, Inhale 2 puffs Every 4 (Four) Hours As Needed for Wheezing or Shortness of Air., Disp: 18 g, Rfl: 5    Biotin (Biotin Maximum) 91322 MCG tablet dispersible, , Disp: , Rfl:     Blood Glucose Monitoring Suppl (ONE TOUCH ULTRA 2) w/Device kit, , Disp: , Rfl:     calcium carbonate (TUMS) 500 MG chewable tablet,  Chew 1 tablet Daily., Disp: , Rfl:     Cyanocobalamin (Vitamin B-12) 5000 MCG tablet dispersible, , Disp: , Rfl:     CYCLOBENZAPRINE HCL PO, , Disp: , Rfl:     diphenhydrAMINE (BENADRYL) 25 mg capsule, Take 1 capsule by mouth Every 6 (Six) Hours As Needed for Itching or Sleep., Disp: , Rfl:     diphenhydrAMINE-acetaminophen (Tylenol PM Extra Strength)  MG tablet per tablet, , Disp: , Rfl:     docusate sodium (Stool Softener) 100 MG capsule, , Disp: , Rfl:     furosemide (LASIX) 20 MG tablet, , Disp: , Rfl:     gabapentin (NEURONTIN) 300 MG capsule, , Disp: , Rfl:     glipizide (GLUCOTROL XL) 5 MG ER tablet, Take 1 tablet by mouth Daily., Disp: 90 tablet, Rfl: 3    Lactase ([employer] DAIRY RELIEF FAST ACTING PO), , Disp: , Rfl:     Lancets (OneTouch Delica Plus [id number]) misc, Use 1 each 5 (Five) Times a Day., Disp: 200 each, Rfl: 5    loratadine (CLARITIN) 10 MG tablet, TAKE ONE TABLET BY MOUTH DAILY, Disp: 30 tablet, Rfl: 2    MAGNESIUM GLYCINATE PO, , Disp: , Rfl:     Melatonin 5 MG lozenge, , Disp: , Rfl:     metFORMIN ER (GLUCOPHAGE-XR) 500 MG 24 hr tablet, Take 2 tablets by mouth Daily With Dinner., Disp: 180 tablet, Rfl: 1    metoprolol tartrate (LOPRESSOR) 50 MG tablet, TAKE ONE TABLET BY MOUTH TWICE A DAY, Disp: 180 tablet, Rfl: 3    multivitamin with minerals (ONE DAILY MULTIVITAMIN ADULT PO), , Disp: , Rfl:     multivitamin with minerals tablet tablet, Take 1 tablet by mouth Daily., Disp: , Rfl:     OnabotulinumtoxinA (Botox) 200 units reconstituted solution, FOR . PHYSICIAN TO INJECT UP  UNITS INTRAMUSCULARLY INTO HEAD, NECK AND SHOULDERS EVERY 90 DAYS., Disp: 1 each, Rfl: 3    ondansetron ODT (ZOFRAN-ODT) 8 MG disintegrating tablet, , Disp: , Rfl:     OneTouch Ultra test strip, 1 each by Other route 5 (Five) Times a Day., Disp: 200 each, Rfl: 5    simethicone (Gas-X) 80 MG chewable tablet, Chew 1 tablet Every 6 (Six) Hours As Needed for Flatulence., Disp: 10 tablet, Rfl: 0     "SPIRONOLACTONE PO, , Disp: , Rfl:     tiZANidine (ZANAFLEX) 4 MG tablet, , Disp: , Rfl:     traMADol (ULTRAM) 50 MG tablet, , Disp: , Rfl:     vitamin B-6 (PYRIDOXINE) 50 MG tablet, Take 1 tablet by mouth 2 (Two) Times a Day., Disp: , Rfl:     vitamin D3 125 MCG (5000 UT) capsule capsule, Take 1 capsule by mouth Daily., Disp: , Rfl:     Vitamin E 180 MG (400 UNIT) capsule capsule, , Disp: , Rfl:     omeprazole (priLOSEC) 20 MG capsule, Take 1 capsule by mouth Daily., Disp: 30 capsule, Rfl: 11    riFAXIMin (XIFAXAN) 550 MG tablet, Take 1 tablet by mouth 3 (Three) Times a Day., Disp: 42 tablet, Rfl: 0    Current Facility-Administered Medications:     OnabotulinumtoxinA 200 Units, 200 Units, Intramuscular, Q3 Months, Shiela Coelho K, APRN, 200 Units at 07/16/24 0810    Allergies:   Allergies   Allergen Reactions    Capsicum Annuum Extract & Derivative (Bell Pepper) [Capsicum] Anaphylaxis    Codeine Headache, Unknown - Low Severity and Other (See Comments)     Can tolerate hydrocodone, no other adverse reactions to other narcotics.    Doxycycline Hallucinations, Rash and Unknown - Low Severity    Hydrochlorothiazide Swelling and Unknown - Low Severity     eventually causes CHF    Ibuprofen Other (See Comments)     \"makes me retain fluid and eventually go into CHF\"    Nitrofurantoin Nausea Only and Unknown - Low Severity    Peanut-Containing Drug Products Anaphylaxis    Monosodium Glutamate Swelling and Headache    Oatmeal Other (See Comments)     Dx on allergy testing    Banana (Diagnostic) Unknown - High Severity    Coconut Flavor Unknown - High Severity    Doxycycline Monohydrate Other (See Comments)    Isoflavones (Soy) Unknown - Low Severity    Amitriptyline Mental Status Change and Unknown - Low Severity     memory gaps + neuropathy + hair loss    Amoxicillin-Pot Clavulanate Other (See Comments) and Unknown - Low Severity     Syncope, not sure if allergic to cephalosporins.    Aspartame Nausea Only    " "Diclofenac Headache and Unknown - Low Severity    Egg-Derived Products Other (See Comments)     dx on allergy testing, but does not completely avoid    Naproxen Other (See Comments)     \"blackout\"       Social History:   Social History     Socioeconomic History    Marital status: Single   Tobacco Use    Smoking status: Never     Passive exposure: Never    Smokeless tobacco: Never   Vaping Use    Vaping status: Never Used   Substance and Sexual Activity    Alcohol use: Not Currently     Comment: Very rarely drink socially. At most 2 drinks per month.    Drug use: Never    Sexual activity: Yes     Partners: Male     Birth control/protection: Condom, Coitus interruptus        Surgical History:   Past Surgical History:   Procedure Laterality Date    ABDOMINAL SURGERY  06/15/2021    Bariatric Sleeve Surgery    BARIATRIC SURGERY      COLONOSCOPY  December 2022    COSMETIC SURGERY      Multiple Keloid surgeries    ENDOSCOPY N/A 06/15/2021    Procedure: ESOPHAGOGASTRODUODENOSCOPY;  Surgeon: Ayaka Andre MD;  Location:  [facility];  Service: Robotics - DaVinci;  Laterality: N/A;    ENDOSCOPY N/A 10/28/2021    Procedure: ESOPHAGOGASTRODUODENOSCOPY WITH ACHALASIA BALLOON DILATATION;  Surgeon: Jase Hernandez MD;  Location:  [facility];  Service: Gastroenterology;  Laterality: N/A;  58 F DILATOR    ENDOSCOPY N/A 11/15/2021    Procedure: ESOPHAGOGASTRODUODENOSCOPY WITH DILATATION;  Surgeon: Jase Hernandez MD;  Location:  [facility];  Service: Gastroenterology;  Laterality: N/A;  achalasia balloon     ENDOSCOPY N/A 11/24/2021    Procedure: ESOPHAGOGASTRODUODENOSCOPY [facility] DUODENAL POLYPECTOPMY AND NASAL JEJUNAL TUBE PLACEMENT;  Surgeon: Jase Hernandez MD;  Location:  [facility];  Service: Gastroenterology;  Laterality: N/A;  placement of feeding tube, checked position with KUB    GASTRIC RESTRICTION SURGERY  2021    Sleeve    GASTRIC SLEEVE LAPAROSCOPIC N/A 06/15/2021    Procedure: GASTRIC SLEEVE " LAPAROSCOPIC WITH DAVINCI ROBOT, LAPROSCOPIC HIATAL HERNIA REPAIR WITH DAVINCI ROBOT;  Surgeon: Ayaka Andre MD;  Location: [facility];  Service: Robotics - DaVinci;  Laterality: N/A;    KELOID EXCISION      1990,1993,1999,2015,2016,2019    LAPAROSCOPIC CHOLECYSTECTOMY  2000    for stones    RADIOFREQUENCY ABLATION  2019    x 2  for pt back    UMBILICAL HERNIA REPAIR  1990    UPPER GASTROINTESTINAL ENDOSCOPY      WISDOM TOOTH EXTRACTION  1994        Medical History:   Past Medical History:   Diagnosis Date    Allergic     Birth    Anxiety     Arthritis     Asthma     prn inhalers, does not follow w/ pulmonary    Chronic back pain     previously followed w/ pain management, now just prn Tylenol + Gabapentin    Chronic deep vein thrombosis (DVT) of femoral vein of right lower extremity 09/10/2021    Clotting disorder 2015    Cluster headache     Depression     Diabetes mellitus     Type II, dx 2014, never on insulin, A1C 7.6     Dyspepsia     Dyspnea on exertion     Dysuria 03/07/2022    Dysuria and hematuria x 2 weeks. 3/7/22 urine culture sent. Rx Macrobid 100 mg twice daily for 7 days. Patient did not tolerate Macrobid well due to GI upset. Urine culture was negative.    Fatigue     Gastroparesis     GERD (gastroesophageal reflux disease)     Headache, tension-type     Heartburn     chronic, prn TUMS, denies prior eval    Hirsutism     History of medical problems     PCOS    Hx of radiation therapy     for treatments of Keloids    Hypertension     Irregular menses     infrequent spotting    Leiomyoma, subserous     possible peduculated f3-4 cm fibroid on u/s at [facility]    Low back pain     Migraines     botox q3 months, following Neurology @ [facility]    Morbid obesity     s/p sleeve gastrectomy    Peripheral edema     Polycystic ovary syndrome 2011    Seasonal allergies     Slow to wake up after anesthesia     Urinary tract infection     Visual impairment     Astigmatism, Nearsightedness        Objective  "    Physical Exam:  Vital Signs:   Vitals:    07/18/24 1348   BP: 122/74   BP Location: Left arm   Patient Position: Sitting   Cuff Size: Adult   Pulse: 76   Temp: 98.3 °F (36.8 °C)   TempSrc: Temporal   SpO2: 97%   Weight: 105 kg (231 lb)   Height: 152.4 cm (60\")   PainSc: 0-No pain   PainLoc: Abdomen     Body mass index is 45.11 kg/m².     Physical Exam  Constitutional:       General: She is not in acute distress.     Appearance: She is well-developed.   Pulmonary:      Effort: Pulmonary effort is normal. No accessory muscle usage or respiratory distress.   Skin:     Coloration: Skin is not pale.      Findings: No erythema.   Neurological:      Mental Status: She is alert and oriented to person, place, and time.   Psychiatric:         Speech: Speech normal.         Behavior: Behavior normal.         Thought Content: Thought content normal.         Judgment: Judgment normal.         Assessment / Plan      Assessment/Plan:   Diagnoses and all orders for this visit:    1. Delayed gastric emptying (Primary)    2. NAFLD (nonalcoholic fatty liver disease)  -     US Liver; Future  -     Comprehensive Metabolic Panel; Future  -     CBC & Differential; Future    3. Gastroesophageal reflux disease, unspecified whether esophagitis present  -     omeprazole (priLOSEC) 20 MG capsule; Take 1 capsule by mouth Daily.  Dispense: 30 capsule; Refill: 11    4. Irritable bowel syndrome with diarrhea  -     riFAXIMin (XIFAXAN) 550 MG tablet; Take 1 tablet by mouth 3 (Three) Times a Day.  Dispense: 42 tablet; Refill: 0       Resume PPI  Trial Xifaxan  Future consideration-Creon      Follow Up:   Return in about 6 months (around 1/18/2025), or if symptoms worsen or fail to improve.    Plan of care reviewed with the patient at the conclusion of today's visit.  Education was provided regarding diagnosis, management, and any prescribed or recommended OTC medications.  Patient verbalized understanding of and agreement with management plan. "         Ember Lopez, TANESHA  [facility]

## 2024-07-22 ENCOUNTER — PRIOR AUTHORIZATION (OUTPATIENT)
Dept: GASTROENTEROLOGY | Facility: CLINIC | Age: 39
End: 2024-07-22
Payer: COMMERCIAL

## 2024-07-22 NOTE — TELEPHONE ENCOUNTER
PA Case: [id number], Status: Approved, Coverage Starts on: 7/22/2024 12:00:00 AM, Coverage Ends on: 7/22/2025 12:00:00 AM.. Authorization Expiration Date: July 22, 2025.

## 2024-08-02 ENCOUNTER — HOSPITAL ENCOUNTER (OUTPATIENT)
Facility: HOSPITAL | Age: 39
Discharge: HOME | End: 2024-08-02
Admitting: NURSE PRACTITIONER
Payer: COMMERCIAL

## 2024-08-02 DIAGNOSIS — K76.0 NAFLD (NONALCOHOLIC FATTY LIVER DISEASE): ICD-10-CM

## 2024-08-02 PROCEDURE — 76705 ECHO EXAM OF ABDOMEN: CPT

## 2024-09-11 ENCOUNTER — LAB (OUTPATIENT)
Dept: LAB | Facility: HOSPITAL | Age: 39
End: 2024-09-11
Payer: COMMERCIAL

## 2024-09-11 ENCOUNTER — TRANSCRIBE ORDERS (OUTPATIENT)
Dept: LAB | Facility: HOSPITAL | Age: 39
End: 2024-09-11
Payer: COMMERCIAL

## 2024-09-11 DIAGNOSIS — Z11.3 SCREENING EXAMINATION FOR VENEREAL DISEASE: ICD-10-CM

## 2024-09-11 DIAGNOSIS — K76.0 NAFLD (NONALCOHOLIC FATTY LIVER DISEASE): ICD-10-CM

## 2024-09-11 DIAGNOSIS — Z11.3 SCREENING EXAMINATION FOR VENEREAL DISEASE: Primary | ICD-10-CM

## 2024-09-11 LAB
ALBUMIN SERPL-MCNC: 4.3 G/DL (ref 3.5–5.2)
ALBUMIN/GLOB SERPL: 1.4 G/DL
ALP SERPL-CCNC: 105 U/L (ref 39–117)
ALT SERPL W P-5'-P-CCNC: 36 U/L (ref 1–33)
ANION GAP SERPL CALCULATED.3IONS-SCNC: 13 MMOL/L (ref 5–15)
AST SERPL-CCNC: 24 U/L (ref 1–32)
BASOPHILS # BLD AUTO: 0.07 10*3/MM3 (ref 0–0.2)
BASOPHILS NFR BLD AUTO: 0.8 % (ref 0–1.5)
BILIRUB SERPL-MCNC: 0.4 MG/DL (ref 0–1.2)
BUN SERPL-MCNC: 10 MG/DL (ref 6–20)
BUN/CREAT SERPL: 11 (ref 7–25)
CALCIUM SPEC-SCNC: 10.2 MG/DL (ref 8.6–10.5)
CHLORIDE SERPL-SCNC: 101 MMOL/L (ref 98–107)
CO2 SERPL-SCNC: 25 MMOL/L (ref 22–29)
CREAT SERPL-MCNC: 0.91 MG/DL (ref 0.57–1)
DEPRECATED RDW RBC AUTO: 45.1 FL (ref 37–54)
EGFRCR SERPLBLD CKD-EPI 2021: 82 ML/MIN/1.73
EOSINOPHIL # BLD AUTO: 0.19 10*3/MM3 (ref 0–0.4)
EOSINOPHIL NFR BLD AUTO: 2.3 % (ref 0.3–6.2)
ERYTHROCYTE [DISTWIDTH] IN BLOOD BY AUTOMATED COUNT: 12.7 % (ref 12.3–15.4)
GLOBULIN UR ELPH-MCNC: 3.1 GM/DL
GLUCOSE SERPL-MCNC: 98 MG/DL (ref 65–99)
HBV SURFACE AG SERPL QL IA: NORMAL
HCT VFR BLD AUTO: 39.7 % (ref 34–46.6)
HCV AB SER QL: NORMAL
HGB BLD-MCNC: 12.8 G/DL (ref 12–15.9)
HIV 1+2 AB+HIV1 P24 AG SERPL QL IA: NORMAL
IMM GRANULOCYTES # BLD AUTO: 0.02 10*3/MM3 (ref 0–0.05)
IMM GRANULOCYTES NFR BLD AUTO: 0.2 % (ref 0–0.5)
LYMPHOCYTES # BLD AUTO: 3.34 10*3/MM3 (ref 0.7–3.1)
LYMPHOCYTES NFR BLD AUTO: 40.5 % (ref 19.6–45.3)
MCH RBC QN AUTO: 31.4 PG (ref 26.6–33)
MCHC RBC AUTO-ENTMCNC: 32.2 G/DL (ref 31.5–35.7)
MCV RBC AUTO: 97.3 FL (ref 79–97)
MONOCYTES # BLD AUTO: 0.51 10*3/MM3 (ref 0.1–0.9)
MONOCYTES NFR BLD AUTO: 6.2 % (ref 5–12)
NEUTROPHILS NFR BLD AUTO: 4.12 10*3/MM3 (ref 1.7–7)
NEUTROPHILS NFR BLD AUTO: 50 % (ref 42.7–76)
NRBC BLD AUTO-RTO: 0 /100 WBC (ref 0–0.2)
PLATELET # BLD AUTO: 294 10*3/MM3 (ref 140–450)
PMV BLD AUTO: 11.4 FL (ref 6–12)
POTASSIUM SERPL-SCNC: 4.1 MMOL/L (ref 3.5–5.2)
PROT SERPL-MCNC: 7.4 G/DL (ref 6–8.5)
RBC # BLD AUTO: 4.08 10*6/MM3 (ref 3.77–5.28)
SODIUM SERPL-SCNC: 139 MMOL/L (ref 136–145)
WBC NRBC COR # BLD AUTO: 8.25 10*3/MM3 (ref 3.4–10.8)

## 2024-09-11 PROCEDURE — 36415 COLL VENOUS BLD VENIPUNCTURE: CPT

## 2024-09-11 PROCEDURE — G0432 EIA HIV-1/HIV-2 SCREEN: HCPCS

## 2024-09-11 PROCEDURE — 80053 COMPREHEN METABOLIC PANEL: CPT

## 2024-09-11 PROCEDURE — 87340 HEPATITIS B SURFACE AG IA: CPT

## 2024-09-11 PROCEDURE — 86803 HEPATITIS C AB TEST: CPT

## 2024-09-11 PROCEDURE — 85025 COMPLETE CBC W/AUTO DIFF WBC: CPT

## 2024-09-11 PROCEDURE — 86592 SYPHILIS TEST NON-TREP QUAL: CPT

## 2024-09-12 LAB — RPR SER QL: NORMAL

## 2024-09-24 ENCOUNTER — SPECIALTY PHARMACY (OUTPATIENT)
Dept: ONCOLOGY | Facility: HOSPITAL | Age: 39
End: 2024-09-24
Payer: COMMERCIAL

## 2024-09-24 ENCOUNTER — OFFICE VISIT (OUTPATIENT)
Dept: ENDOCRINOLOGY | Facility: CLINIC | Age: 39
End: 2024-09-24
Payer: COMMERCIAL

## 2024-09-24 VITALS
HEIGHT: 60 IN | OXYGEN SATURATION: 96 % | DIASTOLIC BLOOD PRESSURE: 88 MMHG | HEART RATE: 70 BPM | RESPIRATION RATE: 16 BRPM | WEIGHT: 234 LBS | BODY MASS INDEX: 45.94 KG/M2 | SYSTOLIC BLOOD PRESSURE: 128 MMHG

## 2024-09-24 DIAGNOSIS — E11.9 TYPE 2 DIABETES MELLITUS WITHOUT COMPLICATION, WITHOUT LONG-TERM CURRENT USE OF INSULIN: Primary | ICD-10-CM

## 2024-09-24 DIAGNOSIS — E78.5 DYSLIPIDEMIA: ICD-10-CM

## 2024-09-24 LAB
ALBUMIN UR-MCNC: <1.2 MG/DL
CHOLEST SERPL-MCNC: 197 MG/DL (ref 0–200)
CREAT UR-MCNC: 108.3 MG/DL
EXPIRATION DATE: ABNORMAL
EXPIRATION DATE: ABNORMAL
GLUCOSE BLDC GLUCOMTR-MCNC: 142 MG/DL (ref 70–130)
HBA1C MFR BLD: 6.2 % (ref 4.5–5.7)
HDLC SERPL-MCNC: 41 MG/DL (ref 40–60)
LDLC SERPL CALC-MCNC: 128 MG/DL (ref 0–100)
LDLC/HDLC SERPL: 3.04 {RATIO}
Lab: ABNORMAL
Lab: ABNORMAL
MICROALBUMIN/CREAT UR: NORMAL MG/G{CREAT}
TRIGL SERPL-MCNC: 157 MG/DL (ref 0–150)
TSH SERPL DL<=0.05 MIU/L-ACNC: 2.02 UIU/ML (ref 0.27–4.2)
VIT B12 BLD-MCNC: >2000 PG/ML (ref 211–946)
VLDLC SERPL-MCNC: 28 MG/DL (ref 5–40)

## 2024-09-24 PROCEDURE — 82043 UR ALBUMIN QUANTITATIVE: CPT | Performed by: INTERNAL MEDICINE

## 2024-09-24 PROCEDURE — 99213 OFFICE O/P EST LOW 20 MIN: CPT | Performed by: INTERNAL MEDICINE

## 2024-09-24 PROCEDURE — 82570 ASSAY OF URINE CREATININE: CPT | Performed by: INTERNAL MEDICINE

## 2024-09-24 PROCEDURE — 83036 HEMOGLOBIN GLYCOSYLATED A1C: CPT | Performed by: INTERNAL MEDICINE

## 2024-09-24 PROCEDURE — 82607 VITAMIN B-12: CPT | Performed by: INTERNAL MEDICINE

## 2024-09-24 PROCEDURE — 84443 ASSAY THYROID STIM HORMONE: CPT | Performed by: INTERNAL MEDICINE

## 2024-09-24 PROCEDURE — 80061 LIPID PANEL: CPT | Performed by: INTERNAL MEDICINE

## 2024-09-24 PROCEDURE — 82947 ASSAY GLUCOSE BLOOD QUANT: CPT | Performed by: INTERNAL MEDICINE

## 2024-09-24 RX ORDER — METFORMIN HCL 500 MG
500 TABLET, EXTENDED RELEASE 24 HR ORAL
Qty: 90 TABLET | Refills: 3 | Status: SHIPPED | OUTPATIENT
Start: 2024-09-24

## 2024-09-24 RX ORDER — ONABOTULINUMTOXINA 200 [USP'U]/1
INJECTION, POWDER, LYOPHILIZED, FOR SOLUTION INTRADERMAL; INTRAMUSCULAR
Qty: 1 EACH | Refills: 3 | Status: SHIPPED | OUTPATIENT
Start: 2024-09-24

## 2024-09-24 NOTE — PROGRESS NOTES
Chief Complaint   Patient presents with    Diabetes     Follow-up       HPI:   Frances Hratman is a 38 y.o.female who returns to endocrine clinic for follow-up evaluation of her Type 2 Diabetes. Last visit 03/13/2024.  Her history is as follows:    Interim Events:  - pt reports she has not required IV fluids for at least 1 year.   - pt reported diarrhea with metformin  mg BID. She decreased the dose 1 week ago to one tablet daily. She reports the diarrhea has lessened but notes she also stopped stool softeners 1 months ago.     1) Type 2 DM with no known associated complications at this time:   - Diagnosed with diabetes in 2014. (A1C% was 7.6). Pt was already on metformin ER for PCOS prior to diagnosis. Tolerated metformin ER and stopped in 06/2021 after bariatric surgery.  - Had Sleeve gastrectomy on 06/15/2021. Had post-op complication of pouch stricture and underwent dilation of pouch in 07/2021, 10/2021, and 11/2021 by gastroenterology. Highest wt 290 lbs, Lost 50 lbs on her own. Weight 238 pre-op. Lowest weight 190 lbs. Has regained weight since 2023.   - Was hospitalized 11/26/2021 for N/V/dehydration.   - pt had tried Januvia in 05/2023, but was not efficacious. She had taking glipizide before her surgery and requested to restart the medication.  - pt contacted me on 01/26/2024 to notify me of her pregnancy. Prescribed insulin on 02/11/2024. Pt later miscarried on 02/29/2024..     Current DM Medications:  - pt reported diarrhea with metformin  mg BID. She decreased the dose 1 week ago to one tablet daily. She reports the diarrhea has lessened but notes she also stopped stool softeners 1 months ago.   - glipizide ER 5 mg daily    Other History:  - pt reports a h/o alternating diarrhea and constipation since bariatric surgery.     DM Health Maintenance:  Ophtho: DUE  Podiatry: no recent visit  Monofilament / Foot exam: DUE  Lipids: (09/2024) Tchol 197, , HDL 41,  - not on statin at  this time  Urine Microalb/Cr ratio: (09/2028) < 1.2  TSH: (09/2024) 2.020  CBC: (09/2024) Hgb 12.8  CMP: (09/2024) Cr 0.91, GFR 82.0, ALT 36, AST 24  Vit B12: (09/2024) >2,000    2) PCOS:  - diagnosed in 2011.   - Was prescribed metformin ER and spironolactone at time of diagnosis. Had stopped both medications prior to bariatric surgery.  - Restarted metformin  mg in 02/2022    3) Hirsutism:  - Was on spironolactone 100 mg daily in the past. Medication stopped prior to bariatric surgery in 06/2021  - PCP restarted spironolactone 25 mg daily in 10/2022.   - Was on spironolactone 50 mg daily and stopped in 01/2024 when pregnant. Has not restarted.     Other History:  - pt contacted me on 01/26/2024 to notify me of her pregnancy. Prescribed insulin on 02/11/2024. Pt later miscarried on 02/29/2024. This was pt's 11th miscarriage.     Review of Systems   Constitutional:  Negative for activity change and fatigue.        Wt gain since last visit.    Eyes: Negative.  Negative for discharge.   Respiratory: Negative.     Cardiovascular: Negative.    Gastrointestinal:  Positive for constipation and diarrhea. Negative for nausea.   Endocrine: Negative.    Genitourinary: Negative.    Musculoskeletal:  Positive for myalgias (Occasional). Negative for arthralgias and back pain.   Skin: Negative.    Allergic/Immunologic: Positive for environmental allergies and food allergies.   Neurological:  Negative for dizziness, weakness, light-headedness, numbness and headaches.   Hematological: Negative.  Does not bruise/bleed easily.   Psychiatric/Behavioral:  Positive for sleep disturbance.      The following portions of the patient's history were reviewed and updated as appropriate: allergies, current medications, past family history, past medical history, past social history, past surgical history and problem list.    /88 (BP Location: Left arm, Patient Position: Sitting, Cuff Size: Large Adult)   Pulse 70   Resp 16   Ht  "152.4 cm (60\")   Wt 106 kg (234 lb)   SpO2 96%   BMI 45.70 kg/m²   Physical Exam  Vitals reviewed.   Constitutional:       General: She is not in acute distress.     Appearance: Normal appearance. She is well-developed. She is not diaphoretic.   HENT:      Head: Normocephalic.   Eyes:      Conjunctiva/sclera: Conjunctivae normal.      Pupils: Pupils are equal, round, and reactive to light.   Neck:      Thyroid: No thyromegaly.      Trachea: No tracheal deviation.      Comments: No palpable thyroid nodules    Cardiovascular:      Rate and Rhythm: Normal rate and regular rhythm.      Heart sounds: Normal heart sounds. No murmur heard.  Pulmonary:      Effort: Pulmonary effort is normal. No respiratory distress.      Breath sounds: Normal breath sounds.   Lymphadenopathy:      Cervical: No cervical adenopathy.   Skin:     General: Skin is warm and dry.      Findings: No erythema.      Comments: + acanthosis nigricans, + facial hirsutism   Neurological:      Mental Status: She is alert and oriented to person, place, and time.      Cranial Nerves: No cranial nerve deficit.   Psychiatric:         Behavior: Behavior normal.       LABS/IMAGING: prior labs / outside records reviewed and summarized in HPI    Orders Only on 09/24/2024   Component Date Value Ref Range Status    Total Cholesterol 09/24/2024 197  0 - 200 mg/dL Final    Triglycerides 09/24/2024 157 (H)  0 - 150 mg/dL Final    HDL Cholesterol 09/24/2024 41  40 - 60 mg/dL Final    LDL Cholesterol  09/24/2024 128 (H)  0 - 100 mg/dL Final    VLDL Cholesterol 09/24/2024 28  5 - 40 mg/dL Final    LDL/HDL Ratio 09/24/2024 3.04   Final    Microalbumin/Creatinine Ratio 09/24/2024    Final    Unable to calculate    Creatinine, Urine 09/24/2024 108.3  mg/dL Final    Microalbumin, Urine 09/24/2024 <1.2  mg/dL Final    TSH 09/24/2024 2.020  0.270 - 4.200 uIU/mL Final    Vitamin B-12 09/24/2024 >2,000 (H)  211 - 946 pg/mL Final   Office Visit on 09/24/2024   Component Date " Value Ref Range Status    Hemoglobin A1C 09/24/2024 6.2 (A)  4.5 - 5.7 % Final    Lot Number 09/24/2024 [id number]   Final    Expiration Date 09/24/2024 04/26/2026   Final    Glucose 09/24/2024 142 (A)  70 - 130 mg/dL Final    Lot Number 09/24/2024 [id number]   Final    Expiration Date 09/24/2024 01/20/2025   Final         ASSESSMENT/PLAN:    1) Type 2 DM with no associated complications at this time: controlled, A1C% 6.2 today.   - Pt with h/o frequent UTI, BV: Pt would like to post-pone trying a SGLT-2 inhibitors at this time.   - Januvia was not efficacious in the past  - Discussed with patient the possible addition of a GLP-1 agonist. Pt is understandably hesitant at this time due to her h/o pre-existing GI issues of chronic alternating diarrhea and constipation.       - Will Decrease metformin  mg to once daily with food   - Will Continue glipizide ER 5 mg daily.    DM health maintenance labs completed today and reviewed.     2) Dyslipidemia:   Lipids: (09/2024) Tchol 197, , HDL 41,  - not on statin at this time  - Will discuss AHA guidelines with patient at her f/u visit    RTC 4-5 months    Electronically Signed: Carine Aden MD

## 2024-09-30 ENCOUNTER — TELEPHONE (OUTPATIENT)
Dept: CARDIOLOGY | Facility: CLINIC | Age: 39
End: 2024-09-30
Payer: COMMERCIAL

## 2024-09-30 NOTE — TELEPHONE ENCOUNTER
Pt called in asking for lasix 20mg BID called in. Spoke with pt she states over a year ago she had bariatric surgery and ended up in hospital d/t malnourishment and dehydrated and lasix stopped but PCP refilled it some but she is needing more d/t increased leg swelling so she reached out to us for it. Per JK she needs an appointment first called and told pt she voiced understanding.

## 2024-10-08 ENCOUNTER — SPECIALTY PHARMACY (OUTPATIENT)
Dept: ONCOLOGY | Facility: HOSPITAL | Age: 39
End: 2024-10-08
Payer: COMMERCIAL

## 2024-10-09 ENCOUNTER — TELEPHONE (OUTPATIENT)
Dept: NEUROLOGY | Facility: CLINIC | Age: 39
End: 2024-10-09

## 2024-10-09 NOTE — TELEPHONE ENCOUNTER
Caller: KAMALA GUTIERREZ    Relationship:  PATIENT    Best call back number:[phone]    PATIENT CALLED REQUESTING TO CANCEL SAME DAY APPT.    Did the patient call AFTER the start time of their scheduled appointment?  YES      Was the patient's appointment rescheduled?  NO    Any additional information: BOTOX APPT

## 2024-10-09 NOTE — TELEPHONE ENCOUNTER
Patient works night shift.  I sent a [employer] message informing that I rescheduled the appointment for tomorrow and to let me know if this was not good for her and we would try again.

## 2024-10-10 ENCOUNTER — TELEPHONE (OUTPATIENT)
Dept: NEUROLOGY | Facility: CLINIC | Age: 39
End: 2024-10-10

## 2024-10-10 NOTE — TELEPHONE ENCOUNTER
PATIENT RETURNING CALL TO BENSON - PLEASE CALL HER BACK.  ALSO, SHE IS NOT WORKING TODAY SO YOU CAN CALL ANYTIME.

## 2024-10-10 NOTE — TELEPHONE ENCOUNTER
Lmvm to inform that I am cancelling her appointment this morning but not no-showing it and asked her to call to reschedule.

## 2024-10-11 ENCOUNTER — PROCEDURE VISIT (OUTPATIENT)
Dept: NEUROLOGY | Facility: CLINIC | Age: 39
End: 2024-10-11
Payer: COMMERCIAL

## 2024-10-11 VITALS — SYSTOLIC BLOOD PRESSURE: 132 MMHG | DIASTOLIC BLOOD PRESSURE: 78 MMHG

## 2024-10-11 DIAGNOSIS — G43.709 CHRONIC MIGRAINE WITHOUT AURA WITHOUT STATUS MIGRAINOSUS, NOT INTRACTABLE: Primary | Chronic | ICD-10-CM

## 2024-10-11 RX ORDER — MONTELUKAST SODIUM 10 MG/1
TABLET ORAL
COMMUNITY
Start: 2024-09-25

## 2024-10-17 NOTE — PROGRESS NOTES
Bedside and Verbal shift change report given to Glen De Los Santos (oncoming nurse) by Andrae Manning (offgoing nurse). Report included the following information SBAR, Kardex, Intake/Output, MAR and Med Rec Status. 0904-Patient is expressing the want to be discharged today. Spoke with Dr. Keara Beckham, she states that if the patient can tolerate lunch that he will probably be discharged this evening. [facility]    Encounter Date: 10/24/2024    Patient ID: Frances Hartman is a 38 y.o. female.  : 1985     PCP: Gay Prado APRN       Chief Complaint: Palpitations and Dizziness      PROBLEM LIST:  Hypertension:  Echo, 2014: EF 55-60%. All valves are structurally and functionally normal with no hemodynamically significant valve disease.  Echo, 2017: EF 65%. Mild TR with normal RVSP.  Echo, 2021: EF 60%. No significant valvular abnormalities.  Hyperlipidemia.  Chest pain:  Pet MPS, 2019: EF > 70%. No evidence of reversible ischemia.  Normal bilateral lower extremity venous duplex, 2021  Palpitations:  2-week Zio, 2020: SR. <1% PACs/PVCs. One short SVT (4 beats). Average HR 74 bpm.  DM2.  Morbid obesity   Peripheral chronic venous insufficiency.  Polycystic ovaries.  GERD.  Subclinical hypothyroidism.  Keloid skin disorder.  Chronic fatigue.  Chronic tonsillar hypertrophy.  Depression.  Migraine with aura, onset age 8.    History of Present Illness  Patient presents today for 6 month follow-up with a history of chest pain and cardiac risk factors. Since last visit, Patient has been doing well from a cardiac standpoint.  Active and busy in daily life but does not have a regular exercise routine.  Tolerates activity well. Has been having issues with lower extremity edema but states that this is her baseline and has been relieve with Lasix. Patient denies any chest pain, shortness of air, palpitations, orthopnea, presyncope or syncope. BP has been well controlled.       Allergies   Allergen Reactions    Capsicum Annuum Extract & Derivative (Bell Pepper) [Capsicum] Anaphylaxis    Codeine Headache, Unknown - Low Severity and Other (See Comments)     Can tolerate hydrocodone, no other adverse reactions to other narcotics.    Doxycycline Hallucinations, Rash and Unknown - Low Severity    Hydrochlorothiazide Swelling and Unknown -  "Low Severity     eventually causes CHF    Ibuprofen Other (See Comments)     \"makes me retain fluid and eventually go into CHF\"    Nitrofurantoin Nausea Only and Unknown - Low Severity    Peanut-Containing Drug Products Anaphylaxis    Monosodium Glutamate Swelling and Headache    Oatmeal Other (See Comments)     Dx on allergy testing    Banana (Diagnostic) Unknown - High Severity    Coconut Flavor Unknown - High Severity    Doxycycline Monohydrate Other (See Comments)    Isoflavones (Soy) Unknown - Low Severity    Amitriptyline Mental Status Change and Unknown - Low Severity     memory gaps + neuropathy + hair loss    Amoxicillin-Pot Clavulanate Other (See Comments) and Unknown - Low Severity     Syncope, not sure if allergic to cephalosporins.    Aspartame Nausea Only    Diclofenac Headache and Unknown - Low Severity    Egg-Derived Products Other (See Comments)     dx on allergy testing, but does not completely avoid    Naproxen Other (See Comments)     \"blackout\"         Current Outpatient Medications:     acetaminophen (TYLENOL) 500 MG tablet, Take 1-2 tablets by mouth Every 6 (Six) Hours As Needed for Mild Pain., Disp: , Rfl:     Acetaminophen-Caffeine (Excedrin Tension Headache) 500-65 MG tablet, , Disp: , Rfl:     albuterol sulfate  (90 Base) MCG/ACT inhaler, Inhale 2 puffs Every 4 (Four) Hours As Needed for Wheezing or Shortness of Air., Disp: 18 g, Rfl: 5    Biotin (Biotin Maximum) 14100 MCG tablet dispersible, , Disp: , Rfl:     Blood Glucose Monitoring Suppl (ONE TOUCH ULTRA 2) w/Device kit, , Disp: , Rfl:     calcium carbonate (TUMS) 500 MG chewable tablet, Chew 1 tablet Daily., Disp: , Rfl:     Cyanocobalamin (Vitamin B-12) 5000 MCG tablet dispersible, , Disp: , Rfl:     CYCLOBENZAPRINE HCL PO, , Disp: , Rfl:     diphenhydrAMINE (BENADRYL) 25 mg capsule, Take 1 capsule by mouth Every 6 (Six) Hours As Needed for Itching or Sleep., Disp: , Rfl:     diphenhydrAMINE-acetaminophen (Tylenol PM Extra " Strength)  MG tablet per tablet, , Disp: , Rfl:     docusate sodium (Stool Softener) 100 MG capsule, , Disp: , Rfl:     fluconazole (DIFLUCAN) 150 MG tablet, Take 1 tablet by mouth Daily. Every 72 hours, Disp: , Rfl:     furosemide (LASIX) 20 MG tablet, Take 1 tablet by mouth Take As Directed. Take one tablet by mouth daily. Can take an extra tablet as needed., Disp: 90 tablet, Rfl: 3    gabapentin (NEURONTIN) 300 MG capsule, , Disp: , Rfl:     glipizide (GLUCOTROL XL) 5 MG ER tablet, Take 1 tablet by mouth Daily., Disp: 90 tablet, Rfl: 3    Lactase ([employer] DAIRY RELIEF FAST ACTING PO), , Disp: , Rfl:     Lancets (OneTouch Delica Plus [id number]) misc, Use 1 each 5 (Five) Times a Day., Disp: 200 each, Rfl: 5    loratadine (CLARITIN) 10 MG tablet, TAKE ONE TABLET BY MOUTH DAILY, Disp: 30 tablet, Rfl: 2    Melatonin 5 MG lozenge, , Disp: , Rfl:     metFORMIN ER (GLUCOPHAGE-XR) 500 MG 24 hr tablet, Take 1 tablet by mouth Daily With Breakfast., Disp: 90 tablet, Rfl: 3    metoprolol tartrate (LOPRESSOR) 50 MG tablet, TAKE ONE TABLET BY MOUTH TWICE A DAY, Disp: 180 tablet, Rfl: 3    montelukast (SINGULAIR) 10 MG tablet, , Disp: , Rfl:     multivitamin with minerals (ONE DAILY MULTIVITAMIN ADULT PO), , Disp: , Rfl:     multivitamin with minerals tablet tablet, Take 1 tablet by mouth Daily., Disp: , Rfl:     omeprazole (priLOSEC) 20 MG capsule, Take 1 capsule by mouth Daily., Disp: 30 capsule, Rfl: 11    OnabotulinumtoxinA (Botox) 200 units reconstituted solution, FOR . PHYSICIAN TO INJECT UP  UNITS INTRAMUSCULARLY INTO HEAD, NECK AND SHOULDERS EVERY 90 DAYS., Disp: 1 each, Rfl: 3    ondansetron ODT (ZOFRAN-ODT) 8 MG disintegrating tablet, , Disp: , Rfl:     OneTouch Ultra test strip, 1 each by Other route 5 (Five) Times a Day., Disp: 200 each, Rfl: 5    simethicone (Gas-X) 80 MG chewable tablet, Chew 1 tablet Every 6 (Six) Hours As Needed for Flatulence., Disp: 10 tablet, Rfl: 0    tiZANidine  "(ZANAFLEX) 4 MG tablet, , Disp: , Rfl:     traMADol (ULTRAM) 50 MG tablet, , Disp: , Rfl:     vitamin D3 125 MCG (5000 UT) capsule capsule, Take 1 capsule by mouth Daily., Disp: , Rfl:     Vitamin E 180 MG (400 UNIT) capsule capsule, , Disp: , Rfl:     Current Facility-Administered Medications:     OnabotulinumtoxinA 200 Units, 200 Units, Intramuscular, Q3 Months, Shiela Coelho APRN, 200 Units at 10/11/24 1232    The following portions of the patient's history were reviewed and updated as appropriate: allergies, current medications, past family history, past medical history, past social history, past surgical history and problem list.    Review of Systems   12 point ROS negative except for that listed in the HPI.        Objective:     /78 (BP Location: Left arm, Patient Position: Sitting, Cuff Size: Adult)   Pulse 66   Ht 152.4 cm (60\")   Wt 106 kg (234 lb)   SpO2 100%   BMI 45.70 kg/m²      Physical Exam  Constitutional: Patient appears well-developed and well-nourished.   HENT: HEENT exam unremarkable.   Neck: Neck supple. No JVD present.   Cardiovascular: Normal rate, regular rhythm and normal heart sounds. No murmur heard.   2+ symmetric pulses.   Pulmonary/Chest: Breath sounds normal.   Musculoskeletal: Does not exhibit edema.   Neurological: Neurological exam unremarkable.   Vitals reviewed.    Data Review:   Lab Results   Component Value Date    GLUCOSE 98 09/11/2024    BUN 10 09/11/2024    CREATININE 0.91 09/11/2024    EGFRIFNONA 111 12/15/2021    EGFRIFAFRI 128 12/15/2021    BCR 11.0 09/11/2024    K 4.1 09/11/2024    CO2 25.0 09/11/2024    CALCIUM 10.2 09/11/2024    ALBUMIN 4.3 09/11/2024    AST 24 09/11/2024    ALT 36 (H) 09/11/2024     Lab Results   Component Value Date    CHOL 197 09/24/2024    CHLPL 142 10/21/2015    TRIG 157 (H) 09/24/2024    HDL 41 09/24/2024     (H) 09/24/2024      Lab Results   Component Value Date    WBC 8.25 09/11/2024    RBC 4.08 09/11/2024    HGB 12.8 " 09/11/2024    HCT 39.7 09/11/2024    MCV 97.3 (H) 09/11/2024     09/11/2024     Lab Results   Component Value Date    TSH 2.020 09/24/2024     Lab Results   Component Value Date    HGBA1C 6.2 (A) 09/24/2024        Procedures                Assessment and plan:      Diagnosis Plan   1. Essential hypertension  BP has been well controlled. Continue current antihypertensive therapy      2. Dyslipidemia  Diet and exercise. No hx of CAD.       3. Palpitations  Improved. Avoid caffeine, maintain adequate hydration, and continue metoprolol.       4. Lower extremity edema  Normal EF with no hemodynamically significant VHD. Refill Lasix today. Of required increased dose of lasix, call our office.        Continue current medications.   FU in 6 MO, sooner as needed.  Thank you for allowing us to participate in the care of your patient.         Meg Palmer PA-C      Please note that portions of this note may have been completed with a voice recognition program. Efforts were made to edit the dictations, but occasionally words are mistranscribed.

## 2024-10-19 PROBLEM — E78.5 HYPERLIPIDEMIA LDL GOAL <100: Status: ACTIVE | Noted: 2024-10-19

## 2024-10-24 ENCOUNTER — OFFICE VISIT (OUTPATIENT)
Dept: CARDIOLOGY | Facility: CLINIC | Age: 39
End: 2024-10-24
Payer: COMMERCIAL

## 2024-10-24 VITALS
DIASTOLIC BLOOD PRESSURE: 78 MMHG | SYSTOLIC BLOOD PRESSURE: 126 MMHG | WEIGHT: 234 LBS | OXYGEN SATURATION: 100 % | HEIGHT: 60 IN | BODY MASS INDEX: 45.94 KG/M2 | HEART RATE: 66 BPM

## 2024-10-24 DIAGNOSIS — R60.0 LOWER EXTREMITY EDEMA: ICD-10-CM

## 2024-10-24 DIAGNOSIS — R00.2 PALPITATIONS: ICD-10-CM

## 2024-10-24 DIAGNOSIS — E78.5 DYSLIPIDEMIA: ICD-10-CM

## 2024-10-24 DIAGNOSIS — I10 ESSENTIAL HYPERTENSION: Primary | ICD-10-CM

## 2024-10-24 RX ORDER — FUROSEMIDE 20 MG
20 TABLET ORAL TAKE AS DIRECTED
Qty: 90 TABLET | Refills: 3 | Status: SHIPPED | OUTPATIENT
Start: 2024-10-24

## 2024-10-24 RX ORDER — FUROSEMIDE 20 MG
20 TABLET ORAL DAILY
Qty: 90 TABLET | Refills: 3 | Status: SHIPPED | OUTPATIENT
Start: 2024-10-24 | End: 2024-10-24

## 2024-10-24 RX ORDER — FLUCONAZOLE 150 MG/1
150 TABLET ORAL DAILY
COMMUNITY
Start: 2024-10-15

## 2024-11-11 DIAGNOSIS — E11.9 TYPE 2 DIABETES MELLITUS WITHOUT COMPLICATION, WITHOUT LONG-TERM CURRENT USE OF INSULIN: ICD-10-CM

## 2024-11-11 RX ORDER — GLIPIZIDE 5 MG/1
5 TABLET, FILM COATED, EXTENDED RELEASE ORAL DAILY
Qty: 90 TABLET | Refills: 3 | OUTPATIENT
Start: 2024-11-11

## 2024-12-18 ENCOUNTER — SPECIALTY PHARMACY (OUTPATIENT)
Dept: ONCOLOGY | Facility: HOSPITAL | Age: 39
End: 2024-12-18
Payer: COMMERCIAL

## 2024-12-18 RX ORDER — GABAPENTIN 600 MG/1
600 TABLET ORAL NIGHTLY
COMMUNITY

## 2024-12-18 NOTE — PROGRESS NOTES
Specialty Pharmacy Refill Coordination Note     Frances is a 39 y.o. female contacted today regarding refills of her specialty medication(s).    Specialty medication(s) and dose(s) confirmed: botox 155 units in office  Changes to medications: no  Changes to insurance: no  Reviewed and verified with patient:  Allergies  Meds  Problems         Refill Questions      Flowsheet Row Most Recent Value   Changes to allergies? No   Changes to medications? No   New conditions or infections since last clinic visit No   Unplanned office visit, urgent care, ED, or hospital admission in the last 4 weeks  No   How does patient/caregiver feel medication is working? Good   Financial problems or insurance changes  No   Since the previous refill, were any specialty medication doses or scheduled injections missed or delayed?  No   Does this patient require a clinical escalation to a pharmacist? No            Delivery Questions      Flowsheet Row Most Recent Value   Delivery method Other (Comment)  [mail to [facility]]   Delivery address verified with patient/caregiver? Yes   Delivery address Other (Comment)  [[address]]   Number of medications in delivery 1   Medication(s) being filled and delivered OnabotulinumtoxinA (Botox)   Copay verified? Yes   Copay amount botox = 0$   Copay form of payment No copayment ($0)   Ship Date 12/19   Delivery Date 12/20   Signature Required Yes            Medication Adherence    Adherence tools used: cell phone  Support network for adherence: healthcare provider          Follow-up: 11.5 week(s)     Robert Jacques, PharmD  12/18/2024   11:26 EST     Head atraumatic, normal cephalic shape.

## 2024-12-18 NOTE — PROGRESS NOTES
Specialty Pharmacy Patient Management Program  Neurology Reassessment     Frances Hartman is a 39 y.o. female with migraines seen by a Neurology provider and enrolled in the Neurology Patient Management program offered by [facility].  A follow-up outreach was conducted, including assessment of continued therapy appropriateness, medication adherence, and side effect incidence and management for botox 155 units in office.     Changes to Insurance Coverage or Financial Support  Express scripts with copay card     Relevant Past Medical History and Comorbidities  Relevant medical history and concomitant health conditions were discussed with the patient. The patient's chart has been reviewed for relevant past medical history and comorbid health conditions and updated as necessary.   Past Medical History:   Diagnosis Date    Allergic     Birth    Anxiety     Arthritis     Asthma     prn inhalers, does not follow w/ pulmonary    Chronic back pain     previously followed w/ pain management, now just prn Tylenol + Gabapentin    Chronic deep vein thrombosis (DVT) of femoral vein of right lower extremity 09/10/2021    Clotting disorder 2015    Cluster headache     Depression     Diabetes mellitus     Type II, dx 2014, never on insulin, A1C 7.6     Dyspepsia     Dyspnea on exertion     Dysuria 03/07/2022    Dysuria and hematuria x 2 weeks. 3/7/22 urine culture sent. Rx Macrobid 100 mg twice daily for 7 days. Patient did not tolerate Macrobid well due to GI upset. Urine culture was negative.    Fatigue     Gastroparesis     GERD (gastroesophageal reflux disease)     Headache, tension-type     Heartburn     chronic, prn TUMS, denies prior eval    Hirsutism     History of medical problems     PCOS    Hx of radiation therapy     for treatments of Keloids    Hypertension     Irregular menses     infrequent spotting    Leiomyoma, subserous     possible peduculated f3-4 cm fibroid on u/s at [facility]    Low back  "pain     Migraines     botox q3 months, following Neurology @ [facility]    Morbid obesity     s/p sleeve gastrectomy    Peripheral edema     Polycystic ovary syndrome 2011    Seasonal allergies     Slow to wake up after anesthesia     Urinary tract infection     Visual impairment     Astigmatism, Nearsightedness     Social History     Socioeconomic History    Marital status: Single   Tobacco Use    Smoking status: Never     Passive exposure: Never    Smokeless tobacco: Never   Vaping Use    Vaping status: Never Used   Substance and Sexual Activity    Alcohol use: Not Currently     Comment: Very rarely drink socially. At most 2 drinks per month.    Drug use: Never    Sexual activity: Yes     Partners: Male     Birth control/protection: Condom, Coitus interruptus     Problem list reviewed by Robert Jacques, PharmD on 12/18/2024 at 11:08 AM    Hospitalizations and Urgent Care Since Last Assessment  ED Visits, Admissions, or Hospitalizations: none   Urgent Office Visits: none     Allergies  Known allergies and reactions were discussed with the patient. The patient's chart has been reviewed for allergy information and updated as necessary.   Allergies   Allergen Reactions    Capsicum Annuum Extract & Derivative (Bell Pepper) [Capsicum] Anaphylaxis    Codeine Headache, Unknown - Low Severity and Other (See Comments)     Can tolerate hydrocodone, no other adverse reactions to other narcotics.    Doxycycline Hallucinations, Rash and Unknown - Low Severity    Hydrochlorothiazide Swelling and Unknown - Low Severity     eventually causes CHF    Ibuprofen Other (See Comments)     \"makes me retain fluid and eventually go into CHF\"    Nitrofurantoin Nausea Only and Unknown - Low Severity    Peanut-Containing Drug Products Anaphylaxis    Monosodium Glutamate Swelling and Headache    Oatmeal Other (See Comments)     Dx on allergy testing    Banana (Diagnostic) Unknown - High Severity    Coconut Flavor Unknown - High Severity    " "Doxycycline Monohydrate Other (See Comments)    Isoflavones (Soy) Unknown - Low Severity    Amitriptyline Mental Status Change and Unknown - Low Severity     memory gaps + neuropathy + hair loss    Amoxicillin-Pot Clavulanate Other (See Comments) and Unknown - Low Severity     Syncope, not sure if allergic to cephalosporins.    Aspartame Nausea Only    Diclofenac Headache and Unknown - Low Severity    Egg-Derived Products Other (See Comments)     dx on allergy testing, but does not completely avoid    Naproxen Other (See Comments)     \"blackout\"     Allergies reviewed by Robert Jacques, PharmD on 12/18/2024 at 11:03 AM    Relevant Laboratory Values  Common labs          4/19/2024    08:52 9/11/2024    08:30 9/24/2024    08:40 9/24/2024    09:10   Common Labs   Glucose  98      BUN  10      Creatinine  0.91      Sodium  139      Potassium  4.1      Chloride  101      Calcium  10.2      Albumin  4.3      Total Bilirubin  0.4      Alkaline Phosphatase  105      AST (SGOT)  24      ALT (SGPT)  36      WBC 9.01  8.25      Hemoglobin 12.7  12.8      Hematocrit 38.0  39.7      Platelets 245  294      Total Cholesterol    197    Triglycerides    157    HDL Cholesterol    41    LDL Cholesterol     128    Hemoglobin A1C   6.2     Microalbumin, Urine    <1.2        Lab Assessment   The above labs have been reviewed. No dose adjustments are needed for the specialty medication(s) based on the labs.     Current Medication List  This medication list has been reviewed with the patient and evaluated for any interactions or necessary modifications/recommendations, and updated to include all prescription medications, OTC medications, and supplements the patient is currently taking.  This list reflects what is contained in the patient's profile, which has also been marked as reviewed to communicate to other providers it is the most up to date version of the patient's current medication therapy.     Current Outpatient " Medications:     acetaminophen (TYLENOL) 500 MG tablet, Take 1-2 tablets by mouth Every 6 (Six) Hours As Needed for Mild Pain., Disp: , Rfl:     Acetaminophen-Caffeine (Excedrin Tension Headache) 500-65 MG tablet, , Disp: , Rfl:     albuterol sulfate  (90 Base) MCG/ACT inhaler, Inhale 2 puffs Every 4 (Four) Hours As Needed for Wheezing or Shortness of Air., Disp: 18 g, Rfl: 5    Biotin (Biotin Maximum) 05557 MCG tablet dispersible, , Disp: , Rfl:     Blood Glucose Monitoring Suppl (ONE TOUCH ULTRA 2) w/Device kit, , Disp: , Rfl:     calcium carbonate (TUMS) 500 MG chewable tablet, Chew 1 tablet Daily., Disp: , Rfl:     Cyanocobalamin (Vitamin B-12) 5000 MCG tablet dispersible, , Disp: , Rfl:     CYCLOBENZAPRINE HCL PO, , Disp: , Rfl:     diphenhydrAMINE (BENADRYL) 25 mg capsule, Take 1 capsule by mouth Every 6 (Six) Hours As Needed for Itching or Sleep., Disp: , Rfl:     diphenhydrAMINE-acetaminophen (Tylenol PM Extra Strength)  MG tablet per tablet, , Disp: , Rfl:     furosemide (LASIX) 20 MG tablet, Take 1 tablet by mouth Take As Directed. Take one tablet by mouth daily. Can take an extra tablet as needed., Disp: 90 tablet, Rfl: 3    gabapentin (NEURONTIN) 300 MG capsule, , Disp: , Rfl:     gabapentin (NEURONTIN) 600 MG tablet, Take 1 tablet by mouth Every Night., Disp: , Rfl:     glipizide (GLUCOTROL XL) 5 MG ER tablet, Take 1 tablet by mouth Daily., Disp: 90 tablet, Rfl: 3    Lactase ([employer] DAIRY RELIEF FAST ACTING PO), , Disp: , Rfl:     Lancets (OneTouch Delica Plus [id number]) misc, Use 1 each 5 (Five) Times a Day., Disp: 200 each, Rfl: 5    loratadine (CLARITIN) 10 MG tablet, TAKE ONE TABLET BY MOUTH DAILY, Disp: 30 tablet, Rfl: 2    Melatonin 5 MG lozenge, , Disp: , Rfl:     metFORMIN ER (GLUCOPHAGE-XR) 500 MG 24 hr tablet, Take 1 tablet by mouth Daily With Breakfast., Disp: 90 tablet, Rfl: 3    metoprolol tartrate (LOPRESSOR) 50 MG tablet, TAKE ONE TABLET BY MOUTH TWICE A DAY, Disp: 180 tablet,  Rfl: 3    montelukast (SINGULAIR) 10 MG tablet, , Disp: , Rfl:     multivitamin with minerals (ONE DAILY MULTIVITAMIN ADULT PO), , Disp: , Rfl:     OnabotulinumtoxinA (Botox) 200 units reconstituted solution, FOR . PHYSICIAN TO INJECT UP  UNITS INTRAMUSCULARLY INTO HEAD, NECK AND SHOULDERS EVERY 90 DAYS., Disp: 1 each, Rfl: 3    ondansetron ODT (ZOFRAN-ODT) 8 MG disintegrating tablet, , Disp: , Rfl:     OneTouch Ultra test strip, 1 each by Other route 5 (Five) Times a Day., Disp: 200 each, Rfl: 5    simethicone (Gas-X) 80 MG chewable tablet, Chew 1 tablet Every 6 (Six) Hours As Needed for Flatulence., Disp: 10 tablet, Rfl: 0    tiZANidine (ZANAFLEX) 4 MG tablet, , Disp: , Rfl:     traMADol (ULTRAM) 50 MG tablet, , Disp: , Rfl:     vitamin D3 125 MCG (5000 UT) capsule capsule, Take 1 capsule by mouth Daily., Disp: , Rfl:     Vitamin E 180 MG (400 UNIT) capsule capsule, , Disp: , Rfl:     docusate sodium (Stool Softener) 100 MG capsule, , Disp: , Rfl:     fluconazole (DIFLUCAN) 150 MG tablet, Take 1 tablet by mouth Daily. Every 72 hours (Patient not taking: Reported on 12/18/2024), Disp: , Rfl:     multivitamin with minerals tablet tablet, Take 1 tablet by mouth Daily. (Patient not taking: Reported on 12/18/2024), Disp: , Rfl:     omeprazole (priLOSEC) 20 MG capsule, Take 1 capsule by mouth Daily. (Patient not taking: Reported on 12/18/2024), Disp: 30 capsule, Rfl: 11    Current Facility-Administered Medications:     OnabotulinumtoxinA 200 Units, 200 Units, Intramuscular, Q3 Months, Shiela Coelho, TANESHA, 200 Units at 10/11/24 1232    Medicines reviewed by Robert Jacques, PharmD on 12/18/2024 at 11:15 AM    Drug Interactions  No relevant drug drug interactions with specialty medication.       Adverse Drug Reactions  Medication tolerability: Tolerating with no to minimal ADRs          Medication plan: Continue therapy with normal follow-up  Plan for ADR Management: patient  reporting      Adherence, Self-Administration, and Current Therapy Problems  Adherence related patient's specialty therapy was discussed with the patient. The Adherence segment of this outreach has been reviewed and updated.   Adherence Questions  Linked Medication(s) Assessed: OnabotulinumtoxinA (Botox)  On average, how many doses/injections does the patient miss per month?: 0  What are the identified reasons for non-adherence or missed doses? : no problems identified  What is the estimated medication adherence level?: % (botox q12 week.s)  Based on the patient/caregiver response and refill history, does this patient require an MTP to track adherence improvements?: no    Additional Barriers to Patient Self-Administration: none  Methods for Supporting Patient Self-Administration: n/a    Recently Close Medication Therapy Problems  No medication therapy recommendations to display  Open Medication Therapy Problems  No medication therapy recommendations to display     Goals of Therapy  Goals related to the patient's specialty therapy was discussed with the patient. The Patient Goals segment of this outreach has been reviewed and updated.    Goals Addressed Today        Specialty Pharmacy General Goal      Reduction in frequency and severity of migraines  Response to treatment has reduced HA's at least 7 fewer days a month and/or 100 hours fewer each month    On Average, Reduce:   Frequency of migraines to <  15 per month.       Baseline Values/Notes on Enrollment  Frequency:   > 15 /month  Symptom Severity:  8-9/10      Date of Reassessment Notes on Progress Toward Above Goals   12/18/24 Botox onset ~1week and increase HA 1-2 weeks before next injection;                                               Quality of Life Assessment   Quality of Life related to the patient's enrollment in the patient management program and services provided was discussed with the patient. The QOL segment of this outreach has been  reviewed and updated.   Quality of Life Improvement Scale: 8-Moderately better    Reassessment Plan & Follow-Up  Medication Therapy Changes: n/a  Related Plans, Therapy Recommendations, or Issues to Be Addressed: n/a  Pharmacist to perform regular reassessments no more than (6) months from the previous assessment.  Care Coordinator to set up future refill outreaches, coordinate prescription delivery, and escalate clinical questions to pharmacist.     Attestation  Therapeutic appropriateness: Appropriate  I attest the patient was actively involved in and has agreed to the above plan of care. If the prescribed therapy is at any point deemed not appropriate based on the current or future assessments, a consultation will be initiated with the patient's specialty care provider to determine the best course of action. The revised plan of therapy will be documented along with any additional patient education provided. Discussed aforementioned material with patient by phone.    Robert Jacques, PharmD, [facility]  Clinic Specialty Pharmacist, Neurology  12/18/2024  11:33 EST

## 2024-12-31 ENCOUNTER — OFFICE VISIT (OUTPATIENT)
Dept: BARIATRICS/WEIGHT MGMT | Facility: CLINIC | Age: 39
End: 2024-12-31
Payer: COMMERCIAL

## 2024-12-31 VITALS
OXYGEN SATURATION: 98 % | WEIGHT: 236 LBS | TEMPERATURE: 98 F | RESPIRATION RATE: 18 BRPM | BODY MASS INDEX: 44.56 KG/M2 | DIASTOLIC BLOOD PRESSURE: 84 MMHG | HEIGHT: 61 IN | HEART RATE: 80 BPM | SYSTOLIC BLOOD PRESSURE: 140 MMHG

## 2024-12-31 DIAGNOSIS — E66.01 OBESITY, CLASS III, BMI 40-49.9 (MORBID OBESITY): Primary | ICD-10-CM

## 2024-12-31 DIAGNOSIS — Z90.3 POSTGASTRECTOMY MALABSORPTION: ICD-10-CM

## 2024-12-31 DIAGNOSIS — R79.0 ABNORMAL BLOOD LEVEL OF IRON: ICD-10-CM

## 2024-12-31 DIAGNOSIS — R53.83 FATIGUE, UNSPECIFIED TYPE: ICD-10-CM

## 2024-12-31 DIAGNOSIS — K91.2 POSTGASTRECTOMY MALABSORPTION: ICD-10-CM

## 2024-12-31 DIAGNOSIS — Z13.0 SCREENING, IRON DEFICIENCY ANEMIA: ICD-10-CM

## 2024-12-31 RX ORDER — SODIUM CHLORIDE 9 MG/ML
1000 INJECTION, SOLUTION INTRAVENOUS ONCE
Start: 2024-12-31 | End: 2024-12-31

## 2024-12-31 RX ORDER — SODIUM CHLORIDE 9 MG/ML
500 INJECTION, SOLUTION INTRAVENOUS ONCE
OUTPATIENT
Start: 2024-12-31

## 2024-12-31 RX ORDER — SODIUM CHLORIDE 9 MG/ML
500 INJECTION, SOLUTION INTRAVENOUS ONCE
Status: CANCELLED | OUTPATIENT
Start: 2024-12-31

## 2024-12-31 NOTE — PROGRESS NOTES
[facility] Bariatric Surgery  [address]    [facility] [address] [phone]        Patient Name:  Frances Hartman.  :  1985      Date of Visit: 2024      Reason for Visit:   3 years postop      HPI: Frances Hartman is a 39 y.o. female s/p robotic assisted LSG/HHR by  on 6/15/21     Getting 40-60g prot/day.  Physical activity: not recently, has [employer] membership.    Denies dysphagia, reflux, and abdominal pain.      Feels dehydrated- nausea, not able to eat as many foods- will make her sick. Currently unable to eat cheese, lettuce. Has had to use IV hydration in the past- her standing IV infusion orders had , supposed to be re-ordered in 2024 by Dr. Andre. Occasional vomiting. Takes zofran prn. No longer taking PPI. Uses tums, gasx prn. Hasn't been able to drink as much fluids as she should.  Isn't thirsty, when she tries to drink she feels overly full.    Sees GI- has appt in a couple of weeks.     Has not had to use IV hydration in past few months- May 2023.      Labs 24- acceptable. Taking MVI, Vit D, and Vit E, Biotin, vit B12 .     Presurgery weight: 231 pounds.  Today's weight is 107 kg (236 lb) pounds, today's  Body mass index is 45.33 kg/m²., weight loss since surgery is 9 pounds.      Past Medical History:   Diagnosis Date    Allergic     Birth    Anxiety     Arthritis     Asthma     prn inhalers, does not follow w/ pulmonary    Chronic back pain     previously followed w/ pain management, now just prn Tylenol + Gabapentin    Chronic deep vein thrombosis (DVT) of femoral vein of right lower extremity 09/10/2021    Clotting disorder 2015    Cluster headache     Depression     Diabetes mellitus     Type II, dx , never on insulin, A1C 7.6     Dyspepsia     Dyspnea on exertion     Dysuria 2022    Dysuria and hematuria x 2 weeks. 3/7/22 urine culture sent. Rx Macrobid 100 mg twice daily for 7 days. Patient did  not tolerate Macrobid well due to GI upset. Urine culture was negative.    Fatigue     Gastroparesis     GERD (gastroesophageal reflux disease)     Headache, tension-type     Heartburn     chronic, prn TUMS, denies prior eval    Hirsutism     History of medical problems     PCOS    Hx of radiation therapy     for treatments of Keloids    Hypertension     Irregular menses     infrequent spotting    Leiomyoma, subserous     possible peduculated f3-4 cm fibroid on u/s at [facility]    Low back pain     Migraines     botox q3 months, following Neurology @ [facility]    Morbid obesity     s/p sleeve gastrectomy    Peripheral edema     Polycystic ovary syndrome 2011    Seasonal allergies     Slow to wake up after anesthesia     Urinary tract infection     Visual impairment     Astigmatism, Nearsightedness     Past Surgical History:   Procedure Laterality Date    ABDOMINAL SURGERY  06/15/2021    Bariatric Sleeve Surgery    BARIATRIC SURGERY      COLONOSCOPY  December 2022    COSMETIC SURGERY      Multiple Keloid surgeries    ENDOSCOPY N/A 06/15/2021    Procedure: ESOPHAGOGASTRODUODENOSCOPY;  Surgeon: Ayaka Andre MD;  Location:  [facility];  Service: Robotics - DaVinci;  Laterality: N/A;    ENDOSCOPY N/A 10/28/2021    Procedure: ESOPHAGOGASTRODUODENOSCOPY WITH ACHALASIA BALLOON DILATATION;  Surgeon: Jase Hernandez MD;  Location:  [facility];  Service: Gastroenterology;  Laterality: N/A;  58 F DILATOR    ENDOSCOPY N/A 11/15/2021    Procedure: ESOPHAGOGASTRODUODENOSCOPY WITH DILATATION;  Surgeon: Jase Hernandez MD;  Location:  [facility];  Service: Gastroenterology;  Laterality: N/A;  achalasia balloon     ENDOSCOPY N/A 11/24/2021    Procedure: ESOPHAGOGASTRODUODENOSCOPY [facility] DUODENAL POLYPECTOPMY AND NASAL JEJUNAL TUBE PLACEMENT;  Surgeon: Jase Hernandez MD;  Location:  [facility];  Service: Gastroenterology;  Laterality: N/A;  placement of feeding tube, checked position with KUB    GASTRIC RESTRICTION SURGERY   2021    Sleeve    GASTRIC SLEEVE LAPAROSCOPIC N/A 06/15/2021    Procedure: GASTRIC SLEEVE LAPAROSCOPIC WITH DAVINCI ROBOT, LAPROSCOPIC HIATAL HERNIA REPAIR WITH DAVINCI ROBOT;  Surgeon: Ayaka Andre MD;  Location: [facility];  Service: Robotics - DaVinci;  Laterality: N/A;    KELOID EXCISION      1990,1993,1999,2015,2016,2019    LAPAROSCOPIC CHOLECYSTECTOMY  2000    for stones    RADIOFREQUENCY ABLATION  2019    x 2  for pt back    UMBILICAL HERNIA REPAIR  1990    UPPER GASTROINTESTINAL ENDOSCOPY      WISDOM TOOTH EXTRACTION  1994     Outpatient Medications Marked as Taking for the 12/31/24 encounter (Office Visit) with Nancy Davila APRN   Medication Sig Dispense Refill    acetaminophen (TYLENOL) 500 MG tablet Take 1-2 tablets by mouth Every 6 (Six) Hours As Needed for Mild Pain.      Acetaminophen-Caffeine (Excedrin Tension Headache) 500-65 MG tablet       albuterol sulfate  (90 Base) MCG/ACT inhaler Inhale 2 puffs Every 4 (Four) Hours As Needed for Wheezing or Shortness of Air. 18 g 5    Biotin (Biotin Maximum) 77298 MCG tablet dispersible       Blood Glucose Monitoring Suppl (ONE TOUCH ULTRA 2) w/Device kit       calcium carbonate (TUMS) 500 MG chewable tablet Chew 1 tablet Daily.      Cyanocobalamin (Vitamin B-12) 5000 MCG tablet dispersible       CYCLOBENZAPRINE HCL PO  (Patient taking differently: 3 (Three) Times a Day As Needed.)      diphenhydrAMINE (BENADRYL) 25 mg capsule Take 1 capsule by mouth Every 6 (Six) Hours As Needed for Itching or Sleep.      diphenhydrAMINE-acetaminophen (Tylenol PM Extra Strength)  MG tablet per tablet       furosemide (LASIX) 20 MG tablet Take 1 tablet by mouth Take As Directed. Take one tablet by mouth daily. Can take an extra tablet as needed. 90 tablet 3    gabapentin (NEURONTIN) 300 MG capsule  (Patient taking differently: Take 1 capsule by mouth 2 (Two) Times a Day.)      glipizide (GLUCOTROL XL) 5 MG ER tablet Take 1 tablet by mouth Daily. 90  tablet 3    Lactase ([facility] DAIRY RELIEF FAST ACTING PO)       Lancets (OneTouch Delica Plus [id number]) misc Use 1 each 5 (Five) Times a Day. 200 each 5    loratadine (CLARITIN) 10 MG tablet TAKE ONE TABLET BY MOUTH DAILY 30 tablet 2    Melatonin 5 MG lozenge       metFORMIN ER (GLUCOPHAGE-XR) 500 MG 24 hr tablet Take 1 tablet by mouth Daily With Breakfast. 90 tablet 3    metoprolol tartrate (LOPRESSOR) 50 MG tablet TAKE ONE TABLET BY MOUTH TWICE A  tablet 3    montelukast (SINGULAIR) 10 MG tablet       multivitamin with minerals (ONE DAILY MULTIVITAMIN ADULT PO)       NON FORMULARY Feminine balancing gummy      OnabotulinumtoxinA (Botox) 200 units reconstituted solution FOR . PHYSICIAN TO INJECT UP  UNITS INTRAMUSCULARLY INTO HEAD, NECK AND SHOULDERS EVERY 90 DAYS. 1 each 3    ondansetron ODT (ZOFRAN-ODT) 8 MG disintegrating tablet       OneTouch Ultra test strip 1 each by Other route 5 (Five) Times a Day. 200 each 5    simethicone (Gas-X) 80 MG chewable tablet Chew 1 tablet Every 6 (Six) Hours As Needed for Flatulence. 10 tablet 0    tiZANidine (ZANAFLEX) 4 MG tablet       traMADol (ULTRAM) 50 MG tablet       vitamin D3 125 MCG (5000 UT) capsule capsule Take 1 capsule by mouth Daily.      Vitamin E 180 MG (400 UNIT) capsule capsule        Current Facility-Administered Medications for the 12/31/24 encounter (Office Visit) with Nancy Davila APRN   Medication Dose Route Frequency Provider Last Rate Last Admin    OnabotulinumtoxinA 200 Units  200 Units Intramuscular Q3 Months Shiela Coelho APRN   200 Units at 10/11/24 1232       Allergies   Allergen Reactions    Capsicum Annuum Extract & Derivative (Bell Pepper) [Capsicum] Anaphylaxis    Codeine Headache, Unknown - Low Severity and Other (See Comments)     Can tolerate hydrocodone, no other adverse reactions to other narcotics.    Doxycycline Hallucinations, Rash and Unknown - Low Severity    Hydrochlorothiazide Swelling and  "Unknown - Low Severity     eventually causes CHF    Ibuprofen Other (See Comments)     \"makes me retain fluid and eventually go into CHF\"    Nitrofurantoin Nausea Only and Unknown - Low Severity    Peanut-Containing Drug Products Anaphylaxis    Monosodium Glutamate Swelling and Headache    Oatmeal Other (See Comments)     Dx on allergy testing    Banana (Diagnostic) Unknown - High Severity    Coconut Flavoring Agent (Non-Screening) Unknown - High Severity    Doxycycline Monohydrate Other (See Comments)    Isoflavones (Soy) Unknown - Low Severity    Amitriptyline Mental Status Change and Unknown - Low Severity     memory gaps + neuropathy + hair loss    Amoxicillin-Pot Clavulanate Other (See Comments) and Unknown - Low Severity     Syncope, not sure if allergic to cephalosporins.    Aspartame Nausea Only    Diclofenac Headache and Unknown - Low Severity    Egg-Derived Products Other (See Comments)     dx on allergy testing, but does not completely avoid    Naproxen Other (See Comments)     \"blackout\"       Social History     Socioeconomic History    Marital status: Single   Tobacco Use    Smoking status: Never     Passive exposure: Never    Smokeless tobacco: Never   Vaping Use    Vaping status: Never Used   Substance and Sexual Activity    Alcohol use: Not Currently     Comment: Very rarely drink socially. At most 2 drinks per month.    Drug use: Never    Sexual activity: Yes     Partners: Male     Birth control/protection: Condom, Coitus interruptus       /84 (BP Location: Left arm, Patient Position: Sitting)   Pulse 80   Temp 98 °F (36.7 °C)   Resp 18   Ht 153.7 cm (60.5\")   Wt 107 kg (236 lb)   SpO2 98%   BMI 45.33 kg/m²     Physical Exam  Constitutional:       General: She is not in acute distress.     Appearance: She is well-developed. She is not diaphoretic.   HENT:      Head: Normocephalic and atraumatic.      Mouth/Throat:      Pharynx: No oropharyngeal exudate.   Eyes:      Conjunctiva/sclera: " Conjunctivae normal.      Pupils: Pupils are equal, round, and reactive to light.   Pulmonary:      Effort: Pulmonary effort is normal. No respiratory distress.   Abdominal:      General: There is no distension.      Palpations: Abdomen is soft.   Skin:     General: Skin is warm and dry.      Coloration: Skin is not pale.   Neurological:      Mental Status: She is alert and oriented to person, place, and time.      Cranial Nerves: No cranial nerve deficit.   Psychiatric:         Behavior: Behavior normal.         Thought Content: Thought content normal.           Assessment:  3 years s/p robotic assisted LSG/HHR by  on 6/15/21     ICD-10-CM ICD-9-CM   1. Obesity, Class III, BMI 40-49.9 (morbid obesity)  E66.01 278.01   2. Postgastrectomy malabsorption  K91.2 579.3    Z90.3    3. Fatigue, unspecified type  R53.83 780.79   4. Screening, iron deficiency anemia  Z13.0 V78.0   5. Abnormal blood level of iron  R79.0 790.6                Plan:  Continue w/ good food choices and healthy habits.  Increase protein 100g/day.  Continue routine exercise.  Routine bariatric labs ordered.  Continue vitamins w/ adjustments pending lab results.  Call w/ problems/concerns.     Will re-order infusion standing orders.    The patient was instructed to follow up in 6 months, sooner if needed.      I spent 30 minutes caring for Frances on this date of service. This time includes time spent by me in the following activities: preparing for the visit, reviewing tests, obtaining and/or reviewing a separately obtained history, performing a medically appropriate examination and/or evaluation, counseling and educating the patient/family/caregiver, ordering medications, tests, or procedures, and documenting information in the medical record.       Nancy Davila, APRN

## 2025-01-05 LAB
25(OH)D3+25(OH)D2 SERPL-MCNC: 49.6 NG/ML (ref 30–100)
ALBUMIN SERPL-MCNC: 4.1 G/DL (ref 3.9–4.9)
ALP SERPL-CCNC: 104 IU/L (ref 44–121)
ALT SERPL-CCNC: 29 IU/L (ref 0–32)
AST SERPL-CCNC: 24 IU/L (ref 0–40)
BASOPHILS # BLD AUTO: 0.1 X10E3/UL (ref 0–0.2)
BASOPHILS NFR BLD AUTO: 1 %
BILIRUB SERPL-MCNC: <0.2 MG/DL (ref 0–1.2)
BUN SERPL-MCNC: 12 MG/DL (ref 6–24)
BUN/CREAT SERPL: 13 (ref 9–23)
CALCIUM SERPL-MCNC: 10 MG/DL (ref 8.7–10.2)
CHLORIDE SERPL-SCNC: 103 MMOL/L (ref 96–106)
CO2 SERPL-SCNC: 23 MMOL/L (ref 20–29)
CREAT SERPL-MCNC: 0.91 MG/DL (ref 0.57–1)
EGFRCR SERPLBLD CKD-EPI 2021: 81 ML/MIN/1.73
EOSINOPHIL # BLD AUTO: 0.2 X10E3/UL (ref 0–0.4)
EOSINOPHIL NFR BLD AUTO: 2 %
ERYTHROCYTE [DISTWIDTH] IN BLOOD BY AUTOMATED COUNT: 12.5 % (ref 11.7–15.4)
FERRITIN SERPL-MCNC: 54 NG/ML (ref 15–150)
FOLATE SERPL-MCNC: 19.2 NG/ML
GLOBULIN SER CALC-MCNC: 2.9 G/DL (ref 1.5–4.5)
GLUCOSE SERPL-MCNC: 167 MG/DL (ref 70–99)
HCT VFR BLD AUTO: 39.9 % (ref 34–46.6)
HGB BLD-MCNC: 13 G/DL (ref 11.1–15.9)
IMM GRANULOCYTES # BLD AUTO: 0 X10E3/UL (ref 0–0.1)
IMM GRANULOCYTES NFR BLD AUTO: 0 %
IRON SERPL-MCNC: 61 UG/DL (ref 27–159)
LYMPHOCYTES # BLD AUTO: 2.5 X10E3/UL (ref 0.7–3.1)
LYMPHOCYTES NFR BLD AUTO: 27 %
MCH RBC QN AUTO: 31.8 PG (ref 26.6–33)
MCHC RBC AUTO-ENTMCNC: 32.6 G/DL (ref 31.5–35.7)
MCV RBC AUTO: 98 FL (ref 79–97)
METHYLMALONATE SERPL-SCNC: 119 NMOL/L (ref 0–378)
MONOCYTES # BLD AUTO: 0.6 X10E3/UL (ref 0.1–0.9)
MONOCYTES NFR BLD AUTO: 7 %
NEUTROPHILS # BLD AUTO: 6 X10E3/UL (ref 1.4–7)
NEUTROPHILS NFR BLD AUTO: 63 %
PLATELET # BLD AUTO: 221 X10E3/UL (ref 150–450)
POTASSIUM SERPL-SCNC: 4.1 MMOL/L (ref 3.5–5.2)
PREALB SERPL-MCNC: 26 MG/DL (ref 12–34)
PROT SERPL-MCNC: 7 G/DL (ref 6–8.5)
RBC # BLD AUTO: 4.09 X10E6/UL (ref 3.77–5.28)
SODIUM SERPL-SCNC: 139 MMOL/L (ref 134–144)
VIT B1 BLD-SCNC: 141.2 NMOL/L (ref 66.5–200)
WBC # BLD AUTO: 9.4 X10E3/UL (ref 3.4–10.8)

## 2025-01-07 ENCOUNTER — TELEPHONE (OUTPATIENT)
Dept: NEUROLOGY | Facility: CLINIC | Age: 40
End: 2025-01-07

## 2025-01-07 NOTE — TELEPHONE ENCOUNTER
Caller: Frances Hartman    Relationship to patient: Self    Best call back number: [phone]    Chief complaint: BOTOX    Type of visit: BOTOX    Requested date: 1/14/25 @ 8 OR 8:15    If rescheduling, when is the original appointment: 1/9/25     Additional notes: CAN'T MAKE CURRENT RESCHEDULED BOTOX APPT, WOULD LIKE TO SEE ABOUT MAKING IT FOR NEXT WEEK. WORKS 3RD SHIFT, REQUESTING CALL BACK ASAP THIS MORNING.

## 2025-01-07 NOTE — TELEPHONE ENCOUNTER
Spoke to briana and was able to schedule for requested date and time.  She expressed appreciation.

## 2025-01-14 ENCOUNTER — PROCEDURE VISIT (OUTPATIENT)
Dept: NEUROLOGY | Facility: CLINIC | Age: 40
End: 2025-01-14
Payer: COMMERCIAL

## 2025-01-14 DIAGNOSIS — G43.709 CHRONIC MIGRAINE WITHOUT AURA WITHOUT STATUS MIGRAINOSUS, NOT INTRACTABLE: Primary | Chronic | ICD-10-CM

## 2025-02-20 ENCOUNTER — TELEPHONE (OUTPATIENT)
Dept: NEUROLOGY | Facility: CLINIC | Age: 40
End: 2025-02-20
Payer: COMMERCIAL

## 2025-02-20 ENCOUNTER — TELEPHONE (OUTPATIENT)
Dept: BARIATRICS/WEIGHT MGMT | Facility: CLINIC | Age: 40
End: 2025-02-20
Payer: COMMERCIAL

## 2025-02-20 NOTE — TELEPHONE ENCOUNTER
Caller: Frances Hartman    Relationship to Patient: SELF    Phone Number:   Telephone Information:   Mobile [phone]         Reason for Call: PT CALLED AND STATED THAT SHE HAS BEEN EXPERIENCING INCREASED MIGRAINES SINCE HER LAST INJECTION EVERYDAY, SHE DID ADVISE SHE WANTED TO BE SEEN IN CLINIC BUT NOT BY HER CURRENT PROVIDER. I DID ADVISE PROVIDERS ARE BOOKING OUT PAST HER CURRENT BOTOX APPOINTMENT I ALSO ADVISED I WOULD NEED PERMISSION IN ORDER SCHEDULE WITH A DIFFERENT APRN AS SHE HAD REQUESTED AND SHE WAS NOT VERY HAPPY WITH THAT ANSWER.     WHEN GOING TO FILL OUT THE MIGRAINE QUESTION SHE DID STOP OUR CONVERSATION AND ADVISED SHE WOULD CALL BACK LATER BECAUSE SHE WAS NOT SATISFIED WITH THE POLICY OF NEEDING PERMISSION TO SWITCH HER PROVIDER      When was the patient last seen: LAST BOTOX 1-14-25    Where is it located: [facility]    Characteristics of symptom/severity: 8 OUT 10

## 2025-02-20 NOTE — TELEPHONE ENCOUNTER
Pt called, stated that she came in back in Dec 2024 to get standing order for IV fluids. She stated that the whole purpose of that visit was to get a new order placed for that.    I do not see that a order was placed for fluids, I only see orders from 2022. Please advise, thanks!

## 2025-02-21 PROBLEM — E86.0 DEHYDRATION: Status: ACTIVE | Noted: 2025-02-21

## 2025-02-21 NOTE — TELEPHONE ENCOUNTER
Contacted infusion. Pt is scheduled on 2/27/25 @ 1pm at [facility] infusion. Spoke with pt and the date and time is not going to work for the pt. Pt is going to contact infusion herself to be rescheduled due to her work schedule.

## 2025-02-25 ENCOUNTER — OFFICE VISIT (OUTPATIENT)
Dept: ENDOCRINOLOGY | Facility: CLINIC | Age: 40
End: 2025-02-25
Payer: COMMERCIAL

## 2025-02-25 VITALS
HEIGHT: 60 IN | OXYGEN SATURATION: 98 % | SYSTOLIC BLOOD PRESSURE: 128 MMHG | HEART RATE: 68 BPM | DIASTOLIC BLOOD PRESSURE: 86 MMHG | BODY MASS INDEX: 48.29 KG/M2 | WEIGHT: 246 LBS

## 2025-02-25 DIAGNOSIS — E11.9 TYPE 2 DIABETES MELLITUS WITHOUT COMPLICATION, WITHOUT LONG-TERM CURRENT USE OF INSULIN: Primary | ICD-10-CM

## 2025-02-25 DIAGNOSIS — E78.5 DYSLIPIDEMIA: ICD-10-CM

## 2025-02-25 LAB
EXPIRATION DATE: ABNORMAL
EXPIRATION DATE: ABNORMAL
GLUCOSE BLDC GLUCOMTR-MCNC: 153 MG/DL (ref 70–130)
HBA1C MFR BLD: 7.1 % (ref 4.5–5.7)
Lab: ABNORMAL
Lab: ABNORMAL

## 2025-02-25 PROCEDURE — 83036 HEMOGLOBIN GLYCOSYLATED A1C: CPT | Performed by: INTERNAL MEDICINE

## 2025-02-25 PROCEDURE — 82947 ASSAY GLUCOSE BLOOD QUANT: CPT | Performed by: INTERNAL MEDICINE

## 2025-02-25 PROCEDURE — 99213 OFFICE O/P EST LOW 20 MIN: CPT | Performed by: INTERNAL MEDICINE

## 2025-02-25 RX ORDER — BLOOD SUGAR DIAGNOSTIC
1 STRIP MISCELLANEOUS 2 TIMES DAILY
Qty: 100 EACH | Refills: 3 | Status: SHIPPED | OUTPATIENT
Start: 2025-02-25

## 2025-02-25 RX ORDER — LANCETS 33 GAUGE
1 EACH MISCELLANEOUS 2 TIMES DAILY
Qty: 100 EACH | Refills: 3 | Status: SHIPPED | OUTPATIENT
Start: 2025-02-25

## 2025-02-25 RX ORDER — GABAPENTIN 600 MG/1
TABLET ORAL 2 TIMES DAILY
COMMUNITY
Start: 2025-02-24

## 2025-02-25 RX ORDER — LANCETS 33 GAUGE
1 EACH MISCELLANEOUS
Qty: 200 EACH | Refills: 5 | Status: SHIPPED | OUTPATIENT
Start: 2025-02-25 | End: 2025-02-25

## 2025-02-25 RX ORDER — GLIPIZIDE 5 MG/1
5 TABLET, FILM COATED, EXTENDED RELEASE ORAL DAILY
Qty: 90 TABLET | Refills: 3 | Status: SHIPPED | OUTPATIENT
Start: 2025-02-25 | End: 2026-02-25

## 2025-02-25 NOTE — PROGRESS NOTES
Chief Complaint   Patient presents with    Diabetes     Follow-up       HPI:   Frances Hartman is a 39 y.o.female who returns to endocrine clinic for follow-up evaluation of her Type 2 Diabetes. Last visit 09/24/2024.  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   - Is scheduled to receive IVF for dehydration soon    1) Type 2 DM with no known associated complications at this time:   - Diagnosed with diabetes in 2014. (A1C% was 7.6). Pt was already on metformin ER for PCOS prior to diagnosis. Tolerated metformin ER and stopped in 06/2021 after bariatric surgery.  - Had Sleeve gastrectomy on 06/15/2021. Had post-op complication of pouch stricture and underwent dilation of pouch in 07/2021, 10/2021, and 11/2021 by gastroenterology. Highest wt 290 lbs, Lost 50 lbs on her own. Weight 238 pre-op. Lowest weight 190 lbs. Has regained weight since 2023.   - Was hospitalized 11/26/2021 for N/V/dehydration.   - pt had tried Januvia in 05/2023, but was not efficacious. She had taking glipizide before her surgery and requested to restart the medication.  - pt contacted me on 01/26/2024 to notify me of her pregnancy. Prescribed insulin on 02/11/2024. Pt later miscarried on 02/29/2024..     Current DM Medications:  - metformin  mg daily with food. Did not tolerate a higher dose.  - glipizide ER 5 mg daily. Denies hypoglycemia    Other History:  - pt reports a h/o alternating diarrhea and constipation since bariatric surgery.  Currently w/o GI symptoms    DM Health Maintenance:  Ophtho: DUE  Podiatry: no recent visit  Monofilament / Foot exam: DUE  Lipids: (09/2024) Tchol 197, , HDL 41,  - not on statin at this time  Urine Microalb/Cr ratio: (09/2028) < 1.2  TSH: (09/2024) 2.020  CBC:  "(12/2024) Hgb 13.0  CMP: (12/2024) Cr 0.91, GFR 81, LFTs WNL  Vit B12: (09/2024) >2,000    2) PCOS:  - diagnosed in 2011.   - Was prescribed metformin ER and spironolactone at time of diagnosis. Had stopped both medications prior to bariatric surgery.  - Restarted metformin  mg in 02/2022    3) Hirsutism:  - Was on spironolactone 100 mg daily in the past. Medication stopped prior to bariatric surgery in 06/2021  - PCP restarted spironolactone 25 mg daily in 10/2022.   - Was on spironolactone 50 mg daily and stopped in 01/2024 when pregnant. Has not restarted.     Other History:  - pt contacted me on 01/26/2024 to notify me of her pregnancy. Prescribed insulin on 02/11/2024. Pt later miscarried on 02/29/2024. This was pt's 11th miscarriage.     Review of Systems   Constitutional:  Negative for activity change and fatigue.        Wt gain since last visit.    Eyes: Negative.  Negative for discharge.   Respiratory: Negative.     Cardiovascular: Negative.    Gastrointestinal:  Negative for constipation, diarrhea and nausea.   Endocrine: Negative.    Genitourinary: Negative.    Musculoskeletal:  Positive for myalgias (Occasional). Negative for arthralgias and back pain.   Skin: Negative.    Allergic/Immunologic: Positive for environmental allergies and food allergies.   Neurological:  Positive for headaches. Negative for dizziness, weakness, light-headedness and numbness.   Hematological: Negative.  Does not bruise/bleed easily.   Psychiatric/Behavioral:  Positive for sleep disturbance.      The following portions of the patient's history were reviewed and updated as appropriate: allergies, current medications, past family history, past medical history, past social history, past surgical history and problem list.    /86   Pulse 68   Ht 152.4 cm (60\")   Wt 112 kg (246 lb)   SpO2 98%   BMI 48.04 kg/m²   Physical Exam  Vitals reviewed.   Constitutional:       General: She is not in acute distress.     " Appearance: Normal appearance. She is well-developed. She is not diaphoretic.   HENT:      Head: Normocephalic.   Eyes:      Conjunctiva/sclera: Conjunctivae normal.      Pupils: Pupils are equal, round, and reactive to light.   Neck:      Thyroid: No thyromegaly.      Trachea: No tracheal deviation.      Comments: No palpable thyroid nodules    Cardiovascular:      Rate and Rhythm: Normal rate and regular rhythm.      Heart sounds: Normal heart sounds. No murmur heard.  Pulmonary:      Effort: Pulmonary effort is normal. No respiratory distress.      Breath sounds: Normal breath sounds.   Lymphadenopathy:      Cervical: No cervical adenopathy.   Skin:     General: Skin is warm and dry.      Findings: No erythema.      Comments: + acanthosis nigricans, + facial hirsutism   Neurological:      Mental Status: She is alert and oriented to person, place, and time.      Cranial Nerves: No cranial nerve deficit.   Psychiatric:         Behavior: Behavior normal.       LABS/IMAGING: prior labs / outside records reviewed and summarized in HPI    Office Visit on 02/25/2025   Component Date Value Ref Range Status    Glucose 02/25/2025 153 (A)  70 - 130 mg/dL Final    Lot Number 02/25/2025 [id number]   Final    Expiration Date 02/25/2025 8/30/25   Final    Hemoglobin A1C 02/25/2025 7.1 (A)  4.5 - 5.7 % Final    Lot Number 02/25/2025 [id number]   Final    Expiration Date 02/25/2025 10/18/26   Final       ASSESSMENT/PLAN:    1) Type 2 DM with no associated complications at this time: fair control, A1C% 7.1 today, was 6.2% last visit. Pt received glucocorticoid injection recently that contributed to her recent hyperglycemia  - Pt with h/o frequent UTI, BV: Pt would like to post-pone trying a SGLT-2 inhibitors at this time.   - Januvia was not efficacious in the past  - Discussed with patient the possible addition of a GLP-1 agonist. Will have pt try sample of Mounjaro when available.       - Will Continue metformin  mg once  daily with food    - Will Continue glipizide ER 5 mg daily.    DM health maintenance labs completed 09/2024, 12/2024 reviewed.     2) Dyslipidemia:   Lipids: (09/2024) Tchol 197, , HDL 41,  - not on statin at this time  - Have deferred statin as pt was pursuing pregnancy. Will discuss AHA guidelines with patient at her f/u visit    RTC 4-5 months    Electronically Signed: Carine Aden MD

## 2025-03-04 ENCOUNTER — HOSPITAL ENCOUNTER (OUTPATIENT)
Facility: HOSPITAL | Age: 40
Discharge: HOME | End: 2025-03-04
Admitting: NURSE PRACTITIONER
Payer: COMMERCIAL

## 2025-03-04 VITALS
BODY MASS INDEX: 48.38 KG/M2 | DIASTOLIC BLOOD PRESSURE: 94 MMHG | WEIGHT: 246.4 LBS | HEIGHT: 60 IN | HEART RATE: 77 BPM | TEMPERATURE: 99.3 F | RESPIRATION RATE: 18 BRPM | SYSTOLIC BLOOD PRESSURE: 159 MMHG

## 2025-03-04 DIAGNOSIS — E86.0 DEHYDRATION: Primary | ICD-10-CM

## 2025-03-04 DIAGNOSIS — R11.2 INTRACTABLE NAUSEA AND VOMITING: ICD-10-CM

## 2025-03-04 PROCEDURE — 96361 HYDRATE IV INFUSION ADD-ON: CPT

## 2025-03-04 PROCEDURE — 96360 HYDRATION IV INFUSION INIT: CPT

## 2025-03-04 PROCEDURE — 25810000003 SODIUM CHLORIDE 0.9 % SOLUTION: Performed by: NURSE PRACTITIONER

## 2025-03-04 RX ORDER — SODIUM CHLORIDE 9 MG/ML
500 INJECTION, SOLUTION INTRAVENOUS ONCE
OUTPATIENT
Start: 2025-03-04

## 2025-03-04 RX ORDER — SODIUM CHLORIDE 9 MG/ML
500 INJECTION, SOLUTION INTRAVENOUS ONCE
Status: COMPLETED | OUTPATIENT
Start: 2025-03-04 | End: 2025-03-04

## 2025-03-04 RX ADMIN — SODIUM CHLORIDE 500 ML/HR: 9 INJECTION, SOLUTION INTRAVENOUS at 08:48

## 2025-03-07 ENCOUNTER — OFFICE VISIT (OUTPATIENT)
Dept: NEUROLOGY | Facility: CLINIC | Age: 40
End: 2025-03-07
Payer: COMMERCIAL

## 2025-03-07 VITALS
OXYGEN SATURATION: 99 % | WEIGHT: 246.91 LBS | HEIGHT: 60 IN | HEART RATE: 58 BPM | DIASTOLIC BLOOD PRESSURE: 74 MMHG | BODY MASS INDEX: 48.48 KG/M2 | SYSTOLIC BLOOD PRESSURE: 128 MMHG

## 2025-03-07 DIAGNOSIS — G43.709 CHRONIC MIGRAINE WITHOUT AURA WITHOUT STATUS MIGRAINOSUS, NOT INTRACTABLE: Primary | Chronic | ICD-10-CM

## 2025-03-07 PROCEDURE — 99214 OFFICE O/P EST MOD 30 MIN: CPT | Performed by: PSYCHIATRY & NEUROLOGY

## 2025-03-07 RX ORDER — HYDROCODONE BITARTRATE AND ACETAMINOPHEN 5; 325 MG/1; MG/1
1 TABLET ORAL 3 TIMES DAILY
COMMUNITY
Start: 2025-02-26

## 2025-03-07 NOTE — PROGRESS NOTES
"Chief Complaint  Migraine (Botox not lasting long enough)    Subjective        Frances Hartman presents to [facility] NEUROLOGY  History of Present Illness    39 y.o. female returns in follow up for migraines.  Last visit on 1/14/25 continued  BTX,     Daily HA for a month.  HA start two weeks after Botox injection.     Frequency increased to four a week in December.  Worsens prior to no next injection.  Increased to daily at end of injection.       Moderate to severe intensity.  Last for entire day.  Located in varying locations of head.   OTC meds helpful.        Taking GBP, Flexeril, tramadol for back pain once every few months.       Preventatives:  Elavil, TPM, VPA, Emgality, GBP     Problem history:     HA frequency is daily.  Located in front of head with squeezing and throbbing sensation moderate to severe intensity.  Awakens with HA and will decrease slightly by bedtime.  Treating with Tylenol, tramadol, Lortab on a daily.  Previous trial of triptans with minimal response.        Reviewed Dr Astudillo's notes:     Presented to [facility] ED on with /122 and left A/L numbness and confusion.  Treated with clonidine and BP decreased.  H/O migraines since 8 yoa.  2 - 3 HA a week treated with OTC.  Echo  - EF 55-60,      HCT, my review of films, 7/11/17 normal  CBC, CMP, TSH, Hep B, HIV, RPR, INR - NCS  Objective   Vital Signs:  /74   Pulse 58   Ht 152.4 cm (60\")   Wt 112 kg (246 lb 14.6 oz)   SpO2 99%   BMI 48.22 kg/m²   Estimated body mass index is 48.22 kg/m² as calculated from the following:    Height as of this encounter: 152.4 cm (60\").    Weight as of this encounter: 112 kg (246 lb 14.6 oz).        Neurological Exam  Mental Status  Awake, alert and oriented to person, place and time. Oriented to person, place and time. Speech is normal. Language is fluent with no aphasia. Attention and concentration are normal. Fund of knowledge is appropriate for level of " education.    Cranial Nerves  CN III, IV, VI: Extraocular movements intact bilaterally. Pupils equal round and reactive to light bilaterally.  CN V: Facial sensation is normal.  CN VII: Full and symmetric facial movement.  CN IX, X: Palate elevates symmetrically  CN XI: Shoulder shrug strength is normal.  CN XII: Tongue midline without atrophy or fasciculations.    Motor   Strength is 5/5 throughout all four extremities.    Sensory  Sensation is intact to light touch, pinprick, vibration and proprioception in all four extremities.    Reflexes  Deep tendon reflexes are 2+ and symmetric in all four extremities.    Coordination    Finger-to-nose, rapid alternating movements and heel-to-shin normal bilaterally without dysmetria.    Gait  Normal casual, toe, heel and tandem gait.       Physical Exam  Eyes:      Extraocular Movements: Extraocular movements intact.      Pupils: Pupils are equal, round, and reactive to light.   Neurological:      Motor: Motor strength is normal.     Coordination: Coordination is intact.      Deep Tendon Reflexes: Reflexes are normal and symmetric.   Psychiatric:         Speech: Speech normal.        Result Review :                Assessment and Plan   Diagnoses and all orders for this visit:    1. Chronic migraine without aura without status migrainosus, not intractable (Primary)  Assessment & Plan:  Worsening HA two weeks after Botox injection    Add Qulipta 60 mg daily.      Continue Botox 155 units q 12 weeks.                        Follow Up   No follow-ups on file.  Patient was given instructions and counseling regarding her condition or for health maintenance advice. Please see specific information pulled into the AVS if appropriate.

## 2025-03-07 NOTE — ASSESSMENT & PLAN NOTE
Worsening HA two weeks after Botox injection    Add Qulipta 60 mg daily.      Continue Botox 155 units q 12 weeks.

## 2025-03-12 ENCOUNTER — SPECIALTY PHARMACY (OUTPATIENT)
Dept: ONCOLOGY | Facility: HOSPITAL | Age: 40
End: 2025-03-12
Payer: COMMERCIAL

## 2025-03-17 RX ORDER — ONABOTULINUMTOXINA 200 [USP'U]/1
INJECTION, POWDER, LYOPHILIZED, FOR SOLUTION INTRADERMAL; INTRAMUSCULAR
Qty: 1 EACH | Refills: 3 | Status: SHIPPED | OUTPATIENT
Start: 2025-03-17

## 2025-03-17 RX ORDER — GINSENG 100 MG
CAPSULE ORAL DAILY
COMMUNITY

## 2025-03-17 NOTE — PROGRESS NOTES
Specialty Pharmacy First Fill Coordination Note     Frances is a 39 y.o. female contacted today regarding refills of her specialty medication(s).    Specialty medication(s) and dose(s) confirmed: qulipta 60 mg po qday  Changes to medications: no  Changes to insurance: no  Reviewed and verified with patient:  Allergies  Meds  Problems             Delivery Questions      Flowsheet Row Most Recent Value   Delivery method UPS   Delivery address verified with patient/caregiver? Yes   Delivery address Home   Number of medications in delivery 1   Medication(s) being filled and delivered Atogepant (Qulipta)   Doses left of specialty medications samples   Copay verified? Yes   Copay amount $0   Copay form of payment No copayment ($0)   Delivery Date Selection 03/18/25   Signature Required No            Medication Adherence    Adherence tools used: cell phone  Support network for adherence: healthcare provider          Follow-up: 4 week(s)     Robert Jacques, PharmD  3/17/2025   08:27 EDT

## 2025-03-17 NOTE — PROGRESS NOTES
Specialty Pharmacy Patient Management Program  Neurology Medication Addition Assessment     Frances Hartman is a 39 y.o. female with migraines seen by a Neurology provider and enrolled in the Neurology Patient Management program offered by [facility].  This assessment was conducted as a result of a specialty medication addition or substitution. The patient was previously introduced to services offered by [facility], including: regular assessments, refill coordination, curbside pick-up or mail order delivery options, prior authorization maintenance, and financial assistance programs as applicable. The patient was also provided with contact information for the pharmacy team.     An initial outreach was conducted, including assessment of therapy appropriateness and specialty medication education for qulipta 60 mg po qday.    A follow-up outreach was conducted, including assessment of continued therapy appropriateness, medication adherence, and side effect incidence and management for botox office injections.    Insurance Coverage & Financial Support  Express scripts with copay card    Relevant Past Medical History and Comorbidities  Relevant medical history and concomitant health conditions were discussed with the patient. The patient's chart has been reviewed for relevant past medical history and comorbid health conditions and updated as necessary.   Past Medical History:   Diagnosis Date    Allergic     Birth    Anxiety     Arthritis     Asthma     prn inhalers, does not follow w/ pulmonary    Chronic back pain     previously followed w/ pain management, now just prn Tylenol + Gabapentin    Chronic deep vein thrombosis (DVT) of femoral vein of right lower extremity 09/10/2021    Clotting disorder 2015    Cluster headache     Depression     Diabetes mellitus     Type II, dx 2014, never on insulin, A1C 7.6     Dyspepsia     Dyspnea on exertion     Dysuria 03/07/2022     Dysuria and hematuria x 2 weeks. 3/7/22 urine culture sent. Rx Macrobid 100 mg twice daily for 7 days. Patient did not tolerate Macrobid well due to GI upset. Urine culture was negative.    Fatigue     Gastroparesis     GERD (gastroesophageal reflux disease)     Headache, tension-type     Heartburn     chronic, prn TUMS, denies prior eval    Hirsutism     History of medical problems     PCOS    Hx of radiation therapy     for treatments of Keloids    Hypertension     Irregular menses     infrequent spotting    Leiomyoma, subserous     possible peduculated f3-4 cm fibroid on u/s at [facility]    Low back pain     Migraines     botox q3 months, following Neurology @ [facility]    Morbid obesity     s/p sleeve gastrectomy    Peripheral edema     Polycystic ovary syndrome 2011    Seasonal allergies     Slow to wake up after anesthesia     Type 2 diabetes mellitus     Urinary tract infection     Visual impairment     Astigmatism, Nearsightedness     Social History     Socioeconomic History    Marital status: Single   Tobacco Use    Smoking status: Never     Passive exposure: Never    Smokeless tobacco: Never   Vaping Use    Vaping status: Never Used   Substance and Sexual Activity    Alcohol use: Not Currently     Comment: Very rarely drink socially. At most 2 drinks per month.    Drug use: Never    Sexual activity: Yes     Partners: Male     Birth control/protection: Condom, Coitus interruptus     Problem list reviewed by Robert Jacques, PharmD on 3/17/2025 at  8:25 AM    Hospitalizations and Urgent Care Since Last Assessment  ED Visits, Admissions, or Hospitalizations: none   Urgent Office Visits: none     Allergies  Known allergies and reactions were discussed with the patient. The patient's chart has been reviewed for  allergy information and updated as necessary.   Allergies   Allergen Reactions    Capsicum Annuum Extract & Derivative (Bell Pepper) [Capsicum] Anaphylaxis    Codeine Headache, Unknown - Low Severity and  "Other (See Comments)     Can tolerate hydrocodone, no other adverse reactions to other narcotics.    Doxycycline Hallucinations, Rash and Unknown - Low Severity    Hydrochlorothiazide Swelling and Unknown - Low Severity     eventually causes CHF    Ibuprofen Other (See Comments)     \"makes me retain fluid and eventually go into CHF\"    Nitrofurantoin Nausea Only and Unknown - Low Severity    Peanut-Containing Drug Products Anaphylaxis    Monosodium Glutamate Swelling and Headache    Oatmeal Other (See Comments)     Dx on allergy testing    Banana (Diagnostic) Unknown - High Severity    Coconut Flavoring Agent (Non-Screening) Unknown - High Severity    Doxycycline Monohydrate Other (See Comments)    Isoflavones (Soy) Unknown - Low Severity    Peanut Butter Flavoring Agent (Non-Screening) Unknown - High Severity     All peanuts and byproducts    Amitriptyline Mental Status Change and Unknown - Low Severity     memory gaps + neuropathy + hair loss    Amoxicillin-Pot Clavulanate Other (See Comments) and Unknown - Low Severity     Syncope, not sure if allergic to cephalosporins.    Aspartame Nausea Only    Diclofenac Headache and Unknown - Low Severity    Egg-Derived Products Other (See Comments)     dx on allergy testing, but does not completely avoid    Naproxen Other (See Comments)     \"blackout\"     Allergies reviewed by Robert Jacques, PharmD on 3/17/2025 at  8:25 AM    Relevant Laboratory Values  Common labs          9/24/2024    08:40 9/24/2024    09:10 12/31/2024    08:49 2/25/2025    08:35   Common Labs   Glucose   167     BUN   12     Creatinine   0.91     Sodium   139     Potassium   4.1     Chloride   103     Calcium   10.0     Albumin   4.1     Total Bilirubin   <0.2     Alkaline Phosphatase   104     AST (SGOT)   24     ALT (SGPT)   29     WBC   9.4     Hemoglobin   13.0     Hematocrit   39.9     Platelets   221     Total Cholesterol  197      Triglycerides  157      HDL Cholesterol  41      LDL " Cholesterol   128      Hemoglobin A1C 6.2    7.1    Microalbumin, Urine  <1.2          Lab Assessment   The above labs have been reviewed. No dose adjustments are needed for the specialty medication(s) based on the labs.     Current Medication List  This medication list has been reviewed with the patient and evaluated for any interactions or necessary modifications/recommendations, and updated to include all prescription medications, OTC medications, and supplements the patient is currently taking.  This list reflects what is contained in the patient's profile, which has also been marked as reviewed to communicate to other providers it is the most up to date version of the patient's current medication therapy.     Current Outpatient Medications:     acetaminophen (TYLENOL) 500 MG tablet, Take 1-2 tablets by mouth Every 6 (Six) Hours As Needed for Mild Pain., Disp: , Rfl:     Acetaminophen-Caffeine (Excedrin Tension Headache) 500-65 MG tablet, , Disp: , Rfl:     albuterol sulfate  (90 Base) MCG/ACT inhaler, Inhale 2 puffs Every 4 (Four) Hours As Needed for Wheezing or Shortness of Air., Disp: 18 g, Rfl: 5    Atogepant (Qulipta) 60 MG tablet, Take 1 tablet by mouth Daily., Disp: 30 tablet, Rfl: 11    Biotin (Biotin Maximum) 40740 MCG tablet dispersible, , Disp: , Rfl:     Blood Glucose Monitoring Suppl (ONE TOUCH ULTRA 2) w/Device kit, , Disp: , Rfl:     calcium carbonate (TUMS) 500 MG chewable tablet, Chew 1 tablet Daily., Disp: , Rfl:     Cranberry 200 MG capsule, Take  by mouth Daily., Disp: , Rfl:     Cyanocobalamin (Vitamin B-12) 5000 MCG tablet dispersible, , Disp: , Rfl:     CYCLOBENZAPRINE HCL PO, , Disp: , Rfl:     diphenhydrAMINE (BENADRYL) 25 mg capsule, Take 1 capsule by mouth Every 6 (Six) Hours As Needed for Itching or Sleep., Disp: , Rfl:     diphenhydrAMINE-acetaminophen (Tylenol PM Extra Strength)  MG tablet per tablet, , Disp: , Rfl:     furosemide (LASIX) 20 MG tablet, Take 1 tablet by  mouth Take As Directed. Take one tablet by mouth daily. Can take an extra tablet as needed., Disp: 90 tablet, Rfl: 3    gabapentin (NEURONTIN) 600 MG tablet, 2 (Two) Times a Day., Disp: , Rfl:     glipizide (GLUCOTROL XL) 5 MG ER tablet, Take 1 tablet by mouth Daily., Disp: 90 tablet, Rfl: 3    HYDROcodone-acetaminophen (NORCO) 5-325 MG per tablet, Take 1 tablet by mouth 3 times a day., Disp: , Rfl:     Lactase ([employer] DAIRY RELIEF FAST ACTING PO), , Disp: , Rfl:     Lancets (OneTouch Delica Plus [id number]) misc, Use 1 each 2 (Two) Times a Day., Disp: 100 each, Rfl: 3    loratadine (CLARITIN) 10 MG tablet, TAKE ONE TABLET BY MOUTH DAILY, Disp: 30 tablet, Rfl: 2    Melatonin 5 MG lozenge, , Disp: , Rfl:     metFORMIN ER (GLUCOPHAGE-XR) 500 MG 24 hr tablet, Take 1 tablet by mouth Daily With Breakfast., Disp: 90 tablet, Rfl: 3    metoprolol tartrate (LOPRESSOR) 50 MG tablet, TAKE ONE TABLET BY MOUTH TWICE A DAY, Disp: 180 tablet, Rfl: 3    montelukast (SINGULAIR) 10 MG tablet, , Disp: , Rfl:     multivitamin with minerals (ONE DAILY MULTIVITAMIN ADULT PO), , Disp: , Rfl:     NON FORMULARY, Feminine balancing gummy, Disp: , Rfl:     ondansetron ODT (ZOFRAN-ODT) 8 MG disintegrating tablet, , Disp: , Rfl:     OneTouch Ultra test strip, 1 each by Other route 2 (Two) Times a Day., Disp: 100 each, Rfl: 3    pyridoxine (B6 Natural) 100 MG tablet, , Disp: , Rfl:     simethicone (Gas-X) 80 MG chewable tablet, Chew 1 tablet Every 6 (Six) Hours As Needed for Flatulence., Disp: 10 tablet, Rfl: 0    tiZANidine (ZANAFLEX) 4 MG tablet, , Disp: , Rfl:     traMADol (ULTRAM) 50 MG tablet, , Disp: , Rfl:     vitamin D3 125 MCG (5000 UT) capsule capsule, Take 1 capsule by mouth Daily., Disp: , Rfl:     Vitamin E 180 MG (400 UNIT) capsule capsule, , Disp: , Rfl:     Current Facility-Administered Medications:     OnabotulinumtoxinA 200 Units, 200 Units, Intramuscular, Q3 Months, Shiela Coelho, TANESHA, 200 Units at 01/14/25  0836    Medicines reviewed by Robert Jacques, PharmD on 3/17/2025 at  8:14 AM    Drug Interactions  No relevant drug drug interactions with specialty medication.       Initial Education Provided for Specialty Medication  The patient has been provided with the following education and any applicable administration techniques (i.e. self-injection) have been demonstrated for the therapies indicated. All questions and concerns have been addressed prior to the patient receiving the medication, and the patient has verbalized comprehension of the education and any materials provided.  Additional patient education shall be provided and documented upon request by the patient, provider or payer.      Atogepant (Qulipta)        Medication Expectations   Why am I taking this medication? You are taking this medication for migraine prophylaxis.   What should I expect while on this medication? You should expect to a decrease in the frequency and severity of your migraines.   How does the medication work? Qulipta is a monoclonal antibody that binds to calcitonin gene-related peptide (CGRP) and blocks its binding to the receptor decreasing the severity of migraines.   How long will I be on this medication for? The amount of time you will be on this medication will be determined by your doctor and your response to the medication.    How do I take this medication? Take as directed on your prescription label.   Take one tablet daily with or without food.   What are some possible side effects? Potential side effects including, but not limited to nausea, constipation and fatigue. Pt verbalized understanding.   What happens if I miss a dose? If you miss a dose of this medicine, take it as soon as possible. However, if it is almost time for your next dose, skip the missed dose and go back to your regular dosing schedule. Do not double doses.                  Medication Safety   What are things I should warn my doctor immediately  about? Hypersensitivity reactions, such as trouble breathing or swallowing.   What are things that I should be cautious of? Be cautious driving until you know how this drug will affect you.   What are some medications that can interact with this one? Ketoconazole, Itraconazole, cyclosporin, clarithromycin, rifampin, carbamazepine, phenytion, Yeager's Wort, efavirenz, and etravine.            Medication Storage/Handling   How should I handle this medication? Keep this medication our of reach of pets/children in original container.   How does this medication need to be stored? Store at room temperature away from heat/cold, sunlight or moisture.   How should I dispose of this medication? There should not be a need to dispose of this medication unless your provider decides to change the dose or therapy. If that is the case, take to your local police station for proper disposal. Some pharmacies also have take-back bins for medication drop-off.             Resources/Support   How can I remind myself to take this medication? You can download reminder apps to help you manage your refills. You may also set an alarm on your phone to remind you. The pharmacy carries pill boxes that you can place next to an area you pass everyday (such as where you place your car keys or where you charge your phone)   Is financial support available?  Yes, [facility] can provide co-pay cards if you have commercial insurance or patient assistance if you have Medicare or no insurance.    Which vaccines are recommended for me? Talk to your doctor about these vaccines: Flu, Coronavirus (COVID-19), Pneumococcal (pneumonia), Tdap, Hepatitis B, Zoster (shingles)           Adherence, Self-Administration, and Current Therapy Problems  Adherence related to the patient's specialty therapy was discussed with the patient. The Adherence segment of this outreach has been reviewed and updated.     Is there a concern with patient's ability to self administer the  medication correctly and without issue?: No  Were any potential barriers to adherence identified during the initial assessment or patient education?: No  Are there any concerns regarding the patient's understanding of the importance of medication adherence?: No    Adherence Questions  Linked Medication(s) Assessed: OnabotulinumtoxinA, Atogepant (Qulipta)  On average, how many doses/injections does the patient miss per month?: 0  What are the identified reasons for non-adherence or missed doses? : no problems identified  What is the estimated medication adherence level?: %  Based on the patient/caregiver response and refill history, does this patient require an MTP to track adherence improvements?: no    Additional Barriers to Patient Self-Administration: none  Methods for Supporting Patient Self-Administration: n/a    Recently Close Medication Therapy Problems  No medication therapy recommendations to display  Open Medication Therapy Problems  No medication therapy recommendations to display     Adverse Drug Reactions  Medication tolerability: Tolerating with no to minimal ADRs          Medication plan: Continue therapy with normal follow-up  Plan for ADR Management: patient to report    Goals of Therapy  Goals related to the patient's specialty therapy were discussed with the patient. The Patient Goals segment of this outreach has been reviewed and updated.   Goals Addressed Today        Specialty Pharmacy General Goal      Reduction in frequency and severity of migraines  Response to treatment has reduced HA's at least 7 fewer days a month and/or 100 hours fewer each month    On Average, Reduce:   Frequency of migraines to <  15 per month.       Baseline Values/Notes on Enrollment  Frequency:   > 15 /month  Symptom Severity:  8-9/10      Date of Reassessment Notes on Progress Toward Above Goals   12/18/24 Botox onset ~1week and increase HA 1-2 weeks before next injection;    3/17/25 Begin qulipta, botox  reduces severity, but not frequency                                          Quality of Life Assessment   Quality of Life related to the patient's enrollment in the patient management program and services provided was discussed with the patient. The QOL segment of this outreach has been reviewed and updated.   Quality of Life Improvement Scale: 8-Moderately better    Reassessment Plan & Follow-Up  Medication Therapy Changes: n/a  Related Plans, Therapy Recommendations, or Issues to Be Addressed: she may be insterested in transferring to city employee rx if she has a copay   Pharmacist to perform regular reassessments no more than (6) months from the previous assessment.  Care Coordinator to set up future refill outreaches, coordinate prescription delivery, and escalate clinical questions to pharmacist.     Attestation  Therapeutic appropriateness: Appropriate  I attest the patient was actively involved in and has agreed to the above plan of care. If the prescribed therapy is at any point deemed not appropriate based on the current or future assessments, a consultation will be initiated with the patient's specialty care provider to determine the best course of action. The revised plan of therapy will be documented along with any additional patient education provided. Discussed aforementioned material with patient via telemedicine.    Robert Jacques, PharmD, [facility]  Clinic Specialty Pharmacist, Neurology  3/17/2025  08:35 EDT

## 2025-03-18 ENCOUNTER — HOSPITAL ENCOUNTER (OUTPATIENT)
Facility: HOSPITAL | Age: 40
Discharge: HOME | End: 2025-03-18
Payer: COMMERCIAL

## 2025-03-21 ENCOUNTER — HOSPITAL ENCOUNTER (OUTPATIENT)
Facility: HOSPITAL | Age: 40
Discharge: HOME | End: 2025-03-21
Payer: COMMERCIAL

## 2025-03-21 VITALS
SYSTOLIC BLOOD PRESSURE: 136 MMHG | WEIGHT: 237.8 LBS | DIASTOLIC BLOOD PRESSURE: 87 MMHG | BODY MASS INDEX: 46.68 KG/M2 | HEIGHT: 60 IN | HEART RATE: 64 BPM | TEMPERATURE: 98.5 F | RESPIRATION RATE: 18 BRPM

## 2025-03-21 DIAGNOSIS — R11.2 INTRACTABLE NAUSEA AND VOMITING: ICD-10-CM

## 2025-03-21 DIAGNOSIS — E86.0 DEHYDRATION: Primary | ICD-10-CM

## 2025-03-21 PROCEDURE — 25810000003 SODIUM CHLORIDE 0.9 % SOLUTION: Performed by: NURSE PRACTITIONER

## 2025-03-21 PROCEDURE — 96361 HYDRATE IV INFUSION ADD-ON: CPT

## 2025-03-21 PROCEDURE — 96360 HYDRATION IV INFUSION INIT: CPT

## 2025-03-21 RX ORDER — SODIUM CHLORIDE 9 MG/ML
500 INJECTION, SOLUTION INTRAVENOUS ONCE
OUTPATIENT
Start: 2025-03-21

## 2025-03-21 RX ORDER — SODIUM CHLORIDE 9 MG/ML
500 INJECTION, SOLUTION INTRAVENOUS ONCE
Status: COMPLETED | OUTPATIENT
Start: 2025-03-21 | End: 2025-03-21

## 2025-03-21 RX ADMIN — SODIUM CHLORIDE 500 ML/HR: 900 INJECTION, SOLUTION INTRAVENOUS at 10:49

## 2025-03-31 ENCOUNTER — SPECIALTY PHARMACY (OUTPATIENT)
Dept: ONCOLOGY | Facility: HOSPITAL | Age: 40
End: 2025-03-31
Payer: COMMERCIAL

## 2025-03-31 RX ORDER — ATOGEPANT 60 MG/1
60 TABLET ORAL DAILY
Qty: 30 TABLET | Refills: 11 | Status: SHIPPED | OUTPATIENT
Start: 2025-03-31

## 2025-03-31 NOTE — PROGRESS NOTES
Specialty Pharmacy Patient Management Program  Refill Outreach     Frances was contacted today regarding refills of their medication(s).    Refill Questions      Flowsheet Row Most Recent Value   Changes to allergies? No   Changes to medications? No   New conditions or infections since last clinic visit No   Unplanned office visit, urgent care, ED, or hospital admission in the last 4 weeks  No   How does patient/caregiver feel medication is working? Good   Financial problems or insurance changes  No   Since the previous refill, were any specialty medication doses or scheduled injections missed or delayed?  No   Does this patient require a clinical escalation to a pharmacist? No            Delivery Questions      Flowsheet Row Most Recent Value   Delivery method UPS   Delivery address verified with patient/caregiver? Yes   Delivery address [facility]   Number of medications in delivery 1   Medication(s) being filled and delivered OnabotulinumtoxinA (Botox)   Doses left of specialty medications N/A   Copay verified? Yes   Copay amount Botox =$0   Copay form of payment No copayment ($0)   Delivery Date Selection 04/03/25   Signature Required No   Do you consent to receive electronic handouts?  Yes            Medication Adherence    Adherence tools used: cell phone  Support network for adherence: healthcare provider          Follow-up: 90 day(s)     Roman David  3/31/2025  11:31 EDT

## 2025-04-04 ENCOUNTER — HOSPITAL ENCOUNTER (EMERGENCY)
Facility: HOSPITAL | Age: 40
Discharge: HOME | End: 2025-04-04
Attending: STUDENT IN AN ORGANIZED HEALTH CARE EDUCATION/TRAINING PROGRAM
Payer: COMMERCIAL

## 2025-04-04 ENCOUNTER — APPOINTMENT (OUTPATIENT)
Facility: HOSPITAL | Age: 40
End: 2025-04-04
Payer: COMMERCIAL

## 2025-04-04 VITALS
BODY MASS INDEX: 44.89 KG/M2 | RESPIRATION RATE: 20 BRPM | HEIGHT: 61 IN | TEMPERATURE: 98.2 F | OXYGEN SATURATION: 99 % | HEART RATE: 70 BPM | WEIGHT: 237.8 LBS | DIASTOLIC BLOOD PRESSURE: 74 MMHG | SYSTOLIC BLOOD PRESSURE: 149 MMHG

## 2025-04-04 DIAGNOSIS — R07.9 CHEST PAIN, UNSPECIFIED TYPE: Primary | ICD-10-CM

## 2025-04-04 LAB
ALBUMIN SERPL-MCNC: 4.5 G/DL (ref 3.5–5.2)
ALBUMIN/GLOB SERPL: 1.2 G/DL
ALP SERPL-CCNC: 118 U/L (ref 39–117)
ALT SERPL W P-5'-P-CCNC: 34 U/L (ref 1–33)
ANION GAP SERPL CALCULATED.3IONS-SCNC: 17 MMOL/L (ref 5–15)
AST SERPL-CCNC: 32 U/L (ref 1–32)
BASOPHILS # BLD AUTO: 0.05 10*3/MM3 (ref 0–0.2)
BASOPHILS NFR BLD AUTO: 0.4 % (ref 0–1.5)
BILIRUB SERPL-MCNC: 0.4 MG/DL (ref 0–1.2)
BUN SERPL-MCNC: 8 MG/DL (ref 6–20)
BUN/CREAT SERPL: 8.4 (ref 7–25)
CALCIUM SPEC-SCNC: 10.4 MG/DL (ref 8.6–10.5)
CHLORIDE SERPL-SCNC: 96 MMOL/L (ref 98–107)
CO2 SERPL-SCNC: 26 MMOL/L (ref 22–29)
CREAT SERPL-MCNC: 0.95 MG/DL (ref 0.57–1)
DEPRECATED RDW RBC AUTO: 45.2 FL (ref 37–54)
EGFRCR SERPLBLD CKD-EPI 2021: 77.4 ML/MIN/1.73
EOSINOPHIL # BLD AUTO: 0.17 10*3/MM3 (ref 0–0.4)
EOSINOPHIL NFR BLD AUTO: 1.5 % (ref 0.3–6.2)
ERYTHROCYTE [DISTWIDTH] IN BLOOD BY AUTOMATED COUNT: 12.3 % (ref 12.3–15.4)
GEN 5 1HR TROPONIN T REFLEX: 8 NG/L
GLOBULIN UR ELPH-MCNC: 3.8 GM/DL
GLUCOSE SERPL-MCNC: 141 MG/DL (ref 65–99)
HCT VFR BLD AUTO: 44.5 % (ref 34–46.6)
HGB BLD-MCNC: 14.5 G/DL (ref 12–15.9)
HOLD SPECIMEN: NORMAL
IMM GRANULOCYTES # BLD AUTO: 0.01 10*3/MM3 (ref 0–0.05)
IMM GRANULOCYTES NFR BLD AUTO: 0.1 % (ref 0–0.5)
LIPASE SERPL-CCNC: 17 U/L (ref 13–60)
LYMPHOCYTES # BLD AUTO: 2.97 10*3/MM3 (ref 0.7–3.1)
LYMPHOCYTES NFR BLD AUTO: 26.4 % (ref 19.6–45.3)
MCH RBC QN AUTO: 31.9 PG (ref 26.6–33)
MCHC RBC AUTO-ENTMCNC: 32.6 G/DL (ref 31.5–35.7)
MCV RBC AUTO: 98 FL (ref 79–97)
MONOCYTES # BLD AUTO: 0.67 10*3/MM3 (ref 0.1–0.9)
MONOCYTES NFR BLD AUTO: 6 % (ref 5–12)
NEUTROPHILS NFR BLD AUTO: 65.6 % (ref 42.7–76)
NEUTROPHILS NFR BLD AUTO: 7.37 10*3/MM3 (ref 1.7–7)
NT-PROBNP SERPL-MCNC: <36 PG/ML (ref 0–450)
PLATELET # BLD AUTO: 279 10*3/MM3 (ref 140–450)
PMV BLD AUTO: 11.1 FL (ref 6–12)
POTASSIUM SERPL-SCNC: 3.7 MMOL/L (ref 3.5–5.2)
PROT SERPL-MCNC: 8.3 G/DL (ref 6–8.5)
RBC # BLD AUTO: 4.54 10*6/MM3 (ref 3.77–5.28)
SODIUM SERPL-SCNC: 139 MMOL/L (ref 136–145)
TROPONIN T NUMERIC DELTA: 2 NG/L
TROPONIN T SERPL HS-MCNC: 6 NG/L
WBC NRBC COR # BLD AUTO: 11.24 10*3/MM3 (ref 3.4–10.8)
WHOLE BLOOD HOLD COAG: NORMAL
WHOLE BLOOD HOLD SPECIMEN: NORMAL

## 2025-04-04 PROCEDURE — 93005 ELECTROCARDIOGRAM TRACING: CPT | Performed by: STUDENT IN AN ORGANIZED HEALTH CARE EDUCATION/TRAINING PROGRAM

## 2025-04-04 PROCEDURE — 93005 ELECTROCARDIOGRAM TRACING: CPT

## 2025-04-04 PROCEDURE — 83880 ASSAY OF NATRIURETIC PEPTIDE: CPT | Performed by: STUDENT IN AN ORGANIZED HEALTH CARE EDUCATION/TRAINING PROGRAM

## 2025-04-04 PROCEDURE — 25010000002 FENTANYL CITRATE (PF) 50 MCG/ML SOLUTION: Performed by: STUDENT IN AN ORGANIZED HEALTH CARE EDUCATION/TRAINING PROGRAM

## 2025-04-04 PROCEDURE — 80053 COMPREHEN METABOLIC PANEL: CPT | Performed by: STUDENT IN AN ORGANIZED HEALTH CARE EDUCATION/TRAINING PROGRAM

## 2025-04-04 PROCEDURE — 83690 ASSAY OF LIPASE: CPT | Performed by: STUDENT IN AN ORGANIZED HEALTH CARE EDUCATION/TRAINING PROGRAM

## 2025-04-04 PROCEDURE — 85025 COMPLETE CBC W/AUTO DIFF WBC: CPT | Performed by: STUDENT IN AN ORGANIZED HEALTH CARE EDUCATION/TRAINING PROGRAM

## 2025-04-04 PROCEDURE — 99284 EMERGENCY DEPT VISIT MOD MDM: CPT | Performed by: STUDENT IN AN ORGANIZED HEALTH CARE EDUCATION/TRAINING PROGRAM

## 2025-04-04 PROCEDURE — 96374 THER/PROPH/DIAG INJ IV PUSH: CPT

## 2025-04-04 PROCEDURE — 84484 ASSAY OF TROPONIN QUANT: CPT | Performed by: STUDENT IN AN ORGANIZED HEALTH CARE EDUCATION/TRAINING PROGRAM

## 2025-04-04 PROCEDURE — 71045 X-RAY EXAM CHEST 1 VIEW: CPT

## 2025-04-04 RX ORDER — SODIUM CHLORIDE 0.9 % (FLUSH) 0.9 %
10 SYRINGE (ML) INJECTION AS NEEDED
Status: DISCONTINUED | OUTPATIENT
Start: 2025-04-04 | End: 2025-04-04 | Stop reason: HOSPADM

## 2025-04-04 RX ORDER — FENTANYL CITRATE 50 UG/ML
50 INJECTION, SOLUTION INTRAMUSCULAR; INTRAVENOUS ONCE
Refills: 0 | Status: COMPLETED | OUTPATIENT
Start: 2025-04-04 | End: 2025-04-04

## 2025-04-04 RX ORDER — ALUMINA, MAGNESIA, AND SIMETHICONE 2400; 2400; 240 MG/30ML; MG/30ML; MG/30ML
30 SUSPENSION ORAL ONCE
Status: COMPLETED | OUTPATIENT
Start: 2025-04-04 | End: 2025-04-04

## 2025-04-04 RX ORDER — HYDROCODONE BITARTRATE AND ACETAMINOPHEN 5; 325 MG/1; MG/1
1 TABLET ORAL ONCE
Refills: 0 | Status: COMPLETED | OUTPATIENT
Start: 2025-04-04 | End: 2025-04-04

## 2025-04-04 RX ADMIN — ALUMINUM HYDROXIDE, MAGNESIUM HYDROXIDE, AND DIMETHICONE 30 ML: 400; 400; 40 SUSPENSION ORAL at 09:22

## 2025-04-04 RX ADMIN — FENTANYL CITRATE 50 MCG: 50 INJECTION, SOLUTION INTRAMUSCULAR; INTRAVENOUS at 08:06

## 2025-04-04 RX ADMIN — HYDROCODONE BITARTRATE AND ACETAMINOPHEN 1 TABLET: 5; 325 TABLET ORAL at 09:22

## 2025-04-04 NOTE — Clinical Note
[facility] EMERGENCY DEPARTMENT [place]  [address]   [place] [address]  Phone: [phone]  Fax: [phone]    Frances Hartman was seen and treated in our emergency department on 4/4/2025.  She may return to work on 04/05/2025.         Thank you for choosing [facility].    Kira Harvey RN

## 2025-04-04 NOTE — FSED PROVIDER NOTE
Subjective  History of Present Illness:    39 year old female who presents with retrosternal sharp chest pain x 4 hours. She states this pain has been intermittent for the last few weeks. Pain is not brought on by anything in particular. Nonexertional and nonpleuritic. Pain resolves on its own. 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 She denies radiation of pain, sob, fevers. She states she intermittently has NV since she had her gastric sleeve procedure.   She sees Dr. Astudillo cardiology. She states she had normal stress test with him a few years ago       Nurses Notes reviewed and agree, including vitals, allergies, social history and prior medical history.     REVIEW OF SYSTEMS: All systems reviewed and not pertinent unless noted.  Review of Systems   All other systems reviewed and are negative.      Past Medical History:   Diagnosis Date    Allergic     Birth    Anxiety     Arthritis     Asthma     prn inhalers, does not follow w/ pulmonary    Chronic back pain     previously followed w/ pain management, now just prn Tylenol + Gabapentin    Chronic deep vein thrombosis (DVT) of femoral vein of right lower extremity 09/10/2021    Clotting disorder 2015    Cluster headache     Depression     Diabetes mellitus     Type II, dx 2014, never on insulin, A1C 7.6     Dyspepsia     Dyspnea on exertion     Dysuria 03/07/2022    Dysuria and hematuria x 2 weeks. 3/7/22 urine culture sent. Rx Macrobid 100 mg twice daily for 7 days. Patient did not tolerate Macrobid well due to GI upset. Urine culture was negative.    Fatigue     Gastroparesis     GERD (gastroesophageal reflux disease)     Headache, tension-type     Heartburn     chronic, prn TUMS, denies prior eval    Hirsutism     History of medical problems     PCOS    Hx of radiation therapy     for treatments of Keloids    Hypertension     Irregular menses     infrequent spotting    Leiomyoma, subserous     possible  peduculated f3-4 cm fibroid on u/s at [facility]    Low back pain     Migraines     botox q3 months, following Neurology @ [facility]    Morbid obesity     s/p sleeve gastrectomy    Peripheral edema     Polycystic ovary syndrome 2011    Seasonal allergies     Slow to wake up after anesthesia     Type 2 diabetes mellitus     Urinary tract infection     Visual impairment     Astigmatism, Nearsightedness       Allergies:    Capsicum annuum extract & derivative (bell pepper) [capsicum], Codeine, Doxycycline, Hydrochlorothiazide, Ibuprofen, Nitrofurantoin, Peanut-containing drug products, Monosodium glutamate, Oatmeal, Banana (diagnostic), Coconut flavoring agent (non-screening), Doxycycline monohydrate, Isoflavones (soy), Peanut butter flavoring agent (non-screening), Amitriptyline, Amoxicillin-pot clavulanate, Aspartame, Diclofenac, Egg-derived products, and Naproxen      Past Surgical History:   Procedure Laterality Date    ABDOMINAL SURGERY  06/15/2021    Bariatric Sleeve Surgery    BARIATRIC SURGERY      COLONOSCOPY  December 2022    COSMETIC SURGERY      Multiple Keloid surgeries    ENDOSCOPY N/A 06/15/2021    Procedure: ESOPHAGOGASTRODUODENOSCOPY;  Surgeon: Ayaka Andre MD;  Location: [facility] OR;  Service: Robotics - DaVinci;  Laterality: N/A;    ENDOSCOPY N/A 10/28/2021    Procedure: ESOPHAGOGASTRODUODENOSCOPY WITH ACHALASIA BALLOON DILATATION;  Surgeon: Jase Hernandez MD;  Location:  [facility];  Service: Gastroenterology;  Laterality: N/A;  58 F DILATOR    ENDOSCOPY N/A 11/15/2021    Procedure: ESOPHAGOGASTRODUODENOSCOPY WITH DILATATION;  Surgeon: Jase Hernandez MD;  Location:  [facility];  Service: Gastroenterology;  Laterality: N/A;  achalasia balloon     ENDOSCOPY N/A 11/24/2021    Procedure: ESOPHAGOGASTRODUODENOSCOPY [facility] DUODENAL POLYPECTOPMY AND NASAL JEJUNAL TUBE PLACEMENT;  Surgeon: Jase Hernandez MD;  Location:  [facility];  Service: Gastroenterology;  Laterality: N/A;  placement of  "feeding tube, checked position with KUB    GASTRIC RESTRICTION SURGERY  2021    Sleeve    GASTRIC SLEEVE LAPAROSCOPIC N/A 06/15/2021    Procedure: GASTRIC SLEEVE LAPAROSCOPIC WITH DAVINCI ROBOT, LAPROSCOPIC HIATAL HERNIA REPAIR WITH DAVINCI ROBOT;  Surgeon: Ayaka Andre MD;  Location: [facility];  Service: Robotics - DaVinci;  Laterality: N/A;    KELOID EXCISION      1990,1993,1999,2015,2016,2019    LAPAROSCOPIC CHOLECYSTECTOMY  2000    for stones    RADIOFREQUENCY ABLATION  2019    x 2  for pt back    UMBILICAL HERNIA REPAIR  1990    UPPER GASTROINTESTINAL ENDOSCOPY      WISDOM TOOTH EXTRACTION  1994         Social History     Socioeconomic History    Marital status: Single   Tobacco Use    Smoking status: Never     Passive exposure: Never    Smokeless tobacco: Never   Vaping Use    Vaping status: Never Used   Substance and Sexual Activity    Alcohol use: Not Currently     Comment: Very rarely drink socially. At most 2 drinks per month.    Drug use: Never    Sexual activity: Yes     Partners: Male     Birth control/protection: Condom, Coitus interruptus         Family History   Problem Relation Age of Onset    Arthritis Mother     Diabetes Mother     Obesity Mother     Arrhythmia Mother     Hypertension Mother     Asthma Mother     Migraines Mother     Dementia Father     Heart attack Father     Arrhythmia Father     Heart disease Father     Alcohol abuse Father     Ovarian cancer Maternal Aunt         40's    Breast cancer Paternal Aunt         30's    Stroke Other         CVA    Obesity Other     Heart disease Other     Hypertension Other     Endometrial cancer Neg Hx     Colon cancer Neg Hx     Colon polyps Neg Hx     Esophageal cancer Neg Hx        Objective  Physical Exam:  /74   Pulse 67   Temp 98.2 °F (36.8 °C) (Oral)   Resp 20   Ht 154.9 cm (61\")   Wt 108 kg (237 lb 12.8 oz)   SpO2 99%   BMI 44.93 kg/m²      Physical Exam  Constitutional:       Appearance: Normal appearance.   HENT:    "   Head: Normocephalic and atraumatic.      Nose: Nose normal.      Mouth/Throat:      Mouth: Mucous membranes are moist.   Eyes:      Extraocular Movements: Extraocular movements intact.      Conjunctiva/sclera: Conjunctivae normal.   Cardiovascular:      Rate and Rhythm: Normal rate and regular rhythm.   Pulmonary:      Effort: Pulmonary effort is normal. No respiratory distress.   Abdominal:      General: There is no distension.      Comments: Atraumatic    Musculoskeletal:         General: Normal range of motion.      Cervical back: Normal range of motion and neck supple.   Skin:     General: Skin is warm and dry.   Neurological:      General: No focal deficit present.      Mental Status: She is alert.      Comments: Moving all extremities spontaneously    Psychiatric:         Mood and Affect: Mood normal.         Behavior: Behavior normal.         Procedures    ED Course:         Lab Results (last 24 hours)       Procedure Component Value Units Date/Time    CBC & Differential [[id number]]  (Abnormal) Collected: 04/04/25 0740    Specimen: Blood Updated: 04/04/25 0747    Narrative:      The following orders were created for panel order CBC & Differential.  Procedure                               Abnormality         Status                     ---------                               -----------         ------                     CBC Auto Differential[[id number]]        Abnormal            Final result                 Please view results for these tests on the individual orders.    CBC Auto Differential [[id number]]  (Abnormal) Collected: 04/04/25 0740    Specimen: Blood Updated: 04/04/25 0747     WBC 11.24 10*3/mm3      RBC 4.54 10*6/mm3      Hemoglobin 14.5 g/dL      Hematocrit 44.5 %      MCV 98.0 fL      MCH 31.9 pg      MCHC 32.6 g/dL      RDW 12.3 %      RDW-SD 45.2 fl      MPV 11.1 fL      Platelets 279 10*3/mm3      Neutrophil % 65.6 %      Lymphocyte % 26.4 %      Monocyte % 6.0 %      Eosinophil % 1.5 %       Basophil % 0.4 %      Immature Grans % 0.1 %      Neutrophils, Absolute 7.37 10*3/mm3      Lymphocytes, Absolute 2.97 10*3/mm3      Monocytes, Absolute 0.67 10*3/mm3      Eosinophils, Absolute 0.17 10*3/mm3      Basophils, Absolute 0.05 10*3/mm3      Immature Grans, Absolute 0.01 10*3/mm3     High Sensitivity Troponin T [[id number]]  (Normal) Collected: 04/04/25 0809    Specimen: Blood Updated: 04/04/25 0833     HS Troponin T 6 ng/L     Comprehensive Metabolic Panel [[id number]]  (Abnormal) Collected: 04/04/25 0809    Specimen: Blood Updated: 04/04/25 0838     Glucose 141 mg/dL      BUN 8 mg/dL      Creatinine 0.95 mg/dL      Sodium 139 mmol/L      Potassium 3.7 mmol/L      Comment: Specimen hemolyzed.  Result may be falsely elevated.        Chloride 96 mmol/L      CO2 26.0 mmol/L      Calcium 10.4 mg/dL      Total Protein 8.3 g/dL      Albumin 4.5 g/dL      ALT (SGPT) 34 U/L      AST (SGOT) 32 U/L      Alkaline Phosphatase 118 U/L      Total Bilirubin 0.4 mg/dL      Globulin 3.8 gm/dL      A/G Ratio 1.2 g/dL      BUN/Creatinine Ratio 8.4     Anion Gap 17.0 mmol/L      eGFR 77.4 mL/min/1.73     Narrative:      GFR Categories in Chronic Kidney Disease (CKD)      GFR Category          GFR (mL/min/1.73)    Interpretation  G1                     90 or greater         Normal or high (1)  G2                      60-89                Mild decrease (1)  G3a                   45-59                Mild to moderate decrease  G3b                   30-44                Moderate to severe decrease  G4                    15-29                Severe decrease  G5                    14 or less           Kidney failure          (1)In the absence of evidence of kidney disease, neither GFR category G1 or G2 fulfill the criteria for CKD.    eGFR calculation 2021 CKD-EPI creatinine equation, which does not include race as a factor    Lipase [[id number]]  (Normal) Collected: 04/04/25 0809    Specimen: Blood Updated: 04/04/25 0835     Lipase  17 U/L     BNP [[id number]]  (Normal) Collected: 04/04/25 0809    Specimen: Blood Updated: 04/04/25 0833     proBNP <36.0 pg/mL     Narrative:      This assay is used as an aid in the diagnosis of individuals suspected of having heart failure. It can be used as an aid in the diagnosis of acute decompensated heart failure (ADHF) in patients presenting with signs and symptoms of ADHF to the emergency department (ED). In addition, NT-proBNP of <300 pg/mL indicates ADHF is not likely.    Age Range Result Interpretation  NT-proBNP Concentration (pg/mL:      <50             Positive            >450                   Gray                 300-450                    Negative             <300    50-75           Positive            >900                  Gray                300-900                  Negative            <300      >75             Positive            >1800                  Gray                300-1800                  Negative            <300    High Sensitivity Troponin T 1Hr [[id number]]  (Normal) Collected: 04/04/25 0916    Specimen: Blood Updated: 04/04/25 0935     HS Troponin T 8 ng/L      Troponin T Numeric Delta 2 ng/L     Narrative:      High Sensitive Troponin T Reference Range:  <14.0 ng/L- Negative Female for AMI  <22.0 ng/L- Negative Male for AMI  >=14 - Abnormal Female indicating possible myocardial injury.  >=22 - Abnormal Male indicating possible myocardial injury.   Clinicians would have to utilize clinical acumen, EKG, Troponin, and serial changes to determine if it is an Acute Myocardial Infarction or myocardial injury due to an underlying chronic condition.                  XR Chest 1 View  Result Date: 4/4/2025  XR CHEST 1 VW Date of Exam: 4/4/2025 7:53 AM EDT Indication: Chest Pain Triage Protocol Comparison: 10/3/2022 Findings: Cardiomediastinal silhouette is unremarkable.  No airspace disease, pneumothorax, nor pleural effusion. No acute osseous abnormality identified.     Impression:  Impression: No active disease. Electronically Signed: Artur Yo MD  4/4/2025 8:07 AM EDT  Workstation ID: [id number]         MDM    DDX: includes but is not limited to: acs, pericardidis, pna    Pertinent features from physical exam: hypertensive (she does not take antihypertensives), resting comfortably in bed, no distress.    Initial diagnostic plan: EKG, labs    Results from initial plan were reviewed and interpreted by me revealing EKG does not show acute ischemic changes. Repeat stable   Labs nonactionable. Initial and repeat troponin wnl.   Patient sating well on RA. No resp distress. Denies SOB. Low suspicion for PE  CXR does not show consolidation, ptx, or widened mediastinum. She has good bilateral/equal pulses. Very comfortable appearing. Her pressure has significantly improved without antihypertensive therapy. Low suspicion for dissection    0836: BP improved to 148/87    Diagnostic information from other sources: none    Interventions / Re-evaluation: patient declines aspirin (states she's not supposed to take it), hydrocodone    Patient reports improvement in her pain. She feels comfortable with discharge plan of care. Agrees with my strict return precautions.       Cardiology referral placed    Medications   sodium chloride 0.9 % flush 10 mL (has no administration in time range)   fentaNYL citrate (PF) (SUBLIMAZE) injection 50 mcg (50 mcg Intravenous Given 4/4/25 0806)   HYDROcodone-acetaminophen (NORCO) 5-325 MG per tablet 1 tablet (1 tablet Oral Given 4/4/25 0922)   aluminum-magnesium hydroxide-simethicone (MAALOX MAX) 400-400-40 MG/5ML suspension 30 mL (30 mL Oral Given 4/4/25 0922)       Results/clinical rationale were discussed with patient     Consultations/Discussion of results with other physicians: none    Data interpreted: Nursing notes reviewed, vital signs reviewed.  Labs independently interpreted by me .  Imaging independently interpreted by me.  EKG independently interpreted by me.       Counseling: Discussed the results above with the patient regarding need for admission or discharge.  Patient understands and agrees plan of care.      -----  ED Disposition       ED Disposition   Discharge    Condition   Stable    Comment   --             Final diagnoses:   Chest pain, unspecified type      Your Follow-Up Providers       Julianna Astudillo MD.    Specialties: Cardiology, Interventional Cardiology  [address]  [address]   [facility] [address]  [phone]               [facility] EMERGENCY DEPARTMENT [place].    Specialty: Emergency Medicine  Follow up details: If symptoms worsen, As needed  [address]  [facility] [address] [phone]             Gay Prado APRN In 1 day.    Specialty: Family Medicine  [address] [place]  [address]  [facility] [id number] [phone]                       Contact information for after-discharge care    Follow-up information has not been specified.                    Your medication list        CHANGE how you take these medications        Instructions Last Dose Given Next Dose Due   CYCLOBENZAPRINE HCL PO  What changed: See the new instructions.                  CONTINUE taking these medications        Instructions Last Dose Given Next Dose Due   acetaminophen 500 MG tablet  Commonly known as: TYLENOL      Take 1-2 tablets by mouth Every 6 (Six) Hours As Needed for Mild Pain.       albuterol sulfate  (90 Base) MCG/ACT inhaler  Commonly known as: PROVENTIL HFA;VENTOLIN HFA;PROAIR HFA      Inhale 2 puffs Every 4 (Four) Hours As Needed for Wheezing or Shortness of Air.       B6 Natural 100 MG tablet  Generic drug: pyridoxine           Biotin Maximum 95344 MCG tablet dispersible  Generic drug: Biotin           Botox 200 units reconstituted solution  Generic drug: OnabotulinumtoxinA      FOR . PHYSICIAN TO INJECT UP  UNITS INTRAMUSCULARLY INTO HEAD, NECK AND SHOULDERS EVERY 90  DAYS.       calcium carbonate 500 MG chewable tablet  Commonly known as: TUMS      Chew 1 tablet Daily.       Cranberry 200 MG capsule      Take  by mouth Daily.       CVS DAIRY RELIEF FAST ACTING PO           diphenhydrAMINE 25 mg capsule  Commonly known as: BENADRYL      Take 1 capsule by mouth Every 6 (Six) Hours As Needed for Itching or Sleep.       Excedrin Tension Headache 500-65 MG tablet  Generic drug: Acetaminophen-Caffeine           furosemide 20 MG tablet  Commonly known as: LASIX      Take 1 tablet by mouth Take As Directed. Take one tablet by mouth daily. Can take an extra tablet as needed.       gabapentin 600 MG tablet  Commonly known as: NEURONTIN      2 (Two) Times a Day.       glipizide 5 MG ER tablet  Commonly known as: GLUCOTROL XL      Take 1 tablet by mouth Daily.       HYDROcodone-acetaminophen 5-325 MG per tablet  Commonly known as: NORCO      Take 1 tablet by mouth 3 times a day.       loratadine 10 MG tablet  Commonly known as: CLARITIN      TAKE ONE TABLET BY MOUTH DAILY       Melatonin 5 MG lozenge           metFORMIN  MG 24 hr tablet  Commonly known as: GLUCOPHAGE-XR      Take 1 tablet by mouth Daily With Breakfast.       metoprolol tartrate 50 MG tablet  Commonly known as: LOPRESSOR      TAKE ONE TABLET BY MOUTH TWICE A DAY       montelukast 10 MG tablet  Commonly known as: SINGULAIR           multivitamin with minerals tablet tablet           NON FORMULARY      Feminine balancing gummy       ondansetron ODT 8 MG disintegrating tablet  Commonly known as: ZOFRAN-ODT           ONE TOUCH ULTRA 2 w/Device kit           OneTouch Delica Plus [id number] misc      Use 1 each 2 (Two) Times a Day.       OneTouch Ultra test strip  Generic drug: glucose blood      1 each by Other route 2 (Two) Times a Day.       Qulipta 60 MG tablet  Generic drug: Atogepant      Take 1 tablet by mouth Daily.       simethicone 80 MG chewable tablet  Commonly known as: Gas-X      Chew 1 tablet Every 6 (Six)  Hours As Needed for Flatulence.       tiZANidine 4 MG tablet  Commonly known as: ZANAFLEX           traMADol 50 MG tablet  Commonly known as: ULTRAM           Tylenol PM Extra Strength 500-25 MG tablet per tablet  Generic drug: diphenhydrAMINE-acetaminophen           Vitamin B-12 5000 MCG tablet dispersible           vitamin D3 125 MCG (5000 UT) capsule capsule      Take 1 capsule by mouth Daily.       Vitamin E 180 MG (400 UNIT) capsule capsule

## 2025-04-08 LAB
QT INTERVAL: 382 MS
QT INTERVAL: 404 MS
QTC INTERVAL: 379 MS
QTC INTERVAL: 384 MS

## 2025-04-08 NOTE — PROGRESS NOTES
[facility] Cardiology    Encounter Date: 2025    Patient ID: Frances Hartman is a 39 y.o. female.  : 1985     PCP: Gay Prado APRN       Chief Complaint: Essential hypertension (Light headed, mild occasional chest pains, winded)      PROBLEM LIST:  Hypertension:  Echo, 2014: EF 55-60%. All valves are structurally and functionally normal with no hemodynamically significant valve disease.  Echo, 2017: EF 65%. Mild TR with normal RVSP.  Echo, 2021: EF 60%. No significant valvular abnormalities.  Hyperlipidemia.  Chest pain:  Pet MPS, 2019: EF > 70%. No evidence of reversible ischemia.  Normal bilateral lower extremity venous duplex, 2021  Palpitations:  2-week Zio, 2020: SR. <1% PACs/PVCs. One short SVT (4 beats). Average HR 74 bpm.  DM2.  Morbid obesity   Peripheral chronic venous insufficiency.  Polycystic ovaries.  GERD.  Subclinical hypothyroidism.  Keloid skin disorder.  Chronic fatigue.  Chronic tonsillar hypertrophy.  Depression.  Migraine with aura, onset age 8.    History of Present Illness  Patient presents today for early follow-up after an ED visit with chest pain. She reports that she has been having chest pain for the last few weeks before she decided to go to the emergency department. She ruled out for ACS/MI and was discharged home. She reports that they have decreased in intensity but are still occurring. She has noticed some shortness of breath over the last couple of days. Additionally she has been having some dizzy spells that come and go. They do not seem to have a trigger as they can occur when she is standing or sitting. Continue to have some palpitations but these do not coincide with shortness of breath episodes of with dizzy episodes.        Allergies   Allergen Reactions    Capsicum Annuum Extract & Derivative (Bell Pepper) [Capsicum] Anaphylaxis    Codeine Headache, Unknown - Low Severity and Other  "(See Comments)     Can tolerate hydrocodone, no other adverse reactions to other narcotics.    Doxycycline Hallucinations, Rash and Unknown - Low Severity    Hydrochlorothiazide Swelling and Unknown - Low Severity     eventually causes CHF    Ibuprofen Other (See Comments)     \"makes me retain fluid and eventually go into CHF\"    Nitrofurantoin Nausea Only and Unknown - Low Severity    Peanut-Containing Drug Products Anaphylaxis    Monosodium Glutamate Swelling and Headache    Oatmeal Other (See Comments)     Dx on allergy testing    Banana (Diagnostic) Unknown - High Severity    Coconut Flavoring Agent (Non-Screening) Unknown - High Severity    Doxycycline Monohydrate Other (See Comments)    Isoflavones (Soy) Unknown - Low Severity    Peanut Butter Flavoring Agent (Non-Screening) Unknown - High Severity     All peanuts and byproducts    Amitriptyline Mental Status Change and Unknown - Low Severity     memory gaps + neuropathy + hair loss    Amoxicillin-Pot Clavulanate Other (See Comments) and Unknown - Low Severity     Syncope, not sure if allergic to cephalosporins.    Aspartame Nausea Only    Diclofenac Headache and Unknown - Low Severity    Egg-Derived Products Other (See Comments)     dx on allergy testing, but does not completely avoid    Naproxen Other (See Comments)     \"blackout\"         Current Outpatient Medications:     acetaminophen (TYLENOL) 500 MG tablet, Take 1-2 tablets by mouth Every 6 (Six) Hours As Needed for Mild Pain., Disp: , Rfl:     Acetaminophen-Caffeine (Excedrin Tension Headache) 500-65 MG tablet, , Disp: , Rfl:     albuterol sulfate  (90 Base) MCG/ACT inhaler, Inhale 2 puffs Every 4 (Four) Hours As Needed for Wheezing or Shortness of Air., Disp: 18 g, Rfl: 5    Atogepant (Qulipta) 60 MG tablet, Take 1 tablet by mouth Daily., Disp: 30 tablet, Rfl: 11    Biotin (Biotin Maximum) 83023 MCG tablet dispersible, , Disp: , Rfl:     Blood Glucose Monitoring Suppl (ONE TOUCH ULTRA 2) " w/Device kit, , Disp: , Rfl:     calcium carbonate (TUMS) 500 MG chewable tablet, Chew 1 tablet Daily., Disp: , Rfl:     Cranberry 200 MG capsule, Take  by mouth Daily., Disp: , Rfl:     Cyanocobalamin (Vitamin B-12) 5000 MCG tablet dispersible, , Disp: , Rfl:     CYCLOBENZAPRINE HCL PO, , Disp: , Rfl:     diphenhydrAMINE (BENADRYL) 25 mg capsule, Take 1 capsule by mouth Every 6 (Six) Hours As Needed for Itching or Sleep., Disp: , Rfl:     diphenhydrAMINE-acetaminophen (Tylenol PM Extra Strength)  MG tablet per tablet, , Disp: , Rfl:     furosemide (LASIX) 20 MG tablet, Take 1 tablet by mouth Take As Directed. Take one tablet by mouth daily. Can take an extra tablet as needed., Disp: 90 tablet, Rfl: 3    gabapentin (NEURONTIN) 600 MG tablet, 2 (Two) Times a Day., Disp: , Rfl:     glipizide (GLUCOTROL XL) 5 MG ER tablet, Take 1 tablet by mouth Daily., Disp: 90 tablet, Rfl: 3    HYDROcodone-acetaminophen (NORCO) 5-325 MG per tablet, Take 1 tablet by mouth 3 times a day., Disp: , Rfl:     Lactase ([employer] DAIRY RELIEF FAST ACTING PO), , Disp: , Rfl:     Lancets (OneTouch Delica Plus [id number]) misc, Use 1 each 2 (Two) Times a Day., Disp: 100 each, Rfl: 3    loratadine (CLARITIN) 10 MG tablet, TAKE ONE TABLET BY MOUTH DAILY, Disp: 30 tablet, Rfl: 2    Melatonin 5 MG lozenge, , Disp: , Rfl:     metFORMIN ER (GLUCOPHAGE-XR) 500 MG 24 hr tablet, Take 1 tablet by mouth Daily With Breakfast., Disp: 90 tablet, Rfl: 3    metoprolol tartrate (LOPRESSOR) 50 MG tablet, TAKE ONE TABLET BY MOUTH TWICE A DAY, Disp: 180 tablet, Rfl: 3    montelukast (SINGULAIR) 10 MG tablet, , Disp: , Rfl:     multivitamin with minerals (ONE DAILY MULTIVITAMIN ADULT PO), , Disp: , Rfl:     NON FORMULARY, Feminine balancing gummy, Disp: , Rfl:     OnabotulinumtoxinA (Botox) 200 units reconstituted solution, FOR . PHYSICIAN TO INJECT UP  UNITS INTRAMUSCULARLY INTO HEAD, NECK AND SHOULDERS EVERY 90 DAYS., Disp: 1 each, Rfl:  "3    ondansetron ODT (ZOFRAN-ODT) 8 MG disintegrating tablet, , Disp: , Rfl:     OneTouch Ultra test strip, 1 each by Other route 2 (Two) Times a Day., Disp: 100 each, Rfl: 3    pyridoxine (B6 Natural) 100 MG tablet, , Disp: , Rfl:     simethicone (Gas-X) 80 MG chewable tablet, Chew 1 tablet Every 6 (Six) Hours As Needed for Flatulence., Disp: 10 tablet, Rfl: 0    tiZANidine (ZANAFLEX) 4 MG tablet, , Disp: , Rfl:     traMADol (ULTRAM) 50 MG tablet, , Disp: , Rfl:     vitamin D3 125 MCG (5000 UT) capsule capsule, Take 1 capsule by mouth Daily., Disp: , Rfl:     Vitamin E 180 MG (400 UNIT) capsule capsule, , Disp: , Rfl:     fluconazole (DIFLUCAN) 150 MG tablet, take 1 tab today, if s/s not resolved in 3 days take the second (Patient not taking: Reported on 4/9/2025), Disp: , Rfl:     metoprolol tartrate (LOPRESSOR) 100 MG tablet, Take 100 mg at Bedtime the Night Before Coronary CTA Appointment and In the Morning 1 Hour Prior to Coronary CTA Appointment. Do not take if heart rate less than 60., Disp: 2 tablet, Rfl: 0    Current Facility-Administered Medications:     OnabotulinumtoxinA 200 Units, 200 Units, Intramuscular, Q3 Months, Shiela Coelho APRN, 200 Units at 01/14/25 0836    The following portions of the patient's history were reviewed and updated as appropriate: allergies, current medications, past family history, past medical history, past social history, past surgical history and problem list.    Review of Systems   12 point ROS negative except for that listed in the HPI.        Objective:     /84 (BP Location: Left arm, Patient Position: Sitting, Cuff Size: Adult)   Pulse 83   Ht 152.4 cm (60\")   Wt 108 kg (239 lb)   SpO2 99%   BMI 46.68 kg/m²      Physical Exam  Constitutional: Patient appears well-developed and well-nourished.   Neck: Neck supple. No JVD present.  Cardiovascular: Normal rate, regular rhythm and normal heart sounds. No murmur heard.   2+ symmetric pulses. "   Pulmonary/Chest: Breath sounds normal.  Musculoskeletal: Does not exhibit edema.   Vitals reviewed.    Data Review:   Lab Results   Component Value Date    GLUCOSE 141 (H) 04/04/2025    BUN 8 04/04/2025    CREATININE 0.95 04/04/2025    EGFRIFNONA 111 12/15/2021    EGFRIFAFRI 128 12/15/2021    BCR 8.4 04/04/2025    K 3.7 04/04/2025    CO2 26.0 04/04/2025    CALCIUM 10.4 04/04/2025    ALBUMIN 4.5 04/04/2025    AST 32 04/04/2025    ALT 34 (H) 04/04/2025     Lab Results   Component Value Date    CHOL 197 09/24/2024    CHLPL 142 10/21/2015    TRIG 157 (H) 09/24/2024    HDL 41 09/24/2024     (H) 09/24/2024      Lab Results   Component Value Date    WBC 11.24 (H) 04/04/2025    RBC 4.54 04/04/2025    HGB 14.5 04/04/2025    HCT 44.5 04/04/2025    MCV 98.0 (H) 04/04/2025     04/04/2025     Lab Results   Component Value Date    TSH 2.020 09/24/2024     Lab Results   Component Value Date    HGBA1C 7.1 (A) 02/25/2025        Procedures                Assessment and plan:      Diagnosis   1. Dyspnea on exertion       2. Palpitations       3. Essential hypertension       4. Dyslipidemia       5. Chest pain in adult         Patient with ongoing chest pain and dyspnea on exertion despite normal echocardiogram and stress test.  Will obtain a coronary CTA for further evaluation.  She continues to have episodes of palpitations.  Discussed obtaining Kardia mobile as her palpitations are infrequent and will be difficult to catch on Holter monitor.  Avoid caffeine, maintain adequate hydration, cardiovascular fitness.  Blood pressure well-controlled.  Continue current antihypertensive therapy.  Continue current medications.   FU in 6 MO, sooner as needed.  Thank you for allowing us to participate in the care of your patient.       Meg Palmer PA-C      Please note that portions of this note may have been completed with a voice recognition program. Efforts were made to edit the dictations, but occasionally words are  mistranscribed.

## 2025-04-09 ENCOUNTER — OFFICE VISIT (OUTPATIENT)
Dept: CARDIOLOGY | Facility: CLINIC | Age: 40
End: 2025-04-09
Payer: COMMERCIAL

## 2025-04-09 VITALS
BODY MASS INDEX: 46.92 KG/M2 | DIASTOLIC BLOOD PRESSURE: 84 MMHG | WEIGHT: 239 LBS | HEIGHT: 60 IN | OXYGEN SATURATION: 99 % | HEART RATE: 83 BPM | SYSTOLIC BLOOD PRESSURE: 126 MMHG

## 2025-04-09 DIAGNOSIS — R07.9 CHEST PAIN IN ADULT: ICD-10-CM

## 2025-04-09 DIAGNOSIS — I10 ESSENTIAL HYPERTENSION: ICD-10-CM

## 2025-04-09 DIAGNOSIS — R06.09 DYSPNEA ON EXERTION: Primary | ICD-10-CM

## 2025-04-09 DIAGNOSIS — E78.5 DYSLIPIDEMIA: ICD-10-CM

## 2025-04-09 DIAGNOSIS — R00.2 PALPITATIONS: ICD-10-CM

## 2025-04-09 PROCEDURE — 99214 OFFICE O/P EST MOD 30 MIN: CPT

## 2025-04-09 RX ORDER — IVABRADINE 5 MG/1
15 TABLET, FILM COATED ORAL ONCE
OUTPATIENT
Start: 2025-04-09 | End: 2025-04-09

## 2025-04-09 RX ORDER — SODIUM CHLORIDE 9 MG/ML
40 INJECTION, SOLUTION INTRAVENOUS AS NEEDED
OUTPATIENT
Start: 2025-04-09

## 2025-04-09 RX ORDER — METOPROLOL TARTRATE 25 MG/1
200 TABLET, FILM COATED ORAL ONCE
OUTPATIENT
Start: 2025-04-09 | End: 2025-04-09

## 2025-04-09 RX ORDER — METOPROLOL TARTRATE 25 MG/1
150 TABLET, FILM COATED ORAL ONCE
OUTPATIENT
Start: 2025-04-09

## 2025-04-09 RX ORDER — METOPROLOL TARTRATE 100 MG/1
TABLET ORAL
Qty: 2 TABLET | Refills: 0 | Status: SHIPPED | OUTPATIENT
Start: 2025-04-09

## 2025-04-09 RX ORDER — NITROGLYCERIN 0.4 MG/1
0.8 TABLET SUBLINGUAL
OUTPATIENT
Start: 2025-04-09

## 2025-04-09 RX ORDER — NITROGLYCERIN 0.4 MG/1
0.4 TABLET SUBLINGUAL
OUTPATIENT
Start: 2025-04-09 | End: 2025-04-09

## 2025-04-09 RX ORDER — FLUCONAZOLE 150 MG/1
TABLET ORAL
COMMUNITY
Start: 2025-03-31 | End: 2025-04-09

## 2025-04-09 RX ORDER — METOPROLOL TARTRATE 1 MG/ML
5 INJECTION, SOLUTION INTRAVENOUS
OUTPATIENT
Start: 2025-04-09

## 2025-04-09 RX ORDER — METOPROLOL TARTRATE 50 MG
50 TABLET ORAL
OUTPATIENT
Start: 2025-04-09

## 2025-04-09 RX ORDER — SODIUM CHLORIDE 0.9 % (FLUSH) 0.9 %
10 SYRINGE (ML) INJECTION EVERY 12 HOURS SCHEDULED
OUTPATIENT
Start: 2025-04-09

## 2025-04-09 RX ORDER — METOPROLOL TARTRATE 25 MG/1
100 TABLET, FILM COATED ORAL ONCE
OUTPATIENT
Start: 2025-04-09

## 2025-04-09 RX ORDER — SODIUM CHLORIDE 0.9 % (FLUSH) 0.9 %
10 SYRINGE (ML) INJECTION AS NEEDED
OUTPATIENT
Start: 2025-04-09

## 2025-04-09 RX ORDER — METOPROLOL TARTRATE 25 MG/1
50 TABLET, FILM COATED ORAL ONCE
OUTPATIENT
Start: 2025-04-09

## 2025-04-15 ENCOUNTER — PROCEDURE VISIT (OUTPATIENT)
Dept: NEUROLOGY | Facility: CLINIC | Age: 40
End: 2025-04-15
Payer: COMMERCIAL

## 2025-04-15 DIAGNOSIS — G43.709 CHRONIC MIGRAINE WITHOUT AURA WITHOUT STATUS MIGRAINOSUS, NOT INTRACTABLE: Primary | Chronic | ICD-10-CM

## 2025-04-17 ENCOUNTER — OFFICE VISIT (OUTPATIENT)
Dept: BARIATRICS/WEIGHT MGMT | Facility: CLINIC | Age: 40
End: 2025-04-17
Payer: COMMERCIAL

## 2025-04-17 VITALS
DIASTOLIC BLOOD PRESSURE: 78 MMHG | OXYGEN SATURATION: 98 % | RESPIRATION RATE: 16 BRPM | HEIGHT: 61 IN | WEIGHT: 239.6 LBS | TEMPERATURE: 97.1 F | HEART RATE: 78 BPM | BODY MASS INDEX: 45.24 KG/M2 | SYSTOLIC BLOOD PRESSURE: 126 MMHG

## 2025-04-17 DIAGNOSIS — K21.9 GASTROESOPHAGEAL REFLUX DISEASE, UNSPECIFIED WHETHER ESOPHAGITIS PRESENT: ICD-10-CM

## 2025-04-17 DIAGNOSIS — R53.83 FATIGUE, UNSPECIFIED TYPE: ICD-10-CM

## 2025-04-17 DIAGNOSIS — Z90.3 POSTGASTRECTOMY MALABSORPTION: ICD-10-CM

## 2025-04-17 DIAGNOSIS — E28.2 PCOS (POLYCYSTIC OVARIAN SYNDROME): ICD-10-CM

## 2025-04-17 DIAGNOSIS — K91.2 POSTGASTRECTOMY MALABSORPTION: ICD-10-CM

## 2025-04-17 DIAGNOSIS — Z51.81 THERAPEUTIC DRUG MONITORING: ICD-10-CM

## 2025-04-17 DIAGNOSIS — Z13.21 MALNUTRITION SCREEN: ICD-10-CM

## 2025-04-17 DIAGNOSIS — I10 ESSENTIAL HYPERTENSION: ICD-10-CM

## 2025-04-17 DIAGNOSIS — E11.65 TYPE 2 DIABETES MELLITUS WITH HYPERGLYCEMIA, UNSPECIFIED WHETHER LONG TERM INSULIN USE: ICD-10-CM

## 2025-04-17 DIAGNOSIS — K31.84 GASTROPARESIS: ICD-10-CM

## 2025-04-17 DIAGNOSIS — E78.5 DYSLIPIDEMIA: ICD-10-CM

## 2025-04-17 DIAGNOSIS — E66.01 OBESITY, CLASS III, BMI 40-49.9 (MORBID OBESITY): Primary | ICD-10-CM

## 2025-04-17 DIAGNOSIS — Z13.0 SCREENING, IRON DEFICIENCY ANEMIA: ICD-10-CM

## 2025-04-17 DIAGNOSIS — E55.9 HYPOVITAMINOSIS D: ICD-10-CM

## 2025-04-17 RX ORDER — PANTOPRAZOLE SODIUM 40 MG/1
40 TABLET, DELAYED RELEASE ORAL DAILY
Qty: 90 TABLET | Refills: 3 | Status: SHIPPED | OUTPATIENT
Start: 2025-04-17

## 2025-04-17 NOTE — PROGRESS NOTES
[facility] Bariatric Surgery  [address]    [facility] [address] [phone]        Patient Name:  Frances Hartman.  :  1985      Date of Visit: 2025      Reason for Visit:   4 years postop      HPI: Frances Hartman is a 39 y.o. female s/p robotic assisted LSG/HHR by  on 6/15/21     Reports a lot of stress recently with caring for several family members, work, health concerns.    May be moving to [place] temporarily to care for grandmother.  Reports she resumed getting IV fluid infusions b/c she cannot keep up with liquids.     Has decreased oral intake, vomiting, diarrhea, bubbling, abdominal cramping.  +GERD.  Unchanged since surgery.  Takes only Tums.  Has previously seen GI, but that provider left and she cannot get in to next provider in 2025.  Also has had chest pains for past few weeks, exertional dyspnea.  Has seen neurologist, cardiologist, and been to ED re: these concerns.    Reports intolerance to lettuce and other vegetables due to diarrhea or vomiting.    Tolerates meat.      Getting ?g prot/day.  Downloaded [employer] last year, but did not have much success at logging because the foods she eats weren't in the guillermina.    Last labs revealed  no vitamin deficiencies. Taking  PNV   Exercise: none.     Presurgery weight: 231 pounds.  Today's weight is 109 kg (239 lb 9.6 oz) pounds, today's  Body mass index is 46.02 kg/m²., and weight loss since surgery is -8 pounds.      Past Medical History:   Diagnosis Date    Allergic     Birth    Allergic rhinitis     Anxiety     Arthritis     Asthma     prn inhalers, does not follow w/ pulmonary    Chronic back pain     previously followed w/ pain management, now just prn Tylenol + Gabapentin    Chronic deep vein thrombosis (DVT) of femoral vein of right lower extremity 09/10/2021    Chronic pain disorder     Clotting disorder 2015    Cluster headache     Depression     Diabetes mellitus     Type  II, dx 2014, never on insulin, A1C 7.6     Dyspepsia     Dyspnea on exertion     Dysuria 03/07/2022    Dysuria and hematuria x 2 weeks. 3/7/22 urine culture sent. Rx Macrobid 100 mg twice daily for 7 days. Patient did not tolerate Macrobid well due to GI upset. Urine culture was negative.    Fatigue     Gastroparesis     GERD (gastroesophageal reflux disease)     Headache, tension-type     Heartburn     chronic, prn TUMS, denies prior eval    Hernia 2021    Also 1990    Hirsutism     History of medical problems     PCOS    Hx of radiation therapy     for treatments of Keloids    Hypertension     Irregular menses     infrequent spotting    Leiomyoma, subserous     possible peduculated f3-4 cm fibroid on u/s at [facility]    Low back pain     Migraines     botox q3 months, following Neurology @ [facility]    Morbid obesity     s/p sleeve gastrectomy    Osteoarthritis     Peripheral edema     PMS (premenstrual syndrome)     Polycystic ovary syndrome 2011    PTSD (post-traumatic stress disorder)     Recurrent pregnancy loss, antepartum condition or complication     Seasonal allergies     Self-injurious behavior     Years ago    Slow to wake up after anesthesia     Suicide attempt     Years ago    Type 2 diabetes mellitus     Urinary tract infection     Varicella     Visual impairment     Astigmatism, Nearsightedness     Past Surgical History:   Procedure Laterality Date    ABDOMINAL SURGERY  06/15/2021    Bariatric Sleeve Surgery    BARIATRIC SURGERY      COLONOSCOPY  December 2022    COSMETIC SURGERY      Multiple Keloid surgeries    ENDOSCOPY N/A 06/15/2021    Procedure: ESOPHAGOGASTRODUODENOSCOPY;  Surgeon: Ayaka Andre MD;  Location: [facility] OR;  Service: Robotics - DaVinci;  Laterality: N/A;    ENDOSCOPY N/A 10/28/2021    Procedure: ESOPHAGOGASTRODUODENOSCOPY WITH ACHALASIA BALLOON DILATATION;  Surgeon: Jase Hernandez MD;  Location: [facility] ENDOSCOPY;  Service: Gastroenterology;  Laterality: N/A;  58 F DILATOR     ENDOSCOPY N/A 11/15/2021    Procedure: ESOPHAGOGASTRODUODENOSCOPY WITH DILATATION;  Surgeon: Jase Hernandez MD;  Location:  [facility] ENDOSCOPY;  Service: Gastroenterology;  Laterality: N/A;  achalasia balloon     ENDOSCOPY N/A 11/24/2021    Procedure: ESOPHAGOGASTRODUODENOSCOPY [facility] DUODENAL POLYPECTOPMY AND NASAL JEJUNAL TUBE PLACEMENT;  Surgeon: Jase Hernandez MD;  Location:  [facility] ENDOSCOPY;  Service: Gastroenterology;  Laterality: N/A;  placement of feeding tube, checked position with KUB    GASTRECTOMY  06/15/2021    GASTRIC RESTRICTION SURGERY  2021    Sleeve    GASTRIC SLEEVE LAPAROSCOPIC N/A 06/15/2021    Procedure: GASTRIC SLEEVE LAPAROSCOPIC WITH DAVINCI ROBOT, LAPROSCOPIC HIATAL HERNIA REPAIR WITH DAVINCI ROBOT;  Surgeon: Ayaka Andre MD;  Location:  [facility] OR;  Service: Robotics - DaVinci;  Laterality: N/A;    KELOID EXCISION      1990,1993,1999,2015,2016,2019    LAPAROSCOPIC CHOLECYSTECTOMY  2000    for stones    RADIOFREQUENCY ABLATION  2019    x 2  for pt back    SKIN BIOPSY      UMBILICAL HERNIA REPAIR  1990    UPPER GASTROINTESTINAL ENDOSCOPY      WISDOM TOOTH EXTRACTION  1994     Outpatient Medications Marked as Taking for the 4/17/25 encounter (Office Visit) with Ayaka Andre MD   Medication Sig Dispense Refill    acetaminophen (TYLENOL) 500 MG tablet Take 1-2 tablets by mouth Every 6 (Six) Hours As Needed for Mild Pain.      Acetaminophen-Caffeine (Excedrin Tension Headache) 500-65 MG tablet As Needed.      albuterol sulfate  (90 Base) MCG/ACT inhaler Inhale 2 puffs Every 4 (Four) Hours As Needed for Wheezing or Shortness of Air. 18 g 5    Atogepant (Qulipta) 60 MG tablet Take 1 tablet by mouth Daily. 30 tablet 11    Biotin (Biotin Maximum) 60915 MCG tablet dispersible       Blood Glucose Monitoring Suppl (ONE TOUCH ULTRA 2) w/Device kit       calcium carbonate (TUMS) 500 MG chewable tablet Chew 1 tablet Daily.      Cyanocobalamin (Vitamin B-12) 5000 MCG tablet  dispersible Daily.      CYCLOBENZAPRINE HCL PO  (Patient taking differently: 1 tablet 3 (Three) Times a Day As Needed.)      diphenhydrAMINE (BENADRYL) 25 mg capsule Take 1 capsule by mouth Every 6 (Six) Hours As Needed for Itching or Sleep.      diphenhydrAMINE-acetaminophen (Tylenol PM Extra Strength)  MG tablet per tablet Take 1 tablet by mouth At Night As Needed.      furosemide (LASIX) 20 MG tablet Take 1 tablet by mouth Take As Directed. Take one tablet by mouth daily. Can take an extra tablet as needed. 90 tablet 3    gabapentin (NEURONTIN) 600 MG tablet Take 1 tablet by mouth 2 (Two) Times a Day.      glipizide (GLUCOTROL XL) 5 MG ER tablet Take 1 tablet by mouth Daily. 90 tablet 3    HYDROcodone-acetaminophen (NORCO) 5-325 MG per tablet Take 1 tablet by mouth As Needed.      Lactase ([employer] DAIRY RELIEF FAST ACTING PO) As Needed.      Lancets (OneTouch Delica Plus [id number]) misc Use 1 each 2 (Two) Times a Day. 100 each 3    loratadine (CLARITIN) 10 MG tablet TAKE ONE TABLET BY MOUTH DAILY 30 tablet 2    Melatonin 10 MG sublingual tablet As Needed.      metFORMIN ER (GLUCOPHAGE-XR) 500 MG 24 hr tablet Take 1 tablet by mouth Daily With Breakfast. 90 tablet 3    metoprolol tartrate (LOPRESSOR) 50 MG tablet TAKE ONE TABLET BY MOUTH TWICE A  tablet 3    montelukast (SINGULAIR) 10 MG tablet Take 1 tablet by mouth Every Night.      multivitamin with minerals (ONE DAILY MULTIVITAMIN ADULT PO)       NON FORMULARY Feminine balancing gummy      OnabotulinumtoxinA (Botox) 200 units reconstituted solution FOR . PHYSICIAN TO INJECT UP  UNITS INTRAMUSCULARLY INTO HEAD, NECK AND SHOULDERS EVERY 90 DAYS. 1 each 3    ondansetron ODT (ZOFRAN-ODT) 8 MG disintegrating tablet Place 1 tablet on the tongue Every 8 (Eight) Hours As Needed.      OneTouch Ultra test strip 1 each by Other route 2 (Two) Times a Day. 100 each 3    pyridoxine (B6 Natural) 100 MG tablet       simethicone (Gas-X) 80 MG  "chewable tablet Chew 1 tablet Every 6 (Six) Hours As Needed for Flatulence. 10 tablet 0    tiZANidine (ZANAFLEX) 4 MG tablet Take 1 tablet by mouth 2 (Two) Times a Day.      traMADol (ULTRAM) 50 MG tablet Take 1 tablet by mouth 2 (Two) Times a Day.      vitamin D3 125 MCG (5000 UT) capsule capsule Take 1 capsule by mouth Daily.      Vitamin E 180 MG (400 UNIT) capsule capsule Daily.       Current Facility-Administered Medications for the 4/17/25 encounter (Office Visit) with Ayaka Andre MD   Medication Dose Route Frequency Provider Last Rate Last Admin    OnabotulinumtoxinA 155 Units  155 Units Intramuscular Q3 Months Ciro Francois MD   155 Units at 04/15/25 1017       Allergies   Allergen Reactions    Capsicum Annuum Extract & Derivative (Bell Pepper) [Capsicum] Anaphylaxis    Codeine Headache, Unknown - Low Severity and Other (See Comments)     Can tolerate hydrocodone, no other adverse reactions to other narcotics.    Doxycycline Hallucinations, Rash and Unknown - Low Severity    Hydrochlorothiazide Swelling and Unknown - Low Severity     eventually causes CHF    Ibuprofen Other (See Comments)     \"makes me retain fluid and eventually go into CHF\"    Nitrofurantoin Nausea Only and Unknown - Low Severity    Peanut-Containing Drug Products Anaphylaxis    Monosodium Glutamate Swelling and Headache    Oatmeal Other (See Comments)     Dx on allergy testing    Banana (Diagnostic) Unknown - High Severity    Coconut Flavoring Agent (Non-Screening) Unknown - High Severity    Doxycycline Monohydrate Other (See Comments)    Isoflavones (Soy) Unknown - Low Severity    Peanut Butter Flavoring Agent (Non-Screening) Unknown - High Severity     All peanuts and byproducts    Amitriptyline Mental Status Change and Unknown - Low Severity     memory gaps + neuropathy + hair loss    Amoxicillin-Pot Clavulanate Other (See Comments) and Unknown - Low Severity     Syncope, not sure if allergic to cephalosporins.    " "Aspartame Nausea Only    Diclofenac Headache and Unknown - Low Severity    Egg-Derived Products Other (See Comments)     dx on allergy testing, but does not completely avoid    Naproxen Other (See Comments)     \"blackout\"       Social History     Socioeconomic History    Marital status: Single   Tobacco Use    Smoking status: Never     Passive exposure: Never    Smokeless tobacco: Never   Vaping Use    Vaping status: Never Used   Substance and Sexual Activity    Alcohol use: Not Currently     Comment: Very rarely drink socially. At most 2 drinks per 6 months.    Drug use: Never    Sexual activity: Yes     Partners: Male     Birth control/protection: Condom, Coitus interruptus       /78 (BP Location: Left arm, Patient Position: Sitting)   Pulse 78   Temp 97.1 °F (36.2 °C)   Resp 16   Ht 153.7 cm (60.5\")   Wt 109 kg (239 lb 9.6 oz)   SpO2 98%   BMI 46.02 kg/m²     Physical Exam  Constitutional:       General: She is not in acute distress.     Appearance: She is well-developed. She is not diaphoretic.   HENT:      Head: Normocephalic and atraumatic.      Mouth/Throat:      Pharynx: No oropharyngeal exudate.   Eyes:      Conjunctiva/sclera: Conjunctivae normal.      Pupils: Pupils are equal, round, and reactive to light.   Pulmonary:      Effort: Pulmonary effort is normal. No respiratory distress.   Abdominal:      General: There is no distension.      Palpations: Abdomen is soft.   Skin:     General: Skin is warm and dry.      Coloration: Skin is not pale.   Neurological:      Mental Status: She is alert and oriented to person, place, and time.      Cranial Nerves: No cranial nerve deficit.   Psychiatric:         Behavior: Behavior normal.         Thought Content: Thought content normal.           Assessment:  4 years s/p robotic assisted LSG/HHR by  on 6/15/21     ICD-10-CM ICD-9-CM   1. Obesity, Class III, BMI 40-49.9 (morbid obesity)  E66.01 278.01   2. Fatigue, unspecified type  R53.83 " 780.79   3. Postgastrectomy malabsorption  K91.2 579.3    Z90.3    4. Malnutrition screen  Z13.21 V77.2   5. Therapeutic drug monitoring  Z51.81 V58.83   6. Screening, iron deficiency anemia  Z13.0 V78.0   7. Hypovitaminosis D  E55.9 268.9   8. Gastroesophageal reflux disease, unspecified whether esophagitis present  K21.9 530.81   9. Essential hypertension  I10 401.9   10. Dyslipidemia  E78.5 272.4       Class 3 Severe Obesity (BMI >=40). Obesity-related health conditions include the following: diabetes mellitus. Obesity is worsening. BMI is is above average; BMI management plan is completed. We discussed portion control and increasing exercise.      Plan:  Doing well. Continue w/ good food choices and healthy habits.  Continue protein >70g/day.  Continue routine exercise.  Routine bariatric labs ordered.  Continue vitamins w/ adjustments pending lab results.  Call w/ problems/concerns.     Review of records shows delay in gastric emptying prior to surgery on 2020 GES.  Recommend recall GI appt to discuss gastroparesis.     Rx pantoprazole for GERD.  Previously only on Tums.  Paradoxically reports that omeprazole makes her reflux worse.  UGI, EGD, GES.    I asked patient to move towards eliminating fast food, and work on preparing meals ahead of time.  Refer to [facility].    Ok to do GLP-1 per endocrinologist or [facility], but could exacerbate some upper GI symptoms, pt may not tolerate.    She wishes to continue IV fluid infusions.    The patient was instructed to follow up in 12 months, sooner if needed.    I spent 30 minutes caring for Frances on this date of service. This time includes time spent by me in the following activities: preparing for the visit, reviewing tests, performing a medically appropriate examination and/or evaluation, counseling and educating the patient/family/caregiver, referring and communicating with other health care professionals, documenting information in the medical record, independently  interpreting results and communicating that information with the patient/family/caregiver, care coordination, ordering medications, ordering test(s), ordering procedure(s), obtaining a separately obtained history, and reviewing a separately obtained history      Ayaka Andre MD

## 2025-04-22 ENCOUNTER — HOSPITAL ENCOUNTER (OUTPATIENT)
Facility: HOSPITAL | Age: 40
Discharge: HOME | End: 2025-04-22
Admitting: NURSE PRACTITIONER
Payer: COMMERCIAL

## 2025-04-22 VITALS
DIASTOLIC BLOOD PRESSURE: 84 MMHG | BODY MASS INDEX: 45.24 KG/M2 | TEMPERATURE: 98.5 F | SYSTOLIC BLOOD PRESSURE: 139 MMHG | HEART RATE: 56 BPM | HEIGHT: 61 IN | WEIGHT: 239.6 LBS | RESPIRATION RATE: 18 BRPM

## 2025-04-22 DIAGNOSIS — R11.2 INTRACTABLE NAUSEA AND VOMITING: ICD-10-CM

## 2025-04-22 DIAGNOSIS — E86.0 DEHYDRATION: Primary | ICD-10-CM

## 2025-04-22 PROCEDURE — 25810000003 SODIUM CHLORIDE 0.9 % SOLUTION: Performed by: NURSE PRACTITIONER

## 2025-04-22 PROCEDURE — 96360 HYDRATION IV INFUSION INIT: CPT

## 2025-04-22 PROCEDURE — 96361 HYDRATE IV INFUSION ADD-ON: CPT

## 2025-04-22 RX ORDER — SODIUM CHLORIDE 9 MG/ML
500 INJECTION, SOLUTION INTRAVENOUS ONCE
OUTPATIENT
Start: 2025-04-22

## 2025-04-22 RX ORDER — SODIUM CHLORIDE 9 MG/ML
500 INJECTION, SOLUTION INTRAVENOUS ONCE
Status: COMPLETED | OUTPATIENT
Start: 2025-04-22 | End: 2025-04-22

## 2025-04-22 RX ADMIN — SODIUM CHLORIDE 500 ML/HR: 0.9 INJECTION, SOLUTION INTRAVENOUS at 08:29

## 2025-04-23 LAB
25(OH)D3+25(OH)D2 SERPL-MCNC: 46.4 NG/ML (ref 30–100)
ALBUMIN SERPL-MCNC: 4.2 G/DL (ref 3.9–4.9)
ALP SERPL-CCNC: 101 IU/L (ref 44–121)
ALT SERPL-CCNC: 27 IU/L (ref 0–32)
AST SERPL-CCNC: 21 IU/L (ref 0–40)
BASOPHILS # BLD AUTO: 0.1 X10E3/UL (ref 0–0.2)
BASOPHILS NFR BLD AUTO: 1 %
BILIRUB SERPL-MCNC: <0.2 MG/DL (ref 0–1.2)
BUN SERPL-MCNC: 9 MG/DL (ref 6–24)
BUN/CREAT SERPL: 9 (ref 9–23)
CALCIUM SERPL-MCNC: 9.9 MG/DL (ref 8.7–10.2)
CHLORIDE SERPL-SCNC: 104 MMOL/L (ref 96–106)
CO2 SERPL-SCNC: 21 MMOL/L (ref 20–29)
CREAT SERPL-MCNC: 0.97 MG/DL (ref 0.57–1)
EGFRCR SERPLBLD CKD-EPI 2021: 75 ML/MIN/1.73
EOSINOPHIL # BLD AUTO: 0.3 X10E3/UL (ref 0–0.4)
EOSINOPHIL NFR BLD AUTO: 3 %
ERYTHROCYTE [DISTWIDTH] IN BLOOD BY AUTOMATED COUNT: 12.3 % (ref 11.7–15.4)
FERRITIN SERPL-MCNC: 56 NG/ML (ref 15–150)
FOLATE SERPL-MCNC: >20 NG/ML
GLOBULIN SER CALC-MCNC: 2.8 G/DL (ref 1.5–4.5)
GLUCOSE SERPL-MCNC: 90 MG/DL (ref 70–99)
HCT VFR BLD AUTO: 39.6 % (ref 34–46.6)
HGB BLD-MCNC: 12.7 G/DL (ref 11.1–15.9)
IMM GRANULOCYTES # BLD AUTO: 0 X10E3/UL (ref 0–0.1)
IMM GRANULOCYTES NFR BLD AUTO: 0 %
IRON SERPL-MCNC: 68 UG/DL (ref 27–159)
LYMPHOCYTES # BLD AUTO: 3.2 X10E3/UL (ref 0.7–3.1)
LYMPHOCYTES NFR BLD AUTO: 33 %
MCH RBC QN AUTO: 31.6 PG (ref 26.6–33)
MCHC RBC AUTO-ENTMCNC: 32.1 G/DL (ref 31.5–35.7)
MCV RBC AUTO: 99 FL (ref 79–97)
METHYLMALONATE SERPL-SCNC: 151 NMOL/L (ref 0–378)
MONOCYTES # BLD AUTO: 0.6 X10E3/UL (ref 0.1–0.9)
MONOCYTES NFR BLD AUTO: 6 %
NEUTROPHILS # BLD AUTO: 5.4 X10E3/UL (ref 1.4–7)
NEUTROPHILS NFR BLD AUTO: 57 %
PLATELET # BLD AUTO: 259 X10E3/UL (ref 150–450)
POTASSIUM SERPL-SCNC: 4.8 MMOL/L (ref 3.5–5.2)
PREALB SERPL-MCNC: 27 MG/DL (ref 12–34)
PROT SERPL-MCNC: 7 G/DL (ref 6–8.5)
RBC # BLD AUTO: 4.02 X10E6/UL (ref 3.77–5.28)
SODIUM SERPL-SCNC: 142 MMOL/L (ref 134–144)
VIT B1 BLD-SCNC: 124.7 NMOL/L (ref 66.5–200)
WBC # BLD AUTO: 9.5 X10E3/UL (ref 3.4–10.8)

## 2025-04-29 ENCOUNTER — RESULTS FOLLOW-UP (OUTPATIENT)
Dept: BARIATRICS/WEIGHT MGMT | Facility: CLINIC | Age: 40
End: 2025-04-29
Payer: COMMERCIAL

## 2025-05-06 ENCOUNTER — HOSPITAL ENCOUNTER (OUTPATIENT)
Facility: HOSPITAL | Age: 40
Discharge: HOME | End: 2025-05-06
Admitting: NURSE PRACTITIONER
Payer: COMMERCIAL

## 2025-05-06 VITALS
DIASTOLIC BLOOD PRESSURE: 80 MMHG | TEMPERATURE: 99 F | BODY MASS INDEX: 45.46 KG/M2 | WEIGHT: 240.8 LBS | HEART RATE: 64 BPM | RESPIRATION RATE: 16 BRPM | SYSTOLIC BLOOD PRESSURE: 134 MMHG | HEIGHT: 61 IN

## 2025-05-06 DIAGNOSIS — R11.2 INTRACTABLE NAUSEA AND VOMITING: ICD-10-CM

## 2025-05-06 DIAGNOSIS — E86.0 DEHYDRATION: Primary | ICD-10-CM

## 2025-05-06 PROCEDURE — 96361 HYDRATE IV INFUSION ADD-ON: CPT

## 2025-05-06 PROCEDURE — 96360 HYDRATION IV INFUSION INIT: CPT

## 2025-05-06 PROCEDURE — 25810000003 SODIUM CHLORIDE 0.9 % SOLUTION: Performed by: NURSE PRACTITIONER

## 2025-05-06 RX ORDER — SODIUM CHLORIDE 9 MG/ML
500 INJECTION, SOLUTION INTRAVENOUS ONCE
Status: COMPLETED | OUTPATIENT
Start: 2025-05-06 | End: 2025-05-06

## 2025-05-06 RX ORDER — SODIUM CHLORIDE 9 MG/ML
500 INJECTION, SOLUTION INTRAVENOUS ONCE
OUTPATIENT
Start: 2025-05-06

## 2025-05-06 RX ADMIN — SODIUM CHLORIDE 500 ML/HR: 0.9 INJECTION, SOLUTION INTRAVENOUS at 08:41

## 2025-05-09 ENCOUNTER — HOSPITAL ENCOUNTER (OUTPATIENT)
Dept: GENERAL RADIOLOGY | Facility: HOSPITAL | Age: 40
Discharge: HOME | End: 2025-05-09
Admitting: SURGERY
Payer: COMMERCIAL

## 2025-05-09 DIAGNOSIS — K21.9 GASTROESOPHAGEAL REFLUX DISEASE, UNSPECIFIED WHETHER ESOPHAGITIS PRESENT: ICD-10-CM

## 2025-05-09 PROCEDURE — 74240 X-RAY XM UPR GI TRC 1CNTRST: CPT

## 2025-05-09 RX ADMIN — BARIUM SULFATE 240 ML: 960 POWDER, FOR SUSPENSION ORAL at 09:59

## 2025-05-12 ENCOUNTER — TELEPHONE (OUTPATIENT)
Facility: HOSPITAL | Age: 40
End: 2025-05-12
Payer: COMMERCIAL

## 2025-05-12 NOTE — TELEPHONE ENCOUNTER
Attempted to contact patient as pre-procedure phone call prior to planned CT angiogram coronary planned for 5/13/25. Left voicemail message reminder with arrival time of 0830 to main registration, no caffeine after midnight, okay to take medications as per normal routine unless taking a stimulant,  is recommended, and if have any questions may contact outpatient prep and recovery at [phone].

## 2025-05-13 ENCOUNTER — HOSPITAL ENCOUNTER (OUTPATIENT)
Facility: HOSPITAL | Age: 40
Discharge: HOME | End: 2025-05-13
Payer: COMMERCIAL

## 2025-05-13 VITALS
OXYGEN SATURATION: 99 % | BODY MASS INDEX: 47.81 KG/M2 | HEIGHT: 60 IN | WEIGHT: 243.5 LBS | TEMPERATURE: 97.2 F | HEART RATE: 73 BPM | DIASTOLIC BLOOD PRESSURE: 95 MMHG | RESPIRATION RATE: 17 BRPM | SYSTOLIC BLOOD PRESSURE: 126 MMHG

## 2025-05-13 DIAGNOSIS — R06.09 DYSPNEA ON EXERTION: ICD-10-CM

## 2025-05-13 DIAGNOSIS — R07.9 CHEST PAIN IN ADULT: ICD-10-CM

## 2025-05-13 LAB
B-HCG UR QL: NEGATIVE
CREAT BLDA-MCNC: 1.2 MG/DL (ref 0.6–1.3)

## 2025-05-13 PROCEDURE — 82565 ASSAY OF CREATININE: CPT

## 2025-05-13 PROCEDURE — 81025 URINE PREGNANCY TEST: CPT | Performed by: INTERNAL MEDICINE

## 2025-05-13 PROCEDURE — 75574 CT ANGIO HRT W/3D IMAGE: CPT

## 2025-05-13 PROCEDURE — 25510000001 IOPAMIDOL PER 1 ML

## 2025-05-13 PROCEDURE — 75574 CT ANGIO HRT W/3D IMAGE: CPT | Performed by: INTERNAL MEDICINE

## 2025-05-13 RX ORDER — IOPAMIDOL 755 MG/ML
100 INJECTION, SOLUTION INTRAVASCULAR
Status: COMPLETED | OUTPATIENT
Start: 2025-05-13 | End: 2025-05-13

## 2025-05-13 RX ORDER — SODIUM CHLORIDE 0.9 % (FLUSH) 0.9 %
10 SYRINGE (ML) INJECTION AS NEEDED
Status: DISCONTINUED | OUTPATIENT
Start: 2025-05-13 | End: 2025-05-14 | Stop reason: HOSPADM

## 2025-05-13 RX ORDER — METOPROLOL TARTRATE 100 MG/1
200 TABLET ORAL ONCE
Status: DISCONTINUED | OUTPATIENT
Start: 2025-05-13 | End: 2025-05-14 | Stop reason: HOSPADM

## 2025-05-13 RX ORDER — SODIUM CHLORIDE 9 MG/ML
40 INJECTION, SOLUTION INTRAVENOUS AS NEEDED
Status: DISCONTINUED | OUTPATIENT
Start: 2025-05-13 | End: 2025-05-14 | Stop reason: HOSPADM

## 2025-05-13 RX ORDER — METOPROLOL TARTRATE 1 MG/ML
5 INJECTION, SOLUTION INTRAVENOUS
Status: DISCONTINUED | OUTPATIENT
Start: 2025-05-13 | End: 2025-05-14 | Stop reason: HOSPADM

## 2025-05-13 RX ORDER — IVABRADINE 5 MG/1
15 TABLET, FILM COATED ORAL ONCE
Status: DISCONTINUED | OUTPATIENT
Start: 2025-05-13 | End: 2025-05-14 | Stop reason: HOSPADM

## 2025-05-13 RX ORDER — METOPROLOL TARTRATE 100 MG/1
100 TABLET ORAL ONCE
Status: DISCONTINUED | OUTPATIENT
Start: 2025-05-13 | End: 2025-05-14 | Stop reason: HOSPADM

## 2025-05-13 RX ORDER — NITROGLYCERIN 0.4 MG/1
0.8 TABLET SUBLINGUAL
Status: COMPLETED | OUTPATIENT
Start: 2025-05-13 | End: 2025-05-13

## 2025-05-13 RX ORDER — METOPROLOL TARTRATE 25 MG/1
50 TABLET, FILM COATED ORAL ONCE
Status: DISCONTINUED | OUTPATIENT
Start: 2025-05-13 | End: 2025-05-14 | Stop reason: HOSPADM

## 2025-05-13 RX ORDER — NITROGLYCERIN 0.4 MG/1
0.4 TABLET SUBLINGUAL
Status: COMPLETED | OUTPATIENT
Start: 2025-05-13 | End: 2025-05-13

## 2025-05-13 RX ORDER — METOPROLOL TARTRATE 25 MG/1
50 TABLET, FILM COATED ORAL
Status: DISCONTINUED | OUTPATIENT
Start: 2025-05-13 | End: 2025-05-14 | Stop reason: HOSPADM

## 2025-05-13 RX ORDER — SODIUM CHLORIDE 0.9 % (FLUSH) 0.9 %
10 SYRINGE (ML) INJECTION EVERY 12 HOURS SCHEDULED
Status: DISCONTINUED | OUTPATIENT
Start: 2025-05-13 | End: 2025-05-14 | Stop reason: HOSPADM

## 2025-05-13 RX ADMIN — NITROGLYCERIN 0.8 MG: 0.4 TABLET SUBLINGUAL at 09:57

## 2025-05-13 RX ADMIN — IOPAMIDOL 70 ML: 755 INJECTION, SOLUTION INTRAVENOUS at 10:05

## 2025-05-20 ENCOUNTER — HOSPITAL ENCOUNTER (OUTPATIENT)
Facility: HOSPITAL | Age: 40
Discharge: HOME | End: 2025-05-20
Admitting: NURSE PRACTITIONER
Payer: COMMERCIAL

## 2025-05-20 VITALS
HEIGHT: 60 IN | HEART RATE: 60 BPM | RESPIRATION RATE: 16 BRPM | SYSTOLIC BLOOD PRESSURE: 141 MMHG | TEMPERATURE: 98.1 F | BODY MASS INDEX: 47.12 KG/M2 | DIASTOLIC BLOOD PRESSURE: 89 MMHG | WEIGHT: 240 LBS

## 2025-05-20 DIAGNOSIS — R11.2 INTRACTABLE NAUSEA AND VOMITING: ICD-10-CM

## 2025-05-20 DIAGNOSIS — E86.0 DEHYDRATION: Primary | ICD-10-CM

## 2025-05-20 PROCEDURE — 96361 HYDRATE IV INFUSION ADD-ON: CPT

## 2025-05-20 PROCEDURE — 96360 HYDRATION IV INFUSION INIT: CPT

## 2025-05-20 PROCEDURE — 25810000003 SODIUM CHLORIDE 0.9 % SOLUTION: Performed by: NURSE PRACTITIONER

## 2025-05-20 RX ORDER — SODIUM CHLORIDE 9 MG/ML
500 INJECTION, SOLUTION INTRAVENOUS ONCE
OUTPATIENT
Start: 2025-05-20

## 2025-05-20 RX ORDER — SODIUM CHLORIDE 9 MG/ML
500 INJECTION, SOLUTION INTRAVENOUS ONCE
Status: COMPLETED | OUTPATIENT
Start: 2025-05-20 | End: 2025-05-20

## 2025-05-20 RX ADMIN — SODIUM CHLORIDE 500 ML/HR: 9 INJECTION, SOLUTION INTRAVENOUS at 08:41

## 2025-05-22 ENCOUNTER — TELEPHONE (OUTPATIENT)
Dept: ENDOCRINOLOGY | Facility: CLINIC | Age: 40
End: 2025-05-22
Payer: COMMERCIAL

## 2025-05-22 DIAGNOSIS — E11.65 TYPE 2 DIABETES MELLITUS WITH HYPERGLYCEMIA, WITHOUT LONG-TERM CURRENT USE OF INSULIN: Primary | ICD-10-CM

## 2025-05-22 RX ORDER — GLIPIZIDE 10 MG/1
10 TABLET, FILM COATED, EXTENDED RELEASE ORAL DAILY
Qty: 90 TABLET | Refills: 3 | Status: SHIPPED | OUTPATIENT
Start: 2025-05-22

## 2025-05-22 NOTE — TELEPHONE ENCOUNTER
PATIENT IS CALLING HAVING MAJOR EYE ISSUES AND IS AFFECTING HER IN ALL ASPECTS OF HER LIFE. SHE IS ALSO GETTING EXTREME MIGRAINES AND BODY ACHES. SHE IS GETTING RED SPOTS THAT POP UP ON PARTS OF HER BODY. SHE IS WANTING TO TRY TO BE SEEN SOONER WITH DR. CROCKER AS SHE IS NOT SCHEDULED TILL JULY. THE VISION ISSUES ARE AFFECTING HER VISION IN DRIVING AND AT HER JOB. SHE WOULD LIKE A CALL BACK TO DISCUSS ISSUES. PHONE NUMBER [phone]

## 2025-05-22 NOTE — TELEPHONE ENCOUNTER
Spoke with patient. Have increased her glipizide XL to 10 mg daily. Keeping her current 07/02/2025.  Electronically Signed: Carine Aden MD

## 2025-05-23 ENCOUNTER — PREP FOR SURGERY (OUTPATIENT)
Dept: OTHER | Facility: HOSPITAL | Age: 40
End: 2025-05-23
Payer: COMMERCIAL

## 2025-05-23 DIAGNOSIS — K21.9 GASTROESOPHAGEAL REFLUX DISEASE, UNSPECIFIED WHETHER ESOPHAGITIS PRESENT: Primary | ICD-10-CM

## 2025-05-23 RX ORDER — SODIUM CHLORIDE 9 MG/ML
40 INJECTION, SOLUTION INTRAVENOUS AS NEEDED
OUTPATIENT
Start: 2025-05-23

## 2025-05-23 RX ORDER — SODIUM CHLORIDE 0.9 % (FLUSH) 0.9 %
3 SYRINGE (ML) INJECTION EVERY 12 HOURS SCHEDULED
OUTPATIENT
Start: 2025-05-23

## 2025-05-23 RX ORDER — SODIUM CHLORIDE 0.9 % (FLUSH) 0.9 %
3-10 SYRINGE (ML) INJECTION AS NEEDED
OUTPATIENT
Start: 2025-05-23

## 2025-05-30 ENCOUNTER — SPECIALTY PHARMACY (OUTPATIENT)
Dept: ONCOLOGY | Facility: HOSPITAL | Age: 40
End: 2025-05-30
Payer: COMMERCIAL

## 2025-05-30 RX ORDER — ATOGEPANT 60 MG/1
60 TABLET ORAL DAILY
Qty: 30 TABLET | Refills: 11 | Status: SHIPPED | OUTPATIENT
Start: 2025-05-30

## 2025-06-03 ENCOUNTER — HOSPITAL ENCOUNTER (OUTPATIENT)
Facility: HOSPITAL | Age: 40
Discharge: HOME | End: 2025-06-03
Admitting: NURSE PRACTITIONER
Payer: COMMERCIAL

## 2025-06-03 VITALS
BODY MASS INDEX: 46.87 KG/M2 | HEART RATE: 64 BPM | WEIGHT: 240 LBS | DIASTOLIC BLOOD PRESSURE: 79 MMHG | SYSTOLIC BLOOD PRESSURE: 118 MMHG | TEMPERATURE: 98.5 F | RESPIRATION RATE: 18 BRPM

## 2025-06-03 DIAGNOSIS — E86.0 DEHYDRATION: Primary | ICD-10-CM

## 2025-06-03 DIAGNOSIS — R11.2 INTRACTABLE NAUSEA AND VOMITING: ICD-10-CM

## 2025-06-03 PROCEDURE — 96361 HYDRATE IV INFUSION ADD-ON: CPT

## 2025-06-03 PROCEDURE — 96360 HYDRATION IV INFUSION INIT: CPT

## 2025-06-03 PROCEDURE — 25810000003 SODIUM CHLORIDE 0.9 % SOLUTION: Performed by: NURSE PRACTITIONER

## 2025-06-03 RX ORDER — SODIUM CHLORIDE 9 MG/ML
500 INJECTION, SOLUTION INTRAVENOUS ONCE
Status: COMPLETED | OUTPATIENT
Start: 2025-06-03 | End: 2025-06-03

## 2025-06-03 RX ORDER — SODIUM CHLORIDE 9 MG/ML
500 INJECTION, SOLUTION INTRAVENOUS ONCE
Status: CANCELLED | OUTPATIENT
Start: 2025-06-03

## 2025-06-03 RX ORDER — BISACODYL 5 MG/1
2 TABLET, COATED ORAL
COMMUNITY

## 2025-06-03 RX ADMIN — SODIUM CHLORIDE 500 ML/HR: 9 INJECTION, SOLUTION INTRAVENOUS at 08:06

## 2025-06-09 RX ORDER — METOPROLOL TARTRATE 50 MG
50 TABLET ORAL 2 TIMES DAILY
Qty: 180 TABLET | Refills: 3 | Status: SHIPPED | OUTPATIENT
Start: 2025-06-09

## 2025-06-16 ENCOUNTER — SPECIALTY PHARMACY (OUTPATIENT)
Dept: ONCOLOGY | Facility: HOSPITAL | Age: 40
End: 2025-06-16
Payer: COMMERCIAL

## 2025-06-16 ENCOUNTER — OFFICE VISIT (OUTPATIENT)
Dept: BARIATRICS/WEIGHT MGMT | Facility: CLINIC | Age: 40
End: 2025-06-16
Payer: COMMERCIAL

## 2025-06-16 VITALS
OXYGEN SATURATION: 96 % | WEIGHT: 239.6 LBS | TEMPERATURE: 97.3 F | BODY MASS INDEX: 45.24 KG/M2 | HEIGHT: 61 IN | HEART RATE: 61 BPM | DIASTOLIC BLOOD PRESSURE: 76 MMHG | SYSTOLIC BLOOD PRESSURE: 122 MMHG | RESPIRATION RATE: 18 BRPM

## 2025-06-16 DIAGNOSIS — K21.9 GASTROESOPHAGEAL REFLUX DISEASE, UNSPECIFIED WHETHER ESOPHAGITIS PRESENT: Primary | ICD-10-CM

## 2025-06-16 PROCEDURE — 99214 OFFICE O/P EST MOD 30 MIN: CPT | Performed by: NURSE PRACTITIONER

## 2025-06-16 RX ORDER — SODIUM CHLORIDE 9 MG/ML
500 INJECTION, SOLUTION INTRAVENOUS ONCE
OUTPATIENT
Start: 2025-06-16

## 2025-06-16 NOTE — PROGRESS NOTES
"[facility]  [address]    [facility] [id number] [phone]        Patient Name:  Frances Hartman.  :  1985      Date of Visit: 2025      Reason for Visit:   4 years postop; GERD      HPI: Frances Hartman is a 39 y.o. female s/p robotic LSG/HHR 6/15/21 w/     LOV 25: \"Reports a lot of stress recently with caring for several family members, work, health concerns.    May be moving to [place] temporarily to care for grandmother.  Reports she resumed getting IV fluid infusions b/c she cannot keep up with liquids.     Has decreased oral intake, vomiting, diarrhea, bubbling, abdominal cramping.  +GERD.  Unchanged since surgery.  Takes only Tums.  Has previously seen GI, but that provider left and she cannot get in to next provider in 2025.  Also has had chest pains for past few weeks, exertional dyspnea.  Has seen neurologist, cardiologist, and been to ED re: these concerns.    Reports intolerance to lettuce and other vegetables due to diarrhea or vomiting.    Tolerates meat.      Getting ?g prot/day.  Downloaded [employer] last year, but did not have much success at logging because the foods she eats weren't in the guillermina.    Last labs revealed  no vitamin deficiencies. Taking PNV  Exercise: none.\"    -----  UGI 25: @ [facility]  Impression:  1. Status post vertical sleeve gastrectomy x4 years. There was no evidence of extraluminal contrast. No postoperative strictures were seen.  2. Mild gastroesophageal reflux to the level of the thoracic inlet  3. Small sized sliding-type hiatal hernia    -----  2025 Update:    Over past 2 weeks, has had increase in N/V/D w/ intolerance of certain foods. Getting IVF about q2 wks for hydration. Not currently taking pantoprazole- felt like it caused increased gas, bloating. Reflux remains unchanged. Taking tums, gas x, kaopectate prn. Not on GLP-1.     Presurgery weight: 231 pounds.  Today's " weight is 109 kg (239 lb 9.6 oz) pounds, today's  Body mass index is 46.02 kg/m²., and weight loss since surgery is +8 pounds.      Past Medical History:   Diagnosis Date    Allergic     Birth    Allergic rhinitis     Anxiety     Arthritis     Asthma     prn inhalers, does not follow w/ pulmonary    Chronic back pain     previously followed w/ pain management, now just prn Tylenol + Gabapentin    Chronic deep vein thrombosis (DVT) of femoral vein of right lower extremity 09/10/2021    Chronic pain disorder     Clotting disorder 2015    Cluster headache     Depression     Diabetes mellitus     Type II, dx 2014, never on insulin, A1C 7.6     Dyspepsia     Dyspnea on exertion     Dysuria 03/07/2022    Dysuria and hematuria x 2 weeks. 3/7/22 urine culture sent. Rx Macrobid 100 mg twice daily for 7 days. Patient did not tolerate Macrobid well due to GI upset. Urine culture was negative.    Fatigue     Gastroparesis     GERD (gastroesophageal reflux disease)     Headache, tension-type     Heartburn     chronic, prn TUMS, denies prior eval    Hernia 2021    Also 1990    Hirsutism     History of medical problems     PCOS    Hx of radiation therapy     for treatments of Keloids    Hypertension     Irregular menses     infrequent spotting    Leiomyoma, subserous     possible peduculated f3-4 cm fibroid on u/s at [facility]    Low back pain     Migraines     botox q3 months, following Neurology @ [facility]    Morbid obesity     s/p sleeve gastrectomy    Osteoarthritis     Peripheral edema     PMS (premenstrual syndrome)     Polycystic ovary syndrome 2011    PTSD (post-traumatic stress disorder)     Recurrent pregnancy loss, antepartum condition or complication     Seasonal allergies     Self-injurious behavior     Years ago    Slow to wake up after anesthesia     Suicide attempt     Years ago    Type 2 diabetes mellitus     Urinary tract infection     Varicella     Visual impairment     Astigmatism, Nearsightedness     Past Surgical  History:   Procedure Laterality Date    ABDOMINAL SURGERY  06/15/2021    Bariatric Sleeve Surgery    BARIATRIC SURGERY      COLONOSCOPY  December 2022    COSMETIC SURGERY      Multiple Keloid surgeries    ENDOSCOPY N/A 06/15/2021    Procedure: ESOPHAGOGASTRODUODENOSCOPY;  Surgeon: Ayaka Andre MD;  Location:  [facility];  Service: Robotics - [employer];  Laterality: N/A;    ENDOSCOPY N/A 10/28/2021    Procedure: ESOPHAGOGASTRODUODENOSCOPY WITH ACHALASIA BALLOON DILATATION;  Surgeon: Jase Hernandez MD;  Location:  [facility];  Service: Gastroenterology;  Laterality: N/A;  58 F DILATOR    ENDOSCOPY N/A 11/15/2021    Procedure: ESOPHAGOGASTRODUODENOSCOPY WITH DILATATION;  Surgeon: Jase Hernandez MD;  Location:  [facility];  Service: Gastroenterology;  Laterality: N/A;  achalasia balloon     ENDOSCOPY N/A 11/24/2021    Procedure: ESOPHAGOGASTRODUODENOSCOPY WTH DUODENAL POLYPECTOPMY AND NASAL JEJUNAL TUBE PLACEMENT;  Surgeon: Jase Hernandez MD;  Location:  [facility];  Service: Gastroenterology;  Laterality: N/A;  placement of feeding tube, checked position with KUB    GASTRECTOMY  06/15/2021    GASTRIC RESTRICTION SURGERY  2021    Sleeve    GASTRIC SLEEVE LAPAROSCOPIC N/A 06/15/2021    Procedure: GASTRIC SLEEVE LAPAROSCOPIC WITH DAVINCI ROBOT, LAPROSCOPIC HIATAL HERNIA REPAIR WITH DAVINCI ROBOT;  Surgeon: Ayaka Andre MD;  Location:  [facility];  Service: Robotics - [facility];  Laterality: N/A;    KELOID EXCISION      1990,1993,1999,2015,2016,2019    LAPAROSCOPIC CHOLECYSTECTOMY  2000    for stones    RADIOFREQUENCY ABLATION  2019    x 2  for pt back    SKIN BIOPSY      UMBILICAL HERNIA REPAIR  1990    UPPER GASTROINTESTINAL ENDOSCOPY      WISDOM TOOTH EXTRACTION  1994     Outpatient Medications Marked as Taking for the 6/16/25 encounter (Office Visit) with Nancy Davila APRN   Medication Sig Dispense Refill    acetaminophen (TYLENOL) 500 MG tablet Take 1-2 tablets by mouth Every 6 (Six)  Hours As Needed for Mild Pain.      Acetaminophen-Caffeine (Excedrin Tension Headache) 500-65 MG tablet As Needed.      albuterol sulfate  (90 Base) MCG/ACT inhaler Inhale 2 puffs Every 4 (Four) Hours As Needed for Wheezing or Shortness of Air. 18 g 5    Atogepant (Qulipta) 60 MG tablet Take 1 tablet by mouth Daily. 30 tablet 11    Biotin (Biotin Maximum) 06885 MCG tablet dispersible       Bismuth Subsalicylate (Kaopectate) 262 MG tablet Take 2 tablets by mouth 3 (Three) Times a Day After Meals.      Blood Glucose Monitoring Suppl (ONE TOUCH ULTRA 2) w/Device kit       calcium carbonate (TUMS) 500 MG chewable tablet Chew 1 tablet Daily.      Cyanocobalamin (Vitamin B-12) 5000 MCG tablet dispersible Daily.      CYCLOBENZAPRINE HCL PO  (Patient taking differently: 1 tablet 3 (Three) Times a Day As Needed.)      diphenhydrAMINE (BENADRYL) 25 mg capsule Take 1 capsule by mouth Every 6 (Six) Hours As Needed for Itching or Sleep.      diphenhydrAMINE-acetaminophen (Tylenol PM Extra Strength)  MG tablet per tablet Take 1 tablet by mouth At Night As Needed.      furosemide (LASIX) 20 MG tablet Take 1 tablet by mouth Take As Directed. Take one tablet by mouth daily. Can take an extra tablet as needed. 90 tablet 3    gabapentin (NEURONTIN) 600 MG tablet Take 1 tablet by mouth 2 (Two) Times a Day.      glipizide (GLUCOTROL XL) 10 MG 24 hr tablet Take 1 tablet by mouth Daily. New dose. Please D/C prior Rx for 5 mg tabs. 90 tablet 3    HYDROcodone-acetaminophen (NORCO) 5-325 MG per tablet Take 1 tablet by mouth As Needed.      Lactase ([employer] DAIRY RELIEF FAST ACTING PO) As Needed.      Lancets (OneTouch Delica Plus [id number]) misc Use 1 each 2 (Two) Times a Day. 100 each 3    loratadine (CLARITIN) 10 MG tablet TAKE ONE TABLET BY MOUTH DAILY 30 tablet 2    Melatonin 10 MG sublingual tablet As Needed.      metFORMIN ER (GLUCOPHAGE-XR) 500 MG 24 hr tablet Take 1 tablet by mouth Daily With Breakfast. 90 tablet 3     "metoprolol tartrate (LOPRESSOR) 50 MG tablet TAKE ONE TABLET BY MOUTH TWICE A  tablet 3    montelukast (SINGULAIR) 10 MG tablet Take 1 tablet by mouth Every Night.      multivitamin with minerals (ONE DAILY MULTIVITAMIN ADULT PO)       NON FORMULARY Feminine balancing gummy      OnabotulinumtoxinA (Botox) 200 units reconstituted solution FOR . PHYSICIAN TO INJECT UP  UNITS INTRAMUSCULARLY INTO HEAD, NECK AND SHOULDERS EVERY 90 DAYS. 1 each 3    ondansetron ODT (ZOFRAN-ODT) 8 MG disintegrating tablet Place 1 tablet on the tongue Every 8 (Eight) Hours As Needed.      OneTouch Ultra test strip 1 each by Other route 2 (Two) Times a Day. 100 each 3    simethicone (Gas-X) 80 MG chewable tablet Chew 1 tablet Every 6 (Six) Hours As Needed for Flatulence. 10 tablet 0    tiZANidine (ZANAFLEX) 4 MG tablet Take 1 tablet by mouth 2 (Two) Times a Day.      traMADol (ULTRAM) 50 MG tablet Take 1 tablet by mouth 2 (Two) Times a Day.      vitamin D3 125 MCG (5000 UT) capsule capsule Take 1 capsule by mouth Daily.      Vitamin E 180 MG (400 UNIT) capsule capsule Daily.       Current Facility-Administered Medications for the 6/16/25 encounter (Office Visit) with Nancy Davila APRN   Medication Dose Route Frequency Provider Last Rate Last Admin    OnabotulinumtoxinA 155 Units  155 Units Intramuscular Q3 Months Ciro Francois MD   155 Units at 04/15/25 1017       Allergies   Allergen Reactions    Capsicum Annuum Extract & Derivative (Bell Pepper) [Capsicum] Anaphylaxis    Codeine Headache, Unknown - Low Severity and Other (See Comments)     Can tolerate hydrocodone, no other adverse reactions to other narcotics.    Doxycycline Hallucinations, Rash and Unknown - Low Severity    Hydrochlorothiazide Swelling and Unknown - Low Severity     eventually causes CHF    Ibuprofen Other (See Comments)     \"makes me retain fluid and eventually go into CHF\"    Nitrofurantoin Nausea Only and Unknown - Low Severity " "   Peanut-Containing Drug Products Anaphylaxis    Monosodium Glutamate Swelling and Headache    Oatmeal Other (See Comments)     Dx on allergy testing    Banana (Diagnostic) Unknown - High Severity    Coconut Flavoring Agent (Non-Screening) Unknown - High Severity    Doxycycline Monohydrate Other (See Comments)    Isoflavones (Soy) Unknown - Low Severity    Peanut Butter Flavoring Agent (Non-Screening) Unknown - High Severity     All peanuts and byproducts    Protonix [Pantoprazole] Swelling    Amitriptyline Mental Status Change and Unknown - Low Severity     memory gaps + neuropathy + hair loss    Amoxicillin-Pot Clavulanate Other (See Comments) and Unknown - Low Severity     Syncope, not sure if allergic to cephalosporins.    Aspartame Nausea Only    Diclofenac Headache and Unknown - Low Severity    Egg-Derived Products Other (See Comments)     dx on allergy testing, but does not completely avoid    Naproxen Other (See Comments)     \"blackout\"       Social History     Socioeconomic History    Marital status: Single   Tobacco Use    Smoking status: Never     Passive exposure: Never    Smokeless tobacco: Never   Vaping Use    Vaping status: Never Used   Substance and Sexual Activity    Alcohol use: Not Currently     Comment: Very rarely drink socially. At most 2 drinks per 6 months.    Drug use: Never    Sexual activity: Yes     Partners: Male     Birth control/protection: Condom, Coitus interruptus       /76 (BP Location: Left arm, Patient Position: Sitting)   Pulse 61   Temp 97.3 °F (36.3 °C)   Resp 18   Ht 153.7 cm (60.5\")   Wt 109 kg (239 lb 9.6 oz)   SpO2 96%   BMI 46.02 kg/m²     Physical Exam  Constitutional:       General: She is not in acute distress.     Appearance: She is well-developed. She is not diaphoretic.   HENT:      Head: Normocephalic and atraumatic.      Mouth/Throat:      Pharynx: No oropharyngeal exudate.   Eyes:      Conjunctiva/sclera: Conjunctivae normal.      Pupils: Pupils " are equal, round, and reactive to light.   Pulmonary:      Effort: Pulmonary effort is normal. No respiratory distress.   Abdominal:      General: There is no distension.      Palpations: Abdomen is soft.   Skin:     General: Skin is warm and dry.      Coloration: Skin is not pale.   Neurological:      Mental Status: She is alert and oriented to person, place, and time.      Cranial Nerves: No cranial nerve deficit.   Psychiatric:         Behavior: Behavior normal.         Thought Content: Thought content normal.           Assessment:  4 years s/p robotic assisted LSG/HHR by  on 6/15/21     ICD-10-CM ICD-9-CM   1. Gastroesophageal reflux disease, unspecified whether esophagitis present  K21.9 530.81         Class 3 Severe Obesity (BMI >=40). Obesity-related health conditions include the following: diabetes mellitus. Obesity is worsening. BMI is is above average; BMI management plan is completed. We discussed portion control and increasing exercise.      Plan:  Will schedule EGD @ [facility] w/ Dr. Andre for further evaluation. Additional input to follow.     I spent 30 minutes caring for Frances on this date of service. This time includes time spent by me in the following activities: preparing for the visit, reviewing tests, obtaining and/or reviewing a separately obtained history, performing a medically appropriate examination and/or evaluation, counseling and educating the patient/family/caregiver, ordering medications, tests, or procedures, and documenting information in the medical record.       TANESHA Moss

## 2025-06-26 ENCOUNTER — SPECIALTY PHARMACY (OUTPATIENT)
Dept: ONCOLOGY | Facility: HOSPITAL | Age: 40
End: 2025-06-26
Payer: COMMERCIAL

## 2025-06-26 ENCOUNTER — LAB REQUISITION (OUTPATIENT)
Dept: LAB | Facility: HOSPITAL | Age: 40
End: 2025-06-26
Payer: COMMERCIAL

## 2025-06-26 ENCOUNTER — OUTSIDE FACILITY SERVICE (OUTPATIENT)
Dept: BARIATRICS/WEIGHT MGMT | Facility: CLINIC | Age: 40
End: 2025-06-26
Payer: COMMERCIAL

## 2025-06-26 DIAGNOSIS — K21.00 GASTRO-ESOPHAGEAL REFLUX DISEASE WITH ESOPHAGITIS, WITHOUT BLEEDING: ICD-10-CM

## 2025-06-26 PROCEDURE — 88305 TISSUE EXAM BY PATHOLOGIST: CPT | Performed by: SURGERY

## 2025-06-26 NOTE — PROGRESS NOTES
Specialty Pharmacy Patient Management Program  Refill Outreach     Frances was contacted today regarding refills of their medication(s).    Refill Questions      Flowsheet Row Most Recent Value   Changes to allergies? No   Changes to medications? No   New conditions or infections since last clinic visit No   Unplanned office visit, urgent care, ED, or hospital admission in the last 4 weeks  No   How does patient/caregiver feel medication is working? Good   Financial problems or insurance changes  No   Since the previous refill, were any specialty medication doses or scheduled injections missed or delayed?  No   Does this patient require a clinical escalation to a pharmacist? No            Delivery Questions      Flowsheet Row Most Recent Value   Delivery method UPS   Delivery address verified with patient/caregiver? Yes   Delivery address [facility]   Other address preferred na   Number of medications in delivery 1   Medication(s) being filled and delivered OnabotulinumtoxinA (Botox)   Doses left of specialty medications na   Copay verified? Yes   Copay amount Botox= $0   Copay form of payment No copayment ($0)   Delivery Date Selection 06/27/25   Signature Required No   Do you consent to receive electronic handouts?  Yes            Medication Adherence    Adherence tools used: cell phone  Support network for adherence: healthcare provider          Follow-up: 90 day(s)     Roman David  6/26/2025  10:50 EDT

## 2025-06-30 ENCOUNTER — TELEPHONE (OUTPATIENT)
Dept: BARIATRICS/WEIGHT MGMT | Facility: CLINIC | Age: 40
End: 2025-06-30
Payer: COMMERCIAL

## 2025-06-30 NOTE — TELEPHONE ENCOUNTER
----- Message -----  From: Ayaka Andre MD  Sent: 6/27/2025   1:50 PM EDT  To: LASHANDA Morley    EGZARINA Thursday completed.  F/u to discuss options.

## 2025-07-02 ENCOUNTER — OFFICE VISIT (OUTPATIENT)
Dept: ENDOCRINOLOGY | Facility: CLINIC | Age: 40
End: 2025-07-02
Payer: COMMERCIAL

## 2025-07-02 VITALS
BODY MASS INDEX: 44.93 KG/M2 | SYSTOLIC BLOOD PRESSURE: 128 MMHG | OXYGEN SATURATION: 96 % | WEIGHT: 238 LBS | DIASTOLIC BLOOD PRESSURE: 79 MMHG | HEIGHT: 61 IN | HEART RATE: 72 BPM

## 2025-07-02 DIAGNOSIS — E78.5 DYSLIPIDEMIA: ICD-10-CM

## 2025-07-02 DIAGNOSIS — E11.9 TYPE 2 DIABETES MELLITUS WITHOUT COMPLICATION, WITHOUT LONG-TERM CURRENT USE OF INSULIN: Primary | ICD-10-CM

## 2025-07-02 LAB
EXPIRATION DATE: ABNORMAL
EXPIRATION DATE: NORMAL
GLUCOSE BLDC GLUCOMTR-MCNC: 109 MG/DL (ref 70–130)
HBA1C MFR BLD: 6.8 % (ref 4.5–5.7)
Lab: ABNORMAL
Lab: NORMAL

## 2025-07-02 RX ORDER — METFORMIN HYDROCHLORIDE 500 MG/1
500 TABLET, EXTENDED RELEASE ORAL
Qty: 90 TABLET | Refills: 3 | Status: SHIPPED | OUTPATIENT
Start: 2025-07-02

## 2025-07-02 RX ORDER — MAGNESIUM GLYCINATE 100 MG
200 CAPSULE ORAL DAILY
COMMUNITY
Start: 2025-06-22

## 2025-07-02 NOTE — PROGRESS NOTES
Chief Complaint   Patient presents with    Type 2 diabetes mellitus without complication     Follow up       HPI:   Frances Hartman is a 39 y.o.female who returns to endocrine clinic for follow-up evaluation of her Type 2 Diabetes. Last visit 02/25/2025.  Her history is as follows:    Interim Events:  - Has had episodes of nausea/vomiting/diarrhea since last visit. Has received IVF on 03/04, 03/21, 4/22, 05/06, 06/03/2025. EGD on 06/26/2025.   - lower back pain and sciatica have prevented exercise      1) Type 2 DM with no known associated complications at this time:   - Diagnosed with diabetes in 2014. (A1C% was 7.6). Pt was already on metformin ER for PCOS prior to diagnosis. Tolerated metformin ER and stopped in 06/2021 after bariatric surgery.  - Had Sleeve gastrectomy on 06/15/2021. Had post-op complication of pouch stricture and underwent dilation of pouch in 07/2021, 10/2021, and 11/2021 by gastroenterology. Highest wt 299 lbs, Lost 50 lbs on her own. Weight 238 pre-op. Lowest weight 190 lbs. Has regained weight since 2023.   - Was hospitalized 11/26/2021 for N/V/dehydration.   - pt had tried Januvia in 05/2023, but was not efficacious. She had taking glipizide before her surgery and requested to restart the medication.  - pt contacted me on 01/26/2024 to notify me of her pregnancy. Prescribed insulin on 02/11/2024. Pt later miscarried on 02/29/2024..     Current DM Medications:  - metformin  mg daily with food. Did not tolerate a higher dose.  - glipizide ER 10 mg daily. Denies hypoglycemia    Other History:  - pt reports a h/o alternating diarrhea and constipation since bariatric surgery.      DM Health Maintenance:  Ophtho: DUE  Podiatry: no recent visit  Monofilament / Foot exam: DUE  Lipids: (09/2024) Tchol 197, , HDL 41,  - not on statin at this time  Urine Microalb/Cr ratio: (09/2028) < 1.2  TSH: (09/2024) 2.020  CBC: (04/2025) Hgb 12.7  CMP: (04/2025) Cr 0.97, GFR 75, LFTs  "WNL  Vit B12: (09/2024) >2,000    2) PCOS:  - diagnosed in 2011.   - Was prescribed metformin ER and spironolactone at time of diagnosis. Had stopped both medications prior to bariatric surgery.  - Restarted metformin  mg in 02/2022    3) Hirsutism:  - Was on spironolactone 100 mg daily in the past. Medication stopped prior to bariatric surgery in 06/2021  - PCP restarted spironolactone 25 mg daily in 10/2022.   - Was on spironolactone 50 mg daily and stopped in 01/2024 when pregnant. Has not restarted.     Other History:  - pt contacted me on 01/26/2024 to notify me of her pregnancy. Prescribed insulin on 02/11/2024. Pt later miscarried on 02/29/2024. This was pt's 11th miscarriage.     Review of Systems   Constitutional:  Negative for activity change and fatigue.        Mild Wt loss since last visit.    Eyes: Negative.  Negative for discharge.   Respiratory: Negative.     Cardiovascular: Negative.    Gastrointestinal:  Positive for diarrhea and nausea. Negative for constipation.   Endocrine: Negative.    Genitourinary: Negative.    Musculoskeletal:  Positive for back pain and myalgias (Occasional). Negative for arthralgias.   Skin: Negative.    Allergic/Immunologic: Positive for environmental allergies and food allergies.   Neurological:  Positive for headaches. Negative for dizziness, weakness, light-headedness and numbness.   Hematological: Negative.  Does not bruise/bleed easily.   Psychiatric/Behavioral:  Positive for sleep disturbance.      The following portions of the patient's history were reviewed and updated as appropriate: allergies, current medications, past family history, past medical history, past social history, past surgical history and problem list.    /79 (BP Location: Right arm, Patient Position: Sitting, Cuff Size: Adult)   Pulse 72   Ht 153.7 cm (60.5\")   Wt 108 kg (238 lb)   SpO2 96%   BMI 45.72 kg/m²   Physical Exam  Vitals reviewed.   Constitutional:       General: She is " not in acute distress.     Appearance: Normal appearance. She is well-developed. She is not diaphoretic.   HENT:      Head: Normocephalic.   Eyes:      Conjunctiva/sclera: Conjunctivae normal.      Pupils: Pupils are equal, round, and reactive to light.   Neck:      Thyroid: No thyromegaly.      Trachea: No tracheal deviation.      Comments: No palpable thyroid nodules    Cardiovascular:      Rate and Rhythm: Normal rate and regular rhythm.      Heart sounds: Normal heart sounds. No murmur heard.  Pulmonary:      Effort: Pulmonary effort is normal. No respiratory distress.      Breath sounds: Normal breath sounds.   Lymphadenopathy:      Cervical: No cervical adenopathy.   Skin:     General: Skin is warm and dry.      Findings: No erythema.      Comments: + acanthosis nigricans, + facial hirsutism   Keloid scarring   Neurological:      Mental Status: She is alert and oriented to person, place, and time.      Cranial Nerves: No cranial nerve deficit.   Psychiatric:         Behavior: Behavior normal.       LABS/IMAGING: prior labs / outside records reviewed and summarized in HPI    Office Visit on 07/02/2025   Component Date Value Ref Range Status    Hemoglobin A1C 07/02/2025 6.8 (A)  4.5 - 5.7 % Final    Lot Number 07/02/2025 [id number]   Final    Expiration Date 07/02/2025 03/14/2027   Final    Glucose 07/02/2025 109  70 - 130 mg/dL Final    Lot Number 07/02/2025 [id number]   Final    Expiration Date 07/02/2025 12/27/2025   Final       ASSESSMENT/PLAN:    1) Type 2 DM with no associated complications at this time: controlled, A1C% 6.8 today, was 7.1% last visit.   - Pt with h/o frequent UTI, BV: Pt would like to post-pone trying a SGLT-2 inhibitors at this time.   - Januvia was not efficacious in the past  - Due to patient's persistent N/V and requirements for IVF every 1-3 weeks, pt not a candidate for GLP-1 agonist.     - Will Continue metformin  mg once daily with food    - Will Continue glipizide ER 10 mg  daily.    DM health maintenance labs completed 12/2024, 04/2025 reviewed.     2) Dyslipidemia:   Lipids: (09/2024) Tchol 197, , HDL 41,  - not on statin at this time  - Have deferred statin as pt was pursuing pregnancy. Have discussed AHA guidelines with patient. Will discuss again when patient no longer pursuing pregnancy.     RTC 5 months    Electronically Signed: Carine Aden MD

## 2025-07-09 ENCOUNTER — PROCEDURE VISIT (OUTPATIENT)
Dept: NEUROLOGY | Facility: CLINIC | Age: 40
End: 2025-07-09
Payer: COMMERCIAL

## 2025-07-09 DIAGNOSIS — G43.709 CHRONIC MIGRAINE WITHOUT AURA WITHOUT STATUS MIGRAINOSUS, NOT INTRACTABLE: Primary | ICD-10-CM

## 2025-07-09 NOTE — PROGRESS NOTES
"[facility] Bariatric Surgery  [address]    [facility] [id number] [phone]        Patient Name: Frances Hartman.  YOB: 1985      Date of Visit: 07/10/2025      Reason for Visit:  discuss EGD    HPI:  Frances Hartman is a 39 y.o. female s/p robotic assisted LSG/HHR by  on 6/15/21     \"UGI 5/9/25: @ [facility]  Impression:  1. Status post vertical sleeve gastrectomy x4 years. There was no evidence of extraluminal contrast. No postoperative strictures were seen.  2. Mild gastroesophageal reflux to the level of the thoracic inlet  3. Small sized sliding-type hiatal hernia    -----  06/16/2025 Update:    Over past 2 weeks, has had increase in N/V/D w/ intolerance of certain foods. Getting IVF about q2 wks for hydration. Not currently taking pantoprazole- felt like it caused increased gas, bloating. Reflux remains unchanged. Taking tums, gas x, kaopectate prn. Not on GLP-1.\"    ---  7/10/25 Update:    GES 2020 (predates MBS):  IMPRESSION:  Abnormal exam with delay in gastric emptying.      6/26/25 EGD (): GEJ 39 cm, tiny sliding HH, normal sleeve.  Irregular Z line with single tongue of salmon-colored mucosa extending <1 cm from GEJ.  Bile in stomach, but not esophagus.    Path-  Final Diagnosis   DISTAL ESOPHAGUS AT 38, BIOPSY:  Squamocolumnar junctional mucosa with reactive changes.  Negative for intestinal metaplasia, significantly increased eosinophils, dysplasia, or malignancy.     Had some plans to be out of town for a prolonged time to care for ailing grandmother, but those plans have changed and now she will be staying in town.  Not able to tolerate pantoprazole b/c she perceived an increase in gas/bloating.  She felt like omeprazole increased reflux.    When asked to reiterate her main issues, they are  -she cannot eat healthy foods like salad/broccoli/Cheerios/cabbage/celery b/c they cause diarrhea/vomiting.  -frequent dyspepsia requiring Gas-Ex " and Tums  -Bloating  -Reflux is minimal when she takes Gas-Ex and Tums after eating.    Has not seen GI this year.  Her provider left the practice.  Does not have GI follow up appointment in Epic.    Presurgery weight: 231 pounds. Today's weight is 108 kg (237 lb) pounds, today's  Body mass index is 45.52 kg/m².,      Past Medical History:   Diagnosis Date    Allergic     Birth    Allergic rhinitis     Anxiety     Arthritis     Asthma     prn inhalers, does not follow w/ pulmonary    Chronic back pain     previously followed w/ pain management, now just prn Tylenol + Gabapentin    Chronic deep vein thrombosis (DVT) of femoral vein of right lower extremity 09/10/2021    Chronic pain disorder     Clotting disorder 2015    Cluster headache     Depression     Diabetes mellitus     Type II, dx 2014, never on insulin, A1C 7.6     Dyspepsia     Dyspnea on exertion     Dysuria 03/07/2022    Dysuria and hematuria x 2 weeks. 3/7/22 urine culture sent. Rx Macrobid 100 mg twice daily for 7 days. Patient did not tolerate Macrobid well due to GI upset. Urine culture was negative.    Fatigue     Gastroparesis     GERD (gastroesophageal reflux disease)     Headache, tension-type     Heartburn     chronic, prn TUMS, denies prior eval    Hernia 2021    Also 1990    Hirsutism     History of medical problems     PCOS    Hx of radiation therapy     for treatments of Keloids    Hypertension     Irregular menses     infrequent spotting    Leiomyoma, subserous     possible peduculated f3-4 cm fibroid on u/s at [facility]    Low back pain     Migraines     botox q3 months, following Neurology @ [facility]    Morbid obesity     s/p sleeve gastrectomy    Osteoarthritis     Peripheral edema     PMS (premenstrual syndrome)     Polycystic ovary syndrome 2011    PTSD (post-traumatic stress disorder)     Recurrent pregnancy loss, antepartum condition or complication     Seasonal allergies     Self-injurious behavior     Years ago    Slow to wake up after  anesthesia     Suicide attempt     Years ago    Type 2 diabetes mellitus     Urinary tract infection     Varicella     Visual impairment     Astigmatism, Nearsightedness     Past Surgical History:   Procedure Laterality Date    ABDOMINAL SURGERY  06/15/2021    Bariatric Sleeve Surgery    BARIATRIC SURGERY      COLONOSCOPY  December 2022    COSMETIC SURGERY      Multiple Keloid surgeries    ENDOSCOPY N/A 06/15/2021    Procedure: ESOPHAGOGASTRODUODENOSCOPY;  Surgeon: Ayaka Andre MD;  Location:  [facility];  Service: Robotics - DaVinci;  Laterality: N/A;    ENDOSCOPY N/A 10/28/2021    Procedure: ESOPHAGOGASTRODUODENOSCOPY WITH ACHALASIA BALLOON DILATATION;  Surgeon: Jase Hernandez MD;  Location:  [facility];  Service: Gastroenterology;  Laterality: N/A;  58 F DILATOR    ENDOSCOPY N/A 11/15/2021    Procedure: ESOPHAGOGASTRODUODENOSCOPY WITH DILATATION;  Surgeon: Jase Hernandez MD;  Location:  [facility];  Service: Gastroenterology;  Laterality: N/A;  achalasia balloon     ENDOSCOPY N/A 11/24/2021    Procedure: ESOPHAGOGASTRODUODENOSCOPY WT DUODENAL POLYPECTOPMY AND NASAL JEJUNAL TUBE PLACEMENT;  Surgeon: Jase Hernandez MD;  Location:  [facility];  Service: Gastroenterology;  Laterality: N/A;  placement of feeding tube, checked position with KUB    GASTRECTOMY  06/15/2021    GASTRIC RESTRICTION SURGERY  2021    Sleeve    GASTRIC SLEEVE LAPAROSCOPIC N/A 06/15/2021    Procedure: GASTRIC SLEEVE LAPAROSCOPIC WITH DAVINCI ROBOT, LAPROSCOPIC HIATAL HERNIA REPAIR WITH DAVINCI ROBOT;  Surgeon: Ayaka Andre MD;  Location:  [facility];  Service: Robotics - DaVinci;  Laterality: N/A;    KELOID EXCISION      1990,1993,1999,2015,2016,2019    LAPAROSCOPIC CHOLECYSTECTOMY  2000    for stones    RADIOFREQUENCY ABLATION  2019    x 2  for pt back    SKIN BIOPSY      UMBILICAL HERNIA REPAIR  1990    UPPER GASTROINTESTINAL ENDOSCOPY      WISDOM TOOTH EXTRACTION  1994     Outpatient Medications Marked  as Taking for the 7/10/25 encounter (Office Visit) with Ayaka Andre MD   Medication Sig Dispense Refill    acetaminophen (TYLENOL) 500 MG tablet Take 1-2 tablets by mouth Every 6 (Six) Hours As Needed for Mild Pain.      Acetaminophen-Caffeine (Excedrin Tension Headache) 500-65 MG tablet As Needed.      albuterol sulfate  (90 Base) MCG/ACT inhaler Inhale 2 puffs Every 4 (Four) Hours As Needed for Wheezing or Shortness of Air. 18 g 5    Atogepant (Qulipta) 60 MG tablet Take 1 tablet by mouth Daily. 30 tablet 11    Biotin (Biotin Maximum) 72207 MCG tablet dispersible       Bismuth Subsalicylate (Kaopectate) 262 MG tablet Take 2 tablets by mouth 3 (Three) Times a Day After Meals.      Blood Glucose Monitoring Suppl (ONE TOUCH ULTRA 2) w/Device kit       calcium carbonate (TUMS) 500 MG chewable tablet Chew 1 tablet Daily.      Cyanocobalamin (Vitamin B-12) 5000 MCG tablet dispersible Daily.      CYCLOBENZAPRINE HCL PO  (Patient taking differently: 1 tablet 3 (Three) Times a Day As Needed.)      diphenhydrAMINE (BENADRYL) 25 mg capsule Take 1 capsule by mouth Every 6 (Six) Hours As Needed for Itching or Sleep.      diphenhydrAMINE-acetaminophen (Tylenol PM Extra Strength)  MG tablet per tablet Take 1 tablet by mouth At Night As Needed.      furosemide (LASIX) 20 MG tablet Take 1 tablet by mouth Take As Directed. Take one tablet by mouth daily. Can take an extra tablet as needed. 90 tablet 3    gabapentin (NEURONTIN) 600 MG tablet Take 1 tablet by mouth 2 (Two) Times a Day.      glipizide (GLUCOTROL XL) 10 MG 24 hr tablet Take 1 tablet by mouth Daily. New dose. Please D/C prior Rx for 5 mg tabs. 90 tablet 3    HYDROcodone-acetaminophen (NORCO) 5-325 MG per tablet Take 1 tablet by mouth As Needed.      Lactase ([employer] DAIRY RELIEF FAST ACTING PO) As Needed.      Lancets (OneTouch Delica Plus [id number]) misc Use 1 each 2 (Two) Times a Day. 100 each 3    loratadine (CLARITIN) 10 MG tablet TAKE ONE TABLET  BY MOUTH DAILY 30 tablet 2    Magnesium Glycinate 100 MG capsule Take 200 mg by mouth Daily.      Melatonin 10 MG sublingual tablet As Needed.      metFORMIN ER (GLUCOPHAGE-XR) 500 MG 24 hr tablet Take 1 tablet by mouth Daily With Breakfast. 90 tablet 3    metoprolol tartrate (LOPRESSOR) 50 MG tablet TAKE ONE TABLET BY MOUTH TWICE A  tablet 3    montelukast (SINGULAIR) 10 MG tablet Take 1 tablet by mouth Every Night.      multivitamin with minerals (ONE DAILY MULTIVITAMIN ADULT PO)       NON FORMULARY Feminine balancing gummy      OnabotulinumtoxinA (Botox) 200 units reconstituted solution FOR . PHYSICIAN TO INJECT UP  UNITS INTRAMUSCULARLY INTO HEAD, NECK AND SHOULDERS EVERY 90 DAYS. 1 each 3    ondansetron ODT (ZOFRAN-ODT) 8 MG disintegrating tablet Place 1 tablet on the tongue Every 8 (Eight) Hours As Needed.      OneTouch Ultra test strip 1 each by Other route 2 (Two) Times a Day. 100 each 3    simethicone (Gas-X) 80 MG chewable tablet Chew 1 tablet Every 6 (Six) Hours As Needed for Flatulence. 10 tablet 0    tiZANidine (ZANAFLEX) 4 MG tablet Take 1 tablet by mouth 2 (Two) Times a Day.      traMADol (ULTRAM) 50 MG tablet Take 1 tablet by mouth 2 (Two) Times a Day.      vitamin D3 125 MCG (5000 UT) capsule capsule Take 1 capsule by mouth Daily.      Vitamin E 180 MG (400 UNIT) capsule capsule Daily.       Current Facility-Administered Medications for the 7/10/25 encounter (Office Visit) with Ayaka Andre MD   Medication Dose Route Frequency Provider Last Rate Last Admin    OnabotulinumtoxinA 155 Units  155 Units Intramuscular Q3 Months Ciro Francois MD   155 Units at 07/09/25 0922     Allergies   Allergen Reactions    Capsicum Annuum Extract & Derivative (Bell Pepper) [Capsicum] Anaphylaxis    Codeine Headache, Unknown - Low Severity and Other (See Comments)     Can tolerate hydrocodone, no other adverse reactions to other narcotics.    Doxycycline Hallucinations,  "Rash and Unknown - Low Severity    Hydrochlorothiazide Swelling and Unknown - Low Severity     eventually causes CHF    Ibuprofen Other (See Comments)     \"makes me retain fluid and eventually go into CHF\"    Nitrofurantoin Nausea Only and Unknown - Low Severity    Peanut-Containing Drug Products Anaphylaxis    Monosodium Glutamate Swelling and Headache    Oatmeal Other (See Comments)     Dx on allergy testing    Banana (Diagnostic) Unknown - High Severity    Coconut Flavoring Agent (Non-Screening) Unknown - High Severity    Doxycycline Monohydrate Other (See Comments)    Isoflavones (Soy) Unknown - Low Severity    Peanut Butter Flavoring Agent (Non-Screening) Unknown - High Severity     All peanuts and byproducts    Protonix [Pantoprazole] Swelling    Amitriptyline Mental Status Change and Unknown - Low Severity     memory gaps + neuropathy + hair loss    Amoxicillin-Pot Clavulanate Other (See Comments) and Unknown - Low Severity     Syncope, not sure if allergic to cephalosporins.    Aspartame Nausea Only    Diclofenac Headache and Unknown - Low Severity    Egg-Derived Products Other (See Comments)     dx on allergy testing, but does not completely avoid    Naproxen Other (See Comments)     \"blackout\"       Social History     Socioeconomic History    Marital status: Single   Tobacco Use    Smoking status: Never     Passive exposure: Never    Smokeless tobacco: Never   Vaping Use    Vaping status: Never Used   Substance and Sexual Activity    Alcohol use: Not Currently     Comment: Very rarely drink socially. At most 2 drinks per 6 months.    Drug use: Never    Sexual activity: Yes     Partners: Male     Birth control/protection: Condom, Coitus interruptus       Vitals:    07/10/25 0829   BP: 130/84   Pulse: 72   Resp: 18   Temp: 98 °F (36.7 °C)   SpO2: 98%     Weight 108 kg (237 lb)  Body mass index is 45.52 kg/m².    Physical Exam  Constitutional:       General: She is not in acute distress.     Appearance: She " is well-developed. She is not diaphoretic.   HENT:      Head: Normocephalic and atraumatic.      Mouth/Throat:      Pharynx: No oropharyngeal exudate.   Eyes:      Conjunctiva/sclera: Conjunctivae normal.      Pupils: Pupils are equal, round, and reactive to light.   Pulmonary:      Effort: Pulmonary effort is normal. No respiratory distress.   Abdominal:      General: There is no distension.      Palpations: Abdomen is soft.   Skin:     General: Skin is warm and dry.      Coloration: Skin is not pale.   Neurological:      Mental Status: She is alert and oriented to person, place, and time.      Cranial Nerves: No cranial nerve deficit.   Psychiatric:         Behavior: Behavior normal.         Thought Content: Thought content normal.           Assessment:      ICD-10-CM ICD-9-CM   1. Obesity, Class III, BMI 40-49.9 (morbid obesity)  E66.813 278.01   2. Gastroparesis  K31.84 536.3   3. Dyspepsia  R10.13 536.8   4. Abdominal bloating  R14.0 787.3       Plan:      Discussed EGD findings and path, along with review of her symptoms.    I would expect any reflux symptoms to improve with repair of recurrent hiatal hernia, but would not expect this to resolve chronic vomiting/nausea, which may be more secondary to gastroparesis.  Her main symptoms are really the nausea/vomiting/bloating.    She does not have technical issues with sleeve that explain her symptoms, and I strongly suspect functional GI disorder/gastroparesis as the culprit.    Recommend long-term GI follow-up.  She has some challenges d/t not tolerating some meds, like omeprazole and pantoprazole.  Offered referral to [facility] functional GI clinic vs. Second opinion at [facility] Dr. Jimenez, and she elected latter.        I spent 30 minutes caring for Frances on this date of service. This time includes time spent by me in the following activities: preparing for the visit, reviewing tests, performing a medically appropriate examination and/or evaluation,  counseling and educating the patient/family/caregiver, referring and communicating with other health care professionals, documenting information in the medical record, independently interpreting results and communicating that information with the patient/family/caregiver, care coordination, ordering medications, ordering test(s), ordering procedure(s), obtaining a separately obtained history, and reviewing a separately obtained history      The patient was instructed to follow up in 9 months.

## 2025-07-10 ENCOUNTER — OFFICE VISIT (OUTPATIENT)
Dept: BARIATRICS/WEIGHT MGMT | Facility: CLINIC | Age: 40
End: 2025-07-10
Payer: COMMERCIAL

## 2025-07-10 VITALS
HEART RATE: 72 BPM | DIASTOLIC BLOOD PRESSURE: 84 MMHG | WEIGHT: 237 LBS | OXYGEN SATURATION: 98 % | HEIGHT: 61 IN | RESPIRATION RATE: 18 BRPM | TEMPERATURE: 98 F | BODY MASS INDEX: 44.75 KG/M2 | SYSTOLIC BLOOD PRESSURE: 130 MMHG

## 2025-07-10 DIAGNOSIS — R10.13 DYSPEPSIA: ICD-10-CM

## 2025-07-10 DIAGNOSIS — R14.0 ABDOMINAL BLOATING: ICD-10-CM

## 2025-07-10 DIAGNOSIS — K31.84 GASTROPARESIS: ICD-10-CM

## 2025-07-10 DIAGNOSIS — E66.813 OBESITY, CLASS III, BMI 40-49.9 (MORBID OBESITY): Primary | ICD-10-CM

## 2025-07-15 ENCOUNTER — OFFICE VISIT (OUTPATIENT)
Dept: GASTROENTEROLOGY | Facility: CLINIC | Age: 40
End: 2025-07-15
Payer: COMMERCIAL

## 2025-07-15 ENCOUNTER — TRANSCRIBE ORDERS (OUTPATIENT)
Dept: GENERAL RADIOLOGY | Facility: HOSPITAL | Age: 40
End: 2025-07-15
Payer: COMMERCIAL

## 2025-07-15 VITALS
DIASTOLIC BLOOD PRESSURE: 70 MMHG | HEART RATE: 70 BPM | WEIGHT: 238 LBS | BODY MASS INDEX: 44.93 KG/M2 | OXYGEN SATURATION: 99 % | TEMPERATURE: 97.3 F | HEIGHT: 61 IN | SYSTOLIC BLOOD PRESSURE: 126 MMHG

## 2025-07-15 DIAGNOSIS — K44.9 SLIDING HIATAL HERNIA: ICD-10-CM

## 2025-07-15 DIAGNOSIS — R14.0 ABDOMINAL BLOATING: ICD-10-CM

## 2025-07-15 DIAGNOSIS — K21.9 GASTROESOPHAGEAL REFLUX DISEASE, UNSPECIFIED WHETHER ESOPHAGITIS PRESENT: ICD-10-CM

## 2025-07-15 DIAGNOSIS — K30 DELAYED GASTRIC EMPTYING: Primary | ICD-10-CM

## 2025-07-15 DIAGNOSIS — R11.0 NAUSEA: ICD-10-CM

## 2025-07-15 RX ORDER — FAMOTIDINE 20 MG/1
20 TABLET, FILM COATED ORAL 2 TIMES DAILY
Qty: 60 TABLET | Refills: 11 | Status: SHIPPED | OUTPATIENT
Start: 2025-07-15

## 2025-07-15 NOTE — PROGRESS NOTES
"     Follow Up      Date: 07/15/2025   Patient Name: Frances Hartman  : 1985   MRN: [id number]     Chief Complaint:    Chief Complaint   Patient presents with    Gastroparesis     Follow up       History of Present Illness: Frances Hartman is a 39 y.o. female, who is here today for follow up on nausea and gastroparesis. She has a history of NAFLD, GERD and delayed gastric emptying, s/p gastric sleeve and hiatal hernia repair in .  Previously followed by Ember Lopez.    Patient reports today to discuss history of gastroparesis. Had a GES in  which showed evidence of gastroparesis. She reports postprandial abdominal fullness and nausea, GERD and upper abdominal bloating.  Patient states she has experienced the symptoms intermittently in the past, but has noticed them return in the past 8 to 9 months. She has been seeing her bariatric surgery office for her symptoms. Had upper GI series 2025 ordered by bariatric office that showed evidence of s/p vertical sleeve gastrectomy, mild GERD and small sliding type hiatal hernia.  She then underwent EGD by Dr. Andre 2025 due to persistent symptoms. Per Dr. Andre, she did think patients reflux would improve with repair of recurrent hiatal hernia, but not her chronic nausea/vomiting which she thinks is secondary to her hx of gastroparesis.  Reports that when she eats things like broccoli and cabbage that she knows will help with weight loss and being healthy able to feel like it is \"sitting in my stomach\" and points to her upper abdomen. She denies abdominal pain, but endorses bloating with eating and reflux.  She has had 1-2 episodes of emesis in the past couple of months at the end of the day, but feels the vomiting has subsided for now. Able to keep certain foods and water down.  She does state that she stays nauseous for most of the day, but if she takes Zofran she is not nauseous. She does not take Zofran daily, but has needed it more " recently. Requests a refill. She reports that most of the symptoms occur after eating especially at the end of her day.  Patient reports that she works third shift, so her schedule is flipped.  Patient states she does not have symptoms with processing things like carbs, fish and chicken.      She does get  reflux symptoms especially after meals.  Has previously tried and failed Prilosec and Protonix for GERD. She state Prilosec caused her to have heartburn and Protonix caused her to have worsening bloating. Currently she is using Gas X and Tums after meals as needed. Denies dysphagia, choking symptoms. Denies changes to voice. Tries to avoid triggers for reflux symptoms.  Denies chronic, frequent NSAID use.     She does mention occasional stool seepage at night that will wake her up that has occurred intermittently for 2-3 years. She previously discussed with Ember Lopez 1 year ago who ordered colonoscopy and stool samples that came back normal. Patient states she recently had a recurrence of symptoms for 2 nights, but has now subsided again. She states it will wake her up from her sleep. She is currently following with pain management for chronic low back pain. Recently restarted magnesium supplement before bed for relaxation purposes. Has been on Metformin for many years. Has some of this sensation during the day, but is able to clench and control this. Will happen for a few nights and then stop for months at a time. Has struggled with this for many years. Underwent colonoscopy last year which showed non bleeding internal hemorrhoids and normal colon biopsies at that time. Denies blood in the stool. She reports that she has a bowel movement daily. Denies having to strain. States her stools are normal consistency and not pellet like. Denies saddle anesthesias. Due for repeat colonoscopy in December of this year. Had trialed Xiafaxan with previous provider for IBS-D, but did not feel it helped.     Patient denies  associated fever, chills, abdominal pain, constipation, hematemesis, dysphagia, hematochezia, melena, night sweats, weight loss or gain, dysuria, jaundice or bruising.    EGD 6/2025 Dr. Andre:   GEJ 39 cm, tiny sliding HH, normal sleeve.  Irregular Z line with single tongue of salmon-colored mucosa extending <1 cm from GEJ.  Bile in stomach, but not esophagus.    Path-  Final Diagnosis   DISTAL ESOPHAGUS AT 38, BIOPSY:  Squamocolumnar junctional mucosa with reactive changes.  Negative for intestinal metaplasia, significantly increased eosinophils, dysplasia, or malignancy.     Colonoscopy, Scan (07/11/2024) (Dr Singh)-mild diverticulosis, nonbleeding internal hemorrhoids-5-year recall, normal colon biopsies     ENDOSCOPY, INT (12/28/2022)-LA grade a esophagitis, small hiatal hernia, normal healthy appearing sleeve gastrectomy, gastritis, mucosal nodule in the duodenum-biopsied-negative for evidence of neuroendocrine tumor     SCANNED - COLONOSCOPY (12/28/2022)-one 6 mm hyperplastic polyp in the rectum, diverticulosis.  Random colon biopsies- 3 year recall         US Liver (07/13/2022 16:20)-Diffusely increased echogenicity of liver parenchyma likely related to  steatosis. No focal lesions. No acute process.     NM PET/CT Skull Base to Mid Thigh (03/16/2022 11:42)  Negative Ga-68 Dotatate scan  CT Abdomen Pelvis With Contrast (11/22/2021 22:52)   FL Upper GI Single Contrast With KUB (11/09/2021 14:22)1. Status post vertical sleeve gastrectomy x5 months. There was no  evidence of extraluminal contrast. No postoperative strictures were  seen.  2. Moderate gastroesophageal reflux to the level of the thoracic inlet  3. Patulous gastroesophageal junction versus small sized sliding-type  hiatal hernia     NM Gastric Emptying (11/06/2020 13:14)-IMPRESSION:  Abnormal exam with delay in gastric emptying.     UPPER GI ENDOSCOPY (11/24/2021 11:37)-normal esophagus, sleeve gastrectomy with normal healthy-appearing mucosa,  submucosal nodule in the duodenum        Her abdominal surgical history includes Chandrika, umbilical hernia repair, and gastric sleeve June 2021.       Subjective      Review of Systems:   Review of Systems   Constitutional:  Negative for chills and fever.   Respiratory:  Negative for chest tightness and shortness of breath.    Cardiovascular:  Negative for chest pain and palpitations.   Gastrointestinal:  Negative for abdominal pain.   Neurological:  Negative for dizziness and light-headedness.       Medications:     Current Outpatient Medications:     acetaminophen (TYLENOL) 500 MG tablet, Take 1-2 tablets by mouth Every 6 (Six) Hours As Needed for Mild Pain., Disp: , Rfl:     Acetaminophen-Caffeine (Excedrin Tension Headache) 500-65 MG tablet, As Needed., Disp: , Rfl:     albuterol sulfate  (90 Base) MCG/ACT inhaler, Inhale 2 puffs Every 4 (Four) Hours As Needed for Wheezing or Shortness of Air., Disp: 18 g, Rfl: 5    Atogepant (Qulipta) 60 MG tablet, Take 1 tablet by mouth Daily., Disp: 30 tablet, Rfl: 11    Biotin (Biotin Maximum) 93172 MCG tablet dispersible, , Disp: , Rfl:     Bismuth Subsalicylate (Kaopectate) 262 MG tablet, Take 2 tablets by mouth 3 (Three) Times a Day After Meals., Disp: , Rfl:     Blood Glucose Monitoring Suppl (ONE TOUCH ULTRA 2) w/Device kit, , Disp: , Rfl:     calcium carbonate (TUMS) 500 MG chewable tablet, Chew 1 tablet Daily., Disp: , Rfl:     Cyanocobalamin (Vitamin B-12) 5000 MCG tablet dispersible, Daily., Disp: , Rfl:     CYCLOBENZAPRINE HCL PO, , Disp: , Rfl:     diphenhydrAMINE (BENADRYL) 25 mg capsule, Take 1 capsule by mouth Every 6 (Six) Hours As Needed for Itching or Sleep., Disp: , Rfl:     diphenhydrAMINE-acetaminophen (Tylenol PM Extra Strength)  MG tablet per tablet, Take 1 tablet by mouth At Night As Needed., Disp: , Rfl:     furosemide (LASIX) 20 MG tablet, Take 1 tablet by mouth Take As Directed. Take one tablet by mouth daily. Can take an extra tablet as  needed., Disp: 90 tablet, Rfl: 3    gabapentin (NEURONTIN) 600 MG tablet, Take 1 tablet by mouth 2 (Two) Times a Day., Disp: , Rfl:     glipizide (GLUCOTROL XL) 10 MG 24 hr tablet, Take 1 tablet by mouth Daily. New dose. Please D/C prior Rx for 5 mg tabs., Disp: 90 tablet, Rfl: 3    HYDROcodone-acetaminophen (NORCO) 5-325 MG per tablet, Take 1 tablet by mouth As Needed., Disp: , Rfl:     Lancets (OneTouch Delica Plus Apafzl64P) misc, Use 1 each 2 (Two) Times a Day., Disp: 100 each, Rfl: 3    loratadine (CLARITIN) 10 MG tablet, TAKE ONE TABLET BY MOUTH DAILY, Disp: 30 tablet, Rfl: 2    Melatonin 10 MG sublingual tablet, As Needed., Disp: , Rfl:     metFORMIN ER (GLUCOPHAGE-XR) 500 MG 24 hr tablet, Take 1 tablet by mouth Daily With Breakfast., Disp: 90 tablet, Rfl: 3    metoprolol tartrate (LOPRESSOR) 50 MG tablet, TAKE ONE TABLET BY MOUTH TWICE A DAY, Disp: 180 tablet, Rfl: 3    montelukast (SINGULAIR) 10 MG tablet, Take 1 tablet by mouth Every Night., Disp: , Rfl:     OnabotulinumtoxinA (Botox) 200 units reconstituted solution, FOR . PHYSICIAN TO INJECT UP  UNITS INTRAMUSCULARLY INTO HEAD, NECK AND SHOULDERS EVERY 90 DAYS., Disp: 1 each, Rfl: 3    OneTouch Ultra test strip, 1 each by Other route 2 (Two) Times a Day., Disp: 100 each, Rfl: 3    simethicone (Gas-X) 80 MG chewable tablet, Chew 1 tablet Every 6 (Six) Hours As Needed for Flatulence., Disp: 10 tablet, Rfl: 0    tiZANidine (ZANAFLEX) 4 MG tablet, Take 1 tablet by mouth 2 (Two) Times a Day., Disp: , Rfl:     traMADol (ULTRAM) 50 MG tablet, Take 1 tablet by mouth 2 (Two) Times a Day., Disp: , Rfl:     vitamin D3 125 MCG (5000 UT) capsule capsule, Take 1 capsule by mouth Daily., Disp: , Rfl:     Vitamin E 180 MG (400 UNIT) capsule capsule, Daily., Disp: , Rfl:     famotidine (PEPCID) 20 MG tablet, Take 1 tablet by mouth 2 (Two) Times a Day., Disp: 60 tablet, Rfl: 11    ondansetron ODT (ZOFRAN-ODT) 8 MG disintegrating tablet, Place 1  "tablet on the tongue Every 8 (Eight) Hours As Needed for Nausea., Disp: 21 tablet, Rfl: 0    Current Facility-Administered Medications:     OnabotulinumtoxinA 155 Units, 155 Units, Intramuscular, Q3 Months, Ciro Francois MD, 155 Units at 07/09/25 0922    Allergies:   Allergies   Allergen Reactions    Capsicum Annuum Extract & Derivative (Bell Pepper) [Capsicum] Anaphylaxis    Codeine Headache, Unknown - Low Severity and Other (See Comments)     Can tolerate hydrocodone, no other adverse reactions to other narcotics.    Doxycycline Hallucinations, Rash and Unknown - Low Severity    Hydrochlorothiazide Swelling and Unknown - Low Severity     eventually causes CHF    Ibuprofen Other (See Comments)     \"makes me retain fluid and eventually go into CHF\"    Nitrofurantoin Nausea Only and Unknown - Low Severity    Peanut-Containing Drug Products Anaphylaxis    Monosodium Glutamate Swelling and Headache    Oatmeal Other (See Comments)     Dx on allergy testing    Banana (Diagnostic) Unknown - High Severity    Coconut Flavoring Agent (Non-Screening) Unknown - High Severity    Doxycycline Monohydrate Other (See Comments)    Isoflavones (Soy) Unknown - Low Severity    Peanut Butter Flavoring Agent (Non-Screening) Unknown - High Severity     All peanuts and byproducts    Protonix [Pantoprazole] Swelling    Amitriptyline Mental Status Change and Unknown - Low Severity     memory gaps + neuropathy + hair loss    Amoxicillin-Pot Clavulanate Other (See Comments) and Unknown - Low Severity     Syncope, not sure if allergic to cephalosporins.    Aspartame Nausea Only    Diclofenac Headache and Unknown - Low Severity    Egg-Derived Products Other (See Comments)     dx on allergy testing, but does not completely avoid    Naproxen Other (See Comments)     \"blackout\"       Social History:   Social History     Socioeconomic History    Marital status: Single   Tobacco Use    Smoking status: Never     Passive exposure: Never    " Smokeless tobacco: Never   Vaping Use    Vaping status: Never Used   Substance and Sexual Activity    Alcohol use: Not Currently     Comment: Very rarely drink socially. At most 2 drinks per 6 months.    Drug use: Never    Sexual activity: Yes     Partners: Male     Birth control/protection: Condom, Coitus interruptus        Surgical History:   Past Surgical History:   Procedure Laterality Date    ABDOMINAL SURGERY  06/15/2021    Bariatric Sleeve Surgery    BARIATRIC SURGERY      COLONOSCOPY  December 2022    COSMETIC SURGERY      Multiple Keloid surgeries    ENDOSCOPY N/A 06/15/2021    Procedure: ESOPHAGOGASTRODUODENOSCOPY;  Surgeon: Ayaka Andre MD;  Location:  [facility];  Service: Robotics - DaVinci;  Laterality: N/A;    ENDOSCOPY N/A 10/28/2021    Procedure: ESOPHAGOGASTRODUODENOSCOPY WITH ACHALASIA BALLOON DILATATION;  Surgeon: Jase Hernandez MD;  Location:  [facility];  Service: Gastroenterology;  Laterality: N/A;  58 F DILATOR    ENDOSCOPY N/A 11/15/2021    Procedure: ESOPHAGOGASTRODUODENOSCOPY WITH DILATATION;  Surgeon: Jase Hernandez MD;  Location:  [facility];  Service: Gastroenterology;  Laterality: N/A;  achalasia balloon     ENDOSCOPY N/A 11/24/2021    Procedure: ESOPHAGOGASTRODUODENOSCOPY WT DUODENAL POLYPECTOPMY AND NASAL JEJUNAL TUBE PLACEMENT;  Surgeon: Jase Hernandez MD;  Location:  [facility];  Service: Gastroenterology;  Laterality: N/A;  placement of feeding tube, checked position with KUB    GASTRECTOMY  06/15/2021    GASTRIC RESTRICTION SURGERY  2021    Sleeve    GASTRIC SLEEVE LAPAROSCOPIC N/A 06/15/2021    Procedure: GASTRIC SLEEVE LAPAROSCOPIC WITH DAVINCI ROBOT, LAPROSCOPIC HIATAL HERNIA REPAIR WITH DAVINCI ROBOT;  Surgeon: Ayaka Andre MD;  Location:  [facility];  Service: Robotics - DaVinci;  Laterality: N/A;    KELOID EXCISION      1990,1993,1999,2015,2016,2019    LAPAROSCOPIC CHOLECYSTECTOMY  2000    for stones    RADIOFREQUENCY ABLATION  2019     x 2  for pt back    SKIN BIOPSY      UMBILICAL HERNIA REPAIR  1990    UPPER GASTROINTESTINAL ENDOSCOPY      WISDOM TOOTH EXTRACTION  1994        Medical History:   Past Medical History:   Diagnosis Date    Allergic     Birth    Allergic rhinitis     Anxiety     Arthritis     Asthma     prn inhalers, does not follow w/ pulmonary    Cholelithiasis 2000    Chronic back pain     previously followed w/ pain management, now just prn Tylenol + Gabapentin    Chronic deep vein thrombosis (DVT) of femoral vein of right lower extremity 09/10/2021    Chronic pain disorder     Clotting disorder 2015    Cluster headache     Depression     Diabetes mellitus     Type II, dx 2014, never on insulin, A1C 7.6     Dyspepsia     Dyspnea on exertion     Dysuria 03/07/2022    Dysuria and hematuria x 2 weeks. 3/7/22 urine culture sent. Rx Macrobid 100 mg twice daily for 7 days. Patient did not tolerate Macrobid well due to GI upset. Urine culture was negative.    Fatigue     Gastroparesis     GERD (gastroesophageal reflux disease)     Headache, tension-type     Heartburn     chronic, prn TUMS, denies prior eval    Hernia 2021    Also 1990    Hirsutism     History of medical problems     PCOS    Hx of radiation therapy     for treatments of Keloids    Hypertension     Irregular menses     infrequent spotting    Lactose intolerance     Leiomyoma, subserous     possible peduculated f3-4 cm fibroid on u/s at [facility]    Low back pain     Migraines     botox q3 months, following Neurology @ [facility]    Morbid obesity     s/p sleeve gastrectomy    Osteoarthritis     Peripheral edema     PMS (premenstrual syndrome)     Polycystic ovary syndrome 2011    PTSD (post-traumatic stress disorder)     Recurrent pregnancy loss, antepartum condition or complication     Seasonal allergies     Self-injurious behavior     Years ago    Slow to wake up after anesthesia     Suicide attempt     Years ago    Syncope     Type 2 diabetes mellitus     Urinary tract infection   "   Varicella     Visual impairment     Astigmatism, Nearsightedness        Objective     Physical Exam:  Vital Signs:   Vitals:    07/15/25 0948   BP: 126/70   BP Location: Right arm   Patient Position: Sitting   Cuff Size: Adult   Pulse: 70   Temp: 97.3 °F (36.3 °C)   TempSrc: Temporal   SpO2: 99%   Weight: 108 kg (238 lb)   Height: 153.7 cm (60.5\")   PainSc: 0-No pain     Body mass index is 45.72 kg/m².     Physical Exam  Vitals reviewed.   Constitutional:       Appearance: Normal appearance. She is obese.      Comments: BMI 45.72    HENT:      Head: Normocephalic and atraumatic.   Eyes:      Extraocular Movements: Extraocular movements intact.      Pupils: Pupils are equal, round, and reactive to light.   Cardiovascular:      Rate and Rhythm: Normal rate and regular rhythm.      Pulses: Normal pulses.      Heart sounds: Normal heart sounds.   Pulmonary:      Effort: Pulmonary effort is normal.      Breath sounds: Normal breath sounds.   Abdominal:      General: Abdomen is flat. Bowel sounds are normal. There is no distension.      Palpations: Abdomen is soft.      Tenderness: There is no abdominal tenderness. There is no guarding or rebound.   Skin:     General: Skin is warm.   Neurological:      General: No focal deficit present.      Mental Status: She is alert and oriented to person, place, and time.   Psychiatric:         Mood and Affect: Mood normal.         Assessment / Plan      Assessment/Plan:   Diagnoses and all orders for this visit:    1. Delayed gastric emptying (Primary)    2. Gastroesophageal reflux disease, unspecified whether esophagitis present  -     famotidine (PEPCID) 20 MG tablet; Take 1 tablet by mouth 2 (Two) Times a Day.  Dispense: 60 tablet; Refill: 11    3. Sliding hiatal hernia    4. Abdominal bloating  -     famotidine (PEPCID) 20 MG tablet; Take 1 tablet by mouth 2 (Two) Times a Day.  Dispense: 60 tablet; Refill: 11    5. Nausea  -     famotidine (PEPCID) 20 MG tablet; Take 1 tablet " by mouth 2 (Two) Times a Day.  Dispense: 60 tablet; Refill: 11    Reviewed recent upper GI series and previous GES from 2020.   Patient has known history of gastroparesis. This and her history of GERD and recurrent hiatal hernia are likely causing her nausea, upper abdominal fullness with eating. Counseled patient that her history of gastric sleeve procedure and T2DM can also worsen her known history of gastroparesis and GERD.   -Plan to start Ginger Root 550 mg tablets TID with meals daily to help stimulate gastric motility and to help with nausea.    -Counseled on Low FODMAP diet. Instructions attached to AVS. Avoid known triggers for her symptoms like broccoli and cabbage.   -Encouraged multiple small meals per day instead of 3 large meals.   -Plan to start Famotidine BID to treat underlying acid disease as this is likely contributing to her nausea.   -Counseled on lifestyle modifications for GERD and importance of controlling acid disease to prevent cellular changes to stomach and esophagus.   -Patient briefly mentions stool seepage that is intermittent and at night. Normal colonoscopy biopsies last year. Recently restarted Magnesium Glycinate supplement. Recommend holding this as can cause loose stools.   -Patient to continue follow up with her pain specialist due to her new and worsening low back pain and history of stool seepage.   -Recommend to increase fiber in diet. Recommend Benefiber or Metamucil supplement to give bulk to stool.  -Plan to follow up on these symptoms in 4 weeks at next visit. Given ER and return precautions.     - follow up in clinic in 4 weeks , or sooner as needed.   - call clinic at any time for questions or new / worsened sx     Follow Up:   Return in about 4 weeks (around 8/12/2025) for Recheck.    Plan of care reviewed with the patient at the conclusion of today's visit.  Education was provided regarding diagnosis, management, and any prescribed or recommended OTC medications.   Patient verbalized understanding of and agreement with management plan.     NOTE TO PATIENT: The 21st Century Cures Act makes medical notes like these available to patients in the interest of transparency. However, be advised this is a medical document. It is intended as peer to peer communication. It is written in medical language and may contain abbreviations or verbiage that are unfamiliar. It may appear blunt or direct. Medical documents are intended to carry relevant information, facts as evident, and the clinical opinion of the practitioner.       Buffy Chambers PA-C   [facility]

## 2025-07-16 ENCOUNTER — TELEPHONE (OUTPATIENT)
Dept: GASTROENTEROLOGY | Facility: CLINIC | Age: 40
End: 2025-07-16
Payer: COMMERCIAL

## 2025-07-16 ENCOUNTER — PATIENT MESSAGE (OUTPATIENT)
Dept: GASTROENTEROLOGY | Facility: CLINIC | Age: 40
End: 2025-07-16
Payer: COMMERCIAL

## 2025-07-16 DIAGNOSIS — R11.0 NAUSEA: Primary | ICD-10-CM

## 2025-07-16 RX ORDER — ONDANSETRON 8 MG/1
8 TABLET, ORALLY DISINTEGRATING ORAL EVERY 8 HOURS PRN
Qty: 21 TABLET | Refills: 0 | Status: SHIPPED | OUTPATIENT
Start: 2025-07-16

## 2025-07-16 NOTE — TELEPHONE ENCOUNTER
Spoke to patient.   Zofran refills sent.   Patient to return for labs in the next few days.   She has no further questions at this time.

## 2025-07-23 ENCOUNTER — HOSPITAL ENCOUNTER (OUTPATIENT)
Dept: GENERAL RADIOLOGY | Facility: HOSPITAL | Age: 40
Discharge: HOME | End: 2025-07-23
Payer: COMMERCIAL

## 2025-07-23 ENCOUNTER — TRANSCRIBE ORDERS (OUTPATIENT)
Dept: GENERAL RADIOLOGY | Facility: HOSPITAL | Age: 40
End: 2025-07-23
Payer: COMMERCIAL

## 2025-07-23 ENCOUNTER — HOSPITAL ENCOUNTER (OUTPATIENT)
Dept: GENERAL RADIOLOGY | Facility: HOSPITAL | Age: 40
Discharge: HOME | End: 2025-07-23
Admitting: PHYSICAL MEDICINE & REHABILITATION
Payer: COMMERCIAL

## 2025-07-23 DIAGNOSIS — M54.30 BACK PAIN WITH SCIATICA: ICD-10-CM

## 2025-07-23 DIAGNOSIS — M54.30 BACK PAIN WITH SCIATICA: Primary | ICD-10-CM

## 2025-07-23 DIAGNOSIS — M54.9 BACK PAIN WITH SCIATICA: Primary | ICD-10-CM

## 2025-07-23 DIAGNOSIS — M54.9 BACK PAIN WITH SCIATICA: ICD-10-CM

## 2025-07-23 PROCEDURE — 72110 X-RAY EXAM L-2 SPINE 4/>VWS: CPT

## (undated) DEVICE — SUCTION CANISTER, 1000CC,SAFELINER: Brand: DEROYAL

## (undated) DEVICE — SYR LUERLOK 50ML

## (undated) DEVICE — INTRO ACCSR BLNT TP

## (undated) DEVICE — SOL IRR H2O BTL 1000ML STRL

## (undated) DEVICE — PK BARIATRIC 10

## (undated) DEVICE — HYBRID CO2 TUBING/CAP SET FOR OLYMPUS® SCOPES & CO2 SOURCE: Brand: ERBE

## (undated) DEVICE — PENROSE DRAIN 18 X .5" SILICONE: Brand: MEDLINE

## (undated) DEVICE — KT ORCA ORCAPOD DISP STRL

## (undated) DEVICE — SEAL

## (undated) DEVICE — THE BITE BLOCK MAXI, LATEX FREE STRAP IS USED TO PROTECT THE ENDOSCOPE INSERTION TUBE FROM BEING BITTEN BY THE PATIENT.

## (undated) DEVICE — Device

## (undated) DEVICE — INTENDED USE FOR SURGICAL MARKING ON INTACT SKIN, ALSO PROVIDES A PERMANENT METHOD OF IDENTIFYING OBJECTS IN THE OPERATING ROOM: Brand: WRITESITE® REGULAR TIP SKIN MARKER

## (undated) DEVICE — SPNG VERSALON 4X4 4PLY NONSTRL LF BG/200

## (undated) DEVICE — SINGLE-USE BIOPSY FORCEPS: Brand: RADIAL JAW 4

## (undated) DEVICE — NASO-JEJUNAL FEEDING TUBE: Brand: KANGAROO

## (undated) DEVICE — COLUMN DRAPE

## (undated) DEVICE — APPL HEMOS FOR DELIVERY FLOSEAL

## (undated) DEVICE — SINGLE-USE POLYPECTOMY SNARE: Brand: SENSATION SHORT THROW

## (undated) DEVICE — LUBE GEL ENDOGLIDE 1.1OZ

## (undated) DEVICE — CONTN GRAD MEAS TRIANG 32OZ BLK

## (undated) DEVICE — TUBING, SUCTION, 1/4" X 10', STRAIGHT: Brand: MEDLINE

## (undated) DEVICE — VISUALIZATION SYSTEM: Brand: CLEARIFY

## (undated) DEVICE — SINGLE USE ACHALASIA BALLON DILATOR: Brand: RIGIFLEX II

## (undated) DEVICE — TROCAR: Brand: KII FIOS FIRST ENTRY

## (undated) DEVICE — GLV SURG SENSICARE PI MIC PF SZ6.5 LF STRL

## (undated) DEVICE — TISSUE RETRIEVAL SYSTEM: Brand: INZII RETRIEVAL SYSTEM

## (undated) DEVICE — GOWN,NON-REINFORCED,SIRUS,SET IN SLV,XXL: Brand: MEDLINE

## (undated) DEVICE — ARM DRAPE

## (undated) DEVICE — PATIENT RETURN ELECTRODE, SINGLE-USE, CONTACT QUALITY MONITORING, ADULT, WITH 9FT CORD, FOR PATIENTS WEIGING OVER 33LBS. (15KG): Brand: MEGADYNE

## (undated) DEVICE — ST TBG CONN PNEUMOCLEAR EVAC SMOKE HEAT/HUMID

## (undated) DEVICE — APL DUPLOSPRAYER MIS 40CM

## (undated) DEVICE — KT SYR GEL ORISE SNGL PK 10ML

## (undated) DEVICE — BLADELESS OBTURATOR, LONG: Brand: WECK VISTA

## (undated) DEVICE — STAPLER 60: Brand: SUREFORM

## (undated) DEVICE — ENDOPATH 5MM ENDOSCOPIC BLUNT TIP DISSECTORS (12 POUCHES CONTAINING 3 DISSECTORS EACH): Brand: ENDOPATH

## (undated) DEVICE — BLANKT WARM UPPR/BDY ARM/OUT 57X196CM

## (undated) DEVICE — SHT AIR TRANSFR COMFRT GLIDE LAT 40X80IN

## (undated) DEVICE — REDUCER: Brand: ENDOWRIST

## (undated) DEVICE — THE CARR-LOCKE INJECTION NEEDLE IS A SINGLE USE, DISPOSABLE, FLEXIBLE SHEATH INJECTION NEEDLE USED FOR THE INJECTION OF VARIOUS TYPES OF MEDIA THROUGH FLEXIBLE ENDOSCOPES.

## (undated) DEVICE — VESSEL SEALER EXTEND: Brand: ENDOWRIST

## (undated) DEVICE — GLV SURG DERMASSURE GRN LF PF 7.0

## (undated) DEVICE — CVR HNDL LIGHT RIGID

## (undated) DEVICE — CANNULA SEAL

## (undated) DEVICE — UNDRPD COMFRT GLD DRYPAD 36X57IN